# Patient Record
Sex: FEMALE | Race: WHITE | Employment: OTHER | URBAN - METROPOLITAN AREA
[De-identification: names, ages, dates, MRNs, and addresses within clinical notes are randomized per-mention and may not be internally consistent; named-entity substitution may affect disease eponyms.]

---

## 2018-06-20 ENCOUNTER — HOSPITAL ENCOUNTER (EMERGENCY)
Age: 48
Discharge: HOME | End: 2018-06-20
Attending: EMERGENCY MEDICINE
Payer: COMMERCIAL

## 2018-06-20 ENCOUNTER — APPOINTMENT (OUTPATIENT)
Dept: CT IMAGING | Age: 48
End: 2018-06-20
Payer: COMMERCIAL

## 2018-06-20 ENCOUNTER — APPOINTMENT (OUTPATIENT)
Dept: GENERAL RADIOLOGY | Age: 48
End: 2018-06-20
Payer: COMMERCIAL

## 2018-06-20 VITALS
OXYGEN SATURATION: 98 % | HEART RATE: 93 BPM | WEIGHT: 148 LBS | TEMPERATURE: 98.8 F | RESPIRATION RATE: 18 BRPM | HEIGHT: 59 IN | BODY MASS INDEX: 29.84 KG/M2 | SYSTOLIC BLOOD PRESSURE: 158 MMHG | DIASTOLIC BLOOD PRESSURE: 86 MMHG

## 2018-06-20 DIAGNOSIS — R07.89 ATYPICAL CHEST PAIN: Primary | ICD-10-CM

## 2018-06-20 DIAGNOSIS — R10.9 ABDOMINAL PAIN, UNSPECIFIED ABDOMINAL LOCATION: ICD-10-CM

## 2018-06-20 DIAGNOSIS — R60.0 LOWER EXTREMITY EDEMA: ICD-10-CM

## 2018-06-20 LAB
ALBUMIN SERPL-MCNC: 3.8 G/DL (ref 3.5–5.2)
ALP BLD-CCNC: 133 U/L (ref 35–104)
ALT SERPL-CCNC: 26 U/L (ref 0–32)
ANION GAP SERPL CALCULATED.3IONS-SCNC: 10 MMOL/L (ref 7–16)
AST SERPL-CCNC: 20 U/L (ref 0–31)
BACTERIA: ABNORMAL /HPF
BASOPHILS ABSOLUTE: 0.01 E9/L (ref 0–0.2)
BASOPHILS RELATIVE PERCENT: 0.1 % (ref 0–2)
BILIRUB SERPL-MCNC: 0.3 MG/DL (ref 0–1.2)
BILIRUBIN URINE: ABNORMAL
BLOOD, URINE: NEGATIVE
BUN BLDV-MCNC: 22 MG/DL (ref 6–20)
CALCIUM SERPL-MCNC: 9.9 MG/DL (ref 8.6–10.2)
CHLORIDE BLD-SCNC: 97 MMOL/L (ref 98–107)
CLARITY: CLEAR
CO2: 28 MMOL/L (ref 22–29)
COLOR: YELLOW
CREAT SERPL-MCNC: 0.6 MG/DL (ref 0.5–1)
EKG ATRIAL RATE: 91 BPM
EKG P AXIS: 47 DEGREES
EKG P-R INTERVAL: 124 MS
EKG Q-T INTERVAL: 362 MS
EKG QRS DURATION: 88 MS
EKG QTC CALCULATION (BAZETT): 445 MS
EKG R AXIS: 31 DEGREES
EKG T AXIS: 49 DEGREES
EKG VENTRICULAR RATE: 91 BPM
EOSINOPHILS ABSOLUTE: 0.09 E9/L (ref 0.05–0.5)
EOSINOPHILS RELATIVE PERCENT: 1 % (ref 0–6)
EPITHELIAL CELLS, UA: ABNORMAL /HPF
GFR AFRICAN AMERICAN: >60
GFR NON-AFRICAN AMERICAN: >60 ML/MIN/1.73
GLUCOSE BLD-MCNC: 446 MG/DL (ref 74–109)
GLUCOSE URINE: >=1000 MG/DL
HCT VFR BLD CALC: 39 % (ref 34–48)
HEMOGLOBIN: 12.9 G/DL (ref 11.5–15.5)
IMMATURE GRANULOCYTES #: 0.03 E9/L
IMMATURE GRANULOCYTES %: 0.3 % (ref 0–5)
KETONES, URINE: NEGATIVE MG/DL
LACTIC ACID: 1.9 MMOL/L (ref 0.5–2.2)
LEUKOCYTE ESTERASE, URINE: ABNORMAL
LIPASE: 27 U/L (ref 13–60)
LYMPHOCYTES ABSOLUTE: 2.19 E9/L (ref 1.5–4)
LYMPHOCYTES RELATIVE PERCENT: 25.4 % (ref 20–42)
MCH RBC QN AUTO: 31 PG (ref 26–35)
MCHC RBC AUTO-ENTMCNC: 33.1 % (ref 32–34.5)
MCV RBC AUTO: 93.8 FL (ref 80–99.9)
MONOCYTES ABSOLUTE: 0.51 E9/L (ref 0.1–0.95)
MONOCYTES RELATIVE PERCENT: 5.9 % (ref 2–12)
NEUTROPHILS ABSOLUTE: 5.79 E9/L (ref 1.8–7.3)
NEUTROPHILS RELATIVE PERCENT: 67.3 % (ref 43–80)
NITRITE, URINE: NEGATIVE
PDW BLD-RTO: 12.2 FL (ref 11.5–15)
PH UA: 6 (ref 5–9)
PLATELET # BLD: 229 E9/L (ref 130–450)
PMV BLD AUTO: 11.5 FL (ref 7–12)
POTASSIUM SERPL-SCNC: 5.6 MMOL/L (ref 3.5–5)
PROTEIN UA: NEGATIVE MG/DL
RBC # BLD: 4.16 E12/L (ref 3.5–5.5)
RBC UA: ABNORMAL /HPF (ref 0–2)
SODIUM BLD-SCNC: 135 MMOL/L (ref 132–146)
SPECIFIC GRAVITY UA: <=1.005 (ref 1–1.03)
TOTAL PROTEIN: 7.6 G/DL (ref 6.4–8.3)
TROPONIN: <0.01 NG/ML (ref 0–0.03)
UROBILINOGEN, URINE: 0.2 E.U./DL
WBC # BLD: 8.6 E9/L (ref 4.5–11.5)
WBC UA: ABNORMAL /HPF (ref 0–5)

## 2018-06-20 PROCEDURE — 36415 COLL VENOUS BLD VENIPUNCTURE: CPT

## 2018-06-20 PROCEDURE — 74177 CT ABD & PELVIS W/CONTRAST: CPT

## 2018-06-20 PROCEDURE — 83605 ASSAY OF LACTIC ACID: CPT

## 2018-06-20 PROCEDURE — 2580000003 HC RX 258: Performed by: EMERGENCY MEDICINE

## 2018-06-20 PROCEDURE — 99285 EMERGENCY DEPT VISIT HI MDM: CPT

## 2018-06-20 PROCEDURE — 96374 THER/PROPH/DIAG INJ IV PUSH: CPT

## 2018-06-20 PROCEDURE — 84484 ASSAY OF TROPONIN QUANT: CPT

## 2018-06-20 PROCEDURE — 81001 URINALYSIS AUTO W/SCOPE: CPT

## 2018-06-20 PROCEDURE — 93005 ELECTROCARDIOGRAM TRACING: CPT | Performed by: EMERGENCY MEDICINE

## 2018-06-20 PROCEDURE — 6360000002 HC RX W HCPCS: Performed by: EMERGENCY MEDICINE

## 2018-06-20 PROCEDURE — 85025 COMPLETE CBC W/AUTO DIFF WBC: CPT

## 2018-06-20 PROCEDURE — 83690 ASSAY OF LIPASE: CPT

## 2018-06-20 PROCEDURE — 80053 COMPREHEN METABOLIC PANEL: CPT

## 2018-06-20 PROCEDURE — 6360000004 HC RX CONTRAST MEDICATION: Performed by: RADIOLOGY

## 2018-06-20 PROCEDURE — 96375 TX/PRO/DX INJ NEW DRUG ADDON: CPT

## 2018-06-20 PROCEDURE — 71045 X-RAY EXAM CHEST 1 VIEW: CPT

## 2018-06-20 RX ORDER — 0.9 % SODIUM CHLORIDE 0.9 %
1000 INTRAVENOUS SOLUTION INTRAVENOUS ONCE
Status: COMPLETED | OUTPATIENT
Start: 2018-06-20 | End: 2018-06-20

## 2018-06-20 RX ORDER — ONDANSETRON 2 MG/ML
8 INJECTION INTRAMUSCULAR; INTRAVENOUS ONCE
Status: COMPLETED | OUTPATIENT
Start: 2018-06-20 | End: 2018-06-20

## 2018-06-20 RX ORDER — KETOROLAC TROMETHAMINE 30 MG/ML
30 INJECTION, SOLUTION INTRAMUSCULAR; INTRAVENOUS ONCE
Status: COMPLETED | OUTPATIENT
Start: 2018-06-20 | End: 2018-06-20

## 2018-06-20 RX ADMIN — KETOROLAC TROMETHAMINE 30 MG: 30 INJECTION, SOLUTION INTRAMUSCULAR at 10:27

## 2018-06-20 RX ADMIN — IOHEXOL 50 ML: 240 INJECTION, SOLUTION INTRATHECAL; INTRAVASCULAR; INTRAVENOUS; ORAL at 12:00

## 2018-06-20 RX ADMIN — ONDANSETRON 8 MG: 2 INJECTION INTRAMUSCULAR; INTRAVENOUS at 10:25

## 2018-06-20 RX ADMIN — IOPAMIDOL 80 ML: 755 INJECTION, SOLUTION INTRAVENOUS at 12:00

## 2018-06-20 RX ADMIN — SODIUM CHLORIDE 1000 ML: 900 INJECTION, SOLUTION INTRAVENOUS at 12:15

## 2018-06-20 ASSESSMENT — ENCOUNTER SYMPTOMS
BLOOD IN STOOL: 0
WHEEZING: 0
CHEST TIGHTNESS: 0
RHINORRHEA: 0
TROUBLE SWALLOWING: 0
CONSTIPATION: 0
COUGH: 0
VOMITING: 0
SHORTNESS OF BREATH: 0
ABDOMINAL PAIN: 1
SORE THROAT: 0
NAUSEA: 0
DIARRHEA: 1

## 2018-06-20 ASSESSMENT — PAIN SCALES - GENERAL
PAINLEVEL_OUTOF10: 6
PAINLEVEL_OUTOF10: 6

## 2018-06-20 ASSESSMENT — PAIN DESCRIPTION - DESCRIPTORS: DESCRIPTORS: ACHING

## 2018-06-20 ASSESSMENT — PAIN DESCRIPTION - LOCATION: LOCATION: BREAST

## 2018-06-20 ASSESSMENT — PAIN DESCRIPTION - PAIN TYPE: TYPE: ACUTE PAIN

## 2018-06-20 ASSESSMENT — PAIN DESCRIPTION - ORIENTATION: ORIENTATION: LEFT

## 2018-06-20 ASSESSMENT — PAIN DESCRIPTION - FREQUENCY: FREQUENCY: CONTINUOUS

## 2018-07-26 ENCOUNTER — HOSPITAL ENCOUNTER (OUTPATIENT)
Dept: INTERVENTIONAL RADIOLOGY/VASCULAR | Age: 48
End: 2018-07-26
Payer: COMMERCIAL

## 2018-07-26 ENCOUNTER — HOSPITAL ENCOUNTER (OUTPATIENT)
Dept: INTERVENTIONAL RADIOLOGY/VASCULAR | Age: 48
Discharge: HOME | End: 2018-07-28
Payer: COMMERCIAL

## 2018-07-26 DIAGNOSIS — M79.606 PAIN OF LOWER EXTREMITY, UNSPECIFIED LATERALITY: ICD-10-CM

## 2018-07-26 PROCEDURE — 93970 EXTREMITY STUDY: CPT

## 2018-07-27 ENCOUNTER — HOSPITAL ENCOUNTER (OUTPATIENT)
Dept: INTERVENTIONAL RADIOLOGY/VASCULAR | Age: 48
Discharge: HOME | End: 2018-07-29
Payer: COMMERCIAL

## 2018-07-27 DIAGNOSIS — I70.90 ARTERIOSCLEROSIS: ICD-10-CM

## 2018-07-27 PROCEDURE — 93923 UPR/LXTR ART STDY 3+ LVLS: CPT

## 2018-09-10 ENCOUNTER — HOSPITAL ENCOUNTER (OUTPATIENT)
Age: 48
Discharge: HOME | End: 2018-09-12
Payer: COMMERCIAL

## 2018-09-10 ENCOUNTER — OFFICE VISIT (OUTPATIENT)
Dept: FAMILY MEDICINE CLINIC | Age: 48
End: 2018-09-10
Payer: COMMERCIAL

## 2018-09-10 VITALS
TEMPERATURE: 98.5 F | RESPIRATION RATE: 18 BRPM | WEIGHT: 153 LBS | HEART RATE: 76 BPM | DIASTOLIC BLOOD PRESSURE: 84 MMHG | HEIGHT: 59 IN | SYSTOLIC BLOOD PRESSURE: 126 MMHG | OXYGEN SATURATION: 99 % | BODY MASS INDEX: 30.84 KG/M2

## 2018-09-10 DIAGNOSIS — E78.2 MIXED HYPERLIPIDEMIA: ICD-10-CM

## 2018-09-10 DIAGNOSIS — H35.00 RETINOPATHY: ICD-10-CM

## 2018-09-10 DIAGNOSIS — M54.50 LUMBAR PAIN: ICD-10-CM

## 2018-09-10 DIAGNOSIS — F41.9 ANXIETY: ICD-10-CM

## 2018-09-10 DIAGNOSIS — K58.9 IRRITABLE BOWEL SYNDROME, UNSPECIFIED TYPE: ICD-10-CM

## 2018-09-10 DIAGNOSIS — R07.89 ATYPICAL CHEST PAIN: ICD-10-CM

## 2018-09-10 DIAGNOSIS — G62.9 NEUROPATHY: ICD-10-CM

## 2018-09-10 DIAGNOSIS — R53.83 FATIGUE, UNSPECIFIED TYPE: ICD-10-CM

## 2018-09-10 DIAGNOSIS — Z00.00 PHYSICAL EXAM: ICD-10-CM

## 2018-09-10 DIAGNOSIS — K21.9 GASTROESOPHAGEAL REFLUX DISEASE WITHOUT ESOPHAGITIS: ICD-10-CM

## 2018-09-10 DIAGNOSIS — R73.01 IFG (IMPAIRED FASTING GLUCOSE): ICD-10-CM

## 2018-09-10 LAB
ALBUMIN SERPL-MCNC: 3.9 G/DL (ref 3.5–5.2)
ALP BLD-CCNC: 128 U/L (ref 35–104)
ALT SERPL-CCNC: 21 U/L (ref 0–32)
AMPHETAMINE SCREEN, URINE: NOT DETECTED
ANION GAP SERPL CALCULATED.3IONS-SCNC: 16 MMOL/L (ref 7–16)
AST SERPL-CCNC: 20 U/L (ref 0–31)
BARBITURATE SCREEN URINE: NOT DETECTED
BASOPHILS ABSOLUTE: 0.01 E9/L (ref 0–0.2)
BASOPHILS RELATIVE PERCENT: 0.1 % (ref 0–2)
BENZODIAZEPINE SCREEN, URINE: NOT DETECTED
BILIRUB SERPL-MCNC: 0.4 MG/DL (ref 0–1.2)
BUN BLDV-MCNC: 18 MG/DL (ref 6–20)
CALCIUM SERPL-MCNC: 9.4 MG/DL (ref 8.6–10.2)
CANNABINOID SCREEN URINE: NOT DETECTED
CHLORIDE BLD-SCNC: 98 MMOL/L (ref 98–107)
CHOLESTEROL, TOTAL: 233 MG/DL (ref 0–199)
CO2: 26 MMOL/L (ref 22–29)
COCAINE METABOLITE SCREEN URINE: NOT DETECTED
CREAT SERPL-MCNC: 0.8 MG/DL (ref 0.5–1)
EOSINOPHILS ABSOLUTE: 0.12 E9/L (ref 0.05–0.5)
EOSINOPHILS RELATIVE PERCENT: 1.3 % (ref 0–6)
GFR AFRICAN AMERICAN: >60
GFR NON-AFRICAN AMERICAN: >60 ML/MIN/1.73
GLUCOSE BLD-MCNC: 206 MG/DL
GLUCOSE BLD-MCNC: 234 MG/DL (ref 74–109)
HBA1C MFR BLD: 10.6 %
HCT VFR BLD CALC: 43.7 % (ref 34–48)
HDLC SERPL-MCNC: 49 MG/DL
HEMOGLOBIN: 13.7 G/DL (ref 11.5–15.5)
IMMATURE GRANULOCYTES #: 0.03 E9/L
IMMATURE GRANULOCYTES %: 0.3 % (ref 0–5)
LDL CHOLESTEROL CALCULATED: 164 MG/DL (ref 0–99)
LYMPHOCYTES ABSOLUTE: 2.11 E9/L (ref 1.5–4)
LYMPHOCYTES RELATIVE PERCENT: 22.1 % (ref 20–42)
MCH RBC QN AUTO: 30.4 PG (ref 26–35)
MCHC RBC AUTO-ENTMCNC: 31.4 % (ref 32–34.5)
MCV RBC AUTO: 97.1 FL (ref 80–99.9)
METHADONE SCREEN, URINE: NOT DETECTED
MONOCYTES ABSOLUTE: 0.54 E9/L (ref 0.1–0.95)
MONOCYTES RELATIVE PERCENT: 5.7 % (ref 2–12)
NEUTROPHILS ABSOLUTE: 6.72 E9/L (ref 1.8–7.3)
NEUTROPHILS RELATIVE PERCENT: 70.5 % (ref 43–80)
OPIATE SCREEN URINE: NOT DETECTED
PDW BLD-RTO: 12.6 FL (ref 11.5–15)
PHENCYCLIDINE SCREEN URINE: NOT DETECTED
PLATELET # BLD: 282 E9/L (ref 130–450)
PMV BLD AUTO: 11.7 FL (ref 7–12)
POTASSIUM SERPL-SCNC: 5.1 MMOL/L (ref 3.5–5)
PROPOXYPHENE SCREEN: NOT DETECTED
RBC # BLD: 4.5 E12/L (ref 3.5–5.5)
SODIUM BLD-SCNC: 140 MMOL/L (ref 132–146)
TOTAL PROTEIN: 7.6 G/DL (ref 6.4–8.3)
TRIGL SERPL-MCNC: 102 MG/DL (ref 0–149)
TSH SERPL DL<=0.05 MIU/L-ACNC: 3.08 UIU/ML (ref 0.27–4.2)
VLDLC SERPL CALC-MCNC: 20 MG/DL
WBC # BLD: 9.5 E9/L (ref 4.5–11.5)

## 2018-09-10 PROCEDURE — 80307 DRUG TEST PRSMV CHEM ANLYZR: CPT

## 2018-09-10 PROCEDURE — 2022F DILAT RTA XM EVC RTNOPTHY: CPT | Performed by: FAMILY MEDICINE

## 2018-09-10 PROCEDURE — G8427 DOCREV CUR MEDS BY ELIG CLIN: HCPCS | Performed by: FAMILY MEDICINE

## 2018-09-10 PROCEDURE — 85025 COMPLETE CBC W/AUTO DIFF WBC: CPT

## 2018-09-10 PROCEDURE — 82962 GLUCOSE BLOOD TEST: CPT | Performed by: FAMILY MEDICINE

## 2018-09-10 PROCEDURE — G8599 NO ASA/ANTIPLAT THER USE RNG: HCPCS | Performed by: FAMILY MEDICINE

## 2018-09-10 PROCEDURE — 3046F HEMOGLOBIN A1C LEVEL >9.0%: CPT | Performed by: FAMILY MEDICINE

## 2018-09-10 PROCEDURE — 3017F COLORECTAL CA SCREEN DOC REV: CPT | Performed by: FAMILY MEDICINE

## 2018-09-10 PROCEDURE — 1036F TOBACCO NON-USER: CPT | Performed by: FAMILY MEDICINE

## 2018-09-10 PROCEDURE — 99204 OFFICE O/P NEW MOD 45 MIN: CPT | Performed by: FAMILY MEDICINE

## 2018-09-10 PROCEDURE — 80061 LIPID PANEL: CPT

## 2018-09-10 PROCEDURE — 84443 ASSAY THYROID STIM HORMONE: CPT

## 2018-09-10 PROCEDURE — G0444 DEPRESSION SCREEN ANNUAL: HCPCS | Performed by: FAMILY MEDICINE

## 2018-09-10 PROCEDURE — 80053 COMPREHEN METABOLIC PANEL: CPT

## 2018-09-10 PROCEDURE — G8417 CALC BMI ABV UP PARAM F/U: HCPCS | Performed by: FAMILY MEDICINE

## 2018-09-10 PROCEDURE — 83036 HEMOGLOBIN GLYCOSYLATED A1C: CPT | Performed by: FAMILY MEDICINE

## 2018-09-10 RX ORDER — GLYBURIDE 5 MG/1
10 TABLET ORAL 2 TIMES DAILY WITH MEALS
Qty: 360 TABLET | Refills: 1 | Status: SHIPPED | OUTPATIENT
Start: 2018-09-10 | End: 2019-04-20

## 2018-09-10 RX ORDER — OMEPRAZOLE 40 MG/1
40 CAPSULE, DELAYED RELEASE ORAL DAILY
Qty: 90 CAPSULE | Refills: 1 | Status: SHIPPED | OUTPATIENT
Start: 2018-09-10 | End: 2018-11-26 | Stop reason: SDUPTHER

## 2018-09-10 RX ORDER — RAMIPRIL 1.25 MG/1
1.25 CAPSULE ORAL DAILY
Qty: 30 CAPSULE | Refills: 3 | Status: SHIPPED | OUTPATIENT
Start: 2018-09-10 | End: 2019-02-11

## 2018-09-10 RX ORDER — NITROGLYCERIN 0.4 MG/1
0.4 TABLET SUBLINGUAL EVERY 5 MIN PRN
Qty: 25 TABLET | Refills: 1 | Status: SHIPPED | OUTPATIENT
Start: 2018-09-10 | End: 2019-04-15 | Stop reason: SDUPTHER

## 2018-09-10 ASSESSMENT — PATIENT HEALTH QUESTIONNAIRE - PHQ9
1. LITTLE INTEREST OR PLEASURE IN DOING THINGS: 1
SUM OF ALL RESPONSES TO PHQ QUESTIONS 1-9: 8
7. TROUBLE CONCENTRATING ON THINGS, SUCH AS READING THE NEWSPAPER OR WATCHING TELEVISION: 0
6. FEELING BAD ABOUT YOURSELF - OR THAT YOU ARE A FAILURE OR HAVE LET YOURSELF OR YOUR FAMILY DOWN: 0
3. TROUBLE FALLING OR STAYING ASLEEP: 2
2. FEELING DOWN, DEPRESSED OR HOPELESS: 2
10. IF YOU CHECKED OFF ANY PROBLEMS, HOW DIFFICULT HAVE THESE PROBLEMS MADE IT FOR YOU TO DO YOUR WORK, TAKE CARE OF THINGS AT HOME, OR GET ALONG WITH OTHER PEOPLE: 1
9. THOUGHTS THAT YOU WOULD BE BETTER OFF DEAD, OR OF HURTING YOURSELF: 0
SUM OF ALL RESPONSES TO PHQ9 QUESTIONS 1 & 2: 3
4. FEELING TIRED OR HAVING LITTLE ENERGY: 2
8. MOVING OR SPEAKING SO SLOWLY THAT OTHER PEOPLE COULD HAVE NOTICED. OR THE OPPOSITE, BEING SO FIGETY OR RESTLESS THAT YOU HAVE BEEN MOVING AROUND A LOT MORE THAN USUAL: 0
SUM OF ALL RESPONSES TO PHQ QUESTIONS 1-9: 8
5. POOR APPETITE OR OVEREATING: 1

## 2018-09-14 PROBLEM — H35.00 RETINOPATHY: Status: ACTIVE | Noted: 2018-09-14

## 2018-09-14 PROBLEM — F41.9 ANXIETY: Status: ACTIVE | Noted: 2018-09-14

## 2018-09-14 PROBLEM — G62.9 NEUROPATHY: Status: ACTIVE | Noted: 2018-09-14

## 2018-09-14 PROBLEM — E78.2 MIXED HYPERLIPIDEMIA: Status: ACTIVE | Noted: 2018-09-14

## 2018-09-14 PROBLEM — R53.83 FATIGUE: Status: ACTIVE | Noted: 2018-09-14

## 2018-09-14 PROBLEM — M54.50 LUMBAR PAIN: Status: ACTIVE | Noted: 2018-09-14

## 2018-09-14 PROBLEM — K58.9 IRRITABLE BOWEL SYNDROME: Status: ACTIVE | Noted: 2018-09-14

## 2018-09-14 PROBLEM — K21.9 GASTROESOPHAGEAL REFLUX DISEASE WITHOUT ESOPHAGITIS: Status: ACTIVE | Noted: 2018-09-14

## 2018-09-14 ASSESSMENT — ENCOUNTER SYMPTOMS
SORE THROAT: 0
DIARRHEA: 0
HEMOPTYSIS: 0
BACK PAIN: 1
DOUBLE VISION: 0
EYE PAIN: 0
ORTHOPNEA: 0
SPUTUM PRODUCTION: 0
PHOTOPHOBIA: 0
SHORTNESS OF BREATH: 0
CONSTIPATION: 0
EYES NEGATIVE: 1
HEARTBURN: 1
ABDOMINAL PAIN: 0
BLURRED VISION: 0
COUGH: 0
NAUSEA: 0
BLOOD IN STOOL: 0
RESPIRATORY NEGATIVE: 1
EYE DISCHARGE: 0
VOMITING: 0
EYE REDNESS: 0
WHEEZING: 0
STRIDOR: 0
SINUS PAIN: 0

## 2018-09-14 NOTE — PROGRESS NOTES
EOM are normal. Right eye exhibits no discharge. Left eye exhibits no discharge. No scleral icterus. Pt states she has no vision right eye. Pt follows with retin center. Neck: Normal range of motion. Neck supple. No JVD present. No tracheal deviation present. No thyromegaly present. Cardiovascular: Normal rate, regular rhythm, normal heart sounds and intact distal pulses. Exam reveals no gallop and no friction rub. No murmur heard. Pulmonary/Chest: Effort normal and breath sounds normal. No stridor. No respiratory distress. She has no wheezes. She has no rales. She exhibits no tenderness. Abdominal: Soft. Bowel sounds are normal. She exhibits no distension and no mass. There is no tenderness. There is no rebound and no guarding. No hernia. Musculoskeletal: She exhibits tenderness. She exhibits no edema or deformity. Pain and decreased ROM lumbar. Lymphadenopathy:     She has no cervical adenopathy. Neurological: She is alert and oriented to person, place, and time. She has normal reflexes. She displays normal reflexes. No cranial nerve deficit or sensory deficit. She exhibits normal muscle tone. Coordination normal.   Skin: Skin is warm. No rash noted. She is not diaphoretic. No erythema. No pallor. Psychiatric: She has a normal mood and affect. Her behavior is normal. Judgment and thought content normal.   Nursing note and vitals reviewed. ASSESSMENT/PLAN  Jenny was seen today for diabetes, depression, discuss medications and medication refill. Diagnoses and all orders for this visit:    [facility] mellitus[facility] ([facility]  -     insulin glargine (BASAGLAR KWIKPEN) 100 UNIT/ML injection pen; Inject 32 Units into the skin nightly  -     LYRICA 50 MG capsule; Take 1 capsule by mouth daily. .  -     glyBURIDE (DIABETA) 5 MG tablet; Take 2 tablets by mouth 2 times daily (with meals)  -     ramipril (ALTACE) 1.25 MG capsule;  Take 1 capsule by mouth daily  -     CBC Auto controlled. Low chol. Diet. IFG (impaired fasting glucose)  -     CBC Auto Differential; Future  -     Comprehensive Metabolic Panel; Future  -     Lipid Panel; Future  -     TSH without Reflex; Future  -     URINE DRUG SCREEN; Future  Instructed on lab. Fatigue, unspecified type  -     CBC Auto Differential; Future  -     Comprehensive Metabolic Panel; Future  -     Lipid Panel; Future  -     TSH without Reflex; Future  -     URINE DRUG SCREEN; Future  Not controlled. Lab. Atypical chest pain  -     Metoprolol Succinate 25 MG CS24; Take 0.5 tablets by mouth daily  -     nitroGLYCERIN (NITROSTAT) 0.4 MG SL tablet; Place 1 tablet under the tongue every 5 minutes as needed for Chest pain  -     CBC Auto Differential; Future  -     Comprehensive Metabolic Panel; Future  -     Lipid Panel; Future  -     TSH without Reflex; Future  -     URINE DRUG SCREEN; Future  Stable. Lab, BB, nitrostat, cardiology. Lumbar pain  -     etodolac (LODINE) 300 MG capsule; Take 1 capsule by mouth daily  -     CBC Auto Differential; Future  -     Comprehensive Metabolic Panel; Future  -     Lipid Panel; Future  -     TSH without Reflex; Future  -     URINE DRUG SCREEN; Future  Stable. Lab, lodine. Other orders  -     POCT Glucose  -     POCT glycosylated hemoglobin (Hb A1C)      Pt instructed if any worse go ED ASAP. Outpatient Encounter Prescriptions as of 9/10/2018   Medication Sig Dispense Refill    insulin glargine (BASAGLAR KWIKPEN) 100 UNIT/ML injection pen Inject 32 Units into the skin nightly 5 pen 1    LYRICA 50 MG capsule Take 1 capsule by mouth daily. . 30 capsule 0    Metoprolol Succinate 25 MG CS24 Take 0.5 tablets by mouth daily 45 capsule 1    omeprazole (PRILOSEC) 40 MG delayed release capsule Take 1 capsule by mouth daily 90 capsule 1    nitroGLYCERIN (NITROSTAT) 0.4 MG SL tablet Place 1 tablet under the tongue every 5 minutes as needed for Chest pain 25 tablet 1    glyBURIDE (DIABETA) 5 MG tablet Take 2 Shingles Vaccine (1 of 2 - 2 Dose Series)    Colon cancer screen colonoscopy     Flu vaccine (1)    A1C test (Diabetic or Prediabetic)     Lipid screen     Potassium monitoring     Creatinine monitoring

## 2018-09-17 ENCOUNTER — HOSPITAL ENCOUNTER (EMERGENCY)
Age: 48
Discharge: HOME | End: 2018-09-17
Payer: COMMERCIAL

## 2018-09-17 ENCOUNTER — APPOINTMENT (OUTPATIENT)
Dept: CT IMAGING | Age: 48
End: 2018-09-17
Payer: COMMERCIAL

## 2018-09-17 VITALS
BODY MASS INDEX: 30.44 KG/M2 | TEMPERATURE: 98.6 F | HEART RATE: 77 BPM | OXYGEN SATURATION: 100 % | RESPIRATION RATE: 18 BRPM | SYSTOLIC BLOOD PRESSURE: 131 MMHG | DIASTOLIC BLOOD PRESSURE: 71 MMHG | WEIGHT: 151 LBS | HEIGHT: 59 IN

## 2018-09-17 DIAGNOSIS — R10.31 ABDOMINAL PAIN, RIGHT LOWER QUADRANT: ICD-10-CM

## 2018-09-17 DIAGNOSIS — R10.13 EPIGASTRIC PAIN: Primary | ICD-10-CM

## 2018-09-17 LAB
ALBUMIN SERPL-MCNC: 3.7 G/DL (ref 3.5–5.2)
ALP BLD-CCNC: 128 U/L (ref 35–104)
ALT SERPL-CCNC: 17 U/L (ref 0–32)
ANION GAP SERPL CALCULATED.3IONS-SCNC: 11 MMOL/L (ref 7–16)
AST SERPL-CCNC: 17 U/L (ref 0–31)
BASOPHILS ABSOLUTE: 0.01 E9/L (ref 0–0.2)
BASOPHILS RELATIVE PERCENT: 0.1 % (ref 0–2)
BILIRUB SERPL-MCNC: 0.4 MG/DL (ref 0–1.2)
BILIRUBIN URINE: ABNORMAL
BLOOD, URINE: NEGATIVE
BUN BLDV-MCNC: 18 MG/DL (ref 6–20)
CALCIUM SERPL-MCNC: 9.2 MG/DL (ref 8.6–10.2)
CHLORIDE BLD-SCNC: 102 MMOL/L (ref 98–107)
CLARITY: CLEAR
CO2: 26 MMOL/L (ref 22–29)
COLOR: YELLOW
CREAT SERPL-MCNC: 0.8 MG/DL (ref 0.5–1)
EOSINOPHILS ABSOLUTE: 0.06 E9/L (ref 0.05–0.5)
EOSINOPHILS RELATIVE PERCENT: 0.7 % (ref 0–6)
GFR AFRICAN AMERICAN: >60
GFR NON-AFRICAN AMERICAN: >60 ML/MIN/1.73
GLUCOSE BLD-MCNC: 348 MG/DL (ref 74–109)
GLUCOSE URINE: >=1000 MG/DL
HCT VFR BLD CALC: 39.2 % (ref 34–48)
HEMOGLOBIN: 12.9 G/DL (ref 11.5–15.5)
IMMATURE GRANULOCYTES #: 0.04 E9/L
IMMATURE GRANULOCYTES %: 0.5 % (ref 0–5)
KETONES, URINE: NEGATIVE MG/DL
LACTIC ACID: 1.3 MMOL/L (ref 0.5–2.2)
LEUKOCYTE ESTERASE, URINE: NEGATIVE
LIPASE: 26 U/L (ref 13–60)
LYMPHOCYTES ABSOLUTE: 2.29 E9/L (ref 1.5–4)
LYMPHOCYTES RELATIVE PERCENT: 26 % (ref 20–42)
MCH RBC QN AUTO: 30.6 PG (ref 26–35)
MCHC RBC AUTO-ENTMCNC: 32.9 % (ref 32–34.5)
MCV RBC AUTO: 93.1 FL (ref 80–99.9)
MONOCYTES ABSOLUTE: 0.47 E9/L (ref 0.1–0.95)
MONOCYTES RELATIVE PERCENT: 5.3 % (ref 2–12)
NEUTROPHILS ABSOLUTE: 5.94 E9/L (ref 1.8–7.3)
NEUTROPHILS RELATIVE PERCENT: 67.4 % (ref 43–80)
NITRITE, URINE: NEGATIVE
PDW BLD-RTO: 11.9 FL (ref 11.5–15)
PH UA: 6 (ref 5–9)
PLATELET # BLD: 254 E9/L (ref 130–450)
PMV BLD AUTO: 11.1 FL (ref 7–12)
POTASSIUM SERPL-SCNC: 4.7 MMOL/L (ref 3.5–5)
PROTEIN UA: NEGATIVE MG/DL
RBC # BLD: 4.21 E12/L (ref 3.5–5.5)
SODIUM BLD-SCNC: 139 MMOL/L (ref 132–146)
SPECIFIC GRAVITY UA: <=1.005 (ref 1–1.03)
TOTAL PROTEIN: 7.3 G/DL (ref 6.4–8.3)
UROBILINOGEN, URINE: 0.2 E.U./DL
WBC # BLD: 8.8 E9/L (ref 4.5–11.5)

## 2018-09-17 PROCEDURE — 36415 COLL VENOUS BLD VENIPUNCTURE: CPT

## 2018-09-17 PROCEDURE — 99284 EMERGENCY DEPT VISIT MOD MDM: CPT

## 2018-09-17 PROCEDURE — 83690 ASSAY OF LIPASE: CPT

## 2018-09-17 PROCEDURE — 6360000002 HC RX W HCPCS: Performed by: NURSE PRACTITIONER

## 2018-09-17 PROCEDURE — 6360000004 HC RX CONTRAST MEDICATION: Performed by: RADIOLOGY

## 2018-09-17 PROCEDURE — 80053 COMPREHEN METABOLIC PANEL: CPT

## 2018-09-17 PROCEDURE — 96375 TX/PRO/DX INJ NEW DRUG ADDON: CPT

## 2018-09-17 PROCEDURE — 83605 ASSAY OF LACTIC ACID: CPT

## 2018-09-17 PROCEDURE — 74177 CT ABD & PELVIS W/CONTRAST: CPT

## 2018-09-17 PROCEDURE — 2580000003 HC RX 258: Performed by: NURSE PRACTITIONER

## 2018-09-17 PROCEDURE — 81003 URINALYSIS AUTO W/O SCOPE: CPT

## 2018-09-17 PROCEDURE — 96374 THER/PROPH/DIAG INJ IV PUSH: CPT

## 2018-09-17 PROCEDURE — 85025 COMPLETE CBC W/AUTO DIFF WBC: CPT

## 2018-09-17 RX ORDER — 0.9 % SODIUM CHLORIDE 0.9 %
1000 INTRAVENOUS SOLUTION INTRAVENOUS ONCE
Status: COMPLETED | OUTPATIENT
Start: 2018-09-17 | End: 2018-09-17

## 2018-09-17 RX ORDER — SUCRALFATE 1 G/1
1 TABLET ORAL 4 TIMES DAILY
Qty: 40 TABLET | Refills: 0 | Status: SHIPPED | OUTPATIENT
Start: 2018-09-17 | End: 2018-10-10 | Stop reason: CLARIF

## 2018-09-17 RX ORDER — KETOROLAC TROMETHAMINE 30 MG/ML
30 INJECTION, SOLUTION INTRAMUSCULAR; INTRAVENOUS ONCE
Status: COMPLETED | OUTPATIENT
Start: 2018-09-17 | End: 2018-09-17

## 2018-09-17 RX ORDER — PANTOPRAZOLE SODIUM 40 MG/1
40 TABLET, DELAYED RELEASE ORAL DAILY
Qty: 10 TABLET | Refills: 0 | Status: SHIPPED | OUTPATIENT
Start: 2018-09-17 | End: 2018-10-10

## 2018-09-17 RX ORDER — DICYCLOMINE HYDROCHLORIDE 10 MG/1
20 CAPSULE ORAL
Qty: 40 CAPSULE | Refills: 0 | Status: SHIPPED | OUTPATIENT
Start: 2018-09-17 | End: 2018-10-10 | Stop reason: CLARIF

## 2018-09-17 RX ORDER — ROSUVASTATIN CALCIUM 10 MG/1
5 TABLET, COATED ORAL DAILY
COMMUNITY
End: 2019-04-15 | Stop reason: SDUPTHER

## 2018-09-17 RX ORDER — METOCLOPRAMIDE 10 MG/1
10 TABLET ORAL 4 TIMES DAILY
Qty: 120 TABLET | Refills: 0 | Status: SHIPPED | OUTPATIENT
Start: 2018-09-17 | End: 2019-02-11 | Stop reason: CLARIF

## 2018-09-17 RX ORDER — MORPHINE SULFATE 10 MG/ML
6 INJECTION, SOLUTION INTRAMUSCULAR; INTRAVENOUS ONCE
Status: COMPLETED | OUTPATIENT
Start: 2018-09-17 | End: 2018-09-17

## 2018-09-17 RX ORDER — ONDANSETRON 2 MG/ML
4 INJECTION INTRAMUSCULAR; INTRAVENOUS ONCE
Status: COMPLETED | OUTPATIENT
Start: 2018-09-17 | End: 2018-09-17

## 2018-09-17 RX ADMIN — KETOROLAC TROMETHAMINE 30 MG: 30 INJECTION, SOLUTION INTRAMUSCULAR; INTRAVENOUS at 12:12

## 2018-09-17 RX ADMIN — IOPAMIDOL 80 ML: 755 INJECTION, SOLUTION INTRAVENOUS at 13:02

## 2018-09-17 RX ADMIN — ONDANSETRON 4 MG: 2 INJECTION, SOLUTION INTRAMUSCULAR; INTRAVENOUS at 12:11

## 2018-09-17 RX ADMIN — SODIUM CHLORIDE 1000 ML: 9 INJECTION, SOLUTION INTRAVENOUS at 12:11

## 2018-09-17 RX ADMIN — MORPHINE SULFATE 6 MG: 10 INJECTION INTRAVENOUS at 12:12

## 2018-09-17 ASSESSMENT — PAIN SCALES - GENERAL
PAINLEVEL_OUTOF10: 8
PAINLEVEL_OUTOF10: 10

## 2018-09-17 NOTE — ED PROVIDER NOTES
that she has never smoked. She has never used smokeless tobacco. She reports that she drinks alcohol. She reports that she does not use drugs. Family History: family history includes Diabetes in her father and mother; Heart Disease in her father and mother. Allergies: Patient has no known allergies. Physical Exam           ED Triage Vitals [09/17/18 1124]   BP Temp Temp Source Pulse Resp SpO2 Height Weight   132/66 98.6 °F (37 °C) Oral 78 20 100 % 4' 11\" (1.499 m) 151 lb (68.5 kg)      Oxygen Saturation Interpretation: Normal.    · General Appearance/Constitutional:  Alert, development consistent with age. · HEENT:  NC/NT. PERRLA. Airway patent. · Neck:  Supple. No lymphadenopathy. · Respiratory:  No retractions. Lungs Clear to auscultation and breath sounds equal.  · CV:  Regular rate and rhythm. · GI:  General Appearance: normal.         Bowel sounds: normal bowel sounds. Distension:  None. Tenderness: No abdominal tenderness. Liver: non-palpable and non-tender. Spleen:  non-palpable and non-tender. Pulsatile Mass: absent. Hernia:  no inguinal or femoral hernias noted. · Back: CVA Tenderness: No.  · : (chaperone present during examination). deferred. · Integument:  Normal turgor. Warm, dry, without visible rash, unless noted elsewhere. · Lymphatics: No edema, cap.refill <3sec. · Neurological:  Orientation age-appropriate. Motor functions intact.     Lab / Imaging Results   (All laboratory and radiology results have been personally reviewed by myself)  Labs:  Results for orders placed or performed during the hospital encounter of 09/17/18   CBC auto differential   Result Value Ref Range    WBC 8.8 4.5 - 11.5 E9/L    RBC 4.21 3.50 - 5.50 E12/L    Hemoglobin 12.9 11.5 - 15.5 g/dL    Hematocrit 39.2 34.0 - 48.0 %    MCV 93.1 80.0 - 99.9 fL    MCH 30.6 26.0 - 35.0 pg    MCHC 32.9 32.0 - 34.5 %    RDW 11.9 11.5 - 15.0 fL signed by LAKESHA Amos CNP   DD: 9/17/18  **This report was transcribed using voice recognition software. Every effort was made to ensure accuracy; however, inadvertent computerized transcription errors may be present.   END OF ED PROVIDER NOTE     LAKESHA Amos CNP  09/17/18 2293

## 2018-09-17 NOTE — ED NOTES
Radiology Procedure Waiver   Name: Gabby Griggs  : 1970  MRN: [id number]    Date:  18    Time: 11:39 AM    Benefits of immediately proceeding with Radiology exam(s) without pre-testing outweigh the risks or are not indicated as specified below and therefore the following is/are being waived:    [x] Pregnancy test   [] Patients LMP on-time and regular.   [] Patient had Tubal Ligation or has other Contraception Device. [] Patient  is Menopausal or Premenarcheal.    [x] Patient had Full or Partial Hysterectomy. [] Protocol for Iodine allergy    [] MRI Questionnaire     [] BUN/Creatinine   [] Patient age w/no hx of renal dysfunction. [] Patient on Dialysis. [] Recent Normal Labs.   Electronically signed by LAKESHA Mosquera CNP on 18 at 11:39 AM             LAKESHA Mosquera CNP  [phone]

## 2018-10-09 ENCOUNTER — HOSPITAL ENCOUNTER (OUTPATIENT)
Age: 48
Discharge: HOME | End: 2018-10-09
Payer: COMMERCIAL

## 2018-10-09 PROCEDURE — 87045 FECES CULTURE AEROBIC BACT: CPT

## 2018-10-09 PROCEDURE — 89055 LEUKOCYTE ASSESSMENT FECAL: CPT

## 2018-10-09 PROCEDURE — G0328 FECAL BLOOD SCRN IMMUNOASSAY: HCPCS

## 2018-10-09 PROCEDURE — 87329 GIARDIA AG IA: CPT

## 2018-10-10 ENCOUNTER — OFFICE VISIT (OUTPATIENT)
Dept: FAMILY MEDICINE CLINIC | Age: 48
End: 2018-10-10
Payer: COMMERCIAL

## 2018-10-10 ENCOUNTER — TELEPHONE (OUTPATIENT)
Dept: FAMILY MEDICINE CLINIC | Age: 48
End: 2018-10-10

## 2018-10-10 VITALS
WEIGHT: 151 LBS | RESPIRATION RATE: 18 BRPM | HEART RATE: 81 BPM | SYSTOLIC BLOOD PRESSURE: 122 MMHG | OXYGEN SATURATION: 98 % | DIASTOLIC BLOOD PRESSURE: 84 MMHG | BODY MASS INDEX: 30.44 KG/M2 | HEIGHT: 59 IN

## 2018-10-10 DIAGNOSIS — H35.00 RETINOPATHY: ICD-10-CM

## 2018-10-10 DIAGNOSIS — K21.9 GASTROESOPHAGEAL REFLUX DISEASE WITHOUT ESOPHAGITIS: ICD-10-CM

## 2018-10-10 DIAGNOSIS — R42 VERTIGO: ICD-10-CM

## 2018-10-10 DIAGNOSIS — E78.2 MIXED HYPERLIPIDEMIA: ICD-10-CM

## 2018-10-10 DIAGNOSIS — Z12.39 SCREENING FOR BREAST CANCER: ICD-10-CM

## 2018-10-10 DIAGNOSIS — G62.9 NEUROPATHY: ICD-10-CM

## 2018-10-10 DIAGNOSIS — R53.1 WEAKNESS: ICD-10-CM

## 2018-10-10 LAB
GIARDIA ANTIGEN STOOL: NORMAL
WHITE BLOOD CELLS (WBC), STOOL: NORMAL

## 2018-10-10 PROCEDURE — G8417 CALC BMI ABV UP PARAM F/U: HCPCS | Performed by: FAMILY MEDICINE

## 2018-10-10 PROCEDURE — G8599 NO ASA/ANTIPLAT THER USE RNG: HCPCS | Performed by: FAMILY MEDICINE

## 2018-10-10 PROCEDURE — 99214 OFFICE O/P EST MOD 30 MIN: CPT | Performed by: FAMILY MEDICINE

## 2018-10-10 PROCEDURE — 1036F TOBACCO NON-USER: CPT | Performed by: FAMILY MEDICINE

## 2018-10-10 PROCEDURE — 2022F DILAT RTA XM EVC RTNOPTHY: CPT | Performed by: FAMILY MEDICINE

## 2018-10-10 PROCEDURE — 3046F HEMOGLOBIN A1C LEVEL >9.0%: CPT | Performed by: FAMILY MEDICINE

## 2018-10-10 PROCEDURE — 3017F COLORECTAL CA SCREEN DOC REV: CPT | Performed by: FAMILY MEDICINE

## 2018-10-10 PROCEDURE — G8484 FLU IMMUNIZE NO ADMIN: HCPCS | Performed by: FAMILY MEDICINE

## 2018-10-10 PROCEDURE — G8427 DOCREV CUR MEDS BY ELIG CLIN: HCPCS | Performed by: FAMILY MEDICINE

## 2018-10-10 RX ORDER — BUMETANIDE 1 MG/1
1 TABLET ORAL EVERY OTHER DAY
COMMUNITY
End: 2019-04-15 | Stop reason: SDUPTHER

## 2018-10-10 RX ORDER — BLOOD PRESSURE TEST KIT
1 KIT MISCELLANEOUS DAILY
Qty: 1 KIT | Refills: 0 | Status: SHIPPED | OUTPATIENT
Start: 2018-10-10

## 2018-10-11 LAB — CULTURE, STOOL: NORMAL

## 2018-10-12 LAB — OCCULT BLOOD SCREENING: NORMAL

## 2018-10-15 ASSESSMENT — ENCOUNTER SYMPTOMS
BLOOD IN STOOL: 0
SORE THROAT: 0
ORTHOPNEA: 0
WHEEZING: 0
PHOTOPHOBIA: 0
COUGH: 0
DIARRHEA: 0
BLURRED VISION: 1
DOUBLE VISION: 0
EYE DISCHARGE: 0
SPUTUM PRODUCTION: 0
HEMOPTYSIS: 0
SHORTNESS OF BREATH: 0
NAUSEA: 0
VOMITING: 0
ABDOMINAL PAIN: 0
BACK PAIN: 0
SINUS PAIN: 0
HEARTBURN: 1
EYE REDNESS: 0
STRIDOR: 0
EYE PAIN: 0
RESPIRATORY NEGATIVE: 1
CONSTIPATION: 0

## 2018-10-15 NOTE — PROGRESS NOTES
Positive for tingling and focal weakness. Negative for dizziness, tremors, sensory change, speech change, seizures, loss of consciousness, weakness and headaches. Endo/Heme/Allergies: Negative. Negative for environmental allergies and polydipsia. Does not bruise/bleed easily. Psychiatric/Behavioral: Negative. Negative for depression, hallucinations, memory loss, substance abuse and suicidal ideas. The patient is not nervous/anxious and does not have insomnia. Past Medical/Surgical Hx;  Reviewed with patient      Diagnosis Date    Arthritis     lower back    Back pain     Diabetes mellitus ([address]     H/O exercise stress test 10/2015  ? Dr Ronaldo Hobson  neg    Hyperlipidemia     Hypertension     Neuropathy due to secondary diabetes ([address]     in marcial feet     Past Surgical History:   Procedure Laterality Date    ANKLE SURGERY      bilateral tarsal tunnels    CARPAL TUNNEL RELEASE      left    CARPAL TUNNEL RELEASE  12    right     SECTION      HYSTERECTOMY      SHOULDER ARTHROSCOPY Left 2016    subacromin decompression and debridement       Past Family Hx:  Reviewed with patient  Family History   Problem Relation Age of Onset    Heart Disease Mother     Diabetes Mother     Heart Disease Father     Diabetes Father        Social Hx:  Reviewed with patient  Social History   Substance Use Topics    Smoking status: Never Smoker    Smokeless tobacco: Never Used    Alcohol use Yes      Comment: social       OBJECTIVE  /84   Pulse 81   Resp 18   Ht 4' 11\" (1.499 m)   Wt 151 lb (68.5 kg)   LMP  (LMP Unknown)   SpO2 98%   Breastfeeding? No   BMI 30.50 kg/m²     Problem List:  Praveen Dsouza  does not have any pertinent problems on file. PHYS EX:  Physical Exam   Constitutional: She is oriented to person, place, and time. She appears well-developed and well-nourished. No distress. HENT:   Head: Normocephalic and atraumatic.    Right Ear: External ear normal.   Left Ear: (VICTOZA) 18 MG/3ML SOPN SC injection; Inject 0.6 mg into the skin daily  Not controlled. Micro, diabeta, victoza, basaglar, ace, statin, aspirin, ADA diet. Neuropathy  -     Misc. Devices (QUAD CANE) MISC; 1 Quad cane  -     [facility] [address]  Not controlled. Lodine,  Lyrica, quad cane. Gastroesophageal reflux disease without esophagitis  Stable. Reglan, prilosex. Retinopathy  Stable. Specialist.  Mixed hyperlipidemia  Not controlled. Low chol. Diet, statin. Screening for breast cancer  -     [facility] DIGITAL SCREEN W OR WO CAD BILATERAL; Future  Pt instructed on marcial. Curry.  Vertigo  -     Blood Pressure KIT; 1 kit by Does not apply route daily  -     [facility] [address]  Stable. BP kit, PT. Weakness  -     Blood Pressure KIT; 1 kit by Does not apply route daily  -     [facility] [address]  Stable. BP kit, PT. Pt instructed if any worse go ED ASAP. Outpatient Encounter Prescriptions as of 10/10/2018   Medication Sig Dispense Refill    bumetanide (BUMEX) 1 MG tablet Take 1 mg by mouth every other day      Blood Pressure KIT 1 kit by Does not apply route daily 1 kit 0    Misc. Devices (QUAD CANE) MISC 1 Quad cane 1 each 0    Liraglutide (VICTOZA) 18 MG/3ML SOPN SC injection Inject 0.6 mg into the skin daily 1 pen 3    rosuvastatin (CRESTOR) 10 MG tablet Take 10 mg by mouth daily      metoclopramide (REGLAN) 10 MG tablet Take 1 tablet by mouth 4 times daily 120 tablet 0    insulin glargine (BASAGLAR KWIKPEN) 100 UNIT/ML injection pen Inject 32 Units into the skin nightly 5 pen 1    LYRICA 50 MG capsule Take 1 capsule by mouth daily. . 30 capsule 0    Metoprolol Succinate 25 MG CS24 Take 0.5 tablets by mouth daily 45 capsule 1    omeprazole (PRILOSEC) 40 MG delayed release capsule Take 1 capsule by mouth daily 90 capsule 1    nitroGLYCERIN (NITROSTAT) 0.4 MG SL tablet Place 1 tablet under the tongue every 5 minutes as needed for Chest

## 2018-10-22 DIAGNOSIS — G62.9 NEUROPATHY: ICD-10-CM

## 2018-10-29 ENCOUNTER — HOSPITAL ENCOUNTER (OUTPATIENT)
Age: 48
Discharge: HOME | End: 2018-10-31
Payer: COMMERCIAL

## 2018-10-29 ENCOUNTER — OFFICE VISIT (OUTPATIENT)
Dept: FAMILY MEDICINE CLINIC | Age: 48
End: 2018-10-29
Payer: COMMERCIAL

## 2018-10-29 VITALS
DIASTOLIC BLOOD PRESSURE: 66 MMHG | WEIGHT: 152 LBS | SYSTOLIC BLOOD PRESSURE: 124 MMHG | HEIGHT: 59 IN | OXYGEN SATURATION: 99 % | HEART RATE: 80 BPM | RESPIRATION RATE: 18 BRPM | BODY MASS INDEX: 30.64 KG/M2

## 2018-10-29 DIAGNOSIS — G62.9 NEUROPATHY: ICD-10-CM

## 2018-10-29 DIAGNOSIS — R53.83 FATIGUE, UNSPECIFIED TYPE: ICD-10-CM

## 2018-10-29 DIAGNOSIS — Z23 IMMUNIZATION DUE: ICD-10-CM

## 2018-10-29 DIAGNOSIS — E78.2 MIXED HYPERLIPIDEMIA: ICD-10-CM

## 2018-10-29 DIAGNOSIS — L30.9 DERMATITIS: ICD-10-CM

## 2018-10-29 LAB
AMPHETAMINE SCREEN, URINE: NOT DETECTED
BARBITURATE SCREEN URINE: NOT DETECTED
BENZODIAZEPINE SCREEN, URINE: NOT DETECTED
CANNABINOID SCREEN URINE: NOT DETECTED
COCAINE METABOLITE SCREEN URINE: NOT DETECTED
GLUCOSE BLD-MCNC: 265 MG/DL
METHADONE SCREEN, URINE: NOT DETECTED
MICROALBUMIN UR-MCNC: <12 MG/L
OPIATE SCREEN URINE: NOT DETECTED
PHENCYCLIDINE SCREEN URINE: NOT DETECTED
PROPOXYPHENE SCREEN: NOT DETECTED

## 2018-10-29 PROCEDURE — 1036F TOBACCO NON-USER: CPT | Performed by: FAMILY MEDICINE

## 2018-10-29 PROCEDURE — 2022F DILAT RTA XM EVC RTNOPTHY: CPT | Performed by: FAMILY MEDICINE

## 2018-10-29 PROCEDURE — G8417 CALC BMI ABV UP PARAM F/U: HCPCS | Performed by: FAMILY MEDICINE

## 2018-10-29 PROCEDURE — 82044 UR ALBUMIN SEMIQUANTITATIVE: CPT

## 2018-10-29 PROCEDURE — 3017F COLORECTAL CA SCREEN DOC REV: CPT | Performed by: FAMILY MEDICINE

## 2018-10-29 PROCEDURE — 99214 OFFICE O/P EST MOD 30 MIN: CPT | Performed by: FAMILY MEDICINE

## 2018-10-29 PROCEDURE — G8427 DOCREV CUR MEDS BY ELIG CLIN: HCPCS | Performed by: FAMILY MEDICINE

## 2018-10-29 PROCEDURE — 80307 DRUG TEST PRSMV CHEM ANLYZR: CPT

## 2018-10-29 PROCEDURE — 90732 PPSV23 VACC 2 YRS+ SUBQ/IM: CPT | Performed by: FAMILY MEDICINE

## 2018-10-29 PROCEDURE — 90471 IMMUNIZATION ADMIN: CPT | Performed by: FAMILY MEDICINE

## 2018-10-29 PROCEDURE — 90472 IMMUNIZATION ADMIN EACH ADD: CPT | Performed by: FAMILY MEDICINE

## 2018-10-29 PROCEDURE — 82962 GLUCOSE BLOOD TEST: CPT | Performed by: FAMILY MEDICINE

## 2018-10-29 PROCEDURE — G8599 NO ASA/ANTIPLAT THER USE RNG: HCPCS | Performed by: FAMILY MEDICINE

## 2018-10-29 PROCEDURE — 90686 IIV4 VACC NO PRSV 0.5 ML IM: CPT | Performed by: FAMILY MEDICINE

## 2018-10-29 PROCEDURE — 3046F HEMOGLOBIN A1C LEVEL >9.0%: CPT | Performed by: FAMILY MEDICINE

## 2018-10-29 PROCEDURE — G8482 FLU IMMUNIZE ORDER/ADMIN: HCPCS | Performed by: FAMILY MEDICINE

## 2018-10-29 RX ORDER — BLOOD-GLUCOSE METER
1 KIT MISCELLANEOUS DAILY
Qty: 1 KIT | Refills: 0 | Status: SHIPPED | OUTPATIENT
Start: 2018-10-29 | End: 2021-08-04

## 2018-10-29 RX ORDER — PREGABALIN 50 MG/1
50 CAPSULE ORAL DAILY
Qty: 30 CAPSULE | Refills: 0 | Status: SHIPPED | OUTPATIENT
Start: 2018-10-29 | End: 2018-11-09

## 2018-10-29 RX ORDER — MUPIROCIN CALCIUM 20 MG/G
CREAM TOPICAL
Qty: 1 TUBE | Refills: 0 | Status: SHIPPED | OUTPATIENT
Start: 2018-10-29 | End: 2018-11-28

## 2018-10-30 ENCOUNTER — TELEPHONE (OUTPATIENT)
Dept: FAMILY MEDICINE CLINIC | Age: 48
End: 2018-10-30

## 2018-11-04 PROBLEM — L30.9 DERMATITIS: Status: ACTIVE | Noted: 2018-11-04

## 2018-11-04 ASSESSMENT — ENCOUNTER SYMPTOMS
VOMITING: 0
RESPIRATORY NEGATIVE: 1
EYE REDNESS: 0
EYE PAIN: 0
BLOOD IN STOOL: 0
ORTHOPNEA: 0
DIARRHEA: 0
SINUS PAIN: 0
EYES NEGATIVE: 1
ABDOMINAL PAIN: 0
HEARTBURN: 0
STRIDOR: 0
SORE THROAT: 0
NAUSEA: 0
BLURRED VISION: 0
BACK PAIN: 0
DOUBLE VISION: 0
PHOTOPHOBIA: 0
CONSTIPATION: 0
SPUTUM PRODUCTION: 0
EYE DISCHARGE: 0
HEMOPTYSIS: 0
COUGH: 0
WHEEZING: 0
GASTROINTESTINAL NEGATIVE: 1
SHORTNESS OF BREATH: 0

## 2018-11-05 ENCOUNTER — TELEPHONE (OUTPATIENT)
Dept: FAMILY MEDICINE CLINIC | Age: 48
End: 2018-11-05

## 2018-11-05 RX ORDER — GLUCOSAMINE HCL/CHONDROITIN SU 500-400 MG
CAPSULE ORAL
Qty: 100 STRIP | Refills: 1 | Status: SHIPPED | OUTPATIENT
Start: 2018-11-05 | End: 2018-11-19 | Stop reason: SDUPTHER

## 2018-11-05 RX ORDER — LANCETS 30 GAUGE
1 EACH MISCELLANEOUS DAILY
Qty: 100 EACH | Refills: 1 | Status: SHIPPED | OUTPATIENT
Start: 2018-11-05 | End: 2019-06-10 | Stop reason: SDUPTHER

## 2018-11-07 ENCOUNTER — TELEPHONE (OUTPATIENT)
Dept: FAMILY MEDICINE CLINIC | Age: 48
End: 2018-11-07

## 2018-11-07 DIAGNOSIS — G62.9 NEUROPATHY: Primary | ICD-10-CM

## 2018-11-08 ENCOUNTER — TELEPHONE (OUTPATIENT)
Dept: FAMILY MEDICINE CLINIC | Age: 48
End: 2018-11-08

## 2018-11-08 NOTE — TELEPHONE ENCOUNTER
Tim Juarez has reviewed the request for [facility] submitted by Josep Wood on behalf of Charlene Ayala on 11/07/2018. After review, the request for service is:  Denied: It did not meet the established medical necessity criteria or guidelines at this time. The request does not meet Plan criteria for an exception to the preferred drug list (formulary). Other medications are available on the preferred drug list.  [place] is given to patients who have tried or are unable to try certain medicines at the maximum tolerated dose for 6 to 8 weeks: tricyclic anti-depressants-(IE: amitriptyline, nortriptyline, desipramine, imipramine, doexpin), OR SNRI anti-depressants (IE: duloxetine, venlafaxine). The facts reviewed in your pharmacy claims history does not show you have tried one of these anti-depressant medications for the length required. Please speak with your doctor about your choices. This decision was made per the [facility] (pregabalin) Guideline.     Electronically signed by Kim Escobar MA on 11/8/18 at 9:45 AM

## 2018-11-09 RX ORDER — DULOXETIN HYDROCHLORIDE 30 MG/1
30 CAPSULE, DELAYED RELEASE ORAL EVERY MORNING
Qty: 90 CAPSULE | Refills: 1 | Status: SHIPPED | OUTPATIENT
Start: 2018-11-09 | End: 2019-04-15

## 2018-11-17 ENCOUNTER — APPOINTMENT (OUTPATIENT)
Dept: GENERAL RADIOLOGY | Age: 48
End: 2018-11-17
Payer: COMMERCIAL

## 2018-11-17 ENCOUNTER — HOSPITAL ENCOUNTER (EMERGENCY)
Age: 48
Discharge: HOME | End: 2018-11-17
Payer: COMMERCIAL

## 2018-11-17 ENCOUNTER — APPOINTMENT (OUTPATIENT)
Dept: CT IMAGING | Age: 48
End: 2018-11-17
Payer: COMMERCIAL

## 2018-11-17 VITALS
DIASTOLIC BLOOD PRESSURE: 79 MMHG | SYSTOLIC BLOOD PRESSURE: 128 MMHG | HEART RATE: 84 BPM | HEIGHT: 59 IN | RESPIRATION RATE: 18 BRPM | BODY MASS INDEX: 30.64 KG/M2 | WEIGHT: 152 LBS | TEMPERATURE: 97.9 F | OXYGEN SATURATION: 99 %

## 2018-11-17 DIAGNOSIS — S70.02XA CONTUSION OF LEFT HIP, INITIAL ENCOUNTER: ICD-10-CM

## 2018-11-17 DIAGNOSIS — S09.90XA INJURY OF HEAD, INITIAL ENCOUNTER: Primary | ICD-10-CM

## 2018-11-17 PROCEDURE — 96374 THER/PROPH/DIAG INJ IV PUSH: CPT

## 2018-11-17 PROCEDURE — 99284 EMERGENCY DEPT VISIT MOD MDM: CPT

## 2018-11-17 PROCEDURE — 73700 CT LOWER EXTREMITY W/O DYE: CPT

## 2018-11-17 PROCEDURE — 6360000002 HC RX W HCPCS: Performed by: NURSE PRACTITIONER

## 2018-11-17 PROCEDURE — 6360000002 HC RX W HCPCS: Performed by: PHYSICIAN ASSISTANT

## 2018-11-17 PROCEDURE — 96375 TX/PRO/DX INJ NEW DRUG ADDON: CPT

## 2018-11-17 PROCEDURE — 72100 X-RAY EXAM L-S SPINE 2/3 VWS: CPT

## 2018-11-17 PROCEDURE — 96376 TX/PRO/DX INJ SAME DRUG ADON: CPT

## 2018-11-17 PROCEDURE — 73502 X-RAY EXAM HIP UNI 2-3 VIEWS: CPT

## 2018-11-17 RX ORDER — METHYLPREDNISOLONE 4 MG/1
TABLET ORAL
Qty: 21 TABLET | Status: SHIPPED | OUTPATIENT
Start: 2018-11-17 | End: 2018-11-23

## 2018-11-17 RX ORDER — IBUPROFEN 800 MG/1
800 TABLET ORAL EVERY 6 HOURS PRN
Qty: 20 TABLET | Refills: 3 | Status: SHIPPED | OUTPATIENT
Start: 2018-11-17 | End: 2018-11-26

## 2018-11-17 RX ORDER — ONDANSETRON 2 MG/ML
4 INJECTION INTRAMUSCULAR; INTRAVENOUS ONCE
Status: COMPLETED | OUTPATIENT
Start: 2018-11-17 | End: 2018-11-17

## 2018-11-17 RX ORDER — MORPHINE SULFATE 10 MG/ML
6 INJECTION, SOLUTION INTRAMUSCULAR; INTRAVENOUS ONCE
Status: COMPLETED | OUTPATIENT
Start: 2018-11-17 | End: 2018-11-17

## 2018-11-17 RX ORDER — LIDOCAINE AND PRILOCAINE 25; 25 MG/G; MG/G
CREAM TOPICAL
Qty: 30 G | Refills: 0 | Status: SHIPPED | OUTPATIENT
Start: 2018-11-17 | End: 2021-08-04 | Stop reason: CLARIF

## 2018-11-17 RX ORDER — MORPHINE SULFATE 4 MG/ML
4 INJECTION, SOLUTION INTRAMUSCULAR; INTRAVENOUS ONCE
Status: COMPLETED | OUTPATIENT
Start: 2018-11-17 | End: 2018-11-17

## 2018-11-17 RX ADMIN — MORPHINE SULFATE 4 MG: 4 INJECTION INTRAVENOUS at 16:49

## 2018-11-17 RX ADMIN — ONDANSETRON 4 MG: 2 INJECTION INTRAMUSCULAR; INTRAVENOUS at 14:13

## 2018-11-17 RX ADMIN — MORPHINE SULFATE 6 MG: 10 INJECTION INTRAVENOUS at 14:13

## 2018-11-17 ASSESSMENT — PAIN SCALES - GENERAL
PAINLEVEL_OUTOF10: 9

## 2018-11-17 ASSESSMENT — PAIN DESCRIPTION - LOCATION: LOCATION: HIP

## 2018-11-17 ASSESSMENT — PAIN DESCRIPTION - ORIENTATION: ORIENTATION: LEFT

## 2018-11-17 ASSESSMENT — PAIN DESCRIPTION - PAIN TYPE: TYPE: ACUTE PAIN

## 2018-11-17 NOTE — ED NOTES
Bed: 23  Expected date:   Expected time:   Means of arrival:   Comments:  213 Ellie Angeles RN  11/17/18 3778

## 2018-11-17 NOTE — ED PROVIDER NOTES
DISPOSITION  Disposition: Discharge to home  Patient condition is stable                   LAKESHA Lopez CNP  11/17/18 2057

## 2018-11-19 RX ORDER — GLUCOSAMINE HCL/CHONDROITIN SU 500-400 MG
CAPSULE ORAL
Qty: 100 STRIP | Refills: 3 | Status: SHIPPED | OUTPATIENT
Start: 2018-11-19 | End: 2019-06-10 | Stop reason: SDUPTHER

## 2018-11-26 ENCOUNTER — OFFICE VISIT (OUTPATIENT)
Dept: FAMILY MEDICINE CLINIC | Age: 48
End: 2018-11-26
Payer: COMMERCIAL

## 2018-11-26 VITALS
DIASTOLIC BLOOD PRESSURE: 78 MMHG | WEIGHT: 153 LBS | BODY MASS INDEX: 30.84 KG/M2 | HEIGHT: 59 IN | OXYGEN SATURATION: 97 % | RESPIRATION RATE: 18 BRPM | HEART RATE: 79 BPM | SYSTOLIC BLOOD PRESSURE: 120 MMHG | TEMPERATURE: 98.2 F

## 2018-11-26 DIAGNOSIS — F41.9 ANXIETY: ICD-10-CM

## 2018-11-26 DIAGNOSIS — K21.9 GASTROESOPHAGEAL REFLUX DISEASE WITHOUT ESOPHAGITIS: ICD-10-CM

## 2018-11-26 DIAGNOSIS — E78.2 MIXED HYPERLIPIDEMIA: ICD-10-CM

## 2018-11-26 DIAGNOSIS — S70.02XD CONTUSION OF LEFT HIP, SUBSEQUENT ENCOUNTER: ICD-10-CM

## 2018-11-26 LAB
GLUCOSE BLD-MCNC: 403 MG/DL
HBA1C MFR BLD: 10 %

## 2018-11-26 PROCEDURE — 2022F DILAT RTA XM EVC RTNOPTHY: CPT | Performed by: FAMILY MEDICINE

## 2018-11-26 PROCEDURE — 99214 OFFICE O/P EST MOD 30 MIN: CPT | Performed by: FAMILY MEDICINE

## 2018-11-26 PROCEDURE — G8599 NO ASA/ANTIPLAT THER USE RNG: HCPCS | Performed by: FAMILY MEDICINE

## 2018-11-26 PROCEDURE — G8417 CALC BMI ABV UP PARAM F/U: HCPCS | Performed by: FAMILY MEDICINE

## 2018-11-26 PROCEDURE — 83036 HEMOGLOBIN GLYCOSYLATED A1C: CPT | Performed by: FAMILY MEDICINE

## 2018-11-26 PROCEDURE — G8482 FLU IMMUNIZE ORDER/ADMIN: HCPCS | Performed by: FAMILY MEDICINE

## 2018-11-26 PROCEDURE — 3017F COLORECTAL CA SCREEN DOC REV: CPT | Performed by: FAMILY MEDICINE

## 2018-11-26 PROCEDURE — 3046F HEMOGLOBIN A1C LEVEL >9.0%: CPT | Performed by: FAMILY MEDICINE

## 2018-11-26 PROCEDURE — 82962 GLUCOSE BLOOD TEST: CPT | Performed by: FAMILY MEDICINE

## 2018-11-26 PROCEDURE — 1036F TOBACCO NON-USER: CPT | Performed by: FAMILY MEDICINE

## 2018-11-26 PROCEDURE — G8427 DOCREV CUR MEDS BY ELIG CLIN: HCPCS | Performed by: FAMILY MEDICINE

## 2018-11-26 RX ORDER — OMEPRAZOLE 20 MG/1
20 CAPSULE, DELAYED RELEASE ORAL DAILY
Qty: 30 CAPSULE | Refills: 2 | Status: SHIPPED | OUTPATIENT
Start: 2018-11-26 | End: 2019-03-11 | Stop reason: SDUPTHER

## 2018-11-27 ASSESSMENT — ENCOUNTER SYMPTOMS
CHOKING: 0
NAUSEA: 0
SHORTNESS OF BREATH: 0
EYE ITCHING: 0
EYE DISCHARGE: 0
FACIAL SWELLING: 0
VOMITING: 0
SORE THROAT: 0
STRIDOR: 0
TROUBLE SWALLOWING: 0
PHOTOPHOBIA: 0
RESPIRATORY NEGATIVE: 1
SINUS PAIN: 0
CONSTIPATION: 0
RECTAL PAIN: 0
WHEEZING: 0
BLOOD IN STOOL: 0
VOICE CHANGE: 0
RHINORRHEA: 0
ANAL BLEEDING: 0
EYE REDNESS: 0
ALLERGIC/IMMUNOLOGIC NEGATIVE: 1
ABDOMINAL PAIN: 0
COUGH: 0
DIARRHEA: 0
EYE PAIN: 0
ABDOMINAL DISTENTION: 0
BACK PAIN: 0
SINUS PRESSURE: 0
COLOR CHANGE: 0
CHEST TIGHTNESS: 0

## 2018-11-28 NOTE — PROGRESS NOTES
orders for this visit:    [facility] mellitus[facility]  -     Dulaglutide (TRULICITY) 1.5 SO/3.4BL SOPN; Inject 1.5 mLs into the skin once a week  -     insulin glargine (BASAGLAR KWIKPEN) 100 UNIT/ML injection pen; Inject 36 Units into the skin nightly  -     Insulin Pen Needle 32G X 4 MM MISC; 1 each by Does not apply route daily  -     POCT Glucose  -     POCT glycosylated hemoglobin (Hb A1C)  Not controlled. Lab, diabeta, trulicity, basaglar, ace, statin, aspirin, ADA diet. Gastroesophageal reflux disease without esophagitis  -     omeprazole (PRILOSEC) 20 MG delayed release capsule; Take 1 capsule by mouth daily  Stable. Lab, prilosec. Anxiety  Stable. Cymbalta. Mixed hyperlipidemia  Not controlled. Low chol. Diet, statin. Contusion of left hip, subsequent encounter  Stable. Moist heat. Pt instructed if any worse go ED ASAP. Outpatient Encounter Prescriptions as of 11/26/2018   Medication Sig Dispense Refill    Dulaglutide (TRULICITY) 1.5 DQ/7.9DN SOPN Inject 1.5 mLs into the skin once a week 4 pen 3    insulin glargine (BASAGLAR KWIKPEN) 100 UNIT/ML injection pen Inject 36 Units into the skin nightly 5 pen 3    Insulin Pen Needle 32G X 4 MM MISC 1 each by Does not apply route daily 100 each 3    omeprazole (PRILOSEC) 20 MG delayed release capsule Take 1 capsule by mouth daily 30 capsule 2    blood glucose monitor strips Test once daily & as needed for symptoms of irregular blood glucose. 100 strip 3    lidocaine-prilocaine (EMLA) 2.5-2.5 % cream Apply topically as needed. 30 g 0    DULoxetine (CYMBALTA) 30 MG extended release capsule Take 1 capsule by mouth every morning 90 capsule 1    Lancets MISC 1 each by Does not apply route daily 100 each 1    glucose monitoring kit (FREESTYLE) monitoring kit 1 kit by Does not apply route daily 1 kit 0    mupirocin (BACTROBAN) 2 % cream Apply 3 times daily.  1 Tube 0    bumetanide (BUMEX) 1 MG tablet Take 1 mg by mouth every

## 2018-12-03 ENCOUNTER — HOSPITAL ENCOUNTER (EMERGENCY)
Age: 48
Discharge: HOME | End: 2018-12-03
Attending: EMERGENCY MEDICINE
Payer: COMMERCIAL

## 2018-12-03 ENCOUNTER — APPOINTMENT (OUTPATIENT)
Dept: ULTRASOUND IMAGING | Age: 48
End: 2018-12-03
Payer: COMMERCIAL

## 2018-12-03 ENCOUNTER — APPOINTMENT (OUTPATIENT)
Dept: GENERAL RADIOLOGY | Age: 48
End: 2018-12-03
Payer: COMMERCIAL

## 2018-12-03 VITALS
WEIGHT: 146.19 LBS | RESPIRATION RATE: 15 BRPM | DIASTOLIC BLOOD PRESSURE: 88 MMHG | OXYGEN SATURATION: 99 % | SYSTOLIC BLOOD PRESSURE: 135 MMHG | HEART RATE: 102 BPM | HEIGHT: 59 IN | BODY MASS INDEX: 29.47 KG/M2 | TEMPERATURE: 97.9 F

## 2018-12-03 DIAGNOSIS — K29.00 ACUTE GASTRITIS WITHOUT HEMORRHAGE, UNSPECIFIED GASTRITIS TYPE: Primary | ICD-10-CM

## 2018-12-03 LAB
ALBUMIN SERPL-MCNC: 4.3 G/DL (ref 3.5–5.2)
ALP BLD-CCNC: 93 U/L (ref 35–104)
ALT SERPL-CCNC: 27 U/L (ref 0–32)
ANION GAP SERPL CALCULATED.3IONS-SCNC: 13 MMOL/L (ref 7–16)
AST SERPL-CCNC: 21 U/L (ref 0–31)
BASOPHILS ABSOLUTE: 0.01 E9/L (ref 0–0.2)
BASOPHILS RELATIVE PERCENT: 0.1 % (ref 0–2)
BILIRUB SERPL-MCNC: 0.4 MG/DL (ref 0–1.2)
BILIRUBIN URINE: ABNORMAL
BLOOD, URINE: NEGATIVE
BUN BLDV-MCNC: 14 MG/DL (ref 6–20)
CALCIUM SERPL-MCNC: 9.8 MG/DL (ref 8.6–10.2)
CHLORIDE BLD-SCNC: 100 MMOL/L (ref 98–107)
CLARITY: CLEAR
CO2: 28 MMOL/L (ref 22–29)
COLOR: YELLOW
CREAT SERPL-MCNC: 0.7 MG/DL (ref 0.5–1)
EKG ATRIAL RATE: 106 BPM
EKG P AXIS: 57 DEGREES
EKG P-R INTERVAL: 126 MS
EKG Q-T INTERVAL: 342 MS
EKG QRS DURATION: 82 MS
EKG QTC CALCULATION (BAZETT): 454 MS
EKG R AXIS: 39 DEGREES
EKG T AXIS: 43 DEGREES
EKG VENTRICULAR RATE: 106 BPM
EOSINOPHILS ABSOLUTE: 0.1 E9/L (ref 0.05–0.5)
EOSINOPHILS RELATIVE PERCENT: 1.3 % (ref 0–6)
GFR AFRICAN AMERICAN: >60
GFR NON-AFRICAN AMERICAN: >60 ML/MIN/1.73
GLUCOSE BLD-MCNC: 342 MG/DL (ref 74–99)
GLUCOSE URINE: >=1000 MG/DL
HCT VFR BLD CALC: 40.6 % (ref 34–48)
HEMOGLOBIN: 13.3 G/DL (ref 11.5–15.5)
IMMATURE GRANULOCYTES #: 0.02 E9/L
IMMATURE GRANULOCYTES %: 0.3 % (ref 0–5)
KETONES, URINE: ABNORMAL MG/DL
LACTIC ACID: 1.2 MMOL/L (ref 0.5–2.2)
LEUKOCYTE ESTERASE, URINE: NEGATIVE
LIPASE: 20 U/L (ref 13–60)
LYMPHOCYTES ABSOLUTE: 2.79 E9/L (ref 1.5–4)
LYMPHOCYTES RELATIVE PERCENT: 37.6 % (ref 20–42)
MCH RBC QN AUTO: 30.9 PG (ref 26–35)
MCHC RBC AUTO-ENTMCNC: 32.8 % (ref 32–34.5)
MCV RBC AUTO: 94.4 FL (ref 80–99.9)
MONOCYTES ABSOLUTE: 0.5 E9/L (ref 0.1–0.95)
MONOCYTES RELATIVE PERCENT: 6.7 % (ref 2–12)
NEUTROPHILS ABSOLUTE: 4 E9/L (ref 1.8–7.3)
NEUTROPHILS RELATIVE PERCENT: 54 % (ref 43–80)
NITRITE, URINE: NEGATIVE
PDW BLD-RTO: 12.4 FL (ref 11.5–15)
PH UA: 7 (ref 5–9)
PLATELET # BLD: 246 E9/L (ref 130–450)
PMV BLD AUTO: 10.8 FL (ref 7–12)
POTASSIUM SERPL-SCNC: 3.9 MMOL/L (ref 3.5–5)
PROTEIN UA: NEGATIVE MG/DL
RBC # BLD: 4.3 E12/L (ref 3.5–5.5)
SODIUM BLD-SCNC: 141 MMOL/L (ref 132–146)
SPECIFIC GRAVITY UA: 1.01 (ref 1–1.03)
TOTAL PROTEIN: 7.6 G/DL (ref 6.4–8.3)
TROPONIN: <0.01 NG/ML (ref 0–0.03)
UROBILINOGEN, URINE: 0.2 E.U./DL
WBC # BLD: 7.4 E9/L (ref 4.5–11.5)

## 2018-12-03 PROCEDURE — 80053 COMPREHEN METABOLIC PANEL: CPT

## 2018-12-03 PROCEDURE — 83690 ASSAY OF LIPASE: CPT

## 2018-12-03 PROCEDURE — 85025 COMPLETE CBC W/AUTO DIFF WBC: CPT

## 2018-12-03 PROCEDURE — 84484 ASSAY OF TROPONIN QUANT: CPT

## 2018-12-03 PROCEDURE — 6370000000 HC RX 637 (ALT 250 FOR IP): Performed by: PHYSICIAN ASSISTANT

## 2018-12-03 PROCEDURE — 99284 EMERGENCY DEPT VISIT MOD MDM: CPT

## 2018-12-03 PROCEDURE — 74022 RADEX COMPL AQT ABD SERIES: CPT

## 2018-12-03 PROCEDURE — C9113 INJ PANTOPRAZOLE SODIUM, VIA: HCPCS | Performed by: PHYSICIAN ASSISTANT

## 2018-12-03 PROCEDURE — 81003 URINALYSIS AUTO W/O SCOPE: CPT

## 2018-12-03 PROCEDURE — 76705 ECHO EXAM OF ABDOMEN: CPT

## 2018-12-03 PROCEDURE — 36415 COLL VENOUS BLD VENIPUNCTURE: CPT

## 2018-12-03 PROCEDURE — 6360000002 HC RX W HCPCS: Performed by: PHYSICIAN ASSISTANT

## 2018-12-03 PROCEDURE — 96374 THER/PROPH/DIAG INJ IV PUSH: CPT

## 2018-12-03 PROCEDURE — 83605 ASSAY OF LACTIC ACID: CPT

## 2018-12-03 RX ORDER — PANTOPRAZOLE SODIUM 40 MG/10ML
40 INJECTION, POWDER, LYOPHILIZED, FOR SOLUTION INTRAVENOUS ONCE
Status: COMPLETED | OUTPATIENT
Start: 2018-12-03 | End: 2018-12-03

## 2018-12-03 RX ORDER — SUCRALFATE ORAL 1 G/10ML
1 SUSPENSION ORAL 4 TIMES DAILY
Qty: 600 ML | Refills: 0 | Status: SHIPPED | OUTPATIENT
Start: 2018-12-03 | End: 2019-04-15 | Stop reason: SDUPTHER

## 2018-12-03 RX ORDER — HYDROCODONE BITARTRATE AND ACETAMINOPHEN 5; 325 MG/1; MG/1
1 TABLET ORAL ONCE
Status: DISCONTINUED | OUTPATIENT
Start: 2018-12-03 | End: 2018-12-03 | Stop reason: HOSPADM

## 2018-12-03 RX ADMIN — PANTOPRAZOLE SODIUM 40 MG: 40 INJECTION, POWDER, FOR SOLUTION INTRAVENOUS at 14:54

## 2018-12-03 RX ADMIN — LIDOCAINE HYDROCHLORIDE: 20 SOLUTION ORAL; TOPICAL at 14:51

## 2018-12-03 ASSESSMENT — PAIN DESCRIPTION - LOCATION: LOCATION: ABDOMEN;CHEST

## 2018-12-03 ASSESSMENT — PAIN DESCRIPTION - PAIN TYPE: TYPE: ACUTE PAIN

## 2018-12-03 ASSESSMENT — PAIN DESCRIPTION - DESCRIPTORS: DESCRIPTORS: DISCOMFORT

## 2018-12-03 NOTE — ED PROVIDER NOTES
ED Attending  CC: No        HPI:  12/3/18,   Time: 3:30 PM         Jenny Bishop is a 46 y.o. female presenting to the ED for chest and upper abdominal pain, beginning months ago. The complaint has been intermittent, moderate in severity, and worsened by nothing. The patient presents today with several complaints. First of all she's been having intermittent upper abdominal and right upper quadrant pain for several months. She states that it's worse with eating. She reports some nausea but no vomiting. The patient has been on medication for acid reflux but states that is not helping. She states that the pain is severe at times. She is having some increased belching and bloating as well. The patient became more concerned today when she started having pain in her lower sternal region. She states there is a strong family history of cardiac disease. She has been seen by a cardiologist and had an normal stress test about 6 months ago. The patient denies any cough or congestion. She has not been ill. States that she is not a smoker. ROS:     Constitutional: Negative for fever and chills  HENT: Negative for ear pain, sore throat and sinus pressure  Eyes: Negative for pain, discharge and redness  Respiratory: See HPI  Cardiovascular: See HPI  Gastrointestinal: See HPI  Genitourinary: Negative for dysuria and frequency  Musculoskeletal: Negative for back pain and arthralgia  Skin: Negative for rash and wound  Neurological: Negative for weakness and headaches  Hematological: Negative for adenopathy    All other systems reviewed and are negative      -------------------------------- PAST HISTORY ----------------------------------  Past Medical History:  has a past medical history of Arthritis; Back pain; Diabetes mellitus ([facility] H/O exercise stress test; Hyperlipidemia; Hypertension; and Neuropathy due to secondary diabetes ([facility]     Past Surgical History:  has a past surgical history that includes Carpal tunnel

## 2018-12-20 ENCOUNTER — HOSPITAL ENCOUNTER (EMERGENCY)
Age: 48
Discharge: HOME | End: 2018-12-20
Payer: COMMERCIAL

## 2018-12-20 VITALS
HEIGHT: 59 IN | SYSTOLIC BLOOD PRESSURE: 143 MMHG | OXYGEN SATURATION: 98 % | WEIGHT: 148 LBS | HEART RATE: 94 BPM | DIASTOLIC BLOOD PRESSURE: 72 MMHG | TEMPERATURE: 98.1 F | RESPIRATION RATE: 20 BRPM | BODY MASS INDEX: 29.84 KG/M2

## 2018-12-20 DIAGNOSIS — J20.9 ACUTE BRONCHITIS, UNSPECIFIED ORGANISM: Primary | ICD-10-CM

## 2018-12-20 PROCEDURE — 99283 EMERGENCY DEPT VISIT LOW MDM: CPT

## 2018-12-20 RX ORDER — GUAIFENESIN AND DEXTROMETHORPHAN HYDROBROMIDE 1200; 60 MG/1; MG/1
1 TABLET, EXTENDED RELEASE ORAL EVERY 12 HOURS PRN
Qty: 28 TABLET | Refills: 0 | Status: SHIPPED | OUTPATIENT
Start: 2018-12-20 | End: 2019-01-10 | Stop reason: CLARIF

## 2018-12-20 RX ORDER — BENZONATATE 100 MG/1
100 CAPSULE ORAL 3 TIMES DAILY PRN
Qty: 21 CAPSULE | Refills: 0 | Status: SHIPPED | OUTPATIENT
Start: 2018-12-20 | End: 2018-12-27

## 2018-12-20 RX ORDER — ONDANSETRON 4 MG/1
4 TABLET, ORALLY DISINTEGRATING ORAL EVERY 8 HOURS PRN
Qty: 24 TABLET | Refills: 0 | Status: SHIPPED | OUTPATIENT
Start: 2018-12-20 | End: 2019-04-15 | Stop reason: SDUPTHER

## 2018-12-20 RX ORDER — PREDNISONE 10 MG/1
40 TABLET ORAL DAILY
Qty: 20 TABLET | Refills: 0 | Status: SHIPPED | OUTPATIENT
Start: 2018-12-20 | End: 2018-12-25

## 2018-12-20 RX ORDER — NAPROXEN 500 MG/1
500 TABLET ORAL 2 TIMES DAILY PRN
Qty: 14 TABLET | Refills: 0 | Status: SHIPPED | OUTPATIENT
Start: 2018-12-20 | End: 2018-12-27

## 2018-12-21 NOTE — ED PROVIDER NOTES
[facility]     Department of Emergency Medicine   ED  Provider Note  Admit Date/RoomTime: 2018  7:41 PM  ED Room:    Chief Complaint   Cough (dry cough x 5 days, congestion, nausea without emesis, fatigue, chills)    History of Present Illness   Source of history provided by:  patient. History/Exam Limitations: none. Haley Kyle is a 46 y.o. old female who has a past medical history of:   Past Medical History:   Diagnosis Date    Arthritis     lower back    Back pain     Diabetes mellitus ([address]     H/O exercise stress test 10/2015  ? Dr Chip De Anda  neg    Hyperlipidemia     Hypertension     Neuropathy due to secondary diabetes ([address]     in marcial feet    presents to the emergency department by private vehicle, with complaints of gradual onset, still present, constant non-productive cough which began 3-4 day(s) prior to arrival.  The symptoms are associated with chills, night sweats, nasal congestion and nausea and denies abdominal pain, calf pain, chest pain, leg swelling, palpitations, shortness of breath or syncope. She has prior history of no history of pneumonia or bronchitis in the past.  Since onset the symptoms have been persistent and stable and mild-moderate in severity. The symptoms are aggravated by nothing and relieved by nothing. ROS   Pertinent positives and negatives are stated within HPI, all other systems reviewed and are negative. Past Surgical History:   Procedure Laterality Date    ANKLE SURGERY      bilateral tarsal tunnels    CARPAL TUNNEL RELEASE      left    CARPAL TUNNEL RELEASE  12    right     SECTION      HYSTERECTOMY      SHOULDER ARTHROSCOPY Left 2016    subacromin decompression and debridement     Social History:  reports that she has never smoked. She has never used smokeless tobacco. She reports that she drinks alcohol. She reports that she does not use drugs.     Family History: family history includes Diabetes in her

## 2019-01-02 ENCOUNTER — TELEPHONE (OUTPATIENT)
Dept: FAMILY MEDICINE CLINIC | Age: 49
End: 2019-01-02

## 2019-01-10 ENCOUNTER — TELEPHONE (OUTPATIENT)
Dept: FAMILY MEDICINE CLINIC | Age: 49
End: 2019-01-10

## 2019-01-10 ENCOUNTER — OFFICE VISIT (OUTPATIENT)
Dept: FAMILY MEDICINE CLINIC | Age: 49
End: 2019-01-10
Payer: COMMERCIAL

## 2019-01-10 VITALS
RESPIRATION RATE: 18 BRPM | SYSTOLIC BLOOD PRESSURE: 138 MMHG | OXYGEN SATURATION: 97 % | BODY MASS INDEX: 30.1 KG/M2 | WEIGHT: 149.3 LBS | HEIGHT: 59 IN | HEART RATE: 83 BPM | TEMPERATURE: 97.5 F | DIASTOLIC BLOOD PRESSURE: 76 MMHG

## 2019-01-10 DIAGNOSIS — K21.9 GASTROESOPHAGEAL REFLUX DISEASE WITHOUT ESOPHAGITIS: ICD-10-CM

## 2019-01-10 DIAGNOSIS — J06.9 BACTERIAL URI: ICD-10-CM

## 2019-01-10 DIAGNOSIS — Z79.4 TYPE 2 DIABETES MELLITUS WITH HYPERGLYCEMIA, WITH LONG-TERM CURRENT USE OF INSULIN (HCC): Primary | ICD-10-CM

## 2019-01-10 DIAGNOSIS — E11.65 TYPE 2 DIABETES MELLITUS WITH HYPERGLYCEMIA, WITH LONG-TERM CURRENT USE OF INSULIN (HCC): Primary | ICD-10-CM

## 2019-01-10 DIAGNOSIS — B96.89 BACTERIAL URI: ICD-10-CM

## 2019-01-10 DIAGNOSIS — R10.11 RIGHT UPPER QUADRANT PAIN: ICD-10-CM

## 2019-01-10 DIAGNOSIS — G62.9 NEUROPATHY: ICD-10-CM

## 2019-01-10 LAB — GLUCOSE BLD-MCNC: 297 MG/DL

## 2019-01-10 PROCEDURE — G8417 CALC BMI ABV UP PARAM F/U: HCPCS | Performed by: FAMILY MEDICINE

## 2019-01-10 PROCEDURE — G8427 DOCREV CUR MEDS BY ELIG CLIN: HCPCS | Performed by: FAMILY MEDICINE

## 2019-01-10 PROCEDURE — 3046F HEMOGLOBIN A1C LEVEL >9.0%: CPT | Performed by: FAMILY MEDICINE

## 2019-01-10 PROCEDURE — 82962 GLUCOSE BLOOD TEST: CPT | Performed by: FAMILY MEDICINE

## 2019-01-10 PROCEDURE — 2022F DILAT RTA XM EVC RTNOPTHY: CPT | Performed by: FAMILY MEDICINE

## 2019-01-10 PROCEDURE — 3017F COLORECTAL CA SCREEN DOC REV: CPT | Performed by: FAMILY MEDICINE

## 2019-01-10 PROCEDURE — 1036F TOBACCO NON-USER: CPT | Performed by: FAMILY MEDICINE

## 2019-01-10 PROCEDURE — 99214 OFFICE O/P EST MOD 30 MIN: CPT | Performed by: FAMILY MEDICINE

## 2019-01-10 PROCEDURE — G8482 FLU IMMUNIZE ORDER/ADMIN: HCPCS | Performed by: FAMILY MEDICINE

## 2019-01-10 RX ORDER — SERTRALINE HYDROCHLORIDE 100 MG/1
100 TABLET, FILM COATED ORAL DAILY
COMMUNITY
End: 2019-02-26 | Stop reason: CLARIF

## 2019-01-10 RX ORDER — AMOXICILLIN AND CLAVULANATE POTASSIUM 875; 125 MG/1; MG/1
1 TABLET, FILM COATED ORAL 2 TIMES DAILY
Qty: 14 TABLET | Refills: 0 | Status: SHIPPED | OUTPATIENT
Start: 2019-01-10 | End: 2019-01-17

## 2019-01-10 RX ORDER — NAPROXEN 500 MG/1
500 TABLET ORAL 2 TIMES DAILY WITH MEALS
COMMUNITY
End: 2019-02-11 | Stop reason: CLARIF

## 2019-01-10 RX ORDER — FAMOTIDINE 20 MG/1
20 TABLET, FILM COATED ORAL 2 TIMES DAILY
Qty: 60 TABLET | Refills: 0 | Status: SHIPPED | OUTPATIENT
Start: 2019-01-10 | End: 2019-04-15 | Stop reason: SDUPTHER

## 2019-01-10 RX ORDER — PREDNISONE 10 MG/1
10 TABLET ORAL DAILY
COMMUNITY
End: 2019-02-11 | Stop reason: CLARIF

## 2019-01-10 ASSESSMENT — ENCOUNTER SYMPTOMS
CONSTIPATION: 0
WHEEZING: 0
DIARRHEA: 0
SHORTNESS OF BREATH: 0
VOMITING: 0
NAUSEA: 0

## 2019-01-12 ASSESSMENT — ENCOUNTER SYMPTOMS
COUGH: 1
SINUS PRESSURE: 1
ABDOMINAL PAIN: 1

## 2019-01-15 DIAGNOSIS — Z79.4 TYPE 2 DIABETES MELLITUS WITH HYPERGLYCEMIA, WITH LONG-TERM CURRENT USE OF INSULIN (HCC): ICD-10-CM

## 2019-01-15 DIAGNOSIS — E11.65 TYPE 2 DIABETES MELLITUS WITH HYPERGLYCEMIA, WITH LONG-TERM CURRENT USE OF INSULIN (HCC): ICD-10-CM

## 2019-01-24 ENCOUNTER — TELEPHONE (OUTPATIENT)
Dept: FAMILY MEDICINE CLINIC | Age: 49
End: 2019-01-24

## 2019-01-28 ENCOUNTER — TELEPHONE (OUTPATIENT)
Dept: FAMILY MEDICINE CLINIC | Age: 49
End: 2019-01-28

## 2019-02-11 ENCOUNTER — OFFICE VISIT (OUTPATIENT)
Dept: FAMILY MEDICINE CLINIC | Age: 49
End: 2019-02-11
Payer: COMMERCIAL

## 2019-02-11 VITALS
RESPIRATION RATE: 18 BRPM | TEMPERATURE: 98.5 F | HEART RATE: 91 BPM | WEIGHT: 156 LBS | OXYGEN SATURATION: 97 % | BODY MASS INDEX: 31.45 KG/M2 | HEIGHT: 59 IN | DIASTOLIC BLOOD PRESSURE: 82 MMHG | SYSTOLIC BLOOD PRESSURE: 124 MMHG

## 2019-02-11 DIAGNOSIS — E78.2 MIXED HYPERLIPIDEMIA: ICD-10-CM

## 2019-02-11 DIAGNOSIS — K21.9 GASTROESOPHAGEAL REFLUX DISEASE WITHOUT ESOPHAGITIS: ICD-10-CM

## 2019-02-11 DIAGNOSIS — Z12.39 SCREENING FOR BREAST CANCER: ICD-10-CM

## 2019-02-11 DIAGNOSIS — R10.84 GENERALIZED ABDOMINAL PAIN: ICD-10-CM

## 2019-02-11 PROCEDURE — 3046F HEMOGLOBIN A1C LEVEL >9.0%: CPT | Performed by: FAMILY MEDICINE

## 2019-02-11 PROCEDURE — 99214 OFFICE O/P EST MOD 30 MIN: CPT | Performed by: FAMILY MEDICINE

## 2019-02-11 PROCEDURE — G8427 DOCREV CUR MEDS BY ELIG CLIN: HCPCS | Performed by: FAMILY MEDICINE

## 2019-02-11 PROCEDURE — G8417 CALC BMI ABV UP PARAM F/U: HCPCS | Performed by: FAMILY MEDICINE

## 2019-02-11 PROCEDURE — 2022F DILAT RTA XM EVC RTNOPTHY: CPT | Performed by: FAMILY MEDICINE

## 2019-02-11 PROCEDURE — 3017F COLORECTAL CA SCREEN DOC REV: CPT | Performed by: FAMILY MEDICINE

## 2019-02-11 PROCEDURE — 1036F TOBACCO NON-USER: CPT | Performed by: FAMILY MEDICINE

## 2019-02-11 PROCEDURE — G8482 FLU IMMUNIZE ORDER/ADMIN: HCPCS | Performed by: FAMILY MEDICINE

## 2019-02-11 ASSESSMENT — PATIENT HEALTH QUESTIONNAIRE - PHQ9
SUM OF ALL RESPONSES TO PHQ9 QUESTIONS 1 & 2: 0
SUM OF ALL RESPONSES TO PHQ QUESTIONS 1-9: 0
2. FEELING DOWN, DEPRESSED OR HOPELESS: 0
1. LITTLE INTEREST OR PLEASURE IN DOING THINGS: 0
SUM OF ALL RESPONSES TO PHQ QUESTIONS 1-9: 0

## 2019-02-13 ENCOUNTER — INITIAL CONSULT (OUTPATIENT)
Dept: SURGERY | Age: 49
End: 2019-02-13
Payer: COMMERCIAL

## 2019-02-13 VITALS
SYSTOLIC BLOOD PRESSURE: 134 MMHG | HEIGHT: 59 IN | DIASTOLIC BLOOD PRESSURE: 88 MMHG | WEIGHT: 150 LBS | TEMPERATURE: 98.7 F | HEART RATE: 87 BPM | BODY MASS INDEX: 30.24 KG/M2

## 2019-02-13 DIAGNOSIS — G89.29 CHRONIC RLQ PAIN: Primary | ICD-10-CM

## 2019-02-13 DIAGNOSIS — K44.9 HIATAL HERNIA: ICD-10-CM

## 2019-02-13 DIAGNOSIS — K21.00 GASTROESOPHAGEAL REFLUX DISEASE WITH ESOPHAGITIS: ICD-10-CM

## 2019-02-13 DIAGNOSIS — R10.31 CHRONIC RLQ PAIN: Primary | ICD-10-CM

## 2019-02-13 PROBLEM — R10.84 GENERALIZED ABDOMINAL PAIN: Status: ACTIVE | Noted: 2019-02-13

## 2019-02-13 PROCEDURE — G8417 CALC BMI ABV UP PARAM F/U: HCPCS | Performed by: SURGERY

## 2019-02-13 PROCEDURE — G8482 FLU IMMUNIZE ORDER/ADMIN: HCPCS | Performed by: SURGERY

## 2019-02-13 PROCEDURE — 99204 OFFICE O/P NEW MOD 45 MIN: CPT | Performed by: SURGERY

## 2019-02-13 PROCEDURE — 3017F COLORECTAL CA SCREEN DOC REV: CPT | Performed by: SURGERY

## 2019-02-13 PROCEDURE — G8427 DOCREV CUR MEDS BY ELIG CLIN: HCPCS | Performed by: SURGERY

## 2019-02-13 PROCEDURE — 1036F TOBACCO NON-USER: CPT | Performed by: SURGERY

## 2019-02-13 RX ORDER — WHEAT DEXTRIN 3 G/3.8 G
4 POWDER (GRAM) ORAL
Qty: 475 G | Refills: 2 | Status: SHIPPED
Start: 2019-02-13 | End: 2021-02-03

## 2019-02-13 RX ORDER — POLYETHYLENE GLYCOL 3350 17 G/17G
17 POWDER, FOR SOLUTION ORAL DAILY
Qty: 1530 G | Refills: 1 | Status: SHIPPED | OUTPATIENT
Start: 2019-02-13 | End: 2019-03-15

## 2019-02-13 ASSESSMENT — ENCOUNTER SYMPTOMS
COUGH: 0
NAUSEA: 1
EYE REDNESS: 0
BLOOD IN STOOL: 0
COLOR CHANGE: 0
SHORTNESS OF BREATH: 0
ANAL BLEEDING: 0
VOMITING: 0
SINUS PAIN: 0
RESPIRATORY NEGATIVE: 1
TROUBLE SWALLOWING: 0
VOICE CHANGE: 0
SORE THROAT: 0
CHOKING: 0
CONSTIPATION: 0
DIARRHEA: 0
ALLERGIC/IMMUNOLOGIC NEGATIVE: 1
SINUS PRESSURE: 0
EYE DISCHARGE: 0
BACK PAIN: 0
FACIAL SWELLING: 0
STRIDOR: 0
RHINORRHEA: 0
ABDOMINAL PAIN: 1
WHEEZING: 0
CHEST TIGHTNESS: 0
ABDOMINAL DISTENTION: 0
PHOTOPHOBIA: 0
RECTAL PAIN: 0
EYES NEGATIVE: 1
EYE PAIN: 0
EYE ITCHING: 0

## 2019-02-18 ENCOUNTER — HOSPITAL ENCOUNTER (OUTPATIENT)
Dept: NUCLEAR MEDICINE | Age: 49
Discharge: HOME | End: 2019-02-18
Payer: COMMERCIAL

## 2019-02-18 DIAGNOSIS — R10.31 CHRONIC RLQ PAIN: ICD-10-CM

## 2019-02-18 DIAGNOSIS — G89.29 CHRONIC RLQ PAIN: ICD-10-CM

## 2019-02-18 PROCEDURE — 78227 HEPATOBIL SYST IMAGE W/DRUG: CPT

## 2019-02-18 PROCEDURE — 3430000000 HC RX DIAGNOSTIC RADIOPHARMACEUTICAL: Performed by: SURGERY

## 2019-02-18 PROCEDURE — A9537 TC99M MEBROFENIN: HCPCS | Performed by: SURGERY

## 2019-02-18 RX ADMIN — MEBROFENIN 6 MILLICURIE: 45 INJECTION, POWDER, LYOPHILIZED, FOR SOLUTION INTRAVENOUS at 09:37

## 2019-02-19 ENCOUNTER — TELEPHONE (OUTPATIENT)
Dept: SURGERY | Age: 49
End: 2019-02-19

## 2019-02-20 ENCOUNTER — TELEPHONE (OUTPATIENT)
Dept: SURGERY | Age: 49
End: 2019-02-20

## 2019-02-20 ENCOUNTER — OFFICE VISIT (OUTPATIENT)
Dept: SURGERY | Age: 49
End: 2019-02-20
Payer: COMMERCIAL

## 2019-02-20 VITALS
SYSTOLIC BLOOD PRESSURE: 156 MMHG | WEIGHT: 150 LBS | HEIGHT: 59 IN | HEART RATE: 96 BPM | DIASTOLIC BLOOD PRESSURE: 95 MMHG | BODY MASS INDEX: 30.24 KG/M2

## 2019-02-20 DIAGNOSIS — K21.00 GASTROESOPHAGEAL REFLUX DISEASE WITH ESOPHAGITIS: ICD-10-CM

## 2019-02-20 DIAGNOSIS — K82.8 BILIARY DYSKINESIA: Primary | ICD-10-CM

## 2019-02-20 DIAGNOSIS — K44.9 HIATAL HERNIA: ICD-10-CM

## 2019-02-20 PROCEDURE — 1036F TOBACCO NON-USER: CPT | Performed by: SURGERY

## 2019-02-20 PROCEDURE — 3017F COLORECTAL CA SCREEN DOC REV: CPT | Performed by: SURGERY

## 2019-02-20 PROCEDURE — 99214 OFFICE O/P EST MOD 30 MIN: CPT | Performed by: SURGERY

## 2019-02-20 PROCEDURE — G8417 CALC BMI ABV UP PARAM F/U: HCPCS | Performed by: SURGERY

## 2019-02-20 PROCEDURE — G8482 FLU IMMUNIZE ORDER/ADMIN: HCPCS | Performed by: SURGERY

## 2019-02-20 PROCEDURE — G8427 DOCREV CUR MEDS BY ELIG CLIN: HCPCS | Performed by: SURGERY

## 2019-02-26 RX ORDER — SERTRALINE HYDROCHLORIDE 100 MG/1
100 TABLET, FILM COATED ORAL DAILY PRN
COMMUNITY
End: 2019-04-15 | Stop reason: SDUPTHER

## 2019-02-27 ENCOUNTER — HOSPITAL ENCOUNTER (OUTPATIENT)
Dept: PREADMISSION TESTING | Age: 49
Discharge: HOME | End: 2019-02-27
Payer: COMMERCIAL

## 2019-02-27 VITALS
RESPIRATION RATE: 16 BRPM | WEIGHT: 146.9 LBS | DIASTOLIC BLOOD PRESSURE: 80 MMHG | BODY MASS INDEX: 29.61 KG/M2 | HEART RATE: 104 BPM | OXYGEN SATURATION: 98 % | TEMPERATURE: 98.1 F | HEIGHT: 59 IN | SYSTOLIC BLOOD PRESSURE: 144 MMHG

## 2019-02-27 DIAGNOSIS — K80.20 SYMPTOMATIC CHOLELITHIASIS: Primary | ICD-10-CM

## 2019-02-27 LAB
ANION GAP SERPL CALCULATED.3IONS-SCNC: 10 MMOL/L (ref 7–16)
BUN BLDV-MCNC: 13 MG/DL (ref 6–20)
CALCIUM SERPL-MCNC: 9.6 MG/DL (ref 8.6–10.2)
CHLORIDE BLD-SCNC: 98 MMOL/L (ref 98–107)
CO2: 27 MMOL/L (ref 22–29)
CREAT SERPL-MCNC: 0.7 MG/DL (ref 0.5–1)
GFR AFRICAN AMERICAN: >60
GFR NON-AFRICAN AMERICAN: >60 ML/MIN/1.73
GLUCOSE BLD-MCNC: 449 MG/DL (ref 74–99)
HCT VFR BLD CALC: 39.4 % (ref 34–48)
HEMOGLOBIN: 13 G/DL (ref 11.5–15.5)
MCH RBC QN AUTO: 31.8 PG (ref 26–35)
MCHC RBC AUTO-ENTMCNC: 33 % (ref 32–34.5)
MCV RBC AUTO: 96.3 FL (ref 80–99.9)
PDW BLD-RTO: 11.9 FL (ref 11.5–15)
PLATELET # BLD: 246 E9/L (ref 130–450)
PMV BLD AUTO: 10.5 FL (ref 7–12)
POTASSIUM REFLEX MAGNESIUM: 4.8 MMOL/L (ref 3.5–5)
RBC # BLD: 4.09 E12/L (ref 3.5–5.5)
SODIUM BLD-SCNC: 135 MMOL/L (ref 132–146)
WBC # BLD: 5.9 E9/L (ref 4.5–11.5)

## 2019-02-27 PROCEDURE — 80048 BASIC METABOLIC PNL TOTAL CA: CPT

## 2019-02-27 PROCEDURE — 85027 COMPLETE CBC AUTOMATED: CPT

## 2019-02-27 PROCEDURE — 36415 COLL VENOUS BLD VENIPUNCTURE: CPT

## 2019-02-27 ASSESSMENT — PAIN DESCRIPTION - LOCATION: LOCATION: ABDOMEN

## 2019-02-27 ASSESSMENT — PAIN DESCRIPTION - PAIN TYPE: TYPE: CHRONIC PAIN

## 2019-02-27 ASSESSMENT — PAIN DESCRIPTION - DESCRIPTORS: DESCRIPTORS: SORE

## 2019-02-27 ASSESSMENT — PAIN DESCRIPTION - FREQUENCY: FREQUENCY: CONTINUOUS

## 2019-02-27 ASSESSMENT — PAIN DESCRIPTION - ORIENTATION: ORIENTATION: RIGHT

## 2019-02-27 ASSESSMENT — PAIN SCALES - GENERAL: PAINLEVEL_OUTOF10: 5

## 2019-02-28 ENCOUNTER — HOSPITAL ENCOUNTER (OUTPATIENT)
Age: 49
Setting detail: OUTPATIENT SURGERY
Discharge: HOME | End: 2019-02-28
Attending: SURGERY | Admitting: SURGERY
Payer: COMMERCIAL

## 2019-02-28 ENCOUNTER — ANESTHESIA (OUTPATIENT)
Dept: OPERATING ROOM | Age: 49
End: 2019-02-28
Payer: COMMERCIAL

## 2019-02-28 ENCOUNTER — APPOINTMENT (OUTPATIENT)
Dept: GENERAL RADIOLOGY | Age: 49
End: 2019-02-28
Attending: SURGERY
Payer: COMMERCIAL

## 2019-02-28 ENCOUNTER — ANESTHESIA EVENT (OUTPATIENT)
Dept: OPERATING ROOM | Age: 49
End: 2019-02-28
Payer: COMMERCIAL

## 2019-02-28 VITALS
SYSTOLIC BLOOD PRESSURE: 102 MMHG | WEIGHT: 145 LBS | OXYGEN SATURATION: 97 % | BODY MASS INDEX: 29.29 KG/M2 | HEART RATE: 91 BPM | TEMPERATURE: 96.3 F | RESPIRATION RATE: 16 BRPM | DIASTOLIC BLOOD PRESSURE: 50 MMHG

## 2019-02-28 VITALS
DIASTOLIC BLOOD PRESSURE: 116 MMHG | SYSTOLIC BLOOD PRESSURE: 147 MMHG | RESPIRATION RATE: 5 BRPM | OXYGEN SATURATION: 100 %

## 2019-02-28 DIAGNOSIS — K80.80 BILIARY CALCULUS OF OTHER SITE WITHOUT OBSTRUCTION: ICD-10-CM

## 2019-02-28 DIAGNOSIS — G89.18 POSTOPERATIVE PAIN: Primary | ICD-10-CM

## 2019-02-28 LAB — METER GLUCOSE: 134 MG/DL (ref 74–99)

## 2019-02-28 PROCEDURE — 82962 GLUCOSE BLOOD TEST: CPT

## 2019-02-28 PROCEDURE — 3600000014 HC SURGERY LEVEL 4 ADDTL 15MIN: Performed by: SURGERY

## 2019-02-28 PROCEDURE — 6360000004 HC RX CONTRAST MEDICATION: Performed by: SURGERY

## 2019-02-28 PROCEDURE — 7100000001 HC PACU RECOVERY - ADDTL 15 MIN: Performed by: SURGERY

## 2019-02-28 PROCEDURE — 7100000000 HC PACU RECOVERY - FIRST 15 MIN: Performed by: SURGERY

## 2019-02-28 PROCEDURE — 2500000003 HC RX 250 WO HCPCS: Performed by: ANESTHESIOLOGY

## 2019-02-28 PROCEDURE — 3700000001 HC ADD 15 MINUTES (ANESTHESIA): Performed by: SURGERY

## 2019-02-28 PROCEDURE — 2709999900 HC NON-CHARGEABLE SUPPLY: Performed by: SURGERY

## 2019-02-28 PROCEDURE — 3600000004 HC SURGERY LEVEL 4 BASE: Performed by: SURGERY

## 2019-02-28 PROCEDURE — 2500000003 HC RX 250 WO HCPCS: Performed by: NURSE ANESTHETIST, CERTIFIED REGISTERED

## 2019-02-28 PROCEDURE — 6360000002 HC RX W HCPCS

## 2019-02-28 PROCEDURE — 6360000002 HC RX W HCPCS: Performed by: SURGERY

## 2019-02-28 PROCEDURE — 6360000002 HC RX W HCPCS: Performed by: NURSE ANESTHETIST, CERTIFIED REGISTERED

## 2019-02-28 PROCEDURE — 47563 LAPARO CHOLECYSTECTOMY/GRAPH: CPT | Performed by: SURGERY

## 2019-02-28 PROCEDURE — 3700000000 HC ANESTHESIA ATTENDED CARE: Performed by: SURGERY

## 2019-02-28 PROCEDURE — 2500000003 HC RX 250 WO HCPCS: Performed by: SURGERY

## 2019-02-28 PROCEDURE — 6360000002 HC RX W HCPCS: Performed by: ANESTHESIOLOGY

## 2019-02-28 PROCEDURE — 74300 X-RAY BILE DUCTS/PANCREAS: CPT

## 2019-02-28 PROCEDURE — C1894 INTRO/SHEATH, NON-LASER: HCPCS | Performed by: SURGERY

## 2019-02-28 PROCEDURE — 2580000003 HC RX 258: Performed by: SURGERY

## 2019-02-28 PROCEDURE — 88304 TISSUE EXAM BY PATHOLOGIST: CPT

## 2019-02-28 PROCEDURE — 7100000010 HC PHASE II RECOVERY - FIRST 15 MIN: Performed by: SURGERY

## 2019-02-28 PROCEDURE — 7100000011 HC PHASE II RECOVERY - ADDTL 15 MIN: Performed by: SURGERY

## 2019-02-28 RX ORDER — HYDROMORPHONE HYDROCHLORIDE 1 MG/ML
0.5 INJECTION, SOLUTION INTRAMUSCULAR; INTRAVENOUS; SUBCUTANEOUS EVERY 5 MIN PRN
Status: DISCONTINUED | OUTPATIENT
Start: 2019-02-28 | End: 2019-02-28 | Stop reason: HOSPADM

## 2019-02-28 RX ORDER — HYDROMORPHONE HYDROCHLORIDE 1 MG/ML
0.25 INJECTION, SOLUTION INTRAMUSCULAR; INTRAVENOUS; SUBCUTANEOUS EVERY 5 MIN PRN
Status: DISCONTINUED | OUTPATIENT
Start: 2019-02-28 | End: 2019-02-28 | Stop reason: HOSPADM

## 2019-02-28 RX ORDER — CEFAZOLIN SODIUM 2 G/50ML
2 SOLUTION INTRAVENOUS
Status: COMPLETED | OUTPATIENT
Start: 2019-02-28 | End: 2019-02-28

## 2019-02-28 RX ORDER — MEPERIDINE HYDROCHLORIDE 25 MG/ML
12.5 INJECTION INTRAMUSCULAR; INTRAVENOUS; SUBCUTANEOUS EVERY 5 MIN PRN
Status: DISCONTINUED | OUTPATIENT
Start: 2019-02-28 | End: 2019-02-28 | Stop reason: HOSPADM

## 2019-02-28 RX ORDER — FENTANYL CITRATE 50 UG/ML
INJECTION, SOLUTION INTRAMUSCULAR; INTRAVENOUS PRN
Status: DISCONTINUED | OUTPATIENT
Start: 2019-02-28 | End: 2019-02-28 | Stop reason: SDUPTHER

## 2019-02-28 RX ORDER — KETOROLAC TROMETHAMINE 30 MG/ML
INJECTION, SOLUTION INTRAMUSCULAR; INTRAVENOUS PRN
Status: DISCONTINUED | OUTPATIENT
Start: 2019-02-28 | End: 2019-02-28 | Stop reason: SDUPTHER

## 2019-02-28 RX ORDER — SODIUM CHLORIDE 0.9 % (FLUSH) 0.9 %
10 SYRINGE (ML) INJECTION EVERY 12 HOURS SCHEDULED
Status: DISCONTINUED | OUTPATIENT
Start: 2019-02-28 | End: 2019-02-28 | Stop reason: HOSPADM

## 2019-02-28 RX ORDER — MEPERIDINE HYDROCHLORIDE 25 MG/ML
INJECTION INTRAMUSCULAR; INTRAVENOUS; SUBCUTANEOUS
Status: COMPLETED
Start: 2019-02-28 | End: 2019-02-28

## 2019-02-28 RX ORDER — ONDANSETRON 2 MG/ML
4 INJECTION INTRAMUSCULAR; INTRAVENOUS
Status: DISCONTINUED | OUTPATIENT
Start: 2019-02-28 | End: 2019-02-28 | Stop reason: HOSPADM

## 2019-02-28 RX ORDER — SODIUM CHLORIDE 0.9 % (FLUSH) 0.9 %
10 SYRINGE (ML) INJECTION PRN
Status: DISCONTINUED | OUTPATIENT
Start: 2019-02-28 | End: 2019-02-28 | Stop reason: HOSPADM

## 2019-02-28 RX ORDER — IBUPROFEN 200 MG
800 TABLET ORAL EVERY 6 HOURS PRN
Qty: 90 TABLET | Refills: 0 | Status: SHIPPED | OUTPATIENT
Start: 2019-02-28 | End: 2019-04-15

## 2019-02-28 RX ORDER — DOCUSATE SODIUM 100 MG/1
100 CAPSULE, LIQUID FILLED ORAL 2 TIMES DAILY
Qty: 30 CAPSULE | Refills: 0 | Status: SHIPPED | OUTPATIENT
Start: 2019-02-28 | End: 2019-03-15

## 2019-02-28 RX ORDER — LABETALOL HYDROCHLORIDE 5 MG/ML
5 INJECTION, SOLUTION INTRAVENOUS EVERY 10 MIN PRN
Status: DISCONTINUED | OUTPATIENT
Start: 2019-02-28 | End: 2019-02-28 | Stop reason: HOSPADM

## 2019-02-28 RX ORDER — NEOSTIGMINE METHYLSULFATE 1 MG/ML
INJECTION, SOLUTION INTRAVENOUS PRN
Status: DISCONTINUED | OUTPATIENT
Start: 2019-02-28 | End: 2019-02-28 | Stop reason: SDUPTHER

## 2019-02-28 RX ORDER — SODIUM CHLORIDE 9 MG/ML
INJECTION, SOLUTION INTRAVENOUS CONTINUOUS
Status: DISCONTINUED | OUTPATIENT
Start: 2019-02-28 | End: 2019-02-28 | Stop reason: HOSPADM

## 2019-02-28 RX ORDER — ROCURONIUM BROMIDE 10 MG/ML
INJECTION, SOLUTION INTRAVENOUS PRN
Status: DISCONTINUED | OUTPATIENT
Start: 2019-02-28 | End: 2019-02-28 | Stop reason: SDUPTHER

## 2019-02-28 RX ORDER — BUPIVACAINE HYDROCHLORIDE AND EPINEPHRINE 2.5; 5 MG/ML; UG/ML
INJECTION, SOLUTION EPIDURAL; INFILTRATION; INTRACAUDAL; PERINEURAL PRN
Status: DISCONTINUED | OUTPATIENT
Start: 2019-02-28 | End: 2019-02-28 | Stop reason: ALTCHOICE

## 2019-02-28 RX ORDER — ONDANSETRON 2 MG/ML
INJECTION INTRAMUSCULAR; INTRAVENOUS PRN
Status: DISCONTINUED | OUTPATIENT
Start: 2019-02-28 | End: 2019-02-28 | Stop reason: SDUPTHER

## 2019-02-28 RX ORDER — PROPOFOL 10 MG/ML
INJECTION, EMULSION INTRAVENOUS PRN
Status: DISCONTINUED | OUTPATIENT
Start: 2019-02-28 | End: 2019-02-28 | Stop reason: SDUPTHER

## 2019-02-28 RX ORDER — OXYCODONE HYDROCHLORIDE AND ACETAMINOPHEN 5; 325 MG/1; MG/1
1 TABLET ORAL EVERY 6 HOURS PRN
Qty: 20 TABLET | Refills: 0 | Status: SHIPPED | OUTPATIENT
Start: 2019-02-28 | End: 2019-03-05

## 2019-02-28 RX ORDER — GLYCOPYRROLATE 0.2 MG/ML
INJECTION INTRAMUSCULAR; INTRAVENOUS PRN
Status: DISCONTINUED | OUTPATIENT
Start: 2019-02-28 | End: 2019-02-28 | Stop reason: SDUPTHER

## 2019-02-28 RX ADMIN — FENTANYL CITRATE 100 MCG: 50 INJECTION, SOLUTION INTRAMUSCULAR; INTRAVENOUS at 08:05

## 2019-02-28 RX ADMIN — FENTANYL CITRATE 50 MCG: 50 INJECTION, SOLUTION INTRAMUSCULAR; INTRAVENOUS at 07:50

## 2019-02-28 RX ADMIN — GLYCOPYRROLATE 0.6 MG: 0.2 INJECTION, SOLUTION INTRAMUSCULAR; INTRAVENOUS at 08:05

## 2019-02-28 RX ADMIN — FENTANYL CITRATE 50 MCG: 50 INJECTION, SOLUTION INTRAMUSCULAR; INTRAVENOUS at 07:40

## 2019-02-28 RX ADMIN — Medication 35 MG: at 07:38

## 2019-02-28 RX ADMIN — CEFAZOLIN SODIUM 2 G: 2 SOLUTION INTRAVENOUS at 07:35

## 2019-02-28 RX ADMIN — MEPERIDINE HYDROCHLORIDE 12.5 MG: 25 INJECTION INTRAMUSCULAR; INTRAVENOUS; SUBCUTANEOUS at 08:51

## 2019-02-28 RX ADMIN — PROPOFOL 150 MG: 10 INJECTION, EMULSION INTRAVENOUS at 07:38

## 2019-02-28 RX ADMIN — MEPERIDINE HYDROCHLORIDE 12.5 MG: 25 INJECTION INTRAMUSCULAR; INTRAVENOUS; SUBCUTANEOUS at 09:17

## 2019-02-28 RX ADMIN — SODIUM CHLORIDE: 9 INJECTION, SOLUTION INTRAVENOUS at 07:02

## 2019-02-28 RX ADMIN — FENTANYL CITRATE 50 MCG: 50 INJECTION, SOLUTION INTRAMUSCULAR; INTRAVENOUS at 07:35

## 2019-02-28 RX ADMIN — Medication 3 MG: at 08:05

## 2019-02-28 RX ADMIN — FENTANYL CITRATE 50 MCG: 50 INJECTION, SOLUTION INTRAMUSCULAR; INTRAVENOUS at 07:55

## 2019-02-28 RX ADMIN — LABETALOL 20 MG/4 ML (5 MG/ML) INTRAVENOUS SYRINGE 5 MG: at 08:34

## 2019-02-28 RX ADMIN — FENTANYL CITRATE 50 MCG: 50 INJECTION, SOLUTION INTRAMUSCULAR; INTRAVENOUS at 07:45

## 2019-02-28 RX ADMIN — ONDANSETRON HYDROCHLORIDE 4 MG: 2 INJECTION, SOLUTION INTRAMUSCULAR; INTRAVENOUS at 07:51

## 2019-02-28 RX ADMIN — KETOROLAC TROMETHAMINE 30 MG: 30 INJECTION, SOLUTION INTRAMUSCULAR; INTRAVENOUS at 08:06

## 2019-02-28 ASSESSMENT — PAIN SCALES - GENERAL
PAINLEVEL_OUTOF10: 0
PAINLEVEL_OUTOF10: 5
PAINLEVEL_OUTOF10: 6
PAINLEVEL_OUTOF10: 0
PAINLEVEL_OUTOF10: 8

## 2019-02-28 ASSESSMENT — PULMONARY FUNCTION TESTS
PIF_VALUE: 21
PIF_VALUE: 30
PIF_VALUE: 22
PIF_VALUE: 20
PIF_VALUE: 21
PIF_VALUE: 22
PIF_VALUE: 2
PIF_VALUE: 1
PIF_VALUE: 2
PIF_VALUE: 17
PIF_VALUE: 20
PIF_VALUE: 1
PIF_VALUE: 22
PIF_VALUE: 2
PIF_VALUE: 22
PIF_VALUE: 12
PIF_VALUE: 17
PIF_VALUE: 17
PIF_VALUE: 21
PIF_VALUE: 28
PIF_VALUE: 12
PIF_VALUE: 19
PIF_VALUE: 3
PIF_VALUE: 2
PIF_VALUE: 20
PIF_VALUE: 16
PIF_VALUE: 24
PIF_VALUE: 19
PIF_VALUE: 16
PIF_VALUE: 19
PIF_VALUE: 2
PIF_VALUE: 23
PIF_VALUE: 22
PIF_VALUE: 22
PIF_VALUE: 21
PIF_VALUE: 14
PIF_VALUE: 21
PIF_VALUE: 16
PIF_VALUE: 22
PIF_VALUE: 18
PIF_VALUE: 21

## 2019-02-28 ASSESSMENT — PAIN DESCRIPTION - DESCRIPTORS
DESCRIPTORS: DISCOMFORT;ACHING
DESCRIPTORS: SORE;ACHING
DESCRIPTORS: CRAMPING;ACHING;DISCOMFORT

## 2019-02-28 ASSESSMENT — PAIN DESCRIPTION - LOCATION
LOCATION: ABDOMEN

## 2019-02-28 ASSESSMENT — PAIN - FUNCTIONAL ASSESSMENT: PAIN_FUNCTIONAL_ASSESSMENT: 0-10

## 2019-02-28 ASSESSMENT — PAIN DESCRIPTION - PAIN TYPE
TYPE: SURGICAL PAIN

## 2019-02-28 ASSESSMENT — PAIN DESCRIPTION - PROGRESSION: CLINICAL_PROGRESSION: GRADUALLY IMPROVING

## 2019-02-28 ASSESSMENT — PAIN DESCRIPTION - ORIENTATION: ORIENTATION: RIGHT

## 2019-02-28 ASSESSMENT — PAIN DESCRIPTION - ONSET
ONSET: AWAKENED FROM SLEEP
ONSET: AWAKENED FROM SLEEP

## 2019-03-01 ENCOUNTER — TELEPHONE (OUTPATIENT)
Dept: FAMILY MEDICINE CLINIC | Age: 49
End: 2019-03-01

## 2019-03-11 ENCOUNTER — OFFICE VISIT (OUTPATIENT)
Dept: SURGERY | Age: 49
End: 2019-03-11

## 2019-03-11 VITALS
SYSTOLIC BLOOD PRESSURE: 144 MMHG | RESPIRATION RATE: 16 BRPM | HEART RATE: 92 BPM | OXYGEN SATURATION: 98 % | TEMPERATURE: 98.2 F | WEIGHT: 148 LBS | DIASTOLIC BLOOD PRESSURE: 90 MMHG | BODY MASS INDEX: 29.84 KG/M2 | HEIGHT: 59 IN

## 2019-03-11 DIAGNOSIS — K81.1 CHRONIC CHOLECYSTITIS: Primary | ICD-10-CM

## 2019-03-11 PROCEDURE — 99024 POSTOP FOLLOW-UP VISIT: CPT | Performed by: SURGERY

## 2019-03-11 RX ORDER — OMEPRAZOLE 40 MG/1
CAPSULE, DELAYED RELEASE ORAL
Refills: 1 | COMMUNITY
Start: 2019-02-08 | End: 2019-04-15 | Stop reason: SDUPTHER

## 2019-03-12 ENCOUNTER — NURSE ONLY (OUTPATIENT)
Dept: FAMILY MEDICINE CLINIC | Age: 49
End: 2019-03-12
Payer: COMMERCIAL

## 2019-03-12 ENCOUNTER — HOSPITAL ENCOUNTER (OUTPATIENT)
Age: 49
Discharge: HOME | End: 2019-03-14
Payer: COMMERCIAL

## 2019-03-12 DIAGNOSIS — K58.0 IRRITABLE BOWEL SYNDROME WITH DIARRHEA: ICD-10-CM

## 2019-03-12 DIAGNOSIS — E78.2 MIXED HYPERLIPIDEMIA: ICD-10-CM

## 2019-03-12 DIAGNOSIS — K21.9 GASTROESOPHAGEAL REFLUX DISEASE WITHOUT ESOPHAGITIS: ICD-10-CM

## 2019-03-12 DIAGNOSIS — R53.82 CHRONIC FATIGUE: ICD-10-CM

## 2019-03-12 DIAGNOSIS — R10.13 EPIGASTRIC PAIN: ICD-10-CM

## 2019-03-12 DIAGNOSIS — K58.9 IRRITABLE BOWEL SYNDROME, UNSPECIFIED TYPE: ICD-10-CM

## 2019-03-12 DIAGNOSIS — R10.84 GENERALIZED ABDOMINAL PAIN: ICD-10-CM

## 2019-03-12 LAB
ALBUMIN SERPL-MCNC: 3.7 G/DL (ref 3.5–5.2)
ALP BLD-CCNC: 88 U/L (ref 35–104)
ALT SERPL-CCNC: 24 U/L (ref 0–32)
ANION GAP SERPL CALCULATED.3IONS-SCNC: 12 MMOL/L (ref 7–16)
AST SERPL-CCNC: 25 U/L (ref 0–31)
BASOPHILS ABSOLUTE: 0.02 E9/L (ref 0–0.2)
BASOPHILS RELATIVE PERCENT: 0.2 % (ref 0–2)
BILIRUB SERPL-MCNC: 0.3 MG/DL (ref 0–1.2)
BUN BLDV-MCNC: 20 MG/DL (ref 6–20)
CALCIUM SERPL-MCNC: 9.1 MG/DL (ref 8.6–10.2)
CHLORIDE BLD-SCNC: 100 MMOL/L (ref 98–107)
CHOLESTEROL, TOTAL: 137 MG/DL (ref 0–199)
CO2: 27 MMOL/L (ref 22–29)
CREAT SERPL-MCNC: 0.9 MG/DL (ref 0.5–1)
EOSINOPHILS ABSOLUTE: 0.26 E9/L (ref 0.05–0.5)
EOSINOPHILS RELATIVE PERCENT: 3 % (ref 0–6)
GFR AFRICAN AMERICAN: >60
GFR NON-AFRICAN AMERICAN: >60 ML/MIN/1.73
GLUCOSE BLD-MCNC: 253 MG/DL (ref 74–99)
HBA1C MFR BLD: 10.1 % (ref 4–5.6)
HCT VFR BLD CALC: 36.7 % (ref 34–48)
HDLC SERPL-MCNC: 51 MG/DL
HEMOGLOBIN: 11.5 G/DL (ref 11.5–15.5)
IMMATURE GRANULOCYTES #: 0.03 E9/L
IMMATURE GRANULOCYTES %: 0.3 % (ref 0–5)
LDL CHOLESTEROL CALCULATED: 70 MG/DL (ref 0–99)
LYMPHOCYTES ABSOLUTE: 2.49 E9/L (ref 1.5–4)
LYMPHOCYTES RELATIVE PERCENT: 28.7 % (ref 20–42)
MCH RBC QN AUTO: 31 PG (ref 26–35)
MCHC RBC AUTO-ENTMCNC: 31.3 % (ref 32–34.5)
MCV RBC AUTO: 98.9 FL (ref 80–99.9)
MONOCYTES ABSOLUTE: 0.8 E9/L (ref 0.1–0.95)
MONOCYTES RELATIVE PERCENT: 9.2 % (ref 2–12)
NEUTROPHILS ABSOLUTE: 5.09 E9/L (ref 1.8–7.3)
NEUTROPHILS RELATIVE PERCENT: 58.6 % (ref 43–80)
PDW BLD-RTO: 12 FL (ref 11.5–15)
PLATELET # BLD: 299 E9/L (ref 130–450)
PMV BLD AUTO: 11 FL (ref 7–12)
POTASSIUM SERPL-SCNC: 5.3 MMOL/L (ref 3.5–5)
RBC # BLD: 3.71 E12/L (ref 3.5–5.5)
SODIUM BLD-SCNC: 139 MMOL/L (ref 132–146)
TOTAL PROTEIN: 6.7 G/DL (ref 6.4–8.3)
TRIGL SERPL-MCNC: 80 MG/DL (ref 0–149)
TSH SERPL DL<=0.05 MIU/L-ACNC: 2.32 UIU/ML (ref 0.27–4.2)
VLDLC SERPL CALC-MCNC: 16 MG/DL
WBC # BLD: 8.7 E9/L (ref 4.5–11.5)

## 2019-03-12 PROCEDURE — 85025 COMPLETE CBC W/AUTO DIFF WBC: CPT

## 2019-03-12 PROCEDURE — 36415 COLL VENOUS BLD VENIPUNCTURE: CPT | Performed by: FAMILY MEDICINE

## 2019-03-12 PROCEDURE — 84443 ASSAY THYROID STIM HORMONE: CPT

## 2019-03-12 PROCEDURE — 83036 HEMOGLOBIN GLYCOSYLATED A1C: CPT

## 2019-03-12 PROCEDURE — 80061 LIPID PANEL: CPT

## 2019-03-12 PROCEDURE — 80053 COMPREHEN METABOLIC PANEL: CPT

## 2019-03-13 ENCOUNTER — PATIENT MESSAGE (OUTPATIENT)
Dept: FAMILY MEDICINE CLINIC | Age: 49
End: 2019-03-13

## 2019-04-01 DIAGNOSIS — Z79.4 TYPE 2 DIABETES MELLITUS WITH HYPERGLYCEMIA, WITH LONG-TERM CURRENT USE OF INSULIN (HCC): ICD-10-CM

## 2019-04-01 DIAGNOSIS — E11.65 TYPE 2 DIABETES MELLITUS WITH HYPERGLYCEMIA, WITH LONG-TERM CURRENT USE OF INSULIN (HCC): ICD-10-CM

## 2019-04-01 NOTE — TELEPHONE ENCOUNTER
Attempted to contact pt regarding unread MyChart message. Pt did not answer - no voicemail set up.     Electronically signed by Berny Rubio MA on 4/1/19 at 1:42 PM

## 2019-04-01 NOTE — TELEPHONE ENCOUNTER
Patient called the clinical care line requesting medication refill. Chart reviewed and medication sent to the pharmacy.     Electronically signed by Veronica Enriquez on 4/1/19 at 3:40 PM

## 2019-04-15 ENCOUNTER — OFFICE VISIT (OUTPATIENT)
Dept: FAMILY MEDICINE CLINIC | Age: 49
End: 2019-04-15
Payer: COMMERCIAL

## 2019-04-15 VITALS
WEIGHT: 144.8 LBS | RESPIRATION RATE: 18 BRPM | TEMPERATURE: 97.4 F | OXYGEN SATURATION: 98 % | BODY MASS INDEX: 29.19 KG/M2 | HEART RATE: 80 BPM | SYSTOLIC BLOOD PRESSURE: 120 MMHG | DIASTOLIC BLOOD PRESSURE: 70 MMHG | HEIGHT: 59 IN

## 2019-04-15 DIAGNOSIS — R07.89 ATYPICAL CHEST PAIN: ICD-10-CM

## 2019-04-15 DIAGNOSIS — F41.9 ANXIETY: ICD-10-CM

## 2019-04-15 DIAGNOSIS — M54.50 LUMBAR PAIN: ICD-10-CM

## 2019-04-15 DIAGNOSIS — R10.11 RIGHT UPPER QUADRANT PAIN: ICD-10-CM

## 2019-04-15 DIAGNOSIS — E78.2 MIXED HYPERLIPIDEMIA: ICD-10-CM

## 2019-04-15 DIAGNOSIS — Z12.39 SCREENING FOR BREAST CANCER: ICD-10-CM

## 2019-04-15 DIAGNOSIS — G62.9 NEUROPATHY: ICD-10-CM

## 2019-04-15 DIAGNOSIS — K21.9 GASTROESOPHAGEAL REFLUX DISEASE WITHOUT ESOPHAGITIS: ICD-10-CM

## 2019-04-15 LAB
BILIRUBIN, POC: NEGATIVE
BLOOD URINE, POC: NEGATIVE
CLARITY, POC: CLEAR
COLOR, POC: YELLOW
GLUCOSE URINE, POC: NEGATIVE
KETONES, POC: NEGATIVE
LEUKOCYTE EST, POC: NORMAL
NITRITE, POC: NEGATIVE
PH, POC: 7
PROTEIN, POC: NEGATIVE
SPECIFIC GRAVITY, POC: 1.01
UROBILINOGEN, POC: 0.2

## 2019-04-15 PROCEDURE — 2022F DILAT RTA XM EVC RTNOPTHY: CPT | Performed by: FAMILY MEDICINE

## 2019-04-15 PROCEDURE — 3017F COLORECTAL CA SCREEN DOC REV: CPT | Performed by: FAMILY MEDICINE

## 2019-04-15 PROCEDURE — 99214 OFFICE O/P EST MOD 30 MIN: CPT | Performed by: FAMILY MEDICINE

## 2019-04-15 PROCEDURE — G8427 DOCREV CUR MEDS BY ELIG CLIN: HCPCS | Performed by: FAMILY MEDICINE

## 2019-04-15 PROCEDURE — 3046F HEMOGLOBIN A1C LEVEL >9.0%: CPT | Performed by: FAMILY MEDICINE

## 2019-04-15 PROCEDURE — 1036F TOBACCO NON-USER: CPT | Performed by: FAMILY MEDICINE

## 2019-04-15 PROCEDURE — G8417 CALC BMI ABV UP PARAM F/U: HCPCS | Performed by: FAMILY MEDICINE

## 2019-04-15 PROCEDURE — 81003 URINALYSIS AUTO W/O SCOPE: CPT | Performed by: FAMILY MEDICINE

## 2019-04-15 RX ORDER — ONDANSETRON 4 MG/1
4 TABLET, ORALLY DISINTEGRATING ORAL EVERY 8 HOURS PRN
Qty: 24 TABLET | Refills: 1 | Status: SHIPPED | OUTPATIENT
Start: 2019-04-15 | End: 2019-08-23

## 2019-04-15 RX ORDER — SERTRALINE HYDROCHLORIDE 100 MG/1
100 TABLET, FILM COATED ORAL DAILY PRN
Qty: 90 TABLET | Refills: 1 | Status: SHIPPED | OUTPATIENT
Start: 2019-04-15 | End: 2020-01-06 | Stop reason: SDUPTHER

## 2019-04-15 RX ORDER — SUCRALFATE ORAL 1 G/10ML
1 SUSPENSION ORAL 4 TIMES DAILY
Qty: 600 ML | Refills: 5 | Status: SHIPPED | OUTPATIENT
Start: 2019-04-15 | End: 2019-10-09

## 2019-04-15 RX ORDER — OMEPRAZOLE 40 MG/1
CAPSULE, DELAYED RELEASE ORAL
Qty: 90 CAPSULE | Refills: 1 | Status: SHIPPED | OUTPATIENT
Start: 2019-04-15 | End: 2019-08-23 | Stop reason: SDUPTHER

## 2019-04-15 RX ORDER — FAMOTIDINE 20 MG/1
20 TABLET, FILM COATED ORAL 2 TIMES DAILY
Qty: 180 TABLET | Refills: 1 | Status: SHIPPED | OUTPATIENT
Start: 2019-04-15 | End: 2019-08-23 | Stop reason: SDUPTHER

## 2019-04-15 RX ORDER — NITROGLYCERIN 0.4 MG/1
0.4 TABLET SUBLINGUAL EVERY 5 MIN PRN
Qty: 25 TABLET | Refills: 1 | Status: SHIPPED
Start: 2019-04-15 | End: 2020-09-25 | Stop reason: SDUPTHER

## 2019-04-15 RX ORDER — ROSUVASTATIN CALCIUM 10 MG/1
5 TABLET, COATED ORAL DAILY
Qty: 45 TABLET | Refills: 1 | Status: SHIPPED | OUTPATIENT
Start: 2019-04-15 | End: 2019-08-23 | Stop reason: SDUPTHER

## 2019-04-15 RX ORDER — BUMETANIDE 1 MG/1
1 TABLET ORAL EVERY OTHER DAY
Qty: 90 TABLET | Refills: 1 | Status: SHIPPED | OUTPATIENT
Start: 2019-04-15 | End: 2019-08-23 | Stop reason: SDUPTHER

## 2019-04-15 RX ORDER — DULOXETIN HYDROCHLORIDE 30 MG/1
30 CAPSULE, DELAYED RELEASE ORAL EVERY MORNING
Qty: 90 CAPSULE | Refills: 1 | Status: CANCELLED | OUTPATIENT
Start: 2019-04-15

## 2019-04-20 ASSESSMENT — ENCOUNTER SYMPTOMS
DIARRHEA: 1
BLOOD IN STOOL: 0
COUGH: 0
CHEST TIGHTNESS: 0
EYE REDNESS: 0
SHORTNESS OF BREATH: 0
WHEEZING: 0
NAUSEA: 0
FACIAL SWELLING: 0
VOICE CHANGE: 0
ANAL BLEEDING: 0
STRIDOR: 0
BACK PAIN: 0
ABDOMINAL DISTENTION: 0
VOMITING: 0
CONSTIPATION: 0
EYES NEGATIVE: 1
SINUS PRESSURE: 0
PHOTOPHOBIA: 0
TROUBLE SWALLOWING: 0
EYE PAIN: 0
RESPIRATORY NEGATIVE: 1
SINUS PAIN: 0
EYE ITCHING: 0
EYE DISCHARGE: 0
ABDOMINAL PAIN: 1
RECTAL PAIN: 0
SORE THROAT: 0
COLOR CHANGE: 0
CHOKING: 0
ALLERGIC/IMMUNOLOGIC NEGATIVE: 1
RHINORRHEA: 0

## 2019-04-20 NOTE — PROGRESS NOTES
SUBJECTIVE  Vickie A Stanton Kocher is a 44 y.o. female. HPI/Chief C/O:  Chief Complaint   Patient presents with    Medication Refill     Pt here today for medication refills- meds pended    Abdominal Pain     Pt c/o R sided abdominal pain x1 week     No Known Allergies  This 44year old female presents for medication refills. Pt has IDDM, neuropathy, GERD, hyperlipidemia, anxiety, and fatigue. Pt follows with [facility] center, podiatry, and nephrology. Pt had GB surgery on 2/28/2019. Pt c/o RUQ pain for 1 week, and diarrhea 1 days. Pt instructed to call general surgeon. Pt denies SI and HI.   ROS:  Review of Systems   Constitutional: Positive for fatigue. Negative for activity change, appetite change, chills, diaphoresis, fever and unexpected weight change. HENT: Negative. Negative for congestion, dental problem, drooling, ear discharge, ear pain, facial swelling, hearing loss, mouth sores, nosebleeds, postnasal drip, rhinorrhea, sinus pressure, sinus pain, sneezing, sore throat, tinnitus, trouble swallowing and voice change. Eyes: Negative. Negative for photophobia, pain, discharge, redness, itching and visual disturbance. Respiratory: Negative. Negative for cough, choking, chest tightness, shortness of breath, wheezing and stridor. Cardiovascular: Negative. Negative for chest pain, palpitations and leg swelling. Gastrointestinal: Positive for abdominal pain and diarrhea. Negative for abdominal distention, anal bleeding, blood in stool, constipation, nausea, rectal pain and vomiting. Endocrine: Negative. Negative for cold intolerance, heat intolerance, polydipsia, polyphagia and polyuria. Genitourinary: Positive for frequency and urgency. Negative for decreased urine volume, difficulty urinating, dysuria, flank pain, genital sores, hematuria, menstrual problem and pelvic pain. Musculoskeletal: Negative.   Negative for arthralgias, back pain, gait problem, joint swelling, myalgias, neck pain and neck stiffness. Skin: Negative. Negative for color change, pallor, rash and wound. Allergic/Immunologic: Negative. Neurological: Positive for numbness. Negative for dizziness, tremors, seizures, syncope, facial asymmetry, speech difficulty, weakness, light-headedness and headaches. Hematological: Negative. Negative for adenopathy. Does not bruise/bleed easily. Psychiatric/Behavioral: Negative for agitation, behavioral problems, confusion, decreased concentration, dysphoric mood, hallucinations, self-injury, sleep disturbance and suicidal ideas. The patient is nervous/anxious. The patient is not hyperactive. Past Medical/Surgical Hx;  Reviewed with patient      Diagnosis Date    Arthritis     lower back    Back pain     Cardiac valve prolapse     micro    Diabetes mellitus ([facility]     H/O exercise stress test 10/2015  ?     Dr Lyla Libman  neg    Hyperlipidemia     Hypertension     Neuropathy due to secondary diabetes ([facility]     in marcial feet    Psoriasis      Past Surgical History:   Procedure Laterality Date    ANKLE SURGERY      bilateral tarsal tunnels    CARDIAC SURGERY      cardiac catheterization    CARPAL TUNNEL RELEASE      left    CARPAL TUNNEL RELEASE  12    right     SECTION      CHOLECYSTECTOMY, LAPAROSCOPIC N/A 2019    LAPAROSCOPIC CHOLECYSTECTOMY WITH IOC performed by Robinson Donahue MD at [address] [place] [address] COLONOSCOPY      ENDOSCOPY, COLON, DIAGNOSTIC      HYSTERECTOMY      SHOULDER ARTHROSCOPY Left 2016    subacromin decompression and debridement       Past Family Hx:  Reviewed with patient      Problem Relation Age of Onset    Heart Disease Mother     Diabetes Mother     Heart Disease Father     Diabetes Father        Social Hx:  Reviewed with patient  Social History     Tobacco Use    Smoking status: Never Smoker    Smokeless tobacco: Never Used   Substance Use Topics    Alcohol use: Yes     Comment: social       OBJECTIVE  /70   Pulse 80 Temp 97.4 °F (36.3 °C)   Resp 18   Ht 4' 11.02\" (1.499 m)   Wt 144 lb 12.8 oz (65.7 kg)   LMP  (LMP Unknown)   SpO2 98%   Breastfeeding? No   BMI 29.23 kg/m²     Problem List:  Philip Smith does not have any pertinent problems on file. PHYS EX:  Physical Exam   Constitutional: She is oriented to person, place, and time. She appears well-developed and well-nourished. No distress. HENT:   Head: Normocephalic and atraumatic. Right Ear: External ear normal.   Left Ear: External ear normal.   Nose: Nose normal.   Mouth/Throat: Oropharynx is clear and moist. No oropharyngeal exudate. Eyes: Pupils are equal, round, and reactive to light. Conjunctivae and EOM are normal. Right eye exhibits no discharge. Left eye exhibits no discharge. No scleral icterus. Neck: Normal range of motion. Neck supple. No JVD present. No tracheal deviation present. No thyromegaly present. Cardiovascular: Normal rate, regular rhythm, normal heart sounds and intact distal pulses. Exam reveals no gallop and no friction rub. No murmur heard. Pulmonary/Chest: Effort normal and breath sounds normal. No stridor. No respiratory distress. She has no wheezes. She has no rales. She exhibits no tenderness. Abdominal: Soft. Bowel sounds are normal. She exhibits no distension and no mass. There is tenderness. There is no rebound and no guarding. No hernia. Pt has slight RUQ pain. Musculoskeletal: She exhibits tenderness. She exhibits no edema or deformity. Pain and decreased ROM multiple joints. Lymphadenopathy:     She has no cervical adenopathy. Neurological: She is alert and oriented to person, place, and time. She has normal reflexes. She displays normal reflexes. No cranial nerve deficit or sensory deficit. She exhibits normal muscle tone. Coordination normal.   Skin: Skin is warm. No rash noted. She is not diaphoretic. No erythema. No pallor. Psychiatric: She has a normal mood and affect.  Her behavior is normal. Judgment and thought content normal.   Nursing note and vitals reviewed. ASSESSMENT/PLAN  Jenny was seen today for medication refill and abdominal pain. Diagnoses and all orders for this visit:    IDDM (insulin dependent diabetes mellitus) ([facility]  -     bumetanide (BUMEX) 1 MG tablet; Take 1 tablet by mouth every other day  -     Dulaglutide (TRULICITY) 1.5 WB/6.4ZY SOPN; Inject 1.5 mLs into the skin once a week  -     insulin glargine (BASAGLAR KWIKPEN) 100 UNIT/ML injection pen; Inject 44 Units into the skin nightly  -     insulin lispro (ADMELOG SOLOSTAR) 100 UNIT/ML pen; As directed per sliding scale up to 13 units 4 times per day. -     POCT Urinalysis No Micro (Auto)  Not controlled. Metformin, trulicity, sliding scale, basaglar, statin, ADA diet. Neuropathy  -     etodolac (LODINE) 300 MG capsule; TK 1 C PO D  Not controlled. Lodine. Gastroesophageal reflux disease without esophagitis  -     famotidine (PEPCID) 20 MG tablet; Take 1 tablet by mouth 2 times daily  -     omeprazole (PRILOSEC) 40 MG delayed release capsule; TK 1 C PO D  -     ondansetron (ZOFRAN ODT) 4 MG disintegrating tablet; Take 1 tablet by mouth every 8 hours as needed for Nausea or Vomiting  -     sucralfate (CARAFATE) 1 GM/10ML suspension; Take 10 mLs by mouth 4 times daily  Stable. Carafate, pepcid, prilosec, zofran. Atypical chest pain  -     Metoprolol Succinate 25 MG CS24; Take 0.5 tablets by mouth daily  -     nitroGLYCERIN (NITROSTAT) 0.4 MG SL tablet; Place 1 tablet under the tongue every 5 minutes as needed for Chest pain  Controlled. BB. Right upper quadrant pain  Stable. Pt instructed to call Dr. Johann Schwartz office. Screening for breast cancer  -     CURRY DIGITAL SCREEN W OR WO CAD BILATERAL; Future  -     POCT Urinalysis No Micro (Auto)  Pt instructed on marcial. Curry.   Mixed hyperlipidemia  -     rosuvastatin (CRESTOR) 10 MG tablet; Take 0.5 tablets by mouth daily  Not controlled. Low chol. Diet, statin. Lumbar pain  Stable. Moist heat. Anxiety  -     sertraline (ZOLOFT) 100 MG tablet; Take 1 tablet by mouth daily as needed (depression)  Controlled. Zoloft. Pt instructed if any worse go ED ASAP. Outpatient Encounter Medications as of 4/15/2019   Medication Sig Dispense Refill    bumetanide (BUMEX) 1 MG tablet Take 1 tablet by mouth every other day 90 tablet 1    Dulaglutide (TRULICITY) 1.5 KH/9.9HN SOPN Inject 1.5 mLs into the skin once a week 4 pen 3    etodolac (LODINE) 300 MG capsule TK 1 C PO D 90 capsule 1    famotidine (PEPCID) 20 MG tablet Take 1 tablet by mouth 2 times daily 180 tablet 1    insulin glargine (BASAGLAR KWIKPEN) 100 UNIT/ML injection pen Inject 44 Units into the skin nightly 5 pen 3    insulin lispro (ADMELOG SOLOSTAR) 100 UNIT/ML pen As directed per sliding scale up to 13 units 4 times per day.  5 pen 3    Metoprolol Succinate 25 MG CS24 Take 0.5 tablets by mouth daily 45 capsule 1    omeprazole (PRILOSEC) 40 MG delayed release capsule TK 1 C PO D 90 capsule 1    ondansetron (ZOFRAN ODT) 4 MG disintegrating tablet Take 1 tablet by mouth every 8 hours as needed for Nausea or Vomiting 24 tablet 1    nitroGLYCERIN (NITROSTAT) 0.4 MG SL tablet Place 1 tablet under the tongue every 5 minutes as needed for Chest pain 25 tablet 1    rosuvastatin (CRESTOR) 10 MG tablet Take 0.5 tablets by mouth daily 45 tablet 1    sertraline (ZOLOFT) 100 MG tablet Take 1 tablet by mouth daily as needed (depression) 90 tablet 1    sucralfate (CARAFATE) 1 GM/10ML suspension Take 10 mLs by mouth 4 times daily 600 mL 5    metFORMIN (GLUCOPHAGE) 500 MG tablet Take 2 tablets by mouth 2 times daily (with meals) 360 tablet 1    Wheat Dextrin (BENEFIBER) POWD Take 4 g by mouth 3 times daily (with meals) 475 g 2    Insulin Syringe-Needle U-100 ([employer] INSULIN SYR 1CC/30G) 30G X 5/16\" 1 ML MISC 1 each by Does not apply route 4 times daily 120 each 5    Insulin Pen Needle 32G X 4 MM MISC 1 each by Does not apply route daily 100 each 3    blood glucose monitor strips Test once daily & as needed for symptoms of irregular blood glucose. 100 strip 3    lidocaine-prilocaine (EMLA) 2.5-2.5 % cream Apply topically as needed. 30 g 0    Lancets MISC 1 each by Does not apply route daily 100 each 1    glucose monitoring kit (FREESTYLE) monitoring kit 1 kit by Does not apply route daily 1 kit 0    Blood Pressure KIT 1 kit by Does not apply route daily 1 kit 0    Misc. Devices (QUAD CANE) MISC 1 Quad cane 1 each 0    Multiple Vitamins-Minerals (THERAPEUTIC MULTIVITAMIN-MINERALS) tablet Take 1 tablet by mouth daily      B Complex Vitamins (B COMPLEX 1 PO) Take 1 tablet by mouth daily.  [DISCONTINUED] etodolac (LODINE) 300 MG capsule TK 1 C PO D 90 capsule 1    [DISCONTINUED] insulin glargine (BASAGLAR KWIKPEN) 100 UNIT/ML injection pen Inject 44 Units into the skin nightly 5 pen 3    [DISCONTINUED] omeprazole (PRILOSEC) 40 MG delayed release capsule TK 1 C PO D  1    [DISCONTINUED] ibuprofen (ADVIL) 200 MG tablet Take 4 tablets by mouth every 6 hours as needed for Pain 90 tablet 0    [DISCONTINUED] sertraline (ZOLOFT) 100 MG tablet Take 100 mg by mouth daily as needed      [DISCONTINUED] insulin lispro (ADMELOG SOLOSTAR) 100 UNIT/ML pen As directed per sliding scale up to 13 units 4 times per day.  5 pen 3    [DISCONTINUED] famotidine (PEPCID) 20 MG tablet Take 1 tablet by mouth 2 times daily 60 tablet 0    [DISCONTINUED] ondansetron (ZOFRAN ODT) 4 MG disintegrating tablet Take 1 tablet by mouth every 8 hours as needed for Nausea or Vomiting 24 tablet 0    [DISCONTINUED] sucralfate (CARAFATE) 1 GM/10ML suspension Take 10 mLs by mouth 4 times daily 600 mL 0    [DISCONTINUED] Dulaglutide (TRULICITY) 1.5 NF/1.3DP SOPN Inject 1.5 mLs into the skin once a week 4 pen 3    [DISCONTINUED] DULoxetine (CYMBALTA) 30 MG extended release capsule Take 1 capsule by mouth every morning 90 capsule 1    [DISCONTINUED] bumetanide (BUMEX)

## 2019-04-30 ENCOUNTER — TELEPHONE (OUTPATIENT)
Dept: FAMILY MEDICINE CLINIC | Age: 49
End: 2019-04-30

## 2019-04-30 NOTE — TELEPHONE ENCOUNTER
Patient notified the cream has been sent to the pharmacy. Also notified to discontinue the Lodine to due stomach problems. Patient states she hardly ever takes it and will set it off to the side and discontinue.   Electronically signed by Justin Smith on 4/30/2019 at 1:39 PM

## 2019-04-30 NOTE — TELEPHONE ENCOUNTER
Patient contacted office staitng she had bed bugs and now has a rash on her arms and breast. Patient wants to know if there is an otc ointment she can use to help with the itching or is there an ointment that can be sent to pharmacy

## 2019-04-30 NOTE — TELEPHONE ENCOUNTER
Attempted to reach patient regarding medication order. Message picks up and says patient has a voicemail box that is not set up yet and disconnects. Will send letter.   Electronically signed by Tucker Ashby on 4/30/2019 at 10:51 AM

## 2019-05-10 ENCOUNTER — TELEPHONE (OUTPATIENT)
Dept: FAMILY MEDICINE CLINIC | Age: 49
End: 2019-05-10

## 2019-05-10 DIAGNOSIS — M25.511 RIGHT SHOULDER PAIN, UNSPECIFIED CHRONICITY: Primary | ICD-10-CM

## 2019-05-16 DIAGNOSIS — M25.511 RIGHT SHOULDER PAIN, UNSPECIFIED CHRONICITY: Primary | ICD-10-CM

## 2019-05-17 ENCOUNTER — OFFICE VISIT (OUTPATIENT)
Dept: ORTHOPEDIC SURGERY | Age: 49
End: 2019-05-17
Payer: COMMERCIAL

## 2019-05-17 VITALS — BODY MASS INDEX: 29.84 KG/M2 | TEMPERATURE: 98.6 F | WEIGHT: 148 LBS | HEIGHT: 59 IN

## 2019-05-17 DIAGNOSIS — M75.101 TEAR OF RIGHT ROTATOR CUFF, UNSPECIFIED TEAR EXTENT: Primary | ICD-10-CM

## 2019-05-17 PROCEDURE — 99203 OFFICE O/P NEW LOW 30 MIN: CPT | Performed by: ORTHOPAEDIC SURGERY

## 2019-05-17 PROCEDURE — G8417 CALC BMI ABV UP PARAM F/U: HCPCS | Performed by: ORTHOPAEDIC SURGERY

## 2019-05-17 PROCEDURE — 1036F TOBACCO NON-USER: CPT | Performed by: ORTHOPAEDIC SURGERY

## 2019-05-17 PROCEDURE — G8427 DOCREV CUR MEDS BY ELIG CLIN: HCPCS | Performed by: ORTHOPAEDIC SURGERY

## 2019-05-17 PROCEDURE — 3017F COLORECTAL CA SCREEN DOC REV: CPT | Performed by: ORTHOPAEDIC SURGERY

## 2019-05-17 NOTE — PROGRESS NOTES
Chief Complaint   Patient presents with    Shoulder Pain     Right shoulder pain. Pt has neuropathy and has poor balance. Jodie Carreno at her bothers house. Pain with internal rotation. Pain with external rotation. Pt notes she thinks it's rotator cuff related. She had a similar injury on the left and feels similar. HPI:    Patient is 44 y.o. female complaining Right shoulder pain. Pt has neuropathy and has poor balance. Jodie Carreno at her bothers house. Pain with internal rotation. Pain with external rotation. Pt notes she thinks it's rotator cuff related. She had a similar injury on the left and feels similar. ROS:    Skin: (-) rash,(-) psoriasis,(-) eczema, (-)skin cancer. Neurologic: (-)numbness, (-)tingling, (-)headaches, (-) LOC. Cardiovascular: (-) Chest pain, (-) swelling in legs/feet, (-) SOB, (-) cramping in legs/feet with walking. All other review of systems negative except stated above or in HPI      Past Medical History:   Diagnosis Date    Arthritis     lower back    Back pain     Cardiac valve prolapse     micro    Diabetes mellitus ([address]     H/O exercise stress test 10/2015  ? Dr Lito Victoria  neg    Hyperlipidemia     Hypertension     Neuropathy due to secondary diabetes ([address]     in marcial feet    Psoriasis      Past Surgical History:   Procedure Laterality Date    ANKLE SURGERY      bilateral tarsal tunnels    CARDIAC SURGERY      cardiac catheterization    CARPAL TUNNEL RELEASE      left    CARPAL TUNNEL RELEASE  12    right     SECTION      CHOLECYSTECTOMY, LAPAROSCOPIC N/A 2019    LAPAROSCOPIC CHOLECYSTECTOMY WITH IOC performed by Bandar Fregoso MD at [address] [place] [address] COLONOSCOPY      ENDOSCOPY, COLON, DIAGNOSTIC      HYSTERECTOMY      SHOULDER ARTHROSCOPY Left 2016    subacromin decompression and debridement       Current Outpatient Medications:     hydrocortisone 2.5 % cream, Apply topically 2 times daily. , Disp: 28 g, Rfl: 3    bumetanide (BUMEX) 1 MG tablet, Take 1 tablet by mouth every other day, Disp: 90 tablet, Rfl: 1    Dulaglutide (TRULICITY) 1.5 PE/9.7AN SOPN, Inject 1.5 mLs into the skin once a week, Disp: 4 pen, Rfl: 3    etodolac (LODINE) 300 MG capsule, TK 1 C PO D, Disp: 90 capsule, Rfl: 1    famotidine (PEPCID) 20 MG tablet, Take 1 tablet by mouth 2 times daily, Disp: 180 tablet, Rfl: 1    insulin glargine (BASAGLAR KWIKPEN) 100 UNIT/ML injection pen, Inject 44 Units into the skin nightly, Disp: 5 pen, Rfl: 3    insulin lispro (ADMELOG SOLOSTAR) 100 UNIT/ML pen, As directed per sliding scale up to 13 units 4 times per day., Disp: 5 pen, Rfl: 3    Metoprolol Succinate 25 MG CS24, Take 0.5 tablets by mouth daily, Disp: 45 capsule, Rfl: 1    omeprazole (PRILOSEC) 40 MG delayed release capsule, TK 1 C PO D, Disp: 90 capsule, Rfl: 1    ondansetron (ZOFRAN ODT) 4 MG disintegrating tablet, Take 1 tablet by mouth every 8 hours as needed for Nausea or Vomiting, Disp: 24 tablet, Rfl: 1    nitroGLYCERIN (NITROSTAT) 0.4 MG SL tablet, Place 1 tablet under the tongue every 5 minutes as needed for Chest pain, Disp: 25 tablet, Rfl: 1    rosuvastatin (CRESTOR) 10 MG tablet, Take 0.5 tablets by mouth daily, Disp: 45 tablet, Rfl: 1    sertraline (ZOLOFT) 100 MG tablet, Take 1 tablet by mouth daily as needed (depression), Disp: 90 tablet, Rfl: 1    sucralfate (CARAFATE) 1 GM/10ML suspension, Take 10 mLs by mouth 4 times daily, Disp: 600 mL, Rfl: 5    metFORMIN (GLUCOPHAGE) 500 MG tablet, Take 2 tablets by mouth 2 times daily (with meals), Disp: 360 tablet, Rfl: 1    Wheat Dextrin (BENEFIBER) POWD, Take 4 g by mouth 3 times daily (with meals), Disp: 475 g, Rfl: 2    Insulin Syringe-Needle U-100 ([employer] INSULIN SYR 1CC/30G) 30G X 5/16\" 1 ML MISC, 1 each by Does not apply route 4 times daily, Disp: 120 each, Rfl: 5    Insulin Pen Needle 32G X 4 MM MISC, 1 each by Does not apply route daily, Disp: 100 each, Rfl: 3    blood glucose monitor strips, Test once daily & as needed for symptoms of irregular blood glucose., Disp: 100 strip, Rfl: 3    lidocaine-prilocaine (EMLA) 2.5-2.5 % cream, Apply topically as needed. , Disp: 30 g, Rfl: 0    Lancets MISC, 1 each by Does not apply route daily, Disp: 100 each, Rfl: 1    glucose monitoring kit (FREESTYLE) monitoring kit, 1 kit by Does not apply route daily, Disp: 1 kit, Rfl: 0    Blood Pressure KIT, 1 kit by Does not apply route daily, Disp: 1 kit, Rfl: 0    Misc. Devices (QUAD CANE) MISC, 1 Quad cane, Disp: 1 each, Rfl: 0    Multiple Vitamins-Minerals (THERAPEUTIC MULTIVITAMIN-MINERALS) tablet, Take 1 tablet by mouth daily, Disp: , Rfl:     B Complex Vitamins (B COMPLEX 1 PO), Take 1 tablet by mouth daily.   , Disp: , Rfl:   No Known Allergies  Social History     Socioeconomic History    Marital status: Legally      Spouse name: Not on file    Number of children: Not on file    Years of education: Not on file    Highest education level: Not on file   Occupational History    Not on file   Social Needs    Financial resource strain: Not on file    Food insecurity:     Worry: Not on file     Inability: Not on file    Transportation needs:     Medical: Not on file     Non-medical: Not on file   Tobacco Use    Smoking status: Never Smoker    Smokeless tobacco: Never Used   Substance and Sexual Activity    Alcohol use: Yes     Comment: social    Drug use: No    Sexual activity: Not on file   Lifestyle    Physical activity:     Days per week: Not on file     Minutes per session: Not on file    Stress: Not on file   Relationships    Social connections:     Talks on phone: Not on file     Gets together: Not on file     Attends Yarsanism service: Not on file     Active member of club or organization: Not on file     Attends meetings of clubs or organizations: Not on file     Relationship status: Not on file    Intimate partner violence:     Fear of current or ex partner: Not on file Shoulder Right (min 2 Views)    Result Date: 5/17/2019  Location: 200 Indication: Right shoulder pain Comparison: Right shoulder radiograph from 6/5/2015 Technique: 4 views of the right shoulder were obtained. Findings: There is no evidence of acute fracture or dislocation. The glenohumeral joint is maintained. The acromioclavicular joint is maintained. Visualized portion of the right lung is grossly clear. Unremarkable right shoulder radiographs. Philip Smith was seen today for shoulder pain. Diagnoses and all orders for this visit:    Tear of right rotator cuff, unspecified tear extent  -     MRI SHOULDER RIGHT WO CONTRAST; Future        Patient seen and examined. X-rays reviewed. Patient sustained an injury to her right shoulder over several months ago with continued pain and weakness. Patient states it feels much like her left shoulder which had a rotator cuff tear. Concern is for rotator cuff pathology of the right shoulder. MRI recommended for evaluation and management. Follow-up after MRI.           Francoise Shook DO  5/17/19

## 2019-05-17 NOTE — PROGRESS NOTES
Chief Complaint   Patient presents with    Shoulder Pain     Right shoulder pain. Pt has neuropathy and has poor balance. Roya Herron at her bothers house. Pain with internal rotation. Pain with external rotation. Pt notes she thinks it's rotator cuff related. She had a similar injury on the left and feels similar. HPI:    Patient is 44 y.o. female complaining ***. She  admits to ***. She denies ***. Previous treatments include ***.      ROS:    Skin: (-) rash,(-) psoriasis,(-) eczema, (-)skin cancer. Neurologic: (-)numbness, (-)tingling, (-)headaches, (-) LOC. Cardiovascular: (-) Chest pain, (-) swelling in legs/feet, (-) SOB, (-) cramping in legs/feet with walking. All other review of systems negative except stated above or in HPI      Past Medical History:   Diagnosis Date    Arthritis     lower back    Back pain     Cardiac valve prolapse     micro    Diabetes mellitus ([address]     H/O exercise stress test 10/2015  ? Dr Sherrill Shankar  neg    Hyperlipidemia     Hypertension     Neuropathy due to secondary diabetes ([address]     in marcial feet    Psoriasis      Past Surgical History:   Procedure Laterality Date    ANKLE SURGERY      bilateral tarsal tunnels    CARDIAC SURGERY      cardiac catheterization    CARPAL TUNNEL RELEASE      left    CARPAL TUNNEL RELEASE  12    right     SECTION      CHOLECYSTECTOMY, LAPAROSCOPIC N/A 2019    LAPAROSCOPIC CHOLECYSTECTOMY WITH IOC performed by Klever Cha MD at [address] [place] [address],[facility] COLONOSCOPY      ENDOSCOPY, COLON, DIAGNOSTIC      HYSTERECTOMY      SHOULDER ARTHROSCOPY Left 2016    subacromin decompression and debridement       Current Outpatient Medications:     hydrocortisone 2.5 % cream, Apply topically 2 times daily. , Disp: 28 g, Rfl: 3    bumetanide (BUMEX) 1 MG tablet, Take 1 tablet by mouth every other day, Disp: 90 tablet, Rfl: 1    Dulaglutide (TRULICITY) 1.5 SP/5.2XD SOPN, Inject 1.5 mLs into the skin once a week, Disp: 4 g, Rfl: 0    Lancets MISC, 1 each by Does not apply route daily, Disp: 100 each, Rfl: 1    glucose monitoring kit (FREESTYLE) monitoring kit, 1 kit by Does not apply route daily, Disp: 1 kit, Rfl: 0    Blood Pressure KIT, 1 kit by Does not apply route daily, Disp: 1 kit, Rfl: 0    Misc. Devices (QUAD CANE) MISC, 1 Quad cane, Disp: 1 each, Rfl: 0    Multiple Vitamins-Minerals (THERAPEUTIC MULTIVITAMIN-MINERALS) tablet, Take 1 tablet by mouth daily, Disp: , Rfl:     B Complex Vitamins (B COMPLEX 1 PO), Take 1 tablet by mouth daily.   , Disp: , Rfl:   No Known Allergies  Social History     Socioeconomic History    Marital status: Legally      Spouse name: Not on file    Number of children: Not on file    Years of education: Not on file    Highest education level: Not on file   Occupational History    Not on file   Social Needs    Financial resource strain: Not on file    Food insecurity:     Worry: Not on file     Inability: Not on file    Transportation needs:     Medical: Not on file     Non-medical: Not on file   Tobacco Use    Smoking status: Never Smoker    Smokeless tobacco: Never Used   Substance and Sexual Activity    Alcohol use: Yes     Comment: social    Drug use: No    Sexual activity: Not on file   Lifestyle    Physical activity:     Days per week: Not on file     Minutes per session: Not on file    Stress: Not on file   Relationships    Social connections:     Talks on phone: Not on file     Gets together: Not on file     Attends Druze service: Not on file     Active member of club or organization: Not on file     Attends meetings of clubs or organizations: Not on file     Relationship status: Not on file    Intimate partner violence:     Fear of current or ex partner: Not on file     Emotionally abused: Not on file     Physically abused: Not on file     Forced sexual activity: Not on file   Other Topics Concern    Not on file   Social History Narrative    Not on file Family History   Problem Relation Age of Onset    Heart Disease Mother     Diabetes Mother     Heart Disease Father     Diabetes Father            Physical Exam:    Temp 98.6 °F (37 °C)   Ht 4' 11\" (1.499 m)   Wt 148 lb (67.1 kg)   LMP  (LMP Unknown)   BMI 29.89 kg/m²     GENERAL: alert, appears stated age, cooperative, no acute distress    HEENT: Head is normocephalic, atraumatic. PERRLA. SKIN: Clean, dry, intact. There {IS/IS NOT:9024::\"is not\"} any cellulitis or cutaneous lesions noted in the lower extremities    PULMONARY: breathing is regular and unlabored, no acute distress    CV: The bilateral upper and lower extremities are warm and well-perfused with brisk capillary refill. 2+ pulses UE and LE bilateral.     PSYCHIATRY: Pleasant mood, appropriate behavior, follows commands    NEURO: Sensation is intact distally with light touch with no alteration. Motor exam of the lower extremities show quadriceps, hamstrings, foot dorsiflexion and plantarflexion grossly intact 5/5. LYMPH: No lymphedema present distally in upper or lower extremity. MUSCULOSKELETAL:  Shoulder Exam:  Examination of the {left/right:044845} shoulder shows: There {IS/IS GLJ:46631} a deformity. There {IS/IS NOT:19932} erythema. There {IS/IS NOT:19932} soft tissue swelling. Deltoid region {IS/IS NOT:19932} tender to palpation. AC Joint {IS/IS F6254323 tender to palpation. Clavicle {IS/IS NOT:19932} tender to palpation. Bicipital Groove {IS/IS [id number] tender to palpation. Pectoralis  {IS/IS NOT:19932} tender to palpation. Scapula/ trapezius {IS/IS NOT:19932} tender to palpation. There {IS/IS NOT:19932} weakness with supraspinatus testing. There {IS/IS NOT:19932} pain with supraspinatus testing. Yergason Test {Pos/neg/not tested:28519}. Drop Arm Test {Pos/neg/not tested:41411}. Apprehension Test {Pos/neg/not tested:39330}. Cross Arm Test {Pos/neg/not tested:13955}.   Supraspinatus Test {Pos/neg/not

## 2019-06-10 ENCOUNTER — TELEPHONE (OUTPATIENT)
Dept: SURGERY | Age: 49
End: 2019-06-10

## 2019-06-10 ENCOUNTER — OFFICE VISIT (OUTPATIENT)
Dept: SURGERY | Age: 49
End: 2019-06-10
Payer: COMMERCIAL

## 2019-06-10 VITALS
HEIGHT: 59 IN | BODY MASS INDEX: 27.82 KG/M2 | TEMPERATURE: 98.3 F | HEART RATE: 84 BPM | DIASTOLIC BLOOD PRESSURE: 95 MMHG | SYSTOLIC BLOOD PRESSURE: 144 MMHG | WEIGHT: 138 LBS

## 2019-06-10 DIAGNOSIS — K92.1 MELENA: ICD-10-CM

## 2019-06-10 DIAGNOSIS — K21.9 GASTROESOPHAGEAL REFLUX DISEASE WITHOUT ESOPHAGITIS: Primary | ICD-10-CM

## 2019-06-10 PROCEDURE — 1036F TOBACCO NON-USER: CPT | Performed by: SURGERY

## 2019-06-10 PROCEDURE — 99214 OFFICE O/P EST MOD 30 MIN: CPT | Performed by: SURGERY

## 2019-06-10 PROCEDURE — 3017F COLORECTAL CA SCREEN DOC REV: CPT | Performed by: SURGERY

## 2019-06-10 PROCEDURE — G8427 DOCREV CUR MEDS BY ELIG CLIN: HCPCS | Performed by: SURGERY

## 2019-06-10 PROCEDURE — G8417 CALC BMI ABV UP PARAM F/U: HCPCS | Performed by: SURGERY

## 2019-06-10 RX ORDER — GLUCOSAMINE HCL/CHONDROITIN SU 500-400 MG
CAPSULE ORAL
Qty: 100 STRIP | Refills: 3 | Status: SHIPPED | OUTPATIENT
Start: 2019-06-10 | End: 2019-11-22 | Stop reason: SDUPTHER

## 2019-06-10 RX ORDER — LANCETS 30 GAUGE
1 EACH MISCELLANEOUS DAILY
Qty: 100 EACH | Refills: 1 | Status: SHIPPED | OUTPATIENT
Start: 2019-06-10 | End: 2019-11-22 | Stop reason: SDUPTHER

## 2019-06-10 NOTE — TELEPHONE ENCOUNTER
Patient called the clinical care line requesting medication refill. Chart reviewed and medication sent to the pharmacy.

## 2019-06-10 NOTE — TELEPHONE ENCOUNTER
Per the order of Dr. April Kramer, patient has been scheduled for EGD and Screening Colonoscopy on 6.20.19. Patient provided with testing instructions during office visit and scheduled for results appointment on 7.3. 19. Patient instructed to please contact our office with any questions. Patient given Golytely bowel prep instructions for screening colonoscopy. Surgery scheduling form faxed to [facility] surgery scheduling and fax confirmation received. Dr. April Kramer to enter orders. Chart forwarded to RN to check if pre cert is required .

## 2019-06-11 NOTE — TELEPHONE ENCOUNTER
PEr Zion Wiggins no Sandra Crowell is required.    Ref# [id number]  Electronically signed by Lennox Adan MA on 6/11/19 at 9:15 AM

## 2019-06-19 NOTE — PROGRESS NOTES
polish on your fingers or toes. 11. DO NOT wear any jewelry or piercings on day of surgery. All body piercing jewelry must be removed. 12. Shower the night before surgery with _x__Antibacterial soap /ALBINO WIPES________    13. TOTAL JOINT REPLACEMENT/HYSTERECTOMY PATIENTS ONLY---Remember to bring Blood Bank bracelet to the hospital on the day of surgery. 14. If you have a Living Will and Durable Power of  for Healthcare, please bring in a copy. 15. If appropriate bring crutches, inspirex, WALKER, CANE etc... 12. Notify your Surgeon if you develop any illness between now and surgery time, cough, cold, fever, sore throat, nausea, vomiting, etc.  Please notify your surgeon if you experience dizziness, shortness of breath or blurred vision between now & the time of your surgery. 17. If you have ___dentures, they will be removed before going to the OR; we will provide you a container. If you wear ___contact lenses or ___glasses, they will be removed; please bring a case for them. 18. To provide excellent care visitors will be limited to 2 in the room at any given time. 19. Please bring picture ID and insurance card. 20. Sleep apnea patients need to bring CPAP AND SETTINGS to hospital on day of surgery. 21. During flu season no children under the age of 15 are permitted in the hospital for the safety of all patients. 22. Other                  Please call AMBULATORY CARE if you have any further questions.    [facility]     [address]

## 2019-06-20 ENCOUNTER — ANESTHESIA (OUTPATIENT)
Dept: ENDOSCOPY | Age: 49
End: 2019-06-20
Payer: COMMERCIAL

## 2019-06-20 ENCOUNTER — ANESTHESIA EVENT (OUTPATIENT)
Dept: ENDOSCOPY | Age: 49
End: 2019-06-20
Payer: COMMERCIAL

## 2019-06-20 ENCOUNTER — HOSPITAL ENCOUNTER (OUTPATIENT)
Age: 49
Setting detail: OUTPATIENT SURGERY
Discharge: HOME | End: 2019-06-20
Attending: SURGERY | Admitting: SURGERY
Payer: COMMERCIAL

## 2019-06-20 VITALS — SYSTOLIC BLOOD PRESSURE: 81 MMHG | OXYGEN SATURATION: 100 % | DIASTOLIC BLOOD PRESSURE: 51 MMHG

## 2019-06-20 VITALS
SYSTOLIC BLOOD PRESSURE: 108 MMHG | OXYGEN SATURATION: 100 % | TEMPERATURE: 97.9 F | BODY MASS INDEX: 28.44 KG/M2 | HEIGHT: 59 IN | HEART RATE: 80 BPM | WEIGHT: 141.06 LBS | RESPIRATION RATE: 16 BRPM | DIASTOLIC BLOOD PRESSURE: 60 MMHG

## 2019-06-20 LAB — METER GLUCOSE: 110 MG/DL (ref 74–99)

## 2019-06-20 PROCEDURE — 2709999900 HC NON-CHARGEABLE SUPPLY: Performed by: SURGERY

## 2019-06-20 PROCEDURE — 88305 TISSUE EXAM BY PATHOLOGIST: CPT

## 2019-06-20 PROCEDURE — 7100000010 HC PHASE II RECOVERY - FIRST 15 MIN: Performed by: SURGERY

## 2019-06-20 PROCEDURE — 82962 GLUCOSE BLOOD TEST: CPT

## 2019-06-20 PROCEDURE — 6360000002 HC RX W HCPCS: Performed by: NURSE ANESTHETIST, CERTIFIED REGISTERED

## 2019-06-20 PROCEDURE — 43239 EGD BIOPSY SINGLE/MULTIPLE: CPT | Performed by: SURGERY

## 2019-06-20 PROCEDURE — 7100000011 HC PHASE II RECOVERY - ADDTL 15 MIN: Performed by: SURGERY

## 2019-06-20 PROCEDURE — 3700000001 HC ADD 15 MINUTES (ANESTHESIA): Performed by: SURGERY

## 2019-06-20 PROCEDURE — 3609012400 HC EGD TRANSORAL BIOPSY SINGLE/MULTIPLE: Performed by: SURGERY

## 2019-06-20 PROCEDURE — 45378 DIAGNOSTIC COLONOSCOPY: CPT | Performed by: SURGERY

## 2019-06-20 PROCEDURE — 3609027000 HC COLONOSCOPY: Performed by: SURGERY

## 2019-06-20 PROCEDURE — 88342 IMHCHEM/IMCYTCHM 1ST ANTB: CPT

## 2019-06-20 PROCEDURE — 2580000003 HC RX 258: Performed by: SURGERY

## 2019-06-20 PROCEDURE — 3700000000 HC ANESTHESIA ATTENDED CARE: Performed by: SURGERY

## 2019-06-20 RX ORDER — SODIUM CHLORIDE 0.9 % (FLUSH) 0.9 %
10 SYRINGE (ML) INJECTION PRN
Status: DISCONTINUED | OUTPATIENT
Start: 2019-06-20 | End: 2019-06-20 | Stop reason: HOSPADM

## 2019-06-20 RX ORDER — DULOXETIN HYDROCHLORIDE 30 MG/1
CAPSULE, DELAYED RELEASE ORAL
Refills: 0 | COMMUNITY
Start: 2019-06-10 | End: 2019-08-12 | Stop reason: SDUPTHER

## 2019-06-20 RX ORDER — SODIUM CHLORIDE 9 MG/ML
INJECTION, SOLUTION INTRAVENOUS CONTINUOUS
Status: DISCONTINUED | OUTPATIENT
Start: 2019-06-20 | End: 2019-06-20 | Stop reason: HOSPADM

## 2019-06-20 RX ORDER — SODIUM CHLORIDE 0.9 % (FLUSH) 0.9 %
10 SYRINGE (ML) INJECTION EVERY 12 HOURS SCHEDULED
Status: DISCONTINUED | OUTPATIENT
Start: 2019-06-20 | End: 2019-06-20 | Stop reason: HOSPADM

## 2019-06-20 RX ORDER — PROPOFOL 10 MG/ML
INJECTION, EMULSION INTRAVENOUS PRN
Status: DISCONTINUED | OUTPATIENT
Start: 2019-06-20 | End: 2019-06-20 | Stop reason: SDUPTHER

## 2019-06-20 RX ADMIN — PROPOFOL 30 MG: 10 INJECTION, EMULSION INTRAVENOUS at 13:32

## 2019-06-20 RX ADMIN — PROPOFOL 30 MG: 10 INJECTION, EMULSION INTRAVENOUS at 13:29

## 2019-06-20 RX ADMIN — PROPOFOL 100 MG: 10 INJECTION, EMULSION INTRAVENOUS at 13:27

## 2019-06-20 RX ADMIN — PROPOFOL 50 MG: 10 INJECTION, EMULSION INTRAVENOUS at 13:40

## 2019-06-20 RX ADMIN — SODIUM CHLORIDE: 9 INJECTION, SOLUTION INTRAVENOUS at 13:24

## 2019-06-20 RX ADMIN — PROPOFOL 30 MG: 10 INJECTION, EMULSION INTRAVENOUS at 13:34

## 2019-06-20 ASSESSMENT — PAIN - FUNCTIONAL ASSESSMENT: PAIN_FUNCTIONAL_ASSESSMENT: 0-10

## 2019-06-20 NOTE — OP NOTE
[address] [place] [address] [facility]           Operative Report    DATE OF PROCEDURE: 6/20/2019    Efren Acevedo    SURGEON: Marylene Harrison, MD.     ASSISTANT: None    PREOPERATIVE DIAGNOSES:  GERD    POSTOPERATIVE DIAGNOSES:   (1) No Hiatal Hernia  (2) No Esophagitis  (3) Alkaline Gastritis    OPERATION: [id number] with antral biopsies    ANESTHESIA: LMAC    COMPLICATIONS: None. History and consent: This is a 44y.o. year old female who is having GERD. I have discussed with the patient the indication, alternatives, and the possible risks and/or complications of upper endoscopy and the conscious sedation anesthesia. The patient and/or family understands and agrees to proceed. Monitoring and Safety:    The patient was placed on a cardiac monitor and vital signs, pulse oximetry and level of consciousness were continuously evaluated throughout the procedure. The patient was closely monitored until recovery from the medications was complete and the patient had returned to baseline status. Anesthesia was present at all times during the procedure. OPERATIONS: The patient was placed on the table and sedated while blood pressure, pulse and pulse oximetry were continuously monitored by the anesthesia team. A bite block was placed prior to sedation and the patient was placed in the left lateral decubitus position. A lubricated scope was easily passed into the upper esophagus which looked normal. The distal esophagus looked normal. The scope was passed into the stomach and retroflexed. The gastroesophageal junction was at 39cm. There was no hiatal hernia. The scope was passed down toward the pylorus. The antral mucosa looked abnormal: mild biliary reflux with some gastritis. Biopsy was taken to check for H. pylori.  The scope was then passed through the pylorus into the duodenal bulb which looked normal, then around to the distal duodenum which looked normal, and the scope was then withdrawn. The patient tolerated the procedure well.        Diet: GERD    PLAN:  (1) Await biopsies    Further Procedures:  Proceed with colonoscopy    Physician Signature: Electronically signed by Dr. Johnny Jacinto

## 2019-06-20 NOTE — H&P
ondansetron (ZOFRAN ODT) 4 MG disintegrating tablet Take 1 tablet by mouth every 8 hours as needed for Nausea or Vomiting 4/15/19  Yes Charlene P Catterlin, DO   rosuvastatin (CRESTOR) 10 MG tablet Take 0.5 tablets by mouth daily 4/15/19  Yes Charlene P Catterlin, DO   sertraline (ZOLOFT) 100 MG tablet Take 1 tablet by mouth daily as needed (depression) 4/15/19  Yes Charlene P Catterlin, DO   sucralfate (CARAFATE) 1 GM/10ML suspension Take 10 mLs by mouth 4 times daily 4/15/19  Yes Charlene P Catterlin, DO   metFORMIN (GLUCOPHAGE) 500 MG tablet Take 2 tablets by mouth 2 times daily (with meals) 4/1/19  Yes Charlene P Catterlin, DO   Wheat Dextrin (BENEFIBER) POWD Take 4 g by mouth 3 times daily (with meals) 2/13/19  Yes Klever Cha MD   lidocaine-prilocaine (EMLA) 2.5-2.5 % cream Apply topically as needed. 11/17/18  Yes LAKESHA Pardo - CNP   Misc. Devices (QUAD CANE) MISC 1 Quad cane 10/10/18  Yes Charlene P Catterlin, DO   Multiple Vitamins-Minerals (THERAPEUTIC MULTIVITAMIN-MINERALS) tablet Take 1 tablet by mouth daily   Yes Historical Provider, MD   B Complex Vitamins (B COMPLEX 1 PO) Take 1 tablet by mouth daily. Yes Historical Provider, MD   Lancets MISC 1 each by Does not apply route daily 6/10/19   Eddie Smaller Catterlin, DO   blood glucose monitor strips Test once daily & as needed for symptoms of irregular blood glucose.  6/10/19   Charlene P Catterlin, DO   nitroGLYCERIN (NITROSTAT) 0.4 MG SL tablet Place 1 tablet under the tongue every 5 minutes as needed for Chest pain 4/15/19   Charlene P Catterlin, DO   Insulin Syringe-Needle U-100 ([employer] INSULIN SYR 1CC/30G) 30G X 5/16\" 1 ML MISC 1 each by Does not apply route 4 times daily 1/15/19   Atul Neville Catterlin, DO   Insulin Pen Needle 32G X 4 MM MISC 1 each by Does not apply route daily 11/26/18   Eddie Velez,    glucose monitoring kit (FREESTYLE) monitoring kit 1 kit by Does not apply route daily 10/29/18   Bro Velez,    Blood Pressure KIT 1 kit by Does not apply route daily 10/10/18   Jesus Rivera,        Past Medical History:   Diagnosis Date    Arthritis     lower back    Back pain     Cardiac valve prolapse     micro    Diabetes [facility]     H/O exercise stress test 10/2015  ? Dr [place]  neg    Hyperlipidemia     Hypertension     Neuropathy due to secondary diabetes ([facility]     in marcial feet    Prolonged emergence from general anesthesia     Psoriasis        Past Surgical History:   Procedure Laterality Date    ANKLE SURGERY      bilateral tarsal tunnels    CARDIAC SURGERY      cardiac catheterization    CARPAL TUNNEL RELEASE      left    CARPAL TUNNEL RELEASE  12    right     SECTION      CHOLECYSTECTOMY, LAPAROSCOPIC N/A 2019    LAPAROSCOPIC CHOLECYSTECTOMY WITH IOC performed by Denny Skiff, MD at [address]  had extremely high blood pressure and hard to wake up    COLONOSCOPY      ENDOSCOPY, COLON, DIAGNOSTIC      HYSTERECTOMY      SHOULDER ARTHROSCOPY Left 2016    subacromin decompression and debridement       Social History     Socioeconomic History    Marital status: Legally      Spouse name: Not on file    Number of children: Not on file    Years of education: Not on file    Highest education level: Not on file   Occupational History    Not on file   Social Needs    Financial resource strain: Not on file    Food insecurity:     Worry: Not on file     Inability: Not on file    Transportation needs:     Medical: Not on file     Non-medical: Not on file   Tobacco Use    Smoking status: Never Smoker    Smokeless tobacco: Never Used   Substance and Sexual Activity    Alcohol use: Yes     Comment: occas.     Drug use: No    Sexual activity: Not on file   Lifestyle    Physical activity:     Days per week: Not on file     Minutes per session: Not on file    Stress: Not on file   Relationships    Social connections:     Talks on phone: Not on file     Gets together: red conjunctiva  Lungs: Clear bilateral  Chest wall: atraumatic, no tenderness, no echymosis or abrasions  Heart: reg rate, no murmur  Abdomen: soft, nondistended, well healed incisions  Extremities: full ROM all 4 ext  Pulses: 2+ distal  Skin: warm and dry  Neurologic: spontanous eye opening, purposeful, follows complex commands        Assessment:  44 y.o. female presents for melena and GERD    Patient Active Problem List   Diagnosis    IDDM (insulin dependent diabetes mellitus) (HCC)    Neuropathy    Gastroesophageal reflux disease without esophagitis    Retinopathy    Anxiety    Irritable bowel syndrome    Mixed hyperlipidemia    Fatigue    Lumbar pain    Epigastric pain    Abdominal pain, right lower quadrant    Dermatitis    Head injury    Contusion of left hip    Generalized abdominal pain         Plan:  EGD and colonoscopy  I recommended diagnostic colonoscopy with possible biopsy or polypectomy and explained the risk, benefits, expected outcome, and alternatives to the procedure. Risks included but are not limited to bleeding, infection, respiratory distress, hypotension, and perforation of the colon. The patient understands and is in agreement.          Physician Signature: Electronically signed by Dr. Hieu Erazo

## 2019-06-20 NOTE — ANESTHESIA PRE PROCEDURE
Department of Anesthesiology  Preprocedure Note       Name:  Efren Acevedo   Age:  44 y.o.  :  1970                                          MRN:  [id number]         Date:  2019      Surgeon: Chiqui Willson):  Marylene Harrison, MD    Procedure: EGD & COLONOSCOPY (N/A )  COLORECTAL CANCER SCREENING, NOT HIGH RISK (N/A )    Medications prior to admission:   Prior to Admission medications    Medication Sig Start Date End Date Taking? Authorizing Provider   DULoxetine (CYMBALTA) 30 MG extended release capsule TK 1 C PO QAM 6/10/19   Historical Provider, MD   Lancets MISC 1 each by Does not apply route daily 6/10/19   Charlene P Catterlin, DO   blood glucose monitor strips Test once daily & as needed for symptoms of irregular blood glucose. 6/10/19   Charlene P Catterlin, DO   hydrocortisone 2.5 % cream Apply topically 2 times daily. 19   Charlene P Catterlin, DO   bumetanide (BUMEX) 1 MG tablet Take 1 tablet by mouth every other day 4/15/19   Christa Head Catterlin, DO   Dulaglutide (TRULICITY) 1.5 LJ/4.4UK SOPN Inject 1.5 mLs into the skin once a week 4/15/19   Jackdarrell Head Catterlin, DO   etodolac (LODINE) 300 MG capsule TK 1 C PO D 4/15/19   Charlene P Catterlin, DO   famotidine (PEPCID) 20 MG tablet Take 1 tablet by mouth 2 times daily 4/15/19   Lilli Albino P Catterlin, DO   insulin glargine (BASAGLAR KWIKPEN) 100 UNIT/ML injection pen Inject 44 Units into the skin nightly 4/15/19   Charlene P Catterlin, DO   insulin lispro (ADMELOG SOLOSTAR) 100 UNIT/ML pen As directed per sliding scale up to 13 units 4 times per day.  4/15/19   Charlene P Catterlin, DO   Metoprolol Succinate 25 MG CS24 Take 0.5 tablets by mouth daily 4/15/19   Kaseynn Head Catterlin, DO   omeprazole (PRILOSEC) 40 MG delayed release capsule TK 1 C PO D 4/15/19   Charlene P Catterlin, DO   ondansetron (ZOFRAN ODT) 4 MG disintegrating tablet Take 1 tablet by mouth every 8 hours as needed for Nausea or Vomiting 4/15/19   Charlene Velez, DO   nitroGLYCERIN (NITROSTAT) Comment: occas. Counseling given: Not Answered      Vital Signs (Current): There were no vitals filed for this visit. BP Readings from Last 3 Encounters:   06/20/19 126/81   06/10/19 (!) 144/95   04/15/19 120/70       NPO Status:                                                                                 BMI:   Wt Readings from Last 3 Encounters:   06/20/19 141 lb 1 oz (64 kg)   06/10/19 138 lb (62.6 kg)   05/17/19 148 lb (67.1 kg)     There is no height or weight on file to calculate BMI.    CBC:   Lab Results   Component Value Date    WBC 8.7 03/12/2019    RBC 3.71 03/12/2019    HGB 11.5 03/12/2019    HCT 36.7 03/12/2019    MCV 98.9 03/12/2019    RDW 12.0 03/12/2019     03/12/2019       CMP:   Lab Results   Component Value Date     03/12/2019    K 5.3 03/12/2019    K 4.8 02/27/2019     03/12/2019    CO2 27 03/12/2019    BUN 20 03/12/2019    CREATININE 0.9 03/12/2019    GFRAA >60 03/12/2019    LABGLOM >60 03/12/2019    GLUCOSE 253 03/12/2019    GLUCOSE 194 03/30/2012    PROT 6.7 03/12/2019    CALCIUM 9.1 03/12/2019    BILITOT 0.3 03/12/2019    ALKPHOS 88 03/12/2019    AST 25 03/12/2019    ALT 24 03/12/2019       POC Tests: No results for input(s): POCGLU, POCNA, POCK, POCCL, POCBUN, POCHEMO, POCHCT in the last 72 hours.     Coags:   Lab Results   Component Value Date    PROTIME 10.7 03/03/2017    INR 0.9 03/03/2017    APTT 20.0 07/29/2014       HCG (If Applicable): No results found for: PREGTESTUR, PREGSERUM, HCG, HCGQUANT     ABGs: No results found for: PHART, PO2ART, KHN7YSQ, LVB2PUS, BEART, V2CCOQJI     Type & Screen (If Applicable):  No results found for: LABABO, [address]    Anesthesia Evaluation  Patient summary reviewed no history of anesthetic complications:   Airway: Mallampati: II  TM distance: >3 FB   Neck ROM: full  Mouth opening: > = 3 FB Dental: normal exam     Comment: Grossly intact    Pulmonary:Negative Pulmonary ROS breath sounds clear to auscultation                             Cardiovascular:  Exercise tolerance: good (>4 METS),   (+) hypertension: moderate, hyperlipidemia      ECG reviewed  Rhythm: regular  Rate: normal                    Neuro/Psych:   (+) neuromuscular disease ( Neuropathy):,             GI/Hepatic/Renal:   (+) GERD: well controlled, bowel prep,      (-) no renal disease and no morbid obesity       Endo/Other:    (+) DiabetesType II DM, poorly controlled, using insulin, . Abdominal:         (-) obese     Vascular: negative vascular ROS. Anesthesia Plan      MAC     ASA 3       Induction: intravenous. Anesthetic plan and risks discussed with patient. Plan discussed with CRNA. DOS STAFF ADDENDUM:    Pt seen and examined, chart reviewed (including anesthesia, drug and allergy history). Anesthetic plan, risks, benefits, alternatives, and personnel involved discussed with patient. Patient verbalized an understanding and agrees to proceed. Plan discussed with care team members and agreed upon.     Freda Hines MD  Staff Anesthesiologist  12:32 PM        Freda Hines MD   6/20/2019

## 2019-07-03 ENCOUNTER — OFFICE VISIT (OUTPATIENT)
Dept: SURGERY | Age: 49
End: 2019-07-03
Payer: COMMERCIAL

## 2019-07-03 VITALS
HEART RATE: 85 BPM | WEIGHT: 138 LBS | HEIGHT: 59 IN | DIASTOLIC BLOOD PRESSURE: 99 MMHG | SYSTOLIC BLOOD PRESSURE: 151 MMHG | BODY MASS INDEX: 27.82 KG/M2

## 2019-07-03 DIAGNOSIS — K21.9 GASTROESOPHAGEAL REFLUX DISEASE WITHOUT ESOPHAGITIS: Primary | ICD-10-CM

## 2019-07-03 PROCEDURE — 99212 OFFICE O/P EST SF 10 MIN: CPT | Performed by: SURGERY

## 2019-07-03 NOTE — PROGRESS NOTES
General Surgery Office Note  Federico Klein MD, MS    Patient's Name/Date of Birth: Elsa David / 1970    Date: July 3, 2019     Surgeon: Tristan Ortiz MD    Chief Complaint: s/p colonoscopy    Patient Active Problem List   Diagnosis    IDDM (insulin dependent diabetes mellitus) ([facility] [address]    Neuropathy    Gastroesophageal reflux disease without esophagitis    Retinopathy    Anxiety    Irritable bowel syndrome    Mixed hyperlipidemia    Fatigue    Lumbar pain    Epigastric pain    Abdominal pain, right lower quadrant    Dermatitis    Head injury    Contusion of left hip    Generalized abdominal pain    Melena       Subjective:  Doing well. Did well after colonoscopy. No bleeding. No pain, still some diarrhea    Objective:  BP (!) 151/99 (Site: Left Upper Arm, Position: Sitting, Cuff Size: Medium Adult)   Pulse 85   Ht 4' 11\" (1.499 m)   Wt 138 lb (62.6 kg)   LMP  (LMP Unknown)   BMI 27.87 kg/m²   Labs:  No results for input(s): WBC, HGB, HCT in the last 72 hours. Invalid input(s): PLR  Lab Results   Component Value Date    CREATININE 0.9 03/12/2019    BUN 20 03/12/2019     03/12/2019    K 5.3 (H) 03/12/2019     03/12/2019    CO2 27 03/12/2019     No results for input(s): LIPASE, AMYLASE in the last 72 hours.       General appearance: AA, NAD  HEENT: NCAT, PERRLA, EOMI  Lungs: Clear, equal rise bilateral  Heart: Reg  Abdomen: soft, nondistended, nontender  Skin: No lesions  Psych: No distress, conversive, no hallucinations  : No ulcers or lesions  Rectal: No bleeding    Review of Systems -  General ROS: negative for - chills, fatigue or malaise  ENT ROS: negative for - hearing change, nasal congestion or nasal discharge  Allergy and Immunology ROS: negative for - hives, itchy/watery eyes or nasal congestion  Hematological and Lymphatic ROS: negative for - blood clots, blood transfusions, bruising or fatigue  Endocrine ROS: negative for - malaise/lethargy, mood swings, palpitations or polydipsia/polyuria  Respiratory ROS: negative for - sputum changes, stridor, tachypnea or wheezing  Cardiovascular ROS: negative for - irregular heartbeat, loss of consciousness, murmur or orthopnea  Gastrointestinal ROS: negative for - constipation, diarrhea, gas/bloating, heartburn or hematemesis  Genito-Urinary ROS: negative for -  genital discharge, genital ulcers or hematuria  Musculoskeletal ROS: negative for - gait disturbance, muscle pain or muscular weakness    Time spent reviewing past medical, surgical, social and family history, vitals, nursing assessment and images. Colonoscopy: Normal Colon      POSTOPERATIVE DIAGNOSES:   (1) No Hiatal Hernia  (2) No Esophagitis  (3) Alkaline Gastritis      Pathology: Diagnosis:  Gastric antrum, biopsy: Mild chronic gastritis with reactive change. Negative for intestinal metaplasia and dysplasia. Immunostain negative for Helicobacter pylori organisms.     Assessment/Plan:  Mirtha Horton is a 44 y.o. female s/p colonoscopy with findings of normal colon and EGD with alkaline gastritis    Resume normal colon screening per NCCN guidelines  Next colonoscopy 10 yrs  High fiber diet- 40g daily  Advise 60oz water intake daily  Follow up as needed  If high fiber trial not improving, can add cholestryramine    Physician Signature: Electronically signed by Dr. Ramez Nelson  [phone] (p)  7/3/2019  9:42 AM

## 2019-08-12 RX ORDER — DULOXETIN HYDROCHLORIDE 30 MG/1
CAPSULE, DELAYED RELEASE ORAL
Qty: 30 CAPSULE | Refills: 3 | Status: SHIPPED | OUTPATIENT
Start: 2019-08-12 | End: 2019-12-03 | Stop reason: SDUPTHER

## 2019-08-23 ENCOUNTER — HOSPITAL ENCOUNTER (OUTPATIENT)
Age: 49
Discharge: HOME | End: 2019-08-25
Payer: COMMERCIAL

## 2019-08-23 ENCOUNTER — OFFICE VISIT (OUTPATIENT)
Dept: FAMILY MEDICINE CLINIC | Age: 49
End: 2019-08-23
Payer: COMMERCIAL

## 2019-08-23 VITALS
HEIGHT: 59 IN | DIASTOLIC BLOOD PRESSURE: 66 MMHG | TEMPERATURE: 98.4 F | SYSTOLIC BLOOD PRESSURE: 118 MMHG | BODY MASS INDEX: 28.47 KG/M2 | OXYGEN SATURATION: 99 % | HEART RATE: 76 BPM | WEIGHT: 141.2 LBS

## 2019-08-23 DIAGNOSIS — M54.6 DISCOGENIC THORACIC PAIN: ICD-10-CM

## 2019-08-23 DIAGNOSIS — K58.9 IRRITABLE BOWEL SYNDROME, UNSPECIFIED TYPE: ICD-10-CM

## 2019-08-23 DIAGNOSIS — M54.50 LUMBAR PAIN: ICD-10-CM

## 2019-08-23 DIAGNOSIS — G25.81 RLS (RESTLESS LEGS SYNDROME): ICD-10-CM

## 2019-08-23 DIAGNOSIS — R07.89 ATYPICAL CHEST PAIN: ICD-10-CM

## 2019-08-23 DIAGNOSIS — L30.9 DERMATITIS: ICD-10-CM

## 2019-08-23 DIAGNOSIS — E78.2 MIXED HYPERLIPIDEMIA: ICD-10-CM

## 2019-08-23 DIAGNOSIS — H66.002 ACUTE SUPPURATIVE OTITIS MEDIA OF LEFT EAR WITHOUT SPONTANEOUS RUPTURE OF TYMPANIC MEMBRANE, RECURRENCE NOT SPECIFIED: ICD-10-CM

## 2019-08-23 DIAGNOSIS — Z12.39 SCREENING FOR BREAST CANCER: ICD-10-CM

## 2019-08-23 DIAGNOSIS — G62.9 NEUROPATHY: ICD-10-CM

## 2019-08-23 DIAGNOSIS — K21.9 GASTROESOPHAGEAL REFLUX DISEASE WITHOUT ESOPHAGITIS: ICD-10-CM

## 2019-08-23 LAB
ALBUMIN SERPL-MCNC: 4.1 G/DL (ref 3.5–5.2)
ALP BLD-CCNC: 123 U/L (ref 35–104)
ALT SERPL-CCNC: 33 U/L (ref 0–32)
ANION GAP SERPL CALCULATED.3IONS-SCNC: 15 MMOL/L (ref 7–16)
AST SERPL-CCNC: 38 U/L (ref 0–31)
BASOPHILS ABSOLUTE: 0.01 E9/L (ref 0–0.2)
BASOPHILS RELATIVE PERCENT: 0.1 % (ref 0–2)
BILIRUB SERPL-MCNC: 0.3 MG/DL (ref 0–1.2)
BUN BLDV-MCNC: 13 MG/DL (ref 6–20)
CALCIUM SERPL-MCNC: 9.8 MG/DL (ref 8.6–10.2)
CHLORIDE BLD-SCNC: 99 MMOL/L (ref 98–107)
CHOLESTEROL, TOTAL: 146 MG/DL (ref 0–199)
CHP ED QC CHECK: NORMAL
CO2: 27 MMOL/L (ref 22–29)
CREAT SERPL-MCNC: 0.8 MG/DL (ref 0.5–1)
EOSINOPHILS ABSOLUTE: 0.09 E9/L (ref 0.05–0.5)
EOSINOPHILS RELATIVE PERCENT: 1 % (ref 0–6)
GFR AFRICAN AMERICAN: >60
GFR NON-AFRICAN AMERICAN: >60 ML/MIN/1.73
GLUCOSE BLD-MCNC: 177 MG/DL (ref 74–99)
GLUCOSE BLD-MCNC: 180 MG/DL
HBA1C MFR BLD: 9.5 %
HCT VFR BLD CALC: 40.9 % (ref 34–48)
HDLC SERPL-MCNC: 49 MG/DL
HEMOGLOBIN: 13 G/DL (ref 11.5–15.5)
IMMATURE GRANULOCYTES #: 0.03 E9/L
IMMATURE GRANULOCYTES %: 0.3 % (ref 0–5)
LDL CHOLESTEROL CALCULATED: 82 MG/DL (ref 0–99)
LYMPHOCYTES ABSOLUTE: 2.74 E9/L (ref 1.5–4)
LYMPHOCYTES RELATIVE PERCENT: 31.7 % (ref 20–42)
MCH RBC QN AUTO: 31.1 PG (ref 26–35)
MCHC RBC AUTO-ENTMCNC: 31.8 % (ref 32–34.5)
MCV RBC AUTO: 97.8 FL (ref 80–99.9)
MONOCYTES ABSOLUTE: 0.71 E9/L (ref 0.1–0.95)
MONOCYTES RELATIVE PERCENT: 8.2 % (ref 2–12)
NEUTROPHILS ABSOLUTE: 5.05 E9/L (ref 1.8–7.3)
NEUTROPHILS RELATIVE PERCENT: 58.7 % (ref 43–80)
PDW BLD-RTO: 12.2 FL (ref 11.5–15)
PLATELET # BLD: 254 E9/L (ref 130–450)
PMV BLD AUTO: 11.7 FL (ref 7–12)
POTASSIUM SERPL-SCNC: 5.5 MMOL/L (ref 3.5–5)
RBC # BLD: 4.18 E12/L (ref 3.5–5.5)
SODIUM BLD-SCNC: 141 MMOL/L (ref 132–146)
TOTAL PROTEIN: 7.5 G/DL (ref 6.4–8.3)
TRIGL SERPL-MCNC: 74 MG/DL (ref 0–149)
TSH SERPL DL<=0.05 MIU/L-ACNC: 1.92 UIU/ML (ref 0.27–4.2)
VLDLC SERPL CALC-MCNC: 15 MG/DL
WBC # BLD: 8.6 E9/L (ref 4.5–11.5)

## 2019-08-23 PROCEDURE — G8417 CALC BMI ABV UP PARAM F/U: HCPCS | Performed by: FAMILY MEDICINE

## 2019-08-23 PROCEDURE — 99214 OFFICE O/P EST MOD 30 MIN: CPT | Performed by: FAMILY MEDICINE

## 2019-08-23 PROCEDURE — 80061 LIPID PANEL: CPT

## 2019-08-23 PROCEDURE — 83036 HEMOGLOBIN GLYCOSYLATED A1C: CPT | Performed by: FAMILY MEDICINE

## 2019-08-23 PROCEDURE — 80053 COMPREHEN METABOLIC PANEL: CPT

## 2019-08-23 PROCEDURE — 1036F TOBACCO NON-USER: CPT | Performed by: FAMILY MEDICINE

## 2019-08-23 PROCEDURE — 82962 GLUCOSE BLOOD TEST: CPT | Performed by: FAMILY MEDICINE

## 2019-08-23 PROCEDURE — 85025 COMPLETE CBC W/AUTO DIFF WBC: CPT

## 2019-08-23 PROCEDURE — 84443 ASSAY THYROID STIM HORMONE: CPT

## 2019-08-23 PROCEDURE — 3046F HEMOGLOBIN A1C LEVEL >9.0%: CPT | Performed by: FAMILY MEDICINE

## 2019-08-23 PROCEDURE — 2022F DILAT RTA XM EVC RTNOPTHY: CPT | Performed by: FAMILY MEDICINE

## 2019-08-23 PROCEDURE — 3017F COLORECTAL CA SCREEN DOC REV: CPT | Performed by: FAMILY MEDICINE

## 2019-08-23 PROCEDURE — G8427 DOCREV CUR MEDS BY ELIG CLIN: HCPCS | Performed by: FAMILY MEDICINE

## 2019-08-23 RX ORDER — BUMETANIDE 1 MG/1
1 TABLET ORAL EVERY OTHER DAY
Qty: 90 TABLET | Refills: 1 | Status: SHIPPED | OUTPATIENT
Start: 2019-08-23 | End: 2020-01-06 | Stop reason: SDUPTHER

## 2019-08-23 RX ORDER — ROSUVASTATIN CALCIUM 10 MG/1
5 TABLET, COATED ORAL DAILY
Qty: 45 TABLET | Refills: 1 | Status: SHIPPED | OUTPATIENT
Start: 2019-08-23 | End: 2019-10-21 | Stop reason: CLARIF

## 2019-08-23 RX ORDER — OMEPRAZOLE 40 MG/1
CAPSULE, DELAYED RELEASE ORAL
Qty: 90 CAPSULE | Refills: 1 | Status: SHIPPED | OUTPATIENT
Start: 2019-08-23 | End: 2020-01-06 | Stop reason: SDUPTHER

## 2019-08-23 RX ORDER — FAMOTIDINE 20 MG/1
20 TABLET, FILM COATED ORAL 2 TIMES DAILY
Qty: 180 TABLET | Refills: 1 | Status: SHIPPED | OUTPATIENT
Start: 2019-08-23 | End: 2020-01-06 | Stop reason: SDUPTHER

## 2019-08-23 RX ORDER — DOXYCYCLINE HYCLATE 100 MG
100 TABLET ORAL 2 TIMES DAILY
Qty: 14 TABLET | Refills: 0 | Status: SHIPPED | OUTPATIENT
Start: 2019-08-23 | End: 2019-08-30

## 2019-08-23 RX ORDER — PRAMIPEXOLE DIHYDROCHLORIDE 0.12 MG/1
0.12 TABLET ORAL NIGHTLY
Qty: 90 TABLET | Refills: 3 | Status: SHIPPED | OUTPATIENT
Start: 2019-08-23 | End: 2020-01-06 | Stop reason: SDUPTHER

## 2019-08-26 ASSESSMENT — ENCOUNTER SYMPTOMS
TROUBLE SWALLOWING: 0
VOICE CHANGE: 0
ABDOMINAL PAIN: 1
PHOTOPHOBIA: 0
SORE THROAT: 0
COLOR CHANGE: 0
NAUSEA: 0
RHINORRHEA: 0
DIARRHEA: 1
COUGH: 0
EYE ITCHING: 0
BACK PAIN: 1
EYE PAIN: 0
RECTAL PAIN: 0
SHORTNESS OF BREATH: 0
ABDOMINAL DISTENTION: 0
RESPIRATORY NEGATIVE: 1
ALLERGIC/IMMUNOLOGIC NEGATIVE: 1
CHOKING: 0
CONSTIPATION: 0
SINUS PAIN: 0
BLOOD IN STOOL: 0
STRIDOR: 0
VOMITING: 0
ANAL BLEEDING: 0
SINUS PRESSURE: 0
CHEST TIGHTNESS: 0
EYE REDNESS: 0
WHEEZING: 0
FACIAL SWELLING: 0
EYE DISCHARGE: 0

## 2019-08-26 NOTE — PROGRESS NOTES
and oriented to person, place, and time. She has normal reflexes. She displays normal reflexes. No cranial nerve deficit or sensory deficit. She exhibits normal muscle tone. Coordination normal.   Skin: Skin is warm. Rash noted. She is not diaphoretic. There is erythema. No pallor. Bug bites with infection. Psychiatric: She has a normal mood and affect. Her behavior is normal. Judgment and thought content normal.   Nursing note and vitals reviewed. ASSESSMENT/PLAN  Jenny was seen today for medication refill and abdominal pain. Diagnoses and all orders for this visit:    IDDM (insulin dependent diabetes mellitus) ([facility]  -     bumetanide (BUMEX) 1 MG tablet; Take 1 tablet by mouth every other day  -     Dulaglutide (TRULICITY) 1.5 OX/8.4RB SOPN; Inject 1.5 mLs into the skin once a week  -     insulin glargine (BASAGLAR KWIKPEN) 100 UNIT/ML injection pen; Inject 44 Units into the skin nightly  -     insulin lispro (ADMELOG SOLOSTAR) 100 UNIT/ML pen; As directed per sliding scale up to 13 units 4 times per day. -     POCT Urinalysis No Micro (Auto)  Not controlled. Metformin, trulicity, sliding scale, basaglar, statin, ADA diet. Neuropathy  -     etodolac (LODINE) 300 MG capsule; TK 1 C PO D  Not controlled. Lodine. Gastroesophageal reflux disease without esophagitis  -     famotidine (PEPCID) 20 MG tablet; Take 1 tablet by mouth 2 times daily  -     omeprazole (PRILOSEC) 40 MG delayed release capsule; TK 1 C PO D  -     ondansetron (ZOFRAN ODT) 4 MG disintegrating tablet; Take 1 tablet by mouth every 8 hours as needed for Nausea or Vomiting  -     sucralfate (CARAFATE) 1 GM/10ML suspension; Take 10 mLs by mouth 4 times daily  Stable. Carafate, pepcid, prilosec, zofran. Atypical chest pain  -     Metoprolol Succinate 25 MG CS24; Take 0.5 tablets by mouth daily  -     nitroGLYCERIN (NITROSTAT) 0.4 MG SL tablet; Place 1 tablet under the tongue every 5 minutes as needed for Chest pain  Controlled. BB.  Screening for breast cancer  -     [facility] DIGITAL SCREEN W OR WO CAD BILATERAL; Future  -     POCT Urinalysis No Micro (Auto)  Pt instructed on marcial. Curry.   Mixed hyperlipidemia  -     rosuvastatin (CRESTOR) 10 MG tablet; Take 0.5 tablets by mouth daily  Not controlled. Low chol. Diet, statin. Lumbar pain  Not controlled. Moist heat, spine center. Anxiety  -     sertraline (ZOLOFT) 100 MG tablet; Take 1 tablet by mouth daily as needed (depression)  Controlled. Zoloft. Thoracic pain  Not controlled. Moist heat, spine center. Dermatitis  Not controlled. Vibra-tabs, hydrocortisone cream.       Pt instructed if any worse go ED ASAP. Outpatient Encounter Medications as of 8/23/2019   Medication Sig Dispense Refill    metFORMIN (GLUCOPHAGE) 500 MG tablet Take 2 tablets by mouth 2 times daily (with meals) 360 tablet 1    bumetanide (BUMEX) 1 MG tablet Take 1 tablet by mouth every other day 90 tablet 1    famotidine (PEPCID) 20 MG tablet Take 1 tablet by mouth 2 times daily 180 tablet 1    omeprazole (PRILOSEC) 40 MG delayed release capsule TK 1 C PO D 90 capsule 1    rosuvastatin (CRESTOR) 10 MG tablet Take 0.5 tablets by mouth daily 45 tablet 1    Metoprolol Succinate 25 MG CS24 Take 0.5 tablets by mouth daily 45 capsule 1    pramipexole (MIRAPEX) 0.125 MG tablet Take 1 tablet by mouth nightly 90 tablet 3    doxycycline hyclate (VIBRA-TABS) 100 MG tablet Take 1 tablet by mouth 2 times daily for 7 days 14 tablet 0    DULoxetine (CYMBALTA) 30 MG extended release capsule TK 1 C PO QAM 30 capsule 3    Lancets MISC 1 each by Does not apply route daily 100 each 1    blood glucose monitor strips Test once daily & as needed for symptoms of irregular blood glucose. 100 strip 3    hydrocortisone 2.5 % cream Apply topically 2 times daily.  28 g 3    Dulaglutide (TRULICITY) 1.5 QN/1.5VA SOPN Inject 1.5 mLs into the skin once a week 4 pen 3    etodolac (LODINE) 300 MG capsule TK 1 C PO D 90 capsule 1  insulin glargine (BASAGLAR KWIKPEN) 100 UNIT/ML injection pen Inject 44 Units into the skin nightly 5 pen 3    insulin lispro (ADMELOG SOLOSTAR) 100 UNIT/ML pen As directed per sliding scale up to 13 units 4 times per day. 5 pen 3    nitroGLYCERIN (NITROSTAT) 0.4 MG SL tablet Place 1 tablet under the tongue every 5 minutes as needed for Chest pain 25 tablet 1    sertraline (ZOLOFT) 100 MG tablet Take 1 tablet by mouth daily as needed (depression) 90 tablet 1    Wheat Dextrin (BENEFIBER) POWD Take 4 g by mouth 3 times daily (with meals) 475 g 2    Insulin Syringe-Needle U-100 ([employer] INSULIN SYR 1CC/30G) 30G X 5/16\" 1 ML MISC 1 each by Does not apply route 4 times daily 120 each 5    Insulin Pen Needle 32G X 4 MM MISC 1 each by Does not apply route daily 100 each 3    lidocaine-prilocaine (EMLA) 2.5-2.5 % cream Apply topically as needed. 30 g 0    glucose monitoring kit (FREESTYLE) monitoring kit 1 kit by Does not apply route daily 1 kit 0    Blood Pressure KIT 1 kit by Does not apply route daily 1 kit 0    Misc. Devices (QUAD CANE) MISC 1 Quad cane 1 each 0    Multiple Vitamins-Minerals (THERAPEUTIC MULTIVITAMIN-MINERALS) tablet Take 1 tablet by mouth daily      B Complex Vitamins (B COMPLEX 1 PO) Take 1 tablet by mouth daily.         [DISCONTINUED] metFORMIN (GLUCOPHAGE) 500 MG tablet Take 2 tablets by mouth 2 times daily (with meals) 360 tablet 0    sucralfate (CARAFATE) 1 GM/10ML suspension Take 10 mLs by mouth 4 times daily (Patient not taking: Reported on 8/23/2019) 600 mL 5    [DISCONTINUED] bumetanide (BUMEX) 1 MG tablet Take 1 tablet by mouth every other day 90 tablet 1    [DISCONTINUED] famotidine (PEPCID) 20 MG tablet Take 1 tablet by mouth 2 times daily 180 tablet 1    [DISCONTINUED] Metoprolol Succinate 25 MG CS24 Take 0.5 tablets by mouth daily 45 capsule 1    [DISCONTINUED] omeprazole (PRILOSEC) 40 MG delayed release capsule TK 1 C PO D 90 capsule 1    [DISCONTINUED] ondansetron

## 2019-09-23 ENCOUNTER — TELEPHONE (OUTPATIENT)
Dept: FAMILY MEDICINE CLINIC | Age: 49
End: 2019-09-23

## 2019-10-09 ENCOUNTER — OFFICE VISIT (OUTPATIENT)
Dept: FAMILY MEDICINE CLINIC | Age: 49
End: 2019-10-09
Payer: COMMERCIAL

## 2019-10-09 VITALS
RESPIRATION RATE: 18 BRPM | TEMPERATURE: 97.4 F | OXYGEN SATURATION: 98 % | WEIGHT: 143.2 LBS | DIASTOLIC BLOOD PRESSURE: 70 MMHG | SYSTOLIC BLOOD PRESSURE: 122 MMHG | HEIGHT: 59 IN | HEART RATE: 104 BPM | BODY MASS INDEX: 28.87 KG/M2

## 2019-10-09 DIAGNOSIS — Z23 IMMUNIZATION DUE: ICD-10-CM

## 2019-10-09 DIAGNOSIS — R10.31 ABDOMINAL PAIN, RIGHT LOWER QUADRANT: Primary | ICD-10-CM

## 2019-10-09 DIAGNOSIS — K21.9 GASTROESOPHAGEAL REFLUX DISEASE WITHOUT ESOPHAGITIS: ICD-10-CM

## 2019-10-09 DIAGNOSIS — L30.9 DERMATITIS: ICD-10-CM

## 2019-10-09 PROCEDURE — 3046F HEMOGLOBIN A1C LEVEL >9.0%: CPT | Performed by: FAMILY MEDICINE

## 2019-10-09 PROCEDURE — G8482 FLU IMMUNIZE ORDER/ADMIN: HCPCS | Performed by: FAMILY MEDICINE

## 2019-10-09 PROCEDURE — 2022F DILAT RTA XM EVC RTNOPTHY: CPT | Performed by: FAMILY MEDICINE

## 2019-10-09 PROCEDURE — 3017F COLORECTAL CA SCREEN DOC REV: CPT | Performed by: FAMILY MEDICINE

## 2019-10-09 PROCEDURE — G8417 CALC BMI ABV UP PARAM F/U: HCPCS | Performed by: FAMILY MEDICINE

## 2019-10-09 PROCEDURE — G8427 DOCREV CUR MEDS BY ELIG CLIN: HCPCS | Performed by: FAMILY MEDICINE

## 2019-10-09 PROCEDURE — 1036F TOBACCO NON-USER: CPT | Performed by: FAMILY MEDICINE

## 2019-10-09 PROCEDURE — 90686 IIV4 VACC NO PRSV 0.5 ML IM: CPT | Performed by: FAMILY MEDICINE

## 2019-10-09 PROCEDURE — 99214 OFFICE O/P EST MOD 30 MIN: CPT | Performed by: FAMILY MEDICINE

## 2019-10-09 PROCEDURE — 90471 IMMUNIZATION ADMIN: CPT | Performed by: FAMILY MEDICINE

## 2019-10-13 ASSESSMENT — ENCOUNTER SYMPTOMS
COUGH: 0
CONSTIPATION: 0
ABDOMINAL DISTENTION: 0
PHOTOPHOBIA: 0
CHEST TIGHTNESS: 0
EYE DISCHARGE: 0
EYES NEGATIVE: 1
BLOOD IN STOOL: 0
ABDOMINAL PAIN: 1
CHOKING: 0
EYE REDNESS: 0
WHEEZING: 0
ANAL BLEEDING: 0
STRIDOR: 0
SHORTNESS OF BREATH: 0
VOMITING: 0
EYE PAIN: 0
EYE ITCHING: 0
RECTAL PAIN: 0
RHINORRHEA: 0
SINUS PRESSURE: 0
FACIAL SWELLING: 0
BACK PAIN: 0
NAUSEA: 1
ALLERGIC/IMMUNOLOGIC NEGATIVE: 1
RESPIRATORY NEGATIVE: 1
TROUBLE SWALLOWING: 0
SINUS PAIN: 0
COLOR CHANGE: 0
VOICE CHANGE: 0
DIARRHEA: 0
SORE THROAT: 0

## 2019-10-17 ENCOUNTER — HOSPITAL ENCOUNTER (OUTPATIENT)
Dept: CT IMAGING | Age: 49
Discharge: HOME | End: 2019-10-17
Payer: COMMERCIAL

## 2019-10-17 DIAGNOSIS — R10.31 ABDOMINAL PAIN, RIGHT LOWER QUADRANT: ICD-10-CM

## 2019-10-17 PROCEDURE — 74178 CT ABD&PLV WO CNTR FLWD CNTR: CPT

## 2019-10-17 PROCEDURE — 6360000004 HC RX CONTRAST MEDICATION: Performed by: RADIOLOGY

## 2019-10-17 RX ADMIN — IOPAMIDOL 80 ML: 755 INJECTION, SOLUTION INTRAVENOUS at 09:21

## 2019-10-21 ENCOUNTER — OFFICE VISIT (OUTPATIENT)
Dept: FAMILY MEDICINE CLINIC | Age: 49
End: 2019-10-21
Payer: COMMERCIAL

## 2019-10-21 VITALS
WEIGHT: 146 LBS | RESPIRATION RATE: 18 BRPM | HEART RATE: 89 BPM | DIASTOLIC BLOOD PRESSURE: 78 MMHG | TEMPERATURE: 98.4 F | OXYGEN SATURATION: 98 % | SYSTOLIC BLOOD PRESSURE: 122 MMHG | HEIGHT: 59 IN | BODY MASS INDEX: 29.43 KG/M2

## 2019-10-21 DIAGNOSIS — N39.0 URINARY TRACT INFECTION WITHOUT HEMATURIA, SITE UNSPECIFIED: ICD-10-CM

## 2019-10-21 DIAGNOSIS — K31.89 GASTRIC DISTENTION: ICD-10-CM

## 2019-10-21 DIAGNOSIS — R10.84 GENERALIZED ABDOMINAL PAIN: ICD-10-CM

## 2019-10-21 DIAGNOSIS — K63.9 MURAL THICKENING OF SMALL INTESTINE: Primary | ICD-10-CM

## 2019-10-21 LAB
BILIRUBIN, POC: NORMAL
BLOOD URINE, POC: NEGATIVE
CLARITY, POC: CLEAR
COLOR, POC: YELLOW
GLUCOSE URINE, POC: 250
KETONES, POC: NEGATIVE
LEUKOCYTE EST, POC: NORMAL
NITRITE, POC: POSITIVE
PH, POC: 6
PROTEIN, POC: NORMAL
SPECIFIC GRAVITY, POC: 1.01
UROBILINOGEN, POC: 0.2

## 2019-10-21 PROCEDURE — 2022F DILAT RTA XM EVC RTNOPTHY: CPT | Performed by: FAMILY MEDICINE

## 2019-10-21 PROCEDURE — 1036F TOBACCO NON-USER: CPT | Performed by: FAMILY MEDICINE

## 2019-10-21 PROCEDURE — 3046F HEMOGLOBIN A1C LEVEL >9.0%: CPT | Performed by: FAMILY MEDICINE

## 2019-10-21 PROCEDURE — G8417 CALC BMI ABV UP PARAM F/U: HCPCS | Performed by: FAMILY MEDICINE

## 2019-10-21 PROCEDURE — 3017F COLORECTAL CA SCREEN DOC REV: CPT | Performed by: FAMILY MEDICINE

## 2019-10-21 PROCEDURE — 99214 OFFICE O/P EST MOD 30 MIN: CPT | Performed by: FAMILY MEDICINE

## 2019-10-21 PROCEDURE — G8427 DOCREV CUR MEDS BY ELIG CLIN: HCPCS | Performed by: FAMILY MEDICINE

## 2019-10-21 PROCEDURE — 81003 URINALYSIS AUTO W/O SCOPE: CPT | Performed by: FAMILY MEDICINE

## 2019-10-21 PROCEDURE — G8482 FLU IMMUNIZE ORDER/ADMIN: HCPCS | Performed by: FAMILY MEDICINE

## 2019-10-21 RX ORDER — CEFDINIR 300 MG/1
300 CAPSULE ORAL 2 TIMES DAILY
Qty: 14 CAPSULE | Refills: 0 | Status: SHIPPED | OUTPATIENT
Start: 2019-10-21 | End: 2019-10-28

## 2019-10-25 ASSESSMENT — ENCOUNTER SYMPTOMS
EYES NEGATIVE: 1
WHEEZING: 0
RHINORRHEA: 0
ABDOMINAL PAIN: 1
TROUBLE SWALLOWING: 0
CONSTIPATION: 0
RECTAL PAIN: 0
COLOR CHANGE: 0
PHOTOPHOBIA: 0
SINUS PAIN: 0
DIARRHEA: 0
COUGH: 0
EYE REDNESS: 0
ALLERGIC/IMMUNOLOGIC NEGATIVE: 1
EYE PAIN: 0
BLOOD IN STOOL: 0
CHOKING: 0
FACIAL SWELLING: 0
ABDOMINAL DISTENTION: 0
SHORTNESS OF BREATH: 0
EYE DISCHARGE: 0
SINUS PRESSURE: 0
NAUSEA: 1
SORE THROAT: 0
EYE ITCHING: 0
STRIDOR: 0
VOMITING: 0
RESPIRATORY NEGATIVE: 1
ANAL BLEEDING: 0
CHEST TIGHTNESS: 0
BACK PAIN: 0
VOICE CHANGE: 0

## 2019-11-11 ENCOUNTER — INITIAL CONSULT (OUTPATIENT)
Dept: SURGERY | Age: 49
End: 2019-11-11
Payer: MEDICAID

## 2019-11-11 VITALS
OXYGEN SATURATION: 93 % | SYSTOLIC BLOOD PRESSURE: 154 MMHG | BODY MASS INDEX: 28.63 KG/M2 | DIASTOLIC BLOOD PRESSURE: 101 MMHG | HEART RATE: 95 BPM | WEIGHT: 142 LBS | TEMPERATURE: 97.4 F | HEIGHT: 59 IN

## 2019-11-11 DIAGNOSIS — R10.31 RLQ ABDOMINAL PAIN: Primary | ICD-10-CM

## 2019-11-11 PROCEDURE — 99214 OFFICE O/P EST MOD 30 MIN: CPT | Performed by: SURGERY

## 2019-11-11 PROCEDURE — G8417 CALC BMI ABV UP PARAM F/U: HCPCS | Performed by: SURGERY

## 2019-11-11 PROCEDURE — G8427 DOCREV CUR MEDS BY ELIG CLIN: HCPCS | Performed by: SURGERY

## 2019-11-11 PROCEDURE — 1036F TOBACCO NON-USER: CPT | Performed by: SURGERY

## 2019-11-11 PROCEDURE — G8482 FLU IMMUNIZE ORDER/ADMIN: HCPCS | Performed by: SURGERY

## 2019-11-11 PROCEDURE — 3017F COLORECTAL CA SCREEN DOC REV: CPT | Performed by: SURGERY

## 2019-11-25 LAB — DIABETIC RETINOPATHY: POSITIVE

## 2019-12-03 RX ORDER — DULOXETIN HYDROCHLORIDE 30 MG/1
CAPSULE, DELAYED RELEASE ORAL
Qty: 90 CAPSULE | Refills: 1 | Status: SHIPPED
Start: 2019-12-03 | End: 2020-03-13 | Stop reason: SDUPTHER

## 2019-12-16 NOTE — ED PROVIDER NOTES
-------------------------------------------------  Labs:  Results for orders placed or performed during the hospital encounter of 12/03/18   CBC Auto Differential   Result Value Ref Range    WBC 7.4 4.5 - 11.5 E9/L    RBC 4.30 3.50 - 5.50 E12/L    Hemoglobin 13.3 11.5 - 15.5 g/dL    Hematocrit 40.6 34.0 - 48.0 %    MCV 94.4 80.0 - 99.9 fL    MCH 30.9 26.0 - 35.0 pg    MCHC 32.8 32.0 - 34.5 %    RDW 12.4 11.5 - 15.0 fL    Platelets 234 184 - 512 E9/L    MPV 10.8 7.0 - 12.0 fL    Neutrophils % 54.0 43.0 - 80.0 %    Immature Granulocytes % 0.3 0.0 - 5.0 %    Lymphocytes % 37.6 20.0 - 42.0 %    Monocytes % 6.7 2.0 - 12.0 %    Eosinophils % 1.3 0.0 - 6.0 %    Basophils % 0.1 0.0 - 2.0 %    Neutrophils # 4.00 1.80 - 7.30 E9/L    Immature Granulocytes # 0.02 E9/L    Lymphocytes # 2.79 1.50 - 4.00 E9/L    Monocytes # 0.50 0.10 - 0.95 E9/L    Eosinophils # 0.10 0.05 - 0.50 E9/L    Basophils # 0.01 0.00 - 0.20 E9/L   Comprehensive Metabolic Panel   Result Value Ref Range    Sodium 141 132 - 146 mmol/L    Potassium 3.9 3.5 - 5.0 mmol/L    Chloride 100 98 - 107 mmol/L    CO2 28 22 - 29 mmol/L    Anion Gap 13 7 - 16 mmol/L    Glucose 342 (H) 74 - 99 mg/dL    BUN 14 6 - 20 mg/dL    CREATININE 0.7 0.5 - 1.0 mg/dL    GFR Non-African American >60 >=60 mL/min/1.73    GFR African American >60     Calcium 9.8 8.6 - 10.2 mg/dL    Total Protein 7.6 6.4 - 8.3 g/dL    Alb 4.3 3.5 - 5.2 g/dL    Total Bilirubin 0.4 0.0 - 1.2 mg/dL    Alkaline Phosphatase 93 35 - 104 U/L    ALT 27 0 - 32 U/L    AST 21 0 - 31 U/L   Troponin   Result Value Ref Range    Troponin <0.01 0.00 - 0.03 ng/mL   Lipase   Result Value Ref Range    Lipase 20 13 - 60 U/L   Urinalysis   Result Value Ref Range    Color, UA Yellow Straw/Yellow    Clarity, UA Clear Clear    Glucose, Ur >=1000 (A) Negative mg/dL    Bilirubin Urine MODERATE (A) Negative    Ketones, Urine TRACE (A) Negative mg/dL    Specific Gravity, UA 1.010 1.005 - 1.030    Blood, Urine Negative Negative    pH, UA 7.0 5.0 - 9.0    Protein, UA Negative Negative mg/dL    Urobilinogen, Urine 0.2 <2.0 E.U./dL    Nitrite, Urine Negative Negative    Leukocyte Esterase, Urine Negative Negative   Lactic Acid, Plasma   Result Value Ref Range    Lactic Acid 1.2 0.5 - 2.2 mmol/L   EKG 12 Lead   Result Value Ref Range    Ventricular Rate 106 BPM    Atrial Rate 106 BPM    P-R Interval 126 ms    QRS Duration 82 ms    Q-T Interval 342 ms    QTc Calculation (Bazett) 454 ms    P Axis 57 degrees    R Axis 39 degrees    T Axis 43 degrees       Radiology:  US GALLBLADDER RUQ   Final Result   Normal gallbladder sonogram.            XR Acute Abd Series Chest 1 VW   Final Result   1. Nonobstructive bowel gas pattern. 2. No acute cardiopulmonary disease.          ------------------------- NURSING NOTES AND VITALS REVIEWED ---------------------------  Date / Time Roomed:  12/3/2018  2:08 PM  ED Bed Assignment:  23/23    The nursing notes within the ED encounter and vital signs as below have been reviewed. BP (!) 186/84   Pulse 102   Temp 98.1 °F (36.7 °C) (Oral)   Resp 16   Ht 4' 11\" (1.499 m)   Wt 146 lb 3 oz (66.3 kg)   LMP  (LMP Unknown)   SpO2 98%   BMI 29.53 kg/m²   Oxygen Saturation Interpretation: Normal      ------------------------------------------ PROGRESS NOTES ------------------------------------------  I have spoken with the patient and discussed todays results, in addition to providing specific details for the plan of care and counseling regarding the diagnosis and prognosis. Their questions are answered at this time and they are agreeable with the plan. I discussed at length with them reasons for immediate return here for re evaluation. They will followup with primary care by calling their office tomorrow.       --------------------------------- ADDITIONAL PROVIDER NOTES ---------------------------------  At this time the patient is without objective evidence of an acute process requiring hospitalization or inpatient 5

## 2020-01-06 ENCOUNTER — HOSPITAL ENCOUNTER (OUTPATIENT)
Age: 50
Discharge: HOME | End: 2020-01-08
Payer: MEDICAID

## 2020-01-06 ENCOUNTER — OFFICE VISIT (OUTPATIENT)
Dept: FAMILY MEDICINE CLINIC | Age: 50
End: 2020-01-06
Payer: COMMERCIAL

## 2020-01-06 VITALS
SYSTOLIC BLOOD PRESSURE: 122 MMHG | WEIGHT: 153 LBS | HEIGHT: 59 IN | TEMPERATURE: 98.2 F | OXYGEN SATURATION: 98 % | BODY MASS INDEX: 30.84 KG/M2 | HEART RATE: 76 BPM | DIASTOLIC BLOOD PRESSURE: 64 MMHG

## 2020-01-06 PROBLEM — G25.81 RLS (RESTLESS LEGS SYNDROME): Status: ACTIVE | Noted: 2020-01-06

## 2020-01-06 LAB — HBA1C MFR BLD: 8.5 %

## 2020-01-06 PROCEDURE — G8417 CALC BMI ABV UP PARAM F/U: HCPCS | Performed by: FAMILY MEDICINE

## 2020-01-06 PROCEDURE — 99214 OFFICE O/P EST MOD 30 MIN: CPT | Performed by: FAMILY MEDICINE

## 2020-01-06 PROCEDURE — G8427 DOCREV CUR MEDS BY ELIG CLIN: HCPCS | Performed by: FAMILY MEDICINE

## 2020-01-06 PROCEDURE — G8482 FLU IMMUNIZE ORDER/ADMIN: HCPCS | Performed by: FAMILY MEDICINE

## 2020-01-06 PROCEDURE — 3017F COLORECTAL CA SCREEN DOC REV: CPT | Performed by: FAMILY MEDICINE

## 2020-01-06 PROCEDURE — 1036F TOBACCO NON-USER: CPT | Performed by: FAMILY MEDICINE

## 2020-01-06 PROCEDURE — 83036 HEMOGLOBIN GLYCOSYLATED A1C: CPT | Performed by: FAMILY MEDICINE

## 2020-01-06 PROCEDURE — 2022F DILAT RTA XM EVC RTNOPTHY: CPT | Performed by: FAMILY MEDICINE

## 2020-01-06 RX ORDER — PRAMIPEXOLE DIHYDROCHLORIDE 0.12 MG/1
0.12 TABLET ORAL NIGHTLY
Qty: 90 TABLET | Refills: 3 | Status: SHIPPED
Start: 2020-01-06 | End: 2020-03-13 | Stop reason: SDUPTHER

## 2020-01-06 RX ORDER — FAMOTIDINE 20 MG/1
20 TABLET, FILM COATED ORAL 2 TIMES DAILY
Qty: 180 TABLET | Refills: 1 | Status: SHIPPED
Start: 2020-01-06 | End: 2020-08-19 | Stop reason: SDUPTHER

## 2020-01-06 RX ORDER — BUMETANIDE 1 MG/1
1 TABLET ORAL EVERY OTHER DAY
Qty: 90 TABLET | Refills: 1 | Status: SHIPPED
Start: 2020-01-06 | End: 2020-04-20

## 2020-01-06 RX ORDER — SERTRALINE HYDROCHLORIDE 100 MG/1
100 TABLET, FILM COATED ORAL DAILY PRN
Qty: 90 TABLET | Refills: 1 | Status: SHIPPED
Start: 2020-01-06 | End: 2020-04-24 | Stop reason: SDUPTHER

## 2020-01-06 RX ORDER — OMEPRAZOLE 40 MG/1
CAPSULE, DELAYED RELEASE ORAL
Qty: 90 CAPSULE | Refills: 1 | Status: SHIPPED
Start: 2020-01-06 | End: 2020-04-24 | Stop reason: SDUPTHER

## 2020-01-06 ASSESSMENT — ENCOUNTER SYMPTOMS
BLOOD IN STOOL: 0
ABDOMINAL DISTENTION: 0
TROUBLE SWALLOWING: 0
SHORTNESS OF BREATH: 0
COLOR CHANGE: 0
RESPIRATORY NEGATIVE: 1
EYE PAIN: 0
EYE DISCHARGE: 0
EYE REDNESS: 0
SORE THROAT: 0
BACK PAIN: 0
RECTAL PAIN: 0
COUGH: 0
ALLERGIC/IMMUNOLOGIC NEGATIVE: 1
VOMITING: 0
NAUSEA: 0
VOICE CHANGE: 0
CONSTIPATION: 0
FACIAL SWELLING: 0
ABDOMINAL PAIN: 0
EYES NEGATIVE: 1
PHOTOPHOBIA: 0
CHEST TIGHTNESS: 0
STRIDOR: 0
EYE ITCHING: 0
RHINORRHEA: 0
SINUS PRESSURE: 0
CHOKING: 0
WHEEZING: 0
SINUS PAIN: 0
DIARRHEA: 0
ANAL BLEEDING: 0

## 2020-01-06 ASSESSMENT — PATIENT HEALTH QUESTIONNAIRE - PHQ9
2. FEELING DOWN, DEPRESSED OR HOPELESS: 0
SUM OF ALL RESPONSES TO PHQ QUESTIONS 1-9: 0
SUM OF ALL RESPONSES TO PHQ9 QUESTIONS 1 & 2: 0
SUM OF ALL RESPONSES TO PHQ QUESTIONS 1-9: 0
1. LITTLE INTEREST OR PLEASURE IN DOING THINGS: 0

## 2020-01-06 NOTE — PROGRESS NOTES
SUBJECTIVE  Jenny Raza is a 44 y.o. female. HPI/Chief C/O:  Chief Complaint   Patient presents with    Diabetes     patient here for 3 month checkup; and medicine refills    Health Maintenance     due for A1C; urine microalbumin and mammogram (patient still has the order for mammogram)     No Known Allergies  This 44year old female presents with IDDM A1C decreased from 10 in 3/2019 to 8.5. Pt has anxiety, RLS, lumbar pain, neuropathy, and fatigue. Pt denies SI and HI. Pt follows with GI specialist.   ROS:  Review of Systems   Constitutional: Positive for fatigue. Negative for activity change, appetite change, chills, diaphoresis, fever and unexpected weight change. HENT: Negative. Negative for congestion, dental problem, drooling, ear discharge, ear pain, facial swelling, hearing loss, mouth sores, nosebleeds, postnasal drip, rhinorrhea, sinus pressure, sinus pain, sneezing, sore throat, tinnitus, trouble swallowing and voice change. Eyes: Negative. Negative for photophobia, pain, discharge, redness, itching and visual disturbance. Respiratory: Negative. Negative for cough, choking, chest tightness, shortness of breath, wheezing and stridor. Cardiovascular: Negative. Negative for chest pain, palpitations and leg swelling. Gastrointestinal: Negative for abdominal distention, abdominal pain, anal bleeding, blood in stool, constipation, diarrhea, nausea, rectal pain and vomiting. Endocrine: Negative. Negative for cold intolerance, heat intolerance, polydipsia, polyphagia and polyuria. Genitourinary: Positive for frequency and urgency. Negative for decreased urine volume, difficulty urinating, dysuria, flank pain, genital sores, hematuria, menstrual problem and pelvic pain. Musculoskeletal: Negative. Negative for arthralgias, back pain, gait problem, joint swelling, myalgias, neck pain and neck stiffness. Skin: Negative for color change, pallor, rash and wound. Allergic/Immunologic: Negative. Neurological: Positive for dizziness and numbness. Negative for tremors, seizures, syncope, facial asymmetry, speech difficulty, weakness, light-headedness and headaches. Hematological: Negative. Negative for adenopathy. Does not bruise/bleed easily. Psychiatric/Behavioral: Positive for sleep disturbance. Negative for agitation, behavioral problems, confusion, decreased concentration, dysphoric mood, hallucinations, self-injury and suicidal ideas. The patient is nervous/anxious. The patient is not hyperactive. Past Medical/Surgical Hx;  Reviewed with patient      Diagnosis Date    Arthritis     lower back    Back pain     Cardiac valve prolapse     micro    Diabetes mellitus ([address]     H/O exercise stress test 10/2015  ?     Dr Miller December  neg    Hyperlipidemia     Hypertension     Neuropathy due to secondary diabetes ([address]     in marcial feet    Prolonged emergence from general anesthesia     Psoriasis      Past Surgical History:   Procedure Laterality Date    ANKLE SURGERY      bilateral tarsal tunnels    CARDIAC SURGERY      cardiac catheterization    CARPAL TUNNEL RELEASE      left    CARPAL TUNNEL RELEASE  12    right     SECTION      CHOLECYSTECTOMY, LAPAROSCOPIC N/A 2019    LAPAROSCOPIC CHOLECYSTECTOMY WITH IOC performed by Miguel Angel Hewitt MD at [address]  had extremely high blood pressure and hard to wake up    COLONOSCOPY      COLONOSCOPY N/A 2019    COLORECTAL CANCER SCREENING, NOT HIGH RISK performed by Miguel Angel Hewitt MD at [address] [facility], COLON, DIAGNOSTIC      HYSTERECTOMY      SHOULDER ARTHROSCOPY Left 2016    subacromin decompression and debridement    UPPER GASTROINTESTINAL ENDOSCOPY N/A 2019    EGD BIOPSY performed by Miguel Angel Hewitt MD at [place] [address]       Past Family Hx:  Reviewed with patient      Problem Relation Age of Onset    Heart Disease Mother     Diabetes Mother    Alonaon August Heart Disease Father     Diabetes Father        Social Hx:  Reviewed with patient  Social History     Tobacco Use    Smoking status: Never Smoker    Smokeless tobacco: Never Used   Substance Use Topics    Alcohol use: Yes     Comment: occas. OBJECTIVE  /64   Pulse 76   Temp 98.2 °F (36.8 °C)   Ht 4' 11\" (1.499 m)   Wt 153 lb (69.4 kg)   LMP  (LMP Unknown)   SpO2 98%   Breastfeeding? No   BMI 30.90 kg/m²     Problem List:  aGrth Ribeiro does not have any pertinent problems on file. PHYS EX:  Physical Exam  Vitals signs and nursing note reviewed. Constitutional:       General: She is not in acute distress. Appearance: Normal appearance. She is well-developed. She is not ill-appearing, toxic-appearing or diaphoretic. HENT:      Head: Normocephalic and atraumatic. Right Ear: Tympanic membrane, ear canal and external ear normal.      Left Ear: Tympanic membrane, ear canal and external ear normal.      Nose: Nose normal. No congestion or rhinorrhea. Mouth/Throat:      Mouth: Mucous membranes are moist.      Pharynx: Oropharynx is clear. No oropharyngeal exudate or posterior oropharyngeal erythema. Eyes:      General: No scleral icterus. Right eye: No discharge. Left eye: No discharge. Conjunctiva/sclera: Conjunctivae normal.      Pupils: Pupils are equal, round, and reactive to light. Neck:      Musculoskeletal: Normal range of motion and neck supple. No neck rigidity or muscular tenderness. Thyroid: No thyromegaly. Vascular: No carotid bruit or JVD. Trachea: No tracheal deviation. Cardiovascular:      Rate and Rhythm: Normal rate and regular rhythm. Pulses: Normal pulses. Heart sounds: Normal heart sounds. No murmur. No friction rub. No gallop. Pulmonary:      Effort: Pulmonary effort is normal. No respiratory distress. Breath sounds: Normal breath sounds. No stridor. No wheezing, rhonchi or rales.    Chest:      Chest wall: No tenderness. Abdominal:      General: Bowel sounds are normal. There is no distension. Palpations: Abdomen is soft. There is no mass. Tenderness: There is no tenderness. There is no guarding or rebound. Hernia: No hernia is present. Musculoskeletal:         General: Tenderness present. No swelling, deformity or signs of injury. Comments: Pain and decreased ROM lumbar with neuropathy and weakness marcial. Legs. Lymphadenopathy:      Cervical: No cervical adenopathy. Skin:     General: Skin is warm. Coloration: Skin is not jaundiced or pale. Findings: No bruising, erythema, lesion or rash. Neurological:      General: No focal deficit present. Mental Status: She is alert and oriented to person, place, and time. Cranial Nerves: No cranial nerve deficit. Sensory: No sensory deficit. Motor: No weakness or abnormal muscle tone. Coordination: Coordination normal.      Gait: Gait normal.      Deep Tendon Reflexes: Reflexes are normal and symmetric. Reflexes normal.   Psychiatric:         Mood and Affect: Mood normal.         Behavior: Behavior normal.         Thought Content: Thought content normal.         Judgment: Judgment normal.         ASSESSMENT/PLAN  Jenny was seen today for gastroesophageal reflux and medication refill. Diagnoses and all orders for this visit  Gastroesophageal reflux disease without esophagitis  Not controlled. Lab, pepcid, prilosec. IDDM (insulin dependent diabetes mellitus) ([facility] [address]  Not controlled. Lab, metformin, trulicity, admelog, basaglar, statin, ADA diet. Anxiety  Stable. Marcela Linda. RLS  Stable. Lab, mirapex. Neuropathy  Stable. Lab, cymbalta. Lumbar pain  Stable. Lab, cymbalta. Pt instructed if any worse go ED ASAP.         Outpatient Encounter Medications as of 1/6/2020   Medication Sig Dispense Refill    sertraline (ZOLOFT) 100 MG tablet Take 1 tablet by mouth daily as needed (depression) Misc. Devices (QUAD CANE) MISC 1 Quad cane 1 each 0    Multiple Vitamins-Minerals (THERAPEUTIC MULTIVITAMIN-MINERALS) tablet Take 1 tablet by mouth daily      B Complex Vitamins (B COMPLEX 1 PO) Take 1 tablet by mouth daily.  [DISCONTINUED] insulin glargine (BASAGLAR KWIKPEN) 100 UNIT/ML injection pen Inject 50 Units into the skin nightly 5 pen 3    [DISCONTINUED] metFORMIN (GLUCOPHAGE) 500 MG tablet Take 2 tablets by mouth 2 times daily (with meals) 360 tablet 1    [DISCONTINUED] bumetanide (BUMEX) 1 MG tablet Take 1 tablet by mouth every other day 90 tablet 1    [DISCONTINUED] famotidine (PEPCID) 20 MG tablet Take 1 tablet by mouth 2 times daily 180 tablet 1    [DISCONTINUED] omeprazole (PRILOSEC) 40 MG delayed release capsule TK 1 C PO D 90 capsule 1    [DISCONTINUED] Metoprolol Succinate 25 MG CS24 Take 0.5 tablets by mouth daily 45 capsule 1    [DISCONTINUED] pramipexole (MIRAPEX) 0.125 MG tablet Take 1 tablet by mouth nightly 90 tablet 3    [DISCONTINUED] Dulaglutide (TRULICITY) 1.5 SA/2.0BV SOPN Inject 1.5 mLs into the skin once a week 4 pen 3    [DISCONTINUED] sertraline (ZOLOFT) 100 MG tablet Take 1 tablet by mouth daily as needed (depression) 90 tablet 1     No facility-administered encounter medications on file as of 1/6/2020. Return in about 3 months (around 4/6/2020).         Reviewed recent labs related to Jenny's current problems      Discussed importance of regular Health Maintenance follow up  Health Maintenance   Topic    Diabetic foot exam     DTaP/Tdap/Td vaccine (1 - Tdap)    HIV screen     Hepatitis B vaccine (1 of 3 - Risk 3-dose series)    Cervical cancer screen     Breast cancer screen     Shingles Vaccine (1 of 2)    Diabetic microalbuminuria test     A1C test (Diabetic or Prediabetic)     Lipid screen     Potassium monitoring     Creatinine monitoring     Diabetic retinal exam     Colon cancer screen colonoscopy     Flu vaccine     Pneumococcal 0-64 years Vaccine

## 2020-01-07 LAB
ALBUMIN SERPL-MCNC: 3.8 G/DL (ref 3.5–5.2)
ALP BLD-CCNC: 120 U/L (ref 35–104)
ALT SERPL-CCNC: 44 U/L (ref 0–32)
ANION GAP SERPL CALCULATED.3IONS-SCNC: 18 MMOL/L (ref 7–16)
AST SERPL-CCNC: 47 U/L (ref 0–31)
BASOPHILS ABSOLUTE: 0.02 E9/L (ref 0–0.2)
BASOPHILS RELATIVE PERCENT: 0.3 % (ref 0–2)
BILIRUB SERPL-MCNC: 0.2 MG/DL (ref 0–1.2)
BUN BLDV-MCNC: 13 MG/DL (ref 6–20)
CALCIUM SERPL-MCNC: 9.1 MG/DL (ref 8.6–10.2)
CHLORIDE BLD-SCNC: 104 MMOL/L (ref 98–107)
CO2: 22 MMOL/L (ref 22–29)
CREAT SERPL-MCNC: 0.8 MG/DL (ref 0.5–1)
EOSINOPHILS ABSOLUTE: 0.25 E9/L (ref 0.05–0.5)
EOSINOPHILS RELATIVE PERCENT: 3.1 % (ref 0–6)
GFR AFRICAN AMERICAN: >60
GFR NON-AFRICAN AMERICAN: >60 ML/MIN/1.73
GLUCOSE BLD-MCNC: 183 MG/DL (ref 74–99)
HCT VFR BLD CALC: 38.5 % (ref 34–48)
HEMOGLOBIN: 11.5 G/DL (ref 11.5–15.5)
IMMATURE GRANULOCYTES #: 0.02 E9/L
IMMATURE GRANULOCYTES %: 0.3 % (ref 0–5)
LYMPHOCYTES ABSOLUTE: 2.52 E9/L (ref 1.5–4)
LYMPHOCYTES RELATIVE PERCENT: 31.5 % (ref 20–42)
MCH RBC QN AUTO: 30.3 PG (ref 26–35)
MCHC RBC AUTO-ENTMCNC: 29.9 % (ref 32–34.5)
MCV RBC AUTO: 101.6 FL (ref 80–99.9)
MONOCYTES ABSOLUTE: 0.57 E9/L (ref 0.1–0.95)
MONOCYTES RELATIVE PERCENT: 7.1 % (ref 2–12)
NEUTROPHILS ABSOLUTE: 4.61 E9/L (ref 1.8–7.3)
NEUTROPHILS RELATIVE PERCENT: 57.7 % (ref 43–80)
PDW BLD-RTO: 13 FL (ref 11.5–15)
PLATELET # BLD: 250 E9/L (ref 130–450)
PMV BLD AUTO: 11.9 FL (ref 7–12)
POTASSIUM SERPL-SCNC: 5.1 MMOL/L (ref 3.5–5)
RBC # BLD: 3.79 E12/L (ref 3.5–5.5)
SODIUM BLD-SCNC: 144 MMOL/L (ref 132–146)
TOTAL PROTEIN: 6.9 G/DL (ref 6.4–8.3)
WBC # BLD: 8 E9/L (ref 4.5–11.5)

## 2020-01-07 PROCEDURE — 80053 COMPREHEN METABOLIC PANEL: CPT

## 2020-01-07 PROCEDURE — 85025 COMPLETE CBC W/AUTO DIFF WBC: CPT

## 2020-01-17 ENCOUNTER — HOSPITAL ENCOUNTER (OUTPATIENT)
Dept: ULTRASOUND IMAGING | Age: 50
Discharge: HOME | End: 2020-01-17
Payer: MEDICAID

## 2020-01-17 ENCOUNTER — HOSPITAL ENCOUNTER (OUTPATIENT)
Age: 50
Discharge: HOME | End: 2020-01-17
Payer: MEDICAID

## 2020-01-17 LAB
ALBUMIN SERPL-MCNC: 4 G/DL (ref 3.5–5.2)
ALP BLD-CCNC: 152 U/L (ref 35–104)
ALT SERPL-CCNC: 48 U/L (ref 0–32)
ANION GAP SERPL CALCULATED.3IONS-SCNC: 10 MMOL/L (ref 7–16)
AST SERPL-CCNC: 39 U/L (ref 0–31)
BASOPHILS ABSOLUTE: 0.01 E9/L (ref 0–0.2)
BASOPHILS RELATIVE PERCENT: 0.1 % (ref 0–2)
BILIRUB SERPL-MCNC: 0.2 MG/DL (ref 0–1.2)
BUN BLDV-MCNC: 10 MG/DL (ref 6–20)
CALCIUM SERPL-MCNC: 9.9 MG/DL (ref 8.6–10.2)
CHLORIDE BLD-SCNC: 99 MMOL/L (ref 98–107)
CO2: 29 MMOL/L (ref 22–29)
CREAT SERPL-MCNC: 0.6 MG/DL (ref 0.5–1)
EOSINOPHILS ABSOLUTE: 0.06 E9/L (ref 0.05–0.5)
EOSINOPHILS RELATIVE PERCENT: 0.7 % (ref 0–6)
GFR AFRICAN AMERICAN: >60
GFR NON-AFRICAN AMERICAN: >60 ML/MIN/1.73
GLUCOSE BLD-MCNC: 325 MG/DL (ref 74–99)
HCT VFR BLD CALC: 38.7 % (ref 34–48)
HEMOGLOBIN: 12.1 G/DL (ref 11.5–15.5)
IMMATURE GRANULOCYTES #: 0.03 E9/L
IMMATURE GRANULOCYTES %: 0.4 % (ref 0–5)
LYMPHOCYTES ABSOLUTE: 2 E9/L (ref 1.5–4)
LYMPHOCYTES RELATIVE PERCENT: 23.3 % (ref 20–42)
MCH RBC QN AUTO: 30.9 PG (ref 26–35)
MCHC RBC AUTO-ENTMCNC: 31.3 % (ref 32–34.5)
MCV RBC AUTO: 98.7 FL (ref 80–99.9)
MICROALBUMIN UR-MCNC: 32.1 MG/L
MONOCYTES ABSOLUTE: 0.54 E9/L (ref 0.1–0.95)
MONOCYTES RELATIVE PERCENT: 6.3 % (ref 2–12)
NEUTROPHILS ABSOLUTE: 5.93 E9/L (ref 1.8–7.3)
NEUTROPHILS RELATIVE PERCENT: 69.2 % (ref 43–80)
PDW BLD-RTO: 12.8 FL (ref 11.5–15)
PLATELET # BLD: 256 E9/L (ref 130–450)
PMV BLD AUTO: 10.8 FL (ref 7–12)
POTASSIUM SERPL-SCNC: 5.6 MMOL/L (ref 3.5–5)
RBC # BLD: 3.92 E12/L (ref 3.5–5.5)
SODIUM BLD-SCNC: 138 MMOL/L (ref 132–146)
TOTAL PROTEIN: 7.5 G/DL (ref 6.4–8.3)
WBC # BLD: 8.6 E9/L (ref 4.5–11.5)

## 2020-01-17 PROCEDURE — 85025 COMPLETE CBC W/AUTO DIFF WBC: CPT

## 2020-01-17 PROCEDURE — 80053 COMPREHEN METABOLIC PANEL: CPT

## 2020-01-17 PROCEDURE — 76705 ECHO EXAM OF ABDOMEN: CPT

## 2020-01-17 PROCEDURE — 36415 COLL VENOUS BLD VENIPUNCTURE: CPT

## 2020-01-17 PROCEDURE — 82044 UR ALBUMIN SEMIQUANTITATIVE: CPT

## 2020-01-21 NOTE — TELEPHONE ENCOUNTER
Patient called the clinical care line requesting medication refill. Chart reviewed and medication sent to the pharmacy.   Electronically signed by Syd Garland on 1/21/2020 at 3:10 PM

## 2020-03-02 ENCOUNTER — TELEPHONE (OUTPATIENT)
Dept: FAMILY MEDICINE CLINIC | Age: 50
End: 2020-03-02

## 2020-03-02 NOTE — TELEPHONE ENCOUNTER
Patient called asking if a humidifier order can be sent to [facility].  Patient states she thinks her insurance will cover

## 2020-03-09 ENCOUNTER — HOSPITAL ENCOUNTER (OUTPATIENT)
Age: 50
Discharge: HOME | End: 2020-03-09
Payer: COMMERCIAL

## 2020-03-09 LAB
BUN BLDV-MCNC: 17 MG/DL (ref 6–20)
CREAT SERPL-MCNC: 0.7 MG/DL (ref 0.5–1)
GFR AFRICAN AMERICAN: >60
GFR NON-AFRICAN AMERICAN: >60 ML/MIN/1.73

## 2020-03-09 PROCEDURE — 84520 ASSAY OF UREA NITROGEN: CPT

## 2020-03-09 PROCEDURE — 36415 COLL VENOUS BLD VENIPUNCTURE: CPT

## 2020-03-09 PROCEDURE — 82565 ASSAY OF CREATININE: CPT

## 2020-03-13 ENCOUNTER — TELEPHONE (OUTPATIENT)
Dept: FAMILY MEDICINE CLINIC | Age: 50
End: 2020-03-13

## 2020-03-13 RX ORDER — PRAMIPEXOLE DIHYDROCHLORIDE 0.12 MG/1
0.12 TABLET ORAL NIGHTLY
Qty: 90 TABLET | Refills: 0 | Status: SHIPPED
Start: 2020-03-13 | End: 2020-06-15

## 2020-03-13 RX ORDER — GLUCOSAM/CHON-MSM1/C/MANG/BOSW 500-416.6
1 TABLET ORAL 4 TIMES DAILY
Qty: 400 EACH | Refills: 0 | Status: SHIPPED
Start: 2020-03-13 | End: 2020-07-21

## 2020-03-13 RX ORDER — DULOXETIN HYDROCHLORIDE 30 MG/1
CAPSULE, DELAYED RELEASE ORAL
Qty: 90 CAPSULE | Refills: 0 | Status: SHIPPED
Start: 2020-03-13 | End: 2020-04-24 | Stop reason: SDUPTHER

## 2020-03-13 NOTE — TELEPHONE ENCOUNTER
Simeon Osgood called to schedule her check up now, but per protocol, I try to discourage scheduling at this time, but did so in May. Simeon Osgood said that she is concerned about her med refills and she wants to make sure that she has all of her diabetic supplies available. She also said that she had a paper for social security disability that needs to be filled out - I told her that she could fax (from [facility] also) or drop in the mail - I think that she intends to fax it over. Please return her call regarding the med refills - [phone].

## 2020-03-31 ENCOUNTER — HOSPITAL ENCOUNTER (OUTPATIENT)
Dept: CT IMAGING | Age: 50
Discharge: HOME | End: 2020-03-31
Payer: COMMERCIAL

## 2020-03-31 VITALS — SYSTOLIC BLOOD PRESSURE: 151 MMHG | DIASTOLIC BLOOD PRESSURE: 87 MMHG | HEART RATE: 84 BPM

## 2020-03-31 LAB — METER GLUCOSE: 102 MG/DL (ref 74–99)

## 2020-03-31 PROCEDURE — 2500000003 HC RX 250 WO HCPCS: Performed by: INTERNAL MEDICINE

## 2020-03-31 PROCEDURE — 6370000000 HC RX 637 (ALT 250 FOR IP): Performed by: INTERNAL MEDICINE

## 2020-03-31 PROCEDURE — 2500000003 HC RX 250 WO HCPCS: Performed by: RADIOLOGY

## 2020-03-31 PROCEDURE — 82962 GLUCOSE BLOOD TEST: CPT

## 2020-03-31 PROCEDURE — 6370000000 HC RX 637 (ALT 250 FOR IP): Performed by: RADIOLOGY

## 2020-03-31 RX ORDER — METOPROLOL TARTRATE 5 MG/5ML
5 INJECTION INTRAVENOUS PRN
Status: COMPLETED | OUTPATIENT
Start: 2020-03-31 | End: 2020-03-31

## 2020-03-31 RX ORDER — ATENOLOL 25 MG/1
25 TABLET ORAL ONCE
Status: COMPLETED | OUTPATIENT
Start: 2020-03-31 | End: 2020-03-31

## 2020-03-31 RX ADMIN — ATENOLOL 25 MG: 25 TABLET ORAL at 11:04

## 2020-03-31 RX ADMIN — METOPROLOL TARTRATE 5 MG: 5 INJECTION INTRAVENOUS at 10:06

## 2020-03-31 RX ADMIN — ATENOLOL 25 MG: 25 TABLET ORAL at 08:42

## 2020-03-31 RX ADMIN — METOPROLOL TARTRATE 5 MG: 5 INJECTION INTRAVENOUS at 10:21

## 2020-03-31 RX ADMIN — METOPROLOL TARTRATE 5 MG: 5 INJECTION INTRAVENOUS at 11:37

## 2020-03-31 RX ADMIN — METOPROLOL TARTRATE 5 MG: 5 INJECTION INTRAVENOUS at 11:45

## 2020-04-20 RX ORDER — BUMETANIDE 1 MG/1
1 TABLET ORAL EVERY OTHER DAY
Qty: 90 TABLET | Refills: 1 | Status: SHIPPED
Start: 2020-04-20 | End: 2020-08-19 | Stop reason: SDUPTHER

## 2020-04-24 RX ORDER — DULOXETIN HYDROCHLORIDE 30 MG/1
CAPSULE, DELAYED RELEASE ORAL
Qty: 90 CAPSULE | Refills: 0 | Status: SHIPPED
Start: 2020-04-24 | End: 2020-06-18

## 2020-04-24 RX ORDER — SERTRALINE HYDROCHLORIDE 100 MG/1
100 TABLET, FILM COATED ORAL DAILY PRN
Qty: 90 TABLET | Refills: 1 | Status: SHIPPED
Start: 2020-04-24 | End: 2020-08-19

## 2020-04-24 RX ORDER — OMEPRAZOLE 40 MG/1
CAPSULE, DELAYED RELEASE ORAL
Qty: 90 CAPSULE | Refills: 1 | Status: SHIPPED
Start: 2020-04-24 | End: 2020-08-19 | Stop reason: SDUPTHER

## 2020-05-21 RX ORDER — LACTOBACILLUS RHAMNOSUS GG 10B CELL
1 CAPSULE ORAL DAILY
COMMUNITY
Start: 2020-03-23 | End: 2020-05-21 | Stop reason: SDUPTHER

## 2020-05-21 RX ORDER — LACTOBACILLUS RHAMNOSUS GG 10B CELL
1 CAPSULE ORAL DAILY
Qty: 90 CAPSULE | Refills: 1 | Status: SHIPPED
Start: 2020-05-21 | End: 2020-08-19 | Stop reason: SDUPTHER

## 2020-05-22 RX ORDER — PEN NEEDLE, DIABETIC 31 GX5/16"
NEEDLE, DISPOSABLE MISCELLANEOUS
Qty: 100 EACH | Refills: 3 | Status: SHIPPED
Start: 2020-05-22 | End: 2020-08-19 | Stop reason: SDUPTHER

## 2020-05-26 ENCOUNTER — OFFICE VISIT (OUTPATIENT)
Dept: ORTHOPEDIC SURGERY | Age: 50
End: 2020-05-26
Payer: COMMERCIAL

## 2020-05-26 VITALS — BODY MASS INDEX: 30.84 KG/M2 | WEIGHT: 153 LBS | HEIGHT: 59 IN

## 2020-05-26 PROCEDURE — 3017F COLORECTAL CA SCREEN DOC REV: CPT | Performed by: ORTHOPAEDIC SURGERY

## 2020-05-26 PROCEDURE — G8417 CALC BMI ABV UP PARAM F/U: HCPCS | Performed by: ORTHOPAEDIC SURGERY

## 2020-05-26 PROCEDURE — G8427 DOCREV CUR MEDS BY ELIG CLIN: HCPCS | Performed by: ORTHOPAEDIC SURGERY

## 2020-05-26 PROCEDURE — 99213 OFFICE O/P EST LOW 20 MIN: CPT | Performed by: ORTHOPAEDIC SURGERY

## 2020-05-26 PROCEDURE — 1036F TOBACCO NON-USER: CPT | Performed by: ORTHOPAEDIC SURGERY

## 2020-05-26 NOTE — PROGRESS NOTES
Chief Complaint   Patient presents with    Shoulder Pain     Right shoulder follow up. Patient was not able to get MRI about this time last year. HPI:    Patient is 44 y.o. female complaining Right shoulder pain. Pt has neuropathy and has poor balance. Armin Conley at her bothers house. Pain with internal rotation. Pain with external rotation. Pt notes she thinks it's rotator cuff related. She had a similar injury on the left and feels similar. ROS:    Skin: (-) rash,(-) psoriasis,(-) eczema, (-)skin cancer. Neurologic: (-)numbness, (-)tingling, (-)headaches, (-) LOC. Cardiovascular: (-) Chest pain, (-) swelling in legs/feet, (-) SOB, (-) cramping in legs/feet with walking. All other review of systems negative except stated above or in HPI      Past Medical History:   Diagnosis Date    Arthritis     lower back    Back pain     Cardiac valve prolapse     micro    Diabetes mellitus ([address]     H/O exercise stress test 10/2015  ?     Dr Rodolfo Obrien  neg    Hyperlipidemia     Hypertension     Neuropathy due to secondary diabetes ([address]     in marcial feet    Prolonged emergence from general anesthesia     Psoriasis      Past Surgical History:   Procedure Laterality Date    ANKLE SURGERY      bilateral tarsal tunnels    CARDIAC SURGERY      cardiac catheterization    CARPAL TUNNEL RELEASE      left    CARPAL TUNNEL RELEASE  12    right     SECTION      CHOLECYSTECTOMY, LAPAROSCOPIC N/A 2019    LAPAROSCOPIC CHOLECYSTECTOMY WITH IOC performed by Bernardo Benavides MD at [address]  had extremely high blood pressure and hard to wake up    COLONOSCOPY      COLONOSCOPY N/A 2019    COLORECTAL CANCER SCREENING, NOT HIGH RISK performed by Bernardo Benavides MD at [address] [facility], [place], DIAGNOSTIC      HYSTERECTOMY      SHOULDER ARTHROSCOPY Left 2016    subacromin decompression and debridement    UPPER GASTROINTESTINAL ENDOSCOPY N/A 2019    EGD BIOPSY performed by Talks on phone: Not on file     Gets together: Not on file     Attends Congregational service: Not on file     Active member of club or organization: Not on file     Attends meetings of clubs or organizations: Not on file     Relationship status: Not on file    Intimate partner violence     Fear of current or ex partner: Not on file     Emotionally abused: Not on file     Physically abused: Not on file     Forced sexual activity: Not on file   Other Topics Concern    Not on file   Social History Narrative    Not on file     Family History   Problem Relation Age of Onset    Heart Disease Mother     Diabetes Mother     Heart Disease Father     Diabetes Father            Physical Exam:    Ht 4' 11\" (1.499 m)   Wt 153 lb (69.4 kg)   LMP  (LMP Unknown)   BMI 30.90 kg/m²     GENERAL: alert, appears stated age, cooperative, no acute distress    HEENT: Head is normocephalic, atraumatic. PERRLA. SKIN: Clean, dry, intact. There is not any cellulitis or cutaneous lesions noted in the upper extremities    PULMONARY: breathing is regular and unlabored, no acute distress    CV: The bilateral upper and lower extremities are warm and well-perfused with brisk capillary refill. 2+ pulses UE and LE bilateral.     PSYCHIATRY: Pleasant mood, appropriate behavior, follows commands    NEURO: Sensation is intact distally with light touch with no alteration. Motor exam of the upper extremities show elbow flexion and extension, wrist flexion and extension, and finger abduction grossly intact 5/5. Upper extremity reflexes are bilaterally symmetrical and within normal limits. LYMPH: No lymphedema present distally in upper or lower extremity. MUSCULOSKELETAL:  Shoulder Exam:  Examination of the left shoulder shows: There is  a deformity. There is not erythema. There is  soft tissue swelling. Deltoid region is  tender to palpation. AC Joint is  tender to palpation. Clavicle is not tender to palpation.   Bicipital Groove is not tender to palpation. Pectoralis  is not tender to palpation. Scapula/ trapezius is  tender to palpation. Right:  ROM Full, Strength: Supraspinatus 5/5, Infraspinatus 5/5, Subscapularis 5/5  Left:  /160/60, Strength: Supraspinatus 3/5, Infraspinatus 3/5, Subscapularis 3/5      Imaging:  Xr Shoulder Right (min 2 Views)    Result Date: 5/17/2019  Location: 200 Indication: Right shoulder pain Comparison: Right shoulder radiograph from 6/5/2015 Technique: 4 views of the right shoulder were obtained. Findings: There is no evidence of acute fracture or dislocation. The glenohumeral joint is maintained. The acromioclavicular joint is maintained. Visualized portion of the right lung is grossly clear. Unremarkable right shoulder radiographs. Norma Faustin was seen today for shoulder pain. Diagnoses and all orders for this visit:    Nontraumatic tear of right rotator cuff, unspecified tear extent  -     MRI SHOULDER RIGHT WO CONTRAST; Future        Patient seen and examined. X-rays reviewed. Patient sustained an injury to her right shoulder over several months ago with continued pain and weakness. Patient states it feels much like her left shoulder which had a rotator cuff tear. Concern is for rotator cuff pathology of the right shoulder. MRI recommended for evaluation and management. Follow-up after MRI.           Tess Mcwilliams DO

## 2020-06-04 ENCOUNTER — HOSPITAL ENCOUNTER (OUTPATIENT)
Dept: MRI IMAGING | Age: 50
Discharge: HOME | End: 2020-06-06
Payer: MEDICAID

## 2020-06-04 PROCEDURE — 73221 MRI JOINT UPR EXTREM W/O DYE: CPT

## 2020-06-10 ENCOUNTER — OFFICE VISIT (OUTPATIENT)
Dept: ORTHOPEDIC SURGERY | Age: 50
End: 2020-06-10
Payer: MEDICAID

## 2020-06-10 VITALS — BODY MASS INDEX: 30.84 KG/M2 | HEIGHT: 59 IN | WEIGHT: 153 LBS

## 2020-06-10 PROCEDURE — G8427 DOCREV CUR MEDS BY ELIG CLIN: HCPCS | Performed by: ORTHOPAEDIC SURGERY

## 2020-06-10 PROCEDURE — 20610 DRAIN/INJ JOINT/BURSA W/O US: CPT | Performed by: ORTHOPAEDIC SURGERY

## 2020-06-10 PROCEDURE — G8417 CALC BMI ABV UP PARAM F/U: HCPCS | Performed by: ORTHOPAEDIC SURGERY

## 2020-06-10 PROCEDURE — 99214 OFFICE O/P EST MOD 30 MIN: CPT | Performed by: ORTHOPAEDIC SURGERY

## 2020-06-10 PROCEDURE — 1036F TOBACCO NON-USER: CPT | Performed by: ORTHOPAEDIC SURGERY

## 2020-06-10 PROCEDURE — 3017F COLORECTAL CA SCREEN DOC REV: CPT | Performed by: ORTHOPAEDIC SURGERY

## 2020-06-10 RX ORDER — TRIAMCINOLONE ACETONIDE 40 MG/ML
40 INJECTION, SUSPENSION INTRA-ARTICULAR; INTRAMUSCULAR ONCE
Status: COMPLETED | OUTPATIENT
Start: 2020-06-10 | End: 2020-06-12

## 2020-06-10 NOTE — PROGRESS NOTES
Chief Complaint   Patient presents with    Shoulder Pain     Rilght shoulder MRI follow up. HPI:    Patient is 44 y.o. female complaining Right shoulder pain. Pt has neuropathy and has poor balance. Mary Grissom at her bothers house. Pain with internal rotation. Pain with external rotation. Pt notes she thinks it's rotator cuff related. She had a similar injury on the left and feels similar. Follows up after MRI. ROS:    Skin: (-) rash,(-) psoriasis,(-) eczema, (-)skin cancer. Neurologic: (-)numbness, (-)tingling, (-)headaches, (-) LOC. Cardiovascular: (-) Chest pain, (-) swelling in legs/feet, (-) SOB, (-) cramping in legs/feet with walking. All other review of systems negative except stated above or in HPI      Past Medical History:   Diagnosis Date    Arthritis     lower back    Back pain     Cardiac valve prolapse     micro    Diabetes mellitus ([address]     H/O exercise stress test 10/2015  ?     Dr Leonard Roche  neg    Hyperlipidemia     Hypertension     Neuropathy due to secondary diabetes ([address]     in marcial feet    Prolonged emergence from general anesthesia     Psoriasis      Past Surgical History:   Procedure Laterality Date    ANKLE SURGERY      bilateral tarsal tunnels    CARDIAC SURGERY      cardiac catheterization    CARPAL TUNNEL RELEASE      left    CARPAL TUNNEL RELEASE  12    right     SECTION      CHOLECYSTECTOMY, LAPAROSCOPIC N/A 2019    LAPAROSCOPIC CHOLECYSTECTOMY WITH IOC performed by Clay Morton MD at [address]  had extremely high blood pressure and hard to wake up    COLONOSCOPY      COLONOSCOPY N/A 2019    COLORECTAL CANCER SCREENING, NOT HIGH RISK performed by Clay Morton MD at [facility], DIAGNOSTIC      HYSTERECTOMY      SHOULDER ARTHROSCOPY Left 2016    subacromin decompression and debridement    UPPER GASTROINTESTINAL ENDOSCOPY N/A 2019    EGD BIOPSY performed by Clay Morton MD at [facility] ENDOSCOPY       Current Outpatient Medications:     Insulin Pen Needle (B-D ULTRAFINE III SHORT PEN) 31G X 8 MM MISC, USE WITH INSULIN PENS AS DIRECTED, Disp: 100 each, Rfl: 3    lactobacillus (CULTURELLE) CAPS capsule, Take 1 capsule by mouth daily, Disp: 90 capsule, Rfl: 1    hydrocortisone 2.5 % cream, APPLY TO AFFECTED AREA TOPICALLY TWICE DAILY, Disp: 84 g, Rfl: 10    Metoprolol Succinate 25 MG CS24, Take 0.5 tablets by mouth daily, Disp: 45 capsule, Rfl: 1    Insulin Syringe-Needle U-100 ([employer] INSULIN SYR 1CC/30G) 30G X 5/16\" 1 ML MISC, 1 each by Does not apply route 4 times daily, Disp: 120 each, Rfl: 5    DULoxetine (CYMBALTA) 30 MG extended release capsule, TAKE 1 CAPSULE BY MOUTH EVERY MORNING, Disp: 90 capsule, Rfl: 0    etodolac (LODINE) 300 MG capsule, TAKE 1 CAPSULE BY MOUTH EVERY DAY, Disp: 90 capsule, Rfl: 1    metFORMIN (GLUCOPHAGE) 500 MG tablet, Take 2 tablets by mouth 2 times daily (with meals), Disp: 360 tablet, Rfl: 1    omeprazole (PRILOSEC) 40 MG delayed release capsule, TK 1 C PO D, Disp: 90 capsule, Rfl: 1    sertraline (ZOLOFT) 100 MG tablet, Take 1 tablet by mouth daily as needed (depression), Disp: 90 tablet, Rfl: 1    bumetanide (BUMEX) 1 MG tablet, TAKE 1 TABLET BY MOUTH EVERY OTHER DAY, Disp: 90 tablet, Rfl: 1    blood glucose test strips (ONE TOUCH ULTRA TEST) strip, TEST 4 TIMES PER DAY AND AS NEEDED FOR SYMPTOMS OF IRREGULAR BLOOD GLUCOSE, Disp: 400 strip, Rfl: 0    TRUEplus Lancets 30G MISC, Inject 1 each into the skin 4 times daily for 90 doses, Disp: 400 each, Rfl: 0    pramipexole (MIRAPEX) 0.125 MG tablet, Take 1 tablet by mouth nightly, Disp: 90 tablet, Rfl: 0    Humidifier MISC, 1 Device by Does not apply route daily, Disp: 1 each, Rfl: 0    Dulaglutide (TRULICITY) 1.5 LH/7.6GI SOPN, Inject 1.5 mLs into the skin once a week, Disp: 4 pen, Rfl: 3    famotidine (PEPCID) 20 MG tablet, Take 1 tablet by mouth 2 times daily, Disp: 180 tablet, Rfl: 1    insulin Gets together: Not on file     Attends Buddhist service: Not on file     Active member of club or organization: Not on file     Attends meetings of clubs or organizations: Not on file     Relationship status: Not on file    Intimate partner violence     Fear of current or ex partner: Not on file     Emotionally abused: Not on file     Physically abused: Not on file     Forced sexual activity: Not on file   Other Topics Concern    Not on file   Social History Narrative    Not on file     Family History   Problem Relation Age of Onset    Heart Disease Mother     Diabetes Mother     Heart Disease Father     Diabetes Father            Physical Exam:    Ht 4' 11\" (1.499 m)   Wt 153 lb (69.4 kg)   LMP  (LMP Unknown)   BMI 30.90 kg/m²     GENERAL: alert, appears stated age, cooperative, no acute distress    HEENT: Head is normocephalic, atraumatic. PERRLA. SKIN: Clean, dry, intact. There is not any cellulitis or cutaneous lesions noted in the upper extremities    PULMONARY: breathing is regular and unlabored, no acute distress    CV: The bilateral upper and lower extremities are warm and well-perfused with brisk capillary refill. 2+ pulses UE and LE bilateral.     PSYCHIATRY: Pleasant mood, appropriate behavior, follows commands    NEURO: Sensation is intact distally with light touch with no alteration. Motor exam of the upper extremities show elbow flexion and extension, wrist flexion and extension, and finger abduction grossly intact 5/5. Upper extremity reflexes are bilaterally symmetrical and within normal limits. LYMPH: No lymphedema present distally in upper or lower extremity. MUSCULOSKELETAL:  Shoulder Exam:  Examination of the left shoulder shows: There is  a deformity. There is not erythema. There is  soft tissue swelling. Deltoid region is  tender to palpation. AC Joint is  tender to palpation. Clavicle is not tender to palpation. Bicipital Groove is not tender to palpation. Pectoralis  is not tender to palpation. Scapula/ trapezius is  tender to palpation. Right:  ROM Full, Strength: Supraspinatus 5/5, Infraspinatus 5/5, Subscapularis 5/5  Left:  /160/60, Strength: Supraspinatus 3/5, Infraspinatus 3/5, Subscapularis 3/5      Imaging:  Xr Shoulder Right (min 2 Views)    Result Date: 5/17/2019  Location: 200 Indication: Right shoulder pain Comparison: Right shoulder radiograph from 6/5/2015 Technique: 4 views of the right shoulder were obtained. Findings: There is no evidence of acute fracture or dislocation. The glenohumeral joint is maintained. The acromioclavicular joint is maintained. Visualized portion of the right lung is grossly clear. Unremarkable right shoulder radiographs. Mri Shoulder Right Wo Contrast    Result Date: 6/4/2020  EXAMINATION: MRI OF THE RIGHT SHOULDER WITHOUT CONTRAST   6/4/2020 8:57 am TECHNIQUE: Multiplanar multisequence MRI of the right shoulder was performed without the administration of intravenous contrast. COMPARISON: Radiographs May 17, 2019 HISTORY: ORDERING SYSTEM PROVIDED HISTORY: Nontraumatic tear of right rotator cuff, unspecified tear extent FINDINGS: Evaluation is limited by motion. ROTATOR CUFF: Mild partial-thickness articular surface tearing of the distal supraspinatus tendon is suspected. Mild increased signal and thickening within the supraspinatus tendon are consistent with tendinosis. Infraspinatus tendon is intact. Teres minor is intact. Subscapularis tendon is intact. BICEPS TENDON: Intact LABRUM: Questionable SLAP type labral tear. GLENOHUMERAL JOINT: No joint effusion. Mild increased signal surrounds the glenohumeral joint with mild increased signal within the rotator interval. AC JOINT AND ACROMIOCLAVICULAR ARCH: Mild acromioclavicular degenerative changes. BONE MARROW: No acute fracture. No suspicious bone marrow replacing lesion. OUTLET SPACES: No mass or lesion.      Mild supraspinatus tendinosis with mild

## 2020-06-12 RX ADMIN — TRIAMCINOLONE ACETONIDE 40 MG: 40 INJECTION, SUSPENSION INTRA-ARTICULAR; INTRAMUSCULAR at 10:49

## 2020-06-15 RX ORDER — PRAMIPEXOLE DIHYDROCHLORIDE 0.12 MG/1
TABLET ORAL
Qty: 90 TABLET | Refills: 0 | Status: SHIPPED
Start: 2020-06-15 | End: 2020-06-23

## 2020-06-15 RX ORDER — INSULIN GLARGINE 100 [IU]/ML
INJECTION, SOLUTION SUBCUTANEOUS
Qty: 15 ML | Refills: 0 | Status: SHIPPED
Start: 2020-06-15 | End: 2020-07-21

## 2020-06-15 RX ORDER — DULAGLUTIDE 1.5 MG/.5ML
INJECTION, SOLUTION SUBCUTANEOUS
Qty: 12 PEN | Refills: 0 | Status: SHIPPED
Start: 2020-06-15 | End: 2020-08-19 | Stop reason: SDUPTHER

## 2020-06-18 RX ORDER — DULOXETIN HYDROCHLORIDE 30 MG/1
CAPSULE, DELAYED RELEASE ORAL
Qty: 90 CAPSULE | Refills: 0 | Status: SHIPPED
Start: 2020-06-18 | End: 2020-08-19 | Stop reason: SDUPTHER

## 2020-06-23 ENCOUNTER — VIRTUAL VISIT (OUTPATIENT)
Dept: FAMILY MEDICINE CLINIC | Age: 50
End: 2020-06-23
Payer: MEDICAID

## 2020-06-23 PROCEDURE — 99214 OFFICE O/P EST MOD 30 MIN: CPT | Performed by: FAMILY MEDICINE

## 2020-06-23 RX ORDER — PRAMIPEXOLE DIHYDROCHLORIDE 0.12 MG/1
TABLET ORAL
Qty: 90 TABLET | Refills: 3
Start: 2020-06-23 | End: 2020-08-19 | Stop reason: SDUPTHER

## 2020-06-23 NOTE — PROGRESS NOTES
TELEPHONE VISIT    Consent:  He and/or health care decision maker is aware that that he may receive a bill for this telephone service, depending on his insurance coverage, and has provided verbal consent to proceed: Yes      Documentation:  I communicated with the patient and/or health care decision maker about C/O cramping in her legs at rest.   Details of this discussion including any medical advice provided: I have increased the dose of her mirapex . 125 to one in AM amd TWO in PM    ---VASCULAR PANEL  A) asa, plavix, aggrenox  B) coumadin, pletal, tzd, statin  C) ace, BUMEX, folic, ccb  D) cannikinumab, fish oils     ---CARDIAC---asa, ace, beta, statin, BUMEX, ( ccb )      I affirm this is a Patient Initiated Episode with a Patient who has not had a related appointment within my department in the past 7 days or scheduled within the next 24 hours.         Patient's location: {[email or web address]:[id number]::\"home address in [place]\",\"other address in [address] [place] [address]    Physician  location home address in [facility]   Other people involved in call, are none          Total Time: minutes: 21-30 minutes

## 2020-06-24 ENCOUNTER — OFFICE VISIT (OUTPATIENT)
Dept: ORTHOPEDIC SURGERY | Age: 50
End: 2020-06-24
Payer: MEDICAID

## 2020-06-24 ENCOUNTER — HOSPITAL ENCOUNTER (OUTPATIENT)
Age: 50
Discharge: HOME | End: 2020-06-26
Payer: MEDICAID

## 2020-06-24 VITALS — BODY MASS INDEX: 30.84 KG/M2 | WEIGHT: 153 LBS | HEIGHT: 59 IN

## 2020-06-24 LAB
ANION GAP SERPL CALCULATED.3IONS-SCNC: 16 MMOL/L (ref 7–16)
BASOPHILS ABSOLUTE: 0.02 E9/L (ref 0–0.2)
BASOPHILS RELATIVE PERCENT: 0.2 % (ref 0–2)
BUN BLDV-MCNC: 13 MG/DL (ref 6–20)
CALCIUM SERPL-MCNC: 9.5 MG/DL (ref 8.6–10.2)
CHLORIDE BLD-SCNC: 101 MMOL/L (ref 98–107)
CHOLESTEROL, TOTAL: 145 MG/DL (ref 0–199)
CO2: 23 MMOL/L (ref 22–29)
CREAT SERPL-MCNC: 0.8 MG/DL (ref 0.5–1)
EOSINOPHILS ABSOLUTE: 0.12 E9/L (ref 0.05–0.5)
EOSINOPHILS RELATIVE PERCENT: 1.3 % (ref 0–6)
GFR AFRICAN AMERICAN: >60
GFR NON-AFRICAN AMERICAN: >60 ML/MIN/1.73
GLUCOSE BLD-MCNC: 257 MG/DL (ref 74–99)
HBA1C MFR BLD: 9.4 % (ref 4–5.6)
HCT VFR BLD CALC: 40.9 % (ref 34–48)
HDLC SERPL-MCNC: 57 MG/DL
HEMOGLOBIN: 12.8 G/DL (ref 11.5–15.5)
IMMATURE GRANULOCYTES #: 0.02 E9/L
IMMATURE GRANULOCYTES %: 0.2 % (ref 0–5)
LDL CHOLESTEROL CALCULATED: 70 MG/DL (ref 0–99)
LYMPHOCYTES ABSOLUTE: 3.07 E9/L (ref 1.5–4)
LYMPHOCYTES RELATIVE PERCENT: 33.6 % (ref 20–42)
MCH RBC QN AUTO: 30.7 PG (ref 26–35)
MCHC RBC AUTO-ENTMCNC: 31.3 % (ref 32–34.5)
MCV RBC AUTO: 98.1 FL (ref 80–99.9)
MICROALBUMIN UR-MCNC: 515 MG/L
MONOCYTES ABSOLUTE: 0.62 E9/L (ref 0.1–0.95)
MONOCYTES RELATIVE PERCENT: 6.8 % (ref 2–12)
NEUTROPHILS ABSOLUTE: 5.28 E9/L (ref 1.8–7.3)
NEUTROPHILS RELATIVE PERCENT: 57.9 % (ref 43–80)
PDW BLD-RTO: 12.7 FL (ref 11.5–15)
PLATELET # BLD: 248 E9/L (ref 130–450)
PMV BLD AUTO: 11.2 FL (ref 7–12)
POTASSIUM SERPL-SCNC: 5.1 MMOL/L (ref 3.5–5)
RBC # BLD: 4.17 E12/L (ref 3.5–5.5)
SODIUM BLD-SCNC: 140 MMOL/L (ref 132–146)
TRIGL SERPL-MCNC: 92 MG/DL (ref 0–149)
TSH SERPL DL<=0.05 MIU/L-ACNC: 2.21 UIU/ML (ref 0.27–4.2)
VLDLC SERPL CALC-MCNC: 18 MG/DL
WBC # BLD: 9.1 E9/L (ref 4.5–11.5)

## 2020-06-24 PROCEDURE — 20610 DRAIN/INJ JOINT/BURSA W/O US: CPT | Performed by: ORTHOPAEDIC SURGERY

## 2020-06-24 PROCEDURE — 82044 UR ALBUMIN SEMIQUANTITATIVE: CPT

## 2020-06-24 PROCEDURE — 1036F TOBACCO NON-USER: CPT | Performed by: ORTHOPAEDIC SURGERY

## 2020-06-24 PROCEDURE — 99214 OFFICE O/P EST MOD 30 MIN: CPT | Performed by: ORTHOPAEDIC SURGERY

## 2020-06-24 PROCEDURE — 3017F COLORECTAL CA SCREEN DOC REV: CPT | Performed by: ORTHOPAEDIC SURGERY

## 2020-06-24 PROCEDURE — 83036 HEMOGLOBIN GLYCOSYLATED A1C: CPT

## 2020-06-24 PROCEDURE — 85025 COMPLETE CBC W/AUTO DIFF WBC: CPT

## 2020-06-24 PROCEDURE — 80048 BASIC METABOLIC PNL TOTAL CA: CPT

## 2020-06-24 PROCEDURE — 36415 COLL VENOUS BLD VENIPUNCTURE: CPT

## 2020-06-24 PROCEDURE — G8417 CALC BMI ABV UP PARAM F/U: HCPCS | Performed by: ORTHOPAEDIC SURGERY

## 2020-06-24 PROCEDURE — G8427 DOCREV CUR MEDS BY ELIG CLIN: HCPCS | Performed by: ORTHOPAEDIC SURGERY

## 2020-06-24 PROCEDURE — 80074 ACUTE HEPATITIS PANEL: CPT

## 2020-06-24 PROCEDURE — 80061 LIPID PANEL: CPT

## 2020-06-24 PROCEDURE — 84443 ASSAY THYROID STIM HORMONE: CPT

## 2020-06-24 RX ORDER — TRIAMCINOLONE ACETONIDE 40 MG/ML
40 INJECTION, SUSPENSION INTRA-ARTICULAR; INTRAMUSCULAR ONCE
Status: COMPLETED | OUTPATIENT
Start: 2020-06-24 | End: 2020-06-24

## 2020-06-24 RX ADMIN — TRIAMCINOLONE ACETONIDE 40 MG: 40 INJECTION, SUSPENSION INTRA-ARTICULAR; INTRAMUSCULAR at 09:34

## 2020-06-24 NOTE — PROGRESS NOTES
DO Eufemia Olson   Caller: Unspecified (Yesterday,  3:28 PM)             Mirtha Wyman   --set up sono for DVT both lower legs

## 2020-06-24 NOTE — PROGRESS NOTES
strip, Rfl: 0    TRUEplus Lancets 30G MISC, Inject 1 each into the skin 4 times daily for 90 doses, Disp: 400 each, Rfl: 0    Humidifier MISC, 1 Device by Does not apply route daily, Disp: 1 each, Rfl: 0    famotidine (PEPCID) 20 MG tablet, Take 1 tablet by mouth 2 times daily, Disp: 180 tablet, Rfl: 1    insulin lispro (ADMELOG SOLOSTAR) 100 UNIT/ML pen, As directed per sliding scale up to 13 units 4 times per day., Disp: 5 pen, Rfl: 3    nitroGLYCERIN (NITROSTAT) 0.4 MG SL tablet, Place 1 tablet under the tongue every 5 minutes as needed for Chest pain, Disp: 25 tablet, Rfl: 1    Wheat Dextrin (BENEFIBER) POWD, Take 4 g by mouth 3 times daily (with meals), Disp: 475 g, Rfl: 2    lidocaine-prilocaine (EMLA) 2.5-2.5 % cream, Apply topically as needed. , Disp: 30 g, Rfl: 0    glucose monitoring kit (FREESTYLE) monitoring kit, 1 kit by Does not apply route daily, Disp: 1 kit, Rfl: 0    Blood Pressure KIT, 1 kit by Does not apply route daily, Disp: 1 kit, Rfl: 0    Misc. Devices (QUAD CANE) MISC, 1 Quad cane, Disp: 1 each, Rfl: 0    Multiple Vitamins-Minerals (THERAPEUTIC MULTIVITAMIN-MINERALS) tablet, Take 1 tablet by mouth daily, Disp: , Rfl:     B Complex Vitamins (B COMPLEX 1 PO), Take 1 tablet by mouth daily. , Disp: , Rfl:   No Known Allergies  Social History     Socioeconomic History    Marital status:      Spouse name: Not on file    Number of children: Not on file    Years of education: Not on file    Highest education level: Not on file   Occupational History    Not on file   Social Needs    Financial resource strain: Not on file    Food insecurity     Worry: Not on file     Inability: Not on file    Transportation needs     Medical: Not on file     Non-medical: Not on file   Tobacco Use    Smoking status: Never Smoker    Smokeless tobacco: Never Used   Substance and Sexual Activity    Alcohol use: Yes     Comment: occas.     Drug use: No    Sexual activity: Not on file Lifestyle    Physical activity     Days per week: Not on file     Minutes per session: Not on file    Stress: Not on file   Relationships    Social connections     Talks on phone: Not on file     Gets together: Not on file     Attends Pentecostalism service: Not on file     Active member of club or organization: Not on file     Attends meetings of clubs or organizations: Not on file     Relationship status: Not on file    Intimate partner violence     Fear of current or ex partner: Not on file     Emotionally abused: Not on file     Physically abused: Not on file     Forced sexual activity: Not on file   Other Topics Concern    Not on file   Social History Narrative    Not on file     Family History   Problem Relation Age of Onset    Heart Disease Mother     Diabetes Mother     Heart Disease Father     Diabetes Father            Physical Exam:    Ht 4' 11\" (1.499 m)   Wt 153 lb (69.4 kg)   LMP  (LMP Unknown)   BMI 30.90 kg/m²     GENERAL: alert, appears stated age, cooperative, no acute distress    HEENT: Head is normocephalic, atraumatic. PERRLA. SKIN: Clean, dry, intact. There is not any cellulitis or cutaneous lesions noted in the upper extremities    PULMONARY: breathing is regular and unlabored, no acute distress    CV: The bilateral upper and lower extremities are warm and well-perfused with brisk capillary refill. 2+ pulses UE and LE bilateral.     PSYCHIATRY: Pleasant mood, appropriate behavior, follows commands    NEURO: Sensation is intact distally with light touch with no alteration. Motor exam of the upper extremities show elbow flexion and extension, wrist flexion and extension, and finger abduction grossly intact 5/5. Upper extremity reflexes are bilaterally symmetrical and within normal limits. LYMPH: No lymphedema present distally in upper or lower extremity. MUSCULOSKELETAL:  Shoulder Exam:  Examination of the left shoulder shows: There is  a deformity. There is not erythema.

## 2020-06-25 ENCOUNTER — HOSPITAL ENCOUNTER (EMERGENCY)
Age: 50
Discharge: HOME | End: 2020-06-26
Attending: EMERGENCY MEDICINE
Payer: MEDICAID

## 2020-06-25 VITALS
HEIGHT: 59 IN | TEMPERATURE: 98.7 F | DIASTOLIC BLOOD PRESSURE: 94 MMHG | BODY MASS INDEX: 31.45 KG/M2 | HEART RATE: 98 BPM | RESPIRATION RATE: 18 BRPM | OXYGEN SATURATION: 99 % | WEIGHT: 156 LBS | SYSTOLIC BLOOD PRESSURE: 173 MMHG

## 2020-06-25 LAB
ANION GAP SERPL CALCULATED.3IONS-SCNC: 12 MMOL/L (ref 7–16)
BASOPHILS ABSOLUTE: 0.01 E9/L (ref 0–0.2)
BASOPHILS RELATIVE PERCENT: 0.1 % (ref 0–2)
BETA-HYDROXYBUTYRATE: 0.57 MMOL/L (ref 0.02–0.27)
BUN BLDV-MCNC: 16 MG/DL (ref 6–20)
CALCIUM SERPL-MCNC: 10.1 MG/DL (ref 8.6–10.2)
CHLORIDE BLD-SCNC: 98 MMOL/L (ref 98–107)
CO2: 25 MMOL/L (ref 22–29)
CREAT SERPL-MCNC: 0.7 MG/DL (ref 0.5–1)
EOSINOPHILS ABSOLUTE: 0.02 E9/L (ref 0.05–0.5)
EOSINOPHILS RELATIVE PERCENT: 0.2 % (ref 0–6)
GFR AFRICAN AMERICAN: >60
GFR NON-AFRICAN AMERICAN: >60 ML/MIN/1.73
GLUCOSE BLD-MCNC: 355 MG/DL
GLUCOSE BLD-MCNC: 385 MG/DL (ref 74–99)
HAV IGM SER IA-ACNC: NORMAL
HCT VFR BLD CALC: 42.6 % (ref 34–48)
HEMOGLOBIN: 13.6 G/DL (ref 11.5–15.5)
HEPATITIS B CORE IGM ANTIBODY: NORMAL
HEPATITIS B SURFACE ANTIGEN INTERPRETATION: NORMAL
HEPATITIS C ANTIBODY INTERPRETATION: NORMAL
IMMATURE GRANULOCYTES #: 0.04 E9/L
IMMATURE GRANULOCYTES %: 0.4 % (ref 0–5)
LYMPHOCYTES ABSOLUTE: 2.01 E9/L (ref 1.5–4)
LYMPHOCYTES RELATIVE PERCENT: 19.1 % (ref 20–42)
MCH RBC QN AUTO: 30.5 PG (ref 26–35)
MCHC RBC AUTO-ENTMCNC: 31.9 % (ref 32–34.5)
MCV RBC AUTO: 95.5 FL (ref 80–99.9)
METER GLUCOSE: 355 MG/DL (ref 74–99)
MONOCYTES ABSOLUTE: 0.59 E9/L (ref 0.1–0.95)
MONOCYTES RELATIVE PERCENT: 5.6 % (ref 2–12)
NEUTROPHILS ABSOLUTE: 7.87 E9/L (ref 1.8–7.3)
NEUTROPHILS RELATIVE PERCENT: 74.6 % (ref 43–80)
PDW BLD-RTO: 12.2 FL (ref 11.5–15)
PH VENOUS: 7.37 (ref 7.35–7.45)
PLATELET # BLD: 256 E9/L (ref 130–450)
PMV BLD AUTO: 10.9 FL (ref 7–12)
POTASSIUM SERPL-SCNC: 5.4 MMOL/L (ref 3.5–5)
RBC # BLD: 4.46 E12/L (ref 3.5–5.5)
SODIUM BLD-SCNC: 135 MMOL/L (ref 132–146)
WBC # BLD: 10.5 E9/L (ref 4.5–11.5)

## 2020-06-25 PROCEDURE — 80048 BASIC METABOLIC PNL TOTAL CA: CPT

## 2020-06-25 PROCEDURE — 82962 GLUCOSE BLOOD TEST: CPT

## 2020-06-25 PROCEDURE — 96374 THER/PROPH/DIAG INJ IV PUSH: CPT

## 2020-06-25 PROCEDURE — 6370000000 HC RX 637 (ALT 250 FOR IP): Performed by: EMERGENCY MEDICINE

## 2020-06-25 PROCEDURE — 99285 EMERGENCY DEPT VISIT HI MDM: CPT

## 2020-06-25 PROCEDURE — 82800 BLOOD PH: CPT

## 2020-06-25 PROCEDURE — 85025 COMPLETE CBC W/AUTO DIFF WBC: CPT

## 2020-06-25 PROCEDURE — 82010 KETONE BODYS QUAN: CPT

## 2020-06-25 PROCEDURE — 2580000003 HC RX 258: Performed by: EMERGENCY MEDICINE

## 2020-06-25 RX ORDER — 0.9 % SODIUM CHLORIDE 0.9 %
1000 INTRAVENOUS SOLUTION INTRAVENOUS ONCE
Status: COMPLETED | OUTPATIENT
Start: 2020-06-25 | End: 2020-06-25

## 2020-06-25 RX ADMIN — INSULIN HUMAN 10 UNITS: 100 INJECTION, SOLUTION PARENTERAL at 22:30

## 2020-06-25 RX ADMIN — SODIUM CHLORIDE 1000 ML: 9 INJECTION, SOLUTION INTRAVENOUS at 20:33

## 2020-06-25 ASSESSMENT — ENCOUNTER SYMPTOMS
ABDOMINAL PAIN: 0
SHORTNESS OF BREATH: 0
CHEST TIGHTNESS: 0

## 2020-06-25 NOTE — ED PROVIDER NOTES
78-year-old female presenting with her hyperglycemia. She is diabetic but does not have a history of DKA. Says she is taking her insulin and not eating food that she should not be eating. Limited nausea but no vomiting, no other concerns at this time. She checked her sugar at home it was high and she wanted to be evaluated. Awake, alert, oriented x4. No abdominal pain, no lightheadedness, dizziness, chest pain, no fevers, no chills, no other illnesses. Timing is unknown, quality is high sugars, severity is moderate, duration is at least 1 day, little nausea for associated symptom. Family History   Problem Relation Age of Onset    Heart Disease Mother     Diabetes Mother     Heart Disease Father     Diabetes Father      Past Surgical History:   Procedure Laterality Date    ANKLE SURGERY      bilateral tarsal tunnels    CARDIAC SURGERY      cardiac catheterization    CARPAL TUNNEL RELEASE      left    CARPAL TUNNEL RELEASE  12    right     SECTION      CHOLECYSTECTOMY, LAPAROSCOPIC N/A 2019    LAPAROSCOPIC CHOLECYSTECTOMY WITH IOC performed by Nimo Alvarez MD at [address] [facility] [address]  had extremely high blood pressure and hard to wake up    COLONOSCOPY      COLONOSCOPY N/A 2019    COLORECTAL CANCER SCREENING, NOT HIGH RISK performed by Nimo Alvarez MD at [address] [facility], COLON, DIAGNOSTIC      HYSTERECTOMY      SHOULDER ARTHROSCOPY Left 2016    subacromin decompression and debridement    UPPER GASTROINTESTINAL ENDOSCOPY N/A 2019    EGD BIOPSY performed by Nimo Alvarez MD at [address] [facility]   Respiratory: Negative for chest tightness and shortness of breath. Cardiovascular: Negative for chest pain. Gastrointestinal: Negative for abdominal pain. Endocrine:        Hyperglycemia   All other systems reviewed and are negative. Physical Exam  Constitutional:       General: She is not in acute distress. Appearance: She is well-developed. HENT:      Head: Normocephalic and atraumatic. Eyes:      Conjunctiva/sclera: Conjunctivae normal.      Pupils: Pupils are equal, round, and reactive to light. Neck:      Musculoskeletal: Normal range of motion. Thyroid: No thyromegaly. Cardiovascular:      Rate and Rhythm: Normal rate and regular rhythm. Pulmonary:      Effort: Pulmonary effort is normal. No respiratory distress. Breath sounds: Normal breath sounds. Abdominal:      General: There is no distension. Palpations: Abdomen is soft. Tenderness: There is no abdominal tenderness. There is no guarding or rebound. Musculoskeletal: Normal range of motion. General: No tenderness. Skin:     General: Skin is warm and dry. Findings: No erythema. Neurological:      Mental Status: She is alert and oriented to person, place, and time. Cranial Nerves: No cranial nerve deficit. Coordination: Coordination normal.          Procedures     MDM              --------------------------------------------- PAST HISTORY ---------------------------------------------  Past Medical History:  has a past medical history of Arthritis, Back pain, Cardiac valve prolapse, Diabetes mellitus ([address] H/O exercise stress test, Hyperlipidemia, Hypertension, Neuropathy due to secondary diabetes ([address] Prolonged emergence from general anesthesia, and Psoriasis. Past Surgical History:  has a past surgical history that includes Carpal tunnel release; Hysterectomy;  section; Ankle surgery; Carpal tunnel release (12); Shoulder arthroscopy (Left, 2016); Cardiac surgery; Colonoscopy; Endoscopy, colon, diagnostic; Cholecystectomy, laparoscopic (N/A, 2019); Upper gastrointestinal endoscopy (N/A, 2019); and Colonoscopy (N/A, 2019). Social History:  reports that she has never smoked. She has never used smokeless tobacco. She reports current alcohol use.  She reports that Meter Glucose 355 (H) 74 - 99 mg/dL       Radiology:  No orders to display       ------------------------- NURSING NOTES AND VITALS REVIEWED ---------------------------  Date / Time Roomed:  6/25/2020  7:31 PM  ED Bed Assignment:  [id number]    The nursing notes within the ED encounter and vital signs as below have been reviewed. BP (!) 173/94   Pulse 98   Temp 98.7 °F (37.1 °C) (Oral)   Resp 18   Ht 4' 11\" (1.499 m)   Wt 156 lb (70.8 kg)   LMP  (LMP Unknown)   SpO2 99%   BMI 31.51 kg/m²   Oxygen Saturation Interpretation: Normal      ------------------------------------------ PROGRESS NOTES ------------------------------------------  I have spoken with the patient and discussed todays results, in addition to providing specific details for the plan of care and counseling regarding the diagnosis and prognosis. Their questions are answered at this time and they are agreeable with the plan. I discussed at length with them reasons for immediate return here for re evaluation. They will followup with primary care by calling their office tomorrow. --------------------------------- ADDITIONAL PROVIDER NOTES ---------------------------------  At this time the patient is without objective evidence of an acute process requiring hospitalization or inpatient management. They have remained hemodynamically stable throughout their entire ED visit and are stable for discharge with outpatient follow-up. The plan has been discussed in detail and they are aware of the specific conditions for emergent return, as well as the importance of follow-up. Discharge Medication List as of 6/26/2020 12:53 AM          Diagnosis:  1. Hyperglycemia        Disposition:  Patient's disposition: Discharge to home  Patient's condition is stable.              [facility], DO  06/27/20 [id number]

## 2020-06-26 ENCOUNTER — HOSPITAL ENCOUNTER (OUTPATIENT)
Dept: ULTRASOUND IMAGING | Age: 50
Discharge: HOME | End: 2020-06-26
Payer: MEDICAID

## 2020-06-26 PROCEDURE — 93970 EXTREMITY STUDY: CPT

## 2020-07-20 RX ORDER — METOPROLOL SUCCINATE 25 MG/1
TABLET, EXTENDED RELEASE ORAL
Qty: 45 TABLET | Refills: 0 | Status: SHIPPED
Start: 2020-07-20 | End: 2020-08-19 | Stop reason: SDUPTHER

## 2020-07-21 RX ORDER — INSULIN GLARGINE 100 [IU]/ML
INJECTION, SOLUTION SUBCUTANEOUS
Qty: 15 ML | Refills: 3 | Status: SHIPPED
Start: 2020-07-21 | End: 2020-08-19 | Stop reason: SDUPTHER

## 2020-07-21 RX ORDER — LANCETS 30 GAUGE
EACH MISCELLANEOUS
Qty: 300 EACH | Refills: 2 | Status: SHIPPED
Start: 2020-07-21 | End: 2020-09-25 | Stop reason: CLARIF

## 2020-08-19 ENCOUNTER — VIRTUAL VISIT (OUTPATIENT)
Dept: FAMILY MEDICINE CLINIC | Age: 50
End: 2020-08-19
Payer: MEDICAID

## 2020-08-19 PROCEDURE — 99214 OFFICE O/P EST MOD 30 MIN: CPT | Performed by: FAMILY MEDICINE

## 2020-08-19 RX ORDER — SERTRALINE HYDROCHLORIDE 100 MG/1
100 TABLET, FILM COATED ORAL DAILY PRN
Qty: 90 TABLET | Refills: 1 | Status: CANCELLED | OUTPATIENT
Start: 2020-08-19

## 2020-08-19 RX ORDER — FAMOTIDINE 20 MG/1
20 TABLET, FILM COATED ORAL 2 TIMES DAILY
Qty: 180 TABLET | Refills: 1 | Status: SHIPPED
Start: 2020-08-19 | End: 2022-06-09

## 2020-08-19 RX ORDER — DULAGLUTIDE 1.5 MG/.5ML
INJECTION, SOLUTION SUBCUTANEOUS
Qty: 12 PEN | Refills: 1 | Status: SHIPPED
Start: 2020-08-19 | End: 2020-09-16

## 2020-08-19 RX ORDER — PEN NEEDLE, DIABETIC 31 GX5/16"
NEEDLE, DISPOSABLE MISCELLANEOUS
Qty: 100 EACH | Refills: 3 | Status: SHIPPED
Start: 2020-08-19 | End: 2020-11-17

## 2020-08-19 RX ORDER — DULOXETIN HYDROCHLORIDE 30 MG/1
CAPSULE, DELAYED RELEASE ORAL
Qty: 90 CAPSULE | Refills: 1 | Status: SHIPPED
Start: 2020-08-19 | End: 2020-09-16

## 2020-08-19 RX ORDER — LACTOBACILLUS RHAMNOSUS GG 10B CELL
1 CAPSULE ORAL DAILY
Qty: 90 CAPSULE | Refills: 1 | Status: SHIPPED
Start: 2020-08-19 | End: 2020-10-21

## 2020-08-19 RX ORDER — BUMETANIDE 1 MG/1
1 TABLET ORAL EVERY OTHER DAY
Qty: 90 TABLET | Refills: 1 | Status: SHIPPED | OUTPATIENT
Start: 2020-08-19

## 2020-08-19 RX ORDER — PRAMIPEXOLE DIHYDROCHLORIDE 0.12 MG/1
TABLET ORAL
Qty: 270 TABLET | Refills: 1 | Status: SHIPPED
Start: 2020-08-19 | End: 2020-09-16

## 2020-08-19 RX ORDER — INSULIN GLARGINE 100 [IU]/ML
INJECTION, SOLUTION SUBCUTANEOUS
Qty: 15 ML | Refills: 3 | Status: SHIPPED
Start: 2020-08-19 | End: 2021-01-15

## 2020-08-19 RX ORDER — OMEPRAZOLE 40 MG/1
CAPSULE, DELAYED RELEASE ORAL
Qty: 90 CAPSULE | Refills: 1 | Status: SHIPPED
Start: 2020-08-19 | End: 2020-11-17

## 2020-08-19 RX ORDER — AMOXICILLIN 250 MG/1
250 CAPSULE ORAL 3 TIMES DAILY
Qty: 30 CAPSULE | Refills: 0 | Status: SHIPPED | OUTPATIENT
Start: 2020-08-19 | End: 2020-08-29

## 2020-08-19 RX ORDER — METOPROLOL SUCCINATE 25 MG/1
TABLET, EXTENDED RELEASE ORAL
Qty: 45 TABLET | Refills: 1 | Status: SHIPPED
Start: 2020-08-19 | End: 2020-09-16

## 2020-08-21 ENCOUNTER — TELEPHONE (OUTPATIENT)
Dept: FAMILY MEDICINE CLINIC | Age: 50
End: 2020-08-21

## 2020-08-21 PROBLEM — J01.90 ACUTE BACTERIAL SINUSITIS: Status: ACTIVE | Noted: 2020-08-21

## 2020-08-21 PROBLEM — I10 ESSENTIAL HYPERTENSION: Status: ACTIVE | Noted: 2020-08-21

## 2020-08-21 PROBLEM — B96.89 ACUTE BACTERIAL SINUSITIS: Status: ACTIVE | Noted: 2020-08-21

## 2020-08-21 NOTE — PROGRESS NOTES
TELEPHONE VISIT    Consent:  He and/or health care decision maker is aware that that he may receive a bill for this telephone service, depending on his insurance coverage, and has provided verbal consent to proceed: Yes    Pt dies not have access to video computer    Documentation:  I communicated with the patient and/or health care decision maker about this 44year old female presents with nasal congestion, cough, and sinus pain. Pt denies fever, denies chest pain, and denies shortness of breath. Pt has IDDM, neuropathy, GERD, anxiety, anxiety, RLS, hypertension, IBS, and sinusitis. Details of this discussion including any medical advice provided: Please take tylenol every 6 hours as needed for temperature or aches   Hydrate with 40 to 50 oz fluids   I have sent in medication for you  Please stay in touch and let me know how you are doing  If you get worse, please call asap  You should self isolate for 14 days   Pt instructed to get covid test.     Pt instructed if any worse go ED ASAP. I affirm this is a Patient Initiated Episode with a Patient who has not had a related appointment within my department in the past 7 days or scheduled within the next 24 hours. [facility]\"home address in [place]\",\"other address in [facility]   Physician  location other address in [facility]   Other people involved in call Dr. Andres Brownlee          Total Time: minutes: 21-30 minutes                        ASSESSMENT/PLAN  Jenny was seen today for pharyngitis, cough and sinus problem. Diagnoses and all orders for this visit:    Acute bacterial sinusitis  -     amoxicillin (AMOXIL) 250 MG capsule; Take 1 capsule by mouth 3 times daily for 10 days  Not controlled.    IDDM (insulin dependent diabetes mellitus) ([facility])  -     insulin glargine (BASAGLAR KWIKPEN) 100 UNIT/ML injection pen; INJECT 50 UNITS SUBCUTANEOUSLY NIGHTLY  -     Dulaglutide (TRULICITY) 1.5 ZU/4.7RW SOPN; INJECT CONTENTS OF 1 SYRINGE (1.5MG) EVERY WEEK  -     Insulin Syringe-Needle U-100 ([employer] INSULIN SYR 1CC/30G) 30G X 5/16\" 1 ML MISC; 1 each by Does not apply route 4 times daily  -     metFORMIN (GLUCOPHAGE) 500 MG tablet; Take 2 tablets by mouth 2 times daily (with meals)  -     Insulin Pen Needle (B-D ULTRAFINE III SHORT PEN) 31G X 8 MM MISC; USE WITH INSULIN PENS AS DIRECTED  Stable. Neuropathy  -     DULoxetine (CYMBALTA) 30 MG extended release capsule; TAKE 1 CAPSULE BY MOUTH EVERY MORNING  Stable. Gastroesophageal reflux disease without esophagitis  -     omeprazole (PRILOSEC) 40 MG delayed release capsule; TK 1 C PO D  -     famotidine (PEPCID) 20 MG tablet; Take 1 tablet by mouth 2 times daily  -     lactobacillus (CULTURELLE) CAPS capsule; Take 1 capsule by mouth daily  Stable. Anxiety  Stable. Pt denies SI and HI.   RLS (restless legs syndrome)  -     pramipexole (MIRAPEX) 0.125 MG tablet; One in AM and take TWO at night  Stable. Essential hypertension  -     metoprolol succinate (TOPROL XL) 25 MG extended release tablet; TAKE 1/2 TABLET BY MOUTH EVERY DAY  -     bumetanide (BUMEX) 1 MG tablet; Take 1 tablet by mouth every other day  Stable. Irritable bowel syndrome, unspecified type  -     lactobacillus (CULTURELLE) CAPS capsule; Take 1 capsule by mouth daily  Stable. Pt instructed if any worse go ED ASAP.

## 2020-08-27 NOTE — TELEPHONE ENCOUNTER
Marti Wharton     Key: [id number]    PA Case ID: [id number]    Rx #: [id number]    Status  Sent to Plan - today    Drug  Basaglar KwikPen 100UNIT/ML pen-injectors    Form  [employer] Electronic Prior [address] [place] [address] Prior Authorization Required    Electronically signed by Roberto Carlos Martinez MA on 8/27/20 at 10:41 AM EDT

## 2020-08-27 NOTE — TELEPHONE ENCOUNTER
[employer] has reviewed the request for M-KOPA Inj 100unit submitted  by Andres Brownlee on behalf of Jessy Reyes on 08/27/2020. After review, the request for  service is: Approved through 08/27/2021.     Electronically signed by Gisel Jules MA on 8/27/20 at 3:11 PM EDT

## 2020-09-09 ENCOUNTER — TELEPHONE (OUTPATIENT)
Dept: ADMINISTRATIVE | Age: 50
End: 2020-09-09

## 2020-09-09 ENCOUNTER — TELEPHONE (OUTPATIENT)
Dept: FAMILY MEDICINE CLINIC | Age: 50
End: 2020-09-09

## 2020-09-09 NOTE — TELEPHONE ENCOUNTER
Patient would like to schedule her A1-C for September 17th at or about 8:30 -- so her brother who has a lab at that time can bring her. Per my protocol I am sending a phone encounter over to the office to schedule. Patient would like a call back. Thank you!

## 2020-09-09 NOTE — TELEPHONE ENCOUNTER
Patient called the clinical care line stating she had some questions about her prescriptions and \"something is messed up\". This MA attempted to reach patient regarding her questions, voicemail message left to return call to the office.   Electronically signed by Arcenio Huffman on 9/9/2020 at 3:08 PM

## 2020-09-11 ENCOUNTER — TELEPHONE (OUTPATIENT)
Dept: FAMILY MEDICINE CLINIC | Age: 50
End: 2020-09-11

## 2020-09-11 NOTE — TELEPHONE ENCOUNTER
Pt left message for [address] [place] [address] requesting return call regarding an issue with her pharmacy. This MA attempted to return call to pt. Pt's phone rang to voicemail. This MA left message for pt requesting return call to office.     Electronically signed by Shala Saul MA on 9/11/20 at 1:35 PM EDT

## 2020-09-16 RX ORDER — PRAMIPEXOLE DIHYDROCHLORIDE 0.12 MG/1
TABLET ORAL
Qty: 90 TABLET | Refills: 10 | Status: SHIPPED
Start: 2020-09-16 | End: 2020-09-16

## 2020-09-16 RX ORDER — DULOXETIN HYDROCHLORIDE 30 MG/1
CAPSULE, DELAYED RELEASE ORAL
Qty: 90 CAPSULE | Refills: 1 | Status: SHIPPED
Start: 2020-09-16 | End: 2020-09-16

## 2020-09-16 RX ORDER — DULOXETIN HYDROCHLORIDE 30 MG/1
CAPSULE, DELAYED RELEASE ORAL
Qty: 90 CAPSULE | Refills: 10 | Status: SHIPPED
Start: 2020-09-16 | End: 2021-02-03

## 2020-09-16 RX ORDER — DULAGLUTIDE 1.5 MG/.5ML
INJECTION, SOLUTION SUBCUTANEOUS
Qty: 6 ML | Refills: 10 | Status: SHIPPED
Start: 2020-09-16 | End: 2022-04-22 | Stop reason: SDUPTHER

## 2020-09-16 RX ORDER — PRAMIPEXOLE DIHYDROCHLORIDE 0.12 MG/1
TABLET ORAL
Qty: 90 TABLET | Refills: 1 | Status: SHIPPED
Start: 2020-09-16 | End: 2021-09-16

## 2020-09-16 RX ORDER — METOPROLOL SUCCINATE 25 MG/1
TABLET, EXTENDED RELEASE ORAL
Qty: 45 TABLET | Refills: 10 | Status: SHIPPED
Start: 2020-09-16 | End: 2021-09-16

## 2020-09-25 ENCOUNTER — OFFICE VISIT (OUTPATIENT)
Dept: FAMILY MEDICINE CLINIC | Age: 50
End: 2020-09-25
Payer: MEDICAID

## 2020-09-25 ENCOUNTER — HOSPITAL ENCOUNTER (OUTPATIENT)
Age: 50
Discharge: HOME | End: 2020-09-27
Payer: MEDICAID

## 2020-09-25 VITALS
HEIGHT: 59 IN | RESPIRATION RATE: 18 BRPM | OXYGEN SATURATION: 98 % | TEMPERATURE: 98 F | HEART RATE: 76 BPM | SYSTOLIC BLOOD PRESSURE: 122 MMHG | BODY MASS INDEX: 33.06 KG/M2 | DIASTOLIC BLOOD PRESSURE: 78 MMHG | WEIGHT: 164 LBS

## 2020-09-25 LAB
ALBUMIN SERPL-MCNC: 4 G/DL (ref 3.5–5.2)
ALP BLD-CCNC: 146 U/L (ref 35–104)
ALT SERPL-CCNC: 33 U/L (ref 0–32)
ANION GAP SERPL CALCULATED.3IONS-SCNC: 15 MMOL/L (ref 7–16)
AST SERPL-CCNC: 31 U/L (ref 0–31)
BASOPHILS ABSOLUTE: 0.02 E9/L (ref 0–0.2)
BASOPHILS RELATIVE PERCENT: 0.2 % (ref 0–2)
BILIRUB SERPL-MCNC: 0.2 MG/DL (ref 0–1.2)
BUN BLDV-MCNC: 11 MG/DL (ref 6–20)
CALCIUM SERPL-MCNC: 10.4 MG/DL (ref 8.6–10.2)
CHLORIDE BLD-SCNC: 102 MMOL/L (ref 98–107)
CHOLESTEROL, TOTAL: 171 MG/DL (ref 0–199)
CO2: 28 MMOL/L (ref 22–29)
CREAT SERPL-MCNC: 0.7 MG/DL (ref 0.5–1)
EOSINOPHILS ABSOLUTE: 0.14 E9/L (ref 0.05–0.5)
EOSINOPHILS RELATIVE PERCENT: 1.7 % (ref 0–6)
GFR AFRICAN AMERICAN: >60
GFR NON-AFRICAN AMERICAN: >60 ML/MIN/1.73
GLUCOSE BLD-MCNC: 149 MG/DL (ref 74–99)
HBA1C MFR BLD: 9.1 %
HCT VFR BLD CALC: 43.6 % (ref 34–48)
HDLC SERPL-MCNC: 71 MG/DL
HEMOGLOBIN: 13.1 G/DL (ref 11.5–15.5)
IMMATURE GRANULOCYTES #: 0.02 E9/L
IMMATURE GRANULOCYTES %: 0.2 % (ref 0–5)
LDL CHOLESTEROL CALCULATED: 88 MG/DL (ref 0–99)
LYMPHOCYTES ABSOLUTE: 2.34 E9/L (ref 1.5–4)
LYMPHOCYTES RELATIVE PERCENT: 28.7 % (ref 20–42)
MCH RBC QN AUTO: 30.1 PG (ref 26–35)
MCHC RBC AUTO-ENTMCNC: 30 % (ref 32–34.5)
MCV RBC AUTO: 100.2 FL (ref 80–99.9)
MONOCYTES ABSOLUTE: 0.66 E9/L (ref 0.1–0.95)
MONOCYTES RELATIVE PERCENT: 8.1 % (ref 2–12)
NEUTROPHILS ABSOLUTE: 4.96 E9/L (ref 1.8–7.3)
NEUTROPHILS RELATIVE PERCENT: 61.1 % (ref 43–80)
PDW BLD-RTO: 12.5 FL (ref 11.5–15)
PLATELET # BLD: 295 E9/L (ref 130–450)
PMV BLD AUTO: 11.2 FL (ref 7–12)
POTASSIUM SERPL-SCNC: 6.4 MMOL/L (ref 3.5–5)
RBC # BLD: 4.35 E12/L (ref 3.5–5.5)
SODIUM BLD-SCNC: 145 MMOL/L (ref 132–146)
TOTAL PROTEIN: 7.7 G/DL (ref 6.4–8.3)
TRIGL SERPL-MCNC: 61 MG/DL (ref 0–149)
TSH SERPL DL<=0.05 MIU/L-ACNC: 1.83 UIU/ML (ref 0.27–4.2)
VLDLC SERPL CALC-MCNC: 12 MG/DL
WBC # BLD: 8.1 E9/L (ref 4.5–11.5)

## 2020-09-25 PROCEDURE — 3046F HEMOGLOBIN A1C LEVEL >9.0%: CPT | Performed by: FAMILY MEDICINE

## 2020-09-25 PROCEDURE — 86703 HIV-1/HIV-2 1 RESULT ANTBDY: CPT

## 2020-09-25 PROCEDURE — 84443 ASSAY THYROID STIM HORMONE: CPT

## 2020-09-25 PROCEDURE — 2022F DILAT RTA XM EVC RTNOPTHY: CPT | Performed by: FAMILY MEDICINE

## 2020-09-25 PROCEDURE — 1036F TOBACCO NON-USER: CPT | Performed by: FAMILY MEDICINE

## 2020-09-25 PROCEDURE — G8427 DOCREV CUR MEDS BY ELIG CLIN: HCPCS | Performed by: FAMILY MEDICINE

## 2020-09-25 PROCEDURE — 87491 CHLMYD TRACH DNA AMP PROBE: CPT

## 2020-09-25 PROCEDURE — 3017F COLORECTAL CA SCREEN DOC REV: CPT | Performed by: FAMILY MEDICINE

## 2020-09-25 PROCEDURE — 86592 SYPHILIS TEST NON-TREP QUAL: CPT

## 2020-09-25 PROCEDURE — 87591 N.GONORRHOEAE DNA AMP PROB: CPT

## 2020-09-25 PROCEDURE — 80053 COMPREHEN METABOLIC PANEL: CPT

## 2020-09-25 PROCEDURE — 99214 OFFICE O/P EST MOD 30 MIN: CPT | Performed by: FAMILY MEDICINE

## 2020-09-25 PROCEDURE — 85025 COMPLETE CBC W/AUTO DIFF WBC: CPT

## 2020-09-25 PROCEDURE — G8417 CALC BMI ABV UP PARAM F/U: HCPCS | Performed by: FAMILY MEDICINE

## 2020-09-25 PROCEDURE — 80061 LIPID PANEL: CPT

## 2020-09-25 PROCEDURE — 80074 ACUTE HEPATITIS PANEL: CPT

## 2020-09-25 PROCEDURE — 83036 HEMOGLOBIN GLYCOSYLATED A1C: CPT | Performed by: FAMILY MEDICINE

## 2020-09-25 RX ORDER — NITROGLYCERIN 0.4 MG/1
0.4 TABLET SUBLINGUAL EVERY 5 MIN PRN
Qty: 25 TABLET | Refills: 1 | Status: SHIPPED | OUTPATIENT
Start: 2020-09-25

## 2020-09-28 LAB
HAV IGM SER IA-ACNC: NORMAL
HEPATITIS B CORE IGM ANTIBODY: NORMAL
HEPATITIS B SURFACE ANTIGEN INTERPRETATION: NORMAL
HEPATITIS C ANTIBODY INTERPRETATION: NORMAL
HIV-1 AND HIV-2 ANTIBODIES: NORMAL
RPR: NORMAL

## 2020-09-30 PROBLEM — Z76.89 ENCOUNTER FOR ASSESSMENT OF STD EXPOSURE: Status: ACTIVE | Noted: 2020-09-30

## 2020-09-30 PROBLEM — R07.89 ATYPICAL CHEST PAIN: Status: ACTIVE | Noted: 2020-09-30

## 2020-09-30 PROBLEM — Z20.2 ENCOUNTER FOR ASSESSMENT OF STD EXPOSURE: Status: ACTIVE | Noted: 2020-09-30

## 2020-09-30 PROBLEM — Z12.31 ENCOUNTER FOR SCREENING MAMMOGRAM FOR BREAST CANCER: Status: ACTIVE | Noted: 2020-09-30

## 2020-09-30 LAB
C. TRACHOMATIS DNA ,URINE: NEGATIVE
N. GONORRHOEAE DNA, URINE: NEGATIVE
SOURCE: NORMAL

## 2020-09-30 RX ORDER — EMPAGLIFLOZIN 10 MG/1
10 TABLET, FILM COATED ORAL DAILY
Qty: 30 TABLET | Refills: 3 | Status: SHIPPED
Start: 2020-09-30 | End: 2021-02-03 | Stop reason: SDUPTHER

## 2020-09-30 ASSESSMENT — ENCOUNTER SYMPTOMS
CHEST TIGHTNESS: 0
EYE DISCHARGE: 0
SHORTNESS OF BREATH: 0
NAUSEA: 0
CHOKING: 0
VOMITING: 0
RHINORRHEA: 0
EYE PAIN: 0
COLOR CHANGE: 0
BACK PAIN: 0
DIARRHEA: 0
PHOTOPHOBIA: 0
ALLERGIC/IMMUNOLOGIC NEGATIVE: 1
STRIDOR: 0
BLOOD IN STOOL: 0
SINUS PRESSURE: 0
VOICE CHANGE: 0
COUGH: 0
RESPIRATORY NEGATIVE: 1
ANAL BLEEDING: 0
WHEEZING: 0
ABDOMINAL PAIN: 0
SINUS PAIN: 0
SORE THROAT: 0
EYE REDNESS: 0
CONSTIPATION: 0
TROUBLE SWALLOWING: 0
EYE ITCHING: 0
RECTAL PAIN: 0
ABDOMINAL DISTENTION: 0
FACIAL SWELLING: 0
EYES NEGATIVE: 1

## 2020-09-30 NOTE — PROGRESS NOTES
SUBJECTIVE  Jenny Barksdale is a 44 y.o. female. HPI/Chief C/O:  Chief Complaint   Patient presents with    Diabetes     Pt here for check up for her refills, refills previously sent in, only needs refill of Nitro today    Health Maintenance     Mammogram pended     No Known Allergies  This 44year old female presents with IDDM A1C is 9.1. Pt has anxiety, RLS, lumbar pain, neuropathy, and fatigue. Pt denies SI and HI. Pt follows with GI specialist. Pt denies SI and HI. Pt denies bladder and bowel incontinence, and denies symptoms of cauda equina. Pt follows with cardiology. ROS:  Review of Systems   Constitutional: Positive for fatigue. Negative for activity change, appetite change, chills, diaphoresis, fever and unexpected weight change. HENT: Negative. Negative for congestion, dental problem, drooling, ear discharge, ear pain, facial swelling, hearing loss, mouth sores, nosebleeds, postnasal drip, rhinorrhea, sinus pressure, sinus pain, sneezing, sore throat, tinnitus, trouble swallowing and voice change. Eyes: Negative. Negative for photophobia, pain, discharge, redness, itching and visual disturbance. Respiratory: Negative. Negative for cough, choking, chest tightness, shortness of breath, wheezing and stridor. Cardiovascular: Negative. Negative for chest pain, palpitations and leg swelling. Gastrointestinal: Negative for abdominal distention, abdominal pain, anal bleeding, blood in stool, constipation, diarrhea, nausea, rectal pain and vomiting. Endocrine: Negative. Negative for cold intolerance, heat intolerance, polydipsia, polyphagia and polyuria. Genitourinary: Positive for frequency and urgency. Negative for decreased urine volume, difficulty urinating, dysuria, enuresis, flank pain, genital sores, hematuria, menstrual problem and pelvic pain. Musculoskeletal: Positive for arthralgias and myalgias.  Negative for back pain, gait problem, joint swelling, neck pain and neck stiffness. Skin: Negative for color change, pallor, rash and wound. Allergic/Immunologic: Negative. Neurological: Positive for weakness and numbness. Negative for dizziness, tremors, seizures, syncope, facial asymmetry, speech difficulty, light-headedness and headaches. Hematological: Negative. Negative for adenopathy. Does not bruise/bleed easily. Psychiatric/Behavioral: Positive for sleep disturbance. Negative for agitation, behavioral problems, confusion, decreased concentration, dysphoric mood, hallucinations, self-injury and suicidal ideas. The patient is nervous/anxious. The patient is not hyperactive. Past Medical/Surgical Hx;  Reviewed with patient      Diagnosis Date    Arthritis     lower back    Back pain     Cardiac valve prolapse     micro    Diabetes mellitus ([address]     H/O exercise stress test 10/2015  ?     Dr Eddy Ask  neg    Hyperlipidemia     Hypertension     Neuropathy due to secondary diabetes [facility]     in marcial feet    Prolonged emergence from general anesthesia     Psoriasis      Past Surgical History:   Procedure Laterality Date    ANKLE SURGERY      bilateral tarsal tunnels    CARDIAC SURGERY      cardiac catheterization    CARPAL TUNNEL RELEASE      left    CARPAL TUNNEL RELEASE  12    right     SECTION      CHOLECYSTECTOMY, LAPAROSCOPIC N/A 2019    LAPAROSCOPIC CHOLECYSTECTOMY WITH IOC performed by John Paul Alvarez MD at [address]  had extremely high blood pressure and hard to wake up    COLONOSCOPY      COLONOSCOPY N/A 2019    COLORECTAL CANCER SCREENING, NOT HIGH RISK performed by John Paul Alvarez MD at [address] [place] [address], [place], DIAGNOSTIC      HYSTERECTOMY      HYSTERECTOMY, TOTAL ABDOMINAL      SHOULDER ARTHROSCOPY Left 2016    subacromin decompression and debridement    UPPER GASTROINTESTINAL ENDOSCOPY N/A 2019    EGD BIOPSY performed by John Paul Alvarez MD at [place] [address]       Past Family respiratory distress. Breath sounds: Normal breath sounds. No stridor. No wheezing, rhonchi or rales. Chest:      Chest wall: No tenderness. Abdominal:      General: Bowel sounds are normal. There is no distension. Palpations: Abdomen is soft. There is no mass. Tenderness: There is no abdominal tenderness. There is no guarding or rebound. Hernia: No hernia is present. Musculoskeletal: Normal range of motion. General: Tenderness present. No swelling, deformity or signs of injury. Right lower leg: No edema. Left lower leg: No edema. Comments: Pain and decreased ROM lumbar with neuropathy and weakness marcial. Legs. Lymphadenopathy:      Cervical: No cervical adenopathy. Skin:     General: Skin is warm. Coloration: Skin is not jaundiced or pale. Findings: No bruising, erythema, lesion or rash. Neurological:      General: No focal deficit present. Mental Status: She is alert and oriented to person, place, and time. Cranial Nerves: No cranial nerve deficit. Sensory: No sensory deficit. Motor: No weakness or abnormal muscle tone. Coordination: Coordination normal.      Gait: Gait normal.      Deep Tendon Reflexes: Reflexes are normal and symmetric. Reflexes normal.   Psychiatric:         Mood and Affect: Mood normal.         Behavior: Behavior normal.         Thought Content: Thought content normal.         Judgment: Judgment normal.         ASSESSMENT/PLAN  Jenny was seen today for gastroesophageal reflux and medication refill. Diagnoses and all orders for this visit  Gastroesophageal reflux disease without esophagitis  Not controlled. Lab, pepcid, prilosec. IDDM (insulin dependent diabetes mellitus) ([facility]  Not controlled. Lab, metformin, trulicity, admelog, basaglar, statin, ADA diet. Anxiety  Stable. Geanie Bouillon. RLS  Stable. Lab, mirapex. Neuropathy  Stable. Lab, cymbalta. Lumbar pain  Stable.  Lab, cymbalta. Hypertension  Controlled. Lab, BB low salt diet. Hyperlipidemia  Not controlled. Lab, low chol. Diet, statin. IFG  Patient instructed on labs. Fatigue  Not controlled. Lab. Possible exposure to STD  Not controlled. Lab, UA, urine. Atypical chest pain  Stable. Lab, cardiology. Pt instructed if any worse go ED ASAP.         Outpatient Encounter Medications as of 9/25/2020   Medication Sig Dispense Refill    nitroGLYCERIN (NITROSTAT) 0.4 MG SL tablet Place 1 tablet under the tongue every 5 minutes as needed for Chest pain 25 tablet 1    Dulaglutide (TRULICITY) 1.5 BD/7.2ZP SOPN INJECT THE CONTENTS OF 1 SYRINGE SUBCUTANEOUSLY EVERY WEEK *PATIENT NEEDS APPOINTMENT* 6 mL 10    metoprolol succinate (TOPROL XL) 25 MG extended release tablet TAKE 1/2 TABLET BY MOUTH DAILY 45 tablet 10    DULoxetine (CYMBALTA) 30 MG extended release capsule TAKE 1 CAPSULE BY MOUTH EVERY MORNING *PATIENT NEEDS APPOINTMENT* 90 capsule 10    pramipexole (MIRAPEX) 0.125 MG tablet TAKE 1 TABLET BY MOUTH EVERY NIGHT 90 tablet 1    insulin glargine (BASAGLAR KWIKPEN) 100 UNIT/ML injection pen INJECT 50 UNITS SUBCUTANEOUSLY NIGHTLY 15 mL 3    Insulin Syringe-Needle U-100 ([employer] INSULIN SYR 1CC/30G) 30G X 5/16\" 1 ML MISC 1 each by Does not apply route 4 times daily 120 each 5    metFORMIN (GLUCOPHAGE) 500 MG tablet Take 2 tablets by mouth 2 times daily (with meals) 360 tablet 1    omeprazole (PRILOSEC) 40 MG delayed release capsule TK 1 C PO D 90 capsule 1    bumetanide (BUMEX) 1 MG tablet Take 1 tablet by mouth every other day 90 tablet 1    famotidine (PEPCID) 20 MG tablet Take 1 tablet by mouth 2 times daily 180 tablet 1    lactobacillus (CULTURELLE) CAPS capsule Take 1 capsule by mouth daily 90 capsule 1    Insulin Pen Needle (B-D ULTRAFINE III SHORT PEN) 31G X 8 MM MISC USE WITH INSULIN PENS AS DIRECTED 100 each 3    hydrocortisone 2.5 % cream APPLY TO AFFECTED AREA TOPICALLY TWICE DAILY 84 g 10  etodolac (LODINE) 300 MG capsule TAKE 1 CAPSULE BY MOUTH EVERY DAY 90 capsule 1    blood glucose test strips (ONE TOUCH ULTRA TEST) strip TEST 4 TIMES PER DAY AND AS NEEDED FOR SYMPTOMS OF IRREGULAR BLOOD GLUCOSE 400 strip 0    Humidifier MISC 1 Device by Does not apply route daily 1 each 0    insulin lispro (ADMELOG SOLOSTAR) 100 UNIT/ML pen As directed per sliding scale up to 13 units 4 times per day. 5 pen 3    Wheat Dextrin (BENEFIBER) POWD Take 4 g by mouth 3 times daily (with meals) 475 g 2    lidocaine-prilocaine (EMLA) 2.5-2.5 % cream Apply topically as needed. 30 g 0    glucose monitoring kit (FREESTYLE) monitoring kit 1 kit by Does not apply route daily 1 kit 0    Blood Pressure KIT 1 kit by Does not apply route daily 1 kit 0    Misc. Devices (QUAD CANE) MISC 1 Quad cane 1 each 0    Multiple Vitamins-Minerals (THERAPEUTIC MULTIVITAMIN-MINERALS) tablet Take 1 tablet by mouth daily      B Complex Vitamins (B COMPLEX 1 PO) Take 1 tablet by mouth daily.  [DISCONTINUED] Lancets (ONETOUCH DELICA PLUS [id number]) MISC TEST FOUR TIMES DAILY 300 each 2    [DISCONTINUED] nitroGLYCERIN (NITROSTAT) 0.4 MG SL tablet Place 1 tablet under the tongue every 5 minutes as needed for Chest pain 25 tablet 1     No facility-administered encounter medications on file as of 9/25/2020. Return in about 3 months (around 12/25/2020).         Reviewed recent labs related to Jenny's current problems      Discussed importance of regular Health Maintenance follow up  Health Maintenance   Topic    Statin Therapy     Diabetic foot exam     Hepatitis B vaccine (1 of 3 - Risk 3-dose series)    DTaP/Tdap/Td vaccine (1 - Tdap)    Breast cancer screen     Shingles Vaccine (1 of 2)    Flu vaccine (1)    Diabetic retinal exam     A1C test (Diabetic or Prediabetic)     Diabetic microalbuminuria test     Lipid screen     Potassium monitoring     Creatinine monitoring     Colon cancer screen colonoscopy    

## 2020-10-05 LAB — DIABETIC RETINOPATHY: POSITIVE

## 2020-10-13 NOTE — PROGRESS NOTES
Overdue results letter mailed to patient regarding mammogram order  Electronically signed by Sherly Chacko on 10/13/2020 at 10:40 AM

## 2020-10-19 ENCOUNTER — VIRTUAL VISIT (OUTPATIENT)
Dept: FAMILY MEDICINE CLINIC | Age: 50
End: 2020-10-19
Payer: MEDICAID

## 2020-10-19 PROCEDURE — G8427 DOCREV CUR MEDS BY ELIG CLIN: HCPCS | Performed by: FAMILY MEDICINE

## 2020-10-19 PROCEDURE — 1036F TOBACCO NON-USER: CPT | Performed by: FAMILY MEDICINE

## 2020-10-19 PROCEDURE — G8484 FLU IMMUNIZE NO ADMIN: HCPCS | Performed by: FAMILY MEDICINE

## 2020-10-19 PROCEDURE — 99213 OFFICE O/P EST LOW 20 MIN: CPT | Performed by: FAMILY MEDICINE

## 2020-10-19 PROCEDURE — G8417 CALC BMI ABV UP PARAM F/U: HCPCS | Performed by: FAMILY MEDICINE

## 2020-10-19 PROCEDURE — 3017F COLORECTAL CA SCREEN DOC REV: CPT | Performed by: FAMILY MEDICINE

## 2020-10-19 RX ORDER — CEPHALEXIN 500 MG/1
500 CAPSULE ORAL 2 TIMES DAILY
Qty: 14 CAPSULE | Refills: 0 | Status: SHIPPED | OUTPATIENT
Start: 2020-10-19 | End: 2020-10-26

## 2020-10-19 RX ORDER — GUAIFENESIN 600 MG/1
600 TABLET, EXTENDED RELEASE ORAL 2 TIMES DAILY
Qty: 30 TABLET | Refills: 0 | Status: SHIPPED | OUTPATIENT
Start: 2020-10-19 | End: 2020-11-03

## 2020-10-19 RX ORDER — FLUTICASONE PROPIONATE 50 MCG
1 SPRAY, SUSPENSION (ML) NASAL DAILY
Qty: 1 BOTTLE | Refills: 3 | Status: SHIPPED | OUTPATIENT
Start: 2020-10-19

## 2020-10-19 ASSESSMENT — ENCOUNTER SYMPTOMS
DIARRHEA: 0
EYE ITCHING: 0
HOARSE VOICE: 0
PHOTOPHOBIA: 0
RECTAL PAIN: 0
EYE REDNESS: 0
ABDOMINAL DISTENTION: 0
CHEST TIGHTNESS: 0
EYE PAIN: 0
SORE THROAT: 1
BLURRED VISION: 0
EYES NEGATIVE: 1
COLOR CHANGE: 0
STRIDOR: 0
BACK PAIN: 0
BLOOD IN STOOL: 0
WHEEZING: 0
CONSTIPATION: 0
VOICE CHANGE: 0
ANAL BLEEDING: 0
EYE DISCHARGE: 0
APNEA: 0
SINUS PAIN: 0
SINUS PRESSURE: 1
TROUBLE SWALLOWING: 0
ALLERGIC/IMMUNOLOGIC NEGATIVE: 1
VISUAL CHANGE: 0
SHORTNESS OF BREATH: 0
FACIAL SWELLING: 0
RHINORRHEA: 0
COUGH: 1
SWOLLEN GLANDS: 0
CHOKING: 0

## 2020-10-19 NOTE — PROGRESS NOTES
SUBJECTIVE  Jenny Ravi is a 44 y.o. female. HPI/Chief C/O:  Chief Complaint   Patient presents with    Dysuria     Muscle spasms, patient states she feels the need to go, even after going.  Pharyngitis     Sore throat x 2-3 days     No Known Allergies  TeleMedicine Video Visit    This visit was performed as a virtual video visit using a synchronous, two-way, audio-video telehealth technology platform. Patient identification was verified at the start of the visit, including the patient's telephone number and physical location. I discussed with the patient the nature of our telehealth visits, that:     Due to the nature of an audio- video modality, the only components of a physical exam that could be done are the elements supported by direct observation. I would evaluate the patient and recommend diagnostics and treatments based on my assessment. If it was felt that the patient should be evaluated in clinic or an emergency room setting, then they would be directed there. Our sessions are not being recorded and that personal health information is protected. Our team would provide follow up care in person if/when the patient needs it. Patient does agree to proceed with telemedicine consultation. Patient's location: home address in [facility]  Physician  location home address in [facility] other people involved in call, are none      Time spent: Greater than 30    This visit was completed virtually using Doxy. me  C/O UTI  The patient is here for a medication list and treatment planning review  We will go over our care planning goals as well as take care of all refills  We will set up labs as well             Sinusitis   This is a new problem. The current episode started in the past 7 days. The problem has been gradually worsening since onset. Associated symptoms include congestion, coughing, sinus pressure and a sore throat.  Pertinent negatives include no chills, diaphoresis, ear pain, headaches, hoarse leg swelling. Gastrointestinal: Negative for abdominal distention, anal bleeding, blood in stool, constipation, diarrhea and rectal pain. Endocrine: Negative. Negative for cold intolerance, heat intolerance, polydipsia, polyphagia and polyuria. Genitourinary: Negative for decreased urine volume, difficulty urinating, enuresis, genital sores, menstrual problem and pelvic pain. Musculoskeletal: Negative for back pain, gait problem, neck pain and neck stiffness. Skin: Negative for color change, pallor and wound. Allergic/Immunologic: Negative. Neurological: Positive for weakness. Negative for dizziness, tremors, seizures, syncope, facial asymmetry, speech difficulty, light-headedness and headaches. Hematological: Negative. Negative for adenopathy. Does not bruise/bleed easily. Psychiatric/Behavioral: Positive for sleep disturbance. Negative for agitation, behavioral problems, confusion, decreased concentration, dysphoric mood, hallucinations, self-injury and suicidal ideas. The patient is nervous/anxious. The patient is not hyperactive. Past Medical/Surgical Hx;  Reviewed with patient      Diagnosis Date    Arthritis     lower back    Back pain     Cardiac valve prolapse     micro    Diabetes mellitus ([address]     H/O exercise stress test 10/2015  ?     Dr Remy Shelton  neg    Hyperlipidemia     Hypertension     Neuropathy due to secondary diabetes ([address]     in marcial feet    Prolonged emergence from general anesthesia     Psoriasis      Past Surgical History:   Procedure Laterality Date    ANKLE SURGERY      bilateral tarsal tunnels    CARDIAC SURGERY      cardiac catheterization    CARPAL TUNNEL RELEASE      left    CARPAL TUNNEL RELEASE  12    right     SECTION      CHOLECYSTECTOMY, LAPAROSCOPIC N/A 2019    LAPAROSCOPIC CHOLECYSTECTOMY WITH IOC performed by Rufina Adrian MD at [address]  had extremely high blood pressure and hard to wake up    COLONOSCOPY      COLONOSCOPY N/A 6/20/2019    COLORECTAL CANCER SCREENING, NOT HIGH RISK performed by Paulina Primrose, MD at [address], DIAGNOSTIC      HYSTERECTOMY      HYSTERECTOMY, TOTAL ABDOMINAL      SHOULDER ARTHROSCOPY Left 1/21/2016    subacromin decompression and debridement    UPPER GASTROINTESTINAL ENDOSCOPY N/A 6/20/2019    EGD BIOPSY performed by Paulina Primrose, MD at [facility]       Past Family Hx:  Reviewed with patient      Problem Relation Age of Onset    Heart Disease Mother     Diabetes Mother     Heart Disease Father     Diabetes Father        Social Hx:  Reviewed with patient  Social History     Tobacco Use    Smoking status: Never Smoker    Smokeless tobacco: Never Used   Substance Use Topics    Alcohol use: Yes     Comment: occas. OBJECTIVE  LMP  (LMP Unknown)     Problem List:  Dennis Michel does not have any pertinent problems on file. PHYS EX:  General: Awake/Alert/Oriented/No Acute Distress      ASSESSMENT/PLAN  Jenny was seen today for dysuria and pharyngitis. Diagnoses and all orders for this visit:    Acute bacterial sinusitis  -     cephALEXin (KEFLEX) 500 MG capsule; Take 1 capsule by mouth 2 times daily for 7 days  -     fluticasone (FLONASE) 50 MCG/ACT nasal spray; 1 spray by Nasal route daily  -     guaiFENesin (MUCINEX) 600 MG extended release tablet; Take 1 tablet by mouth 2 times daily for 15 days    Acute bronchiolitis due to unspecified organism  -     cephALEXin (KEFLEX) 500 MG capsule; Take 1 capsule by mouth 2 times daily for 7 days  -     guaiFENesin (MUCINEX) 600 MG extended release tablet;  Take 1 tablet by mouth 2 times daily for 15 days    Essential hypertension    ---VASCULAR PANEL  A) asa, plavix, aggrenox  B) coumadin, pletal, tzd, statin  C) ace, BUMEX, folic, ccb  D) cannikinumab, fish oils     ---CARDIAC---asa, ace, BETA, statin, BUMEX, ( ccb )        Outpatient Encounter Medications as of 10/19/2020   Medication Sig Dispense CAPSULE BY MOUTH EVERY DAY 90 capsule 1    blood glucose test strips (ONE TOUCH ULTRA TEST) strip TEST 4 TIMES PER DAY AND AS NEEDED FOR SYMPTOMS OF IRREGULAR BLOOD GLUCOSE 400 strip 0    Humidifier MISC 1 Device by Does not apply route daily 1 each 0    insulin lispro (ADMELOG SOLOSTAR) 100 UNIT/ML pen As directed per sliding scale up to 13 units 4 times per day. 5 pen 3    Wheat Dextrin (BENEFIBER) POWD Take 4 g by mouth 3 times daily (with meals) 475 g 2    lidocaine-prilocaine (EMLA) 2.5-2.5 % cream Apply topically as needed. 30 g 0    glucose monitoring kit (FREESTYLE) monitoring kit 1 kit by Does not apply route daily 1 kit 0    Blood Pressure KIT 1 kit by Does not apply route daily 1 kit 0    Misc. Devices (QUAD CANE) MISC 1 Quad cane 1 each 0    Multiple Vitamins-Minerals (THERAPEUTIC MULTIVITAMIN-MINERALS) tablet Take 1 tablet by mouth daily      B Complex Vitamins (B COMPLEX 1 PO) Take 1 tablet by mouth daily. No facility-administered encounter medications on file as of 10/19/2020. Return in about 2 weeks (around 11/2/2020).         Reviewed recent labs related to Jenny's current problems      Discussed importance of regular Health Maintenance follow up  Health Maintenance   Topic    Diabetic foot exam     Hepatitis B vaccine (1 of 3 - Risk 3-dose series)    DTaP/Tdap/Td vaccine (1 - Tdap)    Breast cancer screen     Shingles Vaccine (1 of 2)    Flu vaccine (1)    Diabetic retinal exam     A1C test (Diabetic or Prediabetic)     Diabetic microalbuminuria test     Lipid screen     Potassium monitoring     Creatinine monitoring     Colon cancer screen colonoscopy     Pneumococcal 0-64 years Vaccine     HIV screen     Hepatitis A vaccine     Hib vaccine     Meningococcal (ACWY) vaccine

## 2020-10-29 ENCOUNTER — OFFICE VISIT (OUTPATIENT)
Dept: ORTHOPEDIC SURGERY | Age: 50
End: 2020-10-29
Payer: MEDICAID

## 2020-10-29 VITALS — HEIGHT: 59 IN | WEIGHT: 158 LBS | TEMPERATURE: 98 F | BODY MASS INDEX: 31.85 KG/M2

## 2020-10-29 PROCEDURE — G8484 FLU IMMUNIZE NO ADMIN: HCPCS | Performed by: ORTHOPAEDIC SURGERY

## 2020-10-29 PROCEDURE — G8417 CALC BMI ABV UP PARAM F/U: HCPCS | Performed by: ORTHOPAEDIC SURGERY

## 2020-10-29 PROCEDURE — G8427 DOCREV CUR MEDS BY ELIG CLIN: HCPCS | Performed by: ORTHOPAEDIC SURGERY

## 2020-10-29 PROCEDURE — 3017F COLORECTAL CA SCREEN DOC REV: CPT | Performed by: ORTHOPAEDIC SURGERY

## 2020-10-29 PROCEDURE — 1036F TOBACCO NON-USER: CPT | Performed by: ORTHOPAEDIC SURGERY

## 2020-10-29 PROCEDURE — 99214 OFFICE O/P EST MOD 30 MIN: CPT | Performed by: ORTHOPAEDIC SURGERY

## 2020-10-29 NOTE — PATIENT INSTRUCTIONS
Patient Education        Rotator Cuff Problems: Care Instructions  Your Care Instructions     The rotator cuff is a group of tendons and muscles around the shoulder that keeps the shoulder joint stable and allows you to raise and rotate your arm. Over time, daily wear and exercise can cause the tendons to rub on the bones of your shoulder. This is called impingement. This condition may cause the tendons to bruise, degenerate, or tear. In many people, these problems do not cause pain. When they do cause pain, you can use rest, physical therapy, ice and heat, and anti-inflammatory medicine to reduce pain and swelling. If you still have pain after trying these treatments, you and your doctor can discuss having a steroid injection or surgery. Follow-up care is a key part of your treatment and safety. Be sure to make and go to all appointments, and call your doctor if you are having problems. It's also a good idea to know your test results and keep a list of the medicines you take. How can you care for yourself at home? · Be safe with medicines. Read and follow all instructions on the label. ? If the doctor gave you a prescription medicine for pain, take it as prescribed. ? If you are not taking a prescription pain medicine, ask your doctor if you can take an over-the-counter medicine. · Put ice or a cold pack on your shoulder for 10 to 20 minutes at a time. Try to do this every 1 to 2 hours for the next 3 days (when you are awake). Put a thin cloth between the ice pack and your skin. · After 3 days, put a warm, wet towel on your shoulder. This is to relax the muscles and increase blood flow. While holding the towel on your shoulder, lean forward so your arm hangs freely, and gently swing your arm back and forth like a pendulum. You also can do this standing under a warm shower. · Follow your doctor's advice for physical therapy. When your doctor says it is okay, try these stretching exercises.  Do them slowly to avoid injury. Put a warm, wet towel on your shoulder before exercising. Stop any exercise that increases pain. ? Range-of-motion exercises. If it is not too painful, stretch your arm in four directions: across the body, up the back, to the side, and overhead. ? Pendulum exercise. Lean forward and hold onto a table or the back of a chair with your good arm. Bend at the waist, letting the arm with the sore shoulder hang straight down. Swing your arm back and forth like a pendulum, then in circles that start small and slowly grow larger. This exercise does not use the arm muscles. Instead, use your legs and your hips to create movement that makes your arm swing freely. Try this for about 5 minutes, several times a day. ? Wall climbing (to the side). Stand with your side to a wall so that your fingers can just touch it. Then turn so your body is turned slightly toward the wall. Walk the fingers of your injured arm up the wall as high as pain permits. Try not to shrug your shoulder up toward your ear as you move your arm up. Hold that position for a count of 15 to 30 seconds. Walk your fingers down to the starting position. Repeat 2 to 4 times, trying to reach higher each time. ? Wall climbing (to the front). Face a wall, standing so your fingers can just touch it. Walk the fingers of your affected arm up the wall as high as pain permits. Try not to shrug your shoulder up toward your ear as you move your arm up. Hold that position for a count of 15 to 30 seconds. Slowly walk your fingers to the starting position. Repeat 2 to 4 times, trying to reach higher each time. · Rest your shoulder when you are not doing stretches and other exercises. Your doctor may tell you to wait for the pain to go away before doing exercises. Do not lift heavy bags of groceries, play sports, or do anything else that makes you twist or stress your shoulder. Avoid activities where you move your affected arm above your head.   When should you call for help? Call your doctor now or seek immediate medical care if:    · You have severe pain.     · You cannot move your shoulder or arm.     · You have tingling or numbness in your arm or hand.     · Your arm or hand is cool or pale. Watch closely for changes in your health, and be sure to contact your doctor if:    · Your pain gets worse.     · You have new or worse swelling in your arm or hand.     · You do not get better as expected. Where can you learn more? Go to https://Nexus BiosystemspeINTERACTION MEDIA GROUP.Novogy. org and sign in to your Shodogg account. Enter E207 in the Scloby box to learn more about \"Rotator Cuff Problems: Care Instructions. \"     If you do not have an account, please click on the \"Sign Up Now\" link. Current as of: March 2, 2020               Content Version: 12.6  © 6692-3743 [employer]. Care instructions adapted under license by [facility] ([facility]). If you have questions about a medical condition or this instruction, always ask your healthcare professional. [address] any warranty or liability for your use of this information. Patient Education        Shoulder Arthroscopy: Before Your Surgery  What is shoulder arthroscopy? Shoulder arthroscopy is a type of surgery. It lets a doctor repair shoulder problems without making a large cut (incision). To do this surgery, the doctor puts a lighted tube through small incisions in your shoulder. The tube is called an arthroscope or scope. Next, the doctor puts some surgical tools in the scope to help make any repairs. The incisions will leave scars that usually fade with time. This type of surgery is used to treat many shoulder problems. Osteoarthritis. This happens when your cartilage breaks down. Cartilage is the hard, thick tissue that cushions the joints. For this problem, the doctor shaves and smooths rough surfaces on the shoulder joint. Loose body.    This is a loose piece of bone or cartilage. It's often caused by an injury. The doctor may put the loose piece back in place. Sometimes the piece is removed. Impingement syndrome. This happens when shoulder tissue starts to swell and rub against a bone. This can occur in the tendons of the rotator cuff. Or it may happen in the tendons that connect the bicep to the shoulder. It can also occur in the bursa, the sac between the rotator cuff and the top of the shoulder blade. To fix this problem, your doctor removes the bursa and part of the bone from the point of your shoulder. This increases the space in the shoulder area. In a few weeks, the bursa re-forms. Shoulder arthroscopy is also used for other problems. These include rotator cuff problems, bicep tendon tears, and shoulder instability. This information does not cover these surgeries. Most people go home on the day of the surgery. When you can go back to work or your usual activities depends on your shoulder problem. You will probably need about 6 weeks or longer to recover. Follow-up care is a key part of your treatment and safety. Be sure to make and go to all appointments, and call your doctor if you are having problems. It's also a good idea to know your test results and keep a list of the medicines you take. How do you prepare for surgery? Surgery can be stressful. This information will help you understand what you can expect. And it will help you safely prepare for surgery. Preparing for surgery    · Be sure you have someone to take you home. Anesthesia and pain medicine will make it unsafe for you to drive or get home on your own.     · Understand exactly what surgery is planned, along with the risks, benefits, and other options.     · If you take aspirin or some other blood thinner, ask your doctor if you should stop taking it before your surgery. Make sure that you understand exactly what your doctor wants you to do. These medicines increase the risk of bleeding.   · Tell your doctor ALL the medicines, vitamins, supplements, and herbal remedies you take. Some may increase the risk of problems during your surgery. Your doctor will tell you if you should stop taking any of them before the surgery and how soon to do it.     · Make sure your doctor and the hospital have a copy of your advance directive. If you don't have one, you may want to prepare one. It lets others know your health care wishes. It's a good thing to have before any type of surgery or procedure. What happens on the day of surgery? · Follow the instructions exactly about when to stop eating and drinking. If you don't, your surgery may be canceled. If your doctor told you to take your medicines on the day of surgery, take them with only a sip of water.     · Take a bath or shower before you come in for your surgery. Do not apply lotions, perfumes, deodorants, or nail polish.     · Do not shave the surgical site yourself.     · Take off all jewelry and piercings. And take out contact lenses, if you wear them. At the hospital or surgery center   · Bring a picture ID.     · The area for surgery is often marked to make sure there are no errors.     · You will be kept comfortable and safe by your anesthesia provider. The anesthesia may make you sleep. Or it may just numb the area being worked on.     · The surgery will take about 1 to 2 hours. It depends on what type of shoulder problem you have. When should you call your doctor? · You have questions or concerns.     · You don't understand how to prepare for your surgery.     · You become ill before the surgery (such as fever, flu, or a cold).     · You need to reschedule or have changed your mind about having the surgery. Where can you learn more? Go to https://chsheryl.Axigen Messaging. org and sign in to your CodeStreet account. Enter W180 in the Stima Systems box to learn more about \"Shoulder Arthroscopy: Before Your Surgery. \"     If you do not have an account, please click on the \"Sign Up Now\" link. Current as of: March 2, 2020               Content Version: 12.6  © 2006-2020 [employer]. Care instructions adapted under license by [facility] ([facility]). If you have questions about a medical condition or this instruction, always ask your healthcare professional. [address] any warranty or liability for your use of this information. Patient Education        Shoulder Arthroscopy: What to Expect at Home  Your Recovery     Arthroscopy is a way to find problems and do surgery inside a joint without making a large cut (incision). Your doctor put a lighted tube with a tiny camera--called an arthroscope, or scope--and surgical tools through small incisions in your shoulder. Your arm may be in a sling. You will feel tired for several days. Your shoulder will be swollen. And you may notice that your skin is a different color near the cuts the doctor made (incisions). Your hand and arm may also be swollen. This is normal and will go away in a few days. Depending on the medicine you had during the surgery, your entire arm may feel numb or like you can't move it. This goes away in 12 to 24 hours. How soon you can go back to work or your usual routine will depend on your shoulder problem. Most people need 6 weeks or longer to recover. How much time you need depends on the surgery that was done. You may have to limit your activity until your shoulder strength and range of motion are back to normal. You may also be in a rehabilitation program (rehab). If you have a desk job, you may be able to go back to work a few days after the surgery. If you lift things at work, it may take months before you can go back. This care sheet gives you a general idea about how long it will take for you to recover. But each person recovers at a different pace. Follow the steps below to get better as quickly as possible.   How can you care for yourself at home? Activity    · Rest when you feel tired. Getting enough sleep will help you recover. You may be more comfortable if you sleep in a reclining chair. To make your arm and shoulder feel better, keep a thin pillow under the back of your arm while you are lying down.     · Try to walk each day. Start by walking a little more than you did the day before. Bit by bit, increase the amount you walk. Walking boosts blood flow and helps prevent pneumonia and constipation.     · For 2 to 3 weeks, avoid lifting anything heavier than a plate or a glass. You need to give your shoulder time to heal.     · Your arm may be in a sling. You may need to use the sling for a few days to a few weeks. Your doctor will advise you on how long to wear the sling.     · You may take the sling off when you dress or wash.     · Do not use your arm for repeated movements. These include painting, vacuuming, or using a computer. Diet    · You can eat your normal diet. If your stomach is upset, try bland, low-fat foods like plain rice, broiled chicken, toast, and yogurt.     · Drink plenty of fluids, unless your doctor tells you not to.     · You may notice that your bowel movements are not regular right after your surgery. This is common. Try to avoid constipation and straining with bowel movements. You may want to take a fiber supplement every day. If you have not had a bowel movement after a couple of days, ask your doctor about taking a mild laxative. Medicines    · Your doctor will tell you if and when you can restart your medicines. He or she will also give you instructions about taking any new medicines.     · If you take aspirin or some other blood thinner, ask your doctor if and when to start taking it again. Make sure that you understand exactly what your doctor wants you to do.     · Take pain medicines exactly as directed. ? If the doctor gave you a prescription medicine for pain, take it as prescribed.   ? If you are pale, or numb, or it changes color.     · You are unable to move your fingers, wrist, or elbow.     · You are sick to your stomach or cannot keep fluids down.     · You have pain that does not get better after you take pain medicine.     · You have signs of infection, such as:  ? Increased pain, swelling, warmth, or redness. ? Red streaks leading from the incision. ? Pus draining from the incision. ? A fever.     · You have loose stitches, or your incision comes open.     · Your incision bleeds through your first dressing or is still bleeding 3 days after your surgery. Watch closely for changes in your health, and be sure to contact your doctor if:    · Your sling feels too tight, and you cannot loosen it.     · You have new or increased swelling in your arm.     · You have new pain that develops in another area of the affected limb. For example, you have pain in your hand or elbow.     · You do not have a bowel movement after taking a laxative.     · You do not get better as expected. Where can you learn more? Go to https://Change.orgpeStyleTread.HealthHiway. org and sign in to your DySISmedical account. Enter M813 in the Savvy Cellar Wines box to learn more about \"Shoulder Arthroscopy: What to Expect at Home. \"     If you do not have an account, please click on the \"Sign Up Now\" link. Current as of: March 2, 2020               Content Version: 12.6  © 0985-6058 [employer]. Care instructions adapted under license by [facility] ([facility]). If you have questions about a medical condition or this instruction, always ask your healthcare professional. [address] any warranty or liability for your use of this information.

## 2020-10-29 NOTE — PROGRESS NOTES
ARTHROSCOPY Left 1/21/2016    subacromin decompression and debridement    UPPER GASTROINTESTINAL ENDOSCOPY N/A 6/20/2019    EGD BIOPSY performed by Stefania Carcamo MD at [place] [address]       Current Outpatient Medications:     etodolac (LODINE) 300 MG capsule, TAKE 1 CAPSULE BY MOUTH EVERY DAY *EMERGENCY REFILL*, Disp: 30 capsule, Rfl: 0    sertraline (ZOLOFT) 100 MG tablet, TAKE 1 TABLET BY MOUTH DAILY AS NEEDED FOR DEPRESSION *EMERGENCY REFILL*, Disp: 30 tablet, Rfl: 3    lactobacillus (CULTURELLE) CAPS capsule, TAKE (1) CAPSULE BY MOUTH DAILY, Disp: 30 capsule, Rfl: 3    blood glucose test strips (ONETOUCH ULTRA) strip, TEST FOUR TIMES DAILY AS NEEDED FOR SYMTOMS OF IRREGULAR BLOOD GLUCOSE, Disp: 100 each, Rfl: 3    empagliflozin (JARDIANCE) 10 MG tablet, Take 1 tablet by mouth daily, Disp: 30 tablet, Rfl: 3    nitroGLYCERIN (NITROSTAT) 0.4 MG SL tablet, Place 1 tablet under the tongue every 5 minutes as needed for Chest pain, Disp: 25 tablet, Rfl: 1    Dulaglutide (TRULICITY) 1.5 CM/6.8GG SOPN, INJECT THE CONTENTS OF 1 SYRINGE SUBCUTANEOUSLY EVERY WEEK *PATIENT NEEDS APPOINTMENT*, Disp: 6 mL, Rfl: 10    metoprolol succinate (TOPROL XL) 25 MG extended release tablet, TAKE 1/2 TABLET BY MOUTH DAILY, Disp: 45 tablet, Rfl: 10    DULoxetine (CYMBALTA) 30 MG extended release capsule, TAKE 1 CAPSULE BY MOUTH EVERY MORNING *PATIENT NEEDS APPOINTMENT*, Disp: 90 capsule, Rfl: 10    pramipexole (MIRAPEX) 0.125 MG tablet, TAKE 1 TABLET BY MOUTH EVERY NIGHT, Disp: 90 tablet, Rfl: 1    insulin glargine (BASAGLAR KWIKPEN) 100 UNIT/ML injection pen, INJECT 50 UNITS SUBCUTANEOUSLY NIGHTLY, Disp: 15 mL, Rfl: 3    Insulin Syringe-Needle U-100 ([employer] INSULIN SYR 1CC/30G) 30G X 5/16\" 1 ML MISC, 1 each by Does not apply route 4 times daily, Disp: 120 each, Rfl: 5    metFORMIN (GLUCOPHAGE) 500 MG tablet, Take 2 tablets by mouth 2 times daily (with meals), Disp: 360 tablet, Rfl: 1    omeprazole (PRILOSEC) 40 MG delayed release capsule, TK 1 C PO D, Disp: 90 capsule, Rfl: 1    bumetanide (BUMEX) 1 MG tablet, Take 1 tablet by mouth every other day, Disp: 90 tablet, Rfl: 1    famotidine (PEPCID) 20 MG tablet, Take 1 tablet by mouth 2 times daily, Disp: 180 tablet, Rfl: 1    Insulin Pen Needle (B-D ULTRAFINE III SHORT PEN) 31G X 8 MM MISC, USE WITH INSULIN PENS AS DIRECTED, Disp: 100 each, Rfl: 3    hydrocortisone 2.5 % cream, APPLY TO AFFECTED AREA TOPICALLY TWICE DAILY, Disp: 84 g, Rfl: 10    Humidifier MISC, 1 Device by Does not apply route daily, Disp: 1 each, Rfl: 0    insulin lispro (ADMELOG SOLOSTAR) 100 UNIT/ML pen, As directed per sliding scale up to 13 units 4 times per day., Disp: 5 pen, Rfl: 3    Wheat Dextrin (BENEFIBER) POWD, Take 4 g by mouth 3 times daily (with meals), Disp: 475 g, Rfl: 2    lidocaine-prilocaine (EMLA) 2.5-2.5 % cream, Apply topically as needed. , Disp: 30 g, Rfl: 0    glucose monitoring kit (FREESTYLE) monitoring kit, 1 kit by Does not apply route daily, Disp: 1 kit, Rfl: 0    Blood Pressure KIT, 1 kit by Does not apply route daily, Disp: 1 kit, Rfl: 0    Misc. Devices (QUAD CANE) MISC, 1 Quad cane, Disp: 1 each, Rfl: 0    Multiple Vitamins-Minerals (THERAPEUTIC MULTIVITAMIN-MINERALS) tablet, Take 1 tablet by mouth daily, Disp: , Rfl:     B Complex Vitamins (B COMPLEX 1 PO), Take 1 tablet by mouth daily.   , Disp: , Rfl:     fluticasone (FLONASE) 50 MCG/ACT nasal spray, 1 spray by Nasal route daily (Patient not taking: Reported on 10/29/2020), Disp: 1 Bottle, Rfl: 3    guaiFENesin (MUCINEX) 600 MG extended release tablet, Take 1 tablet by mouth 2 times daily for 15 days (Patient not taking: Reported on 10/29/2020), Disp: 30 tablet, Rfl: 0  No Known Allergies  Social History     Socioeconomic History    Marital status:      Spouse name: Not on file    Number of children: Not on file    Years of education: Not on file    Highest education level: Not on file Occupational History    Not on file   Social Needs    Financial resource strain: Not on file    Food insecurity     Worry: Not on file     Inability: Not on file    Transportation needs     Medical: Not on file     Non-medical: Not on file   Tobacco Use    Smoking status: Never Smoker    Smokeless tobacco: Never Used   Substance and Sexual Activity    Alcohol use: Yes     Comment: occas.  Drug use: No    Sexual activity: Not on file   Lifestyle    Physical activity     Days per week: Not on file     Minutes per session: Not on file    Stress: Not on file   Relationships    Social connections     Talks on phone: Not on file     Gets together: Not on file     Attends Islam service: Not on file     Active member of club or organization: Not on file     Attends meetings of clubs or organizations: Not on file     Relationship status: Not on file    Intimate partner violence     Fear of current or ex partner: Not on file     Emotionally abused: Not on file     Physically abused: Not on file     Forced sexual activity: Not on file   Other Topics Concern    Not on file   Social History Narrative    Not on file     Family History   Problem Relation Age of Onset    Heart Disease Mother     Diabetes Mother     Heart Disease Father     Diabetes Father            Physical Exam:    Temp 98 °F (36.7 °C)   Ht 4' 11\" (1.499 m)   Wt 158 lb (71.7 kg)   LMP  (LMP Unknown)   BMI 31.91 kg/m²     GENERAL: alert, appears stated age, cooperative, no acute distress    HEENT: Head is normocephalic, atraumatic. PERRLA. SKIN: Clean, dry, intact. There is not any cellulitis or cutaneous lesions noted in the upper extremities    PULMONARY: breathing is regular and unlabored, no acute distress    CV: The bilateral upper and lower extremities are warm and well-perfused with brisk capillary refill.  2+ pulses UE and LE bilateral.     PSYCHIATRY: Pleasant mood, appropriate behavior, follows commands    NEURO: supraspinatus tendon is suspected. Mild increased signal and thickening within the supraspinatus tendon are consistent with tendinosis. Infraspinatus tendon is intact. Teres minor is intact. Subscapularis tendon is intact. BICEPS TENDON: Intact LABRUM: Questionable SLAP type labral tear. GLENOHUMERAL JOINT: No joint effusion. Mild increased signal surrounds the glenohumeral joint with mild increased signal within the rotator interval. AC JOINT AND ACROMIOCLAVICULAR ARCH: Mild acromioclavicular degenerative changes. BONE MARROW: No acute fracture. No suspicious bone marrow replacing lesion. OUTLET SPACES: No mass or lesion. Mild supraspinatus tendinosis with mild partial-thickness articular surface tearing. Questionable SLAP type labral tear. Mild increased signal surrounding the glenohumeral joint is nonspecific but can be seen with adhesive capsulitis. Jayy Chirinos was seen today for shoulder pain. Diagnoses and all orders for this visit:    Nontraumatic tear of right rotator cuff, unspecified tear extent      Patient seen and examined. X-rays reviewed. Patient sustained an injury to her right shoulder over several months ago with continued pain and weakness. Patient states it feels much like her left shoulder which had a rotator cuff tear. Concern is for rotator cuff pathology of the right shoulder. MRI recommended for evaluation and management. Patient seen and examined. MRI reviewed with patient in detail. Natural history and course discussed with patient in long discussion  Treatment options discussed with patient in detail including risks and benefits. I discussed the risks and benefits of the shoulder arthroscopy with the patient. The risks include but are not limited to: infection, injuries to blood vessels and nerves, non relief of symptoms, intraoperative fracture, need for further operative intervention, blood loss, arthrofibrosis of shoulder, DVT/PE, MI and death.   The patient understands these risks and wishes to proceed with surgery. The patient was counseled at length about the risks of jason Covid-19 during their perioperative period and any recovery window from their procedure. The patient was made aware that jason Covid-19  may worsen their prognosis for recovering from their procedure  and lend to a higher morbidity and/or mortality risk. All material risks, benefits, and reasonable alternatives including postponing the procedure were discussed. The patient does wish to proceed with the procedure at this time. Lito Burrows DO          25 minutes was spent with patient. 50% or greater was spent counseling the patient.

## 2020-10-30 PROBLEM — Z12.31 ENCOUNTER FOR SCREENING MAMMOGRAM FOR BREAST CANCER: Status: RESOLVED | Noted: 2020-09-30 | Resolved: 2020-10-30

## 2020-11-10 ENCOUNTER — HOSPITAL ENCOUNTER (OUTPATIENT)
Age: 50
Discharge: HOME | End: 2020-11-12
Payer: MEDICAID

## 2020-11-10 PROCEDURE — U0003 INFECTIOUS AGENT DETECTION BY NUCLEIC ACID (DNA OR RNA); SEVERE ACUTE RESPIRATORY SYNDROME CORONAVIRUS 2 (SARS-COV-2) (CORONAVIRUS DISEASE [COVID-19]), AMPLIFIED PROBE TECHNIQUE, MAKING USE OF HIGH THROUGHPUT TECHNOLOGIES AS DESCRIBED BY CMS-2020-01-R: HCPCS

## 2020-11-11 RX ORDER — SODIUM CHLORIDE 0.9 % (FLUSH) 0.9 %
10 SYRINGE (ML) INJECTION PRN
Status: CANCELLED | OUTPATIENT
Start: 2020-11-11

## 2020-11-11 RX ORDER — SODIUM CHLORIDE 9 MG/ML
INJECTION, SOLUTION INTRAVENOUS CONTINUOUS
Status: CANCELLED | OUTPATIENT
Start: 2020-11-11

## 2020-11-11 RX ORDER — SODIUM CHLORIDE 0.9 % (FLUSH) 0.9 %
10 SYRINGE (ML) INJECTION EVERY 12 HOURS SCHEDULED
Status: CANCELLED | OUTPATIENT
Start: 2020-11-11

## 2020-11-12 ENCOUNTER — HOSPITAL ENCOUNTER (OUTPATIENT)
Dept: MAMMOGRAPHY | Age: 50
Discharge: HOME | End: 2020-11-14
Payer: MEDICAID

## 2020-11-12 ENCOUNTER — HOSPITAL ENCOUNTER (OUTPATIENT)
Dept: PREADMISSION TESTING | Age: 50
Discharge: HOME | End: 2020-11-12
Payer: MEDICAID

## 2020-11-12 VITALS
SYSTOLIC BLOOD PRESSURE: 122 MMHG | OXYGEN SATURATION: 96 % | DIASTOLIC BLOOD PRESSURE: 88 MMHG | TEMPERATURE: 97.3 F | WEIGHT: 157 LBS | HEIGHT: 59 IN | RESPIRATION RATE: 16 BRPM | HEART RATE: 90 BPM | BODY MASS INDEX: 31.65 KG/M2

## 2020-11-12 LAB
ANION GAP SERPL CALCULATED.3IONS-SCNC: 10 MMOL/L (ref 7–16)
BUN BLDV-MCNC: 11 MG/DL (ref 6–20)
CALCIUM SERPL-MCNC: 9.2 MG/DL (ref 8.6–10.2)
CHLORIDE BLD-SCNC: 102 MMOL/L (ref 98–107)
CO2: 26 MMOL/L (ref 22–29)
CREAT SERPL-MCNC: 0.7 MG/DL (ref 0.5–1)
GFR AFRICAN AMERICAN: >60
GFR NON-AFRICAN AMERICAN: >60 ML/MIN/1.73
GLUCOSE BLD-MCNC: 114 MG/DL (ref 74–99)
HCT VFR BLD CALC: 35.7 % (ref 34–48)
HEMOGLOBIN: 11.5 G/DL (ref 11.5–15.5)
MCH RBC QN AUTO: 29.9 PG (ref 26–35)
MCHC RBC AUTO-ENTMCNC: 32.2 % (ref 32–34.5)
MCV RBC AUTO: 93 FL (ref 80–99.9)
PDW BLD-RTO: 12.7 FL (ref 11.5–15)
PLATELET # BLD: 356 E9/L (ref 130–450)
PMV BLD AUTO: 10.5 FL (ref 7–12)
POTASSIUM REFLEX MAGNESIUM: 4.3 MMOL/L (ref 3.5–5)
RBC # BLD: 3.84 E12/L (ref 3.5–5.5)
SODIUM BLD-SCNC: 138 MMOL/L (ref 132–146)
WBC # BLD: 10.7 E9/L (ref 4.5–11.5)

## 2020-11-12 PROCEDURE — 77067 SCR MAMMO BI INCL CAD: CPT

## 2020-11-12 PROCEDURE — 85027 COMPLETE CBC AUTOMATED: CPT

## 2020-11-12 PROCEDURE — 36415 COLL VENOUS BLD VENIPUNCTURE: CPT

## 2020-11-12 PROCEDURE — 80048 BASIC METABOLIC PNL TOTAL CA: CPT

## 2020-11-12 NOTE — PROGRESS NOTES
[facility]                                                                                                                    PRE OP INSTRUCTIONS FOR  Jameel Contreras        Date: 11/12/2020    Date of surgery: 11/16/20   Arrival Time: Hospital will call you this friday between 5pm and 7pm with your final arrival time for surgery    1. Do not eat or drink anything after midnight prior to surgery. This includes no water, chewing gum, mints or ice chips. 2. Take the following medications with a small sip of water on the morning of Surgery: metoprolol, omeprazole,  Nerve pills if needed. Tylenol if needed for pain. 3. Diabetics may take half of evening dose of insulin but none after midnight. If you feel symptomatic or low blood sugar morning of surgery drink 1-2 ounces of apple juice only. 4. Aspirin, Ibuprofen, Advil, Naproxen, Vitamin E and other Anti-inflammatory products should be stopped  before surgery  as directed by your physician. Take Tylenol only unless instructed otherwise by your surgeon. 5. Check with your Doctor regarding stopping Plavix, Coumadin, Lovenox, Eliquis, Effient, or other blood thinners. 6. Do not smoke,use illicit drugs and do not drink any alcoholic beverages 24 hours prior to surgery. 7. You may brush your teeth the morning of surgery. DO NOT SWALLOW WATER    8. You MUST make arrangements for a responsible adult to take you home after your surgery. You will not be allowed to leave alone or drive yourself home. It is strongly suggested someone stay with you the first 24 hrs. Your surgery will be cancelled if you do not have a ride home. 9. PEDIATRIC PATIENTS ONLY:  A parent/legal guardian must accompany a child scheduled for surgery and plan to stay at the hospital until the child is discharged. Please do not bring other children with you.     10. Please wear simple, loose fitting clothing to the hospital.  Do not bring valuables (money, credit cards, checkbooks, etc.) Do not wear any makeup (including no eye makeup) or nail polish on your fingers or toes. 11. DO NOT wear any jewelry or piercings on day of surgery. All body piercing jewelry must be removed. 12. Shower the night before surgery with _ x__Antibacterial soap /ALBINO WIPES________    13. TOTAL JOINT REPLACEMENT/HYSTERECTOMY PATIENTS ONLY---Remember to bring Blood Bank bracelet to the hospital on the day of surgery. 14. If you have a Living Will and Durable Power of  for Healthcare, please bring in a copy. 15. If appropriate bring crutches, inspirex, WALKER, CANE etc... 12. Notify your Surgeon if you develop any illness between now and surgery time, cough, cold, fever, sore throat, nausea, vomiting, etc.  Please notify your surgeon if you experience dizziness, shortness of breath or blurred vision between now & the time of your surgery. 17. If you have ___dentures, they will be removed before going to the OR; we will provide you a container. If you wear ___contact lenses or ___glasses, they will be removed; please bring a case for them. 18. To provide excellent care visitors will be limited to 1 in the room at any given time. 19. Please bring picture ID and insurance card. 20. Sleep apnea patients need to bring CPAP AND SETTINGS to hospital on day of surgery. 21. During flu season no children under the age of 15 are permitted in the hospital for the safety of all patients. 22. Other please check in at the information desk/main lobby. Wear a mask. Please call AMBULATORY CARE if you have any further questions.    [facility]     [address]

## 2020-11-13 ENCOUNTER — TELEPHONE (OUTPATIENT)
Dept: ORTHOPEDIC SURGERY | Age: 50
End: 2020-11-13

## 2020-11-13 LAB
SARS-COV-2: NOT DETECTED
SOURCE: NORMAL

## 2020-11-13 NOTE — TELEPHONE ENCOUNTER
I spoke with Dr Maria E Jonas office yesterday (11/12/2020) asking for them to fax over EKG and cardiac clearance. Office staff said she was faxing it over. I never received the fax. Called again this morning 11/13/2020, had to leave message asking for this info to be faxed. I will call again this afternoon if not received.

## 2020-11-16 ENCOUNTER — ANESTHESIA EVENT (OUTPATIENT)
Dept: OPERATING ROOM | Age: 50
End: 2020-11-16
Payer: MEDICAID

## 2020-11-16 ENCOUNTER — ANESTHESIA (OUTPATIENT)
Dept: OPERATING ROOM | Age: 50
End: 2020-11-16
Payer: MEDICAID

## 2020-11-16 ENCOUNTER — HOSPITAL ENCOUNTER (OUTPATIENT)
Age: 50
Setting detail: OUTPATIENT SURGERY
Discharge: HOME | End: 2020-11-16
Attending: ORTHOPAEDIC SURGERY | Admitting: ORTHOPAEDIC SURGERY
Payer: MEDICAID

## 2020-11-16 VITALS
DIASTOLIC BLOOD PRESSURE: 70 MMHG | RESPIRATION RATE: 20 BRPM | SYSTOLIC BLOOD PRESSURE: 122 MMHG | OXYGEN SATURATION: 97 % | TEMPERATURE: 97.7 F | HEART RATE: 97 BPM

## 2020-11-16 VITALS
SYSTOLIC BLOOD PRESSURE: 105 MMHG | DIASTOLIC BLOOD PRESSURE: 68 MMHG | OXYGEN SATURATION: 94 % | RESPIRATION RATE: 5 BRPM

## 2020-11-16 LAB
METER GLUCOSE: 107 MG/DL (ref 74–99)
METER GLUCOSE: 93 MG/DL (ref 74–99)

## 2020-11-16 PROCEDURE — 29823 SHO ARTHRS SRG XTNSV DBRDMT: CPT | Performed by: ORTHOPAEDIC SURGERY

## 2020-11-16 PROCEDURE — 29824 SHO ARTHRS SRG DSTL CLAVICLC: CPT | Performed by: ORTHOPAEDIC SURGERY

## 2020-11-16 PROCEDURE — 7100000010 HC PHASE II RECOVERY - FIRST 15 MIN: Performed by: ORTHOPAEDIC SURGERY

## 2020-11-16 PROCEDURE — 2500000003 HC RX 250 WO HCPCS: Performed by: ANESTHESIOLOGIST ASSISTANT

## 2020-11-16 PROCEDURE — 3700000000 HC ANESTHESIA ATTENDED CARE: Performed by: ORTHOPAEDIC SURGERY

## 2020-11-16 PROCEDURE — 6360000002 HC RX W HCPCS: Performed by: ORTHOPAEDIC SURGERY

## 2020-11-16 PROCEDURE — 7100000000 HC PACU RECOVERY - FIRST 15 MIN: Performed by: ORTHOPAEDIC SURGERY

## 2020-11-16 PROCEDURE — 2500000003 HC RX 250 WO HCPCS: Performed by: ORTHOPAEDIC SURGERY

## 2020-11-16 PROCEDURE — 2720000010 HC SURG SUPPLY STERILE: Performed by: ORTHOPAEDIC SURGERY

## 2020-11-16 PROCEDURE — 7100000001 HC PACU RECOVERY - ADDTL 15 MIN: Performed by: ORTHOPAEDIC SURGERY

## 2020-11-16 PROCEDURE — 3600000013 HC SURGERY LEVEL 3 ADDTL 15MIN: Performed by: ORTHOPAEDIC SURGERY

## 2020-11-16 PROCEDURE — 7100000011 HC PHASE II RECOVERY - ADDTL 15 MIN: Performed by: ORTHOPAEDIC SURGERY

## 2020-11-16 PROCEDURE — 2580000003 HC RX 258: Performed by: ORTHOPAEDIC SURGERY

## 2020-11-16 PROCEDURE — 3700000001 HC ADD 15 MINUTES (ANESTHESIA): Performed by: ORTHOPAEDIC SURGERY

## 2020-11-16 PROCEDURE — 3600000003 HC SURGERY LEVEL 3 BASE: Performed by: ORTHOPAEDIC SURGERY

## 2020-11-16 PROCEDURE — 6360000002 HC RX W HCPCS

## 2020-11-16 PROCEDURE — 6360000002 HC RX W HCPCS: Performed by: ANESTHESIOLOGY

## 2020-11-16 PROCEDURE — 2580000003 HC RX 258: Performed by: ANESTHESIOLOGY

## 2020-11-16 PROCEDURE — 82962 GLUCOSE BLOOD TEST: CPT

## 2020-11-16 PROCEDURE — 6360000002 HC RX W HCPCS: Performed by: ANESTHESIOLOGIST ASSISTANT

## 2020-11-16 PROCEDURE — 2709999900 HC NON-CHARGEABLE SUPPLY: Performed by: ORTHOPAEDIC SURGERY

## 2020-11-16 PROCEDURE — 29826 SHO ARTHRS SRG DECOMPRESSION: CPT | Performed by: ORTHOPAEDIC SURGERY

## 2020-11-16 PROCEDURE — 76942 ECHO GUIDE FOR BIOPSY: CPT | Performed by: ANESTHESIOLOGY

## 2020-11-16 RX ORDER — GLYCOPYRROLATE 1 MG/5 ML
SYRINGE (ML) INTRAVENOUS PRN
Status: DISCONTINUED | OUTPATIENT
Start: 2020-11-16 | End: 2020-11-16 | Stop reason: SDUPTHER

## 2020-11-16 RX ORDER — OXYCODONE HYDROCHLORIDE AND ACETAMINOPHEN 5; 325 MG/1; MG/1
1 TABLET ORAL EVERY 6 HOURS PRN
Qty: 28 TABLET | Refills: 0 | Status: SHIPPED | OUTPATIENT
Start: 2020-11-16 | End: 2020-11-23

## 2020-11-16 RX ORDER — FENTANYL CITRATE 50 UG/ML
INJECTION, SOLUTION INTRAMUSCULAR; INTRAVENOUS PRN
Status: DISCONTINUED | OUTPATIENT
Start: 2020-11-16 | End: 2020-11-16 | Stop reason: SDUPTHER

## 2020-11-16 RX ORDER — ROPIVACAINE HYDROCHLORIDE 5 MG/ML
30 INJECTION, SOLUTION EPIDURAL; INFILTRATION; PERINEURAL
Status: COMPLETED | OUTPATIENT
Start: 2020-11-16 | End: 2020-11-16

## 2020-11-16 RX ORDER — PROMETHAZINE HYDROCHLORIDE 25 MG/ML
6.25 INJECTION, SOLUTION INTRAMUSCULAR; INTRAVENOUS
Status: DISCONTINUED | OUTPATIENT
Start: 2020-11-16 | End: 2020-11-16 | Stop reason: HOSPADM

## 2020-11-16 RX ORDER — LABETALOL HYDROCHLORIDE 5 MG/ML
5 INJECTION, SOLUTION INTRAVENOUS EVERY 10 MIN PRN
Status: DISCONTINUED | OUTPATIENT
Start: 2020-11-16 | End: 2020-11-16 | Stop reason: HOSPADM

## 2020-11-16 RX ORDER — PHENYLEPHRINE HYDROCHLORIDE 10 MG/ML
INJECTION INTRAVENOUS PRN
Status: DISCONTINUED | OUTPATIENT
Start: 2020-11-16 | End: 2020-11-16 | Stop reason: SDUPTHER

## 2020-11-16 RX ORDER — LABETALOL HYDROCHLORIDE 5 MG/ML
INJECTION, SOLUTION INTRAVENOUS PRN
Status: DISCONTINUED | OUTPATIENT
Start: 2020-11-16 | End: 2020-11-16 | Stop reason: SDUPTHER

## 2020-11-16 RX ORDER — KETOROLAC TROMETHAMINE 10 MG/1
10 TABLET, FILM COATED ORAL EVERY 6 HOURS PRN
Qty: 20 TABLET | Refills: 0 | Status: SHIPPED
Start: 2020-11-16 | End: 2021-02-03

## 2020-11-16 RX ORDER — ROPIVACAINE HYDROCHLORIDE 5 MG/ML
INJECTION, SOLUTION EPIDURAL; INFILTRATION; PERINEURAL
Status: COMPLETED
Start: 2020-11-16 | End: 2020-11-16

## 2020-11-16 RX ORDER — ONDANSETRON 4 MG/1
4 TABLET, FILM COATED ORAL EVERY 8 HOURS PRN
Qty: 20 TABLET | Refills: 0 | Status: SHIPPED | OUTPATIENT
Start: 2020-11-16 | End: 2022-06-09

## 2020-11-16 RX ORDER — PROPOFOL 10 MG/ML
INJECTION, EMULSION INTRAVENOUS PRN
Status: DISCONTINUED | OUTPATIENT
Start: 2020-11-16 | End: 2020-11-16 | Stop reason: SDUPTHER

## 2020-11-16 RX ORDER — FENTANYL CITRATE 50 UG/ML
100 INJECTION, SOLUTION INTRAMUSCULAR; INTRAVENOUS ONCE
Status: DISCONTINUED | OUTPATIENT
Start: 2020-11-16 | End: 2020-11-16

## 2020-11-16 RX ORDER — MIDAZOLAM HYDROCHLORIDE 1 MG/ML
INJECTION INTRAMUSCULAR; INTRAVENOUS PRN
Status: DISCONTINUED | OUTPATIENT
Start: 2020-11-16 | End: 2020-11-16 | Stop reason: SDUPTHER

## 2020-11-16 RX ORDER — MIDAZOLAM HYDROCHLORIDE 1 MG/ML
INJECTION INTRAMUSCULAR; INTRAVENOUS
Status: COMPLETED
Start: 2020-11-16 | End: 2020-11-16

## 2020-11-16 RX ORDER — ONDANSETRON 2 MG/ML
4 INJECTION INTRAMUSCULAR; INTRAVENOUS
Status: DISCONTINUED | OUTPATIENT
Start: 2020-11-16 | End: 2020-11-16 | Stop reason: HOSPADM

## 2020-11-16 RX ORDER — DEXAMETHASONE SODIUM PHOSPHATE 10 MG/ML
INJECTION, SOLUTION INTRAMUSCULAR; INTRAVENOUS PRN
Status: DISCONTINUED | OUTPATIENT
Start: 2020-11-16 | End: 2020-11-16 | Stop reason: SDUPTHER

## 2020-11-16 RX ORDER — HYDRALAZINE HYDROCHLORIDE 20 MG/ML
5 INJECTION INTRAMUSCULAR; INTRAVENOUS EVERY 10 MIN PRN
Status: DISCONTINUED | OUTPATIENT
Start: 2020-11-16 | End: 2020-11-16 | Stop reason: HOSPADM

## 2020-11-16 RX ORDER — SODIUM CHLORIDE 0.9 % (FLUSH) 0.9 %
10 SYRINGE (ML) INJECTION PRN
Status: DISCONTINUED | OUTPATIENT
Start: 2020-11-16 | End: 2020-11-16 | Stop reason: HOSPADM

## 2020-11-16 RX ORDER — LIDOCAINE HYDROCHLORIDE 20 MG/ML
INJECTION, SOLUTION INTRAVENOUS PRN
Status: DISCONTINUED | OUTPATIENT
Start: 2020-11-16 | End: 2020-11-16 | Stop reason: SDUPTHER

## 2020-11-16 RX ORDER — BUPIVACAINE HYDROCHLORIDE AND EPINEPHRINE 2.5; 5 MG/ML; UG/ML
INJECTION, SOLUTION EPIDURAL; INFILTRATION; INTRACAUDAL; PERINEURAL PRN
Status: DISCONTINUED | OUTPATIENT
Start: 2020-11-16 | End: 2020-11-16 | Stop reason: ALTCHOICE

## 2020-11-16 RX ORDER — NEOSTIGMINE METHYLSULFATE 1 MG/ML
INJECTION, SOLUTION INTRAVENOUS PRN
Status: DISCONTINUED | OUTPATIENT
Start: 2020-11-16 | End: 2020-11-16 | Stop reason: SDUPTHER

## 2020-11-16 RX ORDER — FENTANYL CITRATE 50 UG/ML
INJECTION, SOLUTION INTRAMUSCULAR; INTRAVENOUS
Status: COMPLETED
Start: 2020-11-16 | End: 2020-11-16

## 2020-11-16 RX ORDER — ROCURONIUM BROMIDE 10 MG/ML
INJECTION, SOLUTION INTRAVENOUS PRN
Status: DISCONTINUED | OUTPATIENT
Start: 2020-11-16 | End: 2020-11-16 | Stop reason: SDUPTHER

## 2020-11-16 RX ORDER — MIDAZOLAM HYDROCHLORIDE 1 MG/ML
1 INJECTION INTRAMUSCULAR; INTRAVENOUS EVERY 5 MIN PRN
Status: COMPLETED | OUTPATIENT
Start: 2020-11-16 | End: 2020-11-16

## 2020-11-16 RX ORDER — FENTANYL CITRATE 50 UG/ML
50 INJECTION, SOLUTION INTRAMUSCULAR; INTRAVENOUS ONCE
Status: COMPLETED | OUTPATIENT
Start: 2020-11-16 | End: 2020-11-16

## 2020-11-16 RX ORDER — SODIUM CHLORIDE 9 MG/ML
INJECTION, SOLUTION INTRAVENOUS CONTINUOUS
Status: DISCONTINUED | OUTPATIENT
Start: 2020-11-16 | End: 2020-11-16 | Stop reason: HOSPADM

## 2020-11-16 RX ORDER — MEPERIDINE HYDROCHLORIDE 25 MG/ML
12.5 INJECTION INTRAMUSCULAR; INTRAVENOUS; SUBCUTANEOUS EVERY 5 MIN PRN
Status: DISCONTINUED | OUTPATIENT
Start: 2020-11-16 | End: 2020-11-16 | Stop reason: HOSPADM

## 2020-11-16 RX ORDER — ESMOLOL HYDROCHLORIDE 10 MG/ML
INJECTION INTRAVENOUS PRN
Status: DISCONTINUED | OUTPATIENT
Start: 2020-11-16 | End: 2020-11-16 | Stop reason: SDUPTHER

## 2020-11-16 RX ORDER — ONDANSETRON 2 MG/ML
INJECTION INTRAMUSCULAR; INTRAVENOUS PRN
Status: DISCONTINUED | OUTPATIENT
Start: 2020-11-16 | End: 2020-11-16 | Stop reason: SDUPTHER

## 2020-11-16 RX ORDER — DOCUSATE SODIUM 100 MG/1
100 CAPSULE, LIQUID FILLED ORAL 2 TIMES DAILY PRN
Qty: 20 CAPSULE | Refills: 1 | Status: SHIPPED | OUTPATIENT
Start: 2020-11-16 | End: 2021-02-09

## 2020-11-16 RX ORDER — SODIUM CHLORIDE 0.9 % (FLUSH) 0.9 %
10 SYRINGE (ML) INJECTION EVERY 12 HOURS SCHEDULED
Status: DISCONTINUED | OUTPATIENT
Start: 2020-11-16 | End: 2020-11-16 | Stop reason: HOSPADM

## 2020-11-16 RX ORDER — ROPIVACAINE HYDROCHLORIDE 5 MG/ML
INJECTION, SOLUTION EPIDURAL; INFILTRATION; PERINEURAL
Status: COMPLETED | OUTPATIENT
Start: 2020-11-16 | End: 2020-11-16

## 2020-11-16 RX ADMIN — LABETALOL HYDROCHLORIDE 5 MG: 5 INJECTION INTRAVENOUS at 15:03

## 2020-11-16 RX ADMIN — ROPIVACAINE HYDROCHLORIDE 30 ML: 5 INJECTION, SOLUTION EPIDURAL; INFILTRATION; PERINEURAL at 13:53

## 2020-11-16 RX ADMIN — ROCURONIUM BROMIDE 25 MG: 10 SOLUTION INTRAVENOUS at 14:47

## 2020-11-16 RX ADMIN — SODIUM CHLORIDE: 9 INJECTION, SOLUTION INTRAVENOUS at 12:28

## 2020-11-16 RX ADMIN — FENTANYL CITRATE 50 MCG: 50 INJECTION, SOLUTION INTRAMUSCULAR; INTRAVENOUS at 14:58

## 2020-11-16 RX ADMIN — FENTANYL CITRATE 50 MCG: 50 INJECTION, SOLUTION INTRAMUSCULAR; INTRAVENOUS at 13:44

## 2020-11-16 RX ADMIN — PROPOFOL 20 MG: 10 INJECTION, EMULSION INTRAVENOUS at 14:58

## 2020-11-16 RX ADMIN — FENTANYL CITRATE 50 MCG: 50 INJECTION, SOLUTION INTRAMUSCULAR; INTRAVENOUS at 15:03

## 2020-11-16 RX ADMIN — Medication 0.6 MG: at 15:34

## 2020-11-16 RX ADMIN — MIDAZOLAM 1 MG: 1 INJECTION INTRAMUSCULAR; INTRAVENOUS at 13:44

## 2020-11-16 RX ADMIN — LIDOCAINE HYDROCHLORIDE 80 MG: 20 INJECTION, SOLUTION INTRAVENOUS at 14:23

## 2020-11-16 RX ADMIN — MIDAZOLAM 2 MG: 1 INJECTION INTRAMUSCULAR; INTRAVENOUS at 14:15

## 2020-11-16 RX ADMIN — PROPOFOL 30 MG: 10 INJECTION, EMULSION INTRAVENOUS at 14:53

## 2020-11-16 RX ADMIN — ROPIVACAINE HYDROCHLORIDE 30 ML: 5 INJECTION, SOLUTION EPIDURAL; INFILTRATION; PERINEURAL at 14:00

## 2020-11-16 RX ADMIN — ESMOLOL HYDROCHLORIDE 30 MG: 10 INJECTION, SOLUTION INTRAVENOUS at 14:54

## 2020-11-16 RX ADMIN — ONDANSETRON 4 MG: 2 INJECTION INTRAMUSCULAR; INTRAVENOUS at 15:34

## 2020-11-16 RX ADMIN — FENTANYL CITRATE 100 MCG: 50 INJECTION, SOLUTION INTRAMUSCULAR; INTRAVENOUS at 14:53

## 2020-11-16 RX ADMIN — PROPOFOL 150 MG: 10 INJECTION, EMULSION INTRAVENOUS at 14:23

## 2020-11-16 RX ADMIN — PHENYLEPHRINE HYDROCHLORIDE 100 MCG: 10 INJECTION INTRAVENOUS at 14:34

## 2020-11-16 RX ADMIN — Medication 3 MG: at 15:34

## 2020-11-16 RX ADMIN — FENTANYL CITRATE 50 MCG: 50 INJECTION, SOLUTION INTRAMUSCULAR; INTRAVENOUS at 14:39

## 2020-11-16 RX ADMIN — Medication 2 G: at 14:41

## 2020-11-16 RX ADMIN — SODIUM CHLORIDE: 9 INJECTION, SOLUTION INTRAVENOUS at 15:39

## 2020-11-16 RX ADMIN — FENTANYL CITRATE 100 MCG: 50 INJECTION, SOLUTION INTRAMUSCULAR; INTRAVENOUS at 14:23

## 2020-11-16 RX ADMIN — MIDAZOLAM 1 MG: 1 INJECTION INTRAMUSCULAR; INTRAVENOUS at 13:46

## 2020-11-16 RX ADMIN — ROCURONIUM BROMIDE 25 MG: 10 SOLUTION INTRAVENOUS at 14:24

## 2020-11-16 RX ADMIN — DEXAMETHASONE SODIUM PHOSPHATE 10 MG: 10 INJECTION, SOLUTION INTRAMUSCULAR; INTRAVENOUS at 14:24

## 2020-11-16 ASSESSMENT — PULMONARY FUNCTION TESTS
PIF_VALUE: 23
PIF_VALUE: 36
PIF_VALUE: 22
PIF_VALUE: 23
PIF_VALUE: 24
PIF_VALUE: 25
PIF_VALUE: 4
PIF_VALUE: 24
PIF_VALUE: 24
PIF_VALUE: 2
PIF_VALUE: 24
PIF_VALUE: 5
PIF_VALUE: 3
PIF_VALUE: 26
PIF_VALUE: 24
PIF_VALUE: 23
PIF_VALUE: 23
PIF_VALUE: 24
PIF_VALUE: 5
PIF_VALUE: 1
PIF_VALUE: 23
PIF_VALUE: 23
PIF_VALUE: 25
PIF_VALUE: 26
PIF_VALUE: 23
PIF_VALUE: 25
PIF_VALUE: 23
PIF_VALUE: 22
PIF_VALUE: 23
PIF_VALUE: 2
PIF_VALUE: 24
PIF_VALUE: 22
PIF_VALUE: 22
PIF_VALUE: 25
PIF_VALUE: 23
PIF_VALUE: 23
PIF_VALUE: 2
PIF_VALUE: 24
PIF_VALUE: 24
PIF_VALUE: 25
PIF_VALUE: 19
PIF_VALUE: 5
PIF_VALUE: 25
PIF_VALUE: 37
PIF_VALUE: 23
PIF_VALUE: 22
PIF_VALUE: 25
PIF_VALUE: 24
PIF_VALUE: 24
PIF_VALUE: 23
PIF_VALUE: 24
PIF_VALUE: 0
PIF_VALUE: 24
PIF_VALUE: 25
PIF_VALUE: 24
PIF_VALUE: 25
PIF_VALUE: 0
PIF_VALUE: 1
PIF_VALUE: 1
PIF_VALUE: 21
PIF_VALUE: 24
PIF_VALUE: 23
PIF_VALUE: 21
PIF_VALUE: 25
PIF_VALUE: 25
PIF_VALUE: 0
PIF_VALUE: 23
PIF_VALUE: 0
PIF_VALUE: 24
PIF_VALUE: 22
PIF_VALUE: 24
PIF_VALUE: 2
PIF_VALUE: 25
PIF_VALUE: 25
PIF_VALUE: 22
PIF_VALUE: 24
PIF_VALUE: 0
PIF_VALUE: 26
PIF_VALUE: 25
PIF_VALUE: 25
PIF_VALUE: 26
PIF_VALUE: 4

## 2020-11-16 ASSESSMENT — PAIN DESCRIPTION - DESCRIPTORS
DESCRIPTORS: ACHING
DESCRIPTORS: DISCOMFORT
DESCRIPTORS: ACHING

## 2020-11-16 ASSESSMENT — PAIN SCALES - GENERAL
PAINLEVEL_OUTOF10: 0
PAINLEVEL_OUTOF10: 6
PAINLEVEL_OUTOF10: 6
PAINLEVEL_OUTOF10: 0

## 2020-11-16 ASSESSMENT — PAIN - FUNCTIONAL ASSESSMENT: PAIN_FUNCTIONAL_ASSESSMENT: 0-10

## 2020-11-16 ASSESSMENT — PAIN DESCRIPTION - PAIN TYPE
TYPE: SURGICAL PAIN
TYPE: SURGICAL PAIN

## 2020-11-16 ASSESSMENT — PAIN DESCRIPTION - ORIENTATION
ORIENTATION: RIGHT
ORIENTATION: RIGHT

## 2020-11-16 ASSESSMENT — PAIN DESCRIPTION - LOCATION
LOCATION: SHOULDER
LOCATION: SHOULDER

## 2020-11-16 NOTE — BRIEF OP NOTE
Brief Postoperative Note      Patient: Tod Rinaldi  YOB: 1970  MRN: [id number]    Date of Procedure: 11/16/2020    Pre-Op Diagnosis: NONTRAUMATIC TEAR OF RIGHT ROTATOR CUFF    Post-Op Diagnosis: Same       Procedure(s):  RIGHT SHOULDER ARTHROSCOPY, SUBACROMIAL DECOMPRESSION, LABRIAL DEBRIDEMENT, CHONDROPLASTY, RAÚL    Surgeon(s):   Nicolas Mackenzie DO    Assistant:  Resident: Jeronimo Shook DO; Robert Mark DO; Antonette Russell DO    Anesthesia: General    Estimated Blood Loss (mL): Minimal    Complications: None    Specimens:   * No specimens in log *    Implants:  * No implants in log *      Drains: * No LDAs found *    Findings: see report      Electronically signed by Nicolas Mackenzie DO on 11/16/2020 at 3:52 PM

## 2020-11-16 NOTE — ANESTHESIA PRE PROCEDURE
Department of Anesthesiology  Preprocedure Note       Name:  Natasha Cagle   Age:  44 y.o.  :  1970                                          MRN:  [id number]         Date:  2020      Surgeon: Selena Hampton): Hanna Hughes DO    Procedure: RIGHT SHOULDER ARTHROSCOPY, SUBACROMIAL DECOMPRESSION, DEBRIDEMENT (Right )    Medications prior to admission:   Prior to Admission medications    Medication Sig Start Date End Date Taking?  Authorizing Provider   etodolac (LODINE) 300 MG capsule TAKE 1 CAPSULE BY MOUTH EVERY DAY *EMERGENCY REFILL* 10/21/20   Charlene P Catterlin, DO   sertraline (ZOLOFT) 100 MG tablet TAKE 1 TABLET BY MOUTH DAILY AS NEEDED FOR DEPRESSION *EMERGENCY REFILL* 10/21/20   Charlene P Catterlin, DO   lactobacillus (CULTURELLE) CAPS capsule TAKE (1) CAPSULE BY MOUTH DAILY 10/21/20   Charlene P Catterlin, DO   blood glucose test strips (ONETOUCH ULTRA) strip TEST FOUR TIMES DAILY AS NEEDED FOR SYMTOMS OF IRREGULAR BLOOD GLUCOSE 10/21/20   [employer] P Catterlin, DO   fluticasone (FLONASE) 50 MCG/ACT nasal spray 1 spray by Nasal route daily  Patient not taking: Reported on 10/29/2020 10/19/20   Inocencio Johnson Catterlin, DO   empagliflozin (JARDIANCE) 10 MG tablet Take 1 tablet by mouth daily 20   [employer] P Catterlin, DO   nitroGLYCERIN (NITROSTAT) 0.4 MG SL tablet Place 1 tablet under the tongue every 5 minutes as needed for Chest pain 20   Charlene P Catterlin, DO   Dulaglutide (TRULICITY) 1.5 TC/8.7UM SOPN INJECT THE CONTENTS OF 1 SYRINGE SUBCUTANEOUSLY EVERY WEEK *PATIENT NEEDS APPOINTMENT*  Patient taking differently: INJECT THE CONTENTS OF 1 SYRINGE SUBCUTANEOUSLY EVERY WEEK *PATIENT NEEDS APPOINTMENT*    Takes on    [employer] P Catterlin, DO   metoprolol succinate (TOPROL XL) 25 MG extended release tablet TAKE 1/2 TABLET BY MOUTH DAILY 20   Charlene P Catterlin, DO   DULoxetine (CYMBALTA) 30 MG extended release capsule TAKE 1 CAPSULE BY MOUTH EVERY MORNING *PATIENT NEEDS tablet by mouth daily    Historical Provider, MD   B Complex Vitamins (B COMPLEX 1 PO) Take 1 tablet by mouth daily. Historical Provider, MD       Current medications:    No current facility-administered medications for this visit. No current outpatient medications on file. Facility-Administered Medications Ordered in Other Visits   Medication Dose Route Frequency Provider Last Rate Last Dose    ceFAZolin (ANCEF) 2 g in sterile water 20 mL IV syringe  2 g Intravenous Once Anand Hardneion, DO        0.9 % sodium chloride infusion   Intravenous Continuous Hines Osler, MD 50 mL/hr at 11/16/20 1228      sodium chloride flush 0.9 % injection 10 mL  10 mL Intravenous 2 times per day Hines Osler, MD        sodium chloride flush 0.9 % injection 10 mL  10 mL Intravenous PRN Hines Osler, MD           Allergies:  No Known Allergies    Problem List:    Patient Active Problem List   Diagnosis Code    IDDM (insulin dependent diabetes mellitus) [id number]    Neuropathy G62.9    Gastroesophageal reflux disease without esophagitis K21.9    Retinopathy H35.00    Anxiety F41.9    Irritable bowel syndrome K58.9    Mixed hyperlipidemia E78.2    Fatigue R53.83    Lumbar pain M54.5    Epigastric pain R10.13    Abdominal pain, right lower quadrant R10.31    Dermatitis L30.9    Head injury S09.90XA    Contusion of left hip S70. 02XA    Generalized abdominal pain R10.84    Melena K92.1    RLS (restless legs syndrome) G25.81    Essential hypertension I10    Acute bacterial sinusitis J01.90, B96.89    Atypical chest pain R07.89    Encounter for assessment of STD exposure Z76.89       Past Medical History:        Diagnosis Date    Arthritis     lower back    Back pain     Cardiac valve prolapse     micro    Diabetes mellitus ([address]     H/O exercise stress test 10/2015  ?     Dr Harley Gamez  neg    Hyperlipidemia     Hypertension     Neuropathy due to secondary diabetes ([address]     in marcial feet    Prolonged emergence from general anesthesia     Psoriasis        Past Surgical History:        Procedure Laterality Date    ANKLE SURGERY      bilateral tarsal tunnels    CARDIAC SURGERY      cardiac catheterization    CARPAL TUNNEL RELEASE      left    CARPAL TUNNEL RELEASE  12    right     SECTION      CHOLECYSTECTOMY, LAPAROSCOPIC N/A 2019    LAPAROSCOPIC CHOLECYSTECTOMY WITH IOC performed by Pedro Snow MD at [address]  had extremely high blood pressure and hard to wake up    COLONOSCOPY      COLONOSCOPY N/A 2019    COLORECTAL CANCER SCREENING, NOT HIGH RISK performed by Pedro Snow MD at [facility], [place], DIAGNOSTIC      HYSTERECTOMY      HYSTERECTOMY, TOTAL ABDOMINAL      SHOULDER ARTHROSCOPY Left 2016    subacromin decompression and debridement    UPPER GASTROINTESTINAL ENDOSCOPY N/A 2019    EGD BIOPSY [facility] by [facility] at [facility] History:    Social History     Tobacco Use    Smoking status: Never Smoker    Smokeless tobacco: Never Used   Substance Use Topics    Alcohol use: Yes     Alcohol/week: 1.0 standard drinks     Types: 1 Glasses of wine per week     Comment: weekly                                Counseling given: Not Answered      Vital Signs (Current): There were no vitals filed for this visit.                                            BP Readings from Last 3 Encounters:   20 130/83   20 122/88   20 122/78       NPO Status:                                                                                 BMI:   Wt Readings from Last 3 Encounters:   20 157 lb (71.2 kg)   10/29/20 158 lb (71.7 kg)   20 164 lb (74.4 kg)     There is no height or weight on file to calculate BMI.    CBC:   Lab Results   Component Value Date    WBC 10.7 2020    RBC 3.84 2020    HGB 11.5 2020    HCT 35.7 2020    MCV 93.0 2020    RDW 12.7 2020     2020 Angel Luis Dominguez MD   11/16/2020 [place] Counseling: I discussed with the patient the risks of tildrakizumab including but not limited to immunosuppression, malignancy, posterior leukoencephalopathy syndrome, and serious infections.  The patient understands that monitoring is required including a PPD at baseline and must alert us or the primary physician if symptoms of infection or other concerning signs are noted.

## 2020-11-16 NOTE — PROGRESS NOTES
1330 to pacu for right interscalene block. Connected to all monitors and O2 applied at 3 liters nasal canula.   1343 time out performed and premedicated per orders  1347 block started utilizing ultrasound and nerve stimulator  1353 30 cc 0.5% ropivacaine injected to right interscalene by dr Delia vasquez

## 2020-11-16 NOTE — OP NOTE
debrided with chondroplasty. The procedure continued with lysis of adhesions both anterior capsule and posterior capsule due to her adhesive capsulitis. There was synovitis noted throughout the glenohumeral joint treated with arthroscopic extensive debridement anterior to posterior. Underside of the rotator cuff was thoroughly debrided along with extensive debridement of the labrum as well. There was no full-thickness tear of supraspinatus tendon. Interstitial tearing without full thickness defect was noted at the superior border of the subscapularis as well and debrided. The arthroscope was removed from the glenohumeral joint, redirected in subacromial space. There was bursitis noted in the subacromial space and extensive debridement of the subacromial subdeltoid bursa was carried out. Arthroscopic acromioplasty, was performed with a bur which was utilized to remove 1.5 cm of the anterior acromial overhang which was taken back 2 cm smooth and flattened. Subacromial space was thoroughly irrigated and suctioned. Acromioplasty was finished utilizing a cutting block technique. At this point, the acromioclavicular joint was inspected to be arthritic and therefore an arthroscopic distal clavicle excision with a bur was carried out by removing 1 cm of bone. After this was completed, the joint was thoroughly irrigated and suctioned. Instrumentation removed. Portal sites were closed with interrupted 3-0 nylon followed by injection of 0.25% Marcaine with epinephrine. Dry sterile dressings were applied. Xeroform, 4 x 4 gauze, ABD, foam tape, ice bag, and sling. DISPOSITION: The patient was awoken from anesthesia in the operating room having tolerated the procedure well and was transported to the recovery room in stable condition.

## 2020-11-16 NOTE — PROGRESS NOTES
11/16/20 5535 reviewed discharge instructions with pt and her mom adolfo.  Both verbalized understanding, signed in agreement and given copy. reza barber

## 2020-11-16 NOTE — ANESTHESIA POSTPROCEDURE EVALUATION
Department of Anesthesiology  Postprocedure Note    Patient: Terrence Baum  MRN: [id number]  YOB: 1970  Date of evaluation: 11/16/2020  Time:  4:55 PM     Procedure Summary     Date:  11/16/20 Room / Location:  [address] [place] [address] / [facility]    Anesthesia Start:  2098 Anesthesia Stop:  2140    Procedure:  RIGHT SHOULDER ARTHROSCOPY, SUBACROMIAL DECOMPRESSION, LABRIAL DEBRIDEMENT, CHONDROPLASTY, RAÚL (Right Shoulder) Diagnosis:  (NONTRAUMATIC TEAR OF RIGHT ROTATOR CUFF)    Surgeon:  Mekhi Coley DO Responsible Provider:  Marcus Dumont MD    Anesthesia Type:  general, regional ASA Status:  3          Anesthesia Type: general, regional    Radha Phase I: Radha Score: 9    Radha Phase II:      Last vitals: Reviewed and per EMR flowsheets.        Anesthesia Post Evaluation    Patient location during evaluation: PACU  Patient participation: complete - patient participated  Level of consciousness: awake and alert  Pain score: 0  Airway patency: patent  Nausea & Vomiting: no vomiting and no nausea  Complications: no  Cardiovascular status: hemodynamically stable  Respiratory status: acceptable  Hydration status: stable

## 2020-11-16 NOTE — H&P
CC: Right Shoulder Pain     HPI:    Patient is 44 y.o. female complaining Right shoulder pain. Pt has neuropathy and has poor balance. Regina Tan at her bothers house. Pain with internal rotation. Pain with external rotation. Pt notes she thinks it's rotator cuff related. She had a similar injury on the left and feels similar. Follows up after cortisone injection a couple weeks ago. She does note about 20% improvement. ROS:    Skin: (-) rash,(-) psoriasis,(-) eczema, (-)skin cancer. Neurologic: (-)numbness, (-)tingling, (-)headaches, (-) LOC. Cardiovascular: (-) Chest pain, (-) swelling in legs/feet, (-) SOB, (-) cramping in legs/feet with walking. All other review of systems negative except stated above or in HPI      Past Medical History:   Diagnosis Date    Arthritis     lower back    Back pain     Cardiac valve prolapse     micro    Diabetes mellitus ([address]     H/O exercise stress test 10/2015  ?     Dr Edouard Shah  neg    Hyperlipidemia     Hypertension     Neuropathy due to secondary diabetes ([address]     in marcial feet    Prolonged emergence from general anesthesia     Psoriasis      Past Surgical History:   Procedure Laterality Date    ANKLE SURGERY      bilateral tarsal tunnels    CARDIAC SURGERY      cardiac catheterization    CARPAL TUNNEL RELEASE      left    CARPAL TUNNEL RELEASE  12    right     SECTION      CHOLECYSTECTOMY, LAPAROSCOPIC N/A 2019    LAPAROSCOPIC CHOLECYSTECTOMY WITH IOC performed by Miranda Bhatti MD at [address]  had extremely high blood pressure and hard to wake up    COLONOSCOPY      COLONOSCOPY N/A 2019    COLORECTAL CANCER SCREENING, NOT HIGH RISK performed by Miranda Bhatti MD at [address] [facility], COLON, DIAGNOSTIC      HYSTERECTOMY      HYSTERECTOMY, TOTAL ABDOMINAL      SHOULDER ARTHROSCOPY Left 2016    subacromin decompression and debridement    UPPER GASTROINTESTINAL ENDOSCOPY N/A 2019    EGD BIOPSY performed SKIN: Clean, dry, intact. There is not any cellulitis or cutaneous lesions noted in the upper extremities    PULMONARY: breathing is regular and unlabored, no acute distress    CV: The bilateral upper and lower extremities are warm and well-perfused with brisk capillary refill. 2+ pulses UE and LE bilateral.     PSYCHIATRY: Pleasant mood, appropriate behavior, follows commands    NEURO: Sensation is intact distally with light touch with no alteration. Motor exam of the upper extremities show elbow flexion and extension, wrist flexion and extension, and finger abduction grossly intact 5/5. Upper extremity reflexes are bilaterally symmetrical and within normal limits. LYMPH: No lymphedema present distally in upper or lower extremity. MUSCULOSKELETAL:  Shoulder Exam:  Examination of the left shoulder shows: There is  a deformity. There is not erythema. There is  soft tissue swelling. Deltoid region is  tender to palpation. AC Joint is  tender to palpation. Clavicle is not tender to palpation. Bicipital Groove is not tender to palpation. Pectoralis  is not tender to palpation. Scapula/ trapezius is  tender to palpation. Right:  ROM Full, Strength: Supraspinatus 5/5, Infraspinatus 5/5, Subscapularis 5/5  Left:  /160/60, Strength: Supraspinatus 3/5, Infraspinatus 3/5, Subscapularis 3/5      Imaging:  Xr Shoulder Right (min 2 Views)    Result Date: 5/17/2019  Location: 200 Indication: Right shoulder pain Comparison: Right shoulder radiograph from 6/5/2015 Technique: 4 views of the right shoulder were obtained. Findings: There is no evidence of acute fracture or dislocation. The glenohumeral joint is maintained. The acromioclavicular joint is maintained. Visualized portion of the right lung is grossly clear. Unremarkable right shoulder radiographs.       Mri Shoulder Right Wo Contrast    Result Date: 6/4/2020  EXAMINATION: MRI OF THE RIGHT SHOULDER WITHOUT CONTRAST   6/4/2020 8:57 am TECHNIQUE: Multiplanar multisequence MRI of the right shoulder was performed without the administration of intravenous contrast. COMPARISON: Radiographs May 17, 2019 HISTORY: ORDERING SYSTEM PROVIDED HISTORY: Nontraumatic tear of right rotator cuff, unspecified tear extent FINDINGS: Evaluation is limited by motion. ROTATOR CUFF: Mild partial-thickness articular surface tearing of the distal supraspinatus tendon is suspected. Mild increased signal and thickening within the supraspinatus tendon are consistent with tendinosis. Infraspinatus tendon is intact. Teres minor is intact. Subscapularis tendon is intact. BICEPS TENDON: Intact LABRUM: Questionable SLAP type labral tear. GLENOHUMERAL JOINT: No joint effusion. Mild increased signal surrounds the glenohumeral joint with mild increased signal within the rotator interval. AC JOINT AND ACROMIOCLAVICULAR ARCH: Mild acromioclavicular degenerative changes. BONE MARROW: No acute fracture. No suspicious bone marrow replacing lesion. OUTLET SPACES: No mass or lesion. Mild supraspinatus tendinosis with mild partial-thickness articular surface tearing. Questionable SLAP type labral tear. Mild increased signal surrounding the glenohumeral joint is nonspecific but can be seen with adhesive capsulitis. Martinez Pierre was seen today for shoulder pain. Diagnoses and all orders for this visit:    Nontraumatic tear of right rotator cuff, unspecified tear extent      Patient seen and examined. X-rays reviewed. Patient sustained an injury to her right shoulder over several months ago with continued pain and weakness. Patient states it feels much like her left shoulder which had a rotator cuff tear. Concern is for rotator cuff pathology of the right shoulder. MRI recommended for evaluation and management. Patient seen and examined. MRI reviewed with patient in detail.   Natural history and course discussed with patient in long discussion  Treatment options discussed with patient in detail including risks and benefits. I discussed the risks and benefits of the shoulder arthroscopy with the patient. The risks include but are not limited to: infection, injuries to blood vessels and nerves, non relief of symptoms, intraoperative fracture, need for further operative intervention, blood loss, arthrofibrosis of shoulder, DVT/PE, MI and death. The patient understands these risks and wishes to proceed with surgery. The patient was counseled at length about the risks of jason Covid-19 during their perioperative period and any recovery window from their procedure. The patient was made aware that jason Covid-19  may worsen their prognosis for recovering from their procedure  and lend to a higher morbidity and/or mortality risk. All material risks, benefits, and reasonable alternatives including postponing the procedure were discussed. The patient does wish to proceed with the procedure at this time.           Karli John,

## 2020-11-17 RX ORDER — OMEPRAZOLE 40 MG/1
CAPSULE, DELAYED RELEASE ORAL
Qty: 90 CAPSULE | Refills: 1 | Status: SHIPPED
Start: 2020-11-17 | End: 2021-05-12

## 2020-11-17 RX ORDER — PEN NEEDLE, DIABETIC 31 GX5/16"
NEEDLE, DISPOSABLE MISCELLANEOUS
Qty: 100 EACH | Refills: 10 | Status: SHIPPED | OUTPATIENT
Start: 2020-11-17

## 2020-11-30 ENCOUNTER — EVALUATION (OUTPATIENT)
Dept: PHYSICAL THERAPY | Age: 50
End: 2020-11-30
Payer: MEDICAID

## 2020-11-30 PROBLEM — M75.101 NONTRAUMATIC TEAR OF RIGHT ROTATOR CUFF: Status: ACTIVE | Noted: 2020-11-30

## 2020-11-30 PROCEDURE — 97110 THERAPEUTIC EXERCISES: CPT | Performed by: PHYSICAL THERAPIST

## 2020-11-30 PROCEDURE — 97140 MANUAL THERAPY 1/> REGIONS: CPT | Performed by: PHYSICAL THERAPIST

## 2020-11-30 PROCEDURE — 97162 PT EVAL MOD COMPLEX 30 MIN: CPT | Performed by: PHYSICAL THERAPIST

## 2020-11-30 NOTE — PROGRESS NOTES
Physical Therapy Daily Treatment Note    Date: 2020  Patient Name: Yesenia Reddy  : 1970   MRN: [id number]  DOInjury: 1 year  DOSx: 2020   Referring Provider: Mally Funk DO   Scott Pereira [address] [place] [address]     Medical Diagnosis:      Diagnosis Orders   1. Nontraumatic tear of right rotator cuff, unspecified tear extent         Outcome Measure: Quick Dash: 41% Impairment    S: See eval  O: Pt given written HEP  Time 3553-9204     Visit 1 Repeat outcome measure at mid point and end. Pain 6/10     ROM See eval     Modalities            Manual 10 mins flex, abd, ER, IR                 Stretch      Table slides 5 reps x 1 set with 10s hold for flex, scap, abd, ER      Wall Walk Flex      Wall Walk ABD      Wall ER Stretch      Towel IR Stretch      Supine Stretch with Arms Behind Head            Exercise      UBE          Pulleys - Flex      Pulleys - IR      Supine Wand Flex      Supine Wand Bench Press      Supine Wand ER/IR      Standing Wand IR      Standing Wand Scaption      Standing Wand Flex      Standing Wand ER/IR      Shrugs AROM       Pendulum Ex 1 min each fwd/bwd, side to side, cwise/ccwise           Supine Flex      S-lying ABD      S-lying ER            Prone Flexion      Prone Ext      Prone Row with ER      Prone Horizontal ABD            Standing Flex      Standing Scaption       Standing ABD      Standing Shoulder Press       Functional activities To aid in ROM and strength needed for reaching , lifting ,pushing and pulling at home/work    ROWS: H       ROWS: M  \"    ROWS: L  \"    ER  \"    IR  \"    Shoulder Flex      Shoulder ABD      A:  Tolerated well. Above added to written HEP.   P: Continue with rehab plan  Hallie Childs, PT    Treatment Charges: Mins Units   Initial Evaluation 30 1   Re-Evaluation     Ther Exercise         TE 20 1   Manual Therapy     MT 10 1   Ther Activities        TA     Gait Training          GT     Neuro Re-education NR     Modalities Non-Billable Service Time     Other     Total Time/Units 60 3

## 2020-11-30 NOTE — PROGRESS NOTES
[facility] OUTPATIENT REHABILITATION  PHYSICAL THERAPY INITIAL EVALUATION         Date:  2020   Patient: Francie Garcia  : 1970  MRN: [id number]  Referring Provider: Jennifer Mello DO  1934 [address], [facility]     Medical Diagnosis:      Diagnosis Orders   1. Nontraumatic tear of right rotator cuff, unspecified tear extent          SUBJECTIVE:     Onset date: 1 year    Sx date: 2020    Sx:      1. Right shoulder arthroscopic lysis and resection of adhesions   2. right shoulder arthroscopic distal clavicle resection   3. right shoulder arthroscopic acromioplasty   4. right shoulder arthroscopic extensive debridement     Onset[de-identified] Sudden onset    Mechanism of Injury / History: Pt fell over outside when walking the dog and landed on her R shoulder and it progressively got worse until she required a R shoulder scope. Patient is right handed. Previous PT: none    Medical Management for Current Problem: injections, cortisone injections, OTC meds     Chief complaint: pain, decreased motion and weakness    Behavior: condition is getting better    Pain: intermittent  Current: 0/10     Best: 0/10     Worst:6/10    Symptom Type/Quality: aching  Location[de-identified] noted above anterior, A-C joint, mid-deltoid region      Aggravated by: reaching overhead, reaching out, reaching behind back, lifting/carrying/material handling    Relieved by: rest, ice, medication      Imaging results: Curry Digital Screen W Or Wo Cad Bilateral    Result Date: 2020  EXAMINATION: SCREENING DIGITAL BILATERAL  MAMMOGRAM, 2020 TECHNIQUE: Screening mammography of the bilateral breasts was performed  in the MLO and CC projection. Computer aided detection was utilized in the interpretation of this exam. COMPARISON: 2012, and 2011 HISTORY: Screening. FINDINGS: The breast parenchyma is heterogeneously dense.  Right breast: There is no suspicious mass, cluster of microcalcifications or architectural distortion. Left breast: Within the medial aspect of the left breast best appreciated on the CC view, there is a 1.5 cm irregular focal asymmetry. This is not well seen on the MLO view but is likely within the inferior aspect of the left breast.    1. 1.5 cm irregular focal asymmetry seen within the medial aspect of the left breast best appreciated on the CC view. Dedicated diagnostic mammogram of the left breast is recommended. 2. Stable mammogram of the right breast.  There is no mammographic evidence of malignancy. Annual screening mammography is recommended. BIRADS: BIRADS - CATEGORY 0 Incomplete: Needs Additional Imaging Evaluation OVERALL ASSESSMENT - INCOMPLETE:NEED ADDITIONAL IMAGING EVALUATION. Past Medical History:  Past Medical History:   Diagnosis Date    Arthritis     lower back    Back pain     Cardiac valve prolapse     micro    Diabetes mellitus ([address]     H/O exercise stress test 10/2015  ?     Dr Haim Fletcher  neg    Hyperlipidemia     Hypertension     Neuropathy due to secondary diabetes ([address]     in marcial feet    Prolonged emergence from general anesthesia     Psoriasis      Past Surgical History:   Procedure Laterality Date    ANKLE SURGERY      bilateral tarsal tunnels    CARDIAC SURGERY      cardiac catheterization    CARPAL TUNNEL RELEASE      left    CARPAL TUNNEL RELEASE  12    right     SECTION      CHOLECYSTECTOMY, LAPAROSCOPIC N/A 2019    LAPAROSCOPIC CHOLECYSTECTOMY WITH IOC performed by Phill Page MD at [address]  had extremely high blood pressure and hard to wake up    COLONOSCOPY      COLONOSCOPY N/A 2019    COLORECTAL CANCER SCREENING, NOT HIGH RISK performed by Phill Page MD at [address] [facility], COLON, DIAGNOSTIC      HYSTERECTOMY      HYSTERECTOMY, TOTAL ABDOMINAL      SHOULDER ARTHROSCOPY Left 2016    subacromin decompression and debridement    SHOULDER ARTHROSCOPY Right 2020 RIGHT SHOULDER ARTHROSCOPY, SUBACROMIAL DECOMPRESSION, LABRIAL DEBRIDEMENT, CHONDROPLASTY, RAÚL performed by Pauline Saldaña DO at [address] N/A 6/20/2019    EGD BIOPSY performed by Bina Benavides MD at [facility] ENDOSCOPY       Medications:   Current Outpatient Medications   Medication Sig Dispense Refill    B-D ULTRAFINE III SHORT PEN 31G X 8 MM MISC USE WITH INSULIN PENS AS DIRECTED 100 each 10    omeprazole (PRILOSEC) 40 MG delayed release capsule TAKE 1 CAPSULE BY MOUTH DAILY 90 capsule 1    docusate sodium (COLACE) 100 MG capsule Take 1 capsule by mouth 2 times daily as needed for Constipation 20 capsule 1    ketorolac (TORADOL) 10 MG tablet Take 1 tablet by mouth every 6 hours as needed for Pain Patient received prior injection 20 tablet 0    ondansetron (ZOFRAN) 4 MG tablet Take 1 tablet by mouth every 8 hours as needed for Nausea or Vomiting 20 tablet 0    sertraline (ZOLOFT) 100 MG tablet TAKE 1 TABLET BY MOUTH DAILY AS NEEDED FOR DEPRESSION *EMERGENCY REFILL* 30 tablet 3    lactobacillus (CULTURELLE) CAPS capsule TAKE (1) CAPSULE BY MOUTH DAILY 30 capsule 3    blood glucose test strips (ONETOUCH ULTRA) strip TEST FOUR TIMES DAILY AS NEEDED FOR SYMTOMS OF IRREGULAR BLOOD GLUCOSE 100 each 3    fluticasone (FLONASE) 50 MCG/ACT nasal spray 1 spray by Nasal route daily (Patient not taking: Reported on 10/29/2020) 1 Bottle 3    empagliflozin (JARDIANCE) 10 MG tablet Take 1 tablet by mouth daily 30 tablet 3    nitroGLYCERIN (NITROSTAT) 0.4 MG SL tablet Place 1 tablet under the tongue every 5 minutes as needed for Chest pain 25 tablet 1    Dulaglutide (TRULICITY) 1.5 QX/6.5XG SOPN INJECT THE CONTENTS OF 1 SYRINGE SUBCUTANEOUSLY EVERY WEEK *PATIENT NEEDS APPOINTMENT* (Patient taking differently: INJECT THE CONTENTS OF 1 SYRINGE SUBCUTANEOUSLY EVERY WEEK *PATIENT NEEDS APPOINTMENT*    Takes on saturdays) 6 mL 10    metoprolol succinate (TOPROL XL) 25 MG extended release tablet TAKE 1/2 TABLET BY MOUTH DAILY 45 tablet 10    DULoxetine (CYMBALTA) 30 MG extended release capsule TAKE 1 CAPSULE BY MOUTH EVERY MORNING *PATIENT NEEDS APPOINTMENT* 90 capsule 10    pramipexole (MIRAPEX) 0.125 MG tablet TAKE 1 TABLET BY MOUTH EVERY NIGHT 90 tablet 1    insulin glargine (BASAGLAR KWIKPEN) 100 UNIT/ML injection pen INJECT 50 UNITS SUBCUTANEOUSLY NIGHTLY 15 mL 3    Insulin Syringe-Needle U-100 ([employer] INSULIN SYR 1CC/30G) 30G X 5/16\" 1 ML MISC 1 each by Does not apply route 4 times daily 120 each 5    metFORMIN (GLUCOPHAGE) 500 MG tablet Take 2 tablets by mouth 2 times daily (with meals) 360 tablet 1    bumetanide (BUMEX) 1 MG tablet Take 1 tablet by mouth every other day 90 tablet 1    famotidine (PEPCID) 20 MG tablet Take 1 tablet by mouth 2 times daily 180 tablet 1    hydrocortisone 2.5 % cream APPLY TO AFFECTED AREA TOPICALLY TWICE DAILY 84 g 10    Humidifier MISC 1 Device by Does not apply route daily 1 each 0    insulin lispro (ADMELOG SOLOSTAR) 100 UNIT/ML pen As directed per sliding scale up to 13 units 4 times per day. 5 pen 3    Wheat Dextrin (BENEFIBER) POWD Take 4 g by mouth 3 times daily (with meals) 475 g 2    lidocaine-prilocaine (EMLA) 2.5-2.5 % cream Apply topically as needed. 30 g 0    glucose monitoring kit (FREESTYLE) monitoring kit 1 kit by Does not apply route daily 1 kit 0    Blood Pressure KIT 1 kit by Does not apply route daily 1 kit 0    Misc. Devices (QUAD CANE) MISC 1 Quad cane 1 each 0    Multiple Vitamins-Minerals (THERAPEUTIC MULTIVITAMIN-MINERALS) tablet Take 1 tablet by mouth daily      B Complex Vitamins (B COMPLEX 1 PO) Take 1 tablet by mouth daily. No current facility-administered medications for this visit. Occupation: does not work. Physical demands include: n/a. Status: n/a.     Exercise regimen: none    Hobbies: yard work, working around the house    Patient Goals: pain relief, return to prior activity, get back to normal    Precautions/Contraindications: none    OBJECTIVE:     Observations: well nourished female    Inspection: irregular scapulohumeral rhythm right shoulder. Incision: edges approximated, no purulent drainage, no redness, no swelling, no tenderness and not hot to touch. Palpation: Tender to palpation ac joint, mid deltoid     Joint/Motion:  Right Shoulder:  AROM: 115° Forward elevation,  35° ER,  IR to 35  PROM: 135° Forward elevation,  40° ER,  40° IR    Left Shoulder:  AROM: WFL° Forward elevation,  WFL° ER,  IR to [facility]  PROM: WFL° Forward elevation,  WFL° ER , WFL° IR    Strength:  Right Shoulder: Flexion 3-/5,  Abduction 3-/5, ER 3-/5, IR 3-/5      Left Shoulder: Flexion 4/5,  Abduction 4/5, ER 4/5, IR 4/5       Special Tests/Functional Screens:    [] Deandre []+ / [] -  [] Dorchester's []+ / [] -   []  Claire's []+ / [] -    []GH drawer []+ / [] -    [] Bicep Load []+ / [] -   [] Crank []+ / [] -  [] Harpal Ferrari []+ / [] -   [] Elbow Varus []+ / [] -  [] Neer's []+ / [] -      [] Speed's []+ / [] -   []Sulcus Sign []+ / [] -   [] Apprehension []+ / [] -   [] Bicep Load II []+ / [] -   [] Desiree Hodgson []+ / [] -   [] Elbow Valgus []+ / [] -     [] Other: []+ / [] -         ASSESSMENT     Outcome Measure:   QuickDASH (Disorders of the Arm, Shoulder, and Hand) 41% disability    Problems:    Pain reported 6/10   ROM decreased   Strength decreased 3-/5 R shoulder   Decreased functional ability with standing, work, ADLs, use of right upper extremity, reaching, lifting, carrying    [x] There are no barriers affecting plan of care or recovery    [] Barriers to this patient's plan of care or recovery include. Domestic Concerns:  [x] No  [] Yes:    Short Term goals (3-4 weeks)   Decrease reported pain to 4/10   Increase ROM to [facility]   Increase Strength to 4-/5 R shoulder within available range.      Able to perform/complete the following functions/tasks: Perform ADLs, hobbies, housework with less pain.    QuickDASH (Disorders of the Arm, Shoulder, and Hand) 25% disability    Long Term goals (6-8 weeks)   Decrease reported pain to 0-3/10   Increase ROM to WNL   Increase Strength to 5/5 R shoulder within available range.  Able to perform/complete the following functions/tasks: Perform ADLs, hobbies, housework with no/minimal pain.  QuickDASH 0-20% disability   Independent with Home Exercise Programs    Rehab Potential: [x] Good  [] Fair  [] Poor    PLAN       Treatment Plan:   [x] Therapeutic Exercise  [x] Therapeutic Activity  [x] Neuromuscular Re-education   [] Gait Training  [] Balance Training  [] Aerobic conditioning  [x] Manual Therapy  [x] Massage/Fascial release   [] Work/Sport specific activities    [] Pain Neuroscience [x] Cold/hotpack  [] Vasocompression  [x] Electrical Stimulation  [] Lumbar/Cervical Traction  [x] Ultrasound   [] Iontophoresis: 4 mg/mL Dexamethasone Sodium Phosphate 40-80 mAmin        [x] Instruction in HEP      []  Medication allergies reviewed for use of Dexamethasone Sodium Phosphate 4mg/ml  with iontophoresis treatments. Patient is not allergic. The following CPT codes are likely to be used in the care of this patient: 37637 PT Evaluation: Moderate Complexity , 82066 PT Re-Evaluation , 05281 Therapeutic Exercise , 15251 Neuromuscular Re-Education , 79142 Therapeutic Activities , 61461 Manual Therapy , 02474 Vasopneumatic Device ,  Electrical Stimulation      Suggested Professional Referral: [x] No  [] Yes:     Patient Education:  [x] Plans/Goals, Risks/Benefits discussed  [x] Home exercise program  Method of Education: [x] Verbal  [x] Demo  [x] Written  Comprehension of Education:  [x] Verbalizes understanding. [x] Demonstrates understanding. [] Needs Review. [] Demonstrates/verbalizes understanding of HEP/Ed previously given.     Frequency:  1-2 days per week for 6-8 weeks    Patient understands diagnosis/prognosis and consents to treatment, plan and goals: [x] Yes    [] No     Thank you for the opportunity to work with your patient. If you have questions or comments, please contact me at [phone]; fax: [phone]. Electronically signed by: Lex Johnson PT    Medicare Patients Only     Please sign Physician's Certification and return to: [address] [place] [id number]  Dept: [phone]  Dept Fax: [phone] Certification / Comments     Frequency/Duration 1-2 days per week for 6-8 weeks. Certification period from 11/30/2020  to 02/28/2021. I have reviewed the Plan of Care established for skilled therapy services and certify that the services are required and that they will be provided while the patient is under my care.     Physician's Comments/Revisions:               Physician's Printed Name:                                           [de-identified] Signature:                                                               Date:

## 2020-12-08 ENCOUNTER — TREATMENT (OUTPATIENT)
Dept: PHYSICAL THERAPY | Age: 50
End: 2020-12-08
Payer: MEDICAID

## 2020-12-08 PROCEDURE — 97110 THERAPEUTIC EXERCISES: CPT | Performed by: PHYSICAL THERAPIST

## 2020-12-08 NOTE — PROGRESS NOTES
Physical Therapy Daily Treatment Note    Date: 2020  Patient Name: Pritesh Garnica  : 1970   MRN: [id number]  DOInjury: 1 year  DOSx: 2020       1. Right shoulder arthroscopic lysis and resection of adhesions                 2. right shoulder arthroscopic distal clavicle resection                 3. right shoulder arthroscopic acromioplasty                 4. right shoulder arthroscopic extensive debridement   Referring Provider: No referring provider defined for this encounter. Medical Diagnosis:      Diagnosis Orders   1. Nontraumatic tear of right rotator cuff, unspecified tear extent         Outcome Measure: Quick Dash: 41% Impairment    S: Pt stated that she is still stiff and painful and has been performing her HEP 2x per day as instructed. O: Pt given written HEP  Time 8988-3472     Visit 2 Repeat outcome measure at mid point and end.     Pain 7/10     ROM See eval     Modalities            Manual 15 mins flex, abd, ER, IR                 Stretch      Table slides 10 reps x 1 set with 10s hold for flex, scap, abd, ER      Wall Walk Flex      Wall Walk ABD      Wall ER Stretch      Towel IR Stretch      Supine Stretch with Arms Behind Head            Exercise      UBE          Pulleys - Flex 3 mins     Pulleys - IR 3 mins     Supine Wand Flex      Supine Wand Bench Press      Supine Wand ER/IR      Standing Wand IR      Standing Wand Scaption      Standing Wand Flex      Standing Wand ER/IR      Shrugs AROM       Pendulum Ex  HEP          Supine Flex      S-lying ABD      S-lying ER            Prone Flexion      Prone Ext      Prone Row with ER      Prone Horizontal ABD            Standing Flex      Standing Scaption       Standing ABD      Standing Shoulder Press       Functional activities To aid in ROM and strength needed for reaching , lifting ,pushing and pulling at home/work    ROWS: H       ROWS: M  \"    ROWS: L  \"    ER  \"    IR  \"    Shoulder Flex      Shoulder ABD      A: Tolerated well. Pt stiff very stiff in all motions and was encouraged to stretch into end range but stop before she gets high level pain.    P: Continue with rehab plan  Mil Clancy PT    Treatment Charges: Mins Units   Initial Evaluation     Re-Evaluation     Ther Exercise         TE 45 3   Manual Therapy     MT     Ther Activities        TA     Gait Training          GT     Neuro Re-education NR     Modalities     Non-Billable Service Time     Other     Total Time/Units 45 3

## 2020-12-09 ENCOUNTER — OFFICE VISIT (OUTPATIENT)
Dept: ORTHOPEDIC SURGERY | Age: 50
End: 2020-12-09

## 2020-12-09 VITALS — WEIGHT: 157 LBS | BODY MASS INDEX: 31.65 KG/M2 | HEIGHT: 59 IN | TEMPERATURE: 98 F

## 2020-12-09 PROCEDURE — 99024 POSTOP FOLLOW-UP VISIT: CPT | Performed by: ORTHOPAEDIC SURGERY

## 2020-12-09 RX ORDER — LANCETS 30 GAUGE
EACH MISCELLANEOUS
COMMUNITY
Start: 2020-12-01 | End: 2021-04-12

## 2020-12-09 NOTE — PROGRESS NOTES
Chief Complaint   Patient presents with    Shoulder Pain     RIght shoulder arthroscopy on 11/16/2020. Feels good. Sore from Pt yesterday. Jamiemagda Baum is here for follow-up after right shoulder arthroscopy. Findings at surgery:  Impingement, AC arthritis, adhesive capsulitis, glenohumeral osteoarthritis. Pain is controlled without any medications. The patient denies fever, wound drainage, increasing redness, pus, increasing pain, increasing swelling. Post op problems reported: none. Shoulder exam - The incisions are clean, dry and intact. right normal; 110/110/50 range of motion, no pain on motion, no tenderness or deformity noted. Motor and sensory exam is grossly intact in B/L upper extremities. Special test results are as follow:  Impingement negative, Zhou negative, Speeds negative, Apprehension negative, Scruggs negative, Load Shiftnegative, Latrice manuver negative, Cross arm test negative. Encounter Diagnoses   Name Primary?  Adhesive capsulitis of right shoulder Yes    Nontraumatic tear of right rotator cuff, unspecified tear extent        Plan:    The patient will continue with gentle ROM exercises and being activities as tolerated. The patient was educated about natural history and postoperative course and to continue physical therapy. Sling will be used for comfort ONLY otherwise discontinue. Patient is to continue analgesics and needed and use ice for pain. We will see the pain back in 4 weeks time for repeat evaluation.

## 2020-12-15 ENCOUNTER — HOSPITAL ENCOUNTER (OUTPATIENT)
Dept: MAMMOGRAPHY | Age: 50
Discharge: HOME | End: 2020-12-17
Payer: MEDICAID

## 2020-12-15 ENCOUNTER — HOSPITAL ENCOUNTER (OUTPATIENT)
Dept: ULTRASOUND IMAGING | Age: 50
Discharge: HOME | End: 2020-12-15
Payer: MEDICAID

## 2020-12-15 PROCEDURE — G0279 TOMOSYNTHESIS, MAMMO: HCPCS

## 2020-12-15 PROCEDURE — 76642 ULTRASOUND BREAST LIMITED: CPT

## 2020-12-18 ENCOUNTER — TELEPHONE (OUTPATIENT)
Dept: GENERAL RADIOLOGY | Age: 50
End: 2020-12-18

## 2020-12-18 NOTE — TELEPHONE ENCOUNTER
Call to [facility] after receiving referral for left breast biopsy at [facility]. Instructed on preparation for biopsy. She is agreeable to proceed. Scheduled for 12/23/20. Dr. Roberto Panda to review imaging prior to biopsy.  Electronically signed by Sriram Velasquez RN, BSN on 12/18/2020 at 9:08 AM

## 2020-12-23 ENCOUNTER — HOSPITAL ENCOUNTER (OUTPATIENT)
Dept: GENERAL RADIOLOGY | Age: 50
Discharge: HOME | End: 2020-12-25
Payer: MEDICAID

## 2020-12-23 PROCEDURE — A4648 IMPLANTABLE TISSUE MARKER: HCPCS

## 2020-12-23 PROCEDURE — 77065 DX MAMMO INCL CAD UNI: CPT

## 2020-12-23 PROCEDURE — 2500000003 HC RX 250 WO HCPCS

## 2020-12-23 PROCEDURE — 88305 TISSUE EXAM BY PATHOLOGIST: CPT

## 2020-12-23 PROCEDURE — 88360 TUMOR IMMUNOHISTOCHEM/MANUAL: CPT

## 2020-12-23 NOTE — PROGRESS NOTES
Met with patient prior to her breast biopsy. Instructed on  breast biopsy procedure. Denies use of blood thinners or aspirin products within the past 5 days. I remained with her during the biopsy to provide instruction and emotional support. She tolerated breast biopsy well. Upon questioning regarding results notification, patient indicates that she would like to receive breast biopsy results by phone via the breast navigator. Instructed that results will be available in approximately 3-5 business days. Instructed that her physician will also be notified of results. Provided with folder containing my contact information, monthly breast self exam card, and post biopsy discharge instructions. Instructed to call me if she has any questions or concerns about her biopsy. Verbalizes understanding.  BLAIRE Castillo, OCN, CN-BN

## 2020-12-30 ENCOUNTER — TELEPHONE (OUTPATIENT)
Dept: GENERAL RADIOLOGY | Age: 50
End: 2020-12-30

## 2020-12-30 NOTE — TELEPHONE ENCOUNTER
Called patient regarding her recent breast biopsy results. Instructed in detail on her breast biopsy pathology findings including cancer type and hormone receptor status. Instructed on next steps including breast surgery consultation. Provided with my contact information and instructed patient to call me with questions or concerns. Verbalizes understanding.

## 2021-01-04 ENCOUNTER — TELEPHONE (OUTPATIENT)
Dept: FAMILY MEDICINE CLINIC | Age: 51
End: 2021-01-04

## 2021-01-04 DIAGNOSIS — Z17.0 MALIGNANT NEOPLASM OF LOWER-INNER QUADRANT OF LEFT BREAST IN FEMALE, ESTROGEN RECEPTOR POSITIVE (HCC): Primary | ICD-10-CM

## 2021-01-04 DIAGNOSIS — C50.312 MALIGNANT NEOPLASM OF LOWER-INNER QUADRANT OF LEFT BREAST IN FEMALE, ESTROGEN RECEPTOR POSITIVE (HCC): Primary | ICD-10-CM

## 2021-01-04 NOTE — TELEPHONE ENCOUNTER
Pt called in she had a mammogram done on 12/23 and she has invasive ductal carcinoma in the left breast. Please advise

## 2021-01-05 ENCOUNTER — TREATMENT (OUTPATIENT)
Dept: PHYSICAL THERAPY | Age: 51
End: 2021-01-05
Payer: MEDICAID

## 2021-01-05 DIAGNOSIS — M75.101 NONTRAUMATIC TEAR OF RIGHT ROTATOR CUFF, UNSPECIFIED TEAR EXTENT: Primary | ICD-10-CM

## 2021-01-05 PROCEDURE — 97140 MANUAL THERAPY 1/> REGIONS: CPT

## 2021-01-05 PROCEDURE — 97110 THERAPEUTIC EXERCISES: CPT

## 2021-01-05 NOTE — PROGRESS NOTES
Physical Therapy Daily Treatment Note    Date: 2021  Patient Name: Cheri Damon  : 1970   MRN: [id number]  DOInjury: 1 year  DOSx: 2020       1. Right shoulder arthroscopic lysis and resection of adhesions                 2. right shoulder arthroscopic distal clavicle resection                 3. right shoulder arthroscopic acromioplasty                 4. right shoulder arthroscopic extensive debridement   Referring Provider:  Cecilia Ren, [place] [address], [facility] Diagnosis:      Diagnosis Orders   1. Nontraumatic tear of right rotator cuff, unspecified tear extent         Outcome Measure: Quick Dash: 41% Impairment    S: Pt reports 3/10 right shoulder pain and tightness  O: Pt given written HEP  Time 6807-3568     Visit 3 Repeat outcome measure at mid point and end.     Pain 3/10     ROM PROM Flex 130*, ER 35*, IR 40* 21    Modalities            Manual 15 mins flex, abd, ER, IR                 Stretch      Table slides 10 reps x 1 set with 10s hold for flex, scap, abd, ER      Wall Walk Flex      Wall Walk ABD      Wall ER Stretch      Towel IR Stretch      Supine Stretch with Arms Behind Head            Exercise      UBE          Pulleys - Flex 3 mins     Pulleys - IR  Unable painful, limited mobility    Supine Wand Flex 2 x 10     Supine Wand Bench Press 2 x 10     Supine Wand ER/IR 2 x 10 (0*)     Standing Wand IR      Standing Wand Scaption      Standing Wand Flex      Standing Wand ER/IR      Shrugs AROM       Pendulum Ex  HEP          Supine Flex      S-lying ABD      S-lying ER            Prone Flexion      Prone Ext      Prone Row with ER      Prone Horizontal ABD            Standing Flex      Standing Scaption       Standing ABD      Standing Shoulder Press       Functional activities To aid in ROM and strength needed for reaching , lifting ,pushing and pulling at home/work    ROWS: H       ROWS: M  \"    ROWS: L  \"    ER  \"    IR  \"    Shoulder Flex

## 2021-01-07 ENCOUNTER — TELEPHONE (OUTPATIENT)
Dept: BREAST CENTER | Age: 51
End: 2021-01-07

## 2021-01-07 NOTE — PROGRESS NOTES
Subjective:      Patient ID: Devin Mancia is a 46 y.o. female. HPI  History and Physical    Patient's Name/Date of Birth: Devin Mancia / 1970    Date: 2021      Myton Nearing A Les Schirmer presents for evaluation of newly diagnosed left invasive ductal breast cancer (ER+/NE+/ HER2 EQUIVACOL). FISH NEGATIVE. Accompanied today by her sister. PCP: Nora Varela DO, Gynecologist: Dr. Dominga Mercado. The lesion is located in the 7 o'clock position of the left breast. The lesion was discovered by mammogram. The patient has not noted a change in BSE since presentation. Patient denies nipple discharge. Patient denies a personal history of breast cancer. Breast cancer risk factors include age, gender, family history of cancer,  Ashkenazi Orthodox Ancestry: No.    OBSTETRIC RELATED HISTORY:  Age of menarche was 4. Partial hysterectomy 10 years ago. No cycles. Patient admits to hormonal therapy (OTC estrogen) Patient has stopped the medication. Patient is . Age of first live birth was 27  Patient did not breast feed. Is patient interested in fertility information about fertility preservation? n/a    CANCER SURVEILLANCE HISTORY:  Mammograms: Yes  Breast MRI's: No   Breast Biopsies: Yes   Colonoscopy: Yes   GI Polyps: No   EGD: Yes (acid reflux)  Pelvic Exam: Yes   Pap Smear: Yes   Dermatology: No   Lung screening: no      Have you received your flu vaccination this year? Yes  Have you received your Pneumococcal vaccination? No      Estimated body mass index is 31.71 kg/m² as calculated from the following:    Height as of 20: 4' 11\" (1.499 m). Weight as of 20: 157 lb (71.2 kg). Bra Size: 36 C    Because violence is so common, we ask all our patients: are you in a relationship or do you live with a person who threatens, hurts, or controls you:  Safe in her apartment. Occasionally falls due to DM-induced balance issues. Patient drinks moderate caffeinated beverages. Patient does not smoke cigarettes. Patient does not use recreational drugs. Past Medical History:   Diagnosis Date    Arthritis     lower back    Back pain     Cardiac valve prolapse     micro    Diabetes mellitus ([address]     H/O exercise stress test 10/2015  ?     Dr Zully Jim  neg    Hyperlipidemia     Hypertension     Neuropathy due to secondary diabetes ([address]     in marcial feet    Prolonged emergence from general anesthesia     Psoriasis        Past Surgical History:   Procedure Laterality Date    ANKLE SURGERY      bilateral tarsal tunnels    CARDIAC SURGERY      cardiac catheterization    CARPAL TUNNEL RELEASE      left    CARPAL TUNNEL RELEASE  12    right     SECTION      CHOLECYSTECTOMY, LAPAROSCOPIC N/A 2019    LAPAROSCOPIC CHOLECYSTECTOMY WITH IOC performed by Diane Oakes MD at [address]  had extremely high blood pressure and hard to wake up    COLONOSCOPY      COLONOSCOPY N/A 2019    COLORECTAL CANCER SCREENING, NOT HIGH RISK performed by Diane Oakes MD at [facility], COLON, DIAGNOSTIC     [address], TOTAL ABDOMINAL      SHOULDER ARTHROSCOPY Left 2016    subacromin decompression and debridement    SHOULDER ARTHROSCOPY Right 2020    RIGHT SHOULDER ARTHROSCOPY, SUBACROMIAL DECOMPRESSION, LABRIAL DEBRIDEMENT, CHONDROPLASTY, [place] performed by Salena Jenkins DO at [facility] 2019    EGD BIOPSY performed by Diane Oakes MD at [address] [facility]  2020     BREAST NEEDLE BIOPSY LEFT 2020 [facility]       Current Outpatient Medications   Medication Sig Dispense Refill    Lancets (ONETOUCH DELICA PLUS [id number]) MISC       B-D ULTRAFINE III SHORT PEN 31G X 8 MM MISC USE WITH INSULIN PENS AS DIRECTED 100 each 10  omeprazole (PRILOSEC) 40 MG delayed release capsule TAKE 1 CAPSULE BY MOUTH DAILY 90 capsule 1    docusate sodium (COLACE) 100 MG capsule Take 1 capsule by mouth 2 times daily as needed for Constipation 20 capsule 1    ketorolac (TORADOL) 10 MG tablet Take 1 tablet by mouth every 6 hours as needed for Pain Patient received prior injection 20 tablet 0    ondansetron (ZOFRAN) 4 MG tablet Take 1 tablet by mouth every 8 hours as needed for Nausea or Vomiting 20 tablet 0    sertraline (ZOLOFT) 100 MG tablet TAKE 1 TABLET BY MOUTH DAILY AS NEEDED FOR DEPRESSION *EMERGENCY REFILL* 30 tablet 3    lactobacillus (CULTURELLE) CAPS capsule TAKE (1) CAPSULE BY MOUTH DAILY 30 capsule 3    blood glucose test strips (ONETOUCH ULTRA) strip TEST FOUR TIMES DAILY AS NEEDED FOR SYMTOMS OF IRREGULAR BLOOD GLUCOSE 100 each 3    fluticasone (FLONASE) 50 MCG/ACT nasal spray 1 spray by Nasal route daily (Patient not taking: Reported on 10/29/2020) 1 Bottle 3    empagliflozin (JARDIANCE) 10 MG tablet Take 1 tablet by mouth daily 30 tablet 3    nitroGLYCERIN (NITROSTAT) 0.4 MG SL tablet Place 1 tablet under the tongue every 5 minutes as needed for Chest pain 25 tablet 1    Dulaglutide (TRULICITY) 1.5 IM/5.7TN SOPN INJECT THE CONTENTS OF 1 SYRINGE SUBCUTANEOUSLY EVERY WEEK *PATIENT NEEDS APPOINTMENT* (Patient taking differently: INJECT THE CONTENTS OF 1 SYRINGE SUBCUTANEOUSLY EVERY WEEK *PATIENT NEEDS APPOINTMENT*    Takes on saturdays) 6 mL 10    metoprolol succinate (TOPROL XL) 25 MG extended release tablet TAKE 1/2 TABLET BY MOUTH DAILY 45 tablet 10    DULoxetine (CYMBALTA) 30 MG extended release capsule TAKE 1 CAPSULE BY MOUTH EVERY MORNING *PATIENT NEEDS APPOINTMENT* 90 capsule 10    pramipexole (MIRAPEX) 0.125 MG tablet TAKE 1 TABLET BY MOUTH EVERY NIGHT 90 tablet 1    insulin glargine (BASAGLAR KWIKPEN) 100 UNIT/ML injection pen INJECT 50 UNITS SUBCUTANEOUSLY NIGHTLY 15 mL 3  Insulin Syringe-Needle U-100 ([employer] INSULIN SYR 1CC/30G) 30G X 5/16\" 1 ML MISC 1 each by Does not apply route 4 times daily 120 each 5    metFORMIN (GLUCOPHAGE) 500 MG tablet Take 2 tablets by mouth 2 times daily (with meals) 360 tablet 1    bumetanide (BUMEX) 1 MG tablet Take 1 tablet by mouth every other day 90 tablet 1    famotidine (PEPCID) 20 MG tablet Take 1 tablet by mouth 2 times daily 180 tablet 1    hydrocortisone 2.5 % cream APPLY TO AFFECTED AREA TOPICALLY TWICE DAILY 84 g 10    Humidifier MISC 1 Device by Does not apply route daily 1 each 0    insulin lispro (ADMELOG SOLOSTAR) 100 UNIT/ML pen As directed per sliding scale up to 13 units 4 times per day. 5 pen 3    Wheat Dextrin (BENEFIBER) POWD Take 4 g by mouth 3 times daily (with meals) 475 g 2    lidocaine-prilocaine (EMLA) 2.5-2.5 % cream Apply topically as needed. 30 g 0    glucose monitoring kit (FREESTYLE) monitoring kit 1 kit by Does not apply route daily 1 kit 0    Blood Pressure KIT 1 kit by Does not apply route daily 1 kit 0    Misc. Devices (QUAD CANE) MISC 1 Quad cane 1 each 0    Multiple Vitamins-Minerals (THERAPEUTIC MULTIVITAMIN-MINERALS) tablet Take 1 tablet by mouth daily      B Complex Vitamins (B COMPLEX 1 PO) Take 1 tablet by mouth daily. No current facility-administered medications for this visit.         No Known Allergies    Family History   Problem Relation Age of Onset    Heart Disease Mother     Diabetes Mother     Heart Disease Father     Diabetes Father        Social History     Socioeconomic History    Marital status:      Spouse name: Not on file    Number of children: Not on file    Years of education: Not on file    Highest education level: Not on file   Occupational History    Not on file   Social Needs    Financial resource strain: Not on file    Food insecurity     Worry: Not on file     Inability: Not on file    Transportation needs     Medical: Not on file Non-medical: Not on file   Tobacco Use    Smoking status: Never Smoker    Smokeless tobacco: Never Used   Substance and Sexual Activity    Alcohol use: Yes     Alcohol/week: 1.0 standard drinks     Types: 1 Glasses of wine per week     Comment: weekly    Drug use: No    Sexual activity: Not on file   Lifestyle    Physical activity     Days per week: Not on file     Minutes per session: Not on file    Stress: Not on file   Relationships    Social connections     Talks on phone: Not on file     Gets together: Not on file     Attends [employer] service: Not on file     Active member of club or organization: Not on file     Attends meetings of clubs or organizations: Not on file     Relationship status: Not on file    Intimate partner violence     Fear of current or ex partner: Not on file     Emotionally abused: Not on file     Physically abused: Not on file     Forced sexual activity: Not on file   Other Topics Concern    Not on file   Social History Narrative    Not on file       Occupation: Applying for disability. No heavy lifting        Review of Systems  CONSTITUTIONAL: No fever, chills. Good appetite and energy level. ENMT: Eyes: No diplopia; Nose: No epistaxis. Mouth: No sore throat. RESPIRATORY: No hemoptysis, shortness of breath, cough. CARDIOVASCULAR: No chest pain, pressure, or palpitations. GASTROINTESTINAL: No nausea/vomiting, abdominal pain, diarrhea/constipation. No blood in the stools. GENITOURINARY: No dysuria, urinary frequency, hematuria. NEURO: No syncope, presyncope, headache. Remainder:  ROS NEGATIVE    Patient denies previous history of DVT/PE. Had recent shoulder surgery, due to adhesions, and had blood pressure issues perioperatively. Objective:   Physical Exam  Vitals signs and nursing note reviewed. Constitutional:       General: She is not in acute distress. Appearance: She is well-developed. She is not diaphoretic.    HENT: Head: Normocephalic and atraumatic. Eyes:      Conjunctiva/sclera: Conjunctivae normal.      Pupils: Pupils are equal, round, and reactive to light. Neck:      Musculoskeletal: Normal range of motion and neck supple. Thyroid: No thyromegaly. Trachea: No tracheal deviation. Cardiovascular:      Rate and Rhythm: Normal rate and regular rhythm. Heart sounds: Normal heart sounds. Pulmonary:      Effort: Pulmonary effort is normal. No respiratory distress. Breath sounds: Normal breath sounds. Chest:      Breasts: Breasts are symmetrical.         Right: No inverted nipple, mass, nipple discharge, skin change or tenderness. Left: No inverted nipple, mass, nipple discharge, skin change or tenderness. Abdominal:      General: There is no distension. Palpations: Abdomen is soft. Musculoskeletal:      Right shoulder: She exhibits decreased range of motion. Left shoulder: She exhibits decreased range of motion. Comments: Bilateral limited range of motion, shoulders. Maximum adduction 90 degrees. Lymphadenopathy:      Cervical: No cervical adenopathy. Upper Body:      Right upper body: No supraclavicular adenopathy. Left upper body: No supraclavicular adenopathy. Skin:     General: Skin is warm and dry. Coloration: Skin is not pale. Findings: No erythema. Neurological:      Mental Status: She is alert and oriented to person, place, and time. Psychiatric:         Behavior: Behavior normal.         Thought Content: Thought content normal.         Judgment: Judgment normal.         Assessment:      46 y.o. woman who underwent  an US guided left breast core biopsy at 7 o'clock position on December 23 , 2020. Pathological evaluation completed at [facility].       11/12/2020: BILATERAL SCREENING MAMMOGRAM: [facility]    FINDINGS:   The breast parenchyma is heterogeneously dense.    Right breast: There is no suspicious mass, cluster of microcalcifications or architectural   distortion. Left breast:   Within the medial aspect of the left breast best appreciated on the CC view,   there is a 1.5 cm irregular focal asymmetry.  This is not well seen on the   MLO view but is likely within the inferior aspect of the left breast.       Impression   1. 1.5 cm irregular focal asymmetry seen within the medial aspect of the left   breast best appreciated on the CC view.  Dedicated diagnostic mammogram of   the left breast is recommended. 2. Stable mammogram of the right breast.  There is no mammographic evidence   of malignancy.  Annual screening mammography is recommended. BIRADS:   BIRADS - CATEGORY 0   Incomplete: Needs Additional Imaging Evaluation   OVERALL ASSESSMENT - INCOMPLETE:NEED ADDITIONAL IMAGING EVALUATION.           12/15/2020: LEFT DIAGNOSTIC MAMMOGRAM;[facility]      FINDINGS:   Mammogram:   The breast parenchyma is heterogeneously dense. Within the lower inner quadrant of the left breast there is a spiculated mass   measuring approximately 1.5 cm.  This mass does not compress on the spot   compression images. On the dedicated targeted ultrasound examination of the left breast there is   a solid lesion with posterior shadowing at the 7 o'clock position   corresponding to the mammographic abnormality. Ultrasound-guided core biopsy is recommended.       Impression   1. 1.5 cm spiculated lesion seen within the left breast at the 7 o'clock   position highly suspicious for malignancy.  Dedicated ultrasound-guided core   biopsy is recommended. BIRADS:   BIRADS - CATEGORY 5   Highly Suggestive for Malignancy.  Biopsy should be considered at this time. OVERALL ASSESSMENT - HIGHLY SUGGESTIVE OF MALIGNANCY. A letter of notification will be sent to the patient regarding the results. My findings and recommendations were discussed with the patient at the time   of service. A representative from the radiology department will be contacting your office   and assisting the patient in getting appropriate follow-up         12/15/2020: LEFT BREAST ULTRASOUND; [facility]    FINDINGS:   Mammogram:   The breast parenchyma is heterogeneously dense. Within the lower inner quadrant of the left breast there is a spiculated mass   measuring approximately 1.5 cm.  This mass does not compress on the spot   compression images. On the dedicated targeted ultrasound examination of the left breast there is   a solid lesion with posterior shadowing at the 7 o'clock position   corresponding to the mammographic abnormality. Ultrasound-guided core biopsy is recommended.       Impression   1. 1.5 cm spiculated lesion seen within the left breast at the 7 o'clock   position highly suspicious for malignancy.  Dedicated ultrasound-guided core   biopsy is recommended. BIRADS:   BIRADS - CATEGORY 5   Highly Suggestive for Malignancy.  Biopsy should be considered at this time. OVERALL ASSESSMENT - HIGHLY SUGGESTIVE OF MALIGNANCY. A letter of notification will be sent to the patient regarding the results. My findings and recommendations were discussed with the patient at the time   of service.    A representative from the radiology department will be contacting your office   and assisting the patient in getting appropriate follow-up.       12/23/2020; LEFT BREAST US BIOPSY; [facility]      Addendum    Signed by Pola Barros DO on 12/30/20 1022    ----------------------------- ADDENDED REPORT -----------------------------     12/30/2020 Addendum:     BIOPSY SITE  ------------------------------  Procedure in the left breast     PATHOLOGY  ------------------------------  REPORTED AS:  Invasive ductal carcinoma in the left breast  ADDITIONAL COMMENTS:  Accession Number:  [id number]  Diagnosis:  Left breast, core needle biopsy: Invasive, moderately differentiated  ductal carcinoma (grade 2) Comment:    Intradepartmental consultation is obtained. Breast Cancer Marker Studies:  Estrogen Receptors (ER):  -Positive (10%of cells demonstrate nuclear positivity):  Percentage of cells positive: 90%  Intensity: Strong  Progesterone Receptors (PA):  -Positive:  Percentage of cells positive: 90%  Intensity: Strong  Hormone receptor studies are performed by immunohistochemistry on  formalin-fixed, paraffin-embedded tissue (Roche Benchmark Immunostainer,  [place] anti-ER clone [id number], anti-PA clone [id number], polymer-based detection  chemistry). ER and PA are evaluated based on the percentage of cells  showing nuclear staining with 1% considered positive for each. Her-2/salvador (c-erb B-2) protein expression: Indeterminate/2+       FISH TESTING has been ordered and will be reported separately  Her-2 studies are performed by immunohistochemistry on formalin-fixed,  paraffin-embedded tissue (Roche Benchmark Immunostainer, [place] Pathway  anti-Her-2 clone [id number], polymer-based detection chemistry). This assay is  FDA approved. Her-2 is evaluated based on the pattern of membrane staining as well as  the percentage of cancer cells showing membrane staining, using the  latest ASCO/CAP scoring criteria. Testing is performed on block A1. All controls (high protein expression,  low protein expression, negative protein expression, and internal) are  acceptable. Percentage of tumor cells exhibiting uniform intense complete membrane  stainin%  Percentage of tumor cells exhibiting weak to moderate complete membrane  stainin%  Cold Ischemia and Fixation Times:  Meets requirements specified in latest version of the ASCO/CAP  guidelines: Yes                                       LITO Card Elvis                                   (Electronic Signature)           CONCORDANCE  ------------------------------  This is concordant with the imaging findings.      RECOMMENDATION  ------------------------------ Review by the [address] [place] [address] Dr Braden interdisciplinary team with generation of multidisciplinary recommendations     FISH: NEGATIVE    CPS stage IA lower inner quadrant left breast cancer. Multiple comorbidities including blood pressure issues perioperatively and insulin-dependent diabetes. Long discussion about process going forward. Patient is an excellent candidate for conservative therapy. Discussed in detail. Questions answered to patient satisfaction.                                              Plan:      1. Metastatic workup to include CXR, CBC, CMP, completed with this visit. 2. Genetic testing discussed. Patient defers at this time. 3. Patient has met with our Breast Navigator for information and to receive literature. 4. She would likely be a candidate for conservative therapy with anesthesia modifications. 5. We had a discussion of the treatment options for breast cancer including risks, benefits, and recurrence rates for breast conservation therapy versus mastectomy. We discussed the indications and risks of sentinel lymph node biopsy and possibility of axillary lymph node dissection. We also discussed the possible role of systemic and radiation therapy. NCCN guidelines were utilized in our discussion. The patient was given the appropriate contact numbers and will call with any questions or concerns. Face-to-face time 45 minutes, greater than 50% in counseling, education, and coordination of care. My final recommendation will be communicated back to the referring physician by way of shared medical record and/or written letter via [facility] [facility] mail. I personally and independently saw and examined patient and reviewed all pertinent laboratory data and imaging studies. I have reviewed and agree with the CNP history and review of systems as documented in the above note. This document is generated, in part, by voice recognition software and thus syntax and grammatical errors are possible. Tana Reid MD [facility]  January 12, 2021

## 2021-01-08 ENCOUNTER — TELEPHONE (OUTPATIENT)
Dept: CASE MANAGEMENT | Age: 51
End: 2021-01-08

## 2021-01-08 ENCOUNTER — TELEPHONE (OUTPATIENT)
Dept: BREAST CENTER | Age: 51
End: 2021-01-08

## 2021-01-08 DIAGNOSIS — C50.912 INVASIVE DUCTAL CARCINOMA OF LEFT BREAST (HCC): Primary | ICD-10-CM

## 2021-01-08 NOTE — TELEPHONE ENCOUNTER
Patient breast surgery education packet assembled. Nurse Navigator is scheduled to met with patient at new patient appointment on 1/12/21 with Dr. Neva Garcia.

## 2021-01-08 NOTE — TELEPHONE ENCOUNTER
Spoke with patient and updated obstetric related and cancer surveillance history. This information will be used for medical decision making and planning for the upcoming surgical consultation on January 12, 2020 with Dr. Gifty Brown.

## 2021-01-12 ENCOUNTER — HOSPITAL ENCOUNTER (OUTPATIENT)
Dept: GENERAL RADIOLOGY | Age: 51
Discharge: HOME | End: 2021-01-14
Payer: MEDICAID

## 2021-01-12 ENCOUNTER — OFFICE VISIT (OUTPATIENT)
Dept: BREAST CENTER | Age: 51
End: 2021-01-12
Payer: MEDICAID

## 2021-01-12 ENCOUNTER — TELEPHONE (OUTPATIENT)
Dept: CASE MANAGEMENT | Age: 51
End: 2021-01-12

## 2021-01-12 VITALS
SYSTOLIC BLOOD PRESSURE: 102 MMHG | RESPIRATION RATE: 16 BRPM | WEIGHT: 154.5 LBS | HEIGHT: 59 IN | TEMPERATURE: 97.7 F | OXYGEN SATURATION: 98 % | HEART RATE: 85 BPM | DIASTOLIC BLOOD PRESSURE: 60 MMHG | BODY MASS INDEX: 31.15 KG/M2

## 2021-01-12 DIAGNOSIS — C50.912 INVASIVE DUCTAL CARCINOMA OF LEFT BREAST (HCC): ICD-10-CM

## 2021-01-12 DIAGNOSIS — C50.912 MALIGNANT NEOPLASM OF LEFT BREAST IN FEMALE, ESTROGEN RECEPTOR POSITIVE, UNSPECIFIED SITE OF BREAST (HCC): ICD-10-CM

## 2021-01-12 DIAGNOSIS — Z17.0 MALIGNANT NEOPLASM OF LEFT BREAST IN FEMALE, ESTROGEN RECEPTOR POSITIVE, UNSPECIFIED SITE OF BREAST (HCC): ICD-10-CM

## 2021-01-12 LAB
ALBUMIN SERPL-MCNC: 3.8 G/DL (ref 3.5–5.2)
ALP BLD-CCNC: 144 U/L (ref 35–104)
ALT SERPL-CCNC: 43 U/L (ref 0–32)
ANION GAP SERPL CALCULATED.3IONS-SCNC: 10 MMOL/L (ref 7–16)
AST SERPL-CCNC: 61 U/L (ref 0–31)
BASOPHILS ABSOLUTE: 0.02 E9/L (ref 0–0.2)
BASOPHILS RELATIVE PERCENT: 0.3 % (ref 0–2)
BILIRUB SERPL-MCNC: <0.2 MG/DL (ref 0–1.2)
BUN BLDV-MCNC: 11 MG/DL (ref 6–20)
CALCIUM SERPL-MCNC: 9.3 MG/DL (ref 8.6–10.2)
CHLORIDE BLD-SCNC: 99 MMOL/L (ref 98–107)
CO2: 27 MMOL/L (ref 22–29)
CREAT SERPL-MCNC: 0.8 MG/DL (ref 0.5–1)
EOSINOPHILS ABSOLUTE: 0.11 E9/L (ref 0.05–0.5)
EOSINOPHILS RELATIVE PERCENT: 1.4 % (ref 0–6)
GFR AFRICAN AMERICAN: >60
GFR NON-AFRICAN AMERICAN: >60 ML/MIN/1.73
GLUCOSE BLD-MCNC: 114 MG/DL (ref 74–99)
HCT VFR BLD CALC: 39.2 % (ref 34–48)
HEMOGLOBIN: 12 G/DL (ref 11.5–15.5)
IMMATURE GRANULOCYTES #: 0.01 E9/L
IMMATURE GRANULOCYTES %: 0.1 % (ref 0–5)
LYMPHOCYTES ABSOLUTE: 2.29 E9/L (ref 1.5–4)
LYMPHOCYTES RELATIVE PERCENT: 29.7 % (ref 20–42)
MCH RBC QN AUTO: 28.7 PG (ref 26–35)
MCHC RBC AUTO-ENTMCNC: 30.6 % (ref 32–34.5)
MCV RBC AUTO: 93.8 FL (ref 80–99.9)
MONOCYTES ABSOLUTE: 1.15 E9/L (ref 0.1–0.95)
MONOCYTES RELATIVE PERCENT: 14.9 % (ref 2–12)
NEUTROPHILS ABSOLUTE: 4.14 E9/L (ref 1.8–7.3)
NEUTROPHILS RELATIVE PERCENT: 53.6 % (ref 43–80)
PDW BLD-RTO: 14.9 FL (ref 11.5–15)
PLATELET # BLD: 279 E9/L (ref 130–450)
PMV BLD AUTO: 11 FL (ref 7–12)
POTASSIUM SERPL-SCNC: 5.2 MMOL/L (ref 3.5–5)
RBC # BLD: 4.18 E12/L (ref 3.5–5.5)
SODIUM BLD-SCNC: 136 MMOL/L (ref 132–146)
TOTAL PROTEIN: 7.4 G/DL (ref 6.4–8.3)
WBC # BLD: 7.7 E9/L (ref 4.5–11.5)

## 2021-01-12 PROCEDURE — 36415 COLL VENOUS BLD VENIPUNCTURE: CPT | Performed by: SURGERY

## 2021-01-12 PROCEDURE — 1036F TOBACCO NON-USER: CPT | Performed by: SURGERY

## 2021-01-12 PROCEDURE — 99203 OFFICE O/P NEW LOW 30 MIN: CPT | Performed by: SURGERY

## 2021-01-12 PROCEDURE — G8417 CALC BMI ABV UP PARAM F/U: HCPCS | Performed by: SURGERY

## 2021-01-12 PROCEDURE — 3017F COLORECTAL CA SCREEN DOC REV: CPT | Performed by: SURGERY

## 2021-01-12 PROCEDURE — G8427 DOCREV CUR MEDS BY ELIG CLIN: HCPCS | Performed by: SURGERY

## 2021-01-12 PROCEDURE — 99214 OFFICE O/P EST MOD 30 MIN: CPT | Performed by: SURGERY

## 2021-01-12 PROCEDURE — G8484 FLU IMMUNIZE NO ADMIN: HCPCS | Performed by: SURGERY

## 2021-01-12 PROCEDURE — 71046 X-RAY EXAM CHEST 2 VIEWS: CPT

## 2021-01-12 NOTE — Clinical Note
Left needle localized lumpectomy, blue dye injection, left axillary sentinel lymph node excision, possible left axillary dissection.

## 2021-01-12 NOTE — TELEPHONE ENCOUNTER
Met with patient regarding her recent breast cancer diagnosis. Instructed in detail on her breast biopsy pathology findings including cancer type Left (IDC) and hormone receptor status (ER+, CO+, Her2-). Instructed on next steps including breast surgery options, lymph node biopsy procedures and additional imaging that may be required. Informed patient that this is the beginning of their breast cancer journey and when treatment has been completed she will receive a [address] detailing the events of her specific treatment plan. Provided with extensive literature including \"Be A Survivor: Your guide to breast cancer treatment\", Your Guide to Your Breast Cancer Pathology Report, American Cancer society Exercises after breast surgery and Lymphedema Early Signs and Symptoms. Written materials on local and national support and informational groups. Today patient received copies of their pathology and imaging reports (if available) as well as a list of local medical oncology providers. Provided patient with my contact information, office hours, and encouragement to call me with questions or concerns. Patient verbalizes understanding and appreciative of nurse navigator visit. Emotional support provided and greater than 30 minutes spent with patient. Nurse navigator will continue to follow.

## 2021-01-12 NOTE — PATIENT INSTRUCTIONS
RELEASE OF RESULTS AND RECORDS  As of October 1, 2020, all results (x-ray reports, labwork, pathology reports) will be released through [employer] in real time per federal law. Once your test results are final, they will be automatically released to your electronic medical record [employer] where you will be able to see those results and any clinical notes associated with that result. In some cases, you may see those results and notes before your provider or the staff have had a chance to review. We will make every effort to contact you, especially about any abnormal or confusing results. If you view a result before we have contacted you, please wait up to one business day for us to reach you before calling with questions. If anything in your notes seems inaccurate, please message us through [employer] with potential corrections. If you see a term or language in your clinical note that doesn't make sense, please use the online health library reference tool in your MyChart (under the Health tab)  to help you interpret the note.

## 2021-01-12 NOTE — LETTER
[address] [address]  Phone: [phone]  Fax: [phone]    Jerry Domingo MD        January 12, 2021     Deborah Crawley DO  [address] [facility]  [place] [address] [address]    Patient: Cherie Espinoza  MR Number: <[id number]>  YOB: 1970  Date of Visit: 1/12/2021    Dear Dr. Miguel Angel [facility] [place]:    Thank you for the request for consultation for Lorri Moss to me for the evaluation of her left breast cancer. Below are the relevant portions of my assessment and plan of care. 46 y.o. woman who underwent an US guided left breast core biopsy at 7 o'clock position on December 23 , 2020. Pathological evaluation completed at [facility].   11/12/2020: BILATERAL SCREENING MAMMOGRAM: [facility]   FINDINGS:   The breast parenchyma is heterogeneously dense. Right breast:   There is no suspicious mass, cluster of microcalcifications or architectural   distortion. Left breast:   Within the medial aspect of the left breast best appreciated on the CC view,   there is a 1.5 cm irregular focal asymmetry. This is not well seen on the   MLO view but is likely within the inferior aspect of the left breast.      Impression   1. 1.5 cm irregular focal asymmetry seen within the medial aspect of the left   breast best appreciated on the CC view. Dedicated diagnostic mammogram of   the left breast is recommended. 2. Stable mammogram of the right breast. There is no mammographic evidence   of malignancy. Annual screening mammography is recommended. BIRADS:   BIRADS - CATEGORY 0   Incomplete: Needs Additional Imaging Evaluation   OVERALL ASSESSMENT - INCOMPLETE:NEED ADDITIONAL IMAGING EVALUATION. 12/15/2020: LEFT DIAGNOSTIC MAMMOGRAM;JACBC   FINDINGS:   Mammogram:   The breast parenchyma is heterogeneously dense.    Within the lower inner quadrant of the left breast there is a spiculated mass measuring approximately 1.5 cm. This mass does not compress on the spot   compression images. On the dedicated targeted ultrasound examination of the left breast there is   a solid lesion with posterior shadowing at the 7 o'clock position   corresponding to the mammographic abnormality. Ultrasound-guided core biopsy is recommended. Impression   1. 1.5 cm spiculated lesion seen within the left breast at the 7 o'clock   position highly suspicious for malignancy. Dedicated ultrasound-guided core   biopsy is recommended. BIRADS:   BIRADS - CATEGORY 5   Highly Suggestive for Malignancy. Biopsy should be considered at this time. OVERALL ASSESSMENT - HIGHLY SUGGESTIVE OF MALIGNANCY. A letter of notification will be sent to the patient regarding the results. My findings and recommendations were discussed with the patient at the time   of service. A representative from the radiology department will be contacting your office   and assisting the patient in getting appropriate follow-up   12/15/2020: LEFT BREAST ULTRASOUND; [facility]   FINDINGS:   Mammogram:   The breast parenchyma is heterogeneously dense. Within the lower inner quadrant of the left breast there is a spiculated mass   measuring approximately 1.5 cm. This mass does not compress on the spot   compression images. On the dedicated targeted ultrasound examination of the left breast there is   a solid lesion with posterior shadowing at the 7 o'clock position   corresponding to the mammographic abnormality. Ultrasound-guided core biopsy is recommended. Impression   1. 1.5 cm spiculated lesion seen within the left breast at the 7 o'clock   position highly suspicious for malignancy. Dedicated ultrasound-guided core   biopsy is recommended. BIRADS:   BIRADS - CATEGORY 5   Highly Suggestive for Malignancy. Biopsy should be considered at this time. OVERALL ASSESSMENT - HIGHLY SUGGESTIVE OF MALIGNANCY. A letter of notification will be sent to the patient regarding the results. My findings and recommendations were discussed with the patient at the time   of service. A representative from the radiology department will be contacting your office   and assisting the patient in getting appropriate follow-up.   12/23/2020; LEFT BREAST US BIOPSY; [address] [place] [address] Dr Braden   Addendum   Signed by Karen Love DO on 12/30/20 1022     ----------------------------- ADDENDED REPORT -----------------------------   12/30/2020 Addendum:   BIOPSY SITE   ------------------------------   Procedure in the left breast   PATHOLOGY   ------------------------------   REPORTED AS:   Invasive ductal carcinoma in the left breast   ADDITIONAL COMMENTS:   Accession Number: [id number]   Diagnosis:   Left breast, core needle biopsy: Invasive, moderately differentiated   ductal carcinoma (grade 2)   Comment: Intradepartmental consultation is obtained. Breast Cancer Marker Studies:   Estrogen Receptors (ER):   -Positive (10%of cells demonstrate nuclear positivity):   Percentage of cells positive: 90%   Intensity: Strong   Progesterone Receptors (SD):   -Positive:   Percentage of cells positive: 90%   Intensity: Strong   Hormone receptor studies are performed by immunohistochemistry on   formalin-fixed, paraffin-embedded tissue (Roche Benchmark Immunostainer,   [place] anti-ER clone [id number], anti-SD clone [id number], polymer-based detection   chemistry). ER and SD are evaluated based on the percentage of cells   showing nuclear staining with 1% considered positive for each. Her-2/salvador (c-erb B-2) protein expression: Indeterminate/2+   FISH TESTING has been ordered and will be reported separately   Her-2 studies are performed by immunohistochemistry on formalin-fixed,   paraffin-embedded tissue (Ofidium Immunostainer, [place] Pathway   anti-Her-2 clone [id number], polymer-based detection chemistry). This assay is   FDA approved. Her-2 is evaluated based on the pattern of membrane staining as well as   the percentage of cancer cells showing membrane staining, using the   latest ASCO/CAP scoring criteria. Testing is performed on block A1. All controls (high protein expression,   low protein expression, negative protein expression, and internal) are   acceptable. Percentage of tumor cells exhibiting uniform intense complete membrane   stainin%   Percentage of tumor cells exhibiting weak to moderate complete membrane   stainin%   Cold Ischemia and Fixation Times:   Meets requirements specified in latest version of the ASCO/CAP   guidelines: Yes   Aliyah Pham M.D.   (Electronic Signature)   Lindsay Wiggins   ------------------------------   This is concordant with the imaging findings. RECOMMENDATION   ------------------------------   Review by the [facility] interdisciplinary team with generation of multidisciplinary recommendations   FISH: NEGATIVE    CPS stage IA lower inner quadrant left breast cancer. Multiple comorbidities including blood pressure issues perioperatively and insulin-dependent diabetes. Long discussion about process going forward. Patient is an excellent candidate for conservative therapy. Discussed in detail. Questions answered to patient satisfaction. If you have questions, please do not hesitate to call me. I look forward to following Jenny along with you.     Sincerely,  Mónica Dimas MD

## 2021-01-13 DIAGNOSIS — C50.912 INVASIVE DUCTAL CARCINOMA OF LEFT BREAST (HCC): Primary | ICD-10-CM

## 2021-01-15 DIAGNOSIS — C50.912 INVASIVE DUCTAL CARCINOMA OF LEFT BREAST (HCC): Primary | ICD-10-CM

## 2021-01-15 RX ORDER — INSULIN GLARGINE 100 [IU]/ML
INJECTION, SOLUTION SUBCUTANEOUS
Qty: 15 ML | Refills: 3 | Status: SHIPPED
Start: 2021-01-15 | End: 2021-02-03 | Stop reason: SDUPTHER

## 2021-01-15 RX ORDER — BLOOD SUGAR DIAGNOSTIC
STRIP MISCELLANEOUS
Qty: 100 EACH | Refills: 2 | Status: SHIPPED
Start: 2021-01-15 | End: 2021-04-13

## 2021-01-18 ENCOUNTER — HOSPITAL ENCOUNTER (OUTPATIENT)
Dept: RADIATION ONCOLOGY | Age: 51
Discharge: HOME | End: 2021-01-18
Payer: MEDICAID

## 2021-01-18 VITALS
RESPIRATION RATE: 18 BRPM | OXYGEN SATURATION: 96 % | TEMPERATURE: 96.1 F | DIASTOLIC BLOOD PRESSURE: 60 MMHG | HEART RATE: 97 BPM | SYSTOLIC BLOOD PRESSURE: 102 MMHG | BODY MASS INDEX: 30.92 KG/M2 | WEIGHT: 153.1 LBS

## 2021-01-18 DIAGNOSIS — C50.919 MALIGNANT NEOPLASM OF FEMALE BREAST, UNSPECIFIED ESTROGEN RECEPTOR STATUS, UNSPECIFIED LATERALITY, UNSPECIFIED SITE OF BREAST (HCC): Primary | ICD-10-CM

## 2021-01-18 PROCEDURE — 99205 OFFICE O/P NEW HI 60 MIN: CPT

## 2021-01-18 PROCEDURE — 99219 PR INITIAL OBSERVATION CARE/DAY 50 MINUTES: CPT | Performed by: RADIOLOGY

## 2021-01-18 NOTE — PROGRESS NOTES
Radiation Oncology      Eugenio Plaza. Sabra Royal MD [facility]      Referring Physician: Dr. Laya Valente      Primary Care Physician:Charlene Velez DO   Primary Oncologist: pending      Diagnosis: cT1c cNo left breast cancer   -ER+   -NE+   -Her2-      Service:  Radiation Oncology consultation performed on 1/18/20        HPI:        The lesion is located in the 7 o'clock position of the left breast. The lesion was discovered by mammogram. The patient has not noted a change in BSE since presentation. Patient denies nipple discharge. Patient denies a personal history of breast cancer. Breast cancer risk factors include age, gender, family history of cancer,  Ashkenazi Congregational Ancestry: No.  The patient presents today to discuss fractionated external beam radiation therapy as a component of multidisciplinary, adjuvant management. We reviewed the available medical records including the complete medical history of this pt today prior to consultation. Epic -CE and available scanned documents per the Epic Media tab were reviewed PRN. A complete ROS was also performed today and is noted below. During consultation today I personally discussed the pts workup to date; including but not limited to applicable imaging studies, Pathology reports, and interventions. The NCCN guidelines, as pertaining to the above diagnosis were also recapped for the pt today in brief. Today, Jenny Lilly  notes Sx that include fatigue, anxiety. KPS 70.        -----  SARS-CoV-2:    -pt asymptomatic and afebrile      [facility] NOV 2020: [email or web address].su.[email or web address] html:    The likelihood of recovering replication-competent virus also declines after onset of symptoms.  For patients with mild to moderate WNQVE-04, replication-competent virus has not been recovered after 10 days following symptom onset (CDC, unpublished data, 2020; Wölfjennifer et al., 1677; Tate et al., 2020; Alejandro et al., 2020; Venus et al., 0354; personal communication with Umu Kenney, 2020; Jose Angel Fall, 2020).       -----  Per [address]:    -pending    -----  Pathology reviewed:    -surgery pending    -----      Past Medical History:   Diagnosis Date    Arthritis     lower back    Back pain     Cardiac valve prolapse     micro    Diabetes mellitus ([address]     H/O exercise stress test 10/2015  ?     Dr Johnny May  neg    Hyperlipidemia     Hypertension     Neuropathy due to secondary diabetes ([address]     in marcial feet    Prolonged emergence from general anesthesia     Psoriasis        Past Surgical History:   Procedure Laterality Date    ANKLE SURGERY      bilateral tarsal tunnels    CARDIAC SURGERY      cardiac catheterization    CARPAL TUNNEL RELEASE      left    CARPAL TUNNEL RELEASE  12    right     SECTION      CHOLECYSTECTOMY, LAPAROSCOPIC N/A 2019    LAPAROSCOPIC CHOLECYSTECTOMY WITH IOC performed by Johnny Hooker MD at [address]  had extremely high blood pressure and hard to wake up    COLONOSCOPY      COLONOSCOPY N/A 2019    COLORECTAL CANCER SCREENING, NOT HIGH RISK performed by Johnny Hooker MD at [facility], COLON, DIAGNOSTIC      HYSTERECTOMY      HYSTERECTOMY, TOTAL ABDOMINAL      SHOULDER ARTHROSCOPY Left 2016    subacromin decompression and debridement    SHOULDER ARTHROSCOPY Right 2020    RIGHT SHOULDER ARTHROSCOPY, SUBACROMIAL DECOMPRESSION, LABRIAL DEBRIDEMENT, CHONDROPLASTY, RAÚL performed by Bety Hood DO at [facility] 2019    EGD BIOPSY performed by Johnny Hooker MD at [facility] LEFT  2020     BREAST NEEDLE BIOPSY LEFT 2020 [facility]       Family History   Problem Relation Age of Onset    Heart Disease Mother     Diabetes Mother     Heart Disease Father     Diabetes Father     Cancer CONTENTS OF 1 SYRINGE SUBCUTANEOUSLY EVERY WEEK *PATIENT NEEDS APPOINTMENT*    Takes on saturdays) 6 mL 10    metoprolol succinate (TOPROL XL) 25 MG extended release tablet TAKE 1/2 TABLET BY MOUTH DAILY 45 tablet 10    DULoxetine (CYMBALTA) 30 MG extended release capsule TAKE 1 CAPSULE BY MOUTH EVERY MORNING *PATIENT NEEDS APPOINTMENT* 90 capsule 10    pramipexole (MIRAPEX) 0.125 MG tablet TAKE 1 TABLET BY MOUTH EVERY NIGHT 90 tablet 1    Insulin Syringe-Needle U-100 (KROGER INSULIN SYR 1CC/30G) 30G X 5/16\" 1 ML MISC 1 each by Does not apply route 4 times daily 120 each 5    metFORMIN (GLUCOPHAGE) 500 MG tablet Take 2 tablets by mouth 2 times daily (with meals) 360 tablet 1    bumetanide (BUMEX) 1 MG tablet Take 1 tablet by mouth every other day 90 tablet 1    famotidine (PEPCID) 20 MG tablet Take 1 tablet by mouth 2 times daily 180 tablet 1    hydrocortisone 2.5 % cream APPLY TO AFFECTED AREA TOPICALLY TWICE DAILY 84 g 10    Humidifier MISC 1 Device by Does not apply route daily 1 each 0    insulin lispro (ADMELOG SOLOSTAR) 100 UNIT/ML pen As directed per sliding scale up to 13 units 4 times per day. 5 pen 3    Wheat Dextrin (BENEFIBER) POWD Take 4 g by mouth 3 times daily (with meals) (Patient not taking: Reported on 1/12/2021) 475 g 2    lidocaine-prilocaine (EMLA) 2.5-2.5 % cream Apply topically as needed. 30 g 0    glucose monitoring kit (FREESTYLE) monitoring kit 1 kit by Does not apply route daily 1 kit 0    Blood Pressure KIT 1 kit by Does not apply route daily 1 kit 0    Misc. Devices (QUAD CANE) MISC 1 Quad cane 1 each 0    Multiple Vitamins-Minerals (THERAPEUTIC MULTIVITAMIN-MINERALS) tablet Take 1 tablet by mouth daily      B Complex Vitamins (B COMPLEX 1 PO) Take 1 tablet by mouth daily. No current facility-administered medications for this encounter.         No Known Allergies      Social History     Socioeconomic History    Marital status:      Spouse name: None    Number of children: 2    Years of education: None    Highest education level: None   Occupational History    None   Social Needs    Financial resource strain: None    Food insecurity     Worry: None     Inability: None    Transportation needs     Medical: None     Non-medical: None   Tobacco Use    Smoking status: Never Smoker    Smokeless tobacco: Never Used   Substance and Sexual Activity    Alcohol use: Not Currently     Alcohol/week: 1.0 standard drinks     Types: 1 Glasses of wine per week     Comment: weekly    Drug use: No    Sexual activity: None   Lifestyle    Physical activity     Days per week: None     Minutes per session: None    Stress: None   Relationships    Social connections     Talks on phone: None     Gets together: None     Attends Voodoo service: None     Active member of club or organization: None     Attends meetings of clubs or organizations: None     Relationship status: None    Intimate partner violence     Fear of current or ex partner: None     Emotionally abused: None     Physically abused: None     Forced sexual activity: None   Other Topics Concern    None   Social History Narrative    None         Review of Systems - History obtained from chart review and the patient  General ROS: positive for  - fatigue and mild anxiety surrounding the diagnosis  Psychological ROS: negative  Ophthalmic ROS: negative  ENT ROS: negative  Allergy and Immunology ROS: negative  Hematological and Lymphatic ROS: negative  Endocrine ROS: negative  Breast ROS: negative for new breast lumps  Respiratory ROS: no cough, shortness of breath, or wheezing  Cardiovascular ROS: no chest pain or dyspnea on exertion  Gastrointestinal ROS: no abdominal pain, change in bowel habits, or black or bloody stools  Genito-Urinary ROS: no dysuria, trouble voiding, or hematuria  Musculoskeletal ROS: negative  Neurological ROS: no TIA or stroke symptoms  Dermatological ROS: negative        Physical Exam  HENT:      Head: Normocephalic and atraumatic. Right Ear: External ear normal.      Left Ear: External ear normal.      Nose: Nose normal.      Mouth/Throat:      Mouth: Mucous membranes are moist.   Eyes:      Extraocular Movements: Extraocular movements intact. Pupils: Pupils are equal, round, and reactive to light. Neck:      Musculoskeletal: Normal range of motion. Cardiovascular:      Rate and Rhythm: Normal rate. Pulses: Normal pulses. Heart sounds: Normal heart sounds. Pulmonary:      Effort: Pulmonary effort is normal.      Breath sounds: Normal breath sounds. Abdominal:      General: Abdomen is flat. Palpations: Abdomen is soft. Musculoskeletal: Normal range of motion. Skin:     General: Skin is warm and dry. Neurological:      General: No focal deficit present. Mental Status: She is alert and oriented to person, place, and time. Psychiatric:         Mood and Affect: Mood normal.         Behavior: Behavior normal.         Thought Content: Thought content normal.         Judgment: Judgment normal.             Imaging reviewed:      [facility] 12/15/20:  Impression   1. 1.5 cm spiculated lesion seen within the left breast at the 7 o'clock   position highly suspicious for malignancy.  Dedicated ultrasound-guided core   biopsy is recommended. BIRADS:   BIRADS - CATEGORY 5   Highly Suggestive for Malignancy.  Biopsy should be considered at this time. OVERALL ASSESSMENT - HIGHLY SUGGESTIVE OF MALIGNANCY. A letter of notification will be sent to the patient regarding the results. My findings and recommendations were discussed with the patient at the time   of service. A representative from the radiology department will be contacting your office   and assisting the patient in getting appropriate follow-up.          Radiation Safety and Treatment Support:  -previous Radiation history: No  -history of connective tissue disease: No  -history of autoimmune disease: No  -pregnant: no  -fertility conservation and /or contraception discussed: no  -nutrition consult prior to [address]: Yes  -PEG: No  -Dental evaluation prior to treatment:No  -Social Work requested: No  -Oncology Nurse Navigator requested: Yes  -pre + post treatment PT / Rehab / PM+R evaluation considered: Yes  -ICD: No   -ICD brand: -  -[facility] patient navigator: Micah Haigler  -Nurse Practitioners for Radiation Oncology:    ---Mya Roman, MSN, RN, FNP-C   ---Inderjit Torrez, MSN, RN, FNP-BC        Assessment and Plan: Roger Hopkins is a pleasant and cooperative 46year old with a recent diagnosis of AJCC stage group I left breast cancer. We recommend adjuvant external beam radiation therapy. With a breast conserving surgery, combined with fractionated external beam radiation therapy, there is no difference in overall survival compared to mastectomy: ipsilateral breast tumor recurrence rates drop from over 30 % (35 % in the 12 year analysis of NSABP B-06) to 10% [20 year IBRT ; 39% lumpectomy alone vs 14% lumpectomy + RT /// Shirlene Hunt al. Juni Adams J Med. 2002 Oct 17;347(16):1233-41]. There is no excess risk of contralateral breast cancer recurrence per [facility] data from a similar era. Multiple other trials have demonstrated a reduction in risk of ipsilateral breast tumor recurrence by 2/3 (whole breast radiation), with further possible benefit of Tamoxifen / Aromasin therapy (per Medical Oncology). The recent EBCTCG meta analysis shows an overall local recurrence rate of less the 10 % for node negative patients. Whole breast radiation to 50 Gy in standard fractions may be combined with a boost based on specific disease and patient characteristics to further improve local control [Frieda H, J Clin Oncol.  2007 Aug 1;25(22):8374-26] and was discussed in detail with the patient as applicable [place] / hypofractionation will also be considered based on the ACR / JASON guidelines) as well as regional LN irradiation based on clinical and pathology characteristic (a thorough discussion of the potential benefits and risks on this specific aspect of treatment was performed PRN). The risks (detailed discussion), benefits, alternatives, process and logistics of external beam radiation were reviewed. Specifically, we discussed the possible chronic radiation toxicity which may include but is not limited to lymphedema, pneumonitis, skin/cartilage necrosis (rare), rib fracture (rare) , joint pain, brachioplexopathy and cosmetic changes. We answered all of the patient's questions to the best of our ability who verbalized understanding and seemed satisfied. Radiation planning will commence within 7 days from SURG; the next step in management being the simulation scan, with external beam radiation to commence in a timely fashion thereafter. For left sided breast cancer and select right sided cases, utilization of the Active Breathing Coordinator and / or surface guided fractionated external beam radiation therapy (with Vision-RT / 4D) will be considered to reduce radiation dose to the heart (and lung in certain situations) [Brooklyn et al. PRO. 2015 /// Sanford et al. Earnestine Tello. 2011 /// Yocasta et al. Charmaine Noyola. 2012  /// Godfrey Avitia. 2017]. It was a pleasure meeting Antonio See today and we appreciate the referral and opportunity to be involved in her care. We had an extensive discussion today regarding the course to date (including a focused review of theapplicable radiographic and laboratory information), multidisciplinary approach to cancer care, and indications for external beam radiation therapy as a component therein. A literature review and multidisciplinary discussion was performed after seeing this patient due to the complexity of the medical decision making in this case. I personally spent greater than 85 minutes on this case and with this patient.  I performed the complete history and physical as above at today's visit, at least 45 minutes was in direct discussion and  regarding disease management.          -surgery pending  -sim 2-3 weeks post BCS, review PATH  -cardiac sparing DIB indicated        Jose Luis Terry MD [place] [address] Oncology  Cell: [phone]    [facility]:  [facility] [address]: [phone]  [facility]:  [phone]   FAX:    [phone]  [address] [place] [address] Road:  [phone]   FAX:  [phone]        NOTE: This report was transcribed using voice recognition software. Every effort was made to ensure accuracy; however, inadvertent computerized transcription errors may be present.

## 2021-01-18 NOTE — PROGRESS NOTES
Mike Hess  1970 46 y.o. Referring Physician: Dr. Ayaan Wilson    PCP: Gracy Velez, DO     Vitals:    21 1129   BP: 102/60   Pulse: 97   Resp: 18   Temp: 96.1 °F (35.6 °C)   SpO2: 96%        Wt Readings from Last 3 Encounters:   21 153 lb 1.6 oz (69.4 kg)   21 154 lb 8 oz (70.1 kg)   20 157 lb (71.2 kg)        Body mass index is 30.92 kg/m². Chief Complaint: No chief complaint on file. Cancer Staging  No matching staging information was found for the patient. Prior Radiation Therapy? NO    Concurrent Chemo/radiation? NO    Prior Chemotherapy? NO    Prior Hormonal Therapy? YES: Site Treated: birth contol          Facility: home          Date: 1yr as teenager    Head and Neck Cancer? No, patient does NOT have HN cancer.       LMP: 34yrs    Age at first Menses: 4    : 3    Para: 2        Current Outpatient Medications   Medication Sig Dispense Refill    blood glucose test strips (ONETOUCH ULTRA) strip USE AS DIRECTED FOUR TIMES DAILY 100 each 2    etodolac (LODINE) 300 MG capsule TAKE ONE (1) CAPSULE BY MOUTH ONCE DAILY 30 capsule 2    BASAGLAR KWIKPEN 100 UNIT/ML injection pen INJECT 50 UNITS SUBCUTANEOUSLY NIGHTLY 15 mL 3    GABAPENTIN PO Take by mouth Nightly      Lancets (ONETOUCH DELICA PLUS [id number]) MISC       B-D ULTRAFINE III SHORT PEN 31G X 8 MM MISC USE WITH INSULIN PENS AS DIRECTED 100 each 10    omeprazole (PRILOSEC) 40 MG delayed release capsule TAKE 1 CAPSULE BY MOUTH DAILY 90 capsule 1    docusate sodium (COLACE) 100 MG capsule Take 1 capsule by mouth 2 times daily as needed for Constipation 20 capsule 1    ketorolac (TORADOL) 10 MG tablet Take 1 tablet by mouth every 6 hours as needed for Pain Patient received prior injection 20 tablet 0    ondansetron (ZOFRAN) 4 MG tablet Take 1 tablet by mouth every 8 hours as needed for Nausea or Vomiting 20 tablet 0    sertraline (ZOLOFT) 100 MG tablet TAKE 1 TABLET BY MOUTH DAILY AS NEEDED FOR DEPRESSION *EMERGENCY REFILL* 30 tablet 3    lactobacillus (CULTURELLE) CAPS capsule TAKE (1) CAPSULE BY MOUTH DAILY 30 capsule 3    fluticasone (FLONASE) 50 MCG/ACT nasal spray 1 spray by Nasal route daily 1 Bottle 3    empagliflozin (JARDIANCE) 10 MG tablet Take 1 tablet by mouth daily 30 tablet 3    nitroGLYCERIN (NITROSTAT) 0.4 MG SL tablet Place 1 tablet under the tongue every 5 minutes as needed for Chest pain 25 tablet 1    Dulaglutide (TRULICITY) 1.5 CI/0.4WV SOPN INJECT THE CONTENTS OF 1 SYRINGE SUBCUTANEOUSLY EVERY WEEK *PATIENT NEEDS APPOINTMENT* (Patient taking differently: INJECT THE CONTENTS OF 1 SYRINGE SUBCUTANEOUSLY EVERY WEEK *PATIENT NEEDS APPOINTMENT*    Takes on saturdays) 6 mL 10    metoprolol succinate (TOPROL XL) 25 MG extended release tablet TAKE 1/2 TABLET BY MOUTH DAILY 45 tablet 10    DULoxetine (CYMBALTA) 30 MG extended release capsule TAKE 1 CAPSULE BY MOUTH EVERY MORNING *PATIENT NEEDS APPOINTMENT* 90 capsule 10    pramipexole (MIRAPEX) 0.125 MG tablet TAKE 1 TABLET BY MOUTH EVERY NIGHT 90 tablet 1    Insulin Syringe-Needle U-100 ([employer] INSULIN SYR 1CC/30G) 30G X 5/16\" 1 ML MISC 1 each by Does not apply route 4 times daily 120 each 5    metFORMIN (GLUCOPHAGE) 500 MG tablet Take 2 tablets by mouth 2 times daily (with meals) 360 tablet 1    bumetanide (BUMEX) 1 MG tablet Take 1 tablet by mouth every other day 90 tablet 1    famotidine (PEPCID) 20 MG tablet Take 1 tablet by mouth 2 times daily 180 tablet 1    hydrocortisone 2.5 % cream APPLY TO AFFECTED AREA TOPICALLY TWICE DAILY 84 g 10    Humidifier MISC 1 Device by Does not apply route daily 1 each 0    insulin lispro (ADMELOG SOLOSTAR) 100 UNIT/ML pen As directed per sliding scale up to 13 units 4 times per day.  5 pen 3    Wheat Dextrin (BENEFIBER) POWD Take 4 g by mouth 3 times daily (with meals) (Patient not taking: Reported on 1/12/2021) 475 g 2    lidocaine-prilocaine (EMLA) 2.5-2.5 % cream Apply topically as needed. 30 g 0    glucose monitoring kit (FREESTYLE) monitoring kit 1 kit by Does not apply route daily 1 kit 0    Blood Pressure KIT 1 kit by Does not apply route daily 1 kit 0    Misc. Devices (QUAD CANE) MISC 1 Quad cane 1 each 0    Multiple Vitamins-Minerals (THERAPEUTIC MULTIVITAMIN-MINERALS) tablet Take 1 tablet by mouth daily      B Complex Vitamins (B COMPLEX 1 PO) Take 1 tablet by mouth daily. No current facility-administered medications for this encounter. Past Medical History:   Diagnosis Date    Arthritis     lower back    Back pain     Cardiac valve prolapse     micro    Diabetes mellitus ([address]     H/O exercise stress test 10/2015  ?     Dr Daxa Delacruz  neg    Hyperlipidemia     Hypertension     Neuropathy due to secondary diabetes ([address]     in marcial feet    Prolonged emergence from general anesthesia     Psoriasis        Past Surgical History:   Procedure Laterality Date    ANKLE SURGERY      bilateral tarsal tunnels    CARDIAC SURGERY      cardiac catheterization    CARPAL TUNNEL RELEASE      left    CARPAL TUNNEL RELEASE  12    right     SECTION      CHOLECYSTECTOMY, LAPAROSCOPIC N/A 2019    LAPAROSCOPIC CHOLECYSTECTOMY WITH IOC performed by Selene Su MD at [address] [place], [place] [address]e W.  had extremely high blood pressure and hard to wake up    COLONOSCOPY      COLONOSCOPY N/A 2019    COLORECTAL CANCER SCREENING, NOT HIGH RISK performed by Selene Su MD at [facility] [place], DIAGNOSTIC     [facility], TOTAL ABDOMINAL      SHOULDER ARTHROSCOPY Left 2016    subacromin decompression and debridement    SHOULDER ARTHROSCOPY Right 2020    RIGHT SHOULDER ARTHROSCOPY, SUBACROMIAL DECOMPRESSION, LABRIAL DEBRIDEMENT, CHONDROPLASTY, [place] performed by Shanice Singh DO at [facility] 2019    EGD BIOPSY performed by Selene Su MD at [place] [address]  US BREAST NEEDLE BIOPSY LEFT  12/23/2020     BREAST NEEDLE BIOPSY LEFT 12/23/2020 [facility]       Family History   Problem Relation Age of Onset    Heart Disease Mother     Diabetes Mother     Heart Disease Father     Diabetes Father     Cancer Father         prostate, colon, skin cance behind ear    Cancer Maternal Aunt 54        breast    Cancer Maternal Uncle 62        colon    Cancer Paternal Aunt 58        breast    Cancer Other 62        maternal great aunt - breast       Social History     Socioeconomic History    Marital status:      Spouse name: Not on file    Number of children: 2    Years of education: Not on file    Highest education level: Not on file   Occupational History    Not on file   Social Needs    Financial resource strain: Not on file    Food insecurity     Worry: Not on file     Inability: Not on file   [employer] needs     Medical: Not on file     Non-medical: Not on file   Tobacco Use    Smoking status: Never Smoker    Smokeless tobacco: Never Used   Substance and Sexual Activity    Alcohol use: Not Currently     Alcohol/week: 1.0 standard drinks     Types: 1 Glasses of wine per week     Comment: weekly    Drug use: No    Sexual activity: Not on file   Lifestyle    Physical activity     Days per week: Not on file     Minutes per session: Not on file    Stress: Not on file   Relationships    Social connections     Talks on phone: Not on file     Gets together: Not on file     Attends Holiness service: Not on file     Active member of club or organization: Not on file     Attends meetings of clubs or organizations: Not on file     Relationship status: Not on file    Intimate partner violence     Fear of current or ex partner: Not on file     Emotionally abused: Not on file     Physically abused: Not on file     Forced sexual activity: Not on file   Other Topics Concern    Not on file   Social History Narrative    Not on file Occupation: not employed  Retired:  NO        REVIEW OF SYSTEMS: <<For Level 5, 10 or more systems>> Approximately >20minutes spent with patient about radiation therapy  to her left breast utilizing handouts and slides, with discussion about treatment and side effects. Her surgery is planned for  2/17/2021 for a lumpectomy by Dr. Heriberto Winchester. A lump was found on a routine visit to her PCP, and then had a mammogram done on 11/12/20220 with a ultrasound 12/15/2020. Dr. 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 Patient hasn't seen medical oncology at this time, scheduling an appointment. All questions were answered from a nursing perspective and she expressed understanding. Pacemaker/Defibulator/ICD:  No    Mediport: No        FALLS RISK SCREENING ASSESSMENT    Instructions:  Assess the patient and enter the appropriate indicators that are present for fall risk identification. Total the numbers entered and assign a fall risk score from Table 2.  Reassess patient at a minimum every 12 weeks or with status change. Assessment   Date  1/18/2021     1. Mental Ability: confusion/cognitively impaired No - 0       2. Elimination Issues: incontinence, frequency No - 0       3. Ambulatory: use of assistive devices (walker, cane, off-loading devices), attached to equipment (IV pole, oxygen) Yes-1     4. Sensory Limitations: dizziness, vertigo, impaired vision No - 0       5. Age Less than 65 years - 0       6. Medication: diuretics, strong analgesics, hypnotics, sedatives, antihypertensive agents   Yes - 3   7. Falls:  recent history of falls within the last 3 months (not to include slipping or tripping)   No - 0   TOTAL 4    If score of 4 or greater was education given? Yes       TABLE 2   Risk Score Risk Level Plan of Care   0-3 Little or  No Risk 1.   Provide assistance as indicated for ambulation activities  2. Reorient confused/cognitively impaired patient  3. Call-light/bell within patient's reach  4. Chair/bed in low position, stretcher/bed with siderails up except when performing patient care activities  5. Educate patient/family/caregiver on falls prevention  6.  Reassess in 12 weeks or with any noted change in patient condition which places them at a risk for a fall   4-6 Moderate Risk 1. Provide assistance as indicated for ambulation activities  2. Reorient confused/cognitively impaired patient  3. Call-light/bell within patient's reach  4. Chair/bed in low position, stretcher/bed with siderails up except when performing patient care activities  5. Educate patient/family/caregiver on falls prevention  6. Falls risk precaution (Yellow sticker Level II) placed on patient chart   7 or   Higher High Risk 1. Place patient in easily observable treatment room  2. Patient attended at all times by family member or staff  3. Provide assistance as indicated for ambulation activities  4. Reorient confused/cognitively impaired patient  5. Call-light/bell within patient's reach  6. Chair/bed in low position, stretcher/bed with siderails up except when performing patient care activities  7. Educate patient/family/caregiver on falls prevention  8. Falls risk precaution (Yellow sticker Level III) placed on patient chart           MALNUTRITION RISK SCREENING ASSESSMENT    Instructions:  Assess the patient and enter the appropriate indicators that are present for nutrition risk identification. Total the numbers entered and assign a risk score. Follow the appropriate action for total score listed below. Assessment   Date  1/18/2021     1. Have you lost weight without trying? 0- No     2. Have you been eating poorly because of a decreased appetite? 0- No   3. Do you have a diagnosis of head and neck cancer?       0- No TOTAL 0          Score of 0-1: No action  Score 2 or greater:  · For Non-Diabetic Patient: Recommend adding Ensure Complete 2 x daily and provide patient with Ensure wellness bag with coupons  · For Diabetic Patient: Recommend adding Glucerna Shake 2 x daily and provide patient with Glucerna Wellness bag with coupons  · Route to the dietitian via [address]    · Are you having  difficulty performing daily routine tasks  due to fatigue or weakness (ie: bathing/showering, dressing, housework, meal prep, work, child Mel Pierce): No     · Do you have any arm flexibility/ROM restrictions, swelling or pain that limit activity: No     · Any changes in memory, attention/focus that impact daily activities: No     · Do you avoid participation in leisure/social activity due weakness, fatigue or pain: No     ARE ANY OF THE ABOVE ARE ANSWERED YES: No          PT ASSESSMENT FOR REFERRAL    · Have you had any recent falls in past 2 months: No     · Do you have difficulty  going up/down stairs: No     · Are you having difficulty walking: No     · Do you often hold onto furniture/environmental supports or feel off balance when you are walking: No     · Do you need to take rest breaks when you are walking: No     · Any pain on scale of 1-10 that limits your mobility: No 0/10    ARE ANY OF THE ABOVE ARE ANSWERED YES: No           LYMPHEDEMA SCREENING ASSESSMENT FOR PATIENTS WITH BREAST CANCER    The patient reports the following signs/symptoms of lymphedema: None    Please ask the provider to assess patient for lymphedema for any reported signs or symptoms so a referral to Lymphedema Therapy can be considered. PREHAB AUDIOLOGY REFERRAL    - Is patient planned to receive Cisplatin? No. This patient is not planned to start Cisplatin. - Is patient planned to receive radiation therapy that may be directed toward auditory canals or nerves?  No. Patient is not planned to start radiation therapy to auditory canals or nerves. - Is patient complaining of new onset hearing loss? No. Patient is not complaining of new onset hearing loss. Patient education given on radiation therapy to her left breast post surgery. . The patient expresses understanding and acceptance of instructions.  Andriy Herron 1/18/2021 11:34 AM           Andriy Herron

## 2021-01-19 ENCOUNTER — TELEPHONE (OUTPATIENT)
Dept: BREAST CENTER | Age: 51
End: 2021-01-19

## 2021-01-19 NOTE — TELEPHONE ENCOUNTER
Patient surgery has been scheduled with surgery scheduling 2/17/21 @ 10:00am / NL & Nuclear 7:30am / [place] Drain 6:00am - Left breast NL lumpectomy - blue dye inj - left axillary SLN excision - possible left axillary dissection - General - Trident/Neoprobe. Patient notified of date and time of surgery. Patient was instructed to enter the [facility] entrance of the hospital. Patient was instructed NPO after midnight the night prior to surgery. Patient was instructed NO ASA products or blood thinners 5-7 days prior to surgery. Patient instruction and post op instructions sheet mailed to patient. Patient was instructed to bring a sports bra with her day of surgery. Patient will be instructed by PAT about Covid 19 test.  Patient will be tested 72-96 hours prior to surgery. Post op visit 3/2/21 @ 9:30am [facility]. No prior authorization required.

## 2021-01-26 ENCOUNTER — PREP FOR PROCEDURE (OUTPATIENT)
Dept: BREAST CENTER | Age: 51
End: 2021-01-26

## 2021-01-26 RX ORDER — SODIUM CHLORIDE, SODIUM LACTATE, POTASSIUM CHLORIDE, CALCIUM CHLORIDE 600; 310; 30; 20 MG/100ML; MG/100ML; MG/100ML; MG/100ML
INJECTION, SOLUTION INTRAVENOUS CONTINUOUS
Status: CANCELLED | OUTPATIENT
Start: 2021-01-26

## 2021-01-26 RX ORDER — SODIUM CHLORIDE 0.9 % (FLUSH) 0.9 %
10 SYRINGE (ML) INJECTION PRN
Status: CANCELLED | OUTPATIENT
Start: 2021-01-26

## 2021-01-26 RX ORDER — SODIUM CHLORIDE 0.9 % (FLUSH) 0.9 %
10 SYRINGE (ML) INJECTION EVERY 12 HOURS SCHEDULED
Status: CANCELLED | OUTPATIENT
Start: 2021-01-26

## 2021-02-03 ENCOUNTER — OFFICE VISIT (OUTPATIENT)
Dept: FAMILY MEDICINE CLINIC | Age: 51
End: 2021-02-03
Payer: MEDICAID

## 2021-02-03 VITALS
RESPIRATION RATE: 16 BRPM | HEIGHT: 59 IN | TEMPERATURE: 98 F | BODY MASS INDEX: 30.84 KG/M2 | WEIGHT: 153 LBS | OXYGEN SATURATION: 97 % | DIASTOLIC BLOOD PRESSURE: 62 MMHG | HEART RATE: 90 BPM | SYSTOLIC BLOOD PRESSURE: 104 MMHG

## 2021-02-03 DIAGNOSIS — F41.9 ANXIETY: ICD-10-CM

## 2021-02-03 DIAGNOSIS — K21.9 GASTROESOPHAGEAL REFLUX DISEASE WITHOUT ESOPHAGITIS: ICD-10-CM

## 2021-02-03 DIAGNOSIS — E11.65 UNCONTROLLED TYPE 2 DIABETES MELLITUS WITH HYPERGLYCEMIA (HCC): ICD-10-CM

## 2021-02-03 DIAGNOSIS — R73.01 IFG (IMPAIRED FASTING GLUCOSE): ICD-10-CM

## 2021-02-03 DIAGNOSIS — R30.0 DYSURIA: ICD-10-CM

## 2021-02-03 DIAGNOSIS — C50.912 MALIGNANT NEOPLASM OF LEFT FEMALE BREAST, UNSPECIFIED ESTROGEN RECEPTOR STATUS, UNSPECIFIED SITE OF BREAST (HCC): Primary | ICD-10-CM

## 2021-02-03 DIAGNOSIS — N39.0 URINARY TRACT INFECTION WITHOUT HEMATURIA, SITE UNSPECIFIED: ICD-10-CM

## 2021-02-03 DIAGNOSIS — K58.9 IRRITABLE BOWEL SYNDROME, UNSPECIFIED TYPE: ICD-10-CM

## 2021-02-03 LAB
BILIRUBIN, POC: NORMAL
BLOOD URINE, POC: NORMAL
CLARITY, POC: NORMAL
COLOR, POC: YELLOW
GLUCOSE URINE, POC: 100
HBA1C MFR BLD: 7.8 %
KETONES, POC: NEGATIVE
LEUKOCYTE EST, POC: NORMAL
NITRITE, POC: NEGATIVE
PH, POC: 6.5
PROTEIN, POC: 100
SPECIFIC GRAVITY, POC: 1.01
UROBILINOGEN, POC: 0.2

## 2021-02-03 PROCEDURE — 1036F TOBACCO NON-USER: CPT | Performed by: FAMILY MEDICINE

## 2021-02-03 PROCEDURE — 3051F HG A1C>EQUAL 7.0%<8.0%: CPT | Performed by: FAMILY MEDICINE

## 2021-02-03 PROCEDURE — 81002 URINALYSIS NONAUTO W/O SCOPE: CPT | Performed by: FAMILY MEDICINE

## 2021-02-03 PROCEDURE — G8484 FLU IMMUNIZE NO ADMIN: HCPCS | Performed by: FAMILY MEDICINE

## 2021-02-03 PROCEDURE — 99214 OFFICE O/P EST MOD 30 MIN: CPT | Performed by: FAMILY MEDICINE

## 2021-02-03 PROCEDURE — 2022F DILAT RTA XM EVC RTNOPTHY: CPT | Performed by: FAMILY MEDICINE

## 2021-02-03 PROCEDURE — 3017F COLORECTAL CA SCREEN DOC REV: CPT | Performed by: FAMILY MEDICINE

## 2021-02-03 PROCEDURE — 83036 HEMOGLOBIN GLYCOSYLATED A1C: CPT | Performed by: FAMILY MEDICINE

## 2021-02-03 PROCEDURE — G8427 DOCREV CUR MEDS BY ELIG CLIN: HCPCS | Performed by: FAMILY MEDICINE

## 2021-02-03 PROCEDURE — G8417 CALC BMI ABV UP PARAM F/U: HCPCS | Performed by: FAMILY MEDICINE

## 2021-02-03 RX ORDER — EMPAGLIFLOZIN 10 MG/1
10 TABLET, FILM COATED ORAL DAILY
Qty: 90 TABLET | Refills: 1 | Status: SHIPPED
Start: 2021-02-03 | End: 2021-07-12

## 2021-02-03 RX ORDER — CEFDINIR 300 MG/1
300 CAPSULE ORAL 2 TIMES DAILY
Qty: 14 CAPSULE | Refills: 0 | Status: SHIPPED
Start: 2021-02-03 | End: 2021-02-09 | Stop reason: ALTCHOICE

## 2021-02-03 RX ORDER — LACTOBACILLUS RHAMNOSUS GG 10B CELL
CAPSULE ORAL
Qty: 90 CAPSULE | Refills: 1 | Status: SHIPPED
Start: 2021-02-03 | End: 2021-08-16

## 2021-02-03 RX ORDER — CEFDINIR 300 MG/1
300 CAPSULE ORAL 2 TIMES DAILY
Qty: 14 CAPSULE | Refills: 0 | Status: SHIPPED
Start: 2021-02-03 | End: 2021-02-03 | Stop reason: SDUPTHER

## 2021-02-03 RX ORDER — INSULIN GLARGINE 100 [IU]/ML
INJECTION, SOLUTION SUBCUTANEOUS
Qty: 15 ML | Refills: 3 | Status: SHIPPED
Start: 2021-02-03 | End: 2021-05-12

## 2021-02-03 RX ORDER — SERTRALINE HYDROCHLORIDE 100 MG/1
TABLET, FILM COATED ORAL
Qty: 90 TABLET | Refills: 1 | Status: SHIPPED
Start: 2021-02-03 | End: 2021-02-16

## 2021-02-04 ENCOUNTER — TELEPHONE (OUTPATIENT)
Dept: FAMILY MEDICINE CLINIC | Age: 51
End: 2021-02-04

## 2021-02-04 NOTE — LETTER
[facility]  [address] [facility] [address]  Phone: [phone]  Fax: Laya Bates,         February 4, 2021    [place] [address]      Dear Omar Perez:    Upon receipt of this notice, please call the office to schedule a follow up from last visit. If you have any questions or concerns, please don't hesitate to call.     Sincerely,        Channing Parham, DO

## 2021-02-04 NOTE — TELEPHONE ENCOUNTER
----- Message from Nora Varela DO sent at 2/1/2021  9:42 AM EST -----  Call pt and set up recheck appointment    document

## 2021-02-09 PROBLEM — C50.912 MALIGNANT NEOPLASM OF LEFT FEMALE BREAST (HCC): Status: ACTIVE | Noted: 2021-02-09

## 2021-02-09 PROBLEM — R30.0 DYSURIA: Status: ACTIVE | Noted: 2021-02-09

## 2021-02-09 PROBLEM — E11.65 UNCONTROLLED TYPE 2 DIABETES MELLITUS WITH HYPERGLYCEMIA (HCC): Status: ACTIVE | Noted: 2021-02-09

## 2021-02-09 PROBLEM — N39.0 URINARY TRACT INFECTION WITHOUT HEMATURIA: Status: ACTIVE | Noted: 2021-02-09

## 2021-02-09 RX ORDER — ROSUVASTATIN CALCIUM 5 MG/1
TABLET, COATED ORAL
COMMUNITY
Start: 2021-02-01 | End: 2022-01-10 | Stop reason: SDUPTHER

## 2021-02-09 ASSESSMENT — ENCOUNTER SYMPTOMS
RHINORRHEA: 0
SINUS PAIN: 0
ABDOMINAL DISTENTION: 0
EYE ITCHING: 0
NAUSEA: 0
COLOR CHANGE: 0
EYES NEGATIVE: 1
RECTAL PAIN: 0
VOICE CHANGE: 0
EYE DISCHARGE: 0
WHEEZING: 0
CONSTIPATION: 0
RESPIRATORY NEGATIVE: 1
ABDOMINAL PAIN: 0
TROUBLE SWALLOWING: 0
CHOKING: 0
VOMITING: 0
BACK PAIN: 0
FACIAL SWELLING: 0
SHORTNESS OF BREATH: 0
STRIDOR: 0
SINUS PRESSURE: 0
ANAL BLEEDING: 0
CHEST TIGHTNESS: 0
COUGH: 0
PHOTOPHOBIA: 0
DIARRHEA: 0
BLOOD IN STOOL: 0
SORE THROAT: 0
EYE PAIN: 0
EYE REDNESS: 0
ALLERGIC/IMMUNOLOGIC NEGATIVE: 1

## 2021-02-09 NOTE — PROGRESS NOTES
[address] PRE-ADMISSION TESTING GENERAL INSTRUCTIONS- [facility]-phone number:[phone]    GENERAL INSTRUCTIONS  [x] Antibacterial Soap shower Night before and/or AM of Surgery  [] Eugene wipe instruction sheet and wipes given. [x] Nothing by mouth after midnight, including gum, candy, mints, or water. [x] You may brush your teeth, gargle, but do NOT swallow water. []Hibiclens shower  the night before and the morning of surgery. Do not use             Hibiclens on your face or head. [x]No smoking, chewing tobacco, illegal drugs, or alcohol within 24 hours of your surgery. [x] Jewelry, valuables or body piercing's should not be brought to the hospital. All body and/or tongue piercing's must be removed prior to arriving to hospital.  ALL hair pins must be removed. [x] Do not wear makeup, lotions, powders, deodorant. Nail polish as directed by the nurse. [x] Arrange transportation with a responsible adult  to and from the hospital. If you do not have a responsible adult  to transport you, you will need to make arrangements with a medical transportation company (i.e. Note. A Uber/taxi/bus is not appropriate unless you are accompanied by a responsible adult ). Arrange for someone to be with you for the remainder of the day and for 24 hours after your procedure due to having had anesthesia. Who will be your  for transportation?_________friend_________   Who will be staying with you for 24 hrs after your procedure?_____________friend_____  [] Bring insurance card and photo ID.  [] Transfusion Bracelet: Please bring with you to hospital, day of surgery  [] Bring urine specimen day of surgery. Any small container is acceptable. [] Use inhalers the morning of surgery and bring with you to hospital.  [] Bring copy of living will or healthcare power of  papers to be placed in your electronic record.   [] CPAP/BI-PAP: Please bring your machine if you are to spend the night in the hospital.     PARKING INSTRUCTIONS:   [x] Arrival Time:_0600 via [address] door____________  · [] Parking lot '\"I\"  is located on [facility] (the corner of [facility] and [facility]). To enter, press the button and the gate will lift. A free token will be provided to exit the lot. One car per patient is allowed to park in this lot. All other cars are to park on [address] [place] [address] Street either in the parking garage or the handicap lot. [] To reach the [facility] lobby from [address] [place] [address], upon entering the hospital, take elevator B to the 3rd floor. EDUCATION INSTRUCTIONS:      [] Knee or hip replacement booklet & exercise pamphlets given. [] [address] placed in chart. [] Pre-admission Testing educational folder given  [] Incentive Spirometry,coughing & deep breathing exercises reviewed. []Medication information sheet(s)   []Fluoroscopy-Xray used in surgery reviewed with patient. Educational pamphlet placed in chart. []Pain: Post-op pain is normal and to be expected. You will be asked to rate your pain from 0-10(a zero is not acceptable-education is needed). Your post-op pain goal is:  [] Ask your nurse for your pain medication. [] Joint camp offered. [] Joint replacement booklets given. [] Other:___________________________    MEDICATION INSTRUCTIONS:   [x]Bring a complete list of your medications, please write the last time you took the medicine, give this list to the nurse. [x] Take the following medications the morning of surgery with 1-2 ounces of water: zoloft toprol  [x] Stop herbal supplements and vitamins 5 days before your surgery. [x] DO NOT take any diabetic medicine the morning of surgery. Follow instructions for insulin the day before surgery. 1/2 dose night before[x] If you are diabetic and your blood sugar is low or you feel symptomatic, you may drink 1-2 ounces of apple juice or take a glucose tablet.   The morning of your procedure, you may call the pre-op area if you have concerns about your blood sugar [phone]. [] Use your inhalers the morning of surgery. Bring your emergency inhaler with you day of surgery. [] Follow physician instructions regarding any blood thinners you may be taking. WHAT TO EXPECT:  [x] The day of surgery you will be greeted and checked in by the [employer].  In addition, you will be registered in the [place] by a Patient Access Representative. Please bring your photo ID and insurance card. A nurse will greet you in accordance to the time you are needed in the pre-op area to prepare you for surgery. Please do not be discouraged if you are not greeted in the order you arrive as there are many variables that are involved in patient preparation. Your patience is greatly appreciated as you wait for your nurse. Please bring in items such as: books, magazines, newspapers, electronics, or any other items  to occupy your time in the waiting area. []  Delays may occur with surgery and staff will make a sincere effort to keep you informed of delays. If any delays occur with your procedure, we apologize ahead of time for your inconvenience as we recognize the value of your time.

## 2021-02-10 NOTE — PROGRESS NOTES
SUBJECTIVE  Jenny Meza is a 46 y.o. female. HPI/Chief C/O:  Chief Complaint   Patient presents with    Hypertension    Diabetes     Regular check up    Dysuria     Urinary pressure     No Known Allergies  This 46year old female presents for left breast cancer, invasive, differentiated ductal carcinoma, follows with specialist. Pt to have lumpectomy on 2/17/2021. Pt has IDDM, GERD, IBS, anxiety, and fatigue. Pt c/o dysuria, urinary frequency and urgency. ROS:  Review of Systems   Constitutional: Positive for fatigue. Negative for activity change, appetite change, chills, diaphoresis, fever and unexpected weight change. HENT: Negative. Negative for congestion, dental problem, drooling, ear discharge, ear pain, facial swelling, hearing loss, mouth sores, nosebleeds, postnasal drip, rhinorrhea, sinus pressure, sinus pain, sneezing, sore throat, tinnitus, trouble swallowing and voice change. Eyes: Negative. Negative for photophobia, pain, discharge, redness, itching and visual disturbance. Respiratory: Negative. Negative for cough, choking, chest tightness, shortness of breath, wheezing and stridor. Cardiovascular: Negative. Negative for chest pain, palpitations and leg swelling. Gastrointestinal: Negative for abdominal distention, abdominal pain, anal bleeding, blood in stool, constipation, diarrhea, nausea, rectal pain and vomiting. Endocrine: Negative. Negative for cold intolerance, heat intolerance, polydipsia, polyphagia and polyuria. Genitourinary: Positive for dysuria, frequency and urgency. Negative for decreased urine volume, difficulty urinating, flank pain, genital sores, hematuria, menstrual problem and pelvic pain. Musculoskeletal: Negative. Negative for arthralgias, back pain, gait problem, joint swelling, myalgias, neck pain and neck stiffness. Skin: Negative. Negative for color change, pallor, rash and wound. Allergic/Immunologic: Negative.     Neurological: Negative. Negative for dizziness, tremors, seizures, syncope, facial asymmetry, speech difficulty, weakness, light-headedness, numbness and headaches. Hematological: Negative. Negative for adenopathy. Does not bruise/bleed easily. Psychiatric/Behavioral: Negative for agitation, behavioral problems, confusion, decreased concentration, dysphoric mood, hallucinations, self-injury, sleep disturbance and suicidal ideas. The patient is nervous/anxious. The patient is not hyperactive.          Past Medical/Surgical Hx;  Reviewed with patient      Diagnosis Date    Anesthesia complication     problem with blood pressure up & down    Arthritis     lower back    Back pain     Blind     right eye    Cancer ([address]     breast    Cardiac valve prolapse     micro    Diabetes mellitus ([address]     Hyperlipidemia     Hypertension     Neuropathy due to secondary diabetes ([address]     in marcial feet    Prolonged emergence from general anesthesia     Psoriasis      Past Surgical History:   Procedure Laterality Date    ANKLE SURGERY      bilateral tarsal tunnels    CARDIAC SURGERY      cardiac catheterization    CARPAL TUNNEL RELEASE      left    CARPAL TUNNEL RELEASE  2012    right     SECTION      CHOLECYSTECTOMY, LAPAROSCOPIC N/A 2019    LAPAROSCOPIC CHOLECYSTECTOMY WITH IOC performed by Diane Oakes MD at [address]  had extremely high blood pressure and hard to wake up    COLONOSCOPY N/A 2019    COLORECTAL CANCER SCREENING, NOT HIGH RISK performed by Diane Oakes MD at [address] [facility], [address] [facility] ARTHROSCOPY Left 2016    subacromin decompression and debridement    SHOULDER ARTHROSCOPY Right 2020    RIGHT SHOULDER ARTHROSCOPY, SUBACROMIAL DECOMPRESSION, LABRIAL DEBRIDEMENT, CHONDROPLASTY, RAÚL performed by Salena Jenkins DO at [address] 2019    EGD BIOPSY performed by Diane Oakes, capsule 0    blood glucose test strips (ONETOUCH ULTRA) strip USE AS DIRECTED FOUR TIMES DAILY 100 each 2    GABAPENTIN PO Take 300 mg by mouth Nightly       Lancets (ONETOUCH DELICA PLUS [id number]) MISC       B-D ULTRAFINE III SHORT PEN 31G X 8 MM MISC USE WITH INSULIN PENS AS DIRECTED 100 each 10    omeprazole (PRILOSEC) 40 MG delayed release capsule TAKE 1 CAPSULE BY MOUTH DAILY 90 capsule 1    ondansetron (ZOFRAN) 4 MG tablet Take 1 tablet by mouth every 8 hours as needed for Nausea or Vomiting 20 tablet 0    [DISCONTINUED] docusate sodium (COLACE) 100 MG capsule Take 1 capsule by mouth 2 times daily as needed for Constipation 20 capsule 1    fluticasone (FLONASE) 50 MCG/ACT nasal spray 1 spray by Nasal route daily 1 Bottle 3    nitroGLYCERIN (NITROSTAT) 0.4 MG SL tablet Place 1 tablet under the tongue every 5 minutes as needed for Chest pain 25 tablet 1    Dulaglutide (TRULICITY) 1.5 CK/5.2AO SOPN INJECT THE CONTENTS OF 1 SYRINGE SUBCUTANEOUSLY EVERY WEEK *PATIENT NEEDS APPOINTMENT* (Patient taking differently: INJECT THE CONTENTS OF 1 SYRINGE SUBCUTANEOUSLY EVERY WEEK *PATIENT NEEDS APPOINTMENT*    Takes on saturdays) 6 mL 10    metoprolol succinate (TOPROL XL) 25 MG extended release tablet TAKE 1/2 TABLET BY MOUTH DAILY 45 tablet 10    pramipexole (MIRAPEX) 0.125 MG tablet TAKE 1 TABLET BY MOUTH EVERY NIGHT 90 tablet 1    Insulin Syringe-Needle U-100 ([employer] INSULIN SYR 1CC/30G) 30G X 5/16\" 1 ML MISC 1 each by Does not apply route 4 times daily 120 each 5    bumetanide (BUMEX) 1 MG tablet Take 1 tablet by mouth every other day 90 tablet 1    famotidine (PEPCID) 20 MG tablet Take 1 tablet by mouth 2 times daily 180 tablet 1    hydrocortisone 2.5 % cream APPLY TO AFFECTED AREA TOPICALLY TWICE DAILY 84 g 10    Humidifier MISC 1 Device by Does not apply route daily 1 each 0    insulin lispro (ADMELOG SOLOSTAR) 100 UNIT/ML pen As directed per sliding scale up to 13 units 4 times per day.  5 pen 3 Maintenance follow up  Health Maintenance   Topic    Diabetic foot exam     Hepatitis B vaccine (1 of 3 - Risk 3-dose series)    DTaP/Tdap/Td vaccine (1 - Tdap)    Shingles Vaccine (1 of 2)    Flu vaccine (1)    Diabetic microalbuminuria test     Lipid screen     Diabetic retinal exam     Breast cancer screen     Potassium monitoring     Creatinine monitoring     A1C test (Diabetic or Prediabetic)     Colon cancer screen colonoscopy     Pneumococcal 0-64 years Vaccine     Hepatitis C screen     HIV screen     Hepatitis A vaccine     Hib vaccine     Meningococcal (ACWY) vaccine

## 2021-02-12 ENCOUNTER — HOSPITAL ENCOUNTER (OUTPATIENT)
Age: 51
Discharge: HOME | End: 2021-02-14
Payer: MEDICAID

## 2021-02-12 DIAGNOSIS — U07.1 COVID-19: ICD-10-CM

## 2021-02-12 PROCEDURE — U0003 INFECTIOUS AGENT DETECTION BY NUCLEIC ACID (DNA OR RNA); SEVERE ACUTE RESPIRATORY SYNDROME CORONAVIRUS 2 (SARS-COV-2) (CORONAVIRUS DISEASE [COVID-19]), AMPLIFIED PROBE TECHNIQUE, MAKING USE OF HIGH THROUGHPUT TECHNOLOGIES AS DESCRIBED BY CMS-2020-01-R: HCPCS

## 2021-02-14 LAB
SARS-COV-2: NOT DETECTED
SOURCE: NORMAL

## 2021-02-16 ENCOUNTER — ANESTHESIA EVENT (OUTPATIENT)
Dept: OPERATING ROOM | Age: 51
End: 2021-02-16
Payer: MEDICAID

## 2021-02-16 NOTE — H&P
Patient ID: Kavon Garcia is a 46 y.o. female.     HPI  History and Physical     Patient's Name/Date of Birth: Kavon Garcia / 1970     Kavon Garcia presents for conservative therapy for left invasive ductal breast cancer (ER+/NH+/ HER2 EQUIVACOL). FISH NEGATIVE.       PCP: Eli Morales DO, Gynecologist: Dr. Trista Payton.     The lesion is located in the 7 o'clock position of the left breast. The lesion was discovered by mammogram. The patient has not noted a change in BSE since presentation. Patient denies nipple discharge. Patient denies a personal history of breast cancer. Breast cancer risk factors include age, gender, family history of cancer,  Ashkenazi Yazdanism Ancestry: No.     OBSTETRIC RELATED HISTORY:  Age of menarche was 4. Partial hysterectomy 10 years ago. No cycles. Patient admits to hormonal therapy (OTC estrogen) Patient has stopped the medication. Patient is . Age of first live birth was 27  Patient did not breast feed. Is patient interested in fertility information about fertility preservation? n/a     CANCER SURVEILLANCE HISTORY:  Mammograms: Yes  Breast MRI's: No   Breast Biopsies: Yes   Colonoscopy: Yes   GI Polyps: No   EGD: Yes (acid reflux)  Pelvic Exam: Yes   Pap Smear: Yes   Dermatology: No   Lung screening: no        Have you received your flu vaccination this year? Yes  Have you received your Pneumococcal vaccination? No        Estimated body mass index is 31.71 kg/m² as calculated from the following:    Height as of 20: 4' 11\" (1.499 m). Weight as of 20: 157 lb (71.2 kg). Bra Size: 36 C     Because violence is so common, we ask all our patients: are you in a relationship or do you live with a person who threatens, hurts, or controls you:  Safe in her apartment. Occasionally falls due to DM-induced balance issues.     Patient drinks moderate caffeinated beverages. Patient does not smoke cigarettes.  Patient does not use recreational omeprazole (PRILOSEC) 40 MG delayed release capsule TAKE 1 CAPSULE BY MOUTH DAILY 90 capsule 1    docusate sodium (COLACE) 100 MG capsule Take 1 capsule by mouth 2 times daily as needed for Constipation 20 capsule 1    ketorolac (TORADOL) 10 MG tablet Take 1 tablet by mouth every 6 hours as needed for Pain Patient received prior injection 20 tablet 0    ondansetron (ZOFRAN) 4 MG tablet Take 1 tablet by mouth every 8 hours as needed for Nausea or Vomiting 20 tablet 0    sertraline (ZOLOFT) 100 MG tablet TAKE 1 TABLET BY MOUTH DAILY AS NEEDED FOR DEPRESSION *EMERGENCY REFILL* 30 tablet 3    lactobacillus (CULTURELLE) CAPS capsule TAKE (1) CAPSULE BY MOUTH DAILY 30 capsule 3    blood glucose test strips (ONETOUCH ULTRA) strip TEST FOUR TIMES DAILY AS NEEDED FOR SYMTOMS OF IRREGULAR BLOOD GLUCOSE 100 each 3    fluticasone (FLONASE) 50 MCG/ACT nasal spray 1 spray by Nasal route daily (Patient not taking: Reported on 10/29/2020) 1 Bottle 3    empagliflozin (JARDIANCE) 10 MG tablet Take 1 tablet by mouth daily 30 tablet 3    nitroGLYCERIN (NITROSTAT) 0.4 MG SL tablet Place 1 tablet under the tongue every 5 minutes as needed for Chest pain 25 tablet 1    Dulaglutide (TRULICITY) 1.5 EQ/6.3EO SOPN INJECT THE CONTENTS OF 1 SYRINGE SUBCUTANEOUSLY EVERY WEEK *PATIENT NEEDS APPOINTMENT* (Patient taking differently: INJECT THE CONTENTS OF 1 SYRINGE SUBCUTANEOUSLY EVERY WEEK *PATIENT NEEDS APPOINTMENT*     Takes on saturdays) 6 mL 10    metoprolol succinate (TOPROL XL) 25 MG extended release tablet TAKE 1/2 TABLET BY MOUTH DAILY 45 tablet 10    DULoxetine (CYMBALTA) 30 MG extended release capsule TAKE 1 CAPSULE BY MOUTH EVERY MORNING *PATIENT NEEDS APPOINTMENT* 90 capsule 10    pramipexole (MIRAPEX) 0.125 MG tablet TAKE 1 TABLET BY MOUTH EVERY NIGHT 90 tablet 1    insulin glargine (BASAGLAR KWIKPEN) 100 UNIT/ML injection pen INJECT 50 UNITS SUBCUTANEOUSLY NIGHTLY 15 mL 3    Insulin Syringe-Needle U-100 ([employer] [facility] INSULIN SYR 1CC/30G) 30G X 5/16\" 1 ML MISC 1 each by Does not apply route 4 times daily 120 each 5    metFORMIN (GLUCOPHAGE) 500 MG tablet Take 2 tablets by mouth 2 times daily (with meals) 360 tablet 1    bumetanide (BUMEX) 1 MG tablet Take 1 tablet by mouth every other day 90 tablet 1    famotidine (PEPCID) 20 MG tablet Take 1 tablet by mouth 2 times daily 180 tablet 1    hydrocortisone 2.5 % cream APPLY TO AFFECTED AREA TOPICALLY TWICE DAILY 84 g 10    Humidifier MISC 1 Device by Does not apply route daily 1 each 0    insulin lispro (ADMELOG SOLOSTAR) 100 UNIT/ML pen As directed per sliding scale up to 13 units 4 times per day. 5 pen 3    Wheat Dextrin (BENEFIBER) POWD Take 4 g by mouth 3 times daily (with meals) 475 g 2    lidocaine-prilocaine (EMLA) 2.5-2.5 % cream Apply topically as needed. 30 g 0    glucose monitoring kit (FREESTYLE) monitoring kit 1 kit by Does not apply route daily 1 kit 0    Blood Pressure KIT 1 kit by Does not apply route daily 1 kit 0    Misc.  Devices (QUAD CANE) MISC 1 Quad cane 1 each 0    Multiple Vitamins-Minerals (THERAPEUTIC MULTIVITAMIN-MINERALS) tablet Take 1 tablet by mouth daily        B Complex Vitamins (B COMPLEX 1 PO) Take 1 tablet by mouth daily.            No current facility-administered medications for this visit.             No Known Allergies     Family History         Family History   Problem Relation Age of Onset    Heart Disease Mother      Diabetes Mother      Heart Disease Father      Diabetes Father              Social History               Socioeconomic History    Marital status:        Spouse name: Not on file    Number of children: Not on file    Years of education: Not on file    Highest education level: Not on file   Occupational History    Not on file   Social Needs    Financial resource strain: Not on file    Food insecurity       Worry: Not on file       Inability: Not on file    Transportation needs       Medical: Not on file       Non-medical: Not on file   Tobacco Use    Smoking status: Never Smoker    Smokeless tobacco: Never Used   Substance and Sexual Activity    Alcohol use: Yes       Alcohol/week: 1.0 standard drinks       Types: 1 Glasses of wine per week       Comment: weekly    Drug use: No    Sexual activity: Not on file   Lifestyle    Physical activity       Days per week: Not on file       Minutes per session: Not on file    Stress: Not on file   Relationships    Social connections       Talks on phone: Not on file       Gets together: Not on file       Attends Mandaeism service: Not on file       Active member of club or organization: Not on file       Attends meetings of clubs or organizations: Not on file       Relationship status: Not on file    Intimate partner violence       Fear of current or ex partner: Not on file       Emotionally abused: Not on file       Physically abused: Not on file       Forced sexual activity: Not on file   Other Topics Concern    Not on file   Social History Narrative    Not on file            Occupation: Applying for disability. No heavy lifting           Review of Systems  CONSTITUTIONAL: No fever, chills. Good appetite and energy level. ENMT: Eyes: No diplopia; Nose: No epistaxis. Mouth: No sore throat. RESPIRATORY: No hemoptysis, shortness of breath, cough. CARDIOVASCULAR: No chest pain, pressure, or palpitations. GASTROINTESTINAL: No nausea/vomiting, abdominal pain, diarrhea/constipation. No blood in the stools. GENITOURINARY: No dysuria, urinary frequency, hematuria. NEURO: No syncope, presyncope, headache. Remainder:  ROS NEGATIVE     Patient denies previous history of DVT/PE. Had recent shoulder surgery, due to adhesions, and had blood pressure issues perioperatively.     Objective:   Physical Exam  Vitals signs and nursing note reviewed. Constitutional:       General: She is not in acute distress. Appearance: She is well-developed.  She is not diaphoretic. HENT:      Head: Normocephalic and atraumatic. Eyes:      Conjunctiva/sclera: Conjunctivae normal.      Pupils: Pupils are equal, round, and reactive to light. Neck:      Musculoskeletal: Normal range of motion and neck supple. Thyroid: No thyromegaly. Trachea: No tracheal deviation. Cardiovascular:      Rate and Rhythm: Normal rate and regular rhythm. Heart sounds: Normal heart sounds. Pulmonary:      Effort: Pulmonary effort is normal. No respiratory distress. Breath sounds: Normal breath sounds. Chest:      Breasts: Breasts are symmetrical.         Right: No inverted nipple, mass, nipple discharge, skin change or tenderness. Left: No inverted nipple, mass, nipple discharge, skin change or tenderness. Abdominal:      General: There is no distension. Palpations: Abdomen is soft. Musculoskeletal:      Right shoulder: She exhibits decreased range of motion. Left shoulder: She exhibits decreased range of motion. Comments: Bilateral limited range of motion, shoulders. Maximum adduction 90 degrees. Lymphadenopathy:      Cervical: No cervical adenopathy. Upper Body:      Right upper body: No supraclavicular adenopathy. Left upper body: No supraclavicular adenopathy. Skin:     General: Skin is warm and dry. Coloration: Skin is not pale. Findings: No erythema. Neurological:      Mental Status: She is alert and oriented to person, place, and time. Psychiatric:         Behavior: Behavior normal.         Thought Content: Thought content normal.         Judgment: Judgment normal.            Assessment:   46 y.o. woman who underwent  an US guided left breast core biopsy at 7 o'clock position on December 23 , 2020. Pathological evaluation completed at [facility].         11/12/2020: BILATERAL SCREENING MAMMOGRAM: [facility]     FINDINGS:   The breast parenchyma is heterogeneously dense.    Right breast:   There is no suspicious mass, cluster of microcalcifications or architectural   distortion. Left breast:   Within the medial aspect of the left breast best appreciated on the CC view,   there is a 1.5 cm irregular focal asymmetry.  This is not well seen on the   MLO view but is likely within the inferior aspect of the left breast.       Impression   1. 1.5 cm irregular focal asymmetry seen within the medial aspect of the left   breast best appreciated on the CC view.  Dedicated diagnostic mammogram of   the left breast is recommended. 2. Stable mammogram of the right breast.  There is no mammographic evidence   of malignancy.  Annual screening mammography is recommended. BIRADS:   BIRADS - CATEGORY 0   Incomplete: Needs Additional Imaging Evaluation   OVERALL ASSESSMENT - INCOMPLETE:NEED ADDITIONAL IMAGING EVALUATION.           12/15/2020: LEFT DIAGNOSTIC MAMMOGRAM;[facility]        FINDINGS:   Mammogram:   The breast parenchyma is heterogeneously dense. Within the lower inner quadrant of the left breast there is a spiculated mass   measuring approximately 1.5 cm.  This mass does not compress on the spot   compression images. On the dedicated targeted ultrasound examination of the left breast there is   a solid lesion with posterior shadowing at the 7 o'clock position   corresponding to the mammographic abnormality. Ultrasound-guided core biopsy is recommended.       Impression   1. 1.5 cm spiculated lesion seen within the left breast at the 7 o'clock   position highly suspicious for malignancy.  Dedicated ultrasound-guided core   biopsy is recommended. BIRADS:   BIRADS - CATEGORY 5   Highly Suggestive for Malignancy.  Biopsy should be considered at this time. OVERALL ASSESSMENT - HIGHLY SUGGESTIVE OF MALIGNANCY. A letter of notification will be sent to the patient regarding the results. My findings and recommendations were discussed with the patient at the time   of service.    A representative from the radiology department will be contacting your office   and assisting the patient in getting appropriate follow-up            12/15/2020: LEFT BREAST ULTRASOUND; [facility]     FINDINGS:   Mammogram:   The breast parenchyma is heterogeneously dense. Within the lower inner quadrant of the left breast there is a spiculated mass   measuring approximately 1.5 cm.  This mass does not compress on the spot   compression images. On the dedicated targeted ultrasound examination of the left breast there is   a solid lesion with posterior shadowing at the 7 o'clock position   corresponding to the mammographic abnormality. Ultrasound-guided core biopsy is recommended.       Impression   1. 1.5 cm spiculated lesion seen within the left breast at the 7 o'clock   position highly suspicious for malignancy.  Dedicated ultrasound-guided core   biopsy is recommended. BIRADS:   BIRADS - CATEGORY 5   Highly Suggestive for Malignancy.  Biopsy should be considered at this time. OVERALL ASSESSMENT - HIGHLY SUGGESTIVE OF MALIGNANCY. A letter of notification will be sent to the patient regarding the results. My findings and recommendations were discussed with the patient at the time   of service.    A representative from the radiology department will be contacting your office   and assisting the patient in getting appropriate follow-up.         12/23/2020; LEFT BREAST US BIOPSY; [facility]        Addendum     Signed by Bill Pierre DO on 12/30/20 1022    ----------------------------- ADDENDED REPORT -----------------------------     12/30/2020 Addendum:     BIOPSY SITE  ------------------------------  Procedure in the left breast     PATHOLOGY  ------------------------------  REPORTED AS:  Invasive ductal carcinoma in the left breast  ADDITIONAL COMMENTS:  Accession Number:  HES-  Diagnosis:  Left breast, core needle biopsy: Invasive, moderately differentiated  ductal carcinoma (grade 2)  Comment:    Intradepartmental consultation is obtained. Breast Cancer Marker Studies:  Estrogen Receptors (ER):  -Positive (10%of cells demonstrate nuclear positivity):  Percentage of cells positive: 90%  Intensity: Strong  Progesterone Receptors (AR):  -Positive:  Percentage of cells positive: 90%  Intensity: Strong  Hormone receptor studies are performed by immunohistochemistry on  formalin-fixed, paraffin-embedded tissue (Roche Benchmark Immunostainer,  Reservoir anti-ER clone [id number], anti-AR clone [id number], polymer-based detection  chemistry). ER and AR are evaluated based on the percentage of cells  showing nuclear staining with 1% considered positive for each. Her-2/salvador (c-erb B-2) protein expression: Indeterminate/2+       FISH TESTING has been ordered and will be reported separately  Her-2 studies are performed by immunohistochemistry on formalin-fixed,  paraffin-embedded tissue (Roche Benchmark Immunostainer, Reservoir Pathway  anti-Her-2 clone [id number], polymer-based detection chemistry). This assay is  FDA approved. Her-2 is evaluated based on the pattern of membrane staining as well as  the percentage of cancer cells showing membrane staining, using the  latest ASCO/CAP scoring criteria. Testing is performed on block A1. All controls (high protein expression,  low protein expression, negative protein expression, and internal) are  acceptable. Percentage of tumor cells exhibiting uniform intense complete membrane  stainin%  Percentage of tumor cells exhibiting weak to moderate complete membrane  stainin%  Cold Ischemia and Fixation Times:  Meets requirements specified in latest version of the ASCO/CAP  guidelines:  Yes                                       LITO Cloud Guest                                   (Electronic Signature)           CONCORDANCE  ------------------------------  This is concordant with the imaging findings.     RECOMMENDATION  ------------------------------  Review by the [facility] interdisciplinary team with generation of multidisciplinary recommendations      FISH: NEGATIVE    CPS stage IA lower inner quadrant left breast cancer. Multiple comorbidities including blood pressure issues perioperatively and insulin-dependent diabetes. Long discussion about process going forward. Patient is an excellent candidate for conservative therapy. Discussed in detail. Questions answered to patient satisfaction. Plan: Left needle localized lumpectomy, blue dye injection, left axillary sentinel lymph node excision, possible left axillary dissection. The risks, benefits, expected outcomes, and alternatives to this procedure have been discussed with the patient, which include but are not limited to bleeding, infection, flap necrosis and medical complications to anesthesia or surgery such as pneumonia, heart problems, blood clots, etc. Patient also was told that with minimally invasive procedures adequate margins are not always obtained with the first operation, and she has a >20% chance of requiring a second procedure to obtain clear margins around her tumor. Patient understood and desires to undergo the procedure.

## 2021-02-17 ENCOUNTER — APPOINTMENT (OUTPATIENT)
Dept: GENERAL RADIOLOGY | Age: 51
End: 2021-02-17
Attending: SURGERY
Payer: MEDICAID

## 2021-02-17 ENCOUNTER — HOSPITAL ENCOUNTER (OUTPATIENT)
Dept: NUCLEAR MEDICINE | Age: 51
Discharge: HOME | End: 2021-02-19
Payer: MEDICAID

## 2021-02-17 ENCOUNTER — HOSPITAL ENCOUNTER (OUTPATIENT)
Age: 51
Setting detail: OUTPATIENT SURGERY
Discharge: HOME | End: 2021-02-17
Attending: SURGERY | Admitting: SURGERY
Payer: MEDICAID

## 2021-02-17 ENCOUNTER — HOSPITAL ENCOUNTER (OUTPATIENT)
Dept: GENERAL RADIOLOGY | Age: 51
Setting detail: OUTPATIENT SURGERY
End: 2021-02-17
Attending: SURGERY
Payer: MEDICAID

## 2021-02-17 ENCOUNTER — ANESTHESIA (OUTPATIENT)
Dept: OPERATING ROOM | Age: 51
End: 2021-02-17
Payer: MEDICAID

## 2021-02-17 VITALS
TEMPERATURE: 97 F | SYSTOLIC BLOOD PRESSURE: 160 MMHG | RESPIRATION RATE: 16 BRPM | DIASTOLIC BLOOD PRESSURE: 112 MMHG | OXYGEN SATURATION: 97 %

## 2021-02-17 VITALS
BODY MASS INDEX: 30.84 KG/M2 | RESPIRATION RATE: 16 BRPM | WEIGHT: 153 LBS | OXYGEN SATURATION: 92 % | SYSTOLIC BLOOD PRESSURE: 124 MMHG | HEART RATE: 93 BPM | TEMPERATURE: 98 F | DIASTOLIC BLOOD PRESSURE: 65 MMHG | HEIGHT: 59 IN

## 2021-02-17 DIAGNOSIS — C50.912 INVASIVE DUCTAL CARCINOMA OF LEFT BREAST (HCC): ICD-10-CM

## 2021-02-17 DIAGNOSIS — U07.1 COVID-19: Primary | ICD-10-CM

## 2021-02-17 DIAGNOSIS — Z01.818 PREOP TESTING: ICD-10-CM

## 2021-02-17 LAB
ANION GAP SERPL CALCULATED.3IONS-SCNC: 10 MMOL/L (ref 7–16)
BUN BLDV-MCNC: 8 MG/DL (ref 6–20)
CALCIUM SERPL-MCNC: 9.9 MG/DL (ref 8.6–10.2)
CHLORIDE BLD-SCNC: 105 MMOL/L (ref 98–107)
CO2: 30 MMOL/L (ref 22–29)
CREAT SERPL-MCNC: 0.6 MG/DL (ref 0.5–1)
GFR AFRICAN AMERICAN: >60
GFR NON-AFRICAN AMERICAN: >60 ML/MIN/1.73
GLUCOSE BLD-MCNC: 172 MG/DL (ref 74–99)
HCT VFR BLD CALC: 36.5 % (ref 34–48)
HEMOGLOBIN: 11.7 G/DL (ref 11.5–15.5)
MCH RBC QN AUTO: 29 PG (ref 26–35)
MCHC RBC AUTO-ENTMCNC: 32.1 % (ref 32–34.5)
MCV RBC AUTO: 90.6 FL (ref 80–99.9)
METER GLUCOSE: 156 MG/DL (ref 74–99)
METER GLUCOSE: 255 MG/DL (ref 74–99)
PDW BLD-RTO: 16.6 FL (ref 11.5–15)
PLATELET # BLD: 270 E9/L (ref 130–450)
PMV BLD AUTO: 10.9 FL (ref 7–12)
POTASSIUM SERPL-SCNC: 4.7 MMOL/L (ref 3.5–5)
RBC # BLD: 4.03 E12/L (ref 3.5–5.5)
SODIUM BLD-SCNC: 145 MMOL/L (ref 132–146)
WBC # BLD: 8.4 E9/L (ref 4.5–11.5)

## 2021-02-17 PROCEDURE — 76098 X-RAY EXAM SURGICAL SPECIMEN: CPT

## 2021-02-17 PROCEDURE — A9520 TC99 TILMANOCEPT DIAG 0.5MCI: HCPCS | Performed by: RADIOLOGY

## 2021-02-17 PROCEDURE — 38525 BIOPSY/REMOVAL LYMPH NODES: CPT | Performed by: SURGERY

## 2021-02-17 PROCEDURE — 80048 BASIC METABOLIC PNL TOTAL CA: CPT

## 2021-02-17 PROCEDURE — 2580000003 HC RX 258: Performed by: SURGERY

## 2021-02-17 PROCEDURE — 19301 PARTIAL MASTECTOMY: CPT | Performed by: SURGERY

## 2021-02-17 PROCEDURE — 38792 RA TRACER ID OF SENTINL NODE: CPT | Performed by: RADIOLOGY

## 2021-02-17 PROCEDURE — 3600000013 HC SURGERY LEVEL 3 ADDTL 15MIN: Performed by: SURGERY

## 2021-02-17 PROCEDURE — 88307 TISSUE EXAM BY PATHOLOGIST: CPT

## 2021-02-17 PROCEDURE — 2500000003 HC RX 250 WO HCPCS

## 2021-02-17 PROCEDURE — 82962 GLUCOSE BLOOD TEST: CPT

## 2021-02-17 PROCEDURE — 3600000003 HC SURGERY LEVEL 3 BASE: Performed by: SURGERY

## 2021-02-17 PROCEDURE — 7100000011 HC PHASE II RECOVERY - ADDTL 15 MIN: Performed by: SURGERY

## 2021-02-17 PROCEDURE — 88305 TISSUE EXAM BY PATHOLOGIST: CPT

## 2021-02-17 PROCEDURE — 2500000003 HC RX 250 WO HCPCS: Performed by: SURGERY

## 2021-02-17 PROCEDURE — 7100000000 HC PACU RECOVERY - FIRST 15 MIN: Performed by: SURGERY

## 2021-02-17 PROCEDURE — 6360000002 HC RX W HCPCS: Performed by: SURGERY

## 2021-02-17 PROCEDURE — 2709999900 HC NON-CHARGEABLE SUPPLY: Performed by: SURGERY

## 2021-02-17 PROCEDURE — 3700000001 HC ADD 15 MINUTES (ANESTHESIA): Performed by: SURGERY

## 2021-02-17 PROCEDURE — 7100000010 HC PHASE II RECOVERY - FIRST 15 MIN: Performed by: SURGERY

## 2021-02-17 PROCEDURE — 3430000000 HC RX DIAGNOSTIC RADIOPHARMACEUTICAL: Performed by: RADIOLOGY

## 2021-02-17 PROCEDURE — 7100000001 HC PACU RECOVERY - ADDTL 15 MIN: Performed by: SURGERY

## 2021-02-17 PROCEDURE — 6370000000 HC RX 637 (ALT 250 FOR IP)

## 2021-02-17 PROCEDURE — 6360000002 HC RX W HCPCS

## 2021-02-17 PROCEDURE — 36415 COLL VENOUS BLD VENIPUNCTURE: CPT

## 2021-02-17 PROCEDURE — 2720000010 HC SURG SUPPLY STERILE: Performed by: SURGERY

## 2021-02-17 PROCEDURE — 38792 RA TRACER ID OF SENTINL NODE: CPT

## 2021-02-17 PROCEDURE — 38900 IO MAP OF SENT LYMPH NODE: CPT | Performed by: SURGERY

## 2021-02-17 PROCEDURE — 85027 COMPLETE CBC AUTOMATED: CPT

## 2021-02-17 PROCEDURE — 19281 PERQ DEVICE BREAST 1ST IMAG: CPT

## 2021-02-17 PROCEDURE — 3700000000 HC ANESTHESIA ATTENDED CARE: Performed by: SURGERY

## 2021-02-17 RX ORDER — ESMOLOL HYDROCHLORIDE 10 MG/ML
INJECTION INTRAVENOUS PRN
Status: DISCONTINUED | OUTPATIENT
Start: 2021-02-17 | End: 2021-02-17 | Stop reason: SDUPTHER

## 2021-02-17 RX ORDER — PROMETHAZINE HYDROCHLORIDE 25 MG/ML
6.25 INJECTION, SOLUTION INTRAMUSCULAR; INTRAVENOUS
Status: DISCONTINUED | OUTPATIENT
Start: 2021-02-17 | End: 2021-02-17 | Stop reason: HOSPADM

## 2021-02-17 RX ORDER — METHYLENE BLUE 10 MG/ML
INJECTION INTRAVENOUS PRN
Status: DISCONTINUED | OUTPATIENT
Start: 2021-02-17 | End: 2021-02-17 | Stop reason: ALTCHOICE

## 2021-02-17 RX ORDER — SODIUM CHLORIDE 0.9 % (FLUSH) 0.9 %
10 SYRINGE (ML) INJECTION PRN
Status: DISCONTINUED | OUTPATIENT
Start: 2021-02-17 | End: 2021-02-17 | Stop reason: HOSPADM

## 2021-02-17 RX ORDER — SODIUM CHLORIDE, SODIUM LACTATE, POTASSIUM CHLORIDE, CALCIUM CHLORIDE 600; 310; 30; 20 MG/100ML; MG/100ML; MG/100ML; MG/100ML
INJECTION, SOLUTION INTRAVENOUS CONTINUOUS
Status: DISCONTINUED | OUTPATIENT
Start: 2021-02-17 | End: 2021-02-17 | Stop reason: HOSPADM

## 2021-02-17 RX ORDER — MIDAZOLAM HYDROCHLORIDE 1 MG/ML
INJECTION INTRAMUSCULAR; INTRAVENOUS PRN
Status: DISCONTINUED | OUTPATIENT
Start: 2021-02-17 | End: 2021-02-17 | Stop reason: SDUPTHER

## 2021-02-17 RX ORDER — FENTANYL CITRATE 50 UG/ML
INJECTION, SOLUTION INTRAMUSCULAR; INTRAVENOUS PRN
Status: DISCONTINUED | OUTPATIENT
Start: 2021-02-17 | End: 2021-02-17 | Stop reason: SDUPTHER

## 2021-02-17 RX ORDER — ONDANSETRON 2 MG/ML
INJECTION INTRAMUSCULAR; INTRAVENOUS PRN
Status: DISCONTINUED | OUTPATIENT
Start: 2021-02-17 | End: 2021-02-17 | Stop reason: SDUPTHER

## 2021-02-17 RX ORDER — SODIUM CHLORIDE 0.9 % (FLUSH) 0.9 %
SYRINGE (ML) INJECTION PRN
Status: DISCONTINUED | OUTPATIENT
Start: 2021-02-17 | End: 2021-02-17 | Stop reason: ALTCHOICE

## 2021-02-17 RX ORDER — ROCURONIUM BROMIDE 10 MG/ML
INJECTION, SOLUTION INTRAVENOUS PRN
Status: DISCONTINUED | OUTPATIENT
Start: 2021-02-17 | End: 2021-02-17 | Stop reason: SDUPTHER

## 2021-02-17 RX ORDER — PROPOFOL 10 MG/ML
INJECTION, EMULSION INTRAVENOUS PRN
Status: DISCONTINUED | OUTPATIENT
Start: 2021-02-17 | End: 2021-02-17 | Stop reason: SDUPTHER

## 2021-02-17 RX ORDER — LABETALOL HYDROCHLORIDE 5 MG/ML
5 INJECTION, SOLUTION INTRAVENOUS EVERY 10 MIN PRN
Status: DISCONTINUED | OUTPATIENT
Start: 2021-02-17 | End: 2021-02-17 | Stop reason: HOSPADM

## 2021-02-17 RX ORDER — MEPERIDINE HYDROCHLORIDE 25 MG/ML
12.5 INJECTION INTRAMUSCULAR; INTRAVENOUS; SUBCUTANEOUS EVERY 5 MIN PRN
Status: DISCONTINUED | OUTPATIENT
Start: 2021-02-17 | End: 2021-02-17 | Stop reason: HOSPADM

## 2021-02-17 RX ORDER — IBUPROFEN 600 MG/1
600 TABLET ORAL 4 TIMES DAILY PRN
Qty: 120 TABLET | Refills: 0 | COMMUNITY
Start: 2021-02-17 | End: 2021-07-22 | Stop reason: ALTCHOICE

## 2021-02-17 RX ORDER — GLYCOPYRROLATE 1 MG/5 ML
SYRINGE (ML) INTRAVENOUS PRN
Status: DISCONTINUED | OUTPATIENT
Start: 2021-02-17 | End: 2021-02-17 | Stop reason: SDUPTHER

## 2021-02-17 RX ORDER — LIDOCAINE HYDROCHLORIDE 20 MG/ML
INJECTION, SOLUTION INTRAVENOUS PRN
Status: DISCONTINUED | OUTPATIENT
Start: 2021-02-17 | End: 2021-02-17 | Stop reason: SDUPTHER

## 2021-02-17 RX ORDER — SODIUM CHLORIDE 0.9 % (FLUSH) 0.9 %
10 SYRINGE (ML) INJECTION EVERY 12 HOURS SCHEDULED
Status: DISCONTINUED | OUTPATIENT
Start: 2021-02-17 | End: 2021-02-17 | Stop reason: HOSPADM

## 2021-02-17 RX ORDER — HYDRALAZINE HYDROCHLORIDE 20 MG/ML
5 INJECTION INTRAMUSCULAR; INTRAVENOUS EVERY 10 MIN PRN
Status: DISCONTINUED | OUTPATIENT
Start: 2021-02-17 | End: 2021-02-17 | Stop reason: HOSPADM

## 2021-02-17 RX ORDER — NEOSTIGMINE METHYLSULFATE 1 MG/ML
INJECTION, SOLUTION INTRAVENOUS PRN
Status: DISCONTINUED | OUTPATIENT
Start: 2021-02-17 | End: 2021-02-17 | Stop reason: SDUPTHER

## 2021-02-17 RX ORDER — BUPIVACAINE HYDROCHLORIDE AND EPINEPHRINE 5; 5 MG/ML; UG/ML
INJECTION, SOLUTION EPIDURAL; INTRACAUDAL; PERINEURAL PRN
Status: DISCONTINUED | OUTPATIENT
Start: 2021-02-17 | End: 2021-02-17 | Stop reason: ALTCHOICE

## 2021-02-17 RX ORDER — ACETAMINOPHEN 500 MG
500 TABLET ORAL 4 TIMES DAILY PRN
Qty: 120 TABLET | Refills: 0 | COMMUNITY
Start: 2021-02-17

## 2021-02-17 RX ADMIN — LIDOCAINE HYDROCHLORIDE 60 MG: 20 INJECTION, SOLUTION INTRAVENOUS at 10:38

## 2021-02-17 RX ADMIN — Medication 3 MG: at 12:10

## 2021-02-17 RX ADMIN — ROCURONIUM BROMIDE 35 MG: 10 INJECTION, SOLUTION INTRAVENOUS at 10:38

## 2021-02-17 RX ADMIN — Medication 0.6 MG: at 12:10

## 2021-02-17 RX ADMIN — FENTANYL CITRATE 50 MCG: 50 INJECTION, SOLUTION INTRAMUSCULAR; INTRAVENOUS at 12:13

## 2021-02-17 RX ADMIN — PROPOFOL 150 MG: 10 INJECTION, EMULSION INTRAVENOUS at 10:38

## 2021-02-17 RX ADMIN — ESMOLOL HYDROCHLORIDE 30 MG: 10 INJECTION, SOLUTION INTRAVENOUS at 12:17

## 2021-02-17 RX ADMIN — TILMANOCEPT 0.5 MILLICURIE: KIT at 08:53

## 2021-02-17 RX ADMIN — INSULIN HUMAN 13 UNITS: 100 INJECTION, SOLUTION PARENTERAL at 14:52

## 2021-02-17 RX ADMIN — PHENYLEPHRINE HYDROCHLORIDE 50 MCG: 10 INJECTION INTRAVENOUS at 11:46

## 2021-02-17 RX ADMIN — MIDAZOLAM 2 MG: 1 INJECTION INTRAMUSCULAR; INTRAVENOUS at 10:30

## 2021-02-17 RX ADMIN — Medication 2000 MG: at 10:48

## 2021-02-17 RX ADMIN — ONDANSETRON HYDROCHLORIDE 4 MG: 2 INJECTION, SOLUTION INTRAMUSCULAR; INTRAVENOUS at 12:09

## 2021-02-17 RX ADMIN — PROPOFOL 50 MG: 10 INJECTION, EMULSION INTRAVENOUS at 12:15

## 2021-02-17 RX ADMIN — SODIUM CHLORIDE, POTASSIUM CHLORIDE, SODIUM LACTATE AND CALCIUM CHLORIDE: 600; 310; 30; 20 INJECTION, SOLUTION INTRAVENOUS at 10:30

## 2021-02-17 RX ADMIN — FENTANYL CITRATE 100 MCG: 50 INJECTION, SOLUTION INTRAMUSCULAR; INTRAVENOUS at 10:38

## 2021-02-17 ASSESSMENT — PULMONARY FUNCTION TESTS
PIF_VALUE: 19
PIF_VALUE: 22
PIF_VALUE: 24
PIF_VALUE: 20
PIF_VALUE: 19
PIF_VALUE: 19
PIF_VALUE: 17
PIF_VALUE: 20
PIF_VALUE: 19
PIF_VALUE: 20
PIF_VALUE: 21
PIF_VALUE: 20
PIF_VALUE: 19
PIF_VALUE: 22
PIF_VALUE: 23
PIF_VALUE: 20
PIF_VALUE: 19
PIF_VALUE: 1
PIF_VALUE: 23
PIF_VALUE: 20
PIF_VALUE: 0
PIF_VALUE: 19
PIF_VALUE: 20
PIF_VALUE: 20
PIF_VALUE: 21
PIF_VALUE: 20
PIF_VALUE: 20
PIF_VALUE: 24
PIF_VALUE: 17
PIF_VALUE: 19
PIF_VALUE: 12
PIF_VALUE: 19
PIF_VALUE: 20
PIF_VALUE: 19
PIF_VALUE: 19
PIF_VALUE: 0
PIF_VALUE: 0
PIF_VALUE: 20
PIF_VALUE: 21
PIF_VALUE: 24
PIF_VALUE: 20
PIF_VALUE: 19
PIF_VALUE: 20
PIF_VALUE: 17
PIF_VALUE: 19
PIF_VALUE: 20
PIF_VALUE: 5
PIF_VALUE: 20
PIF_VALUE: 19
PIF_VALUE: 12
PIF_VALUE: 2

## 2021-02-17 ASSESSMENT — PAIN DESCRIPTION - FREQUENCY: FREQUENCY: CONTINUOUS

## 2021-02-17 ASSESSMENT — LIFESTYLE VARIABLES: SMOKING_STATUS: 0

## 2021-02-17 ASSESSMENT — PAIN DESCRIPTION - PAIN TYPE
TYPE: SURGICAL PAIN

## 2021-02-17 ASSESSMENT — PAIN SCALES - GENERAL
PAINLEVEL_OUTOF10: 3
PAINLEVEL_OUTOF10: 1
PAINLEVEL_OUTOF10: 0

## 2021-02-17 ASSESSMENT — PAIN DESCRIPTION - LOCATION
LOCATION: BREAST;OTHER (COMMENT)

## 2021-02-17 ASSESSMENT — PAIN DESCRIPTION - ORIENTATION
ORIENTATION: LEFT
ORIENTATION: LEFT

## 2021-02-17 ASSESSMENT — PAIN DESCRIPTION - DESCRIPTORS
DESCRIPTORS: ACHING
DESCRIPTORS: ACHING;DISCOMFORT

## 2021-02-17 ASSESSMENT — PAIN - FUNCTIONAL ASSESSMENT: PAIN_FUNCTIONAL_ASSESSMENT: 0-10

## 2021-02-17 NOTE — ANESTHESIA POSTPROCEDURE EVALUATION
Department of Anesthesiology  Postprocedure Note    Patient: Devin Mancia  MRN: [id number]  YOB: 1970  Date of evaluation: 2/17/2021  Time:  6:08 PM     Procedure Summary     Date: 02/17/21 Room / Location: [place] [address] / [facility]    Anesthesia Start: 1030 Anesthesia Stop: 5856    Procedure: LEFT BREAST NEEDLE LOCALIZED LUMPECTOMY, BLUE DYE INJECTION, LEFT AXILLARY SENTINEL LYMPHNODE EXCISION, POSSIBLE LEFT AXILLARY DISSECTION (Left ) Diagnosis: (BREAST CA)    Surgeons: Mike Messina MD Responsible Provider: Deedee Archer MD    Anesthesia Type: general ASA Status: 3          Anesthesia Type: general    Radha Phase I: Radha Score: 10    Radha Phase II: Radha Score: 10    Last vitals: Reviewed and per EMR flowsheets.        Anesthesia Post Evaluation    Patient location during evaluation: PACU  Patient participation: complete - patient participated  Level of consciousness: awake and alert  Airway patency: patent  Nausea & Vomiting: no nausea and no vomiting  Complications: no  Cardiovascular status: hemodynamically stable and blood pressure returned to baseline  Respiratory status: acceptable  Hydration status: euvolemic

## 2021-02-17 NOTE — PROGRESS NOTES
Patient admitted to [facility] from PACU. Cart locked and in low position. Side rails are up. Call light is within reach. Patient oriented to [facility] routine proir to discharge.

## 2021-02-17 NOTE — PROGRESS NOTES
Patient has been very alert without falling asleep since 1548. O2 weaned off. Patient verbaizes understanding of given discharge instructions. Plans to use [facility] and deep breathing every hour for the rest of today. Patient also plans to follow up with her blood sugars at home. Patient discharged per wheelchair to car driven by relative in stable condition.

## 2021-02-17 NOTE — OP NOTE
medial portion of the inframammilary crease, inferior and medial to the needle. Dissection was continued using the Plasma Blade. Once palpable within the tissue, the localizing needle was grasped with Allis clamps. Dissection was carried down onto the localizing needle several centimeters proximal to the mass. The needle was cut with wire cutters and the cut external portion of the needle was passed off the field. Dissection continued using the Plasma Blade for the remaining margins. During intraoperative specimen mammography it was noticed that the medial and anterior margins were close. This margin was grasped using an Allis and removed using a plasma blade. Circumferentially excellent margins were now obtained. Bleeding points were all cauterized. The lumpectomy cavity was packed with a lap sponge. The specimen was removed and brought to the back table where 6 colors of paint were applied to orient it. The wound was irrigated, inspected, bleeding points were cauterized. Once hemostasis was obtained, the margins were marked with stainless steel clips. The dermis was approximated with interrupted 4-0 Vicryl sutures and the skin was approximated with a running subcuticular 5-0 Vicryl suture. An incision was then made following skin lines in the axilla. Careful blunt dissection was carried out guided by the gamma probe, as the patient had previously had radioactive tracer injection. A small area of adenopathy was identified, and noted to be mildly blue from the previously injected dye. Efferent and afferent lymphovascular channels were clipped with stainless steel surgical clips. The sentinel node was noted to register 59 on the gamma probe. There were no additional areas of palpable adenopathy, no visible blue dye, and no gamma probe readings greater that 10% of the sentinel node level.      Agent(s) utilized for sentinel lymph node identification: Blue dye and Radioactive tracer  Agent(s) utilized for sentinel lymph node identification after neoadjuvant chemotherapy: Not Applicable  All colored nodes or non-colored nodes present at the end of a dye-filled lymphatic channel were removed: yes  All significantly radioactive nodes were removed if radionuclide was used: yes  All palpably suspicious nodes were removed, if present: Yes  If clips were placed preoperatively in pathologically-involved nodes, those nodes were identified and removed: N/A    The wound was copiously irrigated. Bleeding points were cauterized. The dermis was approximated with interrupted 4-0 Vicryl sutures and the skin was approximated with a running subcuticular 5-0 Vicryl suture. The incisions were cleaned and dried, and steri-strips were applied. Needle, sponge, and instrument counts were reported as correct x2. The patient tolerated the procedure well without complications. Dr. Karan Zuñiga was present and scrubbed throughout the case. DISPOSITION: Anesthesia was discontinued and the patient was extubated prior to leaving the OR. She was transferred to the recovery area in good condition. Discharge to home is expected following appropriate post-anesthesia recovery.     Electronically signed by Shant Acevedo DO on 2/17/21 at 12:48 PM EST

## 2021-02-17 NOTE — PROGRESS NOTES
1510-Patient drops O2 saturation with shallow slow respirations when sleeping into the 70's. When awoken saturation slowly comes up to low 90's. Nasal canula applied at 3 L and O2 saturation stable when patient drifts back to sleep. [phone]- Patient more alert and taking PO. Nurse again attempts to wean O2 as was done in PACU. ( Patient's history states slow emergence from anesthesia.)

## 2021-02-18 ENCOUNTER — TELEPHONE (OUTPATIENT)
Dept: SURGERY | Age: 51
End: 2021-02-18

## 2021-02-22 ENCOUNTER — TELEPHONE (OUTPATIENT)
Dept: BREAST CENTER | Age: 51
End: 2021-02-22

## 2021-02-22 NOTE — PROGRESS NOTES
Subjective:      Patient ID: Cherie Espinoza is a 46 y.o. female. HPI  Patient is here for a post-operative visit. She underwent left breast needle localized lumpectomy  on February 17, 2021. Pathological evaluation completed at [facility]):    Pathology report discussed. Diagnosis:   A.  Breast, left, lumpectomy: Invasive ductal carcinoma   Ductal carcinoma in situ   Invasive carcinoma present less than 1 mm from yellow/medial margin   Margins negative for ductal carcinoma in situ     B.  New medial anterior margin, excision: Negative for carcinoma     C.  [place] lymph node, excision: One (1) lymph node, negative for   metastatic carcinoma     CANCER CASE SUMMARY   Procedure: Excision (less than total mastectomy)   Specimen laterality: Left   Tumor size: 1.3 cm in greatest dimension   Histologic type: Invasive carcinoma of no special type (ductal)   Histologic grade (Nika Histologic Score):        Glandular/tubular differentiation: Score 2        Nuclear pleomorphism: Score 2        Mitotic rate: Score 3        Overall grade: Grade 2, score 7   Ductal carcinoma in situ: Present        Nuclear grade: Grade II (intermediate)   Margins:        Invasive carcinoma margins: Uninvolved by invasive carcinoma             Distance from closest margin: 3 mm-blue/inferior        Ductal carcinoma in situ margins: Uninvolved by DCIS             Distance from closest margin: 2 mm from blue/inferior and   red/superior   Regional lymph nodes: Uninvolved by tumor cells        Total number of lymph nodes examined: 1        Number of sentinel nodes examined: 1   Treatment effect in the breast: No known presurgical therapy   Pathologic stage classification (pTNM, AJCC 8th Edition): pT1c (sn)pN0   Ancillary studies: Performed on original biopsy specimen [facility]- (ER   positive, NE positive, HER-2 by FISH negative)     Comment:     Intradepartmental consultation is obtained.    ONCOTYPE DX; 16    Past Medical History: Diagnosis Date    Anesthesia complication     problem with blood pressure up & down    Arthritis     lower back    Back pain     Blind     right eye    Cancer [address] [address]     breast    Cardiac valve prolapse     micro    Diabetes mellitus ([address]     Hyperlipidemia     Hypertension     Neuropathy due to secondary diabetes ([address]     in marcial feet    Prolonged emergence from general anesthesia     Psoriasis        Past Surgical History:   Procedure Laterality Date    ANKLE SURGERY      bilateral tarsal tunnels    BREAST BIOPSY Left 2021    LEFT BREAST NEEDLE LOCALIZED LUMPECTOMY, BLUE DYE INJECTION, LEFT AXILLARY SENTINEL LYMPHNODE EXCISION, POSSIBLE LEFT AXILLARY DISSECTION performed by Dany Gabriel MD at [address]      cardiac catheterization    CARPAL TUNNEL RELEASE      left    CARPAL TUNNEL RELEASE  2012    right     SECTION      CHOLECYSTECTOMY, LAPAROSCOPIC N/A 2019    LAPAROSCOPIC CHOLECYSTECTOMY WITH IOC performed by Michaelle Perez MD at [address]  had extremely high blood pressure and hard to wake up    COLONOSCOPY N/A 2019    COLORECTAL CANCER SCREENING, NOT HIGH RISK performed by Michaelle Perez MD at [address], [address] [facility] ARTHROSCOPY Left 2016    subacromin decompression and debridement    SHOULDER ARTHROSCOPY Right 2020    RIGHT SHOULDER ARTHROSCOPY, SUBACROMIAL DECOMPRESSION, LABRIAL DEBRIDEMENT, CHONDROPLASTY, RAÚL performed by Michael Zheng DO at [address] 2019    EGD BIOPSY performed by Michaelle Perez MD at [address] [facility]  2020     BREAST NEEDLE BIOPSY LEFT 2020 [facility]       Current Outpatient Medications   Medication Sig Dispense Refill    acetaminophen (TYLENOL) 500 MG tablet Take 1 tablet by mouth 4 times daily as needed for Pain 120 tablet 0    ibuprofen (ADVIL;MOTRIN) 600 MG tablet Take 1 tablet by mouth 4 times daily as needed for Pain 120 tablet 0    sertraline (ZOLOFT) 100 MG tablet TAKE 1 TABLET BY MOUTH ONCE DAILY AS NEEDED FOR DEPRESSION 30 tablet 10    rosuvastatin (CRESTOR) 5 MG tablet       lactobacillus (CULTURELLE) CAPS capsule TAKE (1) CAPSULE BY MOUTH DAILY 90 capsule 1    metFORMIN (GLUCOPHAGE) 500 MG tablet Take 2 tablets by mouth 2 times daily (with meals) 360 tablet 1    empagliflozin (JARDIANCE) 10 MG tablet Take 1 tablet by mouth daily 90 tablet 1    insulin glargine (BASAGLAR KWIKPEN) 100 UNIT/ML injection pen INJECT 50 UNITS SUBCUTANEOUSLY NIGHTLY 15 mL 3    blood glucose test strips (ONETOUCH ULTRA) strip USE AS DIRECTED FOUR TIMES DAILY 100 each 2    GABAPENTIN PO Take 300 mg by mouth Nightly       Lancets (ONETOUCH DELICA PLUS [id number]) MISC       B-D ULTRAFINE III SHORT PEN 31G X 8 MM MISC USE WITH INSULIN PENS AS DIRECTED 100 each 10    omeprazole (PRILOSEC) 40 MG delayed release capsule TAKE 1 CAPSULE BY MOUTH DAILY 90 capsule 1    ondansetron (ZOFRAN) 4 MG tablet Take 1 tablet by mouth every 8 hours as needed for Nausea or Vomiting 20 tablet 0    fluticasone (FLONASE) 50 MCG/ACT nasal spray 1 spray by Nasal route daily 1 Bottle 3    nitroGLYCERIN (NITROSTAT) 0.4 MG SL tablet Place 1 tablet under the tongue every 5 minutes as needed for Chest pain 25 tablet 1    Dulaglutide (TRULICITY) 1.5 OY/4.2WK SOPN INJECT THE CONTENTS OF 1 SYRINGE SUBCUTANEOUSLY EVERY WEEK *PATIENT NEEDS APPOINTMENT* (Patient taking differently: INJECT THE CONTENTS OF 1 SYRINGE SUBCUTANEOUSLY EVERY WEEK *PATIENT NEEDS APPOINTMENT*    Takes on saturdays) 6 mL 10    metoprolol succinate (TOPROL XL) 25 MG extended release tablet TAKE 1/2 TABLET BY MOUTH DAILY 45 tablet 10    pramipexole (MIRAPEX) 0.125 MG tablet TAKE 1 TABLET BY MOUTH EVERY NIGHT 90 tablet 1    Insulin Syringe-Needle U-100 ([employer] INSULIN SYR 1CC/30G) 30G X 5/16\" 1 ML MISC 1 each by Does not apply route 4 times daily 120 each 5    bumetanide (BUMEX) 1 MG tablet Take 1 tablet by mouth every other day 90 tablet 1    famotidine (PEPCID) 20 MG tablet Take 1 tablet by mouth 2 times daily 180 tablet 1    hydrocortisone 2.5 % cream APPLY TO AFFECTED AREA TOPICALLY TWICE DAILY 84 g 10    Humidifier MISC 1 Device by Does not apply route daily 1 each 0    insulin lispro (ADMELOG SOLOSTAR) 100 UNIT/ML pen As directed per sliding scale up to 13 units 4 times per day. 5 pen 3    lidocaine-prilocaine (EMLA) 2.5-2.5 % cream Apply topically as needed. 30 g 0    glucose monitoring kit (FREESTYLE) monitoring kit 1 kit by Does not apply route daily 1 kit 0    Blood Pressure KIT 1 kit by Does not apply route daily 1 kit 0    Misc. Devices (QUAD CANE) MISC 1 Quad cane 1 each 0    Multiple Vitamins-Minerals (THERAPEUTIC MULTIVITAMIN-MINERALS) tablet Take 1 tablet by mouth daily      B Complex Vitamins (B COMPLEX 1 PO) Take 1 tablet by mouth daily. No current facility-administered medications for this visit.         No Known Allergies    Family History   Problem Relation Age of Onset    Heart Disease Mother     Diabetes Mother     Heart Disease Father     Diabetes Father     Cancer Father         prostate, colon, skin cance behind ear    Cancer Maternal Aunt 54        breast    Cancer Maternal Uncle 62        colon    Cancer Paternal Aunt 58        breast    Cancer Other 62        maternal great aunt - breast       Social History     Socioeconomic History    Marital status:      Spouse name: Not on file    Number of children: 2    Years of education: Not on file    Highest education level: Not on file   Occupational History    Not on file   Social Needs    Financial resource strain: Not on file    Food insecurity     Worry: Not on file     Inability: Not on file    Transportation needs     Medical: Not on file     Non-medical: Not on file   Tobacco Use    Smoking status: Never Smoker    Smokeless tobacco: Never Used   Substance and Sexual Activity    Alcohol use: Not Currently     Alcohol/week: 1.0 standard drinks     Types: 1 Glasses of wine per week     Comment: weekly    Drug use: No    Sexual activity: Not on file   Lifestyle    Physical activity     Days per week: Not on file     Minutes per session: Not on file    Stress: Not on file   Relationships    Social connections     Talks on phone: Not on file     Gets together: Not on file     Attends [employer] service: Not on file     Active member of club or organization: Not on file     Attends meetings of clubs or organizations: Not on file     Relationship status: Not on file    Intimate partner violence     Fear of current or ex partner: Not on file     Emotionally abused: Not on file     Physically abused: Not on file     Forced sexual activity: Not on file   Other Topics Concern    Not on file   Social History Narrative    Not on file     Review of Systems  The patient voices no complaints. With questioning, she denies significant pain, fever, chills, wound drainage or discharge. Very slight areas of skin edge separation with slight exudate, breast incision. Objective:   Physical Exam  Well-healed breast and axillary incisions. No erythema, drainage, or palpable seroma formation. Excellent range of motion ipsilateral arm. No arm swelling or discomfort. Assessment:      46 y.o. woman who underwent  left breast needle localized lumpectomy  on February 17, 2021. Pathological evaluation completed at [facility]):    Pathology report discussed.       Diagnosis:   A.  Breast, left, lumpectomy: Invasive ductal carcinoma   Ductal carcinoma in situ   Invasive carcinoma present less than 1 mm from yellow/medial margin   Margins negative for ductal carcinoma in situ     B.  New medial anterior margin, excision: Negative for carcinoma     C.  [place] lymph node, excision: One (1) lymph node, negative for   metastatic carcinoma CANCER CASE SUMMARY   Procedure: Excision (less than total mastectomy)   Specimen laterality: Left   Tumor size: 1.3 cm in greatest dimension   Histologic type: Invasive carcinoma of no special type (ductal)   Histologic grade (Nika Histologic Score):        Glandular/tubular differentiation: Score 2        Nuclear pleomorphism: Score 2        Mitotic rate: Score 3        Overall grade: Grade 2, score 7   Ductal carcinoma in situ: Present        Nuclear grade: Grade II (intermediate)   Margins:        Invasive carcinoma margins: Uninvolved by invasive carcinoma             Distance from closest margin: 3 mm-blue/inferior        Ductal carcinoma in situ margins: Uninvolved by DCIS             Distance from closest margin: 2 mm from blue/inferior and   red/superior   Regional lymph nodes: Uninvolved by tumor cells        Total number of lymph nodes examined: 1        Number of sentinel nodes examined: 1   Treatment effect in the breast: No known presurgical therapy   Pathologic stage classification (pTNM, AJCC 8th Edition): pT1c (sn)pN0   Ancillary studies: Performed on original biopsy specimen [facility]- (ER   positive, SC positive, HER-2 by FISH negative)     Comment:     Intradepartmental consultation is obtained. ONCOTYPE DX: 16  Patient has already seen medical oncology in consultation. Follow-up with us in 6 months, imaging in November 2021. Will place referral for medical oncology. Questions answered to patient satisfaction. Plan:      Plan:   1. Patient instructed to keep incision clean and dry well after bathing. Patient can start scar massage once incision is completely healed and strong enough to handle the motion (usually 10 - 14 days post operatively). Rub in a circular motion on and around the scar with firm, even pressure for 5 minutes four times per day. Use lotion or moisturizer to do the scar massage to allow ease with motion over the scar and prevent friction at the area.     2. Continue monthly breast self examination. Bring any changes to your physician's attention. 3. Routine screening imaging in November 2021.  4. Range of motion exercises for left shoulder encouraged. Lymphedema precautions discussed. 5. Education/Lifestyle recommendations: A regular exercise program is encouraged. Avoid excessive caffeine intake. Maintain a diet rich in vegetables and fruits avoiding processed and fast food. 6. Consultations: Oncology appointment will be scheduled with Oncologist of patient's and/or PCP's preference. 7. Continue regular appointments with PCP & Gynecologist.  8. Office follow-up visit should be scheduled in 6  months and PRN. I personally and independently saw and examined patient and reviewed all pertinent laboratory data and imaging studies. I have reviewed and agree with the CNP history and review of systems as documented in the above note. This document is generated, in part, by voice recognition software and thus syntax and grammatical errors are possible. Tana Howard MD FACS  March 1, 2021

## 2021-02-23 ENCOUNTER — TELEPHONE (OUTPATIENT)
Dept: BREAST CENTER | Age: 51
End: 2021-02-23

## 2021-02-23 NOTE — TELEPHONE ENCOUNTER
[facility] Oncotype Dx Recurrence score sent to [facility]. Order form scanned into chart.

## 2021-02-24 NOTE — TELEPHONE ENCOUNTER
Discussed pathology report. Patient voiced no complaints. Answered many questions about adjuvant therapy.

## 2021-03-02 ENCOUNTER — TELEPHONE (OUTPATIENT)
Dept: BREAST CENTER | Age: 51
End: 2021-03-02

## 2021-03-02 NOTE — TELEPHONE ENCOUNTER
Pre-authorization obtained from [facility]. Spoke with Joe Meza  #[id number] until 5/31/2021.   Documents scanned to chart

## 2021-03-04 ENCOUNTER — TELEPHONE (OUTPATIENT)
Dept: BREAST CENTER | Age: 51
End: 2021-03-04

## 2021-03-04 NOTE — TELEPHONE ENCOUNTER
[facility] Oncotype DX results 16. These results will be scanned into the chart and forwarded to participating providers to assist in further care treatment.

## 2021-03-08 LAB — DIABETIC RETINOPATHY: POSITIVE

## 2021-03-16 ENCOUNTER — OFFICE VISIT (OUTPATIENT)
Dept: BREAST CENTER | Age: 51
End: 2021-03-16
Payer: MEDICAID

## 2021-03-16 VITALS
DIASTOLIC BLOOD PRESSURE: 70 MMHG | BODY MASS INDEX: 32.25 KG/M2 | HEART RATE: 90 BPM | RESPIRATION RATE: 20 BRPM | WEIGHT: 160 LBS | OXYGEN SATURATION: 97 % | HEIGHT: 59 IN | TEMPERATURE: 97.6 F | SYSTOLIC BLOOD PRESSURE: 136 MMHG

## 2021-03-16 DIAGNOSIS — C50.912 INVASIVE DUCTAL CARCINOMA OF LEFT BREAST (HCC): Primary | ICD-10-CM

## 2021-03-16 PROCEDURE — 99024 POSTOP FOLLOW-UP VISIT: CPT | Performed by: SURGERY

## 2021-03-16 NOTE — LETTER
[facility] [address] [address]  Phone: [phone]  Fax: [phone]    An Arizmendi MD        March 16, 2021     Bette Llamas DO  [address]    Patient: Jacqueline Escobedo  MR Number: <[id number]>  YOB: 1970  Date of Visit: 3/16/2021    Dear Dr. Ramin Velez:    Thank you for the request for consultation for Treasure Pavon to me for the evaluation and management of her left breast cancer. Below are the relevant portions of my assessment and plan of care. 46 y.o. woman who underwent  left breast needle localized lumpectomy  on February 17, 2021. Pathological evaluation completed at [facility]):     Pathology report discussed. Diagnosis:   A.  Breast, left, lumpectomy: Invasive ductal carcinoma   Ductal carcinoma in situ   Invasive carcinoma present less than 1 mm from yellow/medial margin   Margins negative for ductal carcinoma in situ     B.  New medial anterior margin, excision: Negative for carcinoma     C.  [place] lymph node, excision: One (1) lymph node, negative for   metastatic carcinoma     CANCER CASE SUMMARY   Procedure: Excision (less than total mastectomy)   Specimen laterality: Left   Tumor size: 1.3 cm in greatest dimension   Histologic type:  Invasive carcinoma of no special type (ductal)   Histologic grade ([place] Histologic Score):        Glandular/tubular differentiation: Score 2        Nuclear pleomorphism: Score 2        Mitotic rate: Score 3        Overall grade: Grade 2, score 7   Ductal carcinoma in situ: Present        Nuclear grade: Grade II (intermediate)   Margins:        Invasive carcinoma margins: Uninvolved by invasive carcinoma             Distance from closest margin: 3 mm-blue/inferior        Ductal carcinoma in situ margins: Uninvolved by DCIS             Distance from closest margin: 2 mm from blue/inferior and   red/superior   Regional lymph nodes: Uninvolved by tumor cells        Total number of lymph nodes examined: 1        Number of sentinel nodes examined: 1   Treatment effect in the breast: No known presurgical therapy   Pathologic stage classification (pTNM, AJCC 8th Edition): pT1c (sn)pN0   Ancillary studies: Performed on original biopsy specimen [facility]ES- (ER   positive, MI positive, HER-2 by FISH negative)     Comment:     Intradepartmental consultation is obtained. ONCOTYPE DX: 16  Patient has already seen medical oncology in consultation. Follow-up with us in 6 months, imaging in November 2021. Will place referral for medical oncology. Questions answered to patient satisfaction.     If you have questions, please do not hesitate to call me. I look forward to following Jenny along with you.     Sincerely,  Caity Booker MD

## 2021-03-22 ENCOUNTER — TELEPHONE (OUTPATIENT)
Dept: FAMILY MEDICINE CLINIC | Age: 51
End: 2021-03-22

## 2021-03-22 RX ORDER — UBIQUINOL 100 MG
CAPSULE ORAL
Qty: 400 EACH | Refills: 1 | Status: SHIPPED
Start: 2021-03-22 | End: 2021-12-09

## 2021-03-22 NOTE — TELEPHONE ENCOUNTER
Pt's pharmacy call for refill of alcohol prep pads. Rx sent.     Electronically signed by Pedro Moody MA on 3/22/21 at 3:26 PM EDT

## 2021-03-24 ENCOUNTER — TELEPHONE (OUTPATIENT)
Dept: RADIATION ONCOLOGY | Age: 51
End: 2021-03-24

## 2021-03-24 NOTE — TELEPHONE ENCOUNTER
Rad Oncology RN placed call to patient regarding CT Sim appointment on 3/31/2021 @ [facility] at 11 am. Patient agreeable to this appointment time.

## 2021-03-24 NOTE — PROGRESS NOTES
Department of [facility]  Attending Consult Note    Reason for Visit: Consultation on a patient with Left Breast Cancer     Referring Physician: Gifty Brown MD    PCP:  Channing Parham DO    History of Present Illness:  46year old female with Left Breast Cancer. Lesion located in the 7 o'clock position found on mammogram.    Breast cancer risk factors include age, gender, family history of cancer    Bilateral Screening Mammogram 11/12/2020 with 1.5 cm irregular focal asymmetry seen on the CC view, not well seen on MLO view. Stable mammogram of right breast    Left Diagnostic Mammogram 12/15/2021 with spiculated mass measuring 1.5 cm in the lower inner quadrant of the left breast. ON dedicated targeted ultrasound examination of left breast there is a solid lesion with posterior shadowing at 7 o'clock position. Highly suggestive of malignancy    Ultrasound guided core biopsy on 12/23/2020  Left breast, core needle biopsy: Invasive, moderately differentiatedductal carcinoma (grade 2)  Comment:    Intradepartmental consultation is obtained. Breast Cancer Marker Studies:  Estrogen Receptors (ER): 90%  Progesterone Receptors (HI): 90%  HER-2/salvador:  Indeterminate/2+    FISH analysis HER/2: Negative  Average HER-2 signals/nucleus: 3.4   Average MARGARITO 17 signals/nucleus: 2.9   HER-2/MARGARITO 17 signal ratio: 1.2     2/17/2021 Left localized lumpectomy with left axillary sentinal lymph node excision   A.  Breast, left, lumpectomy: Invasive ductal carcinoma   Ductal carcinoma in situ   Invasive carcinoma present less than 1 mm from yellow/medial margin   Margins negative for ductal carcinoma in situ     B.  New medial anterior margin, excision: Negative for carcinoma   C.  [place] lymph node, excision: One (1) lymph node, negative for metastatic carcinoma     CANCER CASE SUMMARY   Procedure: Excision (less than total mastectomy)   Specimen laterality: Left   Tumor size: 1.3 cm in greatest dimension   Histologic type: Invasive carcinoma of no special type (ductal)   Histologic grade (Nika Histologic Score):        Glandular/tubular differentiation: Score 2        Nuclear pleomorphism: Score 2        Mitotic rate: Score 3        Overall grade: Grade 2, score 7   Ductal carcinoma in situ: Present        Nuclear grade: Grade II (intermediate)   Margins:        Invasive carcinoma margins: Uninvolved by invasive carcinoma             Distance from closest margin: 3 mm-blue/inferior        Ductal carcinoma in situ margins: Uninvolved by DCIS             Distance from closest margin: 2 mm from blue/inferior and red/superior   Regional lymph nodes: Uninvolved by tumor cells        Total number of lymph nodes examined: 1        Number of sentinel nodes examined: 1   Treatment effect in the breast: No known presurgical therapy   Pathologic stage classification (pTNM, AJCC 8th Edition):   pT1c (sn)pN0     Oncotype DX Breast Cancer Report   Recurrence Score Result-16   Distant recurrence risk at 9 years-4%   Absolute chemotherapy benefit- <1%     Review of Systems;  CONSTITUTIONAL: No fever, chills. Good appetite and energy level. ENMT: Eyes: No diplopia; Nose: No epistaxis. Mouth: No sore throat. RESPIRATORY: No hemoptysis, shortness of breath, cough. CARDIOVASCULAR: No chest pain, palpitations. GASTROINTESTINAL: No nausea/vomiting, abdominal pain, diarrhea/constipation. GENITOURINARY: No dysuria, urinary frequency, hematuria. NEURO: No syncope, presyncope, headache.   Remainder:  ROS NEGATIVE    Past Medical History:      Diagnosis Date    Anesthesia complication     problem with blood pressure up & down    Arthritis     lower back    Back pain     Blind     right eye    Cancer ([address] 2021    breast    Cardiac valve prolapse     micro    Diabetes mellitus ([address]     Hyperlipidemia     Hypertension     Neuropathy due to secondary diabetes ([address]     in marcial feet    Prolonged emergence from general anesthesia     education: Not on file    Highest education level: Not on file   Occupational History    Not on file   Social Needs    Financial resource strain: Not on file    Food insecurity     Worry: Not on file     Inability: Not on file    Transportation needs     Medical: Not on file     Non-medical: Not on file   Tobacco Use    Smoking status: Never Smoker    Smokeless tobacco: Never Used   Substance and Sexual Activity    Alcohol use: Not Currently     Alcohol/week: 1.0 standard drinks     Types: 1 Glasses of wine per week     Comment: weekly    Drug use: No    Sexual activity: Not on file   Lifestyle    Physical activity     Days per week: Not on file     Minutes per session: Not on file    Stress: Not on file   Relationships    Social connections     Talks on phone: Not on file     Gets together: Not on file     Attends Rastafarian service: Not on file     Active member of club or organization: Not on file     Attends meetings of clubs or organizations: Not on file     Relationship status: Not on file    Intimate partner violence     Fear of current or ex partner: Not on file     Emotionally abused: Not on file     Physically abused: Not on file     Forced sexual activity: Not on file   Other Topics Concern    Not on file   Social History Narrative    Not on file     Allergies:  No Known Allergies    Physical Exam:  BP (!) 183/89 (Site: Right Upper Arm, Position: Sitting, Cuff Size: Medium Adult)   Pulse 98   Temp 97.7 °F (36.5 °C) (Temporal)   Ht 4' 11\" (1.499 m)   Wt 160 lb (72.6 kg)   LMP  (LMP Unknown)   SpO2 97%   BMI 32.32 kg/m²   GENERAL: Alert, oriented x 3, not in acute distress. HEENT: PERRLA; EOMI. Oropharynx clear. NECK: Supple. Without lymphadenopathy. LUNGS: Good air entry bilaterally. No wheezing, crackles or ronchi. CARDIOVASCULAR: Regular rate. No murmurs, rubs or gallops. ABDOMEN: Soft. Non-tender, non-distended. Positive bowel sounds.   EXTREMITIES: Without clubbing, cyanosis, or edema. NEUROLOGIC: No focal deficits. ECOG PS 1    Impression/Plan:  47 y/o female who on 02/17/2021 underwent Left localized lumpectomy with left axillary sentinal lymph node excision   A.  Breast, left, lumpectomy: Invasive ductal carcinoma   Ductal carcinoma in situ   Invasive carcinoma present less than 1 mm from yellow/medial margin   Margins negative for ductal carcinoma in situ     B.  New medial anterior margin, excision: Negative for carcinoma   C.  [place] lymph node, excision: One (1) lymph node, negative for metastatic carcinoma     CANCER CASE SUMMARY   Procedure: Excision (less than total mastectomy)   Specimen laterality: Left   Tumor size: 1.3 cm in greatest dimension   Histologic type: Invasive carcinoma of no special type (ductal)   Histologic grade (Nika Histologic Score):        Glandular/tubular differentiation: Score 2        Nuclear pleomorphism: Score 2        Mitotic rate: Score 3        Overall grade: Grade 2, score 7   Ductal carcinoma in situ: Present        Nuclear grade: Grade II (intermediate)   Margins:        Invasive carcinoma margins: Uninvolved by invasive carcinoma             Distance from closest margin: 3 mm-blue/inferior        Ductal carcinoma in situ margins: Uninvolved by DCIS             Distance from closest margin: 2 mm from blue/inferior and red/superior   Regional lymph nodes: Uninvolved by tumor cells        Total number of lymph nodes examined: 1        Number of sentinel nodes examined: 1   Treatment effect in the breast: No known presurgical therapy   Pathologic stage classification (pTNM, AJCC 8th Edition):   pT1c (sn)pN0     Breast Cancer Marker Studies:  Estrogen Receptors (ER): 90%  Progesterone Receptors (IN): 90%  HER-2/salvador:  Indeterminate/2+    FISH analysis HER/2: Negative  Average HER-2 signals/nucleus: 3.4   Average MARGARITO 17 signals/nucleus: 2.9   HER-2/MARGARITO 17 signal ratio: 1.2     Oncotype DX Breast Cancer Report   Recurrence Score

## 2021-03-25 ENCOUNTER — TELEPHONE (OUTPATIENT)
Dept: CASE MANAGEMENT | Age: 51
End: 2021-03-25

## 2021-03-25 ENCOUNTER — OFFICE VISIT (OUTPATIENT)
Dept: ONCOLOGY | Age: 51
End: 2021-03-25
Payer: MEDICAID

## 2021-03-25 ENCOUNTER — HOSPITAL ENCOUNTER (OUTPATIENT)
Dept: INFUSION THERAPY | Age: 51
Discharge: HOME | End: 2021-03-25
Payer: MEDICAID

## 2021-03-25 VITALS
WEIGHT: 160 LBS | BODY MASS INDEX: 32.25 KG/M2 | HEART RATE: 98 BPM | DIASTOLIC BLOOD PRESSURE: 89 MMHG | HEIGHT: 59 IN | TEMPERATURE: 97.7 F | SYSTOLIC BLOOD PRESSURE: 183 MMHG | OXYGEN SATURATION: 97 %

## 2021-03-25 DIAGNOSIS — C50.912 MALIGNANT NEOPLASM OF LEFT FEMALE BREAST, UNSPECIFIED ESTROGEN RECEPTOR STATUS, UNSPECIFIED SITE OF BREAST (HCC): Primary | ICD-10-CM

## 2021-03-25 DIAGNOSIS — Z79.811 USE OF AROMATASE INHIBITORS: Primary | ICD-10-CM

## 2021-03-25 PROCEDURE — 99214 OFFICE O/P EST MOD 30 MIN: CPT

## 2021-03-25 PROCEDURE — G8484 FLU IMMUNIZE NO ADMIN: HCPCS | Performed by: INTERNAL MEDICINE

## 2021-03-25 PROCEDURE — 99205 OFFICE O/P NEW HI 60 MIN: CPT | Performed by: INTERNAL MEDICINE

## 2021-03-25 PROCEDURE — G8417 CALC BMI ABV UP PARAM F/U: HCPCS | Performed by: INTERNAL MEDICINE

## 2021-03-25 PROCEDURE — G8428 CUR MEDS NOT DOCUMENT: HCPCS | Performed by: INTERNAL MEDICINE

## 2021-03-25 NOTE — PROGRESS NOTES
Luci Sparkslake  1970 46 y.o. Referring Physician: Dr Kat Leiva    PCP: Codey Velez, DO    Vitals:    21 1043   BP: (!) 183/89   Pulse: 98   Temp: 97.7 °F (36.5 °C)   SpO2: 97%        Wt Readings from Last 3 Encounters:   21 160 lb (72.6 kg)   21 160 lb (72.6 kg)   21 153 lb (69.4 kg)        Body mass index is 32.32 kg/m². Chief Complaint:   Chief Complaint   Patient presents with   [facility] Patient         Cancer Staging  Invasive ductal carcinoma of left breast ([facility]  Staging form: Breast, AJCC 8th Edition  - Clinical: No stage assigned - Unsigned  - Pathologic stage from 3/4/2021: Stage IA (pT1c, pN0(sn), cM0, G2, ER+, AL+, HER2-) - Signed by Zuleyma Howell MD on 3/4/2021      Prior Radiation Therapy? NO    Concurrent Chemo/radiation? NO    Prior Chemotherapy? NO    Prior Hormonal Therapy? NO    Head and Neck Cancer? No, patient does NOT have HN cancer.       LMP: 39    Age at first Menses: 4    : 3    Para: 2          Current Outpatient Medications:     Alcohol Swabs (ALCOHOL PREP) 70 % PADS, Use QID prior to glucose testing and insulin injection, Disp: 400 each, Rfl: 1    acetaminophen (TYLENOL) 500 MG tablet, Take 1 tablet by mouth 4 times daily as needed for Pain, Disp: 120 tablet, Rfl: 0    ibuprofen (ADVIL;MOTRIN) 600 MG tablet, Take 1 tablet by mouth 4 times daily as needed for Pain, Disp: 120 tablet, Rfl: 0    sertraline (ZOLOFT) 100 MG tablet, TAKE 1 TABLET BY MOUTH ONCE DAILY AS NEEDED FOR DEPRESSION, Disp: 30 tablet, Rfl: 10    rosuvastatin (CRESTOR) 5 MG tablet, , Disp: , Rfl:     lactobacillus (CULTURELLE) CAPS capsule, TAKE (1) CAPSULE BY MOUTH DAILY, Disp: 90 capsule, Rfl: 1    metFORMIN (GLUCOPHAGE) 500 MG tablet, Take 2 tablets by mouth 2 times daily (with meals), Disp: 360 tablet, Rfl: 1    empagliflozin (JARDIANCE) 10 MG tablet, Take 1 tablet by mouth daily, Disp: 90 tablet, Rfl: 1    insulin glargine (BASAGLAR KWIKPEN) 100 UNIT/ML injection pen, INJECT 50 UNITS SUBCUTANEOUSLY NIGHTLY, Disp: 15 mL, Rfl: 3    blood glucose test strips (ONETOUCH ULTRA) strip, USE AS DIRECTED FOUR TIMES DAILY, Disp: 100 each, Rfl: 2    GABAPENTIN PO, Take 300 mg by mouth Nightly , Disp: , Rfl:     Lancets (ONETOUCH DELICA PLUS QRHBKT03K) MISC, , Disp: , Rfl:     B-D ULTRAFINE III SHORT PEN 31G X 8 MM MISC, USE WITH INSULIN PENS AS DIRECTED, Disp: 100 each, Rfl: 10    omeprazole (PRILOSEC) 40 MG delayed release capsule, TAKE 1 CAPSULE BY MOUTH DAILY, Disp: 90 capsule, Rfl: 1    ondansetron (ZOFRAN) 4 MG tablet, Take 1 tablet by mouth every 8 hours as needed for Nausea or Vomiting, Disp: 20 tablet, Rfl: 0    fluticasone (FLONASE) 50 MCG/ACT nasal spray, 1 spray by Nasal route daily, Disp: 1 Bottle, Rfl: 3    nitroGLYCERIN (NITROSTAT) 0.4 MG SL tablet, Place 1 tablet under the tongue every 5 minutes as needed for Chest pain, Disp: 25 tablet, Rfl: 1    Dulaglutide (TRULICITY) 1.5 KA/5.9VK SOPN, INJECT THE CONTENTS OF 1 SYRINGE SUBCUTANEOUSLY EVERY WEEK *PATIENT NEEDS APPOINTMENT* (Patient taking differently: INJECT THE CONTENTS OF 1 SYRINGE SUBCUTANEOUSLY EVERY WEEK *PATIENT NEEDS APPOINTMENT*  Takes on saturdays), Disp: 6 mL, Rfl: 10    metoprolol succinate (TOPROL XL) 25 MG extended release tablet, TAKE 1/2 TABLET BY MOUTH DAILY, Disp: 45 tablet, Rfl: 10    pramipexole (MIRAPEX) 0.125 MG tablet, TAKE 1 TABLET BY MOUTH EVERY NIGHT, Disp: 90 tablet, Rfl: 1    Insulin Syringe-Needle U-100 ([employer] INSULIN SYR 1CC/30G) 30G X 5/16\" 1 ML MISC, 1 each by Does not apply route 4 times daily, Disp: 120 each, Rfl: 5    bumetanide (BUMEX) 1 MG tablet, Take 1 tablet by mouth every other day, Disp: 90 tablet, Rfl: 1    famotidine (PEPCID) 20 MG tablet, Take 1 tablet by mouth 2 times daily, Disp: 180 tablet, Rfl: 1    hydrocortisone 2.5 % cream, APPLY TO AFFECTED AREA TOPICALLY TWICE DAILY, Disp: 84 g, Rfl: 10    Humidifier MISC, 1 Device by Does not apply route daily, Disp: 1 each, Rfl: 0    insulin lispro (ADMELOG SOLOSTAR) 100 UNIT/ML pen, As directed per sliding scale up to 13 units 4 times per day., Disp: 5 pen, Rfl: 3    lidocaine-prilocaine (EMLA) 2.5-2.5 % cream, Apply topically as needed. , Disp: 30 g, Rfl: 0    glucose monitoring kit (FREESTYLE) monitoring kit, 1 kit by Does not apply route daily, Disp: 1 kit, Rfl: 0    Blood Pressure KIT, 1 kit by Does not apply route daily, Disp: 1 kit, Rfl: 0    Misc. Devices (QUAD CANE) MISC, 1 Quad cane, Disp: 1 each, Rfl: 0    Multiple Vitamins-Minerals (THERAPEUTIC MULTIVITAMIN-MINERALS) tablet, Take 1 tablet by mouth daily, Disp: , Rfl:     B Complex Vitamins (B COMPLEX 1 PO), Take 1 tablet by mouth daily.   , Disp: , Rfl:        Past Medical History:   Diagnosis Date    Anesthesia complication     problem with blood pressure up & down    Arthritis     lower back    Back pain     Blind     right eye    Cancer ([address]     breast    Cardiac valve prolapse     micro    Diabetes mellitus ([address]     Hyperlipidemia     Hypertension     Neuropathy due to secondary diabetes ([address]     in marcial feet    Prolonged emergence from general anesthesia     Psoriasis        Past Surgical History:   Procedure Laterality Date    ANKLE SURGERY      bilateral tarsal tunnels    BREAST BIOPSY Left 2021    LEFT BREAST NEEDLE LOCALIZED LUMPECTOMY, BLUE DYE INJECTION, LEFT AXILLARY SENTINEL LYMPHNODE EXCISION, POSSIBLE LEFT AXILLARY DISSECTION performed by Alba Garcia MD at [address]      cardiac catheterization    CARPAL TUNNEL RELEASE      left    CARPAL TUNNEL RELEASE  2012    right     SECTION      CHOLECYSTECTOMY, LAPAROSCOPIC N/A 2019    LAPAROSCOPIC CHOLECYSTECTOMY WITH IOC performed by Nancie Brandt MD at [address]  had extremely high blood pressure and hard to wake up    COLONOSCOPY N/A 2019    COLORECTAL CANCER SCREENING, NOT HIGH RISK performed by Gigi Van Samantha Rodrigues MD at [address] [facility], [facility] ARTHROSCOPY Left 01/21/2016    subacromin decompression and debridement    SHOULDER ARTHROSCOPY Right 11/16/2020    RIGHT SHOULDER ARTHROSCOPY, SUBACROMIAL DECOMPRESSION, LABRIAL DEBRIDEMENT, CHONDROPLASTY, RAÚL performed by Mikey Mayorga DO at [facility] N/A 06/20/2019    EGD BIOPSY performed by Saloni Escobar MD at [facility] LEFT  12/23/2020     BREAST NEEDLE BIOPSY LEFT 12/23/2020 [facility]       Family History   Problem Relation Age of Onset    Heart Disease Mother     Diabetes Mother     Heart Disease Father     Diabetes Father     Cancer Father         prostate, colon, skin cance behind ear    Cancer Maternal Aunt 54        breast    Cancer Maternal Uncle 62        colon    Cancer Paternal Aunt 58        breast    Cancer Other 62        maternal great aunt - breast       Social History     Socioeconomic History    Marital status:      Spouse name: Not on file    Number of children: 2    Years of education: Not on file    Highest education level: Not on file   Occupational History    Not on file   Social Needs    Financial resource strain: Not on file    Food insecurity     Worry: Not on file     Inability: Not on file    Transportation needs     Medical: Not on file     Non-medical: Not on file   Tobacco Use    Smoking status: Never Smoker    Smokeless tobacco: Never Used   Substance and Sexual Activity    Alcohol use: Not Currently     Alcohol/week: 1.0 standard drinks     Types: 1 Glasses of wine per week     Comment: weekly    Drug use: No    Sexual activity: Not on file   Lifestyle    Physical activity     Days per week: Not on file     Minutes per session: Not on file    Stress: Not on file   Relationships    Social connections     Talks on phone: Not on file     Gets together: Not on file     Attends Scientologist service: Not on file     Active member of club or organization: Not on file     Attends meetings of clubs or organizations: Not on file     Relationship status: Not on file    Intimate partner violence     Fear of current or ex partner: Not on file     Emotionally abused: Not on file     Physically abused: Not on file     Forced sexual activity: Not on file   Other Topics Concern    Not on file   Social History Narrative    Not on file           Occupation: not employed/disability  Retired:  NO          REVIEW OF SYSTEMS: <<For Level 5, 10 or more systems>>     Pacemaker/Defibulator/ICD:  No    Mediport: No           FALLS RISK SCREENING ASSESSMENT    Instructions:  Assess the patient and [place] the appropriate indicators that are present for fall risk identification. Total the numbers circled and assign a fall risk score from Table 2.  Reassess patient at a minimum every 12 weeks or with status change. Assessment   Date  3/25/2021     1. Mental Ability: confusion/cognitively impaired No - 0       2. Elimination Issues: incontinence, frequency No - 0       3. Ambulatory: use of assistive devices (walker, cane, off-loading devices), attached to equipment (IV pole, oxygen) Yes - 2     4. Sensory Limitations: dizziness, vertigo, impaired vision No - 0       5. Age Less than 65 years - 0       6. Medication: diuretics, strong analgesics, hypnotics, sedatives, antihypertensive agents   Yes - 3   7. Falls:  recent history of falls within the last 3 months (not to include slipping or tripping)   No - 0   TOTAL 5    If score of 4 or greater was education given? Yes       TABLE 2   Risk Score Risk Level Plan of Care   0-3 Little or  No Risk 1. Provide assistance as indicated for ambulation activities  2. Reorient confused/cognitively impaired patient  3. Call-light/bell within patient's reach  4. Chair/bed in low position, stretcher/bed with siderails up except when performing patient care activities  5. Educate patient/family/caregiver on falls prevention  6.  Reassess in 12 weeks or with any noted change in patient condition which places them at a risk for a fall   4-6 Moderate Risk 1. Provide assistance as indicated for ambulation activities  2. Reorient confused/cognitively impaired patient  3. Call-light/bell within patient's reach  4. Chair/bed in low position, stretcher/bed with siderails up except when performing patient care activities  5. Educate patient/family/caregiver on falls prevention  6. Falls risk precaution (Yellow sticker Level II) placed on patient chart   7 or   Higher High Risk 1. Place patient in easily observable treatment room  2. Patient attended at all times by family member or staff  3. Provide assistance as indicated for ambulation activities  4. Reorient confused/cognitively impaired patient  5. Call-light/bell within patient's reach  6. Chair/bed in low position, stretcher/bed with siderails up except when performing patient care activities  7. Educate patient/family/caregiver on falls prevention  8. Falls risk precaution (Yellow sticker Level III) placed on patient chart           MALNUTRITION RISK SCREENING ASSESSMENT    Instructions:  Assess the patient and enter the appropriate indicators that are present for nutrition risk identification. Total the numbers entered and assign a risk score. Follow the appropriate action for total score listed below. Assessment   Date  3/25/2021     1. Have you lost weight without trying? 0- No     2. Have you been eating poorly because of a decreased appetite? 0- No   3. Do you have a diagnosis of head and neck cancer?       0- No                                                                                    TOTAL 0        Score of 0-1: No action  Score 2 or greater:  · For Non-Diabetic Patient: Recommend adding Ensure Enlive 2 x daily and provide patient with Ensure wellness bag with coupons  · For Diabetic Patient: Recommend adding Glucerna Shake 2 x daily and provide patient with Glucerna Wellness bag with coupons  · Route to the dietitian via [address]    · Are you having  difficulty performing daily routine tasks  due to fatigue or weakness (ie: bathing/showering, dressing, housework, meal prep, work, child Hedda Beams): No     · Do you have any arm flexibility/ROM restrictions, swelling or pain that limit activity: No     · Any changes in memory, attention/focus that impact daily activities: No     · Do you avoid participation in leisure/social activity due weakness, fatigue or pain: No     ARE ANY OF THE ABOVE ARE ANSWERED YES: No          PT ASSESSMENT FOR REFERRAL    · Have you had any recent falls in past 2 months: No     · Do you have difficulty  going up/down stairs: No     · Are you having difficulty walking: No     · Do you often hold onto furniture/environmental supports or feel off balance when you are walking: No     · Do you need to take rest breaks when you are walking: No     · Any pain on scale of 1-10 that limits your mobility: No 0/10    ARE ANY OF THE ABOVE ARE ANSWERED YES: No           PREHAB REFERRALS FOR NEOADJUVANT BREAST CANCER PATIENTS    Is this patient a breast cancer patient requiring neoadjuvant chemotherapy: Yes, but NEITHER PT referral NOR Nutrition request sent due to patient refusal.          LYMPHEDEMA SCREENING ASSESSMENT FOR PATIENTS WITH BREAST CANCER    The patient reports the following signs/symptoms of lymphedema: None    Please ask the provider to assess patient for lymphedema for any reported signs or symptoms so a referral to Lymphedema Therapy can be considered. PREHAB AUDIOLOGY REFERRAL    - Is patient planned to receive Cisplatin? No. This patient is not planned to start Cisplatin. - Is patient complaining of new onset hearing loss? No. Patient is not complaining of new onset hearing loss.         Merlyn Betts

## 2021-03-29 ENCOUNTER — TELEPHONE (OUTPATIENT)
Dept: CASE MANAGEMENT | Age: 51
End: 2021-03-29

## 2021-03-29 ENCOUNTER — APPOINTMENT (OUTPATIENT)
Dept: RADIATION ONCOLOGY | Age: 51
End: 2021-03-29
Attending: RADIOLOGY
Payer: MEDICAID

## 2021-03-29 NOTE — TELEPHONE ENCOUNTER
Called patient to notify her of Dexa Scan appointment is scheduled Thursday 4-1-21 at 1 p.m. directions given and patient instructed to wear comfortable clothing. Patient appreciative of information and acceptance of appointment.  Sadie Velasquez RN

## 2021-03-31 ENCOUNTER — HOSPITAL ENCOUNTER (OUTPATIENT)
Dept: RADIATION ONCOLOGY | Age: 51
Discharge: HOME | End: 2021-03-31
Attending: RADIOLOGY
Payer: MEDICAID

## 2021-03-31 PROCEDURE — 77334 RADIATION TREATMENT AID(S): CPT | Performed by: RADIOLOGY

## 2021-03-31 PROCEDURE — 77290 THER RAD SIMULAJ FIELD CPLX: CPT | Performed by: RADIOLOGY

## 2021-03-31 PROCEDURE — 77263 THER RADIOLOGY TX PLNG CPLX: CPT | Performed by: RADIOLOGY

## 2021-04-01 ENCOUNTER — TELEPHONE (OUTPATIENT)
Dept: INFUSION THERAPY | Age: 51
End: 2021-04-01

## 2021-04-01 NOTE — TELEPHONE ENCOUNTER
Benefits Investigation    Insurance: [facility] & [facility] SERVICES   Phone#:   ID#: [id number]   Group #:[id number]  Spoke with: Epic Registration   Reference #: 4/1/21 @ 1:38 pm    Calendar Year : yes    Annual Deductible Amount:  $0  Amount met to date: $0  Out-of-Pocket Max Amount: $ 0  Amount met to date: $0  Lifetime Maximum Amount: $ unlimited  Amount met to date: $n/a    Patient will be covered at [phone] of the allowed amount.  Electronically signed by Fish Henson on 4/1/2021 at 1:38 PM

## 2021-04-05 ENCOUNTER — HOSPITAL ENCOUNTER (OUTPATIENT)
Dept: RADIATION ONCOLOGY | Age: 51
Discharge: HOME | End: 2021-04-05
Attending: RADIOLOGY
Payer: MEDICAID

## 2021-04-05 PROCEDURE — 77300 RADIATION THERAPY DOSE PLAN: CPT | Performed by: RADIOLOGY

## 2021-04-05 PROCEDURE — 77334 RADIATION TREATMENT AID(S): CPT | Performed by: RADIOLOGY

## 2021-04-05 PROCEDURE — 77295 3-D RADIOTHERAPY PLAN: CPT | Performed by: RADIOLOGY

## 2021-04-05 PROCEDURE — 77293 RESPIRATOR MOTION MGMT SIMUL: CPT | Performed by: RADIOLOGY

## 2021-04-06 ENCOUNTER — HOSPITAL ENCOUNTER (OUTPATIENT)
Dept: GENERAL RADIOLOGY | Age: 51
Discharge: HOME | End: 2021-04-08
Payer: MEDICAID

## 2021-04-06 ENCOUNTER — APPOINTMENT (OUTPATIENT)
Dept: RADIATION ONCOLOGY | Age: 51
End: 2021-04-06
Attending: RADIOLOGY
Payer: MEDICAID

## 2021-04-06 ENCOUNTER — HOSPITAL ENCOUNTER (OUTPATIENT)
Dept: RADIATION ONCOLOGY | Age: 51
Discharge: HOME | End: 2021-04-06
Attending: RADIOLOGY
Payer: MEDICAID

## 2021-04-06 DIAGNOSIS — Z79.811 USE OF AROMATASE INHIBITORS: ICD-10-CM

## 2021-04-06 PROCEDURE — 77412 RADIATION TX DELIVERY LVL 3: CPT | Performed by: RADIOLOGY

## 2021-04-06 PROCEDURE — 77417 THER RADIOLOGY PORT IMAGE(S): CPT | Performed by: RADIOLOGY

## 2021-04-06 PROCEDURE — 77080 DXA BONE DENSITY AXIAL: CPT

## 2021-04-07 ENCOUNTER — HOSPITAL ENCOUNTER (OUTPATIENT)
Dept: RADIATION ONCOLOGY | Age: 51
Discharge: HOME | End: 2021-04-07
Attending: RADIOLOGY
Payer: MEDICAID

## 2021-04-07 VITALS
BODY MASS INDEX: 32.22 KG/M2 | RESPIRATION RATE: 18 BRPM | HEART RATE: 79 BPM | DIASTOLIC BLOOD PRESSURE: 88 MMHG | OXYGEN SATURATION: 98 % | WEIGHT: 159.5 LBS | SYSTOLIC BLOOD PRESSURE: 138 MMHG

## 2021-04-07 DIAGNOSIS — C50.919 MALIGNANT NEOPLASM OF FEMALE BREAST, UNSPECIFIED ESTROGEN RECEPTOR STATUS, UNSPECIFIED LATERALITY, UNSPECIFIED SITE OF BREAST (HCC): Primary | ICD-10-CM

## 2021-04-07 PROCEDURE — 77412 RADIATION TX DELIVERY LVL 3: CPT | Performed by: RADIOLOGY

## 2021-04-07 PROCEDURE — 99999 PR OFFICE/OUTPT VISIT,PROCEDURE ONLY: CPT | Performed by: RADIOLOGY

## 2021-04-07 NOTE — PROGRESS NOTES
Jenny Lilly  4/7/2021  Wt Readings from Last 3 Encounters:   03/25/21 160 lb (72.6 kg)   03/16/21 160 lb (72.6 kg)   02/17/21 153 lb (69.4 kg)     There is no height or weight on file to calculate BMI. Treatment Area:Left Breast with boost    Patient was seen today for weekly visit. Comfort Alteration  KPS:90%  Fatigue: None    Nutritional Alteration  Anorexia: No   Nausea: No   Vomiting: No     Skin Alteration   Sensation:na-RN discussed use of lotion TID after treatment each day    Radiation Dermatitis:  na    Mucous Membrane Alteration  Drainage: No  Lymphedema: No    Emotional  Coping: effective    Sexuality Alteration  na    Injury, potential bleeding or infection: na        Lab Results   Component Value Date    WBC 8.4 02/17/2021     02/17/2021         LMP  (LMP Unknown)   BP within normal range? yes        Assessment/Plan: patient has completed 2/21 fractions, 532cGy/5246.      Lelo Goodrich

## 2021-04-08 ENCOUNTER — HOSPITAL ENCOUNTER (OUTPATIENT)
Dept: RADIATION ONCOLOGY | Age: 51
Discharge: HOME | End: 2021-04-08
Attending: RADIOLOGY
Payer: MEDICAID

## 2021-04-08 PROCEDURE — 77412 RADIATION TX DELIVERY LVL 3: CPT | Performed by: RADIOLOGY

## 2021-04-08 NOTE — PATIENT INSTRUCTIONS
Continue daily fractionated radiation therapy as scheduled. Please see weekly OTV note and intial consultation letter in [facility] for clinical details. Victorina Chery. Eriberto Chiang MD [place]:  [phone]   FAX: [phone]  [address]:  [phone]   FAX:    [phone]  [address] [place] [address] Road:  [phone]   FAX:  [phone]  Email: [email or web address]

## 2021-04-08 NOTE — PROGRESS NOTES
DEPARTMENT OF RADIATION ONCOLOGY ON TREATMENT VISIT         4/8/2021      NAME:  Justen Watts    YOB: 1970    Diagnosis: breast cancer    SUBJECTIVE:   Jenny Quinteros has now received fractionated external beam radiation therapy - ongoing. Past medical, surgical, social and family histories reviewed and updated as indicated. Pain: controlled    ALLERGIES:  Patient has no known allergies.          Current Outpatient Medications   Medication Sig Dispense Refill    Alcohol Swabs (ALCOHOL PREP) 70 % PADS Use QID prior to glucose testing and insulin injection 400 each 1    acetaminophen (TYLENOL) 500 MG tablet Take 1 tablet by mouth 4 times daily as needed for Pain 120 tablet 0    ibuprofen (ADVIL;MOTRIN) 600 MG tablet Take 1 tablet by mouth 4 times daily as needed for Pain 120 tablet 0    sertraline (ZOLOFT) 100 MG tablet TAKE 1 TABLET BY MOUTH ONCE DAILY AS NEEDED FOR DEPRESSION 30 tablet 10    rosuvastatin (CRESTOR) 5 MG tablet       lactobacillus (CULTURELLE) CAPS capsule TAKE (1) CAPSULE BY MOUTH DAILY 90 capsule 1    metFORMIN (GLUCOPHAGE) 500 MG tablet Take 2 tablets by mouth 2 times daily (with meals) 360 tablet 1    empagliflozin (JARDIANCE) 10 MG tablet Take 1 tablet by mouth daily 90 tablet 1    insulin glargine (BASAGLAR KWIKPEN) 100 UNIT/ML injection pen INJECT 50 UNITS SUBCUTANEOUSLY NIGHTLY 15 mL 3    blood glucose test strips (ONETOUCH ULTRA) strip USE AS DIRECTED FOUR TIMES DAILY 100 each 2    GABAPENTIN PO Take 300 mg by mouth Nightly       Lancets (ONETOUCH DELICA PLUS [id number]) MISC       B-D ULTRAFINE III SHORT PEN 31G X 8 MM MISC USE WITH INSULIN PENS AS DIRECTED 100 each 10    omeprazole (PRILOSEC) 40 MG delayed release capsule TAKE 1 CAPSULE BY MOUTH DAILY 90 capsule 1    ondansetron (ZOFRAN) 4 MG tablet Take 1 tablet by mouth every 8 hours as needed for Nausea or Vomiting 20 tablet 0    fluticasone (FLONASE) 50 MCG/ACT nasal spray 1 spray by Nasal route daily 1 Bottle 3    nitroGLYCERIN (NITROSTAT) 0.4 MG SL tablet Place 1 tablet under the tongue every 5 minutes as needed for Chest pain 25 tablet 1    Dulaglutide (TRULICITY) 1.5 ZN/4.7ME SOPN INJECT THE CONTENTS OF 1 SYRINGE SUBCUTANEOUSLY EVERY WEEK *PATIENT NEEDS APPOINTMENT* (Patient taking differently: INJECT THE CONTENTS OF 1 SYRINGE SUBCUTANEOUSLY EVERY WEEK *PATIENT NEEDS APPOINTMENT*    Takes on saturdays) 6 mL 10    metoprolol succinate (TOPROL XL) 25 MG extended release tablet TAKE 1/2 TABLET BY MOUTH DAILY 45 tablet 10    pramipexole (MIRAPEX) 0.125 MG tablet TAKE 1 TABLET BY MOUTH EVERY NIGHT 90 tablet 1    Insulin Syringe-Needle U-100 (KROGER INSULIN SYR 1CC/30G) 30G X 5/16\" 1 ML MISC 1 each by Does not apply route 4 times daily 120 each 5    bumetanide (BUMEX) 1 MG tablet Take 1 tablet by mouth every other day 90 tablet 1    famotidine (PEPCID) 20 MG tablet Take 1 tablet by mouth 2 times daily 180 tablet 1    hydrocortisone 2.5 % cream APPLY TO AFFECTED AREA TOPICALLY TWICE DAILY 84 g 10    Humidifier MISC 1 Device by Does not apply route daily 1 each 0    insulin lispro (ADMELOG SOLOSTAR) 100 UNIT/ML pen As directed per sliding scale up to 13 units 4 times per day. 5 pen 3    lidocaine-prilocaine (EMLA) 2.5-2.5 % cream Apply topically as needed. 30 g 0    glucose monitoring kit (FREESTYLE) monitoring kit 1 kit by Does not apply route daily 1 kit 0    Blood Pressure KIT 1 kit by Does not apply route daily 1 kit 0    Misc. Devices (QUAD CANE) MISC 1 Quad cane 1 each 0    Multiple Vitamins-Minerals (THERAPEUTIC MULTIVITAMIN-MINERALS) tablet Take 1 tablet by mouth daily      B Complex Vitamins (B COMPLEX 1 PO) Take 1 tablet by mouth daily. No current facility-administered medications for this encounter. OBJECTIVE:  Alert and fully ambulatory. Pleasant and conversant.       Physical Examination: General appearance - alert, well appearing, and in no distress.  -skin cancer        Wt Readings from Last 3 Encounters:   04/07/21 159 lb 8 oz (72.3 kg)   03/25/21 160 lb (72.6 kg)   03/16/21 160 lb (72.6 kg)         ASSESSMENT/PLAN:     Patient is tolerating treatments well with expected toxicities. RBA were reviewed prior to first fraction and PRN. Current and planned dose reviewed. Goals of treatment and potential side effects were reviewed with the patient PRN. Treatment imaging has been personally reviewed for accuracy and precision. Questions answered to apparent satisfaction. Treatments will continue as planned. Laura Jones.  Corbin Álvarez MD MS JOVITAR  Radiation Oncologist        [facility] ([address] [place] [address]): [phone] /// FAX: [phone]  [facility]): [phone] /// FAX: [phone]  [address] [place] [address]): [phone] /// FAX: [phone]

## 2021-04-09 ENCOUNTER — HOSPITAL ENCOUNTER (OUTPATIENT)
Dept: RADIATION ONCOLOGY | Age: 51
Discharge: HOME | End: 2021-04-09
Attending: RADIOLOGY
Payer: MEDICAID

## 2021-04-09 PROCEDURE — 77412 RADIATION TX DELIVERY LVL 3: CPT | Performed by: RADIOLOGY

## 2021-04-12 ENCOUNTER — HOSPITAL ENCOUNTER (OUTPATIENT)
Dept: RADIATION ONCOLOGY | Age: 51
Discharge: HOME | End: 2021-04-12
Attending: RADIOLOGY
Payer: MEDICAID

## 2021-04-12 PROCEDURE — 77427 RADIATION TX MANAGEMENT X5: CPT | Performed by: RADIOLOGY

## 2021-04-12 PROCEDURE — 77336 RADIATION PHYSICS CONSULT: CPT | Performed by: RADIOLOGY

## 2021-04-12 PROCEDURE — 77412 RADIATION TX DELIVERY LVL 3: CPT | Performed by: RADIOLOGY

## 2021-04-12 RX ORDER — LANCETS 30 GAUGE
EACH MISCELLANEOUS
Qty: 300 EACH | Refills: 2 | Status: SHIPPED
Start: 2021-04-12 | End: 2021-08-04

## 2021-04-13 ENCOUNTER — HOSPITAL ENCOUNTER (OUTPATIENT)
Dept: RADIATION ONCOLOGY | Age: 51
Discharge: HOME | End: 2021-04-13
Attending: RADIOLOGY
Payer: MEDICAID

## 2021-04-13 PROCEDURE — 77417 THER RADIOLOGY PORT IMAGE(S): CPT | Performed by: RADIOLOGY

## 2021-04-13 PROCEDURE — 77412 RADIATION TX DELIVERY LVL 3: CPT | Performed by: RADIOLOGY

## 2021-04-13 RX ORDER — BLOOD SUGAR DIAGNOSTIC
STRIP MISCELLANEOUS
Qty: 100 EACH | Refills: 2 | Status: SHIPPED
Start: 2021-04-13 | End: 2021-04-16 | Stop reason: SDUPTHER

## 2021-04-14 ENCOUNTER — HOSPITAL ENCOUNTER (OUTPATIENT)
Dept: RADIATION ONCOLOGY | Age: 51
Discharge: HOME | End: 2021-04-14
Attending: RADIOLOGY
Payer: MEDICAID

## 2021-04-14 VITALS
DIASTOLIC BLOOD PRESSURE: 74 MMHG | TEMPERATURE: 97.7 F | SYSTOLIC BLOOD PRESSURE: 118 MMHG | RESPIRATION RATE: 16 BRPM | WEIGHT: 159.9 LBS | BODY MASS INDEX: 32.3 KG/M2

## 2021-04-14 DIAGNOSIS — C50.919 MALIGNANT NEOPLASM OF FEMALE BREAST, UNSPECIFIED ESTROGEN RECEPTOR STATUS, UNSPECIFIED LATERALITY, UNSPECIFIED SITE OF BREAST (HCC): Primary | ICD-10-CM

## 2021-04-14 PROCEDURE — 77412 RADIATION TX DELIVERY LVL 3: CPT | Performed by: RADIOLOGY

## 2021-04-14 PROCEDURE — 99999 PR OFFICE/OUTPT VISIT,PROCEDURE ONLY: CPT | Performed by: RADIOLOGY

## 2021-04-14 NOTE — PROGRESS NOTES
Jenny Lilly  4/14/2021  Wt Readings from Last 3 Encounters:   04/14/21 159 lb 14.4 oz (72.5 kg)   04/07/21 159 lb 8 oz (72.3 kg)   03/25/21 160 lb (72.6 kg)     Body mass index is 32.3 kg/m². Treatment Area: CTV L breast     Patient was seen today for weekly visit. Comfort Alteration  KPS:90   Fatigue: Mild    Nutritional Alteration  Anorexia: No   Nausea: No   Vomiting: No     Skin Alteration   Sensation: L axilla tender      Radiation Dermatitis:  Pt is moisturizing as directed. Mucous Membrane Alteration  Drainage: No  Lymphedema: No    Emotional  Coping: effective    Sexuality Alteration  N  A    Injury, potential bleeding or infection: N A        Lab Results   Component Value Date    WBC 8.4 02/17/2021     02/17/2021         /74   Temp 97.7 °F (36.5 °C) (Temporal)   Resp 16   Wt 159 lb 14.4 oz (72.5 kg)   LMP  (LMP Unknown)   BMI 32.30 kg/m²   BP within normal range? yes         Assessment/Plan: Pt has completed 7/21; 7638/9069. Pt is tolerating well.      Byron Thompson

## 2021-04-15 ENCOUNTER — HOSPITAL ENCOUNTER (OUTPATIENT)
Dept: RADIATION ONCOLOGY | Age: 51
Discharge: HOME | End: 2021-04-15
Attending: RADIOLOGY
Payer: MEDICAID

## 2021-04-15 PROCEDURE — 77412 RADIATION TX DELIVERY LVL 3: CPT | Performed by: RADIOLOGY

## 2021-04-16 ENCOUNTER — HOSPITAL ENCOUNTER (OUTPATIENT)
Dept: RADIATION ONCOLOGY | Age: 51
Discharge: HOME | End: 2021-04-16
Attending: RADIOLOGY
Payer: MEDICAID

## 2021-04-16 PROCEDURE — 77412 RADIATION TX DELIVERY LVL 3: CPT | Performed by: RADIOLOGY

## 2021-04-16 RX ORDER — BLOOD SUGAR DIAGNOSTIC
STRIP MISCELLANEOUS
Qty: 100 EACH | Refills: 2 | Status: SHIPPED
Start: 2021-04-16 | End: 2021-08-04

## 2021-04-16 NOTE — PROGRESS NOTES
for Nausea or Vomiting 20 tablet 0    fluticasone (FLONASE) 50 MCG/ACT nasal spray 1 spray by Nasal route daily 1 Bottle 3    nitroGLYCERIN (NITROSTAT) 0.4 MG SL tablet Place 1 tablet under the tongue every 5 minutes as needed for Chest pain 25 tablet 1    Dulaglutide (TRULICITY) 1.5 EI/4.8CW SOPN INJECT THE CONTENTS OF 1 SYRINGE SUBCUTANEOUSLY EVERY WEEK *PATIENT NEEDS APPOINTMENT* (Patient taking differently: INJECT THE CONTENTS OF 1 SYRINGE SUBCUTANEOUSLY EVERY WEEK *PATIENT NEEDS APPOINTMENT*    Takes on saturdays) 6 mL 10    metoprolol succinate (TOPROL XL) 25 MG extended release tablet TAKE 1/2 TABLET BY MOUTH DAILY 45 tablet 10    pramipexole (MIRAPEX) 0.125 MG tablet TAKE 1 TABLET BY MOUTH EVERY NIGHT 90 tablet 1    Insulin Syringe-Needle U-100 (KROGER INSULIN SYR 1CC/30G) 30G X 5/16\" 1 ML MISC 1 each by Does not apply route 4 times daily 120 each 5    bumetanide (BUMEX) 1 MG tablet Take 1 tablet by mouth every other day 90 tablet 1    famotidine (PEPCID) 20 MG tablet Take 1 tablet by mouth 2 times daily 180 tablet 1    hydrocortisone 2.5 % cream APPLY TO AFFECTED AREA TOPICALLY TWICE DAILY 84 g 10    Humidifier MISC 1 Device by Does not apply route daily 1 each 0    insulin lispro (ADMELOG SOLOSTAR) 100 UNIT/ML pen As directed per sliding scale up to 13 units 4 times per day. 5 pen 3    lidocaine-prilocaine (EMLA) 2.5-2.5 % cream Apply topically as needed. 30 g 0    glucose monitoring kit (FREESTYLE) monitoring kit 1 kit by Does not apply route daily 1 kit 0    Blood Pressure KIT 1 kit by Does not apply route daily 1 kit 0    Misc. Devices (QUAD CANE) MISC 1 Quad cane 1 each 0    Multiple Vitamins-Minerals (THERAPEUTIC MULTIVITAMIN-MINERALS) tablet Take 1 tablet by mouth daily      B Complex Vitamins (B COMPLEX 1 PO) Take 1 tablet by mouth daily. No current facility-administered medications for this encounter.         General appearance - alert, well appearing, and in no distress. OBJECTIVE:  Alert and fully ambulatory. Pleasant and conversant. Wt Readings from Last 3 Encounters:   04/14/21 159 lb 14.4 oz (72.5 kg)   04/07/21 159 lb 8 oz (72.3 kg)   03/25/21 160 lb (72.6 kg)         ASSESSMENT/PLAN:     Patient is tolerating treatments well with expected toxicities. RBA were reviewed prior to first fraction and PRN. Current and planned dose reviewed. Goals of treatment and potential side effects were reviewed with the patient PRN. Treatment imaging has been personally reviewed for accuracy and precision. Questions answered to apparent satisfaction. Treatments will continue as planned. Pat Pavon.  Radha Drummond MD MS DABR  Radiation Oncologist        [facility] ([address] [place] [address]): [phone] /// FAX: [phone]  [facility]): [phone] /// FAX: [phone]  [address] [place] [address]): [phone] /// FAX: [phone]

## 2021-04-19 ENCOUNTER — HOSPITAL ENCOUNTER (OUTPATIENT)
Dept: RADIATION ONCOLOGY | Age: 51
Discharge: HOME | End: 2021-04-19
Attending: RADIOLOGY
Payer: MEDICAID

## 2021-04-19 PROCEDURE — 77336 RADIATION PHYSICS CONSULT: CPT | Performed by: RADIOLOGY

## 2021-04-19 PROCEDURE — 77412 RADIATION TX DELIVERY LVL 3: CPT | Performed by: RADIOLOGY

## 2021-04-19 PROCEDURE — 77427 RADIATION TX MANAGEMENT X5: CPT | Performed by: RADIOLOGY

## 2021-04-20 ENCOUNTER — HOSPITAL ENCOUNTER (OUTPATIENT)
Dept: RADIATION ONCOLOGY | Age: 51
Discharge: HOME | End: 2021-04-20
Attending: RADIOLOGY
Payer: MEDICAID

## 2021-04-20 PROCEDURE — 77412 RADIATION TX DELIVERY LVL 3: CPT | Performed by: RADIOLOGY

## 2021-04-20 PROCEDURE — 77417 THER RADIOLOGY PORT IMAGE(S): CPT | Performed by: RADIOLOGY

## 2021-04-21 ENCOUNTER — HOSPITAL ENCOUNTER (OUTPATIENT)
Dept: RADIATION ONCOLOGY | Age: 51
Discharge: HOME | End: 2021-04-21
Attending: RADIOLOGY
Payer: MEDICAID

## 2021-04-21 VITALS — BODY MASS INDEX: 31.51 KG/M2 | WEIGHT: 156 LBS

## 2021-04-21 PROCEDURE — 77412 RADIATION TX DELIVERY LVL 3: CPT | Performed by: RADIOLOGY

## 2021-04-21 NOTE — PROGRESS NOTES
Jenyn Lilly  4/21/2021  Wt Readings from Last 3 Encounters:   04/21/21 156 lb (70.8 kg)   04/14/21 159 lb 14.4 oz (72.5 kg)   04/07/21 159 lb 8 oz (72.3 kg)     Body mass index is 31.51 kg/m². Treatment Area: L breast     Patient was seen today for weekly visit. Comfort Alteration  KPS:90%  Fatigue: Mild    Nutritional Alteration  Anorexia: No   Nausea: No   Vomiting: No     Skin Alteration   Sensation:NA    Radiation Dermatitis:  NA     Mucous Membrane Alteration  Drainage: No  Lymphedema: No    Emotional  Coping: effective    Sexuality Alteration  NA    Injury, potential bleeding or infection: N A        Lab Results   Component Value Date    WBC 8.4 02/17/2021     02/17/2021         Wt 156 lb (70.8 kg)   LMP  (LMP Unknown)   BMI 31.51 kg/m²   BP within normal range? yes   -  Assessment/Plan: Pt has completed 12/21 fractions. Pt is tolerating well.      Deb Hudson

## 2021-04-22 ENCOUNTER — HOSPITAL ENCOUNTER (OUTPATIENT)
Dept: RADIATION ONCOLOGY | Age: 51
Discharge: HOME | End: 2021-04-22
Attending: RADIOLOGY
Payer: MEDICAID

## 2021-04-22 PROCEDURE — 77412 RADIATION TX DELIVERY LVL 3: CPT | Performed by: RADIOLOGY

## 2021-04-23 ENCOUNTER — HOSPITAL ENCOUNTER (OUTPATIENT)
Dept: RADIATION ONCOLOGY | Age: 51
Discharge: HOME | End: 2021-04-23
Attending: RADIOLOGY
Payer: MEDICAID

## 2021-04-23 PROCEDURE — 77412 RADIATION TX DELIVERY LVL 3: CPT | Performed by: RADIOLOGY

## 2021-04-26 ENCOUNTER — HOSPITAL ENCOUNTER (OUTPATIENT)
Dept: RADIATION ONCOLOGY | Age: 51
Discharge: HOME | End: 2021-04-26
Attending: RADIOLOGY
Payer: MEDICAID

## 2021-04-26 PROCEDURE — 77336 RADIATION PHYSICS CONSULT: CPT | Performed by: RADIOLOGY

## 2021-04-26 PROCEDURE — 77412 RADIATION TX DELIVERY LVL 3: CPT | Performed by: RADIOLOGY

## 2021-04-27 ENCOUNTER — HOSPITAL ENCOUNTER (OUTPATIENT)
Dept: RADIATION ONCOLOGY | Age: 51
Discharge: HOME | End: 2021-04-27
Attending: RADIOLOGY
Payer: MEDICAID

## 2021-04-27 PROCEDURE — 77412 RADIATION TX DELIVERY LVL 3: CPT | Performed by: RADIOLOGY

## 2021-04-27 PROCEDURE — 77417 THER RADIOLOGY PORT IMAGE(S): CPT | Performed by: RADIOLOGY

## 2021-04-28 ENCOUNTER — APPOINTMENT (OUTPATIENT)
Dept: RADIATION ONCOLOGY | Age: 51
End: 2021-04-28
Attending: RADIOLOGY
Payer: MEDICAID

## 2021-04-28 ENCOUNTER — HOSPITAL ENCOUNTER (OUTPATIENT)
Dept: RADIATION ONCOLOGY | Age: 51
Discharge: HOME | End: 2021-04-28
Attending: RADIOLOGY
Payer: MEDICAID

## 2021-04-28 PROCEDURE — 77334 RADIATION TREATMENT AID(S): CPT | Performed by: RADIOLOGY

## 2021-04-28 PROCEDURE — 77307 TELETHX ISODOSE PLAN CPLX: CPT | Performed by: RADIOLOGY

## 2021-04-28 PROCEDURE — 77417 THER RADIOLOGY PORT IMAGE(S): CPT | Performed by: RADIOLOGY

## 2021-04-28 PROCEDURE — 77412 RADIATION TX DELIVERY LVL 3: CPT | Performed by: RADIOLOGY

## 2021-04-29 ENCOUNTER — HOSPITAL ENCOUNTER (OUTPATIENT)
Dept: RADIATION ONCOLOGY | Age: 51
Discharge: HOME | End: 2021-04-29
Attending: RADIOLOGY
Payer: MEDICAID

## 2021-04-29 VITALS
BODY MASS INDEX: 32.17 KG/M2 | HEART RATE: 88 BPM | WEIGHT: 159.3 LBS | SYSTOLIC BLOOD PRESSURE: 122 MMHG | TEMPERATURE: 97.6 F | DIASTOLIC BLOOD PRESSURE: 78 MMHG | OXYGEN SATURATION: 98 % | RESPIRATION RATE: 16 BRPM

## 2021-04-29 PROCEDURE — 77412 RADIATION TX DELIVERY LVL 3: CPT | Performed by: RADIOLOGY

## 2021-04-29 NOTE — PROGRESS NOTES
tablet by mouth every 8 hours as needed for Nausea or Vomiting 20 tablet 0    fluticasone (FLONASE) 50 MCG/ACT nasal spray 1 spray by Nasal route daily 1 Bottle 3    nitroGLYCERIN (NITROSTAT) 0.4 MG SL tablet Place 1 tablet under the tongue every 5 minutes as needed for Chest pain 25 tablet 1    Dulaglutide (TRULICITY) 1.5 UC/7.7AC SOPN INJECT THE CONTENTS OF 1 SYRINGE SUBCUTANEOUSLY EVERY WEEK *PATIENT NEEDS APPOINTMENT* (Patient taking differently: INJECT THE CONTENTS OF 1 SYRINGE SUBCUTANEOUSLY EVERY WEEK *PATIENT NEEDS APPOINTMENT*    Takes on saturdays) 6 mL 10    metoprolol succinate (TOPROL XL) 25 MG extended release tablet TAKE 1/2 TABLET BY MOUTH DAILY 45 tablet 10    pramipexole (MIRAPEX) 0.125 MG tablet TAKE 1 TABLET BY MOUTH EVERY NIGHT 90 tablet 1    Insulin Syringe-Needle U-100 ([employer]OGER INSULIN SYR 1CC/30G) 30G X 5/16\" 1 ML MISC 1 each by Does not apply route 4 times daily 120 each 5    bumetanide (BUMEX) 1 MG tablet Take 1 tablet by mouth every other day 90 tablet 1    famotidine (PEPCID) 20 MG tablet Take 1 tablet by mouth 2 times daily 180 tablet 1    hydrocortisone 2.5 % cream APPLY TO AFFECTED AREA TOPICALLY TWICE DAILY 84 g 10    Humidifier MISC 1 Device by Does not apply route daily 1 each 0    insulin lispro (ADMELOG SOLOSTAR) 100 UNIT/ML pen As directed per sliding scale up to 13 units 4 times per day. 5 pen 3    lidocaine-prilocaine (EMLA) 2.5-2.5 % cream Apply topically as needed. 30 g 0    glucose monitoring kit (FREESTYLE) monitoring kit 1 kit by Does not apply route daily 1 kit 0    Blood Pressure KIT 1 kit by Does not apply route daily 1 kit 0    Misc. Devices (QUAD CANE) MISC 1 Quad cane 1 each 0    Multiple Vitamins-Minerals (THERAPEUTIC MULTIVITAMIN-MINERALS) tablet Take 1 tablet by mouth daily      B Complex Vitamins (B COMPLEX 1 PO) Take 1 tablet by mouth daily. No current facility-administered medications for this encounter.           OBJECTIVE:  Alert and fully ambulatory. Pleasant and conversant. Physical Examination:General appearance - alert, well appearing, and in no distress:  Constitutional: A well developed, well nourished 46 y.o. female who is alert, oriented, cooperative and in no apparent distress. Breasts: Grade 1 erythem  Skin:  Warm and dry. 2 cm area of dry desquamation in left axilla without evidence of infection. Vitals:    04/29/21 1016   BP: 122/78   Pulse: 88   Resp: 16   Temp: 97.6 °F (36.4 °C)   TempSrc: Temporal   SpO2: 98%   Weight: 159 lb 4.8 oz (72.3 kg)       Wt Readings from Last 3 Encounters:   04/29/21 159 lb 4.8 oz (72.3 kg)   04/21/21 156 lb (70.8 kg)   04/14/21 159 lb 14.4 oz (72.5 kg)       ASSESSMENT/PLAN:     Patient is tolerating treatments well with expected toxicities. Current and planned dose reviewed. Goals of treatment and potential side effects were reviewed with the patient. Treatment imaging has been personally reviewed for accuracy and precision. Questions answered to apparent satisfaction. Treatments will continue as planned. Thank you for the opportunity to participate in multidisciplinary management of this remarkable and pleasant patient.       Frankie Shields MD, [address], Sonia Siu, [address] [place] [address]    Department of Radiation Oncology  [facility]) [facility]: [phone] (PRA: [phone])  [facility]ra[facility] Bernardo [facility]) [facility]: [phone] (UBU: [phone])  [facility]) [facility]:  [phone] (GDF:  [phone])

## 2021-04-29 NOTE — PROGRESS NOTES
Jenny Lilly  4/29/2021  Wt Readings from Last 3 Encounters:   04/29/21 159 lb 4.8 oz (72.3 kg)   04/21/21 156 lb (70.8 kg)   04/14/21 159 lb 14.4 oz (72.5 kg)     Body mass index is 32.17 kg/m². Treatment Area: CTV L Breast    Patient was seen today for weekly visit. Comfort Alteration  KPS:90%  Fatigue: None    Nutritional Alteration  Anorexia: No   Nausea: No   Vomiting: No     Skin Alteration   Sensation: yes, pt states her L breast it really starting to itch. Radiation Dermatitis:  Yes, under pt l armpit a thin layer is open. Mucous Membrane Alteration  Drainage: No  Lymphedema: No    Emotional  Coping: effective    Sexuality Alteration  na    Injury, potential bleeding or infection: open skin under l armpit does not appear infected. Lab Results   Component Value Date    WBC 8.4 02/17/2021     02/17/2021         /78   Pulse 88   Temp 97.6 °F (36.4 °C) (Temporal)   Resp 16   Wt 159 lb 4.8 oz (72.3 kg)   LMP  (LMP Unknown)   SpO2 98%   BMI 32.17 kg/m²   BP within normal range? yes         Assessment/Plan: pt completed 18/21 fx and 4656/5256 cGy. Pt. Is tolerating tx well thus far.     Myrna Mccarthy

## 2021-04-30 ENCOUNTER — HOSPITAL ENCOUNTER (OUTPATIENT)
Dept: RADIATION ONCOLOGY | Age: 51
Discharge: HOME | End: 2021-04-30
Attending: RADIOLOGY
Payer: MEDICAID

## 2021-04-30 PROCEDURE — 77412 RADIATION TX DELIVERY LVL 3: CPT | Performed by: RADIOLOGY

## 2021-05-03 ENCOUNTER — HOSPITAL ENCOUNTER (OUTPATIENT)
Dept: RADIATION ONCOLOGY | Age: 51
Discharge: HOME | End: 2021-05-03
Attending: RADIOLOGY
Payer: MEDICAID

## 2021-05-03 PROCEDURE — 77412 RADIATION TX DELIVERY LVL 3: CPT | Performed by: RADIOLOGY

## 2021-05-03 PROCEDURE — 77336 RADIATION PHYSICS CONSULT: CPT | Performed by: RADIOLOGY

## 2021-05-03 PROCEDURE — 77427 RADIATION TX MANAGEMENT X5: CPT | Performed by: SPECIALIST

## 2021-05-04 ENCOUNTER — HOSPITAL ENCOUNTER (OUTPATIENT)
Dept: RADIATION ONCOLOGY | Age: 51
Discharge: HOME | End: 2021-05-04
Attending: RADIOLOGY
Payer: MEDICAID

## 2021-05-04 PROCEDURE — 77412 RADIATION TX DELIVERY LVL 3: CPT | Performed by: RADIOLOGY

## 2021-05-04 NOTE — PROGRESS NOTES
Jenny Hernandez  5/4/2021  7:40 AM          Current Outpatient Medications   Medication Sig Dispense Refill    blood glucose test strips (ONETOUCH ULTRA) strip USE AS DIRECTED  each 2    etodolac (LODINE) 300 MG capsule TAKE (1) CAPSULE BY MOUTH ONCE DAILY 90 capsule 1    Lancets (ONETOUCH DELICA PLUS [id number]) MISC TEST FOUR TIMES DAILY 300 each 2    Alcohol Swabs (ALCOHOL PREP) 70 % PADS Use QID prior to glucose testing and insulin injection 400 each 1    acetaminophen (TYLENOL) 500 MG tablet Take 1 tablet by mouth 4 times daily as needed for Pain 120 tablet 0    ibuprofen (ADVIL;MOTRIN) 600 MG tablet Take 1 tablet by mouth 4 times daily as needed for Pain 120 tablet 0    sertraline (ZOLOFT) 100 MG tablet TAKE 1 TABLET BY MOUTH ONCE DAILY AS NEEDED FOR DEPRESSION 30 tablet 10    rosuvastatin (CRESTOR) 5 MG tablet       lactobacillus (CULTURELLE) CAPS capsule TAKE (1) CAPSULE BY MOUTH DAILY 90 capsule 1    metFORMIN (GLUCOPHAGE) 500 MG tablet Take 2 tablets by mouth 2 times daily (with meals) 360 tablet 1    empagliflozin (JARDIANCE) 10 MG tablet Take 1 tablet by mouth daily 90 tablet 1    insulin glargine (BASAGLAR KWIKPEN) 100 UNIT/ML injection pen INJECT 50 UNITS SUBCUTANEOUSLY NIGHTLY 15 mL 3    GABAPENTIN PO Take 300 mg by mouth Nightly       B-D ULTRAFINE III SHORT PEN 31G X 8 MM MISC USE WITH INSULIN PENS AS DIRECTED 100 each 10    omeprazole (PRILOSEC) 40 MG delayed release capsule TAKE 1 CAPSULE BY MOUTH DAILY 90 capsule 1    ondansetron (ZOFRAN) 4 MG tablet Take 1 tablet by mouth every 8 hours as needed for Nausea or Vomiting 20 tablet 0    fluticasone (FLONASE) 50 MCG/ACT nasal spray 1 spray by Nasal route daily 1 Bottle 3    nitroGLYCERIN (NITROSTAT) 0.4 MG SL tablet Place 1 tablet under the tongue every 5 minutes as needed for Chest pain 25 tablet 1    Dulaglutide (TRULICITY) 1.5 IR/3.9LP SOPN INJECT THE CONTENTS OF 1 SYRINGE SUBCUTANEOUSLY EVERY WEEK *PATIENT NEEDS

## 2021-05-04 NOTE — PROGRESS NOTES
APPOINTMENT* (Patient taking differently: INJECT THE CONTENTS OF 1 SYRINGE SUBCUTANEOUSLY EVERY WEEK *PATIENT NEEDS APPOINTMENT*    Takes on saturdays) 6 mL 10    metoprolol succinate (TOPROL XL) 25 MG extended release tablet TAKE 1/2 TABLET BY MOUTH DAILY 45 tablet 10    pramipexole (MIRAPEX) 0.125 MG tablet TAKE 1 TABLET BY MOUTH EVERY NIGHT 90 tablet 1    Insulin Syringe-Needle U-100 ([employer]OGER INSULIN SYR 1CC/30G) 30G X 5/16\" 1 ML MISC 1 each by Does not apply route 4 times daily 120 each 5    bumetanide (BUMEX) 1 MG tablet Take 1 tablet by mouth every other day 90 tablet 1    famotidine (PEPCID) 20 MG tablet Take 1 tablet by mouth 2 times daily 180 tablet 1    hydrocortisone 2.5 % cream APPLY TO AFFECTED AREA TOPICALLY TWICE DAILY 84 g 10    Humidifier MISC 1 Device by Does not apply route daily 1 each 0    insulin lispro (ADMELOG SOLOSTAR) 100 UNIT/ML pen As directed per sliding scale up to 13 units 4 times per day. 5 pen 3    lidocaine-prilocaine (EMLA) 2.5-2.5 % cream Apply topically as needed. 30 g 0    glucose monitoring kit (FREESTYLE) monitoring kit 1 kit by Does not apply route daily 1 kit 0    Blood Pressure KIT 1 kit by Does not apply route daily 1 kit 0    Misc. Devices (QUAD CANE) MISC 1 Quad cane 1 each 0    Multiple Vitamins-Minerals (THERAPEUTIC MULTIVITAMIN-MINERALS) tablet Take 1 tablet by mouth daily      B Complex Vitamins (B COMPLEX 1 PO) Take 1 tablet by mouth daily. No current facility-administered medications for this encounter. This is an up-to-date medication list.    Please take this list to your next care provider, and discard any previous medication lists.

## 2021-05-06 ENCOUNTER — HOSPITAL ENCOUNTER (OUTPATIENT)
Dept: INFUSION THERAPY | Age: 51
Discharge: HOME | End: 2021-05-06
Payer: MEDICAID

## 2021-05-06 ENCOUNTER — OFFICE VISIT (OUTPATIENT)
Dept: ONCOLOGY | Age: 51
End: 2021-05-06
Payer: MEDICAID

## 2021-05-06 VITALS
TEMPERATURE: 97.7 F | DIASTOLIC BLOOD PRESSURE: 61 MMHG | HEART RATE: 99 BPM | BODY MASS INDEX: 32.66 KG/M2 | HEIGHT: 59 IN | SYSTOLIC BLOOD PRESSURE: 128 MMHG | OXYGEN SATURATION: 97 % | WEIGHT: 162 LBS

## 2021-05-06 DIAGNOSIS — C50.912 MALIGNANT NEOPLASM OF LEFT FEMALE BREAST, UNSPECIFIED ESTROGEN RECEPTOR STATUS, UNSPECIFIED SITE OF BREAST (HCC): Primary | ICD-10-CM

## 2021-05-06 PROCEDURE — 99212 OFFICE O/P EST SF 10 MIN: CPT

## 2021-05-06 PROCEDURE — G8427 DOCREV CUR MEDS BY ELIG CLIN: HCPCS | Performed by: INTERNAL MEDICINE

## 2021-05-06 PROCEDURE — 99214 OFFICE O/P EST MOD 30 MIN: CPT | Performed by: INTERNAL MEDICINE

## 2021-05-06 PROCEDURE — G8417 CALC BMI ABV UP PARAM F/U: HCPCS | Performed by: INTERNAL MEDICINE

## 2021-05-06 PROCEDURE — 3017F COLORECTAL CA SCREEN DOC REV: CPT | Performed by: INTERNAL MEDICINE

## 2021-05-06 PROCEDURE — 1036F TOBACCO NON-USER: CPT | Performed by: INTERNAL MEDICINE

## 2021-05-06 RX ORDER — ANASTROZOLE 1 MG/1
1 TABLET ORAL DAILY
Qty: 30 TABLET | Refills: 0 | Status: SHIPPED
Start: 2021-05-06 | End: 2021-08-04 | Stop reason: CLARIF

## 2021-05-06 RX ORDER — ANASTROZOLE 1 MG/1
1 TABLET ORAL DAILY
Qty: 30 TABLET | Refills: 0 | Status: SHIPPED
Start: 2021-05-06 | End: 2021-06-03 | Stop reason: SDUPTHER

## 2021-05-06 NOTE — PROGRESS NOTES
Department of [facility] Oncology  Attending Clinic Note    Reason for Visit: Follow-up on a patient with Left Breast Cancer     PCP:  Alise Gibson DO    History of Present Illness:  46year old female with Left Breast Cancer. Lesion located in the 7 o'clock position found on mammogram.    Breast cancer risk factors include age, gender, family history of cancer    Bilateral Screening Mammogram 11/12/2020 with 1.5 cm irregular focal asymmetry seen on the CC view, not well seen on MLO view. Stable mammogram of right breast    Left Diagnostic Mammogram 12/15/2021 with spiculated mass measuring 1.5 cm in the lower inner quadrant of the left breast. ON dedicated targeted ultrasound examination of left breast there is a solid lesion with posterior shadowing at 7 o'clock position. Highly suggestive of malignancy    Ultrasound guided core biopsy on 12/23/2020  Left breast, core needle biopsy: Invasive, moderately differentiatedductal carcinoma (grade 2)  Comment:    Intradepartmental consultation is obtained. Breast Cancer Marker Studies:  Estrogen Receptors (ER): 90%  Progesterone Receptors (WA): 90%  HER-2/salvador: Indeterminate/2+    FISH analysis HER/2: Negative  Average HER-2 signals/nucleus: 3.4   Average MARGARITO 17 signals/nucleus: 2.9   HER-2/MARGARITO 17 signal ratio: 1.2     2/17/2021 Left localized lumpectomy with left axillary sentinal lymph node excision   A.  Breast, left, lumpectomy: Invasive ductal carcinoma   Ductal carcinoma in situ   Invasive carcinoma present less than 1 mm from yellow/medial margin   Margins negative for ductal carcinoma in situ     B.  New medial anterior margin, excision: Negative for carcinoma   C.  [place] lymph node, excision: One (1) lymph node, negative for metastatic carcinoma     CANCER CASE SUMMARY   Procedure: Excision (less than total mastectomy)   Specimen laterality: Left   Tumor size: 1.3 cm in greatest dimension   Histologic type:  Invasive carcinoma of no special type (ductal)   Histologic grade (Nika Histologic Score):        Glandular/tubular differentiation: Score 2        Nuclear pleomorphism: Score 2        Mitotic rate: Score 3        Overall grade: Grade 2, score 7   Ductal carcinoma in situ: Present        Nuclear grade: Grade II (intermediate)   Margins:        Invasive carcinoma margins: Uninvolved by invasive carcinoma             Distance from closest margin: 3 mm-blue/inferior        Ductal carcinoma in situ margins: Uninvolved by DCIS             Distance from closest margin: 2 mm from blue/inferior and red/superior   Regional lymph nodes: Uninvolved by tumor cells        Total number of lymph nodes examined: 1        Number of sentinel nodes examined: 1   Treatment effect in the breast: No known presurgical therapy   Pathologic stage classification (pTNM, AJCC 8th Edition):   pT1c (sn)pN0     Oncotype DX Breast Cancer Report   Recurrence Score Result-16   Distant recurrence risk at 9 years-4%   Absolute chemotherapy benefit- <1%     No indication for adjuvant chemotherapy  Recommended adjuvant RT followed by adjuvant endocrine therapy  RT was completed on 05/04/2021. Review of Systems;  CONSTITUTIONAL: No fever, chills. Good appetite and energy level. ENMT: Eyes: No diplopia; Nose: No epistaxis. Mouth: No sore throat. RESPIRATORY: No hemoptysis, shortness of breath, cough. CARDIOVASCULAR: No chest pain, palpitations. GASTROINTESTINAL: No nausea/vomiting, abdominal pain, diarrhea/constipation. GENITOURINARY: No dysuria, urinary frequency, hematuria. NEURO: No syncope, presyncope, headache.   Remainder:  ROS NEGATIVE    Past Medical History:      Diagnosis Date    Anesthesia complication     problem with blood pressure up & down    Arthritis     lower back    Back pain     Blind     right eye    Cancer ([facility] [address] 2021    breast    Cardiac valve prolapse     micro    Diabetes mellitus ([address]     Hyperlipidemia     Hypertension     Neuropathy due to secondary diabetes ([facility] [address]     in marcial feet    Prolonged emergence from general anesthesia     Psoriasis      Medications:  Reviewed and reconciled. Allergies:  No Known Allergies    Physical Exam:  /61 (Site: Right Upper Arm, Position: Sitting, Cuff Size: Medium Adult)   Pulse 99   Temp 97.7 °F (36.5 °C) (Temporal)   Ht 4' 11\" (1.499 m)   Wt 162 lb (73.5 kg)   LMP  (LMP Unknown)   SpO2 97%   BMI 32.72 kg/m²   GENERAL: Alert, oriented x 3, not in acute distress. HEENT: PERRLA; EOMI. Oropharynx clear. NECK: Supple. Without lymphadenopathy. LUNGS: Good air entry bilaterally. No wheezing, crackles or ronchi. CARDIOVASCULAR: Regular rate. No murmurs, rubs or gallops. ABDOMEN: Soft. Non-tender, non-distended. Positive bowel sounds. EXTREMITIES: Without clubbing, cyanosis, or edema. NEUROLOGIC: No focal deficits. ECOG PS 1    Impression/Plan:  45 y/o female who on 02/17/2021 underwent Left localized lumpectomy with left axillary sentinal lymph node excision   A.  Breast, left, lumpectomy: Invasive ductal carcinoma   Ductal carcinoma in situ   Invasive carcinoma present less than 1 mm from yellow/medial margin   Margins negative for ductal carcinoma in situ     B.  New medial anterior margin, excision: Negative for carcinoma   C.  [place] lymph node, excision: One (1) lymph node, negative for metastatic carcinoma     CANCER CASE SUMMARY   Procedure: Excision (less than total mastectomy)   Specimen laterality: Left   Tumor size: 1.3 cm in greatest dimension   Histologic type:  Invasive carcinoma of no special type (ductal)   Histologic grade (Nika Histologic Score):        Glandular/tubular differentiation: Score 2        Nuclear pleomorphism: Score 2        Mitotic rate: Score 3        Overall grade: Grade 2, score 7   Ductal carcinoma in situ: Present        Nuclear grade: Grade II (intermediate)   Margins:        Invasive carcinoma margins: Uninvolved by invasive carcinoma

## 2021-05-10 LAB — DIABETIC RETINOPATHY: POSITIVE

## 2021-05-11 DIAGNOSIS — L30.9 DERMATITIS: ICD-10-CM

## 2021-05-12 DIAGNOSIS — K21.9 GASTROESOPHAGEAL REFLUX DISEASE WITHOUT ESOPHAGITIS: ICD-10-CM

## 2021-05-12 DIAGNOSIS — E11.65 UNCONTROLLED TYPE 2 DIABETES MELLITUS WITH HYPERGLYCEMIA (HCC): ICD-10-CM

## 2021-05-12 RX ORDER — OMEPRAZOLE 40 MG/1
CAPSULE, DELAYED RELEASE ORAL
Qty: 90 CAPSULE | Refills: 0 | Status: SHIPPED
Start: 2021-05-12 | End: 2021-08-16

## 2021-05-12 RX ORDER — INSULIN GLARGINE 100 [IU]/ML
INJECTION, SOLUTION SUBCUTANEOUS
Qty: 15 ML | Refills: 3 | Status: SHIPPED
Start: 2021-05-12 | End: 2021-06-02

## 2021-06-02 DIAGNOSIS — E11.65 UNCONTROLLED TYPE 2 DIABETES MELLITUS WITH HYPERGLYCEMIA (HCC): ICD-10-CM

## 2021-06-02 DIAGNOSIS — C50.912 MALIGNANT NEOPLASM OF LEFT FEMALE BREAST, UNSPECIFIED ESTROGEN RECEPTOR STATUS, UNSPECIFIED SITE OF BREAST (HCC): Primary | ICD-10-CM

## 2021-06-02 RX ORDER — INSULIN GLARGINE 100 [IU]/ML
INJECTION, SOLUTION SUBCUTANEOUS
Qty: 15 ML | Refills: 1 | Status: SHIPPED
Start: 2021-06-02 | End: 2021-07-12

## 2021-06-03 ENCOUNTER — OFFICE VISIT (OUTPATIENT)
Dept: ONCOLOGY | Age: 51
End: 2021-06-03
Payer: MEDICAID

## 2021-06-03 ENCOUNTER — HOSPITAL ENCOUNTER (OUTPATIENT)
Dept: INFUSION THERAPY | Age: 51
Discharge: HOME | End: 2021-06-03
Payer: MEDICAID

## 2021-06-03 VITALS
DIASTOLIC BLOOD PRESSURE: 77 MMHG | TEMPERATURE: 97.3 F | WEIGHT: 158.5 LBS | BODY MASS INDEX: 31.95 KG/M2 | HEIGHT: 59 IN | HEART RATE: 97 BPM | OXYGEN SATURATION: 97 % | SYSTOLIC BLOOD PRESSURE: 128 MMHG

## 2021-06-03 DIAGNOSIS — C50.912 MALIGNANT NEOPLASM OF LEFT FEMALE BREAST, UNSPECIFIED ESTROGEN RECEPTOR STATUS, UNSPECIFIED SITE OF BREAST (HCC): ICD-10-CM

## 2021-06-03 DIAGNOSIS — C50.912 MALIGNANT NEOPLASM OF LEFT FEMALE BREAST, UNSPECIFIED ESTROGEN RECEPTOR STATUS, UNSPECIFIED SITE OF BREAST (HCC): Primary | ICD-10-CM

## 2021-06-03 LAB
ALBUMIN SERPL-MCNC: 4 G/DL (ref 3.5–5.2)
ALP BLD-CCNC: 132 U/L (ref 35–104)
ALT SERPL-CCNC: 47 U/L (ref 0–32)
ANION GAP SERPL CALCULATED.3IONS-SCNC: 11 MMOL/L (ref 7–16)
AST SERPL-CCNC: 57 U/L (ref 0–31)
BASOPHILS ABSOLUTE: 0.01 E9/L (ref 0–0.2)
BASOPHILS RELATIVE PERCENT: 0.2 % (ref 0–2)
BILIRUB SERPL-MCNC: 0.3 MG/DL (ref 0–1.2)
BUN BLDV-MCNC: 13 MG/DL (ref 6–20)
CALCIUM SERPL-MCNC: 10.3 MG/DL (ref 8.6–10.2)
CHLORIDE BLD-SCNC: 102 MMOL/L (ref 98–107)
CO2: 26 MMOL/L (ref 22–29)
CREAT SERPL-MCNC: 0.7 MG/DL (ref 0.5–1)
EOSINOPHILS ABSOLUTE: 0.15 E9/L (ref 0.05–0.5)
EOSINOPHILS RELATIVE PERCENT: 2.3 % (ref 0–6)
GFR AFRICAN AMERICAN: >60
GFR NON-AFRICAN AMERICAN: >60 ML/MIN/1.73
GLUCOSE BLD-MCNC: 240 MG/DL (ref 74–99)
HCT VFR BLD CALC: 38.7 % (ref 34–48)
HEMOGLOBIN: 12.3 G/DL (ref 11.5–15.5)
IMMATURE GRANULOCYTES #: 0.02 E9/L
IMMATURE GRANULOCYTES %: 0.3 % (ref 0–5)
LYMPHOCYTES ABSOLUTE: 1.75 E9/L (ref 1.5–4)
LYMPHOCYTES RELATIVE PERCENT: 26.4 % (ref 20–42)
MCH RBC QN AUTO: 29.9 PG (ref 26–35)
MCHC RBC AUTO-ENTMCNC: 31.8 % (ref 32–34.5)
MCV RBC AUTO: 93.9 FL (ref 80–99.9)
MONOCYTES ABSOLUTE: 0.56 E9/L (ref 0.1–0.95)
MONOCYTES RELATIVE PERCENT: 8.5 % (ref 2–12)
NEUTROPHILS ABSOLUTE: 4.13 E9/L (ref 1.8–7.3)
NEUTROPHILS RELATIVE PERCENT: 62.3 % (ref 43–80)
PDW BLD-RTO: 13.4 FL (ref 11.5–15)
PLATELET # BLD: 242 E9/L (ref 130–450)
PMV BLD AUTO: 10.6 FL (ref 7–12)
POTASSIUM SERPL-SCNC: 4.8 MMOL/L (ref 3.5–5)
RBC # BLD: 4.12 E12/L (ref 3.5–5.5)
SODIUM BLD-SCNC: 139 MMOL/L (ref 132–146)
TOTAL PROTEIN: 7.5 G/DL (ref 6.4–8.3)
WBC # BLD: 6.6 E9/L (ref 4.5–11.5)

## 2021-06-03 PROCEDURE — 85025 COMPLETE CBC W/AUTO DIFF WBC: CPT

## 2021-06-03 PROCEDURE — 1036F TOBACCO NON-USER: CPT | Performed by: INTERNAL MEDICINE

## 2021-06-03 PROCEDURE — 99214 OFFICE O/P EST MOD 30 MIN: CPT | Performed by: INTERNAL MEDICINE

## 2021-06-03 PROCEDURE — G8427 DOCREV CUR MEDS BY ELIG CLIN: HCPCS | Performed by: INTERNAL MEDICINE

## 2021-06-03 PROCEDURE — 3017F COLORECTAL CA SCREEN DOC REV: CPT | Performed by: INTERNAL MEDICINE

## 2021-06-03 PROCEDURE — 36415 COLL VENOUS BLD VENIPUNCTURE: CPT

## 2021-06-03 PROCEDURE — G8417 CALC BMI ABV UP PARAM F/U: HCPCS | Performed by: INTERNAL MEDICINE

## 2021-06-03 PROCEDURE — 80053 COMPREHEN METABOLIC PANEL: CPT

## 2021-06-03 PROCEDURE — 99212 OFFICE O/P EST SF 10 MIN: CPT

## 2021-06-03 RX ORDER — ANASTROZOLE 1 MG/1
1 TABLET ORAL DAILY
Qty: 90 TABLET | Refills: 1 | Status: SHIPPED
Start: 2021-06-03 | End: 2021-12-01

## 2021-06-03 NOTE — PROGRESS NOTES
Department of [facility] Oncology  Attending Clinic Note    Reason for Visit: Follow-up on a patient with Left Breast Cancer     PCP:  Eunice Silva DO    History of Present Illness:  46year old female with Left Breast Cancer. Lesion located in the 7 o'clock position found on mammogram.    Breast cancer risk factors include age, gender, family history of cancer    Bilateral Screening Mammogram 11/12/2020 with 1.5 cm irregular focal asymmetry seen on the CC view, not well seen on MLO view. Stable mammogram of right breast    Left Diagnostic Mammogram 12/15/2021 with spiculated mass measuring 1.5 cm in the lower inner quadrant of the left breast. ON dedicated targeted ultrasound examination of left breast there is a solid lesion with posterior shadowing at 7 o'clock position. Highly suggestive of malignancy    Ultrasound guided core biopsy on 12/23/2020  Left breast, core needle biopsy: Invasive, moderately differentiatedductal carcinoma (grade 2)  Comment:    Intradepartmental consultation is obtained. Breast Cancer Marker Studies:  Estrogen Receptors (ER): 90%  Progesterone Receptors (OH): 90%  HER-2/salvador: Indeterminate/2+    FISH analysis HER/2: Negative  Average HER-2 signals/nucleus: 3.4   Average MARGARITO 17 signals/nucleus: 2.9   HER-2/MARGARITO 17 signal ratio: 1.2     2/17/2021 Left localized lumpectomy with left axillary sentinal lymph node excision   A.  Breast, left, lumpectomy: Invasive ductal carcinoma   Ductal carcinoma in situ   Invasive carcinoma present less than 1 mm from yellow/medial margin   Margins negative for ductal carcinoma in situ     B.  New medial anterior margin, excision: Negative for carcinoma   C.  [place] lymph node, excision: One (1) lymph node, negative for metastatic carcinoma     CANCER CASE SUMMARY   Procedure: Excision (less than total mastectomy)   Specimen laterality: Left   Tumor size: 1.3 cm in greatest dimension   Histologic type:  Invasive carcinoma of no special type (ductal)   Histologic grade (Nika Histologic Score):        Glandular/tubular differentiation: Score 2        Nuclear pleomorphism: Score 2        Mitotic rate: Score 3        Overall grade: Grade 2, score 7   Ductal carcinoma in situ: Present        Nuclear grade: Grade II (intermediate)   Margins:        Invasive carcinoma margins: Uninvolved by invasive carcinoma             Distance from closest margin: 3 mm-blue/inferior        Ductal carcinoma in situ margins: Uninvolved by DCIS             Distance from closest margin: 2 mm from blue/inferior and red/superior   Regional lymph nodes: Uninvolved by tumor cells        Total number of lymph nodes examined: 1        Number of sentinel nodes examined: 1   Treatment effect in the breast: No known presurgical therapy   Pathologic stage classification (pTNM, AJCC 8th Edition):   pT1c (sn)pN0     Oncotype DX Breast Cancer Report   Recurrence Score Result-16   Distant recurrence risk at 9 years-4%   Absolute chemotherapy benefit- <1%     No indication for adjuvant chemotherapy  Recommended adjuvant RT followed by adjuvant endocrine therapy  RT was completed on 05/04/2021. Arimidex 1 mg po daily was started on 05/06/2021 with fair tolerance so far. Review of Systems;  CONSTITUTIONAL: No fever, chills. Good appetite and energy level. ENMT: Eyes: No diplopia; Nose: No epistaxis. Mouth: No sore throat. RESPIRATORY: No hemoptysis, shortness of breath, cough. CARDIOVASCULAR: No chest pain, palpitations. GASTROINTESTINAL: No nausea/vomiting, abdominal pain, diarrhea/constipation. GENITOURINARY: No dysuria, urinary frequency, hematuria. NEURO: No syncope, presyncope, headache.   Remainder:  ROS NEGATIVE    Past Medical History:      Diagnosis Date    Anesthesia complication     problem with blood pressure up & down    Arthritis     lower back    Back pain     Blind     right eye    Cancer ([address] 2021    breast    Cardiac valve prolapse     micro    Diabetes mellitus [facility]     Hyperlipidemia     Hypertension     Neuropathy due to secondary diabetes ([facility]     in marcial feet    Prolonged emergence from general anesthesia     Psoriasis      Medications:  Reviewed and reconciled. Allergies:  No Known Allergies    Physical Exam:  /77   Pulse 97   Temp 97.3 °F (36.3 °C)   Ht 4' 11\" (1.499 m)   Wt 158 lb 8 oz (71.9 kg)   LMP  (LMP Unknown)   SpO2 97%   BMI 32.01 kg/m²   GENERAL: Alert, oriented x 3, not in acute distress. HEENT: PERRLA; EOMI. Oropharynx clear. EXTREMITIES: Without clubbing, cyanosis, or edema. NEUROLOGIC: No focal deficits. ECOG PS 1    Impression/Plan:  47 y/o female who on 02/17/2021 underwent Left localized lumpectomy with left axillary sentinal lymph node excision   A.  Breast, left, lumpectomy: Invasive ductal carcinoma   Ductal carcinoma in situ   Invasive carcinoma present less than 1 mm from yellow/medial margin   Margins negative for ductal carcinoma in situ     B.  New medial anterior margin, excision: Negative for carcinoma   C.  [place] lymph node, excision: One (1) lymph node, negative for metastatic carcinoma     CANCER CASE SUMMARY   Procedure: Excision (less than total mastectomy)   Specimen laterality: Left   Tumor size: 1.3 cm in greatest dimension   Histologic type:  Invasive carcinoma of no special type (ductal)   Histologic grade (Willington Histologic Score):        Glandular/tubular differentiation: Score 2        Nuclear pleomorphism: Score 2        Mitotic rate: Score 3        Overall grade: Grade 2, score 7   Ductal carcinoma in situ: Present        Nuclear grade: Grade II (intermediate)   Margins:        Invasive carcinoma margins: Uninvolved by invasive carcinoma             Distance from closest margin: 3 mm-blue/inferior        Ductal carcinoma in situ margins: Uninvolved by DCIS             Distance from closest margin: 2 mm from blue/inferior and red/superior   Regional lymph nodes: Uninvolved by tumor cells        Total number of lymph nodes examined: 1        Number of sentinel nodes examined: 1   Treatment effect in the breast: No known presurgical therapy   Pathologic stage classification (pTNM, AJCC 8th Edition):   pT1c (sn)pN0     Breast Cancer Marker Studies:  Estrogen Receptors (ER): 90%  Progesterone Receptors (MO): 90%  HER-2/salvador: Indeterminate/2+    FISH analysis HER/2: Negative  Average HER-2 signals/nucleus: 3.4   Average MARGARITO 17 signals/nucleus: 2.9   HER-2/MARGARITO 17 signal ratio: 1.2     Oncotype DX Breast Cancer Report   Recurrence Score Result-16   Distant recurrence risk at 9 years-4%   Absolute chemotherapy benefit- <1%     No indication for adjuvant chemotherapy  Recommended adjuvant RT followed by adjuvant endocrine therapy  RT was completed on 05/04/2021. DEXA scan 04/06/2021 normal in LS and bilateral hips. On Ca. VitD  Imaging reviewed  Arimidex 1 mg po daily was started on 05/06/2021 with fair tolerance so far. Labs reviewed. Continue Arimidex, Ca. VitD    RTC 4 months    06/03/2021  Tejas Lane MD

## 2021-06-09 ENCOUNTER — HOSPITAL ENCOUNTER (OUTPATIENT)
Dept: RADIATION ONCOLOGY | Age: 51
Discharge: HOME | End: 2021-06-09
Attending: RADIOLOGY
Payer: MEDICAID

## 2021-06-09 VITALS
BODY MASS INDEX: 31.97 KG/M2 | DIASTOLIC BLOOD PRESSURE: 76 MMHG | HEART RATE: 86 BPM | OXYGEN SATURATION: 98 % | SYSTOLIC BLOOD PRESSURE: 134 MMHG | RESPIRATION RATE: 18 BRPM | WEIGHT: 158.3 LBS | TEMPERATURE: 96.8 F

## 2021-06-09 DIAGNOSIS — C50.912 MALIGNANT NEOPLASM OF LEFT BREAST IN FEMALE, ESTROGEN RECEPTOR POSITIVE, UNSPECIFIED SITE OF BREAST (HCC): Primary | ICD-10-CM

## 2021-06-09 DIAGNOSIS — Z17.0 MALIGNANT NEOPLASM OF LEFT BREAST IN FEMALE, ESTROGEN RECEPTOR POSITIVE, UNSPECIFIED SITE OF BREAST (HCC): Primary | ICD-10-CM

## 2021-06-09 PROCEDURE — 99999 PR OFFICE/OUTPT VISIT,PROCEDURE ONLY: CPT | Performed by: NURSE PRACTITIONER

## 2021-06-09 RX ORDER — MULTIVIT-MIN/IRON/FOLIC ACID/K 18-600-40
CAPSULE ORAL
COMMUNITY

## 2021-06-09 NOTE — PROGRESS NOTES
Jenny Lilly  6/9/2021  10:34 AM      Vitals:    06/09/21 1031   BP: 134/76   Pulse: 86   Resp: 18   Temp: 96.8 °F (36 °C)   SpO2: 98%    :     Wt Readings from Last 3 Encounters:   06/09/21 158 lb 4.8 oz (71.8 kg)   06/03/21 158 lb 8 oz (71.9 kg)   05/06/21 162 lb (73.5 kg)                Current Outpatient Medications:     Cholecalciferol (VITAMIN D) 50 MCG (2000 UT) CAPS capsule, Take by mouth, Disp: , Rfl:     anastrozole (ARIMIDEX) 1 MG tablet, Take 1 tablet by mouth daily, Disp: 90 tablet, Rfl: 1    BASAGLAR KWIKPEN 100 UNIT/ML injection pen, INJECT 50 UNITS SUBCUTANEOUSLY NIGHTLY, Disp: 15 mL, Rfl: 1    hydrocortisone 2.5 % cream, APPLY TO AFFECTED AREA TOPICALLY TWICE DAILY, Disp: 30 g, Rfl: 10    omeprazole (PRILOSEC) 40 MG delayed release capsule, TAKE 1 CAPSULE BY MOUTH DAILY, Disp: 90 capsule, Rfl: 0    anastrozole (ARIMIDEX) 1 MG tablet, Take 1 tablet by mouth daily, Disp: 30 tablet, Rfl: 0    blood glucose test strips (ONETOUCH ULTRA) strip, USE AS DIRECTED QID, Disp: 100 each, Rfl: 2    etodolac (LODINE) 300 MG capsule, TAKE (1) CAPSULE BY MOUTH ONCE DAILY, Disp: 90 capsule, Rfl: 1    Lancets (ONETOUCH DELICA PLUS [id number]) MISC, TEST FOUR TIMES DAILY, Disp: 300 each, Rfl: 2    Alcohol Swabs (ALCOHOL PREP) 70 % PADS, Use QID prior to glucose testing and insulin injection, Disp: 400 each, Rfl: 1    acetaminophen (TYLENOL) 500 MG tablet, Take 1 tablet by mouth 4 times daily as needed for Pain, Disp: 120 tablet, Rfl: 0    ibuprofen (ADVIL;MOTRIN) 600 MG tablet, Take 1 tablet by mouth 4 times daily as needed for Pain, Disp: 120 tablet, Rfl: 0    sertraline (ZOLOFT) 100 MG tablet, TAKE 1 TABLET BY MOUTH ONCE DAILY AS NEEDED FOR DEPRESSION, Disp: 30 tablet, Rfl: 10    rosuvastatin (CRESTOR) 5 MG tablet, , Disp: , Rfl:     lactobacillus (CULTURELLE) CAPS capsule, TAKE (1) CAPSULE BY MOUTH DAILY, Disp: 90 capsule, Rfl: 1    metFORMIN (GLUCOPHAGE) 500 MG tablet, Take 2 tablets by mouth 2 times daily (with meals), Disp: 360 tablet, Rfl: 1    empagliflozin (JARDIANCE) 10 MG tablet, Take 1 tablet by mouth daily, Disp: 90 tablet, Rfl: 1    GABAPENTIN PO, Take 300 mg by mouth Nightly , Disp: , Rfl:     B-D ULTRAFINE III SHORT PEN 31G X 8 MM MISC, USE WITH INSULIN PENS AS DIRECTED, Disp: 100 each, Rfl: 10    ondansetron (ZOFRAN) 4 MG tablet, Take 1 tablet by mouth every 8 hours as needed for Nausea or Vomiting, Disp: 20 tablet, Rfl: 0    fluticasone (FLONASE) 50 MCG/ACT nasal spray, 1 spray by Nasal route daily, Disp: 1 Bottle, Rfl: 3    nitroGLYCERIN (NITROSTAT) 0.4 MG SL tablet, Place 1 tablet under the tongue every 5 minutes as needed for Chest pain, Disp: 25 tablet, Rfl: 1    Dulaglutide (TRULICITY) 1.5 FF/8.5NJ SOPN, INJECT THE CONTENTS OF 1 SYRINGE SUBCUTANEOUSLY EVERY WEEK *PATIENT NEEDS APPOINTMENT* (Patient taking differently: INJECT THE CONTENTS OF 1 SYRINGE SUBCUTANEOUSLY EVERY WEEK *PATIENT NEEDS APPOINTMENT*  Takes on saturdays), Disp: 6 mL, Rfl: 10    metoprolol succinate (TOPROL XL) 25 MG extended release tablet, TAKE 1/2 TABLET BY MOUTH DAILY, Disp: 45 tablet, Rfl: 10    pramipexole (MIRAPEX) 0.125 MG tablet, TAKE 1 TABLET BY MOUTH EVERY NIGHT, Disp: 90 tablet, Rfl: 1    Insulin Syringe-Needle U-100 ([employer] INSULIN SYR 1CC/30G) 30G X 5/16\" 1 ML MISC, 1 each by Does not apply route 4 times daily, Disp: 120 each, Rfl: 5    bumetanide (BUMEX) 1 MG tablet, Take 1 tablet by mouth every other day, Disp: 90 tablet, Rfl: 1    famotidine (PEPCID) 20 MG tablet, Take 1 tablet by mouth 2 times daily, Disp: 180 tablet, Rfl: 1    Humidifier MISC, 1 Device by Does not apply route daily, Disp: 1 each, Rfl: 0    insulin lispro (ADMELOG SOLOSTAR) 100 UNIT/ML pen, As directed per sliding scale up to 13 units 4 times per day., Disp: 5 pen, Rfl: 3    lidocaine-prilocaine (EMLA) 2.5-2.5 % cream, Apply topically as needed. , Disp: 30 g, Rfl: 0    glucose monitoring kit (FREESTYLE) monitoring kit, 1 kit by Does not apply route daily, Disp: 1 kit, Rfl: 0    Blood Pressure KIT, 1 kit by Does not apply route daily, Disp: 1 kit, Rfl: 0    Misc. Devices (QUAD CANE) MISC, 1 Quad cane, Disp: 1 each, Rfl: 0    Multiple Vitamins-Minerals (THERAPEUTIC MULTIVITAMIN-MINERALS) tablet, Take 1 tablet by mouth daily, Disp: , Rfl:     B Complex Vitamins (B COMPLEX 1 PO), Take 1 tablet by mouth daily. , Disp: , Rfl:       Patient is seen today in follow up with [facility] NP. Pt received left breast radiation therapy 4/6/21-5/4/2021  [id number]. Pt tolerated radiation therapy and skin is clear per patient. All irritation cleared. Following with Dr. Valencia Chinchilla, next appointment in [facility]. FALLS RISK SCREENING ASSESSMENT    Instructions:  Assess the patient and enter the appropriate indicators that are present for fall risk identification. Total the numbers entered and assign a fall risk score from Table 2.  Reassess patient at a minimum every 12 weeks or with status change. Assessment   Date  6/9/2021     1. Mental Ability: confusion/cognitively impaired No - 0       2. Elimination Issues: incontinence, frequency No - 0       3. Ambulatory: use of assistive devices (walker, cane, off-loading devices), attached to equipment (IV pole, oxygen) No - 0     4. Sensory Limitations: dizziness, vertigo, impaired vision No - 0       5. Age Less than 65 years - 0       6. Medication: diuretics, strong analgesics, hypnotics, sedatives, antihypertensive agents   No - 0   7. Falls:  recent history of falls within the last 3 months (not to include slipping or tripping)   No - 0   TOTAL 0    If score of 4 or greater was education given? No       TABLE 2   Risk Score Risk Level Plan of Care   0-3 Little or  No Risk 1. Provide assistance as indicated for ambulation activities  2. Reorient confused/cognitively impaired patient  3. Call-light/bell within patient's reach  4.   Chair/bed in low position, stretcher/bed with siderails up except when performing patient care activities  5. Educate patient/family/caregiver on falls prevention  6.  Reassess in 12 weeks or with any noted change in patient condition which places them at a risk for a fall   4-6 Moderate Risk 1. Provide assistance as indicated for ambulation activities  2. Reorient confused/cognitively impaired patient  3. Call-light/bell within patient's reach  4. Chair/bed in low position, stretcher/bed with siderails up except when performing patient care activities  5. Educate patient/family/caregiver on falls prevention  6. Falls risk precaution (Yellow sticker Level II) placed on patient chart   7 or   Higher High Risk 1. Place patient in easily observable treatment room  2. Patient attended at all times by family member or staff  3. Provide assistance as indicated for ambulation activities  4. Reorient confused/cognitively impaired patient  5. Call-light/bell within patient's reach  6. Chair/bed in low position, stretcher/bed with siderails up except when performing patient care activities  7. Educate patient/family/caregiver on falls prevention  8. Falls risk precaution (Yellow sticker Level III) placed on patient chart           MALNUTRITION RISK SCREENING ASSESSMENT    6/9/2021   Patient:  Sandra Carlisle  Sex:  female    Instructions:  Assess the patient and enter the appropriate indicators that are present for nutrition risk identification. Total the numbers entered and assign a risk score. Follow the appropriate action for total score listed below. Assessment   Date  6/9/2021     1. Have you lost weight without trying? 0- No     2. Have you been eating poorly because of a decreased appetite? 0- No   3. Do you have a diagnosis of head and neck cancer?       0- No                                                                                    TOTAL 0          Score of 0-1: No action  Score 2 or greater:  · For Non-Diabetic Patient:

## 2021-06-09 NOTE — PROGRESS NOTES
RADIATION ONCOLOGY  5 week follow up         6/9/2021      NAME:  Fan Washburn OF BIRTH:  1970    CC: Pt returns today for re-assessment of radiation treatment area. HPI: Atif Alaniz is a pleasant 46 y.o. female with a diagnosis of left breast cancer (ER/ME+, Her2-), s/p BCS and adjuvant XRT. The patient underwent a course of radiation therapy to the left breast, which was completed on 5/4/21. She completed 5256 cGy in 21 fractions which includes a 1000 cGy in 5 fraction boost.    States that she is doing well. She is continuing to use moisturizing cream to treatment site but skin has healed well. Completes monthly self breast exams. Denies any new lumps, bumps, or discharge from the nipple. Pt is following with Dr aVlencia Chinchilla for medical oncology. Started Arimidex and tolerating well. Next appt 10/4/21. Pt is following with AMANDA Obrien for breast surgery. Next appt 9/2/21. Pain: Denies pain at this time. Past medical, surgical, social and family histories reviewed and updated as indicated. ALLERGIES:  Patient has no known allergies.          Current Outpatient Medications   Medication Sig Dispense Refill    Cholecalciferol (VITAMIN D) 50 MCG (2000 UT) CAPS capsule Take by mouth      anastrozole (ARIMIDEX) 1 MG tablet Take 1 tablet by mouth daily 90 tablet 1    BASAGLAR KWIKPEN 100 UNIT/ML injection pen INJECT 50 UNITS SUBCUTANEOUSLY NIGHTLY 15 mL 1    hydrocortisone 2.5 % cream APPLY TO AFFECTED AREA TOPICALLY TWICE DAILY 30 g 10    omeprazole (PRILOSEC) 40 MG delayed release capsule TAKE 1 CAPSULE BY MOUTH DAILY 90 capsule 0    anastrozole (ARIMIDEX) 1 MG tablet Take 1 tablet by mouth daily 30 tablet 0    blood glucose test strips (ONETOUCH ULTRA) strip USE AS DIRECTED  each 2    etodolac (LODINE) 300 MG capsule TAKE (1) CAPSULE BY MOUTH ONCE DAILY 90 capsule 1    Lancets (ONETOUCH DELICA PLUS [id number]) MISC TEST FOUR TIMES DAILY 300 each 2  Alcohol Swabs (ALCOHOL PREP) 70 % PADS Use QID prior to glucose testing and insulin injection 400 each 1    acetaminophen (TYLENOL) 500 MG tablet Take 1 tablet by mouth 4 times daily as needed for Pain 120 tablet 0    ibuprofen (ADVIL;MOTRIN) 600 MG tablet Take 1 tablet by mouth 4 times daily as needed for Pain 120 tablet 0    sertraline (ZOLOFT) 100 MG tablet TAKE 1 TABLET BY MOUTH ONCE DAILY AS NEEDED FOR DEPRESSION 30 tablet 10    rosuvastatin (CRESTOR) 5 MG tablet       lactobacillus (CULTURELLE) CAPS capsule TAKE (1) CAPSULE BY MOUTH DAILY 90 capsule 1    metFORMIN (GLUCOPHAGE) 500 MG tablet Take 2 tablets by mouth 2 times daily (with meals) 360 tablet 1    empagliflozin (JARDIANCE) 10 MG tablet Take 1 tablet by mouth daily 90 tablet 1    GABAPENTIN PO Take 300 mg by mouth Nightly       B-D ULTRAFINE III SHORT PEN 31G X 8 MM MISC USE WITH INSULIN PENS AS DIRECTED 100 each 10    ondansetron (ZOFRAN) 4 MG tablet Take 1 tablet by mouth every 8 hours as needed for Nausea or Vomiting 20 tablet 0    fluticasone (FLONASE) 50 MCG/ACT nasal spray 1 spray by Nasal route daily 1 Bottle 3    nitroGLYCERIN (NITROSTAT) 0.4 MG SL tablet Place 1 tablet under the tongue every 5 minutes as needed for Chest pain 25 tablet 1    Dulaglutide (TRULICITY) 1.5 UA/8.0QJ SOPN INJECT THE CONTENTS OF 1 SYRINGE SUBCUTANEOUSLY EVERY WEEK *PATIENT NEEDS APPOINTMENT* (Patient taking differently: INJECT THE CONTENTS OF 1 SYRINGE SUBCUTANEOUSLY EVERY WEEK *PATIENT NEEDS APPOINTMENT*    Takes on saturdays) 6 mL 10    metoprolol succinate (TOPROL XL) 25 MG extended release tablet TAKE 1/2 TABLET BY MOUTH DAILY 45 tablet 10    pramipexole (MIRAPEX) 0.125 MG tablet TAKE 1 TABLET BY MOUTH EVERY NIGHT 90 tablet 1    Insulin Syringe-Needle U-100 ([employer] INSULIN SYR 1CC/30G) 30G X 5/16\" 1 ML MISC 1 each by Does not apply route 4 times daily 120 each 5    bumetanide (BUMEX) 1 MG tablet Take 1 tablet by mouth every other day 90 tablet 1    famotidine (PEPCID) 20 MG tablet Take 1 tablet by mouth 2 times daily 180 tablet 1    Humidifier MISC 1 Device by Does not apply route daily 1 each 0    insulin lispro (ADMELOG SOLOSTAR) 100 UNIT/ML pen As directed per sliding scale up to 13 units 4 times per day. 5 pen 3    lidocaine-prilocaine (EMLA) 2.5-2.5 % cream Apply topically as needed. 30 g 0    glucose monitoring kit (FREESTYLE) monitoring kit 1 kit by Does not apply route daily 1 kit 0    Blood Pressure KIT 1 kit by Does not apply route daily 1 kit 0    Misc. Devices (QUAD CANE) MISC 1 Quad cane 1 each 0    Multiple Vitamins-Minerals (THERAPEUTIC MULTIVITAMIN-MINERALS) tablet Take 1 tablet by mouth daily      B Complex Vitamins (B COMPLEX 1 PO) Take 1 tablet by mouth daily. No current facility-administered medications for this encounter. Physical Examination:   Vitals:    06/09/21 1031   BP: 134/76   Pulse: 86   Resp: 18   Temp: 96.8 °F (36 °C)   TempSrc: Skin   SpO2: 98%   Weight: 158 lb 4.8 oz (71.8 kg)       Wt Readings from Last 3 Encounters:   06/09/21 158 lb 4.8 oz (71.8 kg)   06/03/21 158 lb 8 oz (71.9 kg)   05/06/21 162 lb (73.5 kg)       Alert and fully ambulatory. Pleasant and conversant. Irradiated skin shows mild hyperpigmentation. HR reg, rhythm reg. Lungs CTA. ASSESSMENT/PLAN:     Left breast cancer (ER/[place]+, Her2-), s/p BCS and adjuvant radiation therapy to the left breastcompleted on 5/4/21 (5256 cGy in 21 fractions which includes a 1000 cGy in 5 fraction boost). Patient is doing well post-radiation completion. Skin care and sun care reviewed. Signs and symptoms of recurrence reviewed. Encouraged monthly self breast exams. Imaging per med onc/ breast surgery. Cont to follow with Dr Ami Foy for medical oncology. Started Arimidex and tolerating well. Next appt 10/4/21. Cont to follow with AMANDA Mcdonald for breast surgery. Next appt 9/2/21.       I discussed follow up plans with [place]. At this time, I will see the patient back in 6 months for a post-radiation completion follow-up visit. Glen [place] is to follow up with other providers involved in their care as directed (including but not limited to Medical Oncology, Primary Care, Pulmonary, and Surgery). The patient was given our contact number in the event that if at any time they change their mind and would like to return to the clinic to see either myself or one of the Radiation Oncologists, they can simply call us and we would be happy to see them sooner. Thank you for involving us in the management of this extremely pleasant patient. More than 15 min was in direct contact with pt coordinating/giving care. >50% of the visit was spent in counseling the pt on the following: Follow up care    The nurses notes were reviewed and incorporated into this assessment and plan. Questions answered to apparent satisfaction.       Verónica Ku, MSN, RN, APRN-CNP  Nurse Practitioner for Radiation Oncology    [address] [place] [address]) [facility]: [phone] ([facility]: [phone])  [address] [place] [address]: [phone] ([facility]: [phone])  [facility]:  [phone] ([facility]:  [phone])

## 2021-06-17 DIAGNOSIS — E11.65 UNCONTROLLED TYPE 2 DIABETES MELLITUS WITH HYPERGLYCEMIA (HCC): Primary | ICD-10-CM

## 2021-06-17 RX ORDER — LANCETS 30 GAUGE
EACH MISCELLANEOUS
Qty: 300 EACH | Refills: 3 | Status: SHIPPED
Start: 2021-06-17 | End: 2022-03-11

## 2021-06-17 RX ORDER — GLUCOSAMINE HCL/CHONDROITIN SU 500-400 MG
CAPSULE ORAL
Qty: 300 STRIP | Refills: 3 | Status: SHIPPED
Start: 2021-06-17 | End: 2022-01-11

## 2021-06-17 RX ORDER — BLOOD-GLUCOSE METER
EACH MISCELLANEOUS
Qty: 1 KIT | Refills: 0 | Status: SHIPPED | OUTPATIENT
Start: 2021-06-17

## 2021-06-17 NOTE — TELEPHONE ENCOUNTER
"\"PA not required\" per [employer], however when calling [facility] to verify, they report PA is required.    PA comment sent to Cindy in [employer] to  FU on this and resubmit PA if needed.   " Pt left message for [address] [place] [address] requesting a OneTouch Verio due to not being able to properly see the screen on her current glucometer. Pt states that her eyesight is getting worse and the Verio has a screen she is able to see.     Electronically signed by Roger Bentley MA on 6/17/21 at 4:08 PM EDT

## 2021-07-12 DIAGNOSIS — E11.65 UNCONTROLLED TYPE 2 DIABETES MELLITUS WITH HYPERGLYCEMIA (HCC): ICD-10-CM

## 2021-07-12 RX ORDER — INSULIN GLARGINE 100 [IU]/ML
INJECTION, SOLUTION SUBCUTANEOUS
Qty: 15 ML | Refills: 0 | Status: SHIPPED
Start: 2021-07-12 | End: 2021-08-17

## 2021-07-12 RX ORDER — EMPAGLIFLOZIN 10 MG/1
10 TABLET, FILM COATED ORAL DAILY
Qty: 30 TABLET | Refills: 0 | Status: SHIPPED
Start: 2021-07-12 | End: 2021-08-17

## 2021-07-22 ENCOUNTER — HOSPITAL ENCOUNTER (EMERGENCY)
Age: 51
Discharge: HOME | End: 2021-07-22
Payer: MEDICAID

## 2021-07-22 ENCOUNTER — TELEPHONE (OUTPATIENT)
Dept: FAMILY MEDICINE CLINIC | Age: 51
End: 2021-07-22

## 2021-07-22 VITALS
DIASTOLIC BLOOD PRESSURE: 79 MMHG | HEIGHT: 59 IN | HEART RATE: 96 BPM | RESPIRATION RATE: 20 BRPM | BODY MASS INDEX: 32.25 KG/M2 | OXYGEN SATURATION: 99 % | TEMPERATURE: 97.3 F | SYSTOLIC BLOOD PRESSURE: 126 MMHG | WEIGHT: 160 LBS

## 2021-07-22 DIAGNOSIS — N30.01 ACUTE CYSTITIS WITH HEMATURIA: Primary | ICD-10-CM

## 2021-07-22 LAB
BACTERIA: ABNORMAL /HPF
BILIRUBIN URINE: NEGATIVE
BLOOD, URINE: ABNORMAL
CLARITY: ABNORMAL
COLOR: ABNORMAL
EPITHELIAL CELLS, UA: ABNORMAL /HPF
GLUCOSE URINE: >=1000 MG/DL
KETONES, URINE: NEGATIVE MG/DL
LEUKOCYTE ESTERASE, URINE: NEGATIVE
NITRITE, URINE: NEGATIVE
PH UA: 6 (ref 5–9)
PROTEIN UA: NEGATIVE MG/DL
RBC UA: ABNORMAL /HPF (ref 0–2)
SPECIFIC GRAVITY UA: <=1.005 (ref 1–1.03)
UROBILINOGEN, URINE: 0.2 E.U./DL
WBC UA: ABNORMAL /HPF (ref 0–5)

## 2021-07-22 PROCEDURE — 87088 URINE BACTERIA CULTURE: CPT

## 2021-07-22 PROCEDURE — 81001 URINALYSIS AUTO W/SCOPE: CPT

## 2021-07-22 PROCEDURE — 99211 OFF/OP EST MAY X REQ PHY/QHP: CPT

## 2021-07-22 RX ORDER — CEPHALEXIN 500 MG/1
500 CAPSULE ORAL 4 TIMES DAILY
Qty: 40 CAPSULE | Refills: 0 | Status: SHIPPED | OUTPATIENT
Start: 2021-07-22 | End: 2021-08-01

## 2021-07-22 RX ORDER — PHENAZOPYRIDINE HYDROCHLORIDE 100 MG/1
100 TABLET, FILM COATED ORAL 3 TIMES DAILY PRN
Qty: 9 TABLET | Refills: 0 | Status: SHIPPED | OUTPATIENT
Start: 2021-07-22 | End: 2021-07-25

## 2021-07-22 ASSESSMENT — PAIN DESCRIPTION - PAIN TYPE: TYPE: ACUTE PAIN

## 2021-07-22 ASSESSMENT — PAIN DESCRIPTION - FREQUENCY: FREQUENCY: INTERMITTENT

## 2021-07-22 ASSESSMENT — PAIN SCALES - GENERAL: PAINLEVEL_OUTOF10: 2

## 2021-07-22 ASSESSMENT — PAIN DESCRIPTION - DESCRIPTORS: DESCRIPTORS: SPASM

## 2021-07-22 ASSESSMENT — PAIN DESCRIPTION - PROGRESSION: CLINICAL_PROGRESSION: GRADUALLY WORSENING

## 2021-07-22 ASSESSMENT — PAIN DESCRIPTION - LOCATION: LOCATION: ABDOMEN

## 2021-07-22 ASSESSMENT — PAIN DESCRIPTION - ONSET: ONSET: GRADUAL

## 2021-07-22 NOTE — ED PROVIDER NOTES
[address] [facility]  Department of Emergency Medicine   ED  Encounter Note  Admit Date/RoomTime: 2021 12:08 PM  ED Room:     NAME: Modesta Lora  : 1970  MRN: [id number]     Chief Complaint:  Dysuria (Having bladder spasms.)    History of Present Illness       Jenny Ravi is a 46 y.o. old female who presents to the emergency department with a complaint of urinary tract infection. Patient states yesterday she started with bladder pain, bladder spasms. Does admit she is urinating frequently, but she is drinking a lot of water to try to flush her bladder. She denies any burning with urination, but states she knows that is next. Patient states she has had urinary tract infections before, and this is how it always feels. Patient is a diabetic. States her sugars were in the 120s today. Patient denies any fevers or chills. Denies back pain or kidney pain. Denies any nausea or vomiting. She rates the bladder pain a 2 out of 10. ROS   Pertinent positives and negatives are stated within HPI, all other systems reviewed and are negative. Past Medical History:  has a past medical history of Anesthesia complication, Arthritis, Back pain, Blind, Cancer ([address] Cardiac valve prolapse, Diabetes mellitus ([address] Hyperlipidemia, Hypertension, Neuropathy due to secondary diabetes ([address] Prolonged emergence from general anesthesia, and Psoriasis. Surgical History:  has a past surgical history that includes Carpal tunnel release;  section; Ankle surgery; Carpal tunnel release (2012); Shoulder arthroscopy (Left, 2016); Cardiac surgery; Cholecystectomy, laparoscopic (N/A, 2019); Upper gastrointestinal endoscopy (N/A, 2019); Colonoscopy (N/A, 2019); Hysterectomy, total abdominal; Shoulder arthroscopy (Right, 2020); US BREAST BIOPSY W LOC DEVICE 1ST LESION LEFT (2020); and Breast biopsy (Left, 2021).     Social History:  reports that she has never smoked. She has never used smokeless tobacco. She reports previous alcohol use of about 1.0 standard drinks of alcohol per week. She reports that she does not use drugs. Family History: family history includes Cancer in her father; Cancer (age of onset: 54) in her maternal aunt; Cancer (age of onset: 62) in her maternal uncle and another family member; Cancer (age of onset: 58) in her paternal aunt; Diabetes in her father and mother; Heart Disease in her father and mother. Allergies: Patient has no known allergies. Physical Exam   Oxygen Saturation Interpretation: Normal.        ED Triage Vitals [07/22/21 1209]   BP Temp Temp Source Pulse Resp SpO2 Height Weight   126/79 97.3 °F (36.3 °C) Infrared 96 20 99 % 4' 11\" (1.499 m) 160 lb (72.6 kg)         General:  NAD. Alert and Oriented. Well-appearing. Skin:  Warm, dry. No rashes. Head:  Normocephalic. Atraumatic. Eyes:  EOMI. Conjunctiva normal.  ENT:  Oral mucosa moist.  Airway patent. Neck:  Supple. Normal ROM. Respiratory:  No respiratory distress. No labored breathing. Lungs clear without rales, rhonchi or wheezing. Cardiovascular:  Regular rate. No Murmur. No peripheral edema. Extremities warm and good color. Chest:  Abdomen:  Soft, nondistended. Normal bowel sounds. Nontender to palpation all 4 quadrants. Negative rebound, negative guarding. Rectal:  Gu: Bladder nontender and non distended. No CVA tenderness. Pelvic:  Extremities:  Normal ROM. Nontender to palpation. Atraumatic. Back:  Normal ROM. Nontender to palpation. Neuro:  Alert and Oriented to person, place, time and situation. Normal LOC. Moves all extremities. Speech fluent. Psych:  Calm and Cooperative. Normal thought process. Normal judgement.         Lab / Imaging Results   (All laboratory and radiology results have been personally reviewed by myself)  Labs:  Results for orders placed or performed during the hospital encounter of 07/22/21 Urinalysis, reflex to microscopic   Result Value Ref Range    Color, UA Straw Straw/Yellow    Clarity, UA SL CLOUDY Clear    Glucose, Ur >=1000 (A) Negative mg/dL    Bilirubin Urine Negative Negative    Ketones, Urine Negative Negative mg/dL    Specific Gravity, UA <=1.005 1.005 - 1.030    Blood, Urine TRACE (A) Negative    pH, UA 6.0 5.0 - 9.0    Protein, UA Negative Negative mg/dL    Urobilinogen, Urine 0.2 <2.0 E.U./dL    Nitrite, Urine Negative Negative    Leukocyte Esterase, Urine Negative Negative   Microscopic Urinalysis   Result Value Ref Range    WBC, UA 10-20 (A) 0 - 5 /HPF    RBC, UA 0-1 0 - 2 /HPF    Epithelial Cells, UA FEW /HPF    Bacteria, UA FEW (A) None Seen /HPF       Imaging: All Radiology results interpreted by Radiologist unless otherwise noted. No orders to display     ED Course / Medical Decision Making   Medications - No data to display     Re-examination:  7/22/21       Time:     Patients symptoms . Consults:   None    Procedures:   none    Medical Decision Making:    Patient is well appearing, non toxic and appropriate for outpatient management. Plan is for symptom management and PCP follow up. Plan of Care/Counseling:  Physician Assistant on duty reviewed today's visit with the patient in addition to providing specific details for the plan of care and counseling regarding the diagnosis and prognosis. Questions are answered at this time and are agreeable with the plan. Assessment      1. Acute cystitis with hematuria New Problem     Plan   Disposition:   Discharged home. Patient condition is good    New Medications     New Prescriptions    CEPHALEXIN (KEFLEX) 500 MG CAPSULE    Take 1 capsule by mouth 4 times daily for 10 days    PHENAZOPYRIDINE (PYRIDIUM) 100 MG TABLET    Take 1 tablet by mouth 3 times daily as needed for Pain     Electronically signed by AMBER Alcantara   DD: 7/22/21  **This report was transcribed using voice recognition software.  Every effort was made to ensure accuracy; however, inadvertent computerized transcription errors may be present.   END OF ED PROVIDER NOTE       Jordin Doss  07/22/21 6669

## 2021-07-22 NOTE — TELEPHONE ENCOUNTER
----- Message from Whit Ceronchristine sent at 7/22/2021  9:48 AM EDT -----  Subject: Message to Provider    QUESTIONS  Information for Provider? Jayy Chirinos needs a follow up for her blood sugar and   diabetes management. She also needs her A1C checked. She mentioned that   she usually has a phone visit with Dr. Bates. Please check with her   provider and let her know what type of visit she wants her to come in for. More importantly, she suspects that she has a bladder infection and wants   to know if her provider can order her a medication to treat this. Please   follow up with her as soon as possible.  ---------------------------------------------------------------------------  --------------  CALL BACK INFO  What is the best way for the office to contact you? OK to leave message on   voicemail  Preferred Call Back Phone Number? [phone]  ---------------------------------------------------------------------------  --------------  SCRIPT ANSWERS  Relationship to Patient?  Self

## 2021-07-22 NOTE — TELEPHONE ENCOUNTER
This MA attempted to return call to pt. No answer. This MA left message for pt advising to return call to the office to schedule her diabetic check up visit, advised it should be in person. This MA also advised pt of utilizing walk in care for concern of UTI.     Electronically signed by Juliane Gutiérrez MA on 7/22/21 at 10:40 AM EDT

## 2021-07-24 LAB — URINE CULTURE, ROUTINE: NORMAL

## 2021-08-04 ENCOUNTER — OFFICE VISIT (OUTPATIENT)
Dept: FAMILY MEDICINE CLINIC | Age: 51
End: 2021-08-04
Payer: MEDICAID

## 2021-08-04 VITALS
WEIGHT: 159 LBS | DIASTOLIC BLOOD PRESSURE: 84 MMHG | TEMPERATURE: 98 F | RESPIRATION RATE: 18 BRPM | HEART RATE: 84 BPM | HEIGHT: 59 IN | SYSTOLIC BLOOD PRESSURE: 122 MMHG | BODY MASS INDEX: 32.05 KG/M2 | OXYGEN SATURATION: 98 %

## 2021-08-04 DIAGNOSIS — C50.912 MALIGNANT NEOPLASM OF LEFT BREAST IN FEMALE, ESTROGEN RECEPTOR POSITIVE, UNSPECIFIED SITE OF BREAST (HCC): ICD-10-CM

## 2021-08-04 DIAGNOSIS — R73.01 IFG (IMPAIRED FASTING GLUCOSE): ICD-10-CM

## 2021-08-04 DIAGNOSIS — H60.503 ACUTE OTITIS EXTERNA OF BOTH EARS, UNSPECIFIED TYPE: ICD-10-CM

## 2021-08-04 DIAGNOSIS — K21.9 GASTROESOPHAGEAL REFLUX DISEASE WITHOUT ESOPHAGITIS: ICD-10-CM

## 2021-08-04 DIAGNOSIS — Z17.0 MALIGNANT NEOPLASM OF LEFT BREAST IN FEMALE, ESTROGEN RECEPTOR POSITIVE, UNSPECIFIED SITE OF BREAST (HCC): ICD-10-CM

## 2021-08-04 DIAGNOSIS — E11.65 UNCONTROLLED TYPE 2 DIABETES MELLITUS WITH HYPERGLYCEMIA (HCC): Primary | ICD-10-CM

## 2021-08-04 DIAGNOSIS — G25.81 RLS (RESTLESS LEGS SYNDROME): ICD-10-CM

## 2021-08-04 DIAGNOSIS — I10 ESSENTIAL HYPERTENSION: ICD-10-CM

## 2021-08-04 DIAGNOSIS — E78.2 MIXED HYPERLIPIDEMIA: ICD-10-CM

## 2021-08-04 DIAGNOSIS — C50.912 INVASIVE DUCTAL CARCINOMA OF LEFT BREAST (HCC): ICD-10-CM

## 2021-08-04 DIAGNOSIS — R53.83 FATIGUE, UNSPECIFIED TYPE: ICD-10-CM

## 2021-08-04 DIAGNOSIS — E11.65 UNCONTROLLED TYPE 2 DIABETES MELLITUS WITH HYPERGLYCEMIA (HCC): ICD-10-CM

## 2021-08-04 LAB
ALBUMIN SERPL-MCNC: 4.1 G/DL (ref 3.5–5.2)
ALP BLD-CCNC: 114 U/L (ref 35–104)
ALT SERPL-CCNC: 30 U/L (ref 0–32)
ANION GAP SERPL CALCULATED.3IONS-SCNC: 12 MMOL/L (ref 7–16)
AST SERPL-CCNC: 39 U/L (ref 0–31)
BASOPHILS ABSOLUTE: 0.01 E9/L (ref 0–0.2)
BASOPHILS RELATIVE PERCENT: 0.1 % (ref 0–2)
BILIRUB SERPL-MCNC: 0.3 MG/DL (ref 0–1.2)
BUN BLDV-MCNC: 12 MG/DL (ref 6–20)
CALCIUM SERPL-MCNC: 10 MG/DL (ref 8.6–10.2)
CHLORIDE BLD-SCNC: 102 MMOL/L (ref 98–107)
CHOLESTEROL, TOTAL: 128 MG/DL (ref 0–199)
CHP ED QC CHECK: NORMAL
CO2: 27 MMOL/L (ref 22–29)
CREAT SERPL-MCNC: 0.7 MG/DL (ref 0.5–1)
CREATININE URINE POCT: 450
EOSINOPHILS ABSOLUTE: 0.1 E9/L (ref 0.05–0.5)
EOSINOPHILS RELATIVE PERCENT: 1.3 % (ref 0–6)
GFR AFRICAN AMERICAN: >60
GFR NON-AFRICAN AMERICAN: >60 ML/MIN/1.73
GLUCOSE BLD-MCNC: 136 MG/DL (ref 74–99)
GLUCOSE BLD-MCNC: 147 MG/DL
HBA1C MFR BLD: 8.1 %
HCT VFR BLD CALC: 39.5 % (ref 34–48)
HDLC SERPL-MCNC: 55 MG/DL
HEMOGLOBIN: 12.2 G/DL (ref 11.5–15.5)
IMMATURE GRANULOCYTES #: 0.03 E9/L
IMMATURE GRANULOCYTES %: 0.4 % (ref 0–5)
LDL CHOLESTEROL CALCULATED: 61 MG/DL (ref 0–99)
LYMPHOCYTES ABSOLUTE: 1.94 E9/L (ref 1.5–4)
LYMPHOCYTES RELATIVE PERCENT: 24.6 % (ref 20–42)
MCH RBC QN AUTO: 30.1 PG (ref 26–35)
MCHC RBC AUTO-ENTMCNC: 30.9 % (ref 32–34.5)
MCV RBC AUTO: 97.5 FL (ref 80–99.9)
MICROALBUMIN/CREAT 24H UR: 30 MG/G{CREAT}
MICROALBUMIN/CREAT UR-RTO: ABNORMAL
MONOCYTES ABSOLUTE: 0.48 E9/L (ref 0.1–0.95)
MONOCYTES RELATIVE PERCENT: 6.1 % (ref 2–12)
NEUTROPHILS ABSOLUTE: 5.34 E9/L (ref 1.8–7.3)
NEUTROPHILS RELATIVE PERCENT: 67.5 % (ref 43–80)
PDW BLD-RTO: 13.9 FL (ref 11.5–15)
PLATELET # BLD: 253 E9/L (ref 130–450)
PMV BLD AUTO: 11.6 FL (ref 7–12)
POTASSIUM SERPL-SCNC: 5.3 MMOL/L (ref 3.5–5)
RBC # BLD: 4.05 E12/L (ref 3.5–5.5)
SODIUM BLD-SCNC: 141 MMOL/L (ref 132–146)
TOTAL PROTEIN: 7.2 G/DL (ref 6.4–8.3)
TRIGL SERPL-MCNC: 62 MG/DL (ref 0–149)
TSH SERPL DL<=0.05 MIU/L-ACNC: 0.78 UIU/ML (ref 0.27–4.2)
VITAMIN D 25-HYDROXY: 46 NG/ML (ref 30–100)
VLDLC SERPL CALC-MCNC: 12 MG/DL
WBC # BLD: 7.9 E9/L (ref 4.5–11.5)

## 2021-08-04 PROCEDURE — 83036 HEMOGLOBIN GLYCOSYLATED A1C: CPT | Performed by: FAMILY MEDICINE

## 2021-08-04 PROCEDURE — 1036F TOBACCO NON-USER: CPT | Performed by: FAMILY MEDICINE

## 2021-08-04 PROCEDURE — 4130F TOPICAL PREP RX AOE: CPT | Performed by: FAMILY MEDICINE

## 2021-08-04 PROCEDURE — G8427 DOCREV CUR MEDS BY ELIG CLIN: HCPCS | Performed by: FAMILY MEDICINE

## 2021-08-04 PROCEDURE — 3052F HG A1C>EQUAL 8.0%<EQUAL 9.0%: CPT | Performed by: FAMILY MEDICINE

## 2021-08-04 PROCEDURE — G8417 CALC BMI ABV UP PARAM F/U: HCPCS | Performed by: FAMILY MEDICINE

## 2021-08-04 PROCEDURE — 2022F DILAT RTA XM EVC RTNOPTHY: CPT | Performed by: FAMILY MEDICINE

## 2021-08-04 PROCEDURE — 82962 GLUCOSE BLOOD TEST: CPT | Performed by: FAMILY MEDICINE

## 2021-08-04 PROCEDURE — 3017F COLORECTAL CA SCREEN DOC REV: CPT | Performed by: FAMILY MEDICINE

## 2021-08-04 PROCEDURE — 99214 OFFICE O/P EST MOD 30 MIN: CPT | Performed by: FAMILY MEDICINE

## 2021-08-04 PROCEDURE — 82044 UR ALBUMIN SEMIQUANTITATIVE: CPT | Performed by: FAMILY MEDICINE

## 2021-08-04 RX ORDER — SUCRALFATE 1 G/1
1 TABLET ORAL 4 TIMES DAILY
Qty: 120 TABLET | Refills: 3 | Status: SHIPPED | OUTPATIENT
Start: 2021-08-04

## 2021-08-04 SDOH — ECONOMIC STABILITY: FOOD INSECURITY: WITHIN THE PAST 12 MONTHS, THE FOOD YOU BOUGHT JUST DIDN'T LAST AND YOU DIDN'T HAVE MONEY TO GET MORE.: NEVER TRUE

## 2021-08-04 SDOH — ECONOMIC STABILITY: FOOD INSECURITY: WITHIN THE PAST 12 MONTHS, YOU WORRIED THAT YOUR FOOD WOULD RUN OUT BEFORE YOU GOT MONEY TO BUY MORE.: NEVER TRUE

## 2021-08-04 ASSESSMENT — SOCIAL DETERMINANTS OF HEALTH (SDOH): HOW HARD IS IT FOR YOU TO PAY FOR THE VERY BASICS LIKE FOOD, HOUSING, MEDICAL CARE, AND HEATING?: NOT HARD AT ALL

## 2021-08-04 ASSESSMENT — PATIENT HEALTH QUESTIONNAIRE - PHQ9
1. LITTLE INTEREST OR PLEASURE IN DOING THINGS: 0
SUM OF ALL RESPONSES TO PHQ9 QUESTIONS 1 & 2: 0
2. FEELING DOWN, DEPRESSED OR HOPELESS: 0
SUM OF ALL RESPONSES TO PHQ QUESTIONS 1-9: 0

## 2021-08-05 ENCOUNTER — TELEPHONE (OUTPATIENT)
Dept: FAMILY MEDICINE CLINIC | Age: 51
End: 2021-08-05

## 2021-08-05 DIAGNOSIS — K75.9 HEPATITIS: Primary | ICD-10-CM

## 2021-08-05 DIAGNOSIS — R74.8 ELEVATED LIVER ENZYMES: ICD-10-CM

## 2021-08-05 NOTE — TELEPHONE ENCOUNTER
----- Message from [facility] sent at 8/5/2021 12:33 PM EDT -----  Subject: Message to Provider    QUESTIONS  Information for Provider? Patient called on 08/05 in regards to the blood   test results that was taken yesterday, 08/04. She has an important call at   1:30pm, so if you want to call 2:30pm and after that be fine. Pt also   stated if something is wrong from blood work to call, if not then you do   not need to try contact her about the results. ---------------------------------------------------------------------------  --------------  Vince EVANS  What is the best way for the office to contact you? OK to leave message on   voicemail  Preferred Call Back Phone Number?  [phone]  ---------------------------------------------------------------------------  --------------  SCRIPT ANSWERS  undefined

## 2021-08-06 NOTE — TELEPHONE ENCOUNTER
This MA returned phone call to pt. Advised of elevated liver enzymes per Dr. Josiane Osborne and recommendation for hepatitis panel and US liver. Pt amendable. Orders placed, pt provided radiology scheduling contact number.     Electronically signed by Buster Henry MA on 8/6/21 at 2:52 PM EDT

## 2021-08-10 ASSESSMENT — ENCOUNTER SYMPTOMS
CHOKING: 0
COLOR CHANGE: 0
SHORTNESS OF BREATH: 0
RECTAL PAIN: 0
VOMITING: 0
EYE DISCHARGE: 0
TROUBLE SWALLOWING: 0
EYE ITCHING: 0
FACIAL SWELLING: 0
EYES NEGATIVE: 1
ANAL BLEEDING: 0
ABDOMINAL PAIN: 0
WHEEZING: 0
SINUS PRESSURE: 0
SORE THROAT: 0
VOICE CHANGE: 0
ABDOMINAL DISTENTION: 0
NAUSEA: 0
EYE REDNESS: 0
BACK PAIN: 0
ALLERGIC/IMMUNOLOGIC NEGATIVE: 1
CHEST TIGHTNESS: 0
EYE PAIN: 0
CONSTIPATION: 0
PHOTOPHOBIA: 0
RHINORRHEA: 0
COUGH: 0
BLOOD IN STOOL: 0
STRIDOR: 0
SINUS PAIN: 0
RESPIRATORY NEGATIVE: 1
DIARRHEA: 0

## 2021-08-11 ENCOUNTER — HOSPITAL ENCOUNTER (OUTPATIENT)
Age: 51
Discharge: HOME | End: 2021-08-11
Payer: MEDICAID

## 2021-08-11 ENCOUNTER — HOSPITAL ENCOUNTER (OUTPATIENT)
Dept: ULTRASOUND IMAGING | Age: 51
Discharge: HOME | End: 2021-08-11
Payer: MEDICAID

## 2021-08-11 DIAGNOSIS — R74.8 ELEVATED LIVER ENZYMES: ICD-10-CM

## 2021-08-11 DIAGNOSIS — K75.9 HEPATITIS: ICD-10-CM

## 2021-08-11 PROCEDURE — 80074 ACUTE HEPATITIS PANEL: CPT

## 2021-08-11 PROCEDURE — 76705 ECHO EXAM OF ABDOMEN: CPT

## 2021-08-11 PROCEDURE — 36415 COLL VENOUS BLD VENIPUNCTURE: CPT

## 2021-08-11 NOTE — PROGRESS NOTES
SUBJECTIVE  Jenny Frey is a 46 y.o. female. HPI/Chief C/O:  Chief Complaint   Patient presents with    Diabetes     Pt here for diabetic check up, denies needing refills at this time    Dizziness     Pt states that she has been experiencing dizziness, thinks it may be due to not eating enough    Gastroesophageal Reflux     Pt c/o worsening GERD symptoms, pt states that she feels like it is \"deeper\"    Leg Pain     Pt also c/o increased leg cramping    Health Maintenance     A1C/glucose pended and done, corrine pended     No Known Allergies    This 46year old female presents for physical exam.   ROS:  Review of Systems   Constitutional: Positive for fatigue. Negative for activity change, appetite change, chills, diaphoresis, fever and unexpected weight change. HENT: Negative. Negative for congestion, dental problem, drooling, ear discharge, ear pain, facial swelling, hearing loss, mouth sores, nosebleeds, postnasal drip, rhinorrhea, sinus pressure, sinus pain, sneezing, sore throat, tinnitus, trouble swallowing and voice change. Eyes: Negative. Negative for photophobia, pain, discharge, redness, itching and visual disturbance. Respiratory: Negative. Negative for cough, choking, chest tightness, shortness of breath, wheezing and stridor. Cardiovascular: Negative. Negative for chest pain, palpitations and leg swelling. Gastrointestinal: Negative for abdominal distention, abdominal pain, anal bleeding, blood in stool, constipation, diarrhea, nausea, rectal pain and vomiting. Endocrine: Negative. Negative for cold intolerance, heat intolerance, polydipsia, polyphagia and polyuria. Genitourinary: Positive for dysuria, frequency and urgency. Negative for decreased urine volume, difficulty urinating, flank pain, genital sores, hematuria, menstrual problem and pelvic pain. Musculoskeletal: Negative.   Negative for arthralgias, back pain, gait problem, joint swelling, myalgias, neck pain and neck stiffness. Skin: Negative. Negative for color change, pallor, rash and wound. Allergic/Immunologic: Negative. Neurological: Negative. Negative for dizziness, tremors, seizures, syncope, facial asymmetry, speech difficulty, weakness, light-headedness, numbness and headaches. Hematological: Negative. Negative for adenopathy. Does not bruise/bleed easily. Psychiatric/Behavioral: Negative for agitation, behavioral problems, confusion, decreased concentration, dysphoric mood, hallucinations, self-injury, sleep disturbance and suicidal ideas. The patient is nervous/anxious. The patient is not hyperactive.          Past Medical/Surgical Hx;  Reviewed with patient      Diagnosis Date    Anesthesia complication     problem with blood pressure up & down    Arthritis     lower back    Back pain     Blind     right eye    Cancer ([address]     breast    Cardiac valve prolapse     micro    Diabetes mellitus ([address]     Hyperlipidemia     Hypertension     Neuropathy due to secondary diabetes ([address]     in marcial feet    Prolonged emergence from general anesthesia     Psoriasis      Past Surgical History:   Procedure Laterality Date    ANKLE SURGERY      bilateral tarsal tunnels    BREAST BIOPSY Left 2021    LEFT BREAST NEEDLE LOCALIZED LUMPECTOMY, BLUE DYE INJECTION, LEFT AXILLARY SENTINEL LYMPHNODE EXCISION, POSSIBLE LEFT AXILLARY DISSECTION performed by Christian Holden MD at [address]      cardiac catheterization    CARPAL TUNNEL RELEASE      left    CARPAL TUNNEL RELEASE  2012    right     SECTION      CHOLECYSTECTOMY, LAPAROSCOPIC N/A 2019    LAPAROSCOPIC CHOLECYSTECTOMY WITH IOC performed by Leida Mayorga MD at [address]  had extremely high blood pressure and hard to wake up    COLONOSCOPY N/A 2019    COLORECTAL CANCER SCREENING, NOT HIGH RISK performed by Leida Mayorga MD at [address] [facility] ARTHROSCOPY Left 01/21/2016    subacromin decompression and debridement    SHOULDER ARTHROSCOPY Right 11/16/2020    RIGHT SHOULDER ARTHROSCOPY, SUBACROMIAL DECOMPRESSION, LABRIAL DEBRIDEMENT, CHONDROPLASTY, RAÚL performed by Arian Stauffer DO at [address] N/A 06/20/2019    EGD BIOPSY performed by Lev Acuna MD at [address] LEFT  12/23/2020     BREAST NEEDLE BIOPSY LEFT 12/23/2020 [facility]       Past Family Hx:  Reviewed with patient      Problem Relation Age of Onset    Heart Disease Mother     Diabetes Mother     Heart Disease Father     Diabetes Father     Cancer Father         prostate, colon, skin cance behind ear    Cancer Maternal Aunt 54        breast    Cancer Maternal Uncle 62        colon    Cancer Paternal Aunt 58        breast    Cancer Other 62        maternal great aunt - breast       Social Hx:  Reviewed with patient  Social History     Tobacco Use    Smoking status: Never Smoker    Smokeless tobacco: Never Used   Substance Use Topics    Alcohol use: Not Currently     Alcohol/week: 1.0 standard drinks     Types: 1 Glasses of wine per week     Comment: weekly       OBJECTIVE  /84   Pulse 84   Temp 98 °F (36.7 °C) (Temporal)   Resp 18   Ht 4' 11\" (1.499 m)   Wt 159 lb (72.1 kg)   LMP  (LMP Unknown)   SpO2 98%   Breastfeeding No   BMI 32.11 kg/m²     Problem List:  Richardson Resendiz does not have any pertinent problems on file. PHYS EX:  Physical Exam  Vitals and nursing note reviewed. Constitutional:       General: She is not in acute distress. Appearance: Normal appearance. She is well-developed. She is not ill-appearing, toxic-appearing or diaphoretic. Comments: Patient has morbid obesity. Patient instructed on low calorie, healthy ADA diet. HENT:      Head: Normocephalic and atraumatic. Nose: Nose normal. No congestion or rhinorrhea. Eyes:      General: No scleral icterus. Content: Thought content normal.         Judgment: Judgment normal.         ASSESSMENT/PLAN  Jenny was seen today for diabetes, dizziness, gastroesophageal reflux, leg pain and health maintenance. Diagnoses and all orders for this visit:    Uncontrolled type 2 diabetes mellitus with hyperglycemia (HCC)  -     POCT glycosylated hemoglobin (Hb A1C)  -     POCT Glucose  -     POCT Microalbumin  -     CBC Auto Differential; Future  -     Comprehensive Metabolic Panel; Future    Essential hypertension  -     CBC Auto Differential; Future  -     Comprehensive Metabolic Panel; Future    Gastroesophageal reflux disease without esophagitis  -     CBC Auto Differential; Future  -     Comprehensive Metabolic Panel; Future  -     sucralfate (CARAFATE) 1 GM tablet; Take 1 tablet by mouth 4 times daily    RLS (restless legs syndrome)  -     CBC Auto Differential; Future  -     Comprehensive Metabolic Panel; Future    Malignant neoplasm of left breast in female, estrogen receptor positive, unspecified site of breast ([facility]  -     CBC Auto Differential; Future  -     Comprehensive Metabolic Panel; Future    Invasive ductal carcinoma of left breast (HCC)  -     CBC Auto Differential; Future  -     Comprehensive Metabolic Panel; Future    Acute otitis externa of both ears, unspecified type  -     CBC Auto Differential; Future  -     Comprehensive Metabolic Panel; Future  -     neomycin-polymyxin-hydrocortisone (CORTISPORIN) 3.5-18072-5 otic solution; Place 4 drops into both ears 3 times daily for 17 days Instill into both Ear    Mixed hyperlipidemia  -     CBC Auto Differential; Future  -     Comprehensive Metabolic Panel; Future  -     Lipid Panel; Future    IFG (impaired fasting glucose)  -     CBC Auto Differential; Future  -     Comprehensive Metabolic Panel; Future    Fatigue, unspecified type  -     CBC Auto Differential; Future  -     Comprehensive Metabolic Panel; Future  -     TSH without Reflex;  Future  -     Vitamin D 25 Hydroxy; Future    Pt instructed if any worse go ED ASAP. Outpatient Encounter Medications as of 8/4/2021   Medication Sig Dispense Refill    sucralfate (CARAFATE) 1 GM tablet Take 1 tablet by mouth 4 times daily 120 tablet 3    neomycin-polymyxin-hydrocortisone (CORTISPORIN) 3.5-53887-3 otic solution Place 4 drops into both ears 3 times daily for 17 days Instill into both Ear 1 Bottle 0    JARDIANCE 10 MG tablet TAKE 1 TABLET BY MOUTH DAILY 30 tablet 0    BASAGLAR KWIKPEN 100 UNIT/ML injection pen INJECT 50 UNITS SUBCUTANEOUSLY NIGHTLY 15 mL 0    Blood Glucose Monitoring Suppl (ONE TOUCH ULTRA 2) w/Device KIT Test 3 times a day & as needed for symptoms of irregular blood glucose. Dispense sufficient amount for indicated testing frequency plus additional to accommodate PRN testing needs. 1 kit 0    Lancets MISC Test 3 times a day & as needed for symptoms of irregular blood glucose. Dispense sufficient amount for indicated testing frequency plus additional to accommodate PRN testing needs. 300 each 3    blood glucose monitor strips Test 3 times a day & as needed for symptoms of irregular blood glucose. Dispense sufficient amount for indicated testing frequency plus additional to accommodate PRN testing needs.  300 strip 3    Cholecalciferol (VITAMIN D) 50 MCG (2000 UT) CAPS capsule Take by mouth      anastrozole (ARIMIDEX) 1 MG tablet Take 1 tablet by mouth daily 90 tablet 1    hydrocortisone 2.5 % cream APPLY TO AFFECTED AREA TOPICALLY TWICE DAILY 30 g 10    omeprazole (PRILOSEC) 40 MG delayed release capsule TAKE 1 CAPSULE BY MOUTH DAILY 90 capsule 0    Alcohol Swabs (ALCOHOL PREP) 70 % PADS Use QID prior to glucose testing and insulin injection 400 each 1    acetaminophen (TYLENOL) 500 MG tablet Take 1 tablet by mouth 4 times daily as needed for Pain 120 tablet 0    sertraline (ZOLOFT) 100 MG tablet TAKE 1 TABLET BY MOUTH ONCE DAILY AS NEEDED FOR DEPRESSION 30 tablet 10    rosuvastatin (CRESTOR) 5 MG tablet       lactobacillus (CULTURELLE) CAPS capsule TAKE (1) CAPSULE BY MOUTH DAILY 90 capsule 1    metFORMIN (GLUCOPHAGE) 500 MG tablet Take 2 tablets by mouth 2 times daily (with meals) 360 tablet 1    GABAPENTIN PO Take 300 mg by mouth Nightly       B-D ULTRAFINE III SHORT PEN 31G X 8 MM MISC USE WITH INSULIN PENS AS DIRECTED 100 each 10    ondansetron (ZOFRAN) 4 MG tablet Take 1 tablet by mouth every 8 hours as needed for Nausea or Vomiting 20 tablet 0    fluticasone (FLONASE) 50 MCG/ACT nasal spray 1 spray by Nasal route daily 1 Bottle 3    nitroGLYCERIN (NITROSTAT) 0.4 MG SL tablet Place 1 tablet under the tongue every 5 minutes as needed for Chest pain 25 tablet 1    Dulaglutide (TRULICITY) 1.5 AY/2.7FG SOPN INJECT THE CONTENTS OF 1 SYRINGE SUBCUTANEOUSLY EVERY WEEK *PATIENT NEEDS APPOINTMENT* (Patient taking differently: INJECT THE CONTENTS OF 1 SYRINGE SUBCUTANEOUSLY EVERY WEEK *PATIENT NEEDS APPOINTMENT*    Takes on saturdays) 6 mL 10    metoprolol succinate (TOPROL XL) 25 MG extended release tablet TAKE 1/2 TABLET BY MOUTH DAILY 45 tablet 10    pramipexole (MIRAPEX) 0.125 MG tablet TAKE 1 TABLET BY MOUTH EVERY NIGHT 90 tablet 1    Insulin Syringe-Needle U-100 ([employer] INSULIN SYR 1CC/30G) 30G X 5/16\" 1 ML MISC 1 each by Does not apply route 4 times daily 120 each 5    bumetanide (BUMEX) 1 MG tablet Take 1 tablet by mouth every other day 90 tablet 1    famotidine (PEPCID) 20 MG tablet Take 1 tablet by mouth 2 times daily 180 tablet 1    insulin lispro (ADMELOG SOLOSTAR) 100 UNIT/ML pen As directed per sliding scale up to 13 units 4 times per day. 5 pen 3    Blood Pressure KIT 1 kit by Does not apply route daily 1 kit 0    Misc. Devices (QUAD CANE) MISC 1 Quad cane 1 each 0    Multiple Vitamins-Minerals (THERAPEUTIC MULTIVITAMIN-MINERALS) tablet Take 1 tablet by mouth daily      B Complex Vitamins (B COMPLEX 1 PO) Take 1 tablet by mouth daily.         [DISCONTINUED] anastrozole (ARIMIDEX) 1 MG tablet Take 1 tablet by mouth daily 30 tablet 0    [DISCONTINUED] blood glucose test strips (ONETOUCH ULTRA) strip USE AS DIRECTED  each 2    [DISCONTINUED] etodolac (LODINE) 300 MG capsule TAKE (1) CAPSULE BY MOUTH ONCE DAILY 90 capsule 1    [DISCONTINUED] Lancets (ONETOUCH DELICA PLUS [id number]) MISC TEST FOUR TIMES DAILY 300 each 2    [DISCONTINUED] Humidifier MISC 1 Device by Does not apply route daily 1 each 0    [DISCONTINUED] lidocaine-prilocaine (EMLA) 2.5-2.5 % cream Apply topically as needed. 30 g 0    [DISCONTINUED] glucose monitoring kit (FREESTYLE) monitoring kit 1 kit by Does not apply route daily 1 kit 0     No facility-administered encounter medications on file as of 8/4/2021. Return in about 3 months (around 11/4/2021).         Reviewed recent labs related to Jenny's current problems      Discussed importance of regular Health Maintenance follow up  Health Maintenance   Topic    Diabetic foot exam     Hepatitis B vaccine (1 of 3 - Risk 3-dose series)    DTaP/Tdap/Td vaccine (1 - Tdap)    Shingles Vaccine (1 of 2)    Pneumococcal 0-64 years Vaccine (2 of 4 - PCV13)    Flu vaccine (1)    Diabetic retinal exam     Breast cancer screen     A1C test (Diabetic or Prediabetic)     Diabetic microalbuminuria test     Lipid screen     Potassium monitoring     Creatinine monitoring     Colon cancer screen colonoscopy     COVID-19 Vaccine     Hepatitis C screen     HIV screen     Hepatitis A vaccine     Hib vaccine     Meningococcal (ACWY) vaccine

## 2021-08-12 LAB
HAV IGM SER IA-ACNC: NORMAL
HEPATITIS B CORE IGM ANTIBODY: NORMAL
HEPATITIS B SURFACE ANTIGEN INTERPRETATION: NORMAL
HEPATITIS C ANTIBODY INTERPRETATION: NORMAL

## 2021-08-16 DIAGNOSIS — K58.9 IRRITABLE BOWEL SYNDROME, UNSPECIFIED TYPE: ICD-10-CM

## 2021-08-16 DIAGNOSIS — K21.9 GASTROESOPHAGEAL REFLUX DISEASE WITHOUT ESOPHAGITIS: ICD-10-CM

## 2021-08-16 DIAGNOSIS — E11.65 UNCONTROLLED TYPE 2 DIABETES MELLITUS WITH HYPERGLYCEMIA (HCC): ICD-10-CM

## 2021-08-16 RX ORDER — OMEPRAZOLE 40 MG/1
CAPSULE, DELAYED RELEASE ORAL
Qty: 90 CAPSULE | Refills: 1 | Status: SHIPPED
Start: 2021-08-16 | End: 2022-01-10 | Stop reason: SDUPTHER

## 2021-08-16 RX ORDER — LACTOBACILLUS RHAMNOSUS GG 10B CELL
CAPSULE ORAL
Qty: 90 CAPSULE | Refills: 1 | Status: SHIPPED
Start: 2021-08-16 | End: 2022-03-11

## 2021-08-16 NOTE — TELEPHONE ENCOUNTER
----- Message from Porfirio Rose sent at 8/16/2021  9:35 AM EDT -----  Subject: Referral Request    QUESTIONS   Reason for referral request? Results on ultra sound and blood work, Home   Phone? [phone]   Has the physician seen you for this condition before? Yes  Select a date? 2021-08-09  Select the Provider the patient wants to be referred to, if known (PCP or   Specialist)? Jeremy Velez   Preferred Specialist (if applicable)? Do you already have an appointment scheduled? No  Additional Information for Provider? Results on ultra sound and blood   work, Home Phone? [phone]  ---------------------------------------------------------------------------  --------------  [address]  What is the best way for the office to contact you? OK to leave message on   voicemail  Preferred Call Back Phone Number?  [phone]

## 2021-08-17 DIAGNOSIS — E11.65 UNCONTROLLED TYPE 2 DIABETES MELLITUS WITH HYPERGLYCEMIA (HCC): ICD-10-CM

## 2021-08-17 RX ORDER — EMPAGLIFLOZIN 10 MG/1
10 TABLET, FILM COATED ORAL DAILY
Qty: 90 TABLET | Refills: 1 | Status: SHIPPED
Start: 2021-08-17 | End: 2022-03-14

## 2021-08-17 RX ORDER — INSULIN GLARGINE 100 [IU]/ML
INJECTION, SOLUTION SUBCUTANEOUS
Qty: 15 ML | Refills: 2 | Status: SHIPPED
Start: 2021-08-17 | End: 2021-11-12

## 2021-08-23 ASSESSMENT — ENCOUNTER SYMPTOMS
DIARRHEA: 0
VOICE CHANGE: 0
CHOKING: 0
TROUBLE SWALLOWING: 0
BACK PAIN: 0
NAUSEA: 0
VOMITING: 0
CHEST TIGHTNESS: 0
COUGH: 0
ABDOMINAL DISTENTION: 0
SHORTNESS OF BREATH: 0
SORE THROAT: 0
RHINORRHEA: 0
SINUS PAIN: 0
EYE ITCHING: 0
CONSTIPATION: 0
SINUS PRESSURE: 0
EYE DISCHARGE: 0
BLOOD IN STOOL: 0
WHEEZING: 0
ABDOMINAL PAIN: 0

## 2021-08-23 NOTE — PROGRESS NOTES
Subjective: Left breast cancer; ER/ME +; Her-2/Edna negative by [address]  2/17/2021 Left localized lumpectomy with left axillary sentinal lymph node excision   -Oncotype RS 16  -Adjuvant RT completed 05/04/2021  -ET with Arimidex started 05/06/2021. Patient ID: Terrence Baum is a 46 y.o. female. HPI   Follow up for left breast cancer    Ultrasound guided core biopsy on 12/23/2020  Left breast, core needle biopsy: Invasive, moderately differentiatedductal carcinoma (grade 2)  Comment:    Intradepartmental consultation is obtained. Breast Cancer Marker Studies:  Estrogen Receptors (ER): 90%  Progesterone Receptors (ME): 90%  HER-2/edna: Indeterminate/2+     FISH analysis HER/2: Negative  Average HER-2 signals/nucleus: 3.4   Average MARGARITO 17 signals/nucleus: 2.9   HER-2/MARGARITO 17 signal ratio: 1.2      2/17/2021 Left localized lumpectomy with left axillary sentinal lymph node excision   A.  Breast, left, lumpectomy: Invasive ductal carcinoma   Ductal carcinoma in situ   Invasive carcinoma present less than 1 mm from yellow/medial margin   Margins negative for ductal carcinoma in situ     B.  New medial anterior margin, excision: Negative for carcinoma   C.  [place] lymph node, excision: One (1) lymph node, negative for metastatic carcinoma     CANCER CASE SUMMARY   Procedure: Excision (less than total mastectomy)   Specimen laterality: Left   Tumor size: 1.3 cm in greatest dimension   Histologic type:  Invasive carcinoma of no special type (ductal)   Histologic grade ([place] Histologic Score):        Glandular/tubular differentiation: Score 2        Nuclear pleomorphism: Score 2        Mitotic rate: Score 3        Overall grade: Grade 2, score 7   Ductal carcinoma in situ: Present        Nuclear grade: Grade II (intermediate)   Margins:        Invasive carcinoma margins: Uninvolved by invasive carcinoma             Distance from closest margin: 3 mm-blue/inferior        Ductal carcinoma in situ margins: Uninvolved by DCIS             Distance from closest margin: 2 mm from blue/inferior and red/superior   Regional lymph nodes: Uninvolved by tumor cells        Total number of lymph nodes examined: 1        Number of sentinel nodes examined: 1   Treatment effect in the breast: No known presurgical therapy   Pathologic stage classification (pTNM, AJCC 8th Edition):   pT1c (sn)pN0     Oncotype DX Breast Cancer Report Recurrence Score Result-16   Distant recurrence risk at 9 years-4%   Absolute chemotherapy benefit- <1%      -2021 completed adjuvant RT .  -2021 endocrine therapy with Arimidex 1 mg by mouth daily started per med-onc, Yaneth Alamo MD.    Past Medical History:   Diagnosis Date    Anesthesia complication     problem with blood pressure up & down    Arthritis     lower back    Back pain     Blind     right eye    Cancer ([address]     breast    Cardiac valve prolapse     micro    Diabetes mellitus ([address]     Hyperlipidemia     Hypertension     Neuropathy due to secondary diabetes ([address]     in marcial feet    Prolonged emergence from general anesthesia     Psoriasis      Past Surgical History:   Procedure Laterality Date    ANKLE SURGERY      bilateral tarsal tunnels    BREAST BIOPSY Left 2021    LEFT BREAST NEEDLE LOCALIZED LUMPECTOMY, BLUE DYE INJECTION, LEFT AXILLARY SENTINEL LYMPHNODE EXCISION, POSSIBLE LEFT AXILLARY DISSECTION performed by Archana New MD at [facility]      cardiac catheterization    CARPAL TUNNEL RELEASE      left    CARPAL TUNNEL RELEASE  2012    right     SECTION      CHOLECYSTECTOMY, LAPAROSCOPIC N/A 2019    LAPAROSCOPIC CHOLECYSTECTOMY WITH IOC performed by Dixie Orozco MD at [address]  had extremely high blood pressure and hard to wake up    COLONOSCOPY N/A 2019    COLORECTAL CANCER SCREENING, NOT HIGH RISK performed by Dixie Orozco MD at [id number] [facility] SHOULDER ARTHROSCOPY Left 01/21/2016    subacromin decompression and debridement    SHOULDER ARTHROSCOPY Right 11/16/2020    RIGHT SHOULDER ARTHROSCOPY, SUBACROMIAL DECOMPRESSION, LABRIAL DEBRIDEMENT, CHONDROPLASTY, RAÚL performed by Saurav Whitman DO at [address] 06/20/2019    EGD BIOPSY performed by Lian Joshi MD at [address] LEFT  12/23/2020     BREAST NEEDLE BIOPSY LEFT 12/23/2020 [facility]     Social History     Tobacco Use    Smoking status: Never Smoker    Smokeless tobacco: Never Used   Vaping Use    Vaping Use: Never used   Substance Use Topics    Alcohol use: Not Currently     Alcohol/week: 1.0 standard drinks     Types: 1 Glasses of wine per week     Comment: weekly    Drug use: No     No Known Allergies  Current Outpatient Medications on File Prior to Visit   Medication Sig Dispense Refill    JARDIANCE 10 MG tablet TAKE 1 TABLET BY MOUTH DAILY 90 tablet 1    BASAGLAR KWIKPEN 100 UNIT/ML injection pen INJECT 50 UNITS SUBCUTANEOUSLY NIGHTLY 15 mL 2    lactobacillus (CULTURELLE) CAPS capsule TAKE (1) CAPSULE BY MOUTH DAILY 90 capsule 1    metFORMIN (GLUCOPHAGE) 500 MG tablet TAKE TWO (2) TABLETS BY MOUTH TWICE DAILY (WITH MEALS) 360 tablet 1    omeprazole (PRILOSEC) 40 MG delayed release capsule TAKE 1 CAPSULE BY MOUTH DAILY 90 capsule 1    sucralfate (CARAFATE) 1 GM tablet Take 1 tablet by mouth 4 times daily 120 tablet 3    Blood Glucose Monitoring Suppl (ONE TOUCH ULTRA 2) w/Device KIT Test 3 times a day & as needed for symptoms of irregular blood glucose. Dispense sufficient amount for indicated testing frequency plus additional to accommodate PRN testing needs. 1 kit 0    Lancets MISC Test 3 times a day & as needed for symptoms of irregular blood glucose. Dispense sufficient amount for indicated testing frequency plus additional to accommodate PRN testing needs.  300 each 3    blood glucose monitor strips Test 3 times a day & as needed for symptoms of irregular blood glucose. Dispense sufficient amount for indicated testing frequency plus additional to accommodate PRN testing needs.  300 strip 3    Cholecalciferol (VITAMIN D) 50 MCG (2000 UT) CAPS capsule Take by mouth      anastrozole (ARIMIDEX) 1 MG tablet Take 1 tablet by mouth daily 90 tablet 1    hydrocortisone 2.5 % cream APPLY TO AFFECTED AREA TOPICALLY TWICE DAILY 30 g 10    Alcohol Swabs (ALCOHOL PREP) 70 % PADS Use QID prior to glucose testing and insulin injection 400 each 1    acetaminophen (TYLENOL) 500 MG tablet Take 1 tablet by mouth 4 times daily as needed for Pain 120 tablet 0    sertraline (ZOLOFT) 100 MG tablet TAKE 1 TABLET BY MOUTH ONCE DAILY AS NEEDED FOR DEPRESSION 30 tablet 10    rosuvastatin (CRESTOR) 5 MG tablet       GABAPENTIN PO Take 300 mg by mouth Nightly       B-D ULTRAFINE III SHORT PEN 31G X 8 MM MISC USE WITH INSULIN PENS AS DIRECTED 100 each 10    ondansetron (ZOFRAN) 4 MG tablet Take 1 tablet by mouth every 8 hours as needed for Nausea or Vomiting 20 tablet 0    fluticasone (FLONASE) 50 MCG/ACT nasal spray 1 spray by Nasal route daily 1 Bottle 3    nitroGLYCERIN (NITROSTAT) 0.4 MG SL tablet Place 1 tablet under the tongue every 5 minutes as needed for Chest pain 25 tablet 1    Dulaglutide (TRULICITY) 1.5 YE/9.6PA SOPN INJECT THE CONTENTS OF 1 SYRINGE SUBCUTANEOUSLY EVERY WEEK *PATIENT NEEDS APPOINTMENT* (Patient taking differently: INJECT THE CONTENTS OF 1 SYRINGE SUBCUTANEOUSLY EVERY WEEK *PATIENT NEEDS APPOINTMENT*    Takes on saturdays) 6 mL 10    metoprolol succinate (TOPROL XL) 25 MG extended release tablet TAKE 1/2 TABLET BY MOUTH DAILY 45 tablet 10    pramipexole (MIRAPEX) 0.125 MG tablet TAKE 1 TABLET BY MOUTH EVERY NIGHT 90 tablet 1    Insulin Syringe-Needle U-100 ([employer] INSULIN SYR 1CC/30G) 30G X 5/16\" 1 ML MISC 1 each by Does not apply route 4 times daily 120 each 5    bumetanide (BUMEX) 1 MG tablet Take 1 tablet by mouth every other day 90 tablet 1    famotidine (PEPCID) 20 MG tablet Take 1 tablet by mouth 2 times daily 180 tablet 1    insulin lispro (ADMELOG SOLOSTAR) 100 UNIT/ML pen As directed per sliding scale up to 13 units 4 times per day. 5 pen 3    Blood Pressure KIT 1 kit by Does not apply route daily 1 kit 0    Misc. Devices (QUAD CANE) MISC 1 Quad cane 1 each 0    Multiple Vitamins-Minerals (THERAPEUTIC MULTIVITAMIN-MINERALS) tablet Take 1 tablet by mouth daily      B Complex Vitamins (B COMPLEX 1 PO) Take 1 tablet by mouth daily. No current facility-administered medications on file prior to visit. Review of Systems   Constitutional: Negative for activity change, appetite change, chills, fatigue, fever and unexpected weight change. Kat Arnold is doing well. She enjoys spending time with her 9 grandchildren and there is one on the way. Tolerating Arimidex well. HENT: Negative for congestion, postnasal drip, rhinorrhea, sinus pressure, sinus pain, sore throat, trouble swallowing and voice change. Eyes: Negative for discharge, itching and visual disturbance. Respiratory: Negative for cough, choking, chest tightness, shortness of breath and wheezing. Cardiovascular: Negative for chest pain, palpitations and leg swelling. Gastrointestinal: Negative for abdominal distention, abdominal pain, blood in stool, constipation, diarrhea, nausea and vomiting. Endocrine: Negative for cold intolerance and heat intolerance. Genitourinary: Negative for difficulty urinating, dysuria, frequency and hematuria. Musculoskeletal: Negative for arthralgias, back pain, gait problem, joint swelling, myalgias, neck pain and neck stiffness. Allergic/Immunologic: Negative for environmental allergies and food allergies. Neurological: Negative for dizziness, seizures, syncope, speech difficulty, weakness, light-headedness and headaches. Hematological: Negative for adenopathy. Does not bruise/bleed easily. Psychiatric/Behavioral: Negative for agitation, confusion and decreased concentration. The patient is not nervous/anxious. Objective:   Physical Exam  Vitals and nursing note reviewed. Constitutional:       General: She is not in acute distress. Appearance: She is well-developed. She is not diaphoretic. Comments: ECOG 0; pleasant and conversant   HENT:      Head: Normocephalic and atraumatic. Mouth/Throat:      Pharynx: No oropharyngeal exudate. Eyes:      General: No scleral icterus. Right eye: No discharge. Left eye: No discharge. Conjunctiva/sclera: Conjunctivae normal.   Neck:      Thyroid: No thyromegaly. Vascular: No JVD. Trachea: No tracheal deviation. Cardiovascular:      Rate and Rhythm: Normal rate and regular rhythm. Heart sounds: No murmur heard. No friction rub. No gallop. Pulmonary:      Effort: Pulmonary effort is normal. No respiratory distress or retractions. Breath sounds: Normal breath sounds. No stridor. No wheezing or rales. Chest:      Chest wall: No mass, lacerations, deformity, swelling, tenderness or edema. Breasts: Breasts are symmetrical.         Right: No inverted nipple, mass, nipple discharge, skin change or tenderness. Left: No inverted nipple, mass, nipple discharge, skin change or tenderness. Comments: Breast tissue is dense bilaterally. Her exam is unremarkable on the right. No clinically suspicious findings identified. No evidence of recurrent disease. Abdominal:      General: There is no distension. Palpations: Abdomen is soft. Tenderness: There is no abdominal tenderness. There is no guarding or rebound. Musculoskeletal:         General: No tenderness or deformity. Normal range of motion. Right shoulder: Normal.      Left shoulder: Normal.      Cervical back: Normal range of motion and neck supple.    Lymphadenopathy:      Cervical: No cervical adenopathy. Right cervical: No superficial, deep or posterior cervical adenopathy. Left cervical: No superficial, deep or posterior cervical adenopathy. Upper Body:      Right upper body: No pectoral adenopathy. Left upper body: No pectoral adenopathy. Skin:     General: Skin is warm and dry. Coloration: Skin is not pale. Findings: No erythema or rash. Neurological:      Mental Status: She is alert and oriented to person, place, and time. Coordination: Coordination normal.   Psychiatric:         Behavior: Behavior normal.         Thought Content: Thought content normal.         Judgment: Judgment normal.         Assessment:     46 y.o. extremely pleasant female with Left breast cancer     Ultrasound guided core biopsy on 12/23/2020  Left breast, core needle biopsy: Invasive, moderately differentiatedductal carcinoma (grade 2)  Comment:    Intradepartmental consultation is obtained. Breast Cancer Marker Studies:  Estrogen Receptors (ER): 90%  Progesterone Receptors ([place]): 90%  HER-2/salvador: Indeterminate/2+     FISH analysis HER/2: Negative  Average HER-2 signals/nucleus: 3.4   Average MARGARITO 17 signals/nucleus: 2.9   HER-2/MARGARITO 17 signal ratio: 1.2      2/17/2021 Left localized lumpectomy with left axillary sentinal lymph node excision   A.  Breast, left, lumpectomy: Invasive ductal carcinoma   Ductal carcinoma in situ   Invasive carcinoma present less than 1 mm from yellow/medial margin   Margins negative for ductal carcinoma in situ     B.  New medial anterior margin, excision: Negative for carcinoma   C.  [place] lymph node, excision: One (1) lymph node, negative for metastatic carcinoma     CANCER CASE SUMMARY tumor size 1.3 cm's. Overall grade 2, score 7. DCIS present, grade 2. Margins negative. Lymph nodes negative.     Pathologic stage classification (pTNM, AJCC 8th Edition): pT1c (sn)pN0     Oncotype DX Breast Cancer Report Recurrence Score Result-16   Distant recurrence risk at 9 years-4%   Absolute chemotherapy benefit- <1%     -04/06/2021 DEXA bone density normal and lumbar spine and bilateral hips.  -05/04/2021 completed adjuvant RT .  -05/06/2021 ET with Arimidex 1 mg by mouth daily started per med-onc, Arelis Arellano MD.  -09/02/2021 clinical follow-up without evidence of recurrent disease. She has recovered from radiation therapy. We reviewed NCCN guidelines for breast cancer follow-up, breast massage, importance of a good supportive bra at all times while awake, breast self-examination, and limiting alcohol. Plan:   1. Continue monthly breast/chest wall self examination; detailed instructions reviewed today. Bring any changes to your physician's attention. 2. Continue healthy diet and exercise routinely as tolerated. 3. Maintaining ideal body weight (20-25 BMI) may lead to optimal breast cancer outcomes. 4. Avoid alcohol. 5. Repeat mammogram November 2021.  6. Continue Arimidex 1 mg daily at the discretion of medical oncology team.  7. Ca+/VitD while on Arimidex. 8. Continue follow up with Medical Oncology and Primary Care. 9. RTC November with mammogram same day. During today's visit, face-to-face time 25 minutes, greater than 50% in counseling education and coordination of care. All questions were answered to her apparent satisfaction, and she is agreeable to the plan as outlined above. Mic Griffin, RN, MSN, APRN-CNP, [address] [place]  Advanced Oncology Certified Nurse Practitioner  Department of Breast Surgery  [facility]/  Care in collaboration with Dr. Jorge Gilliland.  Roberta/Rehana [id number] [facility], APRN - CNP

## 2021-09-02 ENCOUNTER — OFFICE VISIT (OUTPATIENT)
Dept: BREAST CENTER | Age: 51
End: 2021-09-02
Payer: MEDICAID

## 2021-09-02 VITALS
RESPIRATION RATE: 18 BRPM | OXYGEN SATURATION: 98 % | SYSTOLIC BLOOD PRESSURE: 98 MMHG | TEMPERATURE: 97.5 F | DIASTOLIC BLOOD PRESSURE: 60 MMHG | HEART RATE: 85 BPM | HEIGHT: 59 IN | BODY MASS INDEX: 32.05 KG/M2 | WEIGHT: 159 LBS

## 2021-09-02 DIAGNOSIS — Z12.31 VISIT FOR SCREENING MAMMOGRAM: Primary | ICD-10-CM

## 2021-09-02 PROBLEM — R07.89 ATYPICAL CHEST PAIN: Status: RESOLVED | Noted: 2020-09-30 | Resolved: 2021-09-02

## 2021-09-02 PROBLEM — J01.90 ACUTE BACTERIAL SINUSITIS: Status: RESOLVED | Noted: 2020-08-21 | Resolved: 2021-09-02

## 2021-09-02 PROBLEM — R30.0 DYSURIA: Status: RESOLVED | Noted: 2021-02-09 | Resolved: 2021-09-02

## 2021-09-02 PROBLEM — S70.02XA CONTUSION OF LEFT HIP: Status: RESOLVED | Noted: 2018-11-17 | Resolved: 2021-09-02

## 2021-09-02 PROBLEM — N39.0 URINARY TRACT INFECTION WITHOUT HEMATURIA: Status: RESOLVED | Noted: 2021-02-09 | Resolved: 2021-09-02

## 2021-09-02 PROBLEM — R10.31 ABDOMINAL PAIN, RIGHT LOWER QUADRANT: Status: RESOLVED | Noted: 2018-09-17 | Resolved: 2021-09-02

## 2021-09-02 PROBLEM — R10.84 GENERALIZED ABDOMINAL PAIN: Status: RESOLVED | Noted: 2019-02-13 | Resolved: 2021-09-02

## 2021-09-02 PROBLEM — B96.89 ACUTE BACTERIAL SINUSITIS: Status: RESOLVED | Noted: 2020-08-21 | Resolved: 2021-09-02

## 2021-09-02 PROBLEM — R10.13 EPIGASTRIC PAIN: Status: RESOLVED | Noted: 2018-09-17 | Resolved: 2021-09-02

## 2021-09-02 PROBLEM — S09.90XA HEAD INJURY: Status: RESOLVED | Noted: 2018-11-17 | Resolved: 2021-09-02

## 2021-09-02 PROBLEM — M75.101 NONTRAUMATIC TEAR OF RIGHT ROTATOR CUFF: Status: RESOLVED | Noted: 2020-11-30 | Resolved: 2021-09-02

## 2021-09-02 PROCEDURE — 99213 OFFICE O/P EST LOW 20 MIN: CPT | Performed by: NURSE PRACTITIONER

## 2021-09-02 PROCEDURE — G8417 CALC BMI ABV UP PARAM F/U: HCPCS | Performed by: NURSE PRACTITIONER

## 2021-09-02 PROCEDURE — G8427 DOCREV CUR MEDS BY ELIG CLIN: HCPCS | Performed by: NURSE PRACTITIONER

## 2021-09-02 PROCEDURE — 1036F TOBACCO NON-USER: CPT | Performed by: NURSE PRACTITIONER

## 2021-09-02 PROCEDURE — 3017F COLORECTAL CA SCREEN DOC REV: CPT | Performed by: NURSE PRACTITIONER

## 2021-09-03 ENCOUNTER — NURSE TRIAGE (OUTPATIENT)
Dept: OTHER | Facility: CLINIC | Age: 51
End: 2021-09-03

## 2021-09-03 NOTE — TELEPHONE ENCOUNTER
Received call from Corrina Bermudez  at  [facility]  with [employer] Brief description of triage: woke the last 2 days dry heaving, legs swelling for the last couple of days, she reports diarrhea X1., she reports fatigue. Triage indicates for patient to be seen within 3 days. Care advice provided, patient verbalizes understanding; denies any other questions or concerns; instructed to call back for any new or worsening symptoms. Writer provided warm [facility] [address] [facility]  for appointment scheduling. Attention Provider: Thank you for allowing me to participate in the care of your patient. The patient was connected to triage in response to information provided to the [facility]. Please do not respond through this encounter as the response is not directed to a shared pool. Reason for Disposition   Swollen ankle joint  (Exception: area of localized swelling which is itchy)    Answer Assessment - Initial Assessment Questions  1. LOCATION: \"Which joint is swollen? \"      Both ankles are swollen    2. ONSET: \"When did the swelling start? \"        Over a week    3. SIZE: \"How large is the swelling? \"      Feet and ankles    4. PAIN: \"Is there any pain? \" If so, ask: \"How bad is it? \" (Scale 1-10; or mild, moderate, severe)      Sore and tender  7/10    5. CAUSE: \"What do you think caused the swollen joint? \"      Unknown- mitral valve prolapse      6. OTHER SYMPTOMS: \"Do you have any other symptoms? \" (e.g., fever, chest pain, difficulty breathing, calf pain)        Chest pain occasional she reports   NO fever  Denies SOB  Pulse oximeter has HR as 95-98  currently      7. PREGNANCY: \"Is there any chance you are pregnant? \" \"When was your last menstrual period? \"      N/a age    Protocols used: ANKLE SWELLING-ADULT-    See above

## 2021-09-07 ENCOUNTER — HOSPITAL ENCOUNTER (EMERGENCY)
Age: 51
Discharge: HOME | End: 2021-09-07
Attending: EMERGENCY MEDICINE
Payer: MEDICAID

## 2021-09-07 ENCOUNTER — APPOINTMENT (OUTPATIENT)
Dept: GENERAL RADIOLOGY | Age: 51
End: 2021-09-07
Payer: MEDICAID

## 2021-09-07 VITALS
HEIGHT: 59 IN | RESPIRATION RATE: 20 BRPM | TEMPERATURE: 98.3 F | BODY MASS INDEX: 31.45 KG/M2 | SYSTOLIC BLOOD PRESSURE: 141 MMHG | WEIGHT: 156 LBS | DIASTOLIC BLOOD PRESSURE: 92 MMHG | HEART RATE: 91 BPM | OXYGEN SATURATION: 96 %

## 2021-09-07 DIAGNOSIS — R07.9 CHEST PAIN, UNSPECIFIED TYPE: Primary | ICD-10-CM

## 2021-09-07 LAB
ANION GAP SERPL CALCULATED.3IONS-SCNC: 6 MMOL/L (ref 7–16)
BASOPHILS ABSOLUTE: 0.02 E9/L (ref 0–0.2)
BASOPHILS RELATIVE PERCENT: 0.3 % (ref 0–2)
BUN BLDV-MCNC: 17 MG/DL (ref 6–20)
CALCIUM SERPL-MCNC: 9.7 MG/DL (ref 8.6–10.2)
CHLORIDE BLD-SCNC: 101 MMOL/L (ref 98–107)
CO2: 29 MMOL/L (ref 22–29)
CREAT SERPL-MCNC: 0.7 MG/DL (ref 0.5–1)
EKG ATRIAL RATE: 90 BPM
EKG P AXIS: 64 DEGREES
EKG P-R INTERVAL: 136 MS
EKG Q-T INTERVAL: 362 MS
EKG QRS DURATION: 84 MS
EKG QTC CALCULATION (BAZETT): 442 MS
EKG R AXIS: 48 DEGREES
EKG T AXIS: 51 DEGREES
EKG VENTRICULAR RATE: 90 BPM
EOSINOPHILS ABSOLUTE: 0.12 E9/L (ref 0.05–0.5)
EOSINOPHILS RELATIVE PERCENT: 1.6 % (ref 0–6)
GFR AFRICAN AMERICAN: >60
GFR NON-AFRICAN AMERICAN: >60 ML/MIN/1.73
GLUCOSE BLD-MCNC: 339 MG/DL (ref 74–99)
HCT VFR BLD CALC: 39 % (ref 34–48)
HEMOGLOBIN: 12.7 G/DL (ref 11.5–15.5)
IMMATURE GRANULOCYTES #: 0.03 E9/L
IMMATURE GRANULOCYTES %: 0.4 % (ref 0–5)
LYMPHOCYTES ABSOLUTE: 1.97 E9/L (ref 1.5–4)
LYMPHOCYTES RELATIVE PERCENT: 26.6 % (ref 20–42)
MCH RBC QN AUTO: 30.2 PG (ref 26–35)
MCHC RBC AUTO-ENTMCNC: 32.6 % (ref 32–34.5)
MCV RBC AUTO: 92.9 FL (ref 80–99.9)
MONOCYTES ABSOLUTE: 0.7 E9/L (ref 0.1–0.95)
MONOCYTES RELATIVE PERCENT: 9.5 % (ref 2–12)
NEUTROPHILS ABSOLUTE: 4.56 E9/L (ref 1.8–7.3)
NEUTROPHILS RELATIVE PERCENT: 61.6 % (ref 43–80)
PDW BLD-RTO: 13.2 FL (ref 11.5–15)
PLATELET # BLD: 265 E9/L (ref 130–450)
PMV BLD AUTO: 10.8 FL (ref 7–12)
POTASSIUM SERPL-SCNC: 4.8 MMOL/L (ref 3.5–5)
PRO-BNP: 175 PG/ML (ref 0–125)
RBC # BLD: 4.2 E12/L (ref 3.5–5.5)
REASON FOR REJECTION: NORMAL
REJECTED TEST: NORMAL
SODIUM BLD-SCNC: 136 MMOL/L (ref 132–146)
TROPONIN, HIGH SENSITIVITY: 26 NG/L (ref 0–9)
TROPONIN, HIGH SENSITIVITY: 27 NG/L (ref 0–9)
WBC # BLD: 7.4 E9/L (ref 4.5–11.5)

## 2021-09-07 PROCEDURE — 93005 ELECTROCARDIOGRAM TRACING: CPT | Performed by: EMERGENCY MEDICINE

## 2021-09-07 PROCEDURE — 71045 X-RAY EXAM CHEST 1 VIEW: CPT

## 2021-09-07 PROCEDURE — 99283 EMERGENCY DEPT VISIT LOW MDM: CPT

## 2021-09-07 PROCEDURE — 85025 COMPLETE CBC W/AUTO DIFF WBC: CPT

## 2021-09-07 PROCEDURE — 6370000000 HC RX 637 (ALT 250 FOR IP): Performed by: EMERGENCY MEDICINE

## 2021-09-07 PROCEDURE — 84484 ASSAY OF TROPONIN QUANT: CPT

## 2021-09-07 PROCEDURE — 36415 COLL VENOUS BLD VENIPUNCTURE: CPT

## 2021-09-07 PROCEDURE — 80048 BASIC METABOLIC PNL TOTAL CA: CPT

## 2021-09-07 PROCEDURE — 93010 ELECTROCARDIOGRAM REPORT: CPT | Performed by: INTERNAL MEDICINE

## 2021-09-07 PROCEDURE — 83880 ASSAY OF NATRIURETIC PEPTIDE: CPT

## 2021-09-07 RX ORDER — ASPIRIN 81 MG/1
324 TABLET, CHEWABLE ORAL ONCE
Status: COMPLETED | OUTPATIENT
Start: 2021-09-07 | End: 2021-09-07

## 2021-09-07 RX ADMIN — ASPIRIN 81 MG CHEWABLE TABLET 324 MG: 81 TABLET CHEWABLE at 05:55

## 2021-09-07 ASSESSMENT — PAIN SCALES - GENERAL: PAINLEVEL_OUTOF10: 8

## 2021-09-07 ASSESSMENT — PAIN DESCRIPTION - DESCRIPTORS: DESCRIPTORS: TIGHTNESS;PRESSURE

## 2021-09-07 ASSESSMENT — PAIN DESCRIPTION - PAIN TYPE: TYPE: ACUTE PAIN

## 2021-09-07 ASSESSMENT — PAIN DESCRIPTION - ORIENTATION: ORIENTATION: LEFT

## 2021-09-07 ASSESSMENT — PAIN DESCRIPTION - LOCATION: LOCATION: CHEST

## 2021-09-07 ASSESSMENT — PAIN DESCRIPTION - FREQUENCY: FREQUENCY: CONTINUOUS

## 2021-09-07 NOTE — ED NOTES
Bed: 15  Expected date:   Expected time:   Means of arrival:   Comments:     Vicente Feliciano RN  09/07/21 0043

## 2021-09-07 NOTE — ED NOTES
Bed: 07  Expected date:   Expected time:   Means of arrival:   Comments:  diego Saul, BLAIRE  09/07/21 4552

## 2021-09-07 NOTE — ED PROVIDER NOTES
7:22 AM EDT  I received this patient at sign out from Dr. Queen Trinh. I have discussed the patient's initial exam, treatment and plan of care with the out going physician. I have introduced my self to the patient / family and have answered their questions to this point. I have examined the patient myself and reviewed ordered tests / medications and  reviewed any available results to this point. If a resident is involved in the Emergency Department care, I have discussed my findings and plan with them as well. Hemolyzed labs have been reordered. --------------------------------------------- PAST HISTORY ---------------------------------------------  Past Medical History:  has a past medical history of Anesthesia complication, Arthritis, Back pain, Blind, Cancer ([facility] Cardiac valve prolapse, Diabetes mellitus ([facility] Head injury, Hyperlipidemia, Hypertension, Neuropathy due to secondary diabetes [facility]), Nontraumatic tear of right rotator cuff, Prolonged emergence from general anesthesia, and Psoriasis. Past Surgical History:  has a past surgical history that includes Carpal tunnel release;  section; Ankle surgery; Carpal tunnel release (2012); Shoulder arthroscopy (Left, 2016); Cardiac surgery; Cholecystectomy, laparoscopic (N/A, 2019); Upper gastrointestinal endoscopy (N/A, 2019); Colonoscopy (N/A, 2019); Hysterectomy, total abdominal; Shoulder arthroscopy (Right, 2020); US BREAST BIOPSY W LOC DEVICE 1ST LESION LEFT (2020); and Breast biopsy (Left, 2021). Social History:  reports that she has never smoked. She has never used smokeless tobacco. She reports previous alcohol use of about 1.0 standard drinks of alcohol per week. She reports that she does not use drugs. Family History: family history includes Cancer in her father; Cancer (age of onset: 54) in her maternal aunt;  Cancer (age of onset: 62) in her maternal uncle and another family member; Cancer (age of onset: 58) in her paternal aunt; Diabetes in her father and mother; Heart Disease in her father and mother. The patients home medications have been reviewed. Allergies: Patient has no known allergies.     -------------------------------------------------- RESULTS -------------------------------------------------  Labs:  Results for orders placed or performed during the hospital encounter of 09/07/21   CBC Auto Differential   Result Value Ref Range    WBC 7.4 4.5 - 11.5 E9/L    RBC 4.20 3.50 - 5.50 E12/L    Hemoglobin 12.7 11.5 - 15.5 g/dL    Hematocrit 39.0 34.0 - 48.0 %    MCV 92.9 80.0 - 99.9 fL    MCH 30.2 26.0 - 35.0 pg    MCHC 32.6 32.0 - 34.5 %    RDW 13.2 11.5 - 15.0 fL    Platelets 184 294 - 348 E9/L    MPV 10.8 7.0 - 12.0 fL    Neutrophils % 61.6 43.0 - 80.0 %    Immature Granulocytes % 0.4 0.0 - 5.0 %    Lymphocytes % 26.6 20.0 - 42.0 %    Monocytes % 9.5 2.0 - 12.0 %    Eosinophils % 1.6 0.0 - 6.0 %    Basophils % 0.3 0.0 - 2.0 %    Neutrophils Absolute 4.56 1.80 - 7.30 E9/L    Immature Granulocytes # 0.03 E9/L    Lymphocytes Absolute 1.97 1.50 - 4.00 E9/L    Monocytes Absolute 0.70 0.10 - 0.95 E9/L    Eosinophils Absolute 0.12 0.05 - 0.50 E9/L    Basophils Absolute 0.02 0.00 - 0.20 E9/L   Brain Natriuretic Peptide   Result Value Ref Range    Pro- (H) 0 - 125 pg/mL   SPECIMEN REJECTION   Result Value Ref Range    Rejected Test BMPX TRP5     Reason for Rejection see below    Basic Metabolic Panel   Result Value Ref Range    Sodium 136 132 - 146 mmol/L    Potassium 4.8 3.5 - 5.0 mmol/L    Chloride 101 98 - 107 mmol/L    CO2 29 22 - 29 mmol/L    Anion Gap 6 (L) 7 - 16 mmol/L    Glucose 339 (H) 74 - 99 mg/dL    BUN 17 6 - 20 mg/dL    CREATININE 0.7 0.5 - 1.0 mg/dL    GFR Non-African American >60 >=60 mL/min/1.73    GFR African American >60     Calcium 9.7 8.6 - 10.2 mg/dL   Troponin   Result Value Ref Range    Troponin, High Sensitivity 27 (H) 0 - 9 ng/L   Troponin Result Value Ref Range    Troponin, High Sensitivity 26 (H) 0 - 9 ng/L   EKG 12 Lead   Result Value Ref Range    Ventricular Rate 90 BPM    Atrial Rate 90 BPM    P-R Interval 136 ms    QRS Duration 84 ms    Q-T Interval 362 ms    QTc Calculation (Bazett) 442 ms    P Axis 64 degrees    R Axis 48 degrees    T Axis 51 degrees       Radiology:  XR CHEST PORTABLE   Final Result   Diminished lung volume. PA and lateral views would be useful for further   assessment, if symptoms persist.             ------------------------- NURSING NOTES AND VITALS REVIEWED ---------------------------  Date / Time Roomed:  9/7/2021  4:57 AM  ED Bed Assignment:  07/07    The nursing notes within the ED encounter and vital signs as below have been reviewed. BP (!) 141/92   Pulse 91   Temp 98.3 °F (36.8 °C) (Oral)   Resp 20   Ht 4' 11\" (1.499 m)   Wt 156 lb (70.8 kg)   LMP  (LMP Unknown)   SpO2 96%   BMI 31.51 kg/m²   Oxygen Saturation Interpretation: Normal      ------------------------------------------ PROGRESS NOTES ------------------------------------------  10:18 AM EDT  I have spoken with the patient and discussed todays results, in addition to providing specific details for the plan of care and counseling regarding the diagnosis and prognosis. Their questions are answered at this time and they are agreeable with the plan. I discussed at length with them reasons for immediate return here for re evaluation. They will followup with their primary care physician by calling their office tomorrow. --------------------------------- ADDITIONAL PROVIDER NOTES ---------------------------------  At this time the patient is without objective evidence of an acute process requiring hospitalization or inpatient management. They have remained hemodynamically stable throughout their entire ED visit and are stable for discharge with outpatient follow-up.      The plan has been discussed in detail and they are aware of the specific conditions for emergent return, as well as the importance of follow-up. New Prescriptions    No medications on file       Diagnosis:  1. Chest pain, unspecified type        Disposition:  Patient's disposition: Discharge to home  Patient's condition is stable.        Leroy Schulte DO  09/07/21 1018

## 2021-09-16 DIAGNOSIS — G25.81 RLS (RESTLESS LEGS SYNDROME): ICD-10-CM

## 2021-09-16 DIAGNOSIS — I10 ESSENTIAL HYPERTENSION: ICD-10-CM

## 2021-09-17 ENCOUNTER — TELEPHONE (OUTPATIENT)
Dept: FAMILY MEDICINE CLINIC | Age: 51
End: 2021-09-17

## 2021-09-17 DIAGNOSIS — M25.531 WRIST PAIN, RIGHT: Primary | ICD-10-CM

## 2021-09-17 RX ORDER — METOPROLOL SUCCINATE 25 MG/1
TABLET, EXTENDED RELEASE ORAL
Qty: 45 TABLET | Refills: 1 | Status: SHIPPED
Start: 2021-09-17 | End: 2022-03-11

## 2021-09-17 RX ORDER — PRAMIPEXOLE DIHYDROCHLORIDE 0.12 MG/1
TABLET ORAL
Qty: 90 TABLET | Refills: 1 | Status: SHIPPED
Start: 2021-09-17 | End: 2022-02-28 | Stop reason: SDUPTHER

## 2021-09-17 NOTE — TELEPHONE ENCOUNTER
Pt called in she would like a referral to a ortho surgeon for her right wrist pain, she said she thinks its carpal tunnel. She would like to see Dr. Vianca Barber because he did her shoulder.

## 2021-09-20 NOTE — TELEPHONE ENCOUNTER
This MA returned phone call to pt to inquire where Dr. Chase Cheng is located. Pt explained he is part of [facility]) in [place]. After reviewing pt's chart, pt is requesting referral back to Dr. Caitlyn Cardoza. Referral placed.     Electronically signed by Victoria Ramirez MA on 9/20/21 at 1:55 PM EDT

## 2021-09-23 ENCOUNTER — OFFICE VISIT (OUTPATIENT)
Dept: ORTHOPEDIC SURGERY | Age: 51
End: 2021-09-23
Payer: MEDICAID

## 2021-09-23 VITALS — WEIGHT: 156 LBS | HEIGHT: 59 IN | BODY MASS INDEX: 31.45 KG/M2

## 2021-09-23 DIAGNOSIS — R20.2 PARESTHESIA OF LEFT UPPER EXTREMITY: Primary | ICD-10-CM

## 2021-09-23 DIAGNOSIS — M25.531 RIGHT WRIST PAIN: Primary | ICD-10-CM

## 2021-09-23 DIAGNOSIS — M25.532 LEFT WRIST PAIN: Primary | ICD-10-CM

## 2021-09-23 PROCEDURE — 99213 OFFICE O/P EST LOW 20 MIN: CPT | Performed by: ORTHOPAEDIC SURGERY

## 2021-09-23 PROCEDURE — G8427 DOCREV CUR MEDS BY ELIG CLIN: HCPCS | Performed by: ORTHOPAEDIC SURGERY

## 2021-09-23 PROCEDURE — 1036F TOBACCO NON-USER: CPT | Performed by: ORTHOPAEDIC SURGERY

## 2021-09-23 PROCEDURE — G8417 CALC BMI ABV UP PARAM F/U: HCPCS | Performed by: ORTHOPAEDIC SURGERY

## 2021-09-23 PROCEDURE — 3017F COLORECTAL CA SCREEN DOC REV: CPT | Performed by: ORTHOPAEDIC SURGERY

## 2021-09-23 NOTE — PROGRESS NOTES
Chief Complaint   Patient presents with    Wrist Pain     Left wrist pain for the past week. No history of injury. Complains thumb pain into forearm, also index finger numbness and swelling. HPI:    Patient is 46 y.o. female complaining of Left wrist pain for the past week. No history of injury. Complains thumb pain into forearm, also index finger numbness and swelling. Patient treatments include rest, ice, heat, NSAIDs, HEP without much relief. ROS:    Skin: (-) rash,(-) psoriasis,(-) eczema, (-)skin cancer. Neurologic: (-)numbness, (-)tingling, (-)headaches, (-) LOC. Cardiovascular: (-) Chest pain, (-) swelling in legs/feet, (-) SOB, (-) cramping in legs/feet with walking.     All other review of systems negative except stated above or in HPI      Past Medical History:   Diagnosis Date    Anesthesia complication     problem with blood pressure up & down    Arthritis     lower back    Back pain     Blind     right eye    Cancer ([address]     breast    Cardiac valve prolapse     micro    Diabetes mellitus ([address]     Head injury 2018    Hyperlipidemia     Hypertension     Neuropathy due to secondary diabetes ([address]     in marcial feet    Nontraumatic tear of right rotator cuff 2020    Prolonged emergence from general anesthesia     Psoriasis      Past Surgical History:   Procedure Laterality Date    ANKLE SURGERY      bilateral tarsal tunnels    BREAST BIOPSY Left 2021    LEFT BREAST NEEDLE LOCALIZED LUMPECTOMY, BLUE DYE INJECTION, LEFT AXILLARY SENTINEL LYMPHNODE EXCISION, POSSIBLE LEFT AXILLARY DISSECTION performed by Reny Gilbert MD at [address]      cardiac catheterization    CARPAL TUNNEL RELEASE      left    CARPAL TUNNEL RELEASE  2012    right     SECTION      CHOLECYSTECTOMY, LAPAROSCOPIC N/A 2019    LAPAROSCOPIC CHOLECYSTECTOMY WITH IOC performed by Regina Houser MD at [address]  had extremely high blood pressure and hard to wake up    COLONOSCOPY N/A 06/20/2019    COLORECTAL CANCER SCREENING, NOT HIGH RISK performed by Phill Page MD at [address] [facility] ARTHROSCOPY Left 01/21/2016    subacromin decompression and debridement    SHOULDER ARTHROSCOPY Right 11/16/2020    RIGHT SHOULDER ARTHROSCOPY, SUBACROMIAL DECOMPRESSION, LABRIAL DEBRIDEMENT, CHONDROPLASTY, RAÚL performed by Mally Funk DO at [address] N/A 06/20/2019    EGD BIOPSY performed by Phill Page MD at [address] [facility] LEFT  12/23/2020     BREAST NEEDLE BIOPSY LEFT 12/23/2020 [facility]       Current Outpatient Medications:     metoprolol succinate (TOPROL XL) 25 MG extended release tablet, TAKE 1/2 TABLET BY MOUTH ONCE DAILY, Disp: 45 tablet, Rfl: 1    pramipexole (MIRAPEX) 0.125 MG tablet, TAKE 1 TABLET BY MOUTH NIGHTLY, Disp: 90 tablet, Rfl: 1    BASAGLAR KWIKPEN 100 UNIT/ML injection pen, INJECT 50 UNITS SUBCUTANEOUSLY NIGHTLY, Disp: 15 mL, Rfl: 2    JARDIANCE 10 MG tablet, TAKE 1 TABLET BY MOUTH DAILY, Disp: 90 tablet, Rfl: 1    lactobacillus (CULTURELLE) CAPS capsule, TAKE (1) CAPSULE BY MOUTH DAILY, Disp: 90 capsule, Rfl: 1    metFORMIN (GLUCOPHAGE) 500 MG tablet, TAKE TWO (2) TABLETS BY MOUTH TWICE DAILY (WITH MEALS), Disp: 360 tablet, Rfl: 1    omeprazole (PRILOSEC) 40 MG delayed release capsule, TAKE 1 CAPSULE BY MOUTH DAILY, Disp: 90 capsule, Rfl: 1    sucralfate (CARAFATE) 1 GM tablet, Take 1 tablet by mouth 4 times daily, Disp: 120 tablet, Rfl: 3    Blood Glucose Monitoring Suppl (ONE TOUCH ULTRA 2) w/Device KIT, Test 3 times a day & as needed for symptoms of irregular blood glucose. Dispense sufficient amount for indicated testing frequency plus additional to accommodate PRN testing needs. , Disp: 1 kit, Rfl: 0    Lancets MISC, Test 3 times a day & as needed for symptoms of irregular blood glucose.  Dispense sufficient amount for indicated testing frequency plus additional to accommodate PRN testing needs. , Disp: 300 each, Rfl: 3    blood glucose monitor strips, Test 3 times a day & as needed for symptoms of irregular blood glucose. Dispense sufficient amount for indicated testing frequency plus additional to accommodate PRN testing needs. , Disp: 300 strip, Rfl: 3    Cholecalciferol (VITAMIN D) 50 MCG (2000 UT) CAPS capsule, Take by mouth, Disp: , Rfl:     anastrozole (ARIMIDEX) 1 MG tablet, Take 1 tablet by mouth daily, Disp: 90 tablet, Rfl: 1    hydrocortisone 2.5 % cream, APPLY TO AFFECTED AREA TOPICALLY TWICE DAILY, Disp: 30 g, Rfl: 10    Alcohol Swabs (ALCOHOL PREP) 70 % PADS, Use QID prior to glucose testing and insulin injection, Disp: 400 each, Rfl: 1    acetaminophen (TYLENOL) 500 MG tablet, Take 1 tablet by mouth 4 times daily as needed for Pain, Disp: 120 tablet, Rfl: 0    sertraline (ZOLOFT) 100 MG tablet, TAKE 1 TABLET BY MOUTH ONCE DAILY AS NEEDED FOR DEPRESSION, Disp: 30 tablet, Rfl: 10    rosuvastatin (CRESTOR) 5 MG tablet, , Disp: , Rfl:     GABAPENTIN PO, Take 300 mg by mouth Nightly , Disp: , Rfl:     B-D ULTRAFINE III SHORT PEN 31G X 8 MM MISC, USE WITH INSULIN PENS AS DIRECTED, Disp: 100 each, Rfl: 10    ondansetron (ZOFRAN) 4 MG tablet, Take 1 tablet by mouth every 8 hours as needed for Nausea or Vomiting, Disp: 20 tablet, Rfl: 0    fluticasone (FLONASE) 50 MCG/ACT nasal spray, 1 spray by Nasal route daily, Disp: 1 Bottle, Rfl: 3    nitroGLYCERIN (NITROSTAT) 0.4 MG SL tablet, Place 1 tablet under the tongue every 5 minutes as needed for Chest pain, Disp: 25 tablet, Rfl: 1    Dulaglutide (TRULICITY) 1.5 UT/3.2IG SOPN, INJECT THE CONTENTS OF 1 SYRINGE SUBCUTANEOUSLY EVERY WEEK *PATIENT NEEDS APPOINTMENT* (Patient taking differently: INJECT THE CONTENTS OF 1 SYRINGE SUBCUTANEOUSLY EVERY WEEK *PATIENT NEEDS APPOINTMENT*  Takes on saturdays), Disp: 6 mL, Rfl: 10    Insulin Syringe-Needle U-100 ([employer] INSULIN SYR 1CC/30G) 30G X 5/16\" 1 ML MISC, 1 each by Does not apply route 4 times daily, Disp: 120 each, Rfl: 5    bumetanide (BUMEX) 1 MG tablet, Take 1 tablet by mouth every other day, Disp: 90 tablet, Rfl: 1    famotidine (PEPCID) 20 MG tablet, Take 1 tablet by mouth 2 times daily (Patient not taking: Reported on 9/2/2021), Disp: 180 tablet, Rfl: 1    insulin lispro (ADMELOG SOLOSTAR) 100 UNIT/ML pen, As directed per sliding scale up to 13 units 4 times per day., Disp: 5 pen, Rfl: 3    Blood Pressure KIT, 1 kit by Does not apply route daily, Disp: 1 kit, Rfl: 0    Misc. Devices (QUAD CANE) MISC, 1 Quad cane, Disp: 1 each, Rfl: 0    Multiple Vitamins-Minerals (THERAPEUTIC MULTIVITAMIN-MINERALS) tablet, Take 1 tablet by mouth daily, Disp: , Rfl:     B Complex Vitamins (B COMPLEX 1 PO), Take 1 tablet by mouth daily. , Disp: , Rfl:   No Known Allergies  Social History     Socioeconomic History    Marital status:      Spouse name: Not on file    Number of children: 2    Years of education: Not on file    Highest education level: Not on file   Occupational History    Not on file   Tobacco Use    Smoking status: Never Smoker    Smokeless tobacco: Never Used   Vaping Use    Vaping Use: Never used   Substance and Sexual Activity    Alcohol use: Not Currently     Alcohol/week: 1.0 standard drinks     Types: 1 Glasses of wine per week     Comment: weekly    Drug use: No    Sexual activity: Not on file   Other Topics Concern    Not on file   Social History Narrative    Not on file     Social Determinants of Health     Financial Resource Strain: Low Risk     Difficulty of Paying Living Expenses: Not hard at all   Food Insecurity: No Food Insecurity    Worried About Running Out of Food in the Last Year: Never true    Debbie of Food in the Last Year: Never true   Transportation Needs:     Lack of Transportation (Medical):      Lack of Transportation (Non-Medical):    Physical Activity:     Days of Exercise per Week:     Minutes of Exercise per Session:    Stress:     Feeling of Stress :    Social Connections:     Frequency of Communication with Friends and Family:     Frequency of Social Gatherings with Friends and Family:     Attends [employer] Services:     Active Member of Clubs or Organizations:     Attends Club or Organization Meetings:     Marital Status:    Intimate Partner Violence:     Fear of Current or Ex-Partner:     Emotionally Abused:     Physically Abused:     Sexually Abused:      Family History   Problem Relation Age of Onset    Heart Disease Mother     Diabetes Mother     Heart Disease Father     Diabetes Father     Cancer Father         prostate, colon, skin cance behind ear    Cancer Maternal Aunt 54        breast    Cancer Maternal Uncle 62        colon    Cancer Paternal Aunt 58        breast    Cancer Other 62        maternal great aunt - breast           Physical Exam:    Ht 4' 11\" (1.499 m)   Wt 156 lb (70.8 kg)   LMP  (LMP Unknown)   BMI 31.51 kg/m²     GENERAL: alert, appears stated age, cooperative, no acute distress    HEENT: Head is normocephalic, atraumatic. PERRLA. SKIN: Clean, dry, intact. There is not any cellulitis or cutaneous lesions noted in the lower extremities    PULMONARY: breathing is regular and unlabored, no acute distress    CV: The bilateral upper and lower extremities are warm and well-perfused with brisk capillary refill. 2+ pulses UE and LE bilateral.     PSYCHIATRY: Pleasant mood, appropriate behavior, follows commands    NEURO: Sensation is intact distally with light touch with no alteration. Motor exam of the lower extremities show quadriceps, hamstrings, foot dorsiflexion and plantarflexion grossly intact 5/5. LYMPH: No lymphedema present distally in upper or lower extremity.      MUSCULOSKELETAL:  Cervical Exam:    On physical exam, patient is well-developed, well-nourished, oriented to person, place and upper extremities. Radial pulses are 2+ and symmetric bilaterally. Capillary refill is intact and < 2 seconds. Motor strength is 5/5 with flexion and extension of the small finger joints. Right:  Phallens sign(-), Tinnells sign (+), Median nerve compression test (+),  Finklesteins (-), [employer] Grind test (-), [employer](-). Left:  Phallens sign(+), Tinnells sign (+), Median nerve compression test (+),  Finklesteins (-), [employer] Grind test (-), [employer](-). Imaging:  XR WRIST LEFT (MIN 3 VIEWS)    Result Date: 9/24/2021  EXAMINATION: 4 XRAY VIEWS OF THE LEFT WRIST 9/23/2021 12:13 pm COMPARISON: None. HISTORY: ORDERING SYSTEM PROVIDED HISTORY: Left wrist pain FINDINGS: Carpal bones and alignment are maintained. Distal radius and ulna are intact. No acute fracture or dislocation. Unremarkable left wrist.             Jenny was seen today for wrist pain. Diagnoses and all orders for this visit:    Paresthesia of left upper extremity  -     Ambulatory referral to [address] [place], [place] [address]         Patient seen and examined. X-rays reviewed. Natural history and course discussed with patient. Treatment options discussed with patient in detail including risks and benefits. Patient does have vague forearm pain with paresthesias down into the hand and wrist specifically around thumb. Patient is a brittle diabetic and may be experiencing carpal tunnel syndrome with neuropathic elements. Patient also shows calcification over radial artery and possible vascular complications to this as well. Patient will be referred to physical medicine rehabilitation for work-up of carpal tunnel versus neuropathy.           Corby Smart, DO

## 2021-09-28 ASSESSMENT — ENCOUNTER SYMPTOMS
CHEST TIGHTNESS: 0
ABDOMINAL PAIN: 0
SHORTNESS OF BREATH: 0

## 2021-09-28 NOTE — ED PROVIDER NOTES
47 yo presenting with chest pain. Nondescript type discomfort, gradual onset, persistent, mild severity. No significant SOB, no syncope, no lightheadedness. Family History   Problem Relation Age of Onset    Heart Disease Mother     Diabetes Mother     Heart Disease Father     Diabetes Father     Cancer Father         prostate, colon, skin cance behind ear    Cancer Maternal Aunt 54        breast    Cancer Maternal Uncle 62        colon    Cancer Paternal Aunt 58        breast    Cancer Other 62        maternal great aunt - breast     Past Surgical History:   Procedure Laterality Date    ANKLE SURGERY      bilateral tarsal tunnels    BREAST BIOPSY Left 2021    LEFT BREAST NEEDLE LOCALIZED LUMPECTOMY, BLUE DYE INJECTION, LEFT AXILLARY SENTINEL LYMPHNODE EXCISION, POSSIBLE LEFT AXILLARY DISSECTION performed by Archana New MD at [address]      cardiac catheterization    CARPAL TUNNEL RELEASE      left    CARPAL TUNNEL RELEASE  2012    right     SECTION      CHOLECYSTECTOMY, LAPAROSCOPIC N/A 2019    LAPAROSCOPIC CHOLECYSTECTOMY WITH IOC performed by Dixie Orozco MD at [address]  had extremely high blood pressure and hard to wake up    COLONOSCOPY N/A 2019    COLORECTAL CANCER SCREENING, NOT HIGH RISK performed by Dixie Orozco MD at [address] [facility], [facility] ARTHROSCOPY Left 2016    subacromin decompression and debridement    SHOULDER ARTHROSCOPY Right 2020    RIGHT SHOULDER ARTHROSCOPY, SUBACROMIAL DECOMPRESSION, LABRIAL DEBRIDEMENT, CHONDROPLASTY, RAÚL performed by Chan Jules DO at [facility] 2019    EGD BIOPSY performed by Dixie Orozco MD at [address] [facility]  2020     BREAST NEEDLE BIOPSY LEFT 2020 [facility]       Review of Systems   Constitutional: Negative for chills and fever. Respiratory: Negative for chest tightness and shortness of breath. Cardiovascular: Positive for chest pain. Negative for palpitations. Gastrointestinal: Negative for abdominal pain. All other systems reviewed and are negative. Physical Exam  Constitutional:       General: She is not in acute distress. Appearance: She is well-developed. HENT:      Head: Normocephalic and atraumatic. Eyes:      Conjunctiva/sclera: Conjunctivae normal.      Pupils: Pupils are equal, round, and reactive to light. Neck:      Thyroid: No thyromegaly. Cardiovascular:      Rate and Rhythm: Normal rate and regular rhythm. Pulmonary:      Effort: Pulmonary effort is normal. No respiratory distress. Breath sounds: Normal breath sounds. Abdominal:      General: There is no distension. Palpations: Abdomen is soft. Tenderness: There is no abdominal tenderness. There is no guarding or rebound. Musculoskeletal:         General: No tenderness. Normal range of motion. Cervical back: Normal range of motion. Skin:     General: Skin is warm and dry. Findings: No erythema. Neurological:      Mental Status: She is alert and oriented to person, place, and time. Cranial Nerves: No cranial nerve deficit. Coordination: Coordination normal.          Procedures     MDM           I have signed this patient out to the oncoming physician, Dr. Elma Delatorre. I have discussed the patient's initial exam, treatment and plan of care with the on coming physician. I have notified the patient / family of the change in treating physician and answered their questions to this point.             Teryl Schaumann,   09/28/21 [id number]

## 2021-10-04 ENCOUNTER — HOSPITAL ENCOUNTER (OUTPATIENT)
Dept: INFUSION THERAPY | Age: 51
Discharge: HOME | End: 2021-10-04
Payer: MEDICAID

## 2021-10-04 ENCOUNTER — OFFICE VISIT (OUTPATIENT)
Dept: ONCOLOGY | Age: 51
End: 2021-10-04
Payer: MEDICAID

## 2021-10-04 ENCOUNTER — TELEPHONE (OUTPATIENT)
Dept: CASE MANAGEMENT | Age: 51
End: 2021-10-04

## 2021-10-04 VITALS
OXYGEN SATURATION: 98 % | WEIGHT: 163 LBS | SYSTOLIC BLOOD PRESSURE: 130 MMHG | DIASTOLIC BLOOD PRESSURE: 70 MMHG | HEIGHT: 59 IN | HEART RATE: 94 BPM | BODY MASS INDEX: 32.86 KG/M2

## 2021-10-04 DIAGNOSIS — C50.912 MALIGNANT NEOPLASM OF LEFT FEMALE BREAST, UNSPECIFIED ESTROGEN RECEPTOR STATUS, UNSPECIFIED SITE OF BREAST (HCC): ICD-10-CM

## 2021-10-04 DIAGNOSIS — C50.912 MALIGNANT NEOPLASM OF LEFT FEMALE BREAST, UNSPECIFIED ESTROGEN RECEPTOR STATUS, UNSPECIFIED SITE OF BREAST (HCC): Primary | ICD-10-CM

## 2021-10-04 LAB
ALBUMIN SERPL-MCNC: 3.7 G/DL (ref 3.5–5.2)
ALP BLD-CCNC: 131 U/L (ref 35–104)
ALT SERPL-CCNC: 28 U/L (ref 0–32)
ANION GAP SERPL CALCULATED.3IONS-SCNC: 10 MMOL/L (ref 7–16)
AST SERPL-CCNC: 26 U/L (ref 0–31)
BASOPHILS ABSOLUTE: 0.01 E9/L (ref 0–0.2)
BASOPHILS RELATIVE PERCENT: 0.1 % (ref 0–2)
BILIRUB SERPL-MCNC: 0.2 MG/DL (ref 0–1.2)
BUN BLDV-MCNC: 16 MG/DL (ref 6–20)
CALCIUM SERPL-MCNC: 10.5 MG/DL (ref 8.6–10.2)
CHLORIDE BLD-SCNC: 103 MMOL/L (ref 98–107)
CO2: 28 MMOL/L (ref 22–29)
CREAT SERPL-MCNC: 0.8 MG/DL (ref 0.5–1)
EOSINOPHILS ABSOLUTE: 0.13 E9/L (ref 0.05–0.5)
EOSINOPHILS RELATIVE PERCENT: 1.4 % (ref 0–6)
GFR AFRICAN AMERICAN: >60
GFR NON-AFRICAN AMERICAN: >60 ML/MIN/1.73
GLUCOSE BLD-MCNC: 258 MG/DL (ref 74–99)
HCT VFR BLD CALC: 37.5 % (ref 34–48)
HEMOGLOBIN: 12.1 G/DL (ref 11.5–15.5)
IMMATURE GRANULOCYTES #: 0.04 E9/L
IMMATURE GRANULOCYTES %: 0.4 % (ref 0–5)
LYMPHOCYTES ABSOLUTE: 1.42 E9/L (ref 1.5–4)
LYMPHOCYTES RELATIVE PERCENT: 15.1 % (ref 20–42)
MCH RBC QN AUTO: 30.2 PG (ref 26–35)
MCHC RBC AUTO-ENTMCNC: 32.3 % (ref 32–34.5)
MCV RBC AUTO: 93.5 FL (ref 80–99.9)
MONOCYTES ABSOLUTE: 0.55 E9/L (ref 0.1–0.95)
MONOCYTES RELATIVE PERCENT: 5.9 % (ref 2–12)
NEUTROPHILS ABSOLUTE: 7.25 E9/L (ref 1.8–7.3)
NEUTROPHILS RELATIVE PERCENT: 77.1 % (ref 43–80)
PDW BLD-RTO: 13.3 FL (ref 11.5–15)
PLATELET # BLD: 257 E9/L (ref 130–450)
PMV BLD AUTO: 10.7 FL (ref 7–12)
POTASSIUM SERPL-SCNC: 4.5 MMOL/L (ref 3.5–5)
RBC # BLD: 4.01 E12/L (ref 3.5–5.5)
SODIUM BLD-SCNC: 141 MMOL/L (ref 132–146)
TOTAL PROTEIN: 7 G/DL (ref 6.4–8.3)
WBC # BLD: 9.4 E9/L (ref 4.5–11.5)

## 2021-10-04 PROCEDURE — 80053 COMPREHEN METABOLIC PANEL: CPT

## 2021-10-04 PROCEDURE — G8417 CALC BMI ABV UP PARAM F/U: HCPCS | Performed by: INTERNAL MEDICINE

## 2021-10-04 PROCEDURE — G8427 DOCREV CUR MEDS BY ELIG CLIN: HCPCS | Performed by: INTERNAL MEDICINE

## 2021-10-04 PROCEDURE — 1036F TOBACCO NON-USER: CPT | Performed by: INTERNAL MEDICINE

## 2021-10-04 PROCEDURE — G8484 FLU IMMUNIZE NO ADMIN: HCPCS | Performed by: INTERNAL MEDICINE

## 2021-10-04 PROCEDURE — 99212 OFFICE O/P EST SF 10 MIN: CPT

## 2021-10-04 PROCEDURE — 3017F COLORECTAL CA SCREEN DOC REV: CPT | Performed by: INTERNAL MEDICINE

## 2021-10-04 PROCEDURE — 36415 COLL VENOUS BLD VENIPUNCTURE: CPT

## 2021-10-04 PROCEDURE — 99214 OFFICE O/P EST MOD 30 MIN: CPT | Performed by: INTERNAL MEDICINE

## 2021-10-04 PROCEDURE — 85025 COMPLETE CBC W/AUTO DIFF WBC: CPT

## 2021-10-04 RX ORDER — DULOXETIN HYDROCHLORIDE 30 MG/1
CAPSULE, DELAYED RELEASE ORAL
COMMUNITY
Start: 2021-09-29 | End: 2021-10-21

## 2021-10-04 NOTE — TELEPHONE ENCOUNTER
Met with patient after her follow up appointment with Dr. Nikhil Erazo today. Patient completed her active treatment for her breast cancer. Patient appears in good spirits. Provided support and encouragement. Instructed patient in detail on her Cancer Treatment Summary and Survivorship Care Plan. Copy sent to patient's PCP Vijay Velez DO. Provided patient with [facility] Cancer Survivorship,Local Resources for Cancer Survivors and Oncology Nutrition booklet. I also provided patient a copy of  [facility] Lymphedema, and [employer], [employer], [employer] and my business card. Patient is scheduled for follow up appointment 4 months. Instructed to call with any questions or concerns. Verbally agreed. Patient appreciative of visit and information. I will discharge patient from a navigation standpoint.

## 2021-10-04 NOTE — PROGRESS NOTES
Department of [facility] Oncology  Attending Clinic Note    Reason for Visit: Follow-up on a patient with Left Breast Cancer     PCP:  Marcy Summers DO    History of Present Illness:  46year old female with Left Breast Cancer. Lesion located in the 7 o'clock position found on mammogram.    Breast cancer risk factors include age, gender, family history of cancer    Bilateral Screening Mammogram 11/12/2020 with 1.5 cm irregular focal asymmetry seen on the CC view, not well seen on MLO view. Stable mammogram of right breast    Left Diagnostic Mammogram 12/15/2021 with spiculated mass measuring 1.5 cm in the lower inner quadrant of the left breast. ON dedicated targeted ultrasound examination of left breast there is a solid lesion with posterior shadowing at 7 o'clock position. Highly suggestive of malignancy    Ultrasound guided core biopsy on 12/23/2020  Left breast, core needle biopsy: Invasive, moderately differentiatedductal carcinoma (grade 2)  Comment:    Intradepartmental consultation is obtained. Breast Cancer Marker Studies:  Estrogen Receptors (ER): 90%  Progesterone Receptors (AK): 90%  HER-2/salvador: Indeterminate/2+    FISH analysis HER/2: Negative  Average HER-2 signals/nucleus: 3.4   Average MARGARITO 17 signals/nucleus: 2.9   HER-2/MARGARITO 17 signal ratio: 1.2     2/17/2021 Left localized lumpectomy with left axillary sentinal lymph node excision   A.  Breast, left, lumpectomy: Invasive ductal carcinoma   Ductal carcinoma in situ   Invasive carcinoma present less than 1 mm from yellow/medial margin   Margins negative for ductal carcinoma in situ     B.  New medial anterior margin, excision: Negative for carcinoma   C.  [place] lymph node, excision: One (1) lymph node, negative for metastatic carcinoma     CANCER CASE SUMMARY   Procedure: Excision (less than total mastectomy)   Specimen laterality: Left   Tumor size: 1.3 cm in greatest dimension   Histologic type:  Invasive carcinoma of no special type (ductal)   Histologic grade (Nika Histologic Score):        Glandular/tubular differentiation: Score 2        Nuclear pleomorphism: Score 2        Mitotic rate: Score 3        Overall grade: Grade 2, score 7   Ductal carcinoma in situ: Present        Nuclear grade: Grade II (intermediate)   Margins:        Invasive carcinoma margins: Uninvolved by invasive carcinoma             Distance from closest margin: 3 mm-blue/inferior        Ductal carcinoma in situ margins: Uninvolved by DCIS             Distance from closest margin: 2 mm from blue/inferior and red/superior   Regional lymph nodes: Uninvolved by tumor cells        Total number of lymph nodes examined: 1        Number of sentinel nodes examined: 1   Treatment effect in the breast: No known presurgical therapy   Pathologic stage classification (pTNM, AJCC 8th Edition):   pT1c (sn)pN0     Oncotype DX Breast Cancer Report   Recurrence Score Result-16   Distant recurrence risk at 9 years-4%   Absolute chemotherapy benefit- <1%     No indication for adjuvant chemotherapy  Recommended adjuvant RT followed by adjuvant endocrine therapy  RT was completed on 05/04/2021. Arimidex 1 mg po daily was started on 05/06/2021 with fair tolerance so far. Review of Systems;  CONSTITUTIONAL: No fever, chills. Good appetite and energy level. ENMT: Eyes: No diplopia; Nose: No epistaxis. Mouth: No sore throat. RESPIRATORY: No hemoptysis, shortness of breath, cough. CARDIOVASCULAR: No chest pain, palpitations. GASTROINTESTINAL: No nausea/vomiting, abdominal pain, diarrhea/constipation. GENITOURINARY: No dysuria, urinary frequency, hematuria. NEURO: No syncope, presyncope, headache.   Remainder:  ROS NEGATIVE    Past Medical History:      Diagnosis Date    Anesthesia complication     problem with blood pressure up & down    Arthritis     lower back    Back pain     Blind     right eye    Cancer ([address] 2021    breast    Cardiac valve prolapse     micro    Diabetes mellitus ([facility]     Head injury 11/17/2018    Hyperlipidemia     Hypertension     Neuropathy due to secondary diabetes ([facility]     in siddharth feet    Nontraumatic tear of right rotator cuff 11/30/2020    Prolonged emergence from general anesthesia     Psoriasis      Medications:  Reviewed and reconciled. Allergies:  No Known Allergies    Physical Exam:  /70   Pulse 94   Ht 4' 11\" (1.499 m)   Wt 163 lb (73.9 kg)   LMP  (LMP Unknown)   SpO2 98%   BMI 32.92 kg/m²   GENERAL: Alert, oriented x 3, not in acute distress. Lungs: CTA Siddharth  CVS: RRR  EXTREMITIES: Without clubbing, cyanosis, or edema. NEUROLOGIC: No focal deficits. ECOG PS 1    Impression/Plan:  45 y/o female who on 02/17/2021 underwent Left localized lumpectomy with left axillary sentinal lymph node excision   A.  Breast, left, lumpectomy: Invasive ductal carcinoma   Ductal carcinoma in situ   Invasive carcinoma present less than 1 mm from yellow/medial margin   Margins negative for ductal carcinoma in situ     B.  New medial anterior margin, excision: Negative for carcinoma   C.  [place] lymph node, excision: One (1) lymph node, negative for metastatic carcinoma     CANCER CASE SUMMARY   Procedure: Excision (less than total mastectomy)   Specimen laterality: Left   Tumor size: 1.3 cm in greatest dimension   Histologic type:  Invasive carcinoma of no special type (ductal)   Histologic grade (Nika Histologic Score):        Glandular/tubular differentiation: Score 2        Nuclear pleomorphism: Score 2        Mitotic rate: Score 3        Overall grade: Grade 2, score 7   Ductal carcinoma in situ: Present        Nuclear grade: Grade II (intermediate)   Margins:        Invasive carcinoma margins: Uninvolved by invasive carcinoma             Distance from closest margin: 3 mm-blue/inferior        Ductal carcinoma in situ margins: Uninvolved by DCIS             Distance from closest margin: 2 mm from blue/inferior and red/superior Regional lymph nodes: Uninvolved by tumor cells        Total number of lymph nodes examined: 1        Number of sentinel nodes examined: 1   Treatment effect in the breast: No known presurgical therapy   Pathologic stage classification (pTNM, AJCC 8th Edition):   pT1c (sn)pN0     Breast Cancer Marker Studies:  Estrogen Receptors (ER): 90%  Progesterone Receptors (MS): 90%  HER-2/salvador: Indeterminate/2+    FISH analysis HER/2: Negative  Average HER-2 signals/nucleus: 3.4   Average MARGARITO 17 signals/nucleus: 2.9   HER-2/MARGARITO 17 signal ratio: 1.2     Oncotype DX Breast Cancer Report   Recurrence Score Result-16   Distant recurrence risk at 9 years-4%   Absolute chemotherapy benefit- <1%     No indication for adjuvant chemotherapy  Recommended adjuvant RT followed by adjuvant endocrine therapy  RT was completed on 05/04/2021. DEXA scan 04/06/2021 normal in LS and bilateral hips. On Ca. VitD    Arimidex 1 mg po daily was started on 05/06/2021 with fair tolerance so far. Left wrist X-Ray 09/23/2021 unremarkable left wrist.  Imaging reviewed. Labs reviewed. Continue Arimidex, Ca. VitD    RTC 4 months    10/04/2021  Megha Coe MD

## 2021-10-22 RX ORDER — DULOXETIN HYDROCHLORIDE 30 MG/1
CAPSULE, DELAYED RELEASE ORAL
Qty: 90 CAPSULE | Refills: 0 | Status: SHIPPED
Start: 2021-10-22 | End: 2022-01-07

## 2021-10-29 ENCOUNTER — OFFICE VISIT (OUTPATIENT)
Dept: PHYSICAL MEDICINE AND REHAB | Age: 51
End: 2021-10-29
Payer: MEDICAID

## 2021-10-29 VITALS
WEIGHT: 167 LBS | DIASTOLIC BLOOD PRESSURE: 81 MMHG | HEIGHT: 59 IN | SYSTOLIC BLOOD PRESSURE: 138 MMHG | HEART RATE: 83 BPM | BODY MASS INDEX: 33.67 KG/M2

## 2021-10-29 DIAGNOSIS — M65.4 DE QUERVAIN'S TENOSYNOVITIS, LEFT: ICD-10-CM

## 2021-10-29 DIAGNOSIS — G56.03 BILATERAL CARPAL TUNNEL SYNDROME: Primary | ICD-10-CM

## 2021-10-29 PROCEDURE — G8417 CALC BMI ABV UP PARAM F/U: HCPCS | Performed by: PHYSICAL MEDICINE & REHABILITATION

## 2021-10-29 PROCEDURE — 99244 OFF/OP CNSLTJ NEW/EST MOD 40: CPT | Performed by: PHYSICAL MEDICINE & REHABILITATION

## 2021-10-29 PROCEDURE — G8484 FLU IMMUNIZE NO ADMIN: HCPCS | Performed by: PHYSICAL MEDICINE & REHABILITATION

## 2021-10-29 PROCEDURE — G8427 DOCREV CUR MEDS BY ELIG CLIN: HCPCS | Performed by: PHYSICAL MEDICINE & REHABILITATION

## 2021-10-29 RX ORDER — SERTRALINE HYDROCHLORIDE 100 MG/1
100 TABLET, FILM COATED ORAL DAILY PRN
COMMUNITY
Start: 2021-10-27 | End: 2022-01-05

## 2021-10-29 NOTE — PROGRESS NOTES
Muriel Prather D.O. [facility]  [address] [facility] [address]. [facility] [place]  Phone: [phone]  Fax: [phone]      PCP: Emili Fuentes DO  Date of visit: 10/29/21    Chief Complaint   Patient presents with    Hand Pain     new patient        Dear Dr. Lelo Hilliard,    As you know,  Aman Posada is a 46 y.o.  right hand dominant woman with PSH of bilateral carpal tunnel release in her twenties with a sudden onset of bilateral hand pain after no known injury a few months ago. She denies any associated neck pain. Now, the pain is constant and occurs daily. The pain is rated Pain Score:   8, is described as burning, tingling, and is located in the left wrist with radiation to the left hand. The symptoms have been worse since onset. The pain is better with nothing. The pain is worse with using hand. The prior workup has included: none. The prior treatment has included: ace wrap, icy hot.      Past Medical History:   Diagnosis Date    Anesthesia complication     problem with blood pressure up & down    Arthritis     lower back    Back pain     Blind     right eye    Cancer ([address] 2021    breast    Cardiac valve prolapse     micro    Diabetes mellitus ([address]     Head injury 11/17/2018    Hyperlipidemia     Hypertension     Neuropathy due to secondary diabetes ([address]     in marcial feet    Nontraumatic tear of right rotator cuff 11/30/2020    Prolonged emergence from general anesthesia     Psoriasis        Past Surgical History:   Procedure Laterality Date    ANKLE SURGERY      bilateral tarsal tunnels    BREAST BIOPSY Left 2/17/2021    LEFT BREAST NEEDLE LOCALIZED LUMPECTOMY, BLUE DYE INJECTION, LEFT AXILLARY SENTINEL LYMPHNODE EXCISION, POSSIBLE LEFT AXILLARY DISSECTION performed by Alan Yo MD at [address]      cardiac catheterization    CARPAL TUNNEL RELEASE      left    CARPAL TUNNEL RELEASE  04/02/2012    right   [address] [place] [address]  CHOLECYSTECTOMY, LAPAROSCOPIC N/A 02/28/2019    LAPAROSCOPIC CHOLECYSTECTOMY WITH IOC performed by Narinder Van MD at [address]  had extremely high blood pressure and hard to wake up    COLONOSCOPY N/A 06/20/2019    COLORECTAL CANCER SCREENING, NOT HIGH RISK performed by Narinder Van MD at [address] [facility], [address] [facility] ARTHROSCOPY Left 01/21/2016    subacromin decompression and debridement    SHOULDER ARTHROSCOPY Right 11/16/2020    RIGHT SHOULDER ARTHROSCOPY, SUBACROMIAL DECOMPRESSION, LABRIAL DEBRIDEMENT, CHONDROPLASTY, RAÚL performed by Claudetta Goodie, DO at [address] [facility] 06/20/2019    EGD BIOPSY performed by Narinder Van MD at [address]  12/23/2020    US BREAST NEEDLE BIOPSY LEFT 12/23/2020 [facility]     Social History     Tobacco Use    Smoking status: Never Smoker    Smokeless tobacco: Never Used   Vaping Use    Vaping Use: Never used   Substance Use Topics    Alcohol use: Not Currently     Alcohol/week: 1.0 standard drinks     Types: 1 Glasses of wine per week     Comment: weekly    Drug use: No   Disabled. Family History   Problem Relation Age of Onset    Heart Disease Mother     Diabetes Mother     Heart Disease Father     Diabetes Father     Cancer Father         prostate, colon, skin cance behind ear    Cancer Maternal Aunt 54        breast    Cancer Maternal Uncle 62        colon    Cancer Paternal Aunt 58        breast    Cancer Other 62        maternal great aunt - breast       No Known Allergies    Current Outpatient Medications   Medication Sig Dispense Refill    sertraline (ZOLOFT) 100 MG tablet Take 100 mg by mouth daily as needed      diclofenac sodium (VOLTAREN) 1 % GEL Apply 2 g topically 4 times daily 60 g 2    DULoxetine (CYMBALTA) 30 MG extended release capsule Take 1 capsule by mouth every morning.  90 capsule 0    metoprolol succinate (TOPROL XL) 25 MG extended release tablet TAKE 1/2 TABLET BY MOUTH ONCE DAILY 45 tablet 1    pramipexole (MIRAPEX) 0.125 MG tablet TAKE 1 TABLET BY MOUTH NIGHTLY 90 tablet 1    BASAGLAR KWIKPEN 100 UNIT/ML injection pen INJECT 50 UNITS SUBCUTANEOUSLY NIGHTLY 15 mL 2    JARDIANCE 10 MG tablet TAKE 1 TABLET BY MOUTH DAILY 90 tablet 1    lactobacillus (CULTURELLE) CAPS capsule TAKE (1) CAPSULE BY MOUTH DAILY 90 capsule 1    metFORMIN (GLUCOPHAGE) 500 MG tablet TAKE TWO (2) TABLETS BY MOUTH TWICE DAILY (WITH MEALS) 360 tablet 1    omeprazole (PRILOSEC) 40 MG delayed release capsule TAKE 1 CAPSULE BY MOUTH DAILY 90 capsule 1    sucralfate (CARAFATE) 1 GM tablet Take 1 tablet by mouth 4 times daily 120 tablet 3    Blood Glucose Monitoring Suppl (ONE TOUCH ULTRA 2) w/Device KIT Test 3 times a day & as needed for symptoms of irregular blood glucose. Dispense sufficient amount for indicated testing frequency plus additional to accommodate PRN testing needs. 1 kit 0    Lancets MISC Test 3 times a day & as needed for symptoms of irregular blood glucose. Dispense sufficient amount for indicated testing frequency plus additional to accommodate PRN testing needs. 300 each 3    blood glucose monitor strips Test 3 times a day & as needed for symptoms of irregular blood glucose. Dispense sufficient amount for indicated testing frequency plus additional to accommodate PRN testing needs.  300 strip 3    Cholecalciferol (VITAMIN D) 50 MCG (2000 UT) CAPS capsule Take by mouth      anastrozole (ARIMIDEX) 1 MG tablet Take 1 tablet by mouth daily 90 tablet 1    hydrocortisone 2.5 % cream APPLY TO AFFECTED AREA TOPICALLY TWICE DAILY 30 g 10    Alcohol Swabs (ALCOHOL PREP) 70 % PADS Use QID prior to glucose testing and insulin injection 400 each 1    acetaminophen (TYLENOL) 500 MG tablet Take 1 tablet by mouth 4 times daily as needed for Pain 120 tablet 0    rosuvastatin (CRESTOR) 5 MG tablet       GABAPENTIN PO Take 300 mg by mouth Nightly       B-D ULTRAFINE III SHORT PEN 31G X 8 MM MISC USE WITH INSULIN PENS AS DIRECTED 100 each 10    ondansetron (ZOFRAN) 4 MG tablet Take 1 tablet by mouth every 8 hours as needed for Nausea or Vomiting 20 tablet 0    fluticasone (FLONASE) 50 MCG/ACT nasal spray 1 spray by Nasal route daily 1 Bottle 3    nitroGLYCERIN (NITROSTAT) 0.4 MG SL tablet Place 1 tablet under the tongue every 5 minutes as needed for Chest pain 25 tablet 1    Dulaglutide (TRULICITY) 1.5 CL/2.2UK SOPN INJECT THE CONTENTS OF 1 SYRINGE SUBCUTANEOUSLY EVERY WEEK *PATIENT NEEDS APPOINTMENT* (Patient taking differently: INJECT THE CONTENTS OF 1 SYRINGE SUBCUTANEOUSLY EVERY WEEK *PATIENT NEEDS APPOINTMENT*    Takes on saturdays) 6 mL 10    Insulin Syringe-Needle U-100 ([employer] INSULIN SYR 1CC/30G) 30G X 5/16\" 1 ML MISC 1 each by Does not apply route 4 times daily 120 each 5    bumetanide (BUMEX) 1 MG tablet Take 1 tablet by mouth every other day 90 tablet 1    famotidine (PEPCID) 20 MG tablet Take 1 tablet by mouth 2 times daily 180 tablet 1    insulin lispro (ADMELOG SOLOSTAR) 100 UNIT/ML pen As directed per sliding scale up to 13 units 4 times per day. 5 pen 3    Blood Pressure KIT 1 kit by Does not apply route daily 1 kit 0    Misc. Devices (QUAD CANE) MISC 1 Quad cane 1 each 0    Multiple Vitamins-Minerals (THERAPEUTIC MULTIVITAMIN-MINERALS) tablet Take 1 tablet by mouth daily      B Complex Vitamins (B COMPLEX 1 PO) Take 1 tablet by mouth daily. No current facility-administered medications for this visit. Review of Systems - For review of systems, positive symptoms are underlined and negative findings are not underlined. General: chills, fatigue, fever, malaise, night sweats, weight gain,  weight loss.  Psychological: anxiety, depression, suicidal ideation, sleep disturbances, behavioral disorder, difficulty concentrating, disorientation, hallucinations, mood swings, obsessive thoughts, physical abuse, sexual abuse. Ophthalmic: blurry vision, decreased vision, double vision, loss of vision, photophobia, use of corrective device. Ear Nose Throat: hearing loss, tinnitus, phonophobia, sensitivity to smells, vertigo, or vocal changes. Allergy/Immunology: seasonal allergies, watery eyes, itchy eyes, frequent infections. Hematological and Lymphatic: bleeding problems, blood clots, bruising,  yellowing of the skin, swollen lymph nodes. Endocrine:  polydypsia, polyuria, temperature intolerance. Respiratory: cough, shortness of breath, wheezing. Cardiovascular: syncope, chest pain, dyspnea on exertion, edema, irregular heartbeat,  palpitations. Gastrointestinal: abdominal pain, constipation, diarrhea,  decreased appetite, heartburn, hematemesis, melena, nausea, vomiting, stool incontinence, abnormal swallowing. Genito-Urinary: dysuria, hematuria, incontinence, frequency, urgency. Musculoskeletal: joint pain, stiffness, swelling, muscle pain, muscle  tenderness. Neurological: confusion, memory loss, dizziness, gait disturbance, headaches, impaired coordination, decreased balance, numbness/tingling, seizures, speech problems, tremors,weakness. Dermatological:  hair changes, nail changes, pruritus, rash. Physical Exam: Blood pressure 138/81, pulse 83, height 4' 11\" (1.499 m), weight 167 lb (75.8 kg), not currently breastfeeding. General: The patient is in no apparent distress. Body habitus is non-obese. HEENT: No rhinorrhea, sneezing, yawning, or lacrimation. No scleral icterus or conjunctival injection. SKIN: No piloerection. No track marks. No rash. Normal turgor. No erythema or ecchymosis. Psychological: Mood and affect are appropriate. Hygiene is appropriate. Cardiovascular:  Heart is regular rate and rhythm. Peripheral pulses are 2+ at the dorsalis pedis, posterior tibial and radial arteries. There is no edema. Respiratory: Respirations are regular and unlabored. There is no cyanosis.   Lymphatic: There is no cervical or inguinal lymphadenopathy. Gastrointestinal: Soft abdomen, non-tender. No pulsating abdominal mass. Genitourinary: No costovertebral angle tenderness. MSK: BilateralHand/Wrist:No edema, warmth, erythema, ecchymosis, effusion, instability, atrophy or deformity of the hand and wrist.  No nodules. No atrophy. No tenderness or fullness in the anatomic snuffbox bilaterally. No tenderness to palpation at the  carpal-metacarpal, metacarpal-phalangeal or interphalageal joint. No trigger finger or thumb. No pain with valgus or varus stress at the thumb metacarpal-phalangeal joints. + left Finkelstein's. Negative Froment's Palmaris brevis sign and Wartenberg's. Negative Ok sign.  + Tinel at bilateral wrist  Phalen, and Carpal compression tests. Thumb ulnar collateral stress test shows no instability. Digit collateral stress testing showed no instability. Lopez's sign was negative. Supination lift test is negative. Full and painless AROM of the hand and wrist without crepitus. Negative Spurling. Neurologic: Awake, alert and oriented in three planes. Speech is fluent. No facial weakness. Tongue is midline. Hearing is intact for conversation. Pupils are equal and round. Extraocular muscles are intact. Hearing is intact for conversation. Shoulder shrug symmetric. Sensation: Intact for light touch and pin prick in all upper and lower extremity dermatomes. Vibration and proprioception are intact at the bilateral first MTP. Strength: 5/5 in all myotomes in the upper and lower extremities. Muscle Tendon Reflexes: 2+ symmetric in the bilateral upper and lower extremities. Babinski is downgoing bilaterally. Blanca Gonzalez is negative bilaterally. Gait is Normal.   Romberg is negative. Heel and toe walk are normal.  Tandem walk is normal.  No clonus or spasticity. The patient was able to rise from a chair and squat without difficulty. There is no tremor. Impression:   1. Bilateral carpal tunnel syndrome    2. De Quervain's tenosynovitis, left        Plan:   Orders Placed This Encounter   Procedures    Brace thumb spica   [address] [facility]     Referral Priority:   Routine     Referral Type:   Eval and Treat     Referral Reason:   Specialty Services Required     Requested Specialty:   Occupational Therapy     Number of Visits Requested:   1    [facility]     Order Specific Question:   Which body part? Answer:   bilateral upper extremities regarding cts    SPLINT VELCRO WRIST     Orders Placed This Encounter   Medications    diclofenac sodium (VOLTAREN) 1 % GEL     Sig: Apply 2 g topically 4 times daily     Dispense:  60 g     Refill:  2      If not improving with therapy, consider injection.  The patient was educated about the diagnosis, prognosis, indications, risks and benefits of treatment. An opportunity to ask questions was given to the patient and questions were answered. The patient agreed to proceed with the recommended treatment as described above.  Follow up at [facility]     Thank you for the consultation and for allowing me to participate in the care of this patient. Sincerely,         Divina Purvis D.O., P.T.   Board Certified Physical Medicine and Rehabilitation  Board Certified Electrodiagnostic Medicine

## 2021-11-05 ASSESSMENT — ENCOUNTER SYMPTOMS
EYE DISCHARGE: 0
TROUBLE SWALLOWING: 0
CHOKING: 0
WHEEZING: 0
CHEST TIGHTNESS: 0
NAUSEA: 0
SORE THROAT: 0
BLOOD IN STOOL: 0
SINUS PRESSURE: 0
CONSTIPATION: 0
ABDOMINAL PAIN: 0
SINUS PAIN: 0
COUGH: 0
EYE ITCHING: 0
RHINORRHEA: 0
SHORTNESS OF BREATH: 0
DIARRHEA: 0
VOMITING: 0
ABDOMINAL DISTENTION: 0
VOICE CHANGE: 0
BACK PAIN: 0

## 2021-11-05 NOTE — PROGRESS NOTES
Subjective: Left breast cancer; ER/KS +; Her-2/Salvador negative by [facility]  2/17/2021 Left localized lumpectomy with left axillary sentinal lymph node excision   -Oncotype RS 16  -Adjuvant RT completed 05/04/2021  -ET with Arimidex started 05/06/2021. Patient ID: Mikey Nguyễn is a 46 y.o. female. HPI   Follow up for left breast cancer    Ultrasound guided core biopsy on 12/23/2020  Left breast, core needle biopsy: Invasive, moderately differentiatedductal carcinoma (grade 2)  Comment:    Intradepartmental consultation is obtained. Breast Cancer Marker Studies:  Estrogen Receptors (ER): 90%  Progesterone Receptors (KS): 90%  HER-2/salvador: Indeterminate/2+     FISH analysis HER/2: Negative  Average HER-2 signals/nucleus: 3.4   Average MARGARITO 17 signals/nucleus: 2.9   HER-2/MARGARITO 17 signal ratio: 1.2      2/17/2021 Left localized lumpectomy with left axillary sentinal lymph node excision   A.  Breast, left, lumpectomy: Invasive ductal carcinoma   Ductal carcinoma in situ   Invasive carcinoma present less than 1 mm from yellow/medial margin   Margins negative for ductal carcinoma in situ     B.  New medial anterior margin, excision: Negative for carcinoma   C.  [place] lymph node, excision: One (1) lymph node, negative for metastatic carcinoma     CANCER CASE SUMMARY   Procedure: Excision (less than total mastectomy)   Specimen laterality: Left   Tumor size: 1.3 cm in greatest dimension   Histologic type:  Invasive carcinoma of no special type (ductal)   Histologic grade ([place] Histologic Score):        Glandular/tubular differentiation: Score 2        Nuclear pleomorphism: Score 2        Mitotic rate: Score 3        Overall grade: Grade 2, score 7   Ductal carcinoma in situ: Present        Nuclear grade: Grade II (intermediate)   Margins:        Invasive carcinoma margins: Uninvolved by invasive carcinoma             Distance from closest margin: 3 mm-blue/inferior        Ductal carcinoma in situ margins: Uninvolved by DCIS             Distance from closest margin: 2 mm from blue/inferior and red/superior   Regional lymph nodes: Uninvolved by tumor cells        Total number of lymph nodes examined: 1        Number of sentinel nodes examined: 1   Treatment effect in the breast: No known presurgical therapy   Pathologic stage classification (pTNM, AJCC 8th Edition):   pT1c (sn)pN0     Oncotype DX Breast Cancer Report Recurrence Score Result-16   Distant recurrence risk at 9 years-4%   Absolute chemotherapy benefit- <1%      -2021 completed adjuvant RT .  -2021 endocrine therapy with Arimidex 1 mg by mouth daily started per med-oncEvan MD.    Past Medical History:   Diagnosis Date    Anesthesia complication     problem with blood pressure up & down    Arthritis     lower back    Back pain     Blind     right eye    Cancer ([address]     breast    Cardiac valve prolapse     micro    Diabetes mellitus ([address]     Head injury 2018    Hyperlipidemia     Hypertension     Neuropathy due to secondary diabetes ([address]     in marcial feet    Nontraumatic tear of right rotator cuff 2020    Prolonged emergence from general anesthesia     Psoriasis      Past Surgical History:   Procedure Laterality Date    ANKLE SURGERY      bilateral tarsal tunnels    BREAST BIOPSY Left 2021    LEFT BREAST NEEDLE LOCALIZED LUMPECTOMY, BLUE DYE INJECTION, LEFT AXILLARY SENTINEL LYMPHNODE EXCISION, POSSIBLE LEFT AXILLARY DISSECTION performed by Ricky Leal MD at [address]      cardiac catheterization    CARPAL TUNNEL RELEASE      left    CARPAL TUNNEL RELEASE  2012    right     SECTION      CHOLECYSTECTOMY, LAPAROSCOPIC N/A 2019    LAPAROSCOPIC CHOLECYSTECTOMY WITH IOC performed by Aleksey Escudero MD at [address]  had extremely high blood pressure and hard to wake up    COLONOSCOPY N/A 2019    COLORECTAL CANCER SCREENING, NOT HIGH RISK performed by Take 1 tablet by mouth 4 times daily 120 tablet 3    Blood Glucose Monitoring Suppl (ONE TOUCH ULTRA 2) w/Device KIT Test 3 times a day & as needed for symptoms of irregular blood glucose. Dispense sufficient amount for indicated testing frequency plus additional to accommodate PRN testing needs. 1 kit 0    Lancets MISC Test 3 times a day & as needed for symptoms of irregular blood glucose. Dispense sufficient amount for indicated testing frequency plus additional to accommodate PRN testing needs. 300 each 3    blood glucose monitor strips Test 3 times a day & as needed for symptoms of irregular blood glucose. Dispense sufficient amount for indicated testing frequency plus additional to accommodate PRN testing needs.  300 strip 3    Cholecalciferol (VITAMIN D) 50 MCG (2000 UT) CAPS capsule Take by mouth      anastrozole (ARIMIDEX) 1 MG tablet Take 1 tablet by mouth daily 90 tablet 1    hydrocortisone 2.5 % cream APPLY TO AFFECTED AREA TOPICALLY TWICE DAILY 30 g 10    Alcohol Swabs (ALCOHOL PREP) 70 % PADS Use QID prior to glucose testing and insulin injection 400 each 1    acetaminophen (TYLENOL) 500 MG tablet Take 1 tablet by mouth 4 times daily as needed for Pain 120 tablet 0    rosuvastatin (CRESTOR) 5 MG tablet       GABAPENTIN PO Take 300 mg by mouth Nightly       B-D ULTRAFINE III SHORT PEN 31G X 8 MM MISC USE WITH INSULIN PENS AS DIRECTED 100 each 10    ondansetron (ZOFRAN) 4 MG tablet Take 1 tablet by mouth every 8 hours as needed for Nausea or Vomiting 20 tablet 0    fluticasone (FLONASE) 50 MCG/ACT nasal spray 1 spray by Nasal route daily 1 Bottle 3    nitroGLYCERIN (NITROSTAT) 0.4 MG SL tablet Place 1 tablet under the tongue every 5 minutes as needed for Chest pain 25 tablet 1    Dulaglutide (TRULICITY) 1.5 WG/9.7TV SOPN INJECT THE CONTENTS OF 1 SYRINGE SUBCUTANEOUSLY EVERY WEEK *PATIENT NEEDS APPOINTMENT* (Patient taking differently: INJECT THE CONTENTS OF 1 SYRINGE SUBCUTANEOUSLY EVERY WEEK *PATIENT NEEDS APPOINTMENT*    Takes on saturdays) 6 mL 10    Insulin Syringe-Needle U-100 ([employer] INSULIN SYR 1CC/30G) 30G X 5/16\" 1 ML MISC 1 each by Does not apply route 4 times daily 120 each 5    bumetanide (BUMEX) 1 MG tablet Take 1 tablet by mouth every other day 90 tablet 1    famotidine (PEPCID) 20 MG tablet Take 1 tablet by mouth 2 times daily 180 tablet 1    insulin lispro (ADMELOG SOLOSTAR) 100 UNIT/ML pen As directed per sliding scale up to 13 units 4 times per day. 5 pen 3    Blood Pressure KIT 1 kit by Does not apply route daily 1 kit 0    Misc. Devices (QUAD CANE) MISC 1 Quad cane 1 each 0    Multiple Vitamins-Minerals (THERAPEUTIC MULTIVITAMIN-MINERALS) tablet Take 1 tablet by mouth daily      B Complex Vitamins (B COMPLEX 1 PO) Take 1 tablet by mouth daily. No current facility-administered medications on file prior to visit. Review of Systems   Constitutional: Negative for activity change, appetite change, chills, fatigue, fever and unexpected weight change. Ventura Nguyen is doing well. She enjoys spending time with her 10 grandchildren. Tolerating Arimidex well. HENT: Negative for congestion, postnasal drip, rhinorrhea, sinus pressure, sinus pain, sore throat, trouble swallowing and voice change. Eyes: Negative for discharge, itching and visual disturbance. Respiratory: Negative for cough, choking, chest tightness, shortness of breath and wheezing. Cardiovascular: Negative for chest pain, palpitations and leg swelling. Gastrointestinal: Negative for abdominal distention, abdominal pain, blood in stool, constipation, diarrhea, nausea and vomiting. Endocrine: Negative for cold intolerance and heat intolerance. Genitourinary: Negative for difficulty urinating, dysuria, frequency and hematuria. Musculoskeletal: Negative for arthralgias, back pain, gait problem, joint swelling, myalgias, neck pain and neck stiffness.    Allergic/Immunologic: Negative for environmental allergies and food allergies. Neurological: Negative for dizziness, seizures, syncope, speech difficulty, weakness, light-headedness and headaches. Hematological: Negative for adenopathy. Does not bruise/bleed easily. Psychiatric/Behavioral: Negative for agitation, confusion and decreased concentration. The patient is not nervous/anxious. Objective:   Physical Exam  Vitals and nursing note reviewed. Constitutional:       General: She is not in acute distress. Appearance: She is well-developed. She is not diaphoretic. Comments: ECOG remains stable at 0; pleasant and conversant   HENT:      Head: Normocephalic and atraumatic. Mouth/Throat:      Pharynx: No oropharyngeal exudate. Eyes:      General: No scleral icterus. Right eye: No discharge. Left eye: No discharge. Conjunctiva/sclera: Conjunctivae normal.   Neck:      Thyroid: No thyromegaly. Vascular: No JVD. Trachea: No tracheal deviation. Cardiovascular:      Rate and Rhythm: Normal rate and regular rhythm. Heart sounds: No murmur heard. No friction rub. No gallop. Pulmonary:      Effort: Pulmonary effort is normal. No respiratory distress or retractions. Breath sounds: Normal breath sounds. No stridor. No wheezing or rales. Chest:      Chest wall: No mass, lacerations, deformity, swelling, tenderness or edema. Breasts: Breasts are symmetrical.      Right: No inverted nipple, mass, nipple discharge, skin change or tenderness. Left: No inverted nipple, mass, nipple discharge, skin change or tenderness. Comments: Breast tissue is dense bilaterally. Her exam remains stable and without evidence of recurrent disease. Abdominal:      General: There is no distension. Palpations: Abdomen is soft. Tenderness: There is no abdominal tenderness. There is no guarding or rebound. Musculoskeletal:         General: No tenderness or deformity.  Normal range of motion. Right shoulder: Normal.      Left shoulder: Normal.      Cervical back: Normal range of motion and neck supple. Lymphadenopathy:      Cervical: No cervical adenopathy. Right cervical: No superficial, deep or posterior cervical adenopathy. Left cervical: No superficial, deep or posterior cervical adenopathy. Upper Body:      Right upper body: No pectoral adenopathy. Left upper body: No pectoral adenopathy. Skin:     General: Skin is warm and dry. Coloration: Skin is not pale. Findings: No erythema or rash. Neurological:      Mental Status: She is alert and oriented to person, place, and time. Coordination: Coordination normal.   Psychiatric:         Behavior: Behavior normal.         Thought Content: Thought content normal.         Judgment: Judgment normal.         Assessment:     46 y.o. extremely pleasant female with Left breast cancer     Ultrasound guided core biopsy on 12/23/2020  Left breast, core needle biopsy: Invasive, moderately differentiatedductal carcinoma (grade 2)  Comment:    Intradepartmental consultation is obtained. Breast Cancer Marker Studies:  Estrogen Receptors (ER): 90%  Progesterone Receptors (AK): 90%  HER-2/salvador: Indeterminate/2+     FISH analysis HER/2: Negative  Average HER-2 signals/nucleus: 3.4   Average MARGARITO 17 signals/nucleus: 2.9   HER-2/MARGARITO 17 signal ratio: 1.2      2/17/2021 Left localized lumpectomy with left axillary sentinal lymph node excision   A.  Breast, left, lumpectomy: Invasive ductal carcinoma   Ductal carcinoma in situ   Invasive carcinoma present less than 1 mm from yellow/medial margin   Margins negative for ductal carcinoma in situ     B.  New medial anterior margin, excision: Negative for carcinoma   C.  [place] lymph node, excision: One (1) lymph node, negative for metastatic carcinoma     CANCER CASE SUMMARY tumor size 1.3 cm's. Overall grade 2, score 7. DCIS present, grade 2. Margins negative.   Lymph nodes negative. Pathologic stage classification (pTNM, AJCC 8th Edition): pT1c (sn)pN0     Oncotype DX Breast Cancer Report Recurrence Score Result-16   Distant recurrence risk at 9 years-4%   Absolute chemotherapy benefit- <1%     -04/06/2021 DEXA bone density normal and lumbar spine and bilateral hips.  -05/04/2021 completed adjuvant RT .  -05/06/2021 ET with Arimidex 1 mg by mouth daily started per med-onc, Caren Wolfe MD.  -09/02/2021 clinical follow-up without evidence of recurrent disease. She has recovered from radiation therapy. We reviewed NCCN guidelines for breast cancer follow-up, breast massage, importance of a good supportive bra at all times while awake, breast self-examination, and limiting alcohol. -11/15/2021 bilateral screening mammogram:  Negative, BI-RADS 1.  -11/15/2021 clinical follow-up is without evidence of recurrent disease. Imaging reviewed, including her BI-RADS result. We again reviewed importance of good supportive bra, breast massage twice daily, and limiting alcohol. We discussed importance of calcium and vitamin D supplementation as directed as well as weightbearing exercises. Plan:   1. Continue monthly breast/chest wall self examination; detailed instructions reviewed today. Bring any changes to your physician's attention. 2. Continue healthy diet and exercise routinely as tolerated. 3. Maintaining ideal body weight (20-25 BMI) may lead to optimal breast cancer outcomes. 4. Avoid alcohol. 5. Repeat mammogram December 2022  6. Continue Arimidex 1 mg daily at the discretion of medical oncology team.  7. Ca+/VitD while on Arimidex. 8. Continue follow up with Medical Oncology and Primary Care. 9. RTC 6 months. During today's visit, face-to-face time 25 minutes, greater than 50% in counseling education and coordination of care. All questions were answered to her apparent satisfaction, and she is agreeable to the plan as outlined above.         Joanna Rubi Benites, MSN, APRN-CNP, [address]  Advanced Oncology Certified Nurse Practitioner  Department of Breast Surgery  [facility]/  [facility] in collaboration with Dr. Bola Arellano.  Roberta/Rehana [id number] [facility], APRN - CNP

## 2021-11-08 ENCOUNTER — OFFICE VISIT (OUTPATIENT)
Dept: PHYSICAL MEDICINE AND REHAB | Age: 51
End: 2021-11-08
Payer: MEDICAID

## 2021-11-08 VITALS — HEIGHT: 59 IN | BODY MASS INDEX: 33.67 KG/M2 | WEIGHT: 167 LBS

## 2021-11-08 DIAGNOSIS — G56.03 BILATERAL CARPAL TUNNEL SYNDROME: Primary | ICD-10-CM

## 2021-11-08 DIAGNOSIS — G56.23 ULNAR NEUROPATHY OF BOTH UPPER EXTREMITIES: ICD-10-CM

## 2021-11-08 PROCEDURE — 95886 MUSC TEST DONE W/N TEST COMP: CPT | Performed by: PHYSICAL MEDICINE & REHABILITATION

## 2021-11-08 PROCEDURE — 95913 NRV CNDJ TEST 13/> STUDIES: CPT | Performed by: PHYSICAL MEDICINE & REHABILITATION

## 2021-11-08 NOTE — PROGRESS NOTES
[address] [facility]  Electrodiagnostic Laboratory  *Accredited by the [address] [place] [address] with exemplary status  [address] [facility] [place]  Phone: [phone]  Fax: [phone]    Referring Provider: Jovani Looney DO  Primary Care Physician: Sofia Gonzalez DO  Patient Name: Meaghan Meneses  Patient YOB: 1970  Gender: female  BMI: Body mass index is 33.73 kg/m². Height 4' 11\" (1.499 m), weight 167 lb (75.8 kg), not currently breastfeeding. 11/8/2021    Description of clinical problem:   Chief Complaint   Patient presents with    Extremity Pain     pain in the left wrist and fingers. 8/10 pain. comes and goes. no acute injury. 2+ weeks of symp    Numbness     numbness in all the fingers on both sides. left side is worse.  Extremity Weakness     decrease  strength. Sensory NCS      Nerve / Sites Rec. Site Peak Lat PP Amp Segments Distance Velocity Temp.      ms µV  cm m/s °C   L Median - Digit II (Antidromic)      Palm Dig II 2.03 3.4* Palm - Dig II 7 56 32      Wrist Dig II 4.84* 3.2* Wrist - Dig II 14 35 32   R Median - Digit II (Antidromic)      Palm Dig II 2.24 17.3 Palm - Dig II 7 46 32      Wrist Dig II 4.74* 13.4 Wrist - Dig II 14 36 32   L Ulnar - Digit V (Antidromic)      Wrist Dig V 4.11* 2.1* Wrist - Dig V 14 43 32   R Ulnar - Digit V (Antidromic)      Wrist Dig V 7.81* 5.0 Wrist - Dig V 14 23 32   L Radial - Anatomical snuff box (Forearm)      Forearm Wrist 2.24 17.9 Forearm - Wrist 10 64 32   L Dorsal ulnar cutaneous - Hand dorsum (Forearm)      Forearm Hand dorsum NR* NR Forearm - Hand dorsum 8 NR 32   R Dorsal ulnar cutaneous - Hand dorsum (Forearm)      Forearm Hand dorsum NR* NR Forearm - Hand dorsum 8 NR 32       Motor NCS      Nerve / Sites Muscle Onset Amplitude Segments Distance Velocity Temp.     ms mV  cm m/s °C   L Median - APB      Palm APB 2.14 5.1 Palm - APB   32      Wrist APB 5.73* 3.7 Wrist - Palm 8 22* 32      Elbow APB 9.53 3.2 Elbow - Wrist 16 42* 32   R Median - APB      Palm APB 2.08 7.5 Palm - APB   32      Wrist APB 4.69* 7.5 Wrist - Palm 8 31* 32      Elbow APB 8.80 6.1 Elbow - Wrist 16 39* 32   L Ulnar - ADM      Wrist ADM 4.01* 6.6* Wrist - ADM 8  32      B. Elbow ADM 7.40 6.0* B. Elbow - Wrist 15 44* 32      A. Elbow ADM 10.47 5.5* A. Elbow - B. Elbow 10 33* 32   R Ulnar - ADM      Wrist ADM 3.33 2.1* Wrist - ADM 8  32      B. Elbow ADM 8.07 1.3* B. Elbow - Wrist 14 30* 32      A. Elbow ADM 15.94 0.6* A.Elbow - B. Elbow 10 13* 32       F Wave      Nerve Fmin % F    ms %   L Median - APB 30.73 30   L Ulnar - ADM 30.57 40   R Median - APB 29.79 80   R Ulnar - ADM 37.19* 80       EMG      EMG Summary Table     Spontaneous MUAP Recruitment   Muscle Nerve Roots IA Fib PSW Fasc Amp Dur. PPP Pattern   L. Biceps brachii Musculocutaneous C5-C6 N None None None N N N N   L. Triceps brachii Radial C6-C8 N None None None N N N N   L. Pronator teres Median C6-C7 N None None None N N N N   L. First dorsal interosseous Ulnar C8-T1 N None None None N N N N   L. Abductor pollicis brevis Median X5-P4 N None None None N N N Reduced   R. Biceps brachii Musculocutaneous C5-C6 N None None None N N N N   R. Triceps brachii Radial C6-C8 N None None None N N N N   R. Pronator teres Median C6-C7 N None None None N N N N   R. First dorsal interosseous Ulnar C8-T1 N None None None N N N Discrete   R. Abductor pollicis brevis Median S8-D8 N None None None N N N N   R. Cervical paraspinals (low)  - N None None None N N N N   R. Cervical paraspinals (mid)  - N None None None N N N N   L. Cervical paraspinals (low)  - N None None None N N N N   L. Cervical paraspinals (mid)  - N None None None N N N N      Study Limitations:  none    Summary of Findings:   Nerve conduction studies:   · The following nerve conduction studies were abnormal:   · The bilateral median sensory latency at the wrist was prolonged. The amplitude on the left was small.    · The bilateral ulnar sensory latency at the wrist was prolonged and the amplitude on the left was small. · Bilateral dorsal ulnar cutaneous responses were absent. · Bilateral median motor latency was prolonged and conduction velocity was slow. · Bilateral ulnar motor amplitude was small and conduction velocity slow. Left ulnar motor latency at the wrist was prolonged. · All other nerve conduction studies listed in the table above were normal in latency, amplitude and conduction velocity. Needle EMG:   · Needle EMG was performed using a concentric needle. The following abnormalities were seen on needle EMG: The left abductor pollicis brevis  Motor unit recruitment was reduced and there was only one recruitable motor unit in the right first dorsal interossei.  All other muscles tested, as listed in the table above demonstrated normal amplitude, duration, phases and recruitment and no active denervation signs were seen. Diagnostic Interpretation: This study was complexly abnormal.     Electrodiagnosis: There is electrodiagnostic evidence of a median mononeuropathy. · Location: bilateral, due to axonal component not specifically localizable   · Nature: [  ] Axonal   [  ] Demyelinating  [ X ] Mixed axonal and demyelinating     [  ] Sensory [  ] Motor               [ X ] Mixed sensorimotor     [  ] with active denervation       [ X ] without active denervation  · Duration: Acute on right, subacute on left  · Severity: moderate  · Prognosis: Fair. The prognosis for recovery of demyelinating lesions is good if the cause is alleviated. The prognosis for recovery of axonal lesions is poor and dependant on collateral sprouting and reinnervation. Electrodiagnosis: There is electrodiagnostic evidence of a ulnar neuropathy.    · Location: bilateral, not localizable due to axonal component   · Nature: [  ] Axonal   [  ] Demyelinating  [ X ] Mixed axonal and demyelinating     [  ] Sensory [  ] Motor               Karimeey  ] Mixed sensorimotor     [  ] with active denervation       [ X ] without active denervation  · Duration: Subacute on right, acute on left  · Severity: moderate  · Prognosis: Fair. The prognosis for recovery of axonal lesions is poor and dependant on collateral sprouting and reinnervation. The prognosis for recovery of demyelinating lesions is good if the cause is alleviated. Previous Study: no       Multiple upper extremity entrapment neuropathies can be suggestive of an underlying peripheral neuropathy. If there is clinical concern for peripheral polyneuropathy, recommend EMG evaluation of the lower extremities to further evaluate. Technologist: Jasmyne Serum  Physician:    Patricia Carcamo D.O., P.T. Board Certified Physical Medicine and Rehabilitation  Board Certified Electrodiagnostic Medicine      Nerve conduction studies and electromyography were performed according to our laboratory policies and procedures which can be provided upon request. All abnormal values are identified in the table.  Laboratory normal values can also be provided upon request.       Cc: DO Debbi Carpenter DO

## 2021-11-08 NOTE — PATIENT INSTRUCTIONS
Electrodiagnotic Laboratory  Accredited by the [facility] with Exemplary status  JANY Zhang D.O. [facility]  [address] [place]  Phone: [phone]  Fax: [phone]        Today you had an electrodiagnostic exam which included nerve conduction studies (NCS) and electromyography (EMG). This test evaluated the electrical activity of your nerves and muscles to help determine if you have a nerve or muscle disease. This test can help determine the location and type of a nerve or muscle problem. This will help your referring doctor diagnose your condition and determine the appropriate next step in your treatment plan. After your test:    1. There are no long lasting side effects of the test.     2. You may resume your normal activities without restrictions. 3.  Resume any medications that were stopped for the test.     4  If you have sore areas or bruising in your muscles where the needle was placed, apply a cold pack to the sore area for 15-20 minutes three to four times a day as needed for pain. The soreness should go away in about 1-2 days. 5. Your results were provided  Briefly at the end of your test and the final detailed report will be provided to your referring physician, and/or primary care physician and any other parties you requested within 1-2 days of the examination. You may wish to contact your referring provider after a few days to determine what they would like you to do next. 6.  Please call [phone] with any questions or concerns and if you develop increased body temperature/fever, swelling, tenderness, increased pain and/or drainage from the sites where the needle was placed. Thank you for choosing us for your health care needs.

## 2021-11-12 DIAGNOSIS — E11.65 UNCONTROLLED TYPE 2 DIABETES MELLITUS WITH HYPERGLYCEMIA (HCC): ICD-10-CM

## 2021-11-12 RX ORDER — INSULIN GLARGINE 100 [IU]/ML
INJECTION, SOLUTION SUBCUTANEOUS
Qty: 15 ML | Refills: 3 | Status: SHIPPED
Start: 2021-11-12 | End: 2022-03-11

## 2021-11-18 ENCOUNTER — TELEPHONE (OUTPATIENT)
Dept: ADMINISTRATIVE | Age: 51
End: 2021-11-18

## 2021-11-18 NOTE — TELEPHONE ENCOUNTER
Patient called in to request an EMG follow-up with Dr. Wilbur Kapadia. Upon reviewing chart I noticed Dr. Kristie Macdonald ordered the EMG. Should patient follow-up with Dr. Wilbur Kapadia or Dr. Kristie Macdonald?

## 2021-11-18 NOTE — TELEPHONE ENCOUNTER
Do you want this patient to follow up with you or Dr Georgette Tobar? I see in your note she would need lower EMG but she was originally referred for upper extremities.   Please advise thanks

## 2021-11-19 ENCOUNTER — TELEPHONE (OUTPATIENT)
Dept: PHYSICAL MEDICINE AND REHAB | Age: 51
End: 2021-11-19

## 2021-11-19 NOTE — TELEPHONE ENCOUNTER
She needs scheduled for the lower extremity EMG to complete the work up for neuropathy. The upper extremity report is in there if she wants to see Dr. Toney Mars for that.

## 2021-11-19 NOTE — TELEPHONE ENCOUNTER
Called patient to schedule lower extremity EMG but patient states she is not interested in having that,  already knows she has neuropathy stated she was going to make appointment to follow up with

## 2021-11-19 NOTE — TELEPHONE ENCOUNTER
We don't have a referral for lower extremities to do an EMG, Do you want us to get another referral from Dr. Wilbur Kapadia and I can schedule EMG BLE ? Or should she just follow up with  for the upper extremity EMG that she already had ?   Please advise  Thanks

## 2021-11-28 NOTE — PROGRESS NOTES
Chief Complaint   Patient presents with    Wrist Pain     Left wrist follow up. SHe has seen some relief from topical pain cream. She had EMG completed. HPI:    Patient is 46 y.o. female complaining of Left wrist pain for the past week. No history of injury. Complains thumb pain into forearm, also index finger numbness and swelling. Patient treatments include rest, ice, heat, NSAIDs, HEP without much relief. Left wrist follow up. SHe has seen some relief from topical pain cream. She had EMG completed. ROS:    Skin: (-) rash,(-) psoriasis,(-) eczema, (-)skin cancer. Neurologic: (-)numbness, (-)tingling, (-)headaches, (-) LOC. Cardiovascular: (-) Chest pain, (-) swelling in legs/feet, (-) SOB, (-) cramping in legs/feet with walking.     All other review of systems negative except stated above or in HPI      Past Medical History:   Diagnosis Date    Anesthesia complication     problem with blood pressure up & down    Arthritis     lower back    Back pain     Blind     right eye    Cancer ([address]     breast    Cardiac valve prolapse     micro    Diabetes mellitus ([address]     Head injury 2018    History of therapeutic radiation     Hyperlipidemia     Hypertension     Neuropathy due to secondary diabetes ([address]     in marcial feet    Nontraumatic tear of right rotator cuff 2020    Prolonged emergence from general anesthesia     Psoriasis      Past Surgical History:   Procedure Laterality Date    ANKLE SURGERY      bilateral tarsal tunnels    BREAST BIOPSY Left 2021    LEFT BREAST NEEDLE LOCALIZED LUMPECTOMY, BLUE DYE INJECTION, LEFT AXILLARY SENTINEL LYMPHNODE EXCISION, POSSIBLE LEFT AXILLARY DISSECTION performed by Ricky Leal MD at [address]      cardiac catheterization    CARPAL TUNNEL RELEASE      left    CARPAL TUNNEL RELEASE  2012    right     SECTION      CHOLECYSTECTOMY, LAPAROSCOPIC N/A 2019    LAPAROSCOPIC CHOLECYSTECTOMY TABLETS BY MOUTH TWICE DAILY (WITH MEALS), Disp: 360 tablet, Rfl: 1    omeprazole (PRILOSEC) 40 MG delayed release capsule, TAKE 1 CAPSULE BY MOUTH DAILY, Disp: 90 capsule, Rfl: 1    sucralfate (CARAFATE) 1 GM tablet, Take 1 tablet by mouth 4 times daily, Disp: 120 tablet, Rfl: 3    Blood Glucose Monitoring Suppl (ONE TOUCH ULTRA 2) w/Device KIT, Test 3 times a day & as needed for symptoms of irregular blood glucose. Dispense sufficient amount for indicated testing frequency plus additional to accommodate PRN testing needs. , Disp: 1 kit, Rfl: 0    Lancets MISC, Test 3 times a day & as needed for symptoms of irregular blood glucose. Dispense sufficient amount for indicated testing frequency plus additional to accommodate PRN testing needs. , Disp: 300 each, Rfl: 3    blood glucose monitor strips, Test 3 times a day & as needed for symptoms of irregular blood glucose. Dispense sufficient amount for indicated testing frequency plus additional to accommodate PRN testing needs. , Disp: 300 strip, Rfl: 3    Cholecalciferol (VITAMIN D) 50 MCG (2000 UT) CAPS capsule, Take by mouth, Disp: , Rfl:     hydrocortisone 2.5 % cream, APPLY TO AFFECTED AREA TOPICALLY TWICE DAILY, Disp: 30 g, Rfl: 10    acetaminophen (TYLENOL) 500 MG tablet, Take 1 tablet by mouth 4 times daily as needed for Pain, Disp: 120 tablet, Rfl: 0    rosuvastatin (CRESTOR) 5 MG tablet, , Disp: , Rfl:     GABAPENTIN PO, Take 300 mg by mouth Nightly , Disp: , Rfl:     B-D ULTRAFINE III SHORT PEN 31G X 8 MM MISC, USE WITH INSULIN PENS AS DIRECTED, Disp: 100 each, Rfl: 10    ondansetron (ZOFRAN) 4 MG tablet, Take 1 tablet by mouth every 8 hours as needed for Nausea or Vomiting, Disp: 20 tablet, Rfl: 0    fluticasone (FLONASE) 50 MCG/ACT nasal spray, 1 spray by Nasal route daily, Disp: 1 Bottle, Rfl: 3    nitroGLYCERIN (NITROSTAT) 0.4 MG SL tablet, Place 1 tablet under the tongue every 5 minutes as needed for Chest pain, Disp: 25 tablet, Rfl: 1   Dulaglutide (TRULICITY) 1.5 CJ/8.1RH SOPN, INJECT THE CONTENTS OF 1 SYRINGE SUBCUTANEOUSLY EVERY WEEK *PATIENT NEEDS APPOINTMENT* (Patient taking differently: INJECT THE CONTENTS OF 1 SYRINGE SUBCUTANEOUSLY EVERY WEEK *PATIENT NEEDS APPOINTMENT*  Takes on saturdays), Disp: 6 mL, Rfl: 10    Insulin Syringe-Needle U-100 ([employer] INSULIN SYR 1CC/30G) 30G X 5/16\" 1 ML MISC, 1 each by Does not apply route 4 times daily, Disp: 120 each, Rfl: 5    bumetanide (BUMEX) 1 MG tablet, Take 1 tablet by mouth every other day, Disp: 90 tablet, Rfl: 1    famotidine (PEPCID) 20 MG tablet, Take 1 tablet by mouth 2 times daily, Disp: 180 tablet, Rfl: 1    insulin lispro (ADMELOG SOLOSTAR) 100 UNIT/ML pen, As directed per sliding scale up to 13 units 4 times per day., Disp: 5 pen, Rfl: 3    Blood Pressure KIT, 1 kit by Does not apply route daily, Disp: 1 kit, Rfl: 0    Misc. Devices (QUAD CANE) MISC, 1 Quad cane, Disp: 1 each, Rfl: 0    Multiple Vitamins-Minerals (THERAPEUTIC MULTIVITAMIN-MINERALS) tablet, Take 1 tablet by mouth daily, Disp: , Rfl:     B Complex Vitamins (B COMPLEX 1 PO), Take 1 tablet by mouth daily.   , Disp: , Rfl:   No Known Allergies  Social History     Socioeconomic History    Marital status:      Spouse name: Not on file    Number of children: 2    Years of education: Not on file    Highest education level: Not on file   Occupational History    Not on file   Tobacco Use    Smoking status: Never Smoker    Smokeless tobacco: Never Used   Vaping Use    Vaping Use: Never used   Substance and Sexual Activity    Alcohol use: Not Currently     Alcohol/week: 1.0 standard drink     Types: 1 Glasses of wine per week     Comment: weekly    Drug use: No    Sexual activity: Not on file   Other Topics Concern    Not on file   Social History Narrative    Not on file     Social Determinants of Health     Financial Resource Strain: Low Risk     Difficulty of Paying Living Expenses: Not hard at all Food Insecurity: No Food Insecurity    Worried About Running Out of Food in the Last Year: Never true    Debbie of Food in the Last Year: Never true   Transportation Needs:     Lack of Transportation (Medical): Not on file    Lack of Transportation (Non-Medical): Not on file   Physical Activity:     Days of Exercise per Week: Not on file    Minutes of Exercise per Session: Not on file   Stress:     Feeling of Stress : Not on file   Social Connections:     Frequency of Communication with Friends and Family: Not on file    Frequency of Social Gatherings with Friends and Family: Not on file    Attends Ca[employer] Services: Not on file    Active Member of [address] [place] [address] [employer] or Organizations: Not on file    Attends Club or Organization Meetings: Not on file    Marital Status: Not on file   Intimate Partner Violence:     Fear of Current or Ex-Partner: Not on file    Emotionally Abused: Not on file    Physically Abused: Not on file    Sexually Abused: Not on file   Housing Stability:     Unable to Pay for Housing in the Last Year: Not on file    Number of [place] in the Last Year: Not on file    Unstable Housing in the Last Year: Not on file     Family History   Problem Relation Age of Onset    Heart Disease Mother     Diabetes Mother     Heart Disease Father     Diabetes Father     Cancer Father         prostate, colon, skin cance behind ear    Breast Cancer Maternal Aunt 54        breast    Cancer Maternal Uncle 62        colon    Cancer Paternal Aunt 58        breast    Cancer Other 62        maternal great aunt - breast           Physical Exam:    Ht 4' 11\" (1.499 m)   Wt 167 lb (75.8 kg)   LMP  (LMP Unknown)   BMI 33.73 kg/m²     GENERAL: alert, appears stated age, cooperative, no acute distress    HEENT: Head is normocephalic, atraumatic. PERRLA. SKIN: Clean, dry, intact.  There is not any cellulitis or cutaneous lesions noted in the lower extremities    PULMONARY: breathing is regular and unlabored, no acute distress    CV: The bilateral upper and lower extremities are warm and well-perfused with brisk capillary refill. 2+ pulses UE and LE bilateral.     PSYCHIATRY: Pleasant mood, appropriate behavior, follows commands    NEURO: Sensation is intact distally with light touch with no alteration. Motor exam of the lower extremities show quadriceps, hamstrings, foot dorsiflexion and plantarflexion grossly intact 5/5. LYMPH: No lymphedema present distally in upper or lower extremity. MUSCULOSKELETAL:  Cervical Exam:    On physical exam, patient is well-developed, well-nourished, oriented to person, place and time. her gait is normal.  On evaluation of her cervical spine, she has full range of motion of the cervical spine without pain. There is no cervical tenderness to palpation. Shoulder Exam:    On evaluation of her bilaterally upper extremities, her bilateral shoulder has no deformity. There is not evidence of scapular dyskinesis. There is not muscle atrophy in shoulder girdle. The range of motion for the Right Shoulder is 160/160/60 and for the Left shoulder is 160/160/60. Right shoulder Motor strength is 5/5 in the supraspinatus, 5/5 internal rotation and 5/5 in external rotation, and Left shoulder motor strength 5/5 in supraspinatus, 5/5 in internal rotation, 5/5 in external rotation. Elbow exam:    Evaluation of the elbow, reveals no signs of swelling or deformity. ROM is 0-130. There is not instability with varus/valgus stresses. Motor strength is 5/5 with flexion/extension. Wrist exam:       Inspection of the bilateral upper extremities, there is no evidence of deformity of the wrist.  ROM Wrist ROM R wrist DF 90, VF 80, L wrist DF 80, VF 90, R pronation 40/ supination 40, L pronation 40/supination 40. Motor strength is 5/5 with Dorsiflexion/Volarflexion/Supination/Pronation.  strength 5/5.   Thenar eminence appears bilaterally symmetrical.   Motor and sensation is intact and symmetric throughout the bilateral upper extremities in the ulnar and radial , musclcutaneous, and axillary nerve distributions. There is paresthesia in the median nerve distribution of the left hand. Hand exam:    The skin overlying the hand is  intact. There is not evidence of scar, lesion, laceration, or abrasion. The motion in the small joints of the hand are intact with no stiffness or deformity. The ROM in the MCP flexion full/ extension full , PIP flexion full/ extension full, DIP flexion full/ extension full. There is not rotational deformity. There is no masses or adenopathy in bilateral upper extremities. Radial pulses are 2+ and symmetric bilaterally. Capillary refill is intact and < 2 seconds. Motor strength is 5/5 with flexion and extension of the small finger joints. Right:  Phallens sign(-), Tinnells sign (+), Median nerve compression test (+),  Finklesteins (-), [employer] Grind test (-), [employer](-). Left:  Phallens sign(+), Tinnells sign (+), Median nerve compression test (+),  Finklesteins (-), [employer] Grind test (-), [employer](-).          no change since last visit. Imaging:  XR WRIST LEFT (MIN 3 VIEWS)    Result Date: 9/24/2021  EXAMINATION: 4 XRAY VIEWS OF THE LEFT WRIST 9/23/2021 12:13 pm COMPARISON: None. HISTORY: ORDERING SYSTEM PROVIDED HISTORY: Left wrist pain FINDINGS: Carpal bones and alignment are maintained. Distal radius and ulna are intact. No acute fracture or dislocation. Unremarkable left wrist.       Impression    Diagnostic Interpretation: This study was complexly abnormal.       Electrodiagnosis: There is electrodiagnostic evidence of a median mononeuropathy.    \" Location: bilateral, due to axonal component not specifically localizable   \" Nature: [  ] Axonal   [  ] Demyelinating  [ X ] Mixed axonal and demyelinating                      [  ] Sensory [  ] Motor               [ X ] Mixed sensorimotor                      [  ] with active denervation       [ X ] without active denervation   \" Duration: Acute on right, subacute on left   \" Severity: moderate   \" Prognosis: Fair. The prognosis for recovery of demyelinating lesions is good if the cause is alleviated.  The prognosis for recovery of axonal lesions is poor and dependant on collateral sprouting and reinnervation.         Electrodiagnosis: There is electrodiagnostic evidence of a ulnar neuropathy. \" Location: bilateral, not localizable due to axonal component   \" Nature: [  ] Axonal   [  ] Demyelinating  [ X ] Mixed axonal and demyelinating                      [  ] Sensory [  ] Motor               [X  ] Mixed sensorimotor                      [  ] with active denervation       [ X ] without active denervation   \" Duration: Subacute on right, acute on left   \" Severity: moderate   \" Prognosis: Fair.  The prognosis for recovery of axonal lesions is poor and dependant on collateral sprouting and reinnervation.  The prognosis for recovery of demyelinating lesions is good if the cause is alleviated.       Previous Study: no          Multiple upper extremity entrapment neuropathies can be suggestive of an underlying peripheral neuropathy. If there is clinical concern for peripheral polyneuropathy, recommend EMG evaluation of the lower extremities to further evaluate. Krystal Zhang was seen today for wrist pain. Diagnoses and all orders for this visit:    Paresthesia of left upper extremity    Adhesive capsulitis of right shoulder    Neuropathy    Exercise counseling        Patient seen and examined. X-rays reviewed. Natural history and course discussed with patient. Treatment options discussed with patient in detail including risks and benefits. Patient does have vague forearm pain with paresthesias down into the hand and wrist specifically around thumb. Patient is a brittle diabetic and may be experiencing carpal tunnel syndrome with neuropathic elements.   Patient also shows calcification over radial artery and possible vascular complications to this as well. Patient seen and examined. Imaging and nerve study reviewed with patient in detail. Natural history and course discussed with patient in long discussion  Treatment options discussed with patient in detail including risks and benefits. Patient should do well with conservative management as patient would like to avoid surgery at this time. In a 15 minute assessment and discussion, patient was counseled on continued weight loss, diet, and physical activity relating to this condition. She was educated with options in detail including nutrition, joining a health club/ weight loss program, and use of cardio equipment such as the Arc Trainer and the importance of use as well as range of motion and HEP exercises for weight loss. Andres Curiel, DO          25 minutes was spent with patient. 50% or greater was spent counseling the patient.

## 2021-12-01 ENCOUNTER — OFFICE VISIT (OUTPATIENT)
Dept: ORTHOPEDIC SURGERY | Age: 51
End: 2021-12-01
Payer: MEDICAID

## 2021-12-01 VITALS — BODY MASS INDEX: 33.67 KG/M2 | HEIGHT: 59 IN | WEIGHT: 167 LBS

## 2021-12-01 DIAGNOSIS — Z71.82 EXERCISE COUNSELING: ICD-10-CM

## 2021-12-01 DIAGNOSIS — G62.9 NEUROPATHY: ICD-10-CM

## 2021-12-01 DIAGNOSIS — Z79.811 USE OF AROMATASE INHIBITORS: ICD-10-CM

## 2021-12-01 DIAGNOSIS — R20.2 PARESTHESIA OF LEFT UPPER EXTREMITY: Primary | ICD-10-CM

## 2021-12-01 DIAGNOSIS — C50.912 MALIGNANT NEOPLASM OF LEFT FEMALE BREAST, UNSPECIFIED ESTROGEN RECEPTOR STATUS, UNSPECIFIED SITE OF BREAST (HCC): Primary | ICD-10-CM

## 2021-12-01 DIAGNOSIS — M75.01 ADHESIVE CAPSULITIS OF RIGHT SHOULDER: ICD-10-CM

## 2021-12-01 PROCEDURE — G8417 CALC BMI ABV UP PARAM F/U: HCPCS | Performed by: ORTHOPAEDIC SURGERY

## 2021-12-01 PROCEDURE — 1036F TOBACCO NON-USER: CPT | Performed by: ORTHOPAEDIC SURGERY

## 2021-12-01 PROCEDURE — G8427 DOCREV CUR MEDS BY ELIG CLIN: HCPCS | Performed by: ORTHOPAEDIC SURGERY

## 2021-12-01 PROCEDURE — 99214 OFFICE O/P EST MOD 30 MIN: CPT | Performed by: ORTHOPAEDIC SURGERY

## 2021-12-01 PROCEDURE — 3017F COLORECTAL CA SCREEN DOC REV: CPT | Performed by: ORTHOPAEDIC SURGERY

## 2021-12-01 PROCEDURE — G8484 FLU IMMUNIZE NO ADMIN: HCPCS | Performed by: ORTHOPAEDIC SURGERY

## 2021-12-01 RX ORDER — ANASTROZOLE 1 MG/1
1 TABLET ORAL DAILY
Qty: 90 TABLET | Refills: 1 | Status: SHIPPED
Start: 2021-12-01 | End: 2022-05-18 | Stop reason: SDUPTHER

## 2021-12-01 NOTE — TELEPHONE ENCOUNTER
Patient requested refill for Femara via interface. Order sent to [facility] with confirmation of receipt.

## 2021-12-09 ENCOUNTER — HOSPITAL ENCOUNTER (OUTPATIENT)
Dept: GENERAL RADIOLOGY | Age: 51
Discharge: HOME | End: 2021-12-11
Payer: MEDICAID

## 2021-12-09 ENCOUNTER — OFFICE VISIT (OUTPATIENT)
Dept: BREAST CENTER | Age: 51
End: 2021-12-09
Payer: MEDICAID

## 2021-12-09 VITALS
HEIGHT: 59 IN | SYSTOLIC BLOOD PRESSURE: 116 MMHG | HEART RATE: 83 BPM | BODY MASS INDEX: 32.25 KG/M2 | OXYGEN SATURATION: 97 % | WEIGHT: 160 LBS | DIASTOLIC BLOOD PRESSURE: 70 MMHG | TEMPERATURE: 97.3 F | RESPIRATION RATE: 18 BRPM

## 2021-12-09 DIAGNOSIS — Z12.31 VISIT FOR SCREENING MAMMOGRAM: ICD-10-CM

## 2021-12-09 DIAGNOSIS — Z85.3 HISTORY OF BREAST CANCER: ICD-10-CM

## 2021-12-09 PROCEDURE — 1036F TOBACCO NON-USER: CPT | Performed by: NURSE PRACTITIONER

## 2021-12-09 PROCEDURE — G8417 CALC BMI ABV UP PARAM F/U: HCPCS | Performed by: NURSE PRACTITIONER

## 2021-12-09 PROCEDURE — 99213 OFFICE O/P EST LOW 20 MIN: CPT | Performed by: NURSE PRACTITIONER

## 2021-12-09 PROCEDURE — 77063 BREAST TOMOSYNTHESIS BI: CPT

## 2021-12-09 PROCEDURE — 3017F COLORECTAL CA SCREEN DOC REV: CPT | Performed by: NURSE PRACTITIONER

## 2021-12-09 PROCEDURE — G8484 FLU IMMUNIZE NO ADMIN: HCPCS | Performed by: NURSE PRACTITIONER

## 2021-12-09 PROCEDURE — G8427 DOCREV CUR MEDS BY ELIG CLIN: HCPCS | Performed by: NURSE PRACTITIONER

## 2021-12-09 NOTE — PATIENT INSTRUCTIONS

## 2021-12-10 RX ORDER — ISOPROPYL ALCOHOL 70 ML/100ML
SWAB TOPICAL
Qty: 100 EACH | Refills: 2 | Status: SHIPPED
Start: 2021-12-10 | End: 2022-03-11

## 2022-01-07 RX ORDER — SERTRALINE HYDROCHLORIDE 100 MG/1
TABLET, FILM COATED ORAL
Qty: 90 TABLET | Refills: 0 | Status: SHIPPED
Start: 2022-01-07 | End: 2022-04-18

## 2022-01-10 ENCOUNTER — OFFICE VISIT (OUTPATIENT)
Dept: FAMILY MEDICINE CLINIC | Age: 52
End: 2022-01-10
Payer: MEDICAID

## 2022-01-10 VITALS
DIASTOLIC BLOOD PRESSURE: 74 MMHG | BODY MASS INDEX: 31.65 KG/M2 | RESPIRATION RATE: 18 BRPM | TEMPERATURE: 97.9 F | WEIGHT: 157 LBS | OXYGEN SATURATION: 96 % | SYSTOLIC BLOOD PRESSURE: 124 MMHG | HEART RATE: 89 BPM | HEIGHT: 59 IN

## 2022-01-10 DIAGNOSIS — E78.2 MIXED HYPERLIPIDEMIA: ICD-10-CM

## 2022-01-10 DIAGNOSIS — Z17.0 MALIGNANT NEOPLASM OF LEFT BREAST IN FEMALE, ESTROGEN RECEPTOR POSITIVE, UNSPECIFIED SITE OF BREAST (HCC): ICD-10-CM

## 2022-01-10 DIAGNOSIS — Z23 IMMUNIZATION DUE: ICD-10-CM

## 2022-01-10 DIAGNOSIS — E11.65 UNCONTROLLED TYPE 2 DIABETES MELLITUS WITH HYPERGLYCEMIA (HCC): ICD-10-CM

## 2022-01-10 DIAGNOSIS — K21.9 GASTROESOPHAGEAL REFLUX DISEASE WITHOUT ESOPHAGITIS: ICD-10-CM

## 2022-01-10 DIAGNOSIS — L30.9 DERMATITIS: ICD-10-CM

## 2022-01-10 DIAGNOSIS — C50.912 MALIGNANT NEOPLASM OF LEFT BREAST IN FEMALE, ESTROGEN RECEPTOR POSITIVE, UNSPECIFIED SITE OF BREAST (HCC): ICD-10-CM

## 2022-01-10 DIAGNOSIS — I10 ESSENTIAL HYPERTENSION: ICD-10-CM

## 2022-01-10 DIAGNOSIS — R19.7 DIARRHEA, UNSPECIFIED TYPE: Primary | ICD-10-CM

## 2022-01-10 LAB
CHP ED QC CHECK: NORMAL
GLUCOSE BLD-MCNC: 250 MG/DL
HBA1C MFR BLD: 8.6 %

## 2022-01-10 PROCEDURE — 83036 HEMOGLOBIN GLYCOSYLATED A1C: CPT | Performed by: FAMILY MEDICINE

## 2022-01-10 PROCEDURE — G8427 DOCREV CUR MEDS BY ELIG CLIN: HCPCS | Performed by: FAMILY MEDICINE

## 2022-01-10 PROCEDURE — 99214 OFFICE O/P EST MOD 30 MIN: CPT | Performed by: FAMILY MEDICINE

## 2022-01-10 PROCEDURE — 90471 IMMUNIZATION ADMIN: CPT | Performed by: FAMILY MEDICINE

## 2022-01-10 PROCEDURE — 3052F HG A1C>EQUAL 8.0%<EQUAL 9.0%: CPT | Performed by: FAMILY MEDICINE

## 2022-01-10 PROCEDURE — 1036F TOBACCO NON-USER: CPT | Performed by: FAMILY MEDICINE

## 2022-01-10 PROCEDURE — G8482 FLU IMMUNIZE ORDER/ADMIN: HCPCS | Performed by: FAMILY MEDICINE

## 2022-01-10 PROCEDURE — 90674 CCIIV4 VAC NO PRSV 0.5 ML IM: CPT | Performed by: FAMILY MEDICINE

## 2022-01-10 PROCEDURE — 2022F DILAT RTA XM EVC RTNOPTHY: CPT | Performed by: FAMILY MEDICINE

## 2022-01-10 PROCEDURE — G8417 CALC BMI ABV UP PARAM F/U: HCPCS | Performed by: FAMILY MEDICINE

## 2022-01-10 PROCEDURE — 3017F COLORECTAL CA SCREEN DOC REV: CPT | Performed by: FAMILY MEDICINE

## 2022-01-10 PROCEDURE — 82962 GLUCOSE BLOOD TEST: CPT | Performed by: FAMILY MEDICINE

## 2022-01-10 RX ORDER — DESOXIMETASONE 2.5 MG/G
CREAM TOPICAL
Qty: 30 G | Refills: 0 | Status: SHIPPED
Start: 2022-01-10 | End: 2022-02-21

## 2022-01-10 RX ORDER — ROSUVASTATIN CALCIUM 5 MG/1
TABLET, COATED ORAL
Qty: 90 TABLET | Refills: 1 | Status: SHIPPED
Start: 2022-01-10 | End: 2022-09-14

## 2022-01-10 RX ORDER — OMEPRAZOLE 40 MG/1
CAPSULE, DELAYED RELEASE ORAL
Qty: 90 CAPSULE | Refills: 1 | Status: SHIPPED
Start: 2022-01-10 | End: 2022-03-14

## 2022-01-11 DIAGNOSIS — E11.65 UNCONTROLLED TYPE 2 DIABETES MELLITUS WITH HYPERGLYCEMIA (HCC): ICD-10-CM

## 2022-01-11 RX ORDER — DULOXETIN HYDROCHLORIDE 30 MG/1
CAPSULE, DELAYED RELEASE ORAL
Qty: 30 CAPSULE | Refills: 0 | OUTPATIENT
Start: 2022-01-11

## 2022-01-12 RX ORDER — BLOOD SUGAR DIAGNOSTIC
STRIP MISCELLANEOUS
Qty: 300 EACH | Refills: 2 | Status: SHIPPED
Start: 2022-01-12 | End: 2022-10-05

## 2022-01-13 PROBLEM — Z23 IMMUNIZATION DUE: Status: ACTIVE | Noted: 2022-01-13

## 2022-01-13 PROBLEM — R19.7 DIARRHEA: Status: ACTIVE | Noted: 2022-01-13

## 2022-01-13 ASSESSMENT — ENCOUNTER SYMPTOMS
COLOR CHANGE: 0
ANAL BLEEDING: 0
SORE THROAT: 0
CONSTIPATION: 0
SINUS PAIN: 0
EYE PAIN: 0
RESPIRATORY NEGATIVE: 1
CHOKING: 0
PHOTOPHOBIA: 0
COUGH: 0
EYE ITCHING: 0
SINUS PRESSURE: 0
NAUSEA: 0
VOMITING: 0
DIARRHEA: 0
EYE REDNESS: 0
STRIDOR: 0
WHEEZING: 0
SHORTNESS OF BREATH: 0
BACK PAIN: 0
EYE DISCHARGE: 0
BLOOD IN STOOL: 0
TROUBLE SWALLOWING: 0
RHINORRHEA: 0
VOICE CHANGE: 0
FACIAL SWELLING: 0
ALLERGIC/IMMUNOLOGIC NEGATIVE: 1
ABDOMINAL PAIN: 0
ABDOMINAL DISTENTION: 0
EYES NEGATIVE: 1
CHEST TIGHTNESS: 0
RECTAL PAIN: 0

## 2022-01-14 NOTE — PROGRESS NOTES
SUBJECTIVE  Jenny Grady is a 45 y.o. female. HPI/Chief C/O:  Chief Complaint   Patient presents with    Diabetes     A1C and RBS pended and done    Medication Refill     pharmacy verified and meds pended     No Known Allergies    This 45year old female presents for physical exam.  Pt has type 2 DM, GERD, hypertension, hyperlipidemia, and malignant neoplasm of left breast ([address] Pt c/o diarrhea, has followed with Dr. Shemar Julian in past. Pt has carpal tunnel syndrome with neuropathy, follow with ortho, has brace and uses voltaren cream. A1C is 8.5.   ROS:  Review of Systems   Constitutional: Positive for fatigue. Negative for activity change, appetite change, chills, diaphoresis, fever and unexpected weight change. HENT: Negative. Negative for congestion, dental problem, drooling, ear discharge, ear pain, facial swelling, hearing loss, mouth sores, nosebleeds, postnasal drip, rhinorrhea, sinus pressure, sinus pain, sneezing, sore throat, tinnitus, trouble swallowing and voice change. Eyes: Negative. Negative for photophobia, pain, discharge, redness, itching and visual disturbance. Respiratory: Negative. Negative for cough, choking, chest tightness, shortness of breath, wheezing and stridor. Cardiovascular: Negative. Negative for chest pain, palpitations and leg swelling. Gastrointestinal: Negative for abdominal distention, abdominal pain, anal bleeding, blood in stool, constipation, diarrhea, nausea, rectal pain and vomiting. Endocrine: Negative. Negative for cold intolerance, heat intolerance, polydipsia, polyphagia and polyuria. Genitourinary: Positive for frequency and urgency. Negative for decreased urine volume, difficulty urinating, dysuria, flank pain, genital sores, hematuria, menstrual problem and pelvic pain. Musculoskeletal: Positive for arthralgias and myalgias. Negative for back pain, gait problem, joint swelling, neck pain and neck stiffness. Skin: Positive for rash. Negative for color change, pallor and wound. Allergic/Immunologic: Negative. Neurological: Negative. Negative for dizziness, tremors, seizures, syncope, facial asymmetry, speech difficulty, weakness, light-headedness, numbness and headaches. Hematological: Negative. Negative for adenopathy. Does not bruise/bleed easily. Psychiatric/Behavioral: Negative for agitation, behavioral problems, confusion, decreased concentration, dysphoric mood, hallucinations, self-injury, sleep disturbance and suicidal ideas. The patient is nervous/anxious. The patient is not hyperactive.          Past Medical/Surgical Hx;  Reviewed with patient      Diagnosis Date    Anesthesia complication     problem with blood pressure up & down    Arthritis     lower back    Back pain     Blind     right eye    Cancer ([address]     breast    Cardiac valve prolapse     micro    Diabetes mellitus ([address]     Head injury 2018    History of therapeutic radiation     Hyperlipidemia     Hypertension     Neuropathy due to secondary diabetes ([address]     in marcial feet    Nontraumatic tear of right rotator cuff 2020    Prolonged emergence from general anesthesia     Psoriasis      Past Surgical History:   Procedure Laterality Date    ANKLE SURGERY      bilateral tarsal tunnels    BREAST BIOPSY Left 2021    LEFT BREAST NEEDLE LOCALIZED LUMPECTOMY, BLUE DYE INJECTION, LEFT AXILLARY SENTINEL LYMPHNODE EXCISION, POSSIBLE LEFT AXILLARY DISSECTION performed by Kate Kaufman MD at [address]      cardiac catheterization    CARPAL TUNNEL RELEASE      left    CARPAL TUNNEL RELEASE  2012    right     SECTION      CHOLECYSTECTOMY, LAPAROSCOPIC N/A 2019    LAPAROSCOPIC CHOLECYSTECTOMY WITH IOC performed by Aria Godfrey MD at [address]  had extremely high blood pressure and hard to wake up    COLONOSCOPY N/A 2019    COLORECTAL CANCER SCREENING, NOT HIGH RISK performed by Ulices Schneider Benji Duran MD at [address] [facility], [address] [facility] ARTHROSCOPY Left 01/21/2016    subacromin decompression and debridement    SHOULDER ARTHROSCOPY Right 11/16/2020    RIGHT SHOULDER ARTHROSCOPY, SUBACROMIAL DECOMPRESSION, LABRIAL DEBRIDEMENT, CHONDROPLASTY, RAÚL performed by Luis A Milligan DO at [facility] N/A 06/20/2019    EGD BIOPSY performed by Dmitri Abdul MD at [facility] LEFT  12/23/2020    US BREAST NEEDLE BIOPSY LEFT 12/23/2020 [facility]       Past Family Hx:  Reviewed with patient      Problem Relation Age of Onset    Heart Disease Mother     Diabetes Mother     Heart Disease Father     Diabetes Father     Cancer Father         prostate, colon, skin cance behind ear    Breast Cancer Maternal Aunt 54        breast    Cancer Maternal Uncle 62        colon    Cancer Paternal Aunt 58        breast    Cancer Other 62        maternal great aunt - breast       Social Hx:  Reviewed with patient  Social History     Tobacco Use    Smoking status: Never Smoker    Smokeless tobacco: Never Used   Substance Use Topics    Alcohol use: Not Currently     Alcohol/week: 1.0 standard drink     Types: 1 Glasses of wine per week     Comment: weekly       OBJECTIVE  /74   Pulse 89   Temp 97.9 °F (36.6 °C)   Resp 18   Ht 4' 11\" (1.499 m)   Wt 157 lb (71.2 kg)   LMP  (LMP Unknown)   SpO2 96%   Breastfeeding No   BMI 31.71 kg/m²     Problem List:  Jenifer Sanches does not have any pertinent problems on file. PHYS EX:  Physical Exam  Vitals and nursing note reviewed. Constitutional:       General: She is not in acute distress. Appearance: Normal appearance. She is well-developed. She is obese. She is not ill-appearing, toxic-appearing or diaphoretic. Comments: Patient has morbid obesity. Patient instructed on low calorie, healthy ADA diet. HENT:      Head: Normocephalic and atraumatic. Nose: Nose normal. No congestion or rhinorrhea. Eyes:      General: No scleral icterus. Right eye: No discharge. Left eye: No discharge. Conjunctiva/sclera: Conjunctivae normal.      Pupils: Pupils are equal, round, and reactive to light. Neck:      Thyroid: No thyromegaly. Vascular: No carotid bruit or JVD. Trachea: No tracheal deviation. Cardiovascular:      Rate and Rhythm: Normal rate and regular rhythm. Pulses: Normal pulses. Heart sounds: Normal heart sounds. No murmur heard. No friction rub. No gallop. Pulmonary:      Effort: Pulmonary effort is normal. No respiratory distress. Breath sounds: Normal breath sounds. No stridor. No wheezing, rhonchi or rales. Chest:      Chest wall: No tenderness. Abdominal:      General: Bowel sounds are normal. There is no distension. Palpations: Abdomen is soft. There is no mass. Tenderness: There is no abdominal tenderness. There is no right CVA tenderness, left CVA tenderness, guarding or rebound. Hernia: No hernia is present. Musculoskeletal:         General: Tenderness present. No swelling, deformity or signs of injury. Cervical back: Normal range of motion and neck supple. No rigidity or tenderness. No muscular tenderness. Right lower leg: No edema. Left lower leg: No edema. Comments: Pain and decreased ROM wrist.      Lymphadenopathy:      Cervical: No cervical adenopathy. Skin:     General: Skin is warm. Coloration: Skin is not jaundiced or pale. Findings: Rash present. No bruising, erythema or lesion. Comments: Pt has bed bug bites. Neurological:      General: No focal deficit present. Mental Status: She is alert and oriented to person, place, and time. Cranial Nerves: No cranial nerve deficit. Sensory: No sensory deficit. Motor: No weakness or abnormal muscle tone.       Coordination: Coordination normal.      Gait: Gait normal. Deep Tendon Reflexes: Reflexes are normal and symmetric. Reflexes normal.   Psychiatric:         Mood and Affect: Mood normal.         Behavior: Behavior normal.         Thought Content: Thought content normal.         Judgment: Judgment normal.         ASSESSMENT/PLAN  Jenny was seen today for diabetes and medication refill. Diagnoses and all orders for this visit:    Diarrhea, unspecified type  -     AFL - Ramana Castillo MD, Gastroenterology, [facility]    Uncontrolled type 2 diabetes mellitus with hyperglycemia ([facility]  -     POCT glycosylated hemoglobin (Hb A1C)  -     POCT Glucose  -     metFORMIN (GLUCOPHAGE) 500 MG tablet; TAKE TWO (2) TABLETS BY MOUTH TWICE DAILY (WITH MEALS)  -     rosuvastatin (CRESTOR) 5 MG tablet; Take 1 pill by mouth daily  -     Maylin Deutsch MD, Endocrinology, [place]  Controlled. Metformin, jardiance,  trulicity, admelog, insulin, statin, ace, ADA diet. Gastroesophageal reflux disease without esophagitis  -     omeprazole (PRILOSEC) 40 MG delayed release capsule; TAKE 1 CAPSULE BY MOUTH DAILY  -     ADELA Castillo MD, Gastroenterology, [facility]    Essential hypertension  Controlled. BB, bumex, statin, low salt diet. Malignant neoplasm of left breast in female, estrogen receptor positive, unspecified site of breast ([facility]    Dermatitis  Not controlled. Hydrocortisone cream 2.5%. Immunization due  -     INFLUENZA, MDCK QUADV, 2 YRS AND OLDER, IM, PF, PREFILL SYR OR SDV, 0.5ML (FLUCELVAX QUADV, PF)    Mixed hyperlipidemia  -     rosuvastatin (CRESTOR) 5 MG tablet; Take 1 pill by mouth daily  Pt instructed on low chol. Diet. Other orders  -     desoximetasone (TOPICORT) 0.25 % cream; Apply topically 2 times daily. Pt instructed to stop cymbalta on decreasing dose. Pt instructed if any worse go ED ASAP.     Outpatient Encounter Medications as of 1/10/2022   Medication Sig Dispense Refill    metFORMIN (GLUCOPHAGE) 500 MG tablet TAKE TWO (2) TABLETS BY MOUTH TWICE DAILY (WITH MEALS) 360 tablet 1    omeprazole (PRILOSEC) 40 MG delayed release capsule TAKE 1 CAPSULE BY MOUTH DAILY 90 capsule 1    rosuvastatin (CRESTOR) 5 MG tablet Take 1 pill by mouth daily 90 tablet 1    desoximetasone (TOPICORT) 0.25 % cream Apply topically 2 times daily. 30 g 0    sertraline (ZOLOFT) 100 MG tablet TAKE 1 TABLET BY MOUTH ONCE DAILY AS NEEDED FOR DEPRESSION 90 tablet 0    Alcohol Swabs (EASY TOUCH ALCOHOL PREP MEDIUM) 70 % PADS USE AS DIRECTED PRIOR TO GLUCOSE TESTING AND INSULIN INJECTION FOUR TIMES A  each 2    anastrozole (ARIMIDEX) 1 MG tablet TAKE 1 TABLET BY MOUTH DAILY 90 tablet 1    BASAGLAR KWIKPEN 100 UNIT/ML injection pen INJECT 50 UNITS SUBCUTANEOUSLY NIGHTLY 15 mL 3    diclofenac sodium (VOLTAREN) 1 % GEL Apply 2 g topically 4 times daily 60 g 2    metoprolol succinate (TOPROL XL) 25 MG extended release tablet TAKE 1/2 TABLET BY MOUTH ONCE DAILY 45 tablet 1    pramipexole (MIRAPEX) 0.125 MG tablet TAKE 1 TABLET BY MOUTH NIGHTLY 90 tablet 1    JARDIANCE 10 MG tablet TAKE 1 TABLET BY MOUTH DAILY 90 tablet 1    lactobacillus (CULTURELLE) CAPS capsule TAKE (1) CAPSULE BY MOUTH DAILY 90 capsule 1    sucralfate (CARAFATE) 1 GM tablet Take 1 tablet by mouth 4 times daily 120 tablet 3    Blood Glucose Monitoring Suppl (ONE TOUCH ULTRA 2) w/Device KIT Test 3 times a day & as needed for symptoms of irregular blood glucose. Dispense sufficient amount for indicated testing frequency plus additional to accommodate PRN testing needs. 1 kit 0    Lancets MISC Test 3 times a day & as needed for symptoms of irregular blood glucose. Dispense sufficient amount for indicated testing frequency plus additional to accommodate PRN testing needs. 300 each 3    [DISCONTINUED] blood glucose monitor strips Test 3 times a day & as needed for symptoms of irregular blood glucose. Dispense sufficient amount for indicated testing frequency plus additional to accommodate PRN testing needs. 300 strip 3    Cholecalciferol (VITAMIN D) 50 MCG (2000 UT) CAPS capsule Take by mouth      hydrocortisone 2.5 % cream APPLY TO AFFECTED AREA TOPICALLY TWICE DAILY 30 g 10    acetaminophen (TYLENOL) 500 MG tablet Take 1 tablet by mouth 4 times daily as needed for Pain 120 tablet 0    GABAPENTIN PO Take 300 mg by mouth Nightly       B-D ULTRAFINE III SHORT PEN 31G X 8 MM MISC USE WITH INSULIN PENS AS DIRECTED 100 each 10    fluticasone (FLONASE) 50 MCG/ACT nasal spray 1 spray by Nasal route daily 1 Bottle 3    nitroGLYCERIN (NITROSTAT) 0.4 MG SL tablet Place 1 tablet under the tongue every 5 minutes as needed for Chest pain 25 tablet 1    Dulaglutide (TRULICITY) 1.5 GH/2.8TK SOPN INJECT THE CONTENTS OF 1 SYRINGE SUBCUTANEOUSLY EVERY WEEK *PATIENT NEEDS APPOINTMENT* (Patient taking differently: INJECT THE CONTENTS OF 1 SYRINGE SUBCUTANEOUSLY EVERY WEEK *PATIENT NEEDS APPOINTMENT*    Takes on saturdays) 6 mL 10    Insulin Syringe-Needle U-100 ([employer] INSULIN SYR 1CC/30G) 30G X 5/16\" 1 ML MISC 1 each by Does not apply route 4 times daily 120 each 5    bumetanide (BUMEX) 1 MG tablet Take 1 tablet by mouth every other day 90 tablet 1    famotidine (PEPCID) 20 MG tablet Take 1 tablet by mouth 2 times daily 180 tablet 1    insulin lispro (ADMELOG SOLOSTAR) 100 UNIT/ML pen As directed per sliding scale up to 13 units 4 times per day. 5 pen 3    Blood Pressure KIT 1 kit by Does not apply route daily 1 kit 0    Misc. Devices (QUAD CANE) MISC 1 Quad cane 1 each 0    Multiple Vitamins-Minerals (THERAPEUTIC MULTIVITAMIN-MINERALS) tablet Take 1 tablet by mouth daily      B Complex Vitamins (B COMPLEX 1 PO) Take 1 tablet by mouth daily.         [DISCONTINUED] metFORMIN (GLUCOPHAGE) 500 MG tablet TAKE TWO (2) TABLETS BY MOUTH TWICE DAILY (WITH MEALS) 360 tablet 1    [DISCONTINUED] omeprazole (PRILOSEC) 40 MG delayed release capsule TAKE 1 CAPSULE BY MOUTH DAILY 90 capsule 1    [DISCONTINUED] rosuvastatin (CRESTOR) 5 MG tablet       ondansetron (ZOFRAN) 4 MG tablet Take 1 tablet by mouth every 8 hours as needed for Nausea or Vomiting (Patient not taking: Reported on 1/10/2022) 20 tablet 0     No facility-administered encounter medications on file as of 1/10/2022. Return in about 3 months (around 4/10/2022).         Reviewed recent labs related to Jenny's current problems      Discussed importance of regular Health Maintenance follow up  Health Maintenance   Topic    Diabetic foot exam     Hepatitis B vaccine (1 of 3 - Risk 3-dose series)    DTaP/Tdap/Td vaccine (1 - Tdap)    Shingles Vaccine (1 of 2)    Pneumococcal 0-64 years Vaccine (2 of 4 - PCV13)    COVID-19 Vaccine (3 - Pfizer risk 4-dose series)    Diabetic retinal exam     Diabetic microalbuminuria test     Lipid screen     Depression Screen     Potassium monitoring     Creatinine monitoring     Breast cancer screen     A1C test (Diabetic or Prediabetic)     Colon cancer screen colonoscopy     Flu vaccine     Hepatitis C screen     HIV screen     Hepatitis A vaccine     Hib vaccine     Meningococcal (ACWY) vaccine

## 2022-01-18 ENCOUNTER — TELEPHONE (OUTPATIENT)
Dept: FAMILY MEDICINE CLINIC | Age: 52
End: 2022-01-18

## 2022-01-18 NOTE — TELEPHONE ENCOUNTER
Pt would like new referral to Endocrinologist closer to home. Pt resides in [facility]. Which Endocrinologist accepts [facility] and is closer to pt's home than [facility]?       Electronically signed by Erick Fuentes MA on 1/18/22 at 3:37 PM EST

## 2022-01-18 NOTE — TELEPHONE ENCOUNTER
----- Message from Veronique Perla sent at 1/18/2022  9:40 AM EST -----  Subject: Referral Request    QUESTIONS   Reason for referral request? Patient wants another endocrinology referral.   The previous one, Booker Centeno MD, is too far for her to travel. Wants closer location   Has the physician seen you for this condition before? No   Preferred Specialist (if applicable)? Do you already have an appointment scheduled? No  Additional Information for Provider?   ---------------------------------------------------------------------------  --------------  CALL BACK INFO  What is the best way for the office to contact you? OK to leave message on   voicemail  Preferred Call Back Phone Number?  [phone]

## 2022-01-19 ENCOUNTER — TELEPHONE (OUTPATIENT)
Dept: BREAST CENTER | Age: 52
End: 2022-01-19

## 2022-01-19 DIAGNOSIS — N63.0 BREAST LUMP: Primary | ICD-10-CM

## 2022-01-19 NOTE — TELEPHONE ENCOUNTER
Patient called stating she felt a new lump under right arm pit. She states she is very nervous about it since she has a history of left breast cancer. Breast Ultrasound and appointment scheduled.

## 2022-01-19 NOTE — TELEPHONE ENCOUNTER
Patient referred to Endocrinology [facility] in [place], [address] [place] [address] endocrinologist that takes patient's insurance

## 2022-01-21 ASSESSMENT — ENCOUNTER SYMPTOMS
EYE DISCHARGE: 0
VOICE CHANGE: 0
ABDOMINAL PAIN: 0
BACK PAIN: 0
ABDOMINAL DISTENTION: 0
CONSTIPATION: 0
DIARRHEA: 0
SINUS PAIN: 0
TROUBLE SWALLOWING: 0
VOMITING: 0
BLOOD IN STOOL: 0
COUGH: 0
SHORTNESS OF BREATH: 0
CHEST TIGHTNESS: 0
SORE THROAT: 0
SINUS PRESSURE: 0
CHOKING: 0
EYE ITCHING: 0
WHEEZING: 0
NAUSEA: 0
RHINORRHEA: 0

## 2022-01-21 NOTE — PROGRESS NOTES
Subjective: Left breast cancer; ER/NC +; Her-2/Salvador negative by [facility]  2/17/2021 Left localized lumpectomy with left axillary sentinal lymph node excision   -Oncotype RS 16  -Adjuvant RT completed 05/04/2021  -ET with Arimidex started 05/06/2021. Patient ID: Jamal Duke is a 45 y.o. female. HPI   Follow up for left breast cancer    Ultrasound guided core biopsy on 12/23/2020  Left breast, core needle biopsy: Invasive, moderately differentiatedductal carcinoma (grade 2)  Comment:    Intradepartmental consultation is obtained. Breast Cancer Marker Studies:  Estrogen Receptors (ER): 90%  Progesterone Receptors (NC): 90%  HER-2/salvador: Indeterminate/2+     FISH analysis HER/2: Negative  Average HER-2 signals/nucleus: 3.4   Average MARGARITO 17 signals/nucleus: 2.9   HER-2/MARGARITO 17 signal ratio: 1.2      2/17/2021 Left localized lumpectomy with left axillary sentinal lymph node excision   A.  Breast, left, lumpectomy: Invasive ductal carcinoma   Ductal carcinoma in situ   Invasive carcinoma present less than 1 mm from yellow/medial margin   Margins negative for ductal carcinoma in situ     B.  New medial anterior margin, excision: Negative for carcinoma   C.  [place] lymph node, excision: One (1) lymph node, negative for metastatic carcinoma     CANCER CASE SUMMARY   Procedure: Excision (less than total mastectomy)   Specimen laterality: Left   Tumor size: 1.3 cm in greatest dimension   Histologic type:  Invasive carcinoma of no special type (ductal)   Histologic grade ([place] Histologic Score):        Glandular/tubular differentiation: Score 2        Nuclear pleomorphism: Score 2        Mitotic rate: Score 3        Overall grade: Grade 2, score 7   Ductal carcinoma in situ: Present        Nuclear grade: Grade II (intermediate)   Margins:        Invasive carcinoma margins: Uninvolved by invasive carcinoma             Distance from closest margin: 3 mm-blue/inferior        Ductal carcinoma in situ margins: Uninvolved by DCIS             Distance from closest margin: 2 mm from blue/inferior and red/superior   Regional lymph nodes: Uninvolved by tumor cells        Total number of lymph nodes examined: 1        Number of sentinel nodes examined: 1   Treatment effect in the breast: No known presurgical therapy   Pathologic stage classification (pTNM, AJCC 8th Edition):   pT1c (sn)pN0     Oncotype DX Breast Cancer Report Recurrence Score Result-16   Distant recurrence risk at 9 years-4%   Absolute chemotherapy benefit- <1%      -2021 completed adjuvant RT.  -2021 endocrine therapy with Arimidex 1 mg by mouth daily started per med-oncCassandra MD.    Past Medical History:   Diagnosis Date    Anesthesia complication     problem with blood pressure up & down    Arthritis     lower back    Back pain     Blind     right eye    Cancer ([address]     breast    Cardiac valve prolapse     micro    Diabetes mellitus ([address]     Head injury 2018    History of therapeutic radiation     Hyperlipidemia     Hypertension     Neuropathy due to secondary diabetes ([address]     in marcial feet    Nontraumatic tear of right rotator cuff 2020    Prolonged emergence from general anesthesia     Psoriasis      Past Surgical History:   Procedure Laterality Date    ANKLE SURGERY      bilateral tarsal tunnels    BREAST BIOPSY Left 2021    LEFT BREAST NEEDLE LOCALIZED LUMPECTOMY, BLUE DYE INJECTION, LEFT AXILLARY SENTINEL LYMPHNODE EXCISION, POSSIBLE LEFT AXILLARY DISSECTION performed by Barbara Luther MD at [address]      cardiac catheterization    CARPAL TUNNEL RELEASE      left    CARPAL TUNNEL RELEASE  2012    right     SECTION      CHOLECYSTECTOMY, LAPAROSCOPIC N/A 2019    LAPAROSCOPIC CHOLECYSTECTOMY WITH IOC performed by Joy Henson MD at [address]  had extremely high blood pressure and hard to wake up    COLONOSCOPY N/A 2019    COLORECTAL CANCER SCREENING, NOT HIGH RISK performed by Amaryllis Boeck, MD at [address], [address] [facility] ARTHROSCOPY Left 01/21/2016    subacromin decompression and debridement    SHOULDER ARTHROSCOPY Right 11/16/2020    RIGHT SHOULDER ARTHROSCOPY, SUBACROMIAL DECOMPRESSION, LABRIAL DEBRIDEMENT, CHONDROPLASTY, RAÚL performed by Shani Whitaker DO at [address] [facility] 06/20/2019    EGD BIOPSY performed by Amaryllis Boeck, MD at [address] [facility] LEFT  12/23/2020     BREAST NEEDLE BIOPSY LEFT 12/23/2020 [facility]     Social History     Tobacco Use    Smoking status: Never Smoker    Smokeless tobacco: Never Used   Vaping Use    Vaping Use: Never used   Substance Use Topics    Alcohol use: Not Currently     Alcohol/week: 1.0 standard drink     Types: 1 Glasses of wine per week     Comment: weekly    Drug use: No     No Known Allergies  Current Outpatient Medications on File Prior to Visit   Medication Sig Dispense Refill    blood glucose test strips (ONETOUCH ULTRA) strip USE AS DIRECTED FOUR TIMES A  each 2    metFORMIN (GLUCOPHAGE) 500 MG tablet TAKE TWO (2) TABLETS BY MOUTH TWICE DAILY (WITH MEALS) 360 tablet 1    omeprazole (PRILOSEC) 40 MG delayed release capsule TAKE 1 CAPSULE BY MOUTH DAILY 90 capsule 1    rosuvastatin (CRESTOR) 5 MG tablet Take 1 pill by mouth daily 90 tablet 1    desoximetasone (TOPICORT) 0.25 % cream Apply topically 2 times daily.  30 g 0    sertraline (ZOLOFT) 100 MG tablet TAKE 1 TABLET BY MOUTH ONCE DAILY AS NEEDED FOR DEPRESSION 90 tablet 0    Alcohol Swabs (EASY TOUCH ALCOHOL PREP MEDIUM) 70 % PADS USE AS DIRECTED PRIOR TO GLUCOSE TESTING AND INSULIN INJECTION FOUR TIMES A  each 2    anastrozole (ARIMIDEX) 1 MG tablet TAKE 1 TABLET BY MOUTH DAILY 90 tablet 1    BASAGLAR KWIKPEN 100 UNIT/ML injection pen INJECT 50 UNITS SUBCUTANEOUSLY NIGHTLY 15 mL 3    diclofenac sodium (VOLTAREN) 1 % GEL Apply 2 g topically 4 times daily 60 g 2    metoprolol succinate (TOPROL XL) 25 MG extended release tablet TAKE 1/2 TABLET BY MOUTH ONCE DAILY 45 tablet 1    pramipexole (MIRAPEX) 0.125 MG tablet TAKE 1 TABLET BY MOUTH NIGHTLY 90 tablet 1    JARDIANCE 10 MG tablet TAKE 1 TABLET BY MOUTH DAILY 90 tablet 1    lactobacillus (CULTURELLE) CAPS capsule TAKE (1) CAPSULE BY MOUTH DAILY 90 capsule 1    sucralfate (CARAFATE) 1 GM tablet Take 1 tablet by mouth 4 times daily 120 tablet 3    Blood Glucose Monitoring Suppl (ONE TOUCH ULTRA 2) w/Device KIT Test 3 times a day & as needed for symptoms of irregular blood glucose. Dispense sufficient amount for indicated testing frequency plus additional to accommodate PRN testing needs. 1 kit 0    Lancets MISC Test 3 times a day & as needed for symptoms of irregular blood glucose. Dispense sufficient amount for indicated testing frequency plus additional to accommodate PRN testing needs.  300 each 3    Cholecalciferol (VITAMIN D) 50 MCG (2000 UT) CAPS capsule Take by mouth      hydrocortisone 2.5 % cream APPLY TO AFFECTED AREA TOPICALLY TWICE DAILY 30 g 10    acetaminophen (TYLENOL) 500 MG tablet Take 1 tablet by mouth 4 times daily as needed for Pain 120 tablet 0    GABAPENTIN PO Take 300 mg by mouth Nightly       B-D ULTRAFINE III SHORT PEN 31G X 8 MM MISC USE WITH INSULIN PENS AS DIRECTED 100 each 10    ondansetron (ZOFRAN) 4 MG tablet Take 1 tablet by mouth every 8 hours as needed for Nausea or Vomiting (Patient not taking: Reported on 1/10/2022) 20 tablet 0    fluticasone (FLONASE) 50 MCG/ACT nasal spray 1 spray by Nasal route daily 1 Bottle 3    nitroGLYCERIN (NITROSTAT) 0.4 MG SL tablet Place 1 tablet under the tongue every 5 minutes as needed for Chest pain 25 tablet 1    Dulaglutide (TRULICITY) 1.5 UQ/6.4GO SOPN INJECT THE CONTENTS OF 1 SYRINGE SUBCUTANEOUSLY EVERY WEEK *PATIENT NEEDS APPOINTMENT* (Patient taking differently: INJECT THE CONTENTS OF 1 SYRINGE SUBCUTANEOUSLY EVERY WEEK *PATIENT NEEDS APPOINTMENT*    Takes on saturdays) 6 mL 10    Insulin Syringe-Needle U-100 ([employer] INSULIN SYR 1CC/30G) 30G X 5/16\" 1 ML MISC 1 each by Does not apply route 4 times daily 120 each 5    bumetanide (BUMEX) 1 MG tablet Take 1 tablet by mouth every other day 90 tablet 1    famotidine (PEPCID) 20 MG tablet Take 1 tablet by mouth 2 times daily 180 tablet 1    insulin lispro (ADMELOG SOLOSTAR) 100 UNIT/ML pen As directed per sliding scale up to 13 units 4 times per day. 5 pen 3    Blood Pressure KIT 1 kit by Does not apply route daily 1 kit 0    Misc. Devices (QUAD CANE) MISC 1 Quad cane 1 each 0    Multiple Vitamins-Minerals (THERAPEUTIC MULTIVITAMIN-MINERALS) tablet Take 1 tablet by mouth daily      B Complex Vitamins (B COMPLEX 1 PO) Take 1 tablet by mouth daily. No current facility-administered medications on file prior to visit. Review of Systems   Constitutional: Negative for activity change, appetite change, chills, fatigue, fever and unexpected weight change. Simone Delarosa presents today for evaluation of a new lump that she noted under her right axilla. She reports it is nontender. She continues to tolerate her Arimidex well. She enjoys spending time with her 10 grandchildren. HENT: Negative for congestion, postnasal drip, rhinorrhea, sinus pressure, sinus pain, sore throat, trouble swallowing and voice change. Eyes: Negative for discharge, itching and visual disturbance. Respiratory: Negative for cough, choking, chest tightness, shortness of breath and wheezing. Cardiovascular: Negative for chest pain, palpitations and leg swelling. Gastrointestinal: Negative for abdominal distention, abdominal pain, blood in stool, constipation, diarrhea, nausea and vomiting. Endocrine: Negative for cold intolerance and heat intolerance. Genitourinary: Negative for difficulty urinating, dysuria, frequency and hematuria. Musculoskeletal: Negative for arthralgias, back pain, gait problem, joint swelling, myalgias, neck pain and neck stiffness. Allergic/Immunologic: Negative for environmental allergies and food allergies. Neurological: Negative for dizziness, seizures, syncope, speech difficulty, weakness, light-headedness and headaches. Hematological: Negative for adenopathy. Does not bruise/bleed easily. Psychiatric/Behavioral: Negative for agitation, confusion and decreased concentration. The patient is not nervous/anxious. Objective:   Physical Exam  Vitals and nursing note reviewed. Constitutional:       General: She is not in acute distress. Appearance: She is well-developed. She is not diaphoretic. Comments: ECOG once again remains stable at 0; pleasant and conversant   HENT:      Head: Normocephalic and atraumatic. Mouth/Throat:      Pharynx: No oropharyngeal exudate. Eyes:      General: No scleral icterus. Right eye: No discharge. Left eye: No discharge. Conjunctiva/sclera: Conjunctivae normal.   Neck:      Thyroid: No thyromegaly. Vascular: No JVD. Trachea: No tracheal deviation. Cardiovascular:      Rate and Rhythm: Normal rate and regular rhythm. Heart sounds: No murmur heard. No friction rub. No gallop. Pulmonary:      Effort: Pulmonary effort is normal. No respiratory distress or retractions. Breath sounds: Normal breath sounds. No stridor. No wheezing or rales. Chest:      Chest wall: No mass, lacerations, deformity, swelling, tenderness or edema. Breasts: Breasts are symmetrical.      Right: No inverted nipple, mass, nipple discharge, skin change or tenderness. Left: No inverted nipple, mass, nipple discharge, skin change or tenderness. Comments: Right breast exam is unremarkable. Abdominal:      General: There is no distension. Palpations: Abdomen is soft. Tenderness: There is no abdominal tenderness.  There is no guarding or rebound. Musculoskeletal:         General: No tenderness or deformity. Normal range of motion. Right shoulder: Normal.      Left shoulder: Normal.      Cervical back: Normal range of motion and neck supple. Comments: She has evidence of bites from reported history of bedbugs in her apartment complex. Has Topicort cream prescribed by her primary care physician. Lymphadenopathy:      Cervical: No cervical adenopathy. Right cervical: No superficial, deep or posterior cervical adenopathy. Left cervical: No superficial, deep or posterior cervical adenopathy. Upper Body:      Right upper body: No pectoral adenopathy. Left upper body: No pectoral adenopathy. Skin:     General: Skin is warm and dry. Coloration: Skin is not pale. Findings: No erythema or rash. Neurological:      Mental Status: She is alert and oriented to person, place, and time. Coordination: Coordination normal.   Psychiatric:         Behavior: Behavior normal.         Thought Content: Thought content normal.         Judgment: Judgment normal.         Assessment:     45 y.o. extremely pleasant female with Left breast cancer     Ultrasound guided core biopsy on 12/23/2020  Left breast, core needle biopsy: Invasive, moderately differentiatedductal carcinoma (grade 2)  Comment:    Intradepartmental consultation is obtained. Breast Cancer Marker Studies:  Estrogen Receptors (ER): 90%  Progesterone Receptors ([place]): 90%  HER-2/salvador:  Indeterminate/2+     FISH analysis HER/2: Negative  Average HER-2 signals/nucleus: 3.4   Average MARGARITO 17 signals/nucleus: 2.9   HER-2/MARGARITO 17 signal ratio: 1.2      2/17/2021 Left localized lumpectomy with left axillary sentinal lymph node excision   A.  Breast, left, lumpectomy: Invasive ductal carcinoma   Ductal carcinoma in situ   Invasive carcinoma present less than 1 mm from yellow/medial margin   Margins negative for ductal carcinoma in situ     B.  New medial anterior margin, excision: Negative for carcinoma   C.  [place] lymph node, excision: One (1) lymph node, negative for metastatic carcinoma     CANCER CASE SUMMARY tumor size 1.3 cm's. Overall grade 2, score 7. DCIS present, grade 2. Margins negative. Lymph nodes negative. Pathologic stage classification (pTNM, AJCC 8th Edition): pT1c (sn)pN0     Oncotype DX Breast Cancer Report Recurrence Score Result-16   Distant recurrence risk at 9 years-4%   Absolute chemotherapy benefit- <1%     -04/06/2021 DEXA bone density normal and lumbar spine and bilateral hips.  -05/04/2021 completed adjuvant RT .  -05/06/2021 ET with Arimidex 1 mg by mouth daily started per med-onc, Verenice Kincaid MD.  -09/02/2021 clinical follow-up without evidence of recurrent disease. She has recovered from radiation therapy. We reviewed NCCN guidelines for breast cancer follow-up, breast massage, importance of a good supportive bra at all times while awake, breast self-examination, and limiting alcohol. -11/15/2021 bilateral screening mammogram:  Negative, BI-RADS 1.  -11/15/2021 clinical follow-up is without evidence of recurrent disease. Imaging reviewed, including her BI-RADS result. We again reviewed importance of good supportive bra, breast massage twice daily, and limiting alcohol. We discussed importance of calcium and vitamin D supplementation as directed as well as weightbearing exercises. -01/31/2022 right diagnostic mammogram and right breast US for palpable lump: Benign with no evidence of malignancy. BI-RADS Category 2. She has a 5 x 2 x 6 mm intradermal lesion consistent with epidermal inclusion cyst.  -01/31/2022 clinical follow up: Is without evidence of recurrent disease. She has a non-inflamed, nontender epidermal inclusion cyst of the right axilla. We discussed management of epidermal inclusion cysts and discussed symptom management.   We reviewed that sometimes epidermal inclusion cysts will resolve on their own. She will try warm compress 3-4 times daily. She will call us in the event the cyst becomes inflamed. We discussed that for recurrent cysts, definitive treatment is the surgical excision of the cyst along with the cystic sac. Will plan to see her in 6 months. She will can for any new or worsening symptoms. Plan:   1. Continue monthly breast/chest wall self examination; detailed instructions reviewed today. Bring any changes to your physician's attention. 2. Continue healthy diet and exercise routinely as tolerated. 3. Maintaining ideal body weight (20-25 BMI) may lead to optimal breast cancer outcomes. 4. Avoid alcohol. 5. Repeat B/L mammogram December 2022  6. Continue Arimidex 1 mg daily at the discretion of medical oncology team.  7. Ca+/VitD while on Arimidex. 8. Continue follow up with Medical Oncology and Primary Care. 9. RTC 6 months. During today's visit, face-to-face time 25 minutes, greater than 50% in counseling education and coordination of care. All questions were answered to her apparent satisfaction, and she is agreeable to the plan as outlined above. Ismael Wells, RN, MSN, APRN-CNP, [address] [place]  Advanced Oncology Certified Nurse Practitioner  Department of Breast Surgery  [facility]  [facility] in collaboration with Dr. Karla Gordon.  Roberta/Rehana [id number] [facility], APRN - CNP

## 2022-01-31 ENCOUNTER — HOSPITAL ENCOUNTER (OUTPATIENT)
Dept: GENERAL RADIOLOGY | Age: 52
Discharge: HOME | End: 2022-02-02
Payer: MEDICAID

## 2022-01-31 ENCOUNTER — OFFICE VISIT (OUTPATIENT)
Dept: BREAST CENTER | Age: 52
End: 2022-01-31
Payer: MEDICAID

## 2022-01-31 VITALS — BODY MASS INDEX: 31.57 KG/M2 | WEIGHT: 156.3 LBS

## 2022-01-31 VITALS
HEIGHT: 59 IN | OXYGEN SATURATION: 98 % | RESPIRATION RATE: 17 BRPM | WEIGHT: 156 LBS | BODY MASS INDEX: 31.45 KG/M2 | DIASTOLIC BLOOD PRESSURE: 73 MMHG | HEART RATE: 91 BPM | SYSTOLIC BLOOD PRESSURE: 130 MMHG

## 2022-01-31 DIAGNOSIS — N63.0 BREAST LUMP: ICD-10-CM

## 2022-01-31 PROCEDURE — 3017F COLORECTAL CA SCREEN DOC REV: CPT | Performed by: NURSE PRACTITIONER

## 2022-01-31 PROCEDURE — G8417 CALC BMI ABV UP PARAM F/U: HCPCS | Performed by: NURSE PRACTITIONER

## 2022-01-31 PROCEDURE — 76882 US LMTD JT/FCL EVL NVASC XTR: CPT

## 2022-01-31 PROCEDURE — G8482 FLU IMMUNIZE ORDER/ADMIN: HCPCS | Performed by: NURSE PRACTITIONER

## 2022-01-31 PROCEDURE — 99213 OFFICE O/P EST LOW 20 MIN: CPT | Performed by: NURSE PRACTITIONER

## 2022-01-31 PROCEDURE — G8427 DOCREV CUR MEDS BY ELIG CLIN: HCPCS | Performed by: NURSE PRACTITIONER

## 2022-01-31 PROCEDURE — G0279 TOMOSYNTHESIS, MAMMO: HCPCS

## 2022-01-31 PROCEDURE — 1036F TOBACCO NON-USER: CPT | Performed by: NURSE PRACTITIONER

## 2022-02-04 DIAGNOSIS — K21.9 GASTROESOPHAGEAL REFLUX DISEASE WITHOUT ESOPHAGITIS: ICD-10-CM

## 2022-02-07 ENCOUNTER — TELEPHONE (OUTPATIENT)
Dept: ORTHOPEDIC SURGERY | Age: 52
End: 2022-02-07
Payer: MEDICAID

## 2022-02-07 DIAGNOSIS — M75.01 ADHESIVE CAPSULITIS OF RIGHT SHOULDER: Primary | ICD-10-CM

## 2022-02-07 DIAGNOSIS — Z79.811 USE OF AROMATASE INHIBITORS: Primary | ICD-10-CM

## 2022-02-07 PROCEDURE — 99421 OL DIG E/M SVC 5-10 MIN: CPT | Performed by: ORTHOPAEDIC SURGERY

## 2022-02-07 NOTE — TELEPHONE ENCOUNTER
Patient called in for refill of diclofenac sodium (VOLTAREN) 1 % GEL    To   [facility] [id number] [facility], [address] [facility] [phone]        Patient's chart was reviewed including pertinent information including allergies, current medications, medical conditions, last exam and office notes, available musculoskeletal imaging, most recent labs and other physician and provider notes. In 6-minute chart review, the most appropriate medication/treatment was ordered. In addition, patient did not/ does not have any scheduled office visits within 7 days of this encounter. Patient was educated to monitor for adverse reactions and to notify office of any issues. Calista Blizzard was seen today for medication refill. Diagnoses and all orders for this visit:    Adhesive capsulitis of right shoulder    Other orders  -     diclofenac sodium (VOLTAREN) 1 % GEL;  Apply topically 2 times daily

## 2022-02-08 RX ORDER — OMEPRAZOLE 40 MG/1
CAPSULE, DELAYED RELEASE ORAL
Qty: 90 CAPSULE | Refills: 1 | OUTPATIENT
Start: 2022-02-08

## 2022-02-15 ENCOUNTER — TELEPHONE (OUTPATIENT)
Dept: FAMILY MEDICINE CLINIC | Age: 52
End: 2022-02-15

## 2022-02-16 NOTE — TELEPHONE ENCOUNTER
Pt last seen 1/10/2022 - okay to refill Gabapentin?       Electronically signed by Jeanine Londono MA on 2/16/22 at 9:07 AM EST

## 2022-02-18 NOTE — TELEPHONE ENCOUNTER
This MA attempted to reach pt. No answer. This MA left message for pt advising to please request gabapentin refill from the original prescriber. Advised pt to return call to office if she has any further issues.       Electronically signed by Bassem Schilling MA on 2/18/22 at 4:50 PM EST

## 2022-02-21 ENCOUNTER — OFFICE VISIT (OUTPATIENT)
Dept: FAMILY MEDICINE CLINIC | Age: 52
End: 2022-02-21
Payer: MEDICAID

## 2022-02-21 VITALS
SYSTOLIC BLOOD PRESSURE: 116 MMHG | OXYGEN SATURATION: 96 % | HEART RATE: 88 BPM | DIASTOLIC BLOOD PRESSURE: 78 MMHG | RESPIRATION RATE: 16 BRPM | TEMPERATURE: 98.2 F | WEIGHT: 160 LBS | BODY MASS INDEX: 32.25 KG/M2 | HEIGHT: 59 IN

## 2022-02-21 DIAGNOSIS — Z17.0 MALIGNANT NEOPLASM OF LEFT BREAST IN FEMALE, ESTROGEN RECEPTOR POSITIVE, UNSPECIFIED SITE OF BREAST (HCC): ICD-10-CM

## 2022-02-21 DIAGNOSIS — L30.9 DERMATITIS: ICD-10-CM

## 2022-02-21 DIAGNOSIS — G25.81 RLS (RESTLESS LEGS SYNDROME): ICD-10-CM

## 2022-02-21 DIAGNOSIS — K21.9 GASTROESOPHAGEAL REFLUX DISEASE WITHOUT ESOPHAGITIS: ICD-10-CM

## 2022-02-21 DIAGNOSIS — E78.2 MIXED HYPERLIPIDEMIA: ICD-10-CM

## 2022-02-21 DIAGNOSIS — E11.65 UNCONTROLLED TYPE 2 DIABETES MELLITUS WITH HYPERGLYCEMIA (HCC): Primary | ICD-10-CM

## 2022-02-21 DIAGNOSIS — E11.65 UNCONTROLLED TYPE 2 DIABETES MELLITUS WITH HYPERGLYCEMIA (HCC): ICD-10-CM

## 2022-02-21 DIAGNOSIS — C50.912 INVASIVE DUCTAL CARCINOMA OF LEFT BREAST (HCC): ICD-10-CM

## 2022-02-21 DIAGNOSIS — G62.9 NEUROPATHY: ICD-10-CM

## 2022-02-21 DIAGNOSIS — R53.83 FATIGUE, UNSPECIFIED TYPE: ICD-10-CM

## 2022-02-21 DIAGNOSIS — C50.912 MALIGNANT NEOPLASM OF LEFT BREAST IN FEMALE, ESTROGEN RECEPTOR POSITIVE, UNSPECIFIED SITE OF BREAST (HCC): ICD-10-CM

## 2022-02-21 DIAGNOSIS — I10 ESSENTIAL HYPERTENSION: ICD-10-CM

## 2022-02-21 DIAGNOSIS — H25.9 AGE-RELATED CATARACT OF BOTH EYES, UNSPECIFIED AGE-RELATED CATARACT TYPE: ICD-10-CM

## 2022-02-21 LAB
ALBUMIN SERPL-MCNC: 4.5 G/DL (ref 3.5–5.2)
ALP BLD-CCNC: 107 U/L (ref 35–104)
ALT SERPL-CCNC: 26 U/L (ref 0–32)
ANION GAP SERPL CALCULATED.3IONS-SCNC: 15 MMOL/L (ref 7–16)
AST SERPL-CCNC: 35 U/L (ref 0–31)
BASOPHILS ABSOLUTE: 0.01 E9/L (ref 0–0.2)
BASOPHILS RELATIVE PERCENT: 0.1 % (ref 0–2)
BILIRUB SERPL-MCNC: 0.3 MG/DL (ref 0–1.2)
BUN BLDV-MCNC: 11 MG/DL (ref 6–20)
CALCIUM SERPL-MCNC: 10.8 MG/DL (ref 8.6–10.2)
CHLORIDE BLD-SCNC: 103 MMOL/L (ref 98–107)
CHOLESTEROL, TOTAL: 138 MG/DL (ref 0–199)
CO2: 24 MMOL/L (ref 22–29)
CREAT SERPL-MCNC: 0.7 MG/DL (ref 0.5–1)
EOSINOPHILS ABSOLUTE: 0.12 E9/L (ref 0.05–0.5)
EOSINOPHILS RELATIVE PERCENT: 1.5 % (ref 0–6)
GFR AFRICAN AMERICAN: >60
GFR NON-AFRICAN AMERICAN: >60 ML/MIN/1.73
GLUCOSE BLD-MCNC: 78 MG/DL (ref 74–99)
HBA1C MFR BLD: 8.5 % (ref 4–5.6)
HCT VFR BLD CALC: 40.6 % (ref 34–48)
HDLC SERPL-MCNC: 60 MG/DL
HEMOGLOBIN: 12.4 G/DL (ref 11.5–15.5)
IMMATURE GRANULOCYTES #: 0.03 E9/L
IMMATURE GRANULOCYTES %: 0.4 % (ref 0–5)
LDL CHOLESTEROL CALCULATED: 54 MG/DL (ref 0–99)
LYMPHOCYTES ABSOLUTE: 2.14 E9/L (ref 1.5–4)
LYMPHOCYTES RELATIVE PERCENT: 27.1 % (ref 20–42)
MCH RBC QN AUTO: 29.5 PG (ref 26–35)
MCHC RBC AUTO-ENTMCNC: 30.5 % (ref 32–34.5)
MCV RBC AUTO: 96.4 FL (ref 80–99.9)
MONOCYTES ABSOLUTE: 0.59 E9/L (ref 0.1–0.95)
MONOCYTES RELATIVE PERCENT: 7.5 % (ref 2–12)
NEUTROPHILS ABSOLUTE: 5.01 E9/L (ref 1.8–7.3)
NEUTROPHILS RELATIVE PERCENT: 63.4 % (ref 43–80)
PDW BLD-RTO: 14.7 FL (ref 11.5–15)
PLATELET # BLD: 309 E9/L (ref 130–450)
PMV BLD AUTO: 11.1 FL (ref 7–12)
POTASSIUM SERPL-SCNC: 5 MMOL/L (ref 3.5–5)
RBC # BLD: 4.21 E12/L (ref 3.5–5.5)
SODIUM BLD-SCNC: 142 MMOL/L (ref 132–146)
TOTAL PROTEIN: 8.1 G/DL (ref 6.4–8.3)
TRIGL SERPL-MCNC: 118 MG/DL (ref 0–149)
TSH SERPL DL<=0.05 MIU/L-ACNC: 1.75 UIU/ML (ref 0.27–4.2)
VLDLC SERPL CALC-MCNC: 24 MG/DL
WBC # BLD: 7.9 E9/L (ref 4.5–11.5)

## 2022-02-21 PROCEDURE — 3017F COLORECTAL CA SCREEN DOC REV: CPT | Performed by: FAMILY MEDICINE

## 2022-02-21 PROCEDURE — 1036F TOBACCO NON-USER: CPT | Performed by: FAMILY MEDICINE

## 2022-02-21 PROCEDURE — 99214 OFFICE O/P EST MOD 30 MIN: CPT | Performed by: FAMILY MEDICINE

## 2022-02-21 PROCEDURE — 3052F HG A1C>EQUAL 8.0%<EQUAL 9.0%: CPT | Performed by: FAMILY MEDICINE

## 2022-02-21 PROCEDURE — G8482 FLU IMMUNIZE ORDER/ADMIN: HCPCS | Performed by: FAMILY MEDICINE

## 2022-02-21 PROCEDURE — 2022F DILAT RTA XM EVC RTNOPTHY: CPT | Performed by: FAMILY MEDICINE

## 2022-02-21 PROCEDURE — G8417 CALC BMI ABV UP PARAM F/U: HCPCS | Performed by: FAMILY MEDICINE

## 2022-02-21 PROCEDURE — G8427 DOCREV CUR MEDS BY ELIG CLIN: HCPCS | Performed by: FAMILY MEDICINE

## 2022-02-21 RX ORDER — GABAPENTIN 300 MG/1
CAPSULE ORAL
Qty: 90 CAPSULE | Refills: 1 | Status: CANCELLED | OUTPATIENT
Start: 2022-02-21 | End: 2024-05-21

## 2022-02-22 LAB
6-MONOACETYLMORPHINE, URINE: NOT DETECTED
ALCOHOL URINE: NOT DETECTED
AMPHETAMINE SCREEN, URINE: NOT DETECTED
BARBITURATE SCREEN URINE: NOT DETECTED
BENZODIAZEPINE SCREEN, URINE: NOT DETECTED
BUPRENORPHINE URINE: NOT DETECTED
CANNABINOID SCREEN URINE: NOT DETECTED
COCAINE METABOLITE SCREEN URINE: NOT DETECTED
FENTANYL SCREEN, URINE: NOT DETECTED
INTEGRITY CHECK, CREATININE, URINE: 21.3
INTEGRITY CHECK, OXIDANT, URINE: 72
INTEGRITY CHECK, PH, URINE: 5.1 (ref 4.5–9)
INTEGRITY CHECK, SPECIFIC GRAVITY, URINE: 1 (ref 1–1.03)
INTEGRITY CHECK, SPECIMEN INTEGRITY, URINE: ABNORMAL
Lab: ABNORMAL
METHADONE SCREEN, URINE: NOT DETECTED
OPIATE SCREEN URINE: NOT DETECTED
OXYCODONE URINE: NOT DETECTED
PHENCYCLIDINE SCREEN URINE: NOT DETECTED
TRAMADOL SCREEN URINE: NOT DETECTED

## 2022-02-23 PROBLEM — H25.9 AGE-RELATED CATARACT OF BOTH EYES: Status: ACTIVE | Noted: 2022-02-23

## 2022-02-23 ASSESSMENT — ENCOUNTER SYMPTOMS
PHOTOPHOBIA: 0
STRIDOR: 0
EYE ITCHING: 0
COUGH: 0
NAUSEA: 0
TROUBLE SWALLOWING: 0
BLOOD IN STOOL: 0
SORE THROAT: 0
VOICE CHANGE: 0
WHEEZING: 0
EYE DISCHARGE: 0
CONSTIPATION: 0
VOMITING: 0
COLOR CHANGE: 0
RHINORRHEA: 0
RESPIRATORY NEGATIVE: 1
SINUS PRESSURE: 0
CHOKING: 0
ALLERGIC/IMMUNOLOGIC NEGATIVE: 1
CHEST TIGHTNESS: 0
FACIAL SWELLING: 0
BACK PAIN: 0
ABDOMINAL DISTENTION: 0
EYE REDNESS: 0
ABDOMINAL PAIN: 0
ANAL BLEEDING: 0
EYE PAIN: 0
DIARRHEA: 0
RECTAL PAIN: 0
SHORTNESS OF BREATH: 0
SINUS PAIN: 0

## 2022-02-23 NOTE — PROGRESS NOTES
SUBJECTIVE  Jenny Gutierrez is a 45 y.o. female. HPI/Chief C/O:  Chief Complaint   Patient presents with    Diabetes     glucose pended and done    Spasms     pt c/o legs spasms during he night    Medication Refill     pharmacy verified and med pended     No Known Allergies    This 45year old female presents for physical exam. Pt has hypertension, hyperlipidemia, type 2 DM on insulin, neuropathy, invasive ductal carcinoma of left breast ([address] RLS, bed bug bites, and marcial. Cataracts. Pt follows with endocrinology, oncology,  ophthalmology, and GI specialist. Pt has been off neurontin 1 week. Pt c/o muscle spasm at night. ROS:  Review of Systems   Constitutional: Positive for fatigue. Negative for activity change, appetite change, chills, diaphoresis, fever and unexpected weight change. HENT: Negative. Negative for congestion, dental problem, drooling, ear discharge, ear pain, facial swelling, hearing loss, mouth sores, nosebleeds, postnasal drip, rhinorrhea, sinus pressure, sinus pain, sneezing, sore throat, tinnitus, trouble swallowing and voice change. Eyes: Positive for visual disturbance. Negative for photophobia, pain, discharge, redness and itching. Respiratory: Negative. Negative for cough, choking, chest tightness, shortness of breath, wheezing and stridor. Cardiovascular: Negative. Negative for chest pain, palpitations and leg swelling. Gastrointestinal: Negative for abdominal distention, abdominal pain, anal bleeding, blood in stool, constipation, diarrhea, nausea, rectal pain and vomiting. Endocrine: Negative. Negative for cold intolerance, heat intolerance, polydipsia, polyphagia and polyuria. Genitourinary: Positive for frequency and urgency. Negative for decreased urine volume, difficulty urinating, dysuria, flank pain, genital sores, hematuria, menstrual problem and pelvic pain. Musculoskeletal: Positive for arthralgias and myalgias.  Negative for back pain, gait problem, joint swelling, neck pain and neck stiffness. Skin: Positive for rash. Negative for color change, pallor and wound. Allergic/Immunologic: Negative. Neurological: Negative. Negative for dizziness, tremors, seizures, syncope, facial asymmetry, speech difficulty, weakness, light-headedness, numbness and headaches. Hematological: Negative. Negative for adenopathy. Does not bruise/bleed easily. Psychiatric/Behavioral: Negative for agitation, behavioral problems, confusion, decreased concentration, dysphoric mood, hallucinations, self-injury, sleep disturbance and suicidal ideas. The patient is nervous/anxious. The patient is not hyperactive.          Past Medical/Surgical Hx;  Reviewed with patient      Diagnosis Date    Anesthesia complication     problem with blood pressure up & down    Arthritis     lower back    Back pain     Blind     right eye    Cancer ([address]     breast    Cardiac valve prolapse     micro    Diabetes mellitus ([address]     Head injury 2018    History of therapeutic radiation     Hyperlipidemia     Hypertension     Neuropathy due to secondary diabetes ([address]     in marcial feet    Nontraumatic tear of right rotator cuff 2020    Prolonged emergence from general anesthesia     Psoriasis      Past Surgical History:   Procedure Laterality Date    ANKLE SURGERY      bilateral tarsal tunnels    BREAST BIOPSY Left 2021    LEFT BREAST NEEDLE LOCALIZED LUMPECTOMY, BLUE DYE INJECTION, LEFT AXILLARY SENTINEL LYMPHNODE EXCISION, POSSIBLE LEFT AXILLARY DISSECTION performed by Oswald Nunn MD at [facility]      cardiac catheterization    CARPAL TUNNEL RELEASE      left    CARPAL TUNNEL RELEASE  2012    right     SECTION      CHOLECYSTECTOMY, LAPAROSCOPIC N/A 2019    LAPAROSCOPIC CHOLECYSTECTOMY WITH IOC performed by Winifred Owens MD at [address]  had extremely high blood pressure and hard to wake up   Romaine Escobar COLONOSCOPY N/A 06/20/2019    COLORECTAL CANCER SCREENING, NOT HIGH RISK performed by Yazmin Forde MD at [address], [address] [facility] ARTHROSCOPY Left 01/21/2016    subacromin decompression and debridement    SHOULDER ARTHROSCOPY Right 11/16/2020    RIGHT SHOULDER ARTHROSCOPY, SUBACROMIAL DECOMPRESSION, LABRIAL DEBRIDEMENT, CHONDROPLASTY, RAÚL performed by Oliverio Rosa DO at [facility] N/A 06/20/2019    EGD BIOPSY performed by Yazmin Forde MD at [facility] LEFT  12/23/2020    US BREAST NEEDLE BIOPSY LEFT 12/23/2020 [facility]       Past Family Hx:  Reviewed with patient      Problem Relation Age of Onset    Heart Disease Mother     Diabetes Mother     Heart Disease Father     Diabetes Father     Cancer Father         prostate, colon, skin cance behind ear    Breast Cancer Maternal Aunt 54        breast    Cancer Maternal Uncle 62        colon    Cancer Paternal Aunt 58        breast    Cancer Other 62        maternal great aunt - breast       Social Hx:  Reviewed with patient  Social History     Tobacco Use    Smoking status: Never Smoker    Smokeless tobacco: Never Used   Substance Use Topics    Alcohol use: Not Currently     Alcohol/week: 1.0 standard drink     Types: 1 Glasses of wine per week     Comment: weekly       OBJECTIVE  /78   Pulse 88   Temp 98.2 °F (36.8 °C)   Resp 16   Ht 4' 11\" (1.499 m)   Wt 160 lb (72.6 kg)   LMP  (LMP Unknown)   SpO2 96%   Breastfeeding No   BMI 32.32 kg/m²     Problem List:  Sarahy Briceno does not have any pertinent problems on file. PHYS EX:  Physical Exam  Vitals and nursing note reviewed. Constitutional:       General: She is not in acute distress. Appearance: Normal appearance. She is well-developed. She is obese. She is not ill-appearing, toxic-appearing or diaphoretic. Comments: Patient has morbid obesity.  Patient instructed on low calorie, healthy ADA diet. HENT:      Head: Normocephalic and atraumatic. Nose: Nose normal. No congestion or rhinorrhea. Eyes:      General: No scleral icterus. Right eye: No discharge. Left eye: No discharge. Conjunctiva/sclera: Conjunctivae normal.      Pupils: Pupils are equal, round, and reactive to light. Neck:      Thyroid: No thyromegaly. Vascular: No carotid bruit or JVD. Trachea: No tracheal deviation. Cardiovascular:      Rate and Rhythm: Normal rate and regular rhythm. Pulses: Normal pulses. Heart sounds: Normal heart sounds. No murmur heard. No friction rub. No gallop. Pulmonary:      Effort: Pulmonary effort is normal. No respiratory distress. Breath sounds: Normal breath sounds. No stridor. No wheezing, rhonchi or rales. Chest:      Chest wall: No tenderness. Abdominal:      General: Bowel sounds are normal. There is no distension. Palpations: Abdomen is soft. There is no mass. Tenderness: There is no abdominal tenderness. There is no right CVA tenderness, left CVA tenderness, guarding or rebound. Hernia: No hernia is present. Musculoskeletal:         General: Tenderness present. No swelling, deformity or signs of injury. Cervical back: Normal range of motion and neck supple. No rigidity or tenderness. No muscular tenderness. Right lower leg: No edema. Left lower leg: No edema. Comments: Pain and decreased ROM multiple joints. Lymphadenopathy:      Cervical: No cervical adenopathy. Skin:     General: Skin is warm. Coloration: Skin is not jaundiced or pale. Findings: Rash present. No bruising, erythema or lesion. Comments: Pt has bed bug bites. Neurological:      General: No focal deficit present. Mental Status: She is alert and oriented to person, place, and time. Cranial Nerves: No cranial nerve deficit. Sensory: No sensory deficit. Motor: No weakness or abnormal muscle tone. Coordination: Coordination normal.      Gait: Gait normal.      Deep Tendon Reflexes: Reflexes are normal and symmetric. Reflexes normal.   Psychiatric:         Mood and Affect: Mood normal.         Behavior: Behavior normal.         Thought Content: Thought content normal.         Judgment: Judgment normal.         ASSESSMENT/PLAN  Jenny was seen today for diabetes, spasms and medication refill. Diagnoses and all orders for this visit:    Uncontrolled type 2 diabetes mellitus with hyperglycemia ([facility]  -     POCT Glucose  -     CBC with Auto Differential; Future  -     Comprehensive Metabolic Panel; Future  -     Hemoglobin A1C; Future  basaglar was decreased to 46 units at night due to hypoglycemia. Essential hypertension  -     CBC with Auto Differential; Future  -     Comprehensive Metabolic Panel; Future    Gastroesophageal reflux disease without esophagitis  -     CBC with Auto Differential; Future  -     Comprehensive Metabolic Panel; Future    Neuropathy  -     CBC with Auto Differential; Future  -     Comprehensive Metabolic Panel; Future    Invasive ductal carcinoma of left breast (HCC)  -     CBC with Auto Differential; Future  -     Comprehensive Metabolic Panel; Future    Malignant neoplasm of left breast in female, estrogen receptor positive, unspecified site of breast ([facility]  -     CBC with Auto Differential; Future  -     Comprehensive Metabolic Panel; Future  Oncology, Dr. Jeanette Gerber. Mixed hyperlipidemia  -     CBC with Auto Differential; Future  -     Comprehensive Metabolic Panel; Future  -     Lipid Panel; Future    RLS (restless legs syndrome)  -     CBC with Auto Differential; Future  -     Comprehensive Metabolic Panel; Future  -     PAIN MANAGEMENT PROFILE 1 W/ CONFIRMATION, URINE; Future    Fatigue, unspecified type  -     CBC with Auto Differential; Future  -     Comprehensive Metabolic Panel; Future  -     TSH;  Future    Dermatitis  - CBC with Auto Differential; Future  -     Comprehensive Metabolic Panel; Future  -     hydrocortisone 2.5 % cream; Apply topically 2 times daily. Age-related cataract of both eyes, unspecified age-related cataract type  Opthalmology. Pt instructed if any worse go ED ASAP. Outpatient Encounter Medications as of 2/21/2022   Medication Sig Dispense Refill    hydrocortisone 2.5 % cream Apply topically 2 times daily.  1 each 1    diclofenac sodium (VOLTAREN) 1 % GEL Apply topically 2 times daily 240 g 1    blood glucose test strips (ONETOUCH ULTRA) strip USE AS DIRECTED FOUR TIMES A  each 2    metFORMIN (GLUCOPHAGE) 500 MG tablet TAKE TWO (2) TABLETS BY MOUTH TWICE DAILY (WITH MEALS) 360 tablet 1    omeprazole (PRILOSEC) 40 MG delayed release capsule TAKE 1 CAPSULE BY MOUTH DAILY 90 capsule 1    rosuvastatin (CRESTOR) 5 MG tablet Take 1 pill by mouth daily 90 tablet 1    sertraline (ZOLOFT) 100 MG tablet TAKE 1 TABLET BY MOUTH ONCE DAILY AS NEEDED FOR DEPRESSION 90 tablet 0    Alcohol Swabs (EASY TOUCH ALCOHOL PREP MEDIUM) 70 % PADS USE AS DIRECTED PRIOR TO GLUCOSE TESTING AND INSULIN INJECTION FOUR TIMES A  each 2    anastrozole (ARIMIDEX) 1 MG tablet TAKE 1 TABLET BY MOUTH DAILY 90 tablet 1    BASAGLAR KWIKPEN 100 UNIT/ML injection pen INJECT 50 UNITS SUBCUTANEOUSLY NIGHTLY (Patient taking differently: INJECT 46  UNITS SUBCUTANEOUSLY NIGHTLY) 15 mL 3    diclofenac sodium (VOLTAREN) 1 % GEL Apply 2 g topically 4 times daily 60 g 2    metoprolol succinate (TOPROL XL) 25 MG extended release tablet TAKE 1/2 TABLET BY MOUTH ONCE DAILY 45 tablet 1    pramipexole (MIRAPEX) 0.125 MG tablet TAKE 1 TABLET BY MOUTH NIGHTLY (Patient taking differently: TAKE 2 TABLET BY MOUTH NIGHTLY) 90 tablet 1    JARDIANCE 10 MG tablet TAKE 1 TABLET BY MOUTH DAILY 90 tablet 1    lactobacillus (CULTURELLE) CAPS capsule TAKE (1) CAPSULE BY MOUTH DAILY 90 capsule 1    sucralfate (CARAFATE) 1 GM tablet Take 1 tablet by mouth 4 times daily 120 tablet 3    Blood Glucose Monitoring Suppl (ONE TOUCH ULTRA 2) w/Device KIT Test 3 times a day & as needed for symptoms of irregular blood glucose. Dispense sufficient amount for indicated testing frequency plus additional to accommodate PRN testing needs. 1 kit 0    Lancets MISC Test 3 times a day & as needed for symptoms of irregular blood glucose. Dispense sufficient amount for indicated testing frequency plus additional to accommodate PRN testing needs. 300 each 3    Cholecalciferol (VITAMIN D) 50 MCG (2000 UT) CAPS capsule Take by mouth      acetaminophen (TYLENOL) 500 MG tablet Take 1 tablet by mouth 4 times daily as needed for Pain 120 tablet 0    GABAPENTIN PO Take 300 mg by mouth Nightly       B-D ULTRAFINE III SHORT PEN 31G X 8 MM MISC USE WITH INSULIN PENS AS DIRECTED 100 each 10    ondansetron (ZOFRAN) 4 MG tablet Take 1 tablet by mouth every 8 hours as needed for Nausea or Vomiting 20 tablet 0    fluticasone (FLONASE) 50 MCG/ACT nasal spray 1 spray by Nasal route daily 1 Bottle 3    nitroGLYCERIN (NITROSTAT) 0.4 MG SL tablet Place 1 tablet under the tongue every 5 minutes as needed for Chest pain 25 tablet 1    Dulaglutide (TRULICITY) 1.5 OF/2.1RN SOPN INJECT THE CONTENTS OF 1 SYRINGE SUBCUTANEOUSLY EVERY WEEK *PATIENT NEEDS APPOINTMENT* (Patient taking differently: INJECT THE CONTENTS OF 1 SYRINGE SUBCUTANEOUSLY EVERY WEEK *PATIENT NEEDS APPOINTMENT*    Takes on saturdays) 6 mL 10    Insulin Syringe-Needle U-100 ([employer] INSULIN SYR 1CC/30G) 30G X 5/16\" 1 ML MISC 1 each by Does not apply route 4 times daily 120 each 5    bumetanide (BUMEX) 1 MG tablet Take 1 tablet by mouth every other day 90 tablet 1    famotidine (PEPCID) 20 MG tablet Take 1 tablet by mouth 2 times daily 180 tablet 1    insulin lispro (ADMELOG SOLOSTAR) 100 UNIT/ML pen As directed per sliding scale up to 13 units 4 times per day.  5 pen 3    Blood Pressure KIT 1 kit by Does not apply

## 2022-02-25 DIAGNOSIS — G25.81 RLS (RESTLESS LEGS SYNDROME): ICD-10-CM

## 2022-02-25 NOTE — TELEPHONE ENCOUNTER
Pt called in she needs a refill on pramipexole (MIRAPEX) 0.125 MG tablet sent to [place] [address] #[id number] - Radha Aquino

## 2022-02-28 DIAGNOSIS — C50.912 MALIGNANT NEOPLASM OF LEFT FEMALE BREAST, UNSPECIFIED ESTROGEN RECEPTOR STATUS, UNSPECIFIED SITE OF BREAST (HCC): Primary | ICD-10-CM

## 2022-02-28 RX ORDER — PRAMIPEXOLE DIHYDROCHLORIDE 0.12 MG/1
TABLET ORAL
Qty: 180 TABLET | Refills: 1 | Status: SHIPPED
Start: 2022-02-28 | End: 2022-03-11

## 2022-03-02 ENCOUNTER — OFFICE VISIT (OUTPATIENT)
Dept: ONCOLOGY | Age: 52
End: 2022-03-02
Payer: MEDICAID

## 2022-03-02 ENCOUNTER — HOSPITAL ENCOUNTER (OUTPATIENT)
Dept: INFUSION THERAPY | Age: 52
Discharge: HOME | End: 2022-03-02
Payer: MEDICAID

## 2022-03-02 VITALS
WEIGHT: 162.6 LBS | HEART RATE: 101 BPM | SYSTOLIC BLOOD PRESSURE: 134 MMHG | OXYGEN SATURATION: 98 % | HEIGHT: 59 IN | DIASTOLIC BLOOD PRESSURE: 72 MMHG | TEMPERATURE: 97.4 F | BODY MASS INDEX: 32.78 KG/M2

## 2022-03-02 DIAGNOSIS — C50.912 MALIGNANT NEOPLASM OF LEFT FEMALE BREAST, UNSPECIFIED ESTROGEN RECEPTOR STATUS, UNSPECIFIED SITE OF BREAST (HCC): Primary | ICD-10-CM

## 2022-03-02 DIAGNOSIS — C50.912 MALIGNANT NEOPLASM OF LEFT FEMALE BREAST, UNSPECIFIED ESTROGEN RECEPTOR STATUS, UNSPECIFIED SITE OF BREAST (HCC): ICD-10-CM

## 2022-03-02 LAB
ALBUMIN SERPL-MCNC: 3.7 G/DL (ref 3.5–5.2)
ALP BLD-CCNC: 111 U/L (ref 35–104)
ALT SERPL-CCNC: 28 U/L (ref 0–32)
ANION GAP SERPL CALCULATED.3IONS-SCNC: 9 MMOL/L (ref 7–16)
AST SERPL-CCNC: 49 U/L (ref 0–31)
BASOPHILS ABSOLUTE: 0.01 E9/L (ref 0–0.2)
BASOPHILS RELATIVE PERCENT: 0.1 % (ref 0–2)
BILIRUB SERPL-MCNC: <0.2 MG/DL (ref 0–1.2)
BUN BLDV-MCNC: 14 MG/DL (ref 6–20)
CALCIUM SERPL-MCNC: 9.9 MG/DL (ref 8.6–10.2)
CHLORIDE BLD-SCNC: 105 MMOL/L (ref 98–107)
CO2: 24 MMOL/L (ref 22–29)
CREAT SERPL-MCNC: 0.7 MG/DL (ref 0.5–1)
EOSINOPHILS ABSOLUTE: 0.14 E9/L (ref 0.05–0.5)
EOSINOPHILS RELATIVE PERCENT: 1.9 % (ref 0–6)
GFR AFRICAN AMERICAN: >60
GFR NON-AFRICAN AMERICAN: >60 ML/MIN/1.73
GLUCOSE BLD-MCNC: 171 MG/DL (ref 74–99)
HCT VFR BLD CALC: 37.8 % (ref 34–48)
HEMOGLOBIN: 11.8 G/DL (ref 11.5–15.5)
IMMATURE GRANULOCYTES #: 0.03 E9/L
IMMATURE GRANULOCYTES %: 0.4 % (ref 0–5)
LYMPHOCYTES ABSOLUTE: 1.88 E9/L (ref 1.5–4)
LYMPHOCYTES RELATIVE PERCENT: 25 % (ref 20–42)
MCH RBC QN AUTO: 29.8 PG (ref 26–35)
MCHC RBC AUTO-ENTMCNC: 31.2 % (ref 32–34.5)
MCV RBC AUTO: 95.5 FL (ref 80–99.9)
MONOCYTES ABSOLUTE: 0.67 E9/L (ref 0.1–0.95)
MONOCYTES RELATIVE PERCENT: 8.9 % (ref 2–12)
NEUTROPHILS ABSOLUTE: 4.8 E9/L (ref 1.8–7.3)
NEUTROPHILS RELATIVE PERCENT: 63.7 % (ref 43–80)
PDW BLD-RTO: 14.1 FL (ref 11.5–15)
PLATELET # BLD: 259 E9/L (ref 130–450)
PMV BLD AUTO: 10.6 FL (ref 7–12)
POTASSIUM SERPL-SCNC: 5.2 MMOL/L (ref 3.5–5)
RBC # BLD: 3.96 E12/L (ref 3.5–5.5)
SODIUM BLD-SCNC: 138 MMOL/L (ref 132–146)
TOTAL PROTEIN: 7.6 G/DL (ref 6.4–8.3)
WBC # BLD: 7.5 E9/L (ref 4.5–11.5)

## 2022-03-02 PROCEDURE — G8417 CALC BMI ABV UP PARAM F/U: HCPCS | Performed by: INTERNAL MEDICINE

## 2022-03-02 PROCEDURE — 80053 COMPREHEN METABOLIC PANEL: CPT

## 2022-03-02 PROCEDURE — 1036F TOBACCO NON-USER: CPT | Performed by: INTERNAL MEDICINE

## 2022-03-02 PROCEDURE — 85025 COMPLETE CBC W/AUTO DIFF WBC: CPT

## 2022-03-02 PROCEDURE — 3017F COLORECTAL CA SCREEN DOC REV: CPT | Performed by: INTERNAL MEDICINE

## 2022-03-02 PROCEDURE — 36415 COLL VENOUS BLD VENIPUNCTURE: CPT

## 2022-03-02 PROCEDURE — 99212 OFFICE O/P EST SF 10 MIN: CPT

## 2022-03-02 PROCEDURE — 99214 OFFICE O/P EST MOD 30 MIN: CPT | Performed by: INTERNAL MEDICINE

## 2022-03-02 PROCEDURE — G8482 FLU IMMUNIZE ORDER/ADMIN: HCPCS | Performed by: INTERNAL MEDICINE

## 2022-03-02 PROCEDURE — G8427 DOCREV CUR MEDS BY ELIG CLIN: HCPCS | Performed by: INTERNAL MEDICINE

## 2022-03-02 NOTE — PROGRESS NOTES
Department of [facility] Oncology  Attending Clinic Note    Reason for Visit: Follow-up on a patient with Left Breast Cancer     PCP:  Allison Cullen DO    History of Present Illness:  45year old female with Left Breast Cancer. Lesion located in the 7 o'clock position found on mammogram.    Breast cancer risk factors include age, gender, family history of cancer    Bilateral Screening Mammogram 11/12/2020 with 1.5 cm irregular focal asymmetry seen on the CC view, not well seen on MLO view. Stable mammogram of right breast    Left Diagnostic Mammogram 12/15/2021 with spiculated mass measuring 1.5 cm in the lower inner quadrant of the left breast. ON dedicated targeted ultrasound examination of left breast there is a solid lesion with posterior shadowing at 7 o'clock position. Highly suggestive of malignancy    Ultrasound guided core biopsy on 12/23/2020  Left breast, core needle biopsy: Invasive, moderately differentiatedductal carcinoma (grade 2)  Comment:    Intradepartmental consultation is obtained. Breast Cancer Marker Studies:  Estrogen Receptors (ER): 90%  Progesterone Receptors (MO): 90%  HER-2/salvador: Indeterminate/2+    FISH analysis HER/2: Negative  Average HER-2 signals/nucleus: 3.4   Average MARGARITO 17 signals/nucleus: 2.9   HER-2/M[address]AR[address] 17 signal ratio: 1.2     2/17/2021 Left localized lumpectomy with left axillary sentinal lymph node excision   A.  Breast, left, lumpectomy: Invasive ductal carcinoma   Ductal carcinoma in situ   Invasive carcinoma present less than 1 mm from yellow/medial margin   Margins negative for ductal carcinoma in situ     B.  New medial anterior margin, excision: Negative for carcinoma   C.  [place] lymph node, excision: One (1) lymph node, negative for metastatic carcinoma     CANCER CASE SUMMARY   Procedure: Excision (less than total mastectomy)   Specimen laterality: Left   Tumor size: 1.3 cm in greatest dimension   Histologic type:  Invasive carcinoma of no special type (ductal)   Histologic grade (Nika Histologic Score):        Glandular/tubular differentiation: Score 2        Nuclear pleomorphism: Score 2        Mitotic rate: Score 3        Overall grade: Grade 2, score 7   Ductal carcinoma in situ: Present        Nuclear grade: Grade II (intermediate)   Margins:        Invasive carcinoma margins: Uninvolved by invasive carcinoma             Distance from closest margin: 3 mm-blue/inferior        Ductal carcinoma in situ margins: Uninvolved by DCIS             Distance from closest margin: 2 mm from blue/inferior and red/superior   Regional lymph nodes: Uninvolved by tumor cells        Total number of lymph nodes examined: 1        Number of sentinel nodes examined: 1   Treatment effect in the breast: No known presurgical therapy   Pathologic stage classification (pTNM, AJCC 8th Edition):   pT1c (sn)pN0     Oncotype DX Breast Cancer Report   Recurrence Score Result-16   Distant recurrence risk at 9 years-4%   Absolute chemotherapy benefit- <1%     No indication for adjuvant chemotherapy  Recommended adjuvant RT followed by adjuvant endocrine therapy  RT was completed on 05/04/2021. Arimidex 1 mg po daily was started on 05/06/2021 with fair tolerance so far. Review of Systems;  CONSTITUTIONAL: No fever, chills. Good appetite and energy level. ENMT: Eyes: No diplopia; Nose: No epistaxis. Mouth: No sore throat. RESPIRATORY: No hemoptysis, shortness of breath, cough. CARDIOVASCULAR: No chest pain, palpitations. GASTROINTESTINAL: No nausea/vomiting, abdominal pain, diarrhea/constipation. GENITOURINARY: No dysuria, urinary frequency, hematuria. NEURO: No syncope, presyncope, headache.   Remainder:  ROS NEGATIVE    Past Medical History:      Diagnosis Date    Anesthesia complication     problem with blood pressure up & down    Arthritis     lower back    Back pain     Blind     right eye    Cancer ([address] 2021    breast    Cardiac valve prolapse     micro    [facility]     Head injury 11/17/2018    History of therapeutic radiation     Hyperlipidemia     Hypertension     Neuropathy due to secondary diabetes ([facility]     in siddharth feet    Nontraumatic tear of right rotator cuff 11/30/2020    Prolonged emergence from general anesthesia     Psoriasis      Medications:  Reviewed and reconciled. Allergies:  No Known Allergies    Physical Exam:  /72   Pulse 101   Temp 97.4 °F (36.3 °C)   Ht 4' 11\" (1.499 m)   Wt 162 lb 9.6 oz (73.8 kg)   LMP  (LMP Unknown)   SpO2 98%   BMI 32.84 kg/m²   GENERAL: Alert, oriented x 3, not in acute distress. Lungs: CTA Siddharth  CVS: RRR  EXTREMITIES: Without clubbing, cyanosis, or edema. NEUROLOGIC: No focal deficits.    ECOG PS 1    Lab Results   Component Value Date    WBC 7.5 03/02/2022    HGB 11.8 03/02/2022    HCT 37.8 03/02/2022    MCV 95.5 03/02/2022     03/02/2022     Lab Results   Component Value Date     03/02/2022    K 5.2 (H) 03/02/2022     03/02/2022    CO2 24 03/02/2022    BUN 14 03/02/2022    CREATININE 0.7 03/02/2022    GLUCOSE 171 (H) 03/02/2022    CALCIUM 9.9 03/02/2022    PROT 7.6 03/02/2022    LABALBU 3.7 03/02/2022    BILITOT <0.2 03/02/2022    ALKPHOS 111 (H) 03/02/2022    AST 49 (H) 03/02/2022    ALT 28 03/02/2022    LABGLOM >60 03/02/2022    GFRAA >60 03/02/2022     Impression/Plan:  46 y/o female who on 02/17/2021 underwent Left localized lumpectomy with left axillary sentinal lymph node excision   A.  Breast, left, lumpectomy: Invasive ductal carcinoma   Ductal carcinoma in situ   Invasive carcinoma present less than 1 mm from yellow/medial margin   Margins negative for ductal carcinoma in situ     B.  New medial anterior margin, excision: Negative for carcinoma   C.  [place] lymph node, excision: One (1) lymph node, negative for metastatic carcinoma     CANCER CASE SUMMARY   Procedure: Excision (less than total mastectomy)   Specimen laterality: Left   Tumor size: 1.3 cm breast.  Imaging reviewed. Labs reviewed. JESSICA  Continue Arimidex, Ca. VitD    RTC 4 months with labs.  DEXA scan in the interim    03/02/2022  Jean Pierre Leon MD

## 2022-03-11 DIAGNOSIS — E11.65 UNCONTROLLED TYPE 2 DIABETES MELLITUS WITH HYPERGLYCEMIA (HCC): ICD-10-CM

## 2022-03-11 DIAGNOSIS — K21.9 GASTROESOPHAGEAL REFLUX DISEASE WITHOUT ESOPHAGITIS: ICD-10-CM

## 2022-03-11 DIAGNOSIS — K58.9 IRRITABLE BOWEL SYNDROME, UNSPECIFIED TYPE: ICD-10-CM

## 2022-03-11 DIAGNOSIS — G25.81 RLS (RESTLESS LEGS SYNDROME): ICD-10-CM

## 2022-03-11 DIAGNOSIS — I10 ESSENTIAL HYPERTENSION: ICD-10-CM

## 2022-03-11 RX ORDER — LACTOBACILLUS RHAMNOSUS GG 10B CELL
CAPSULE ORAL
Qty: 30 CAPSULE | Refills: 1 | Status: SHIPPED
Start: 2022-03-11 | End: 2022-05-16 | Stop reason: SDUPTHER

## 2022-03-11 RX ORDER — LANCETS 30 GAUGE
EACH MISCELLANEOUS
Qty: 100 EACH | Refills: 2 | Status: SHIPPED
Start: 2022-03-11 | End: 2022-06-14 | Stop reason: SDUPTHER

## 2022-03-11 RX ORDER — ISOPROPYL ALCOHOL 70 ML/100ML
SWAB TOPICAL
Qty: 100 EACH | Refills: 2 | Status: SHIPPED
Start: 2022-03-11 | End: 2022-06-14 | Stop reason: SDUPTHER

## 2022-03-11 RX ORDER — METOPROLOL SUCCINATE 25 MG/1
TABLET, EXTENDED RELEASE ORAL
Qty: 15 TABLET | Refills: 1 | Status: SHIPPED
Start: 2022-03-11 | End: 2022-05-16 | Stop reason: SDUPTHER

## 2022-03-11 RX ORDER — PRAMIPEXOLE DIHYDROCHLORIDE 0.12 MG/1
TABLET ORAL
Qty: 30 TABLET | Refills: 1 | Status: SHIPPED
Start: 2022-03-11 | End: 2022-10-28

## 2022-03-11 RX ORDER — INSULIN GLARGINE 100 [IU]/ML
INJECTION, SOLUTION SUBCUTANEOUS
Qty: 15 ML | Refills: 3 | Status: SHIPPED
Start: 2022-03-11 | End: 2022-06-27

## 2022-03-14 RX ORDER — DULOXETIN HYDROCHLORIDE 30 MG/1
CAPSULE, DELAYED RELEASE ORAL
Qty: 30 CAPSULE | Refills: 0 | Status: SHIPPED
Start: 2022-03-14 | End: 2022-04-18

## 2022-03-14 RX ORDER — EMPAGLIFLOZIN 10 MG/1
TABLET, FILM COATED ORAL
Qty: 30 TABLET | Refills: 0 | Status: SHIPPED
Start: 2022-03-14 | End: 2022-04-20 | Stop reason: SDUPTHER

## 2022-03-14 RX ORDER — OMEPRAZOLE 40 MG/1
CAPSULE, DELAYED RELEASE ORAL
Qty: 30 CAPSULE | Refills: 0 | Status: SHIPPED
Start: 2022-03-14 | End: 2022-04-20 | Stop reason: SDUPTHER

## 2022-04-18 DIAGNOSIS — K21.9 GASTROESOPHAGEAL REFLUX DISEASE WITHOUT ESOPHAGITIS: ICD-10-CM

## 2022-04-18 DIAGNOSIS — E11.65 UNCONTROLLED TYPE 2 DIABETES MELLITUS WITH HYPERGLYCEMIA (HCC): ICD-10-CM

## 2022-04-18 DIAGNOSIS — L30.9 DERMATITIS: ICD-10-CM

## 2022-04-18 RX ORDER — SERTRALINE HYDROCHLORIDE 100 MG/1
TABLET, FILM COATED ORAL
Qty: 30 TABLET | Refills: 10 | Status: SHIPPED | OUTPATIENT
Start: 2022-04-18

## 2022-04-20 RX ORDER — OMEPRAZOLE 40 MG/1
CAPSULE, DELAYED RELEASE ORAL
Qty: 30 CAPSULE | Refills: 10 | OUTPATIENT
Start: 2022-04-20

## 2022-04-20 RX ORDER — EMPAGLIFLOZIN 10 MG/1
TABLET, FILM COATED ORAL
Qty: 30 TABLET | Refills: 10 | OUTPATIENT
Start: 2022-04-20

## 2022-04-20 RX ORDER — EMPAGLIFLOZIN 10 MG/1
TABLET, FILM COATED ORAL
Qty: 30 TABLET | Refills: 0 | Status: SHIPPED
Start: 2022-04-20 | End: 2022-05-16 | Stop reason: SDUPTHER

## 2022-04-20 RX ORDER — DULOXETIN HYDROCHLORIDE 30 MG/1
CAPSULE, DELAYED RELEASE ORAL
Qty: 30 CAPSULE | Refills: 10 | OUTPATIENT
Start: 2022-04-20

## 2022-04-20 RX ORDER — OMEPRAZOLE 40 MG/1
CAPSULE, DELAYED RELEASE ORAL
Qty: 30 CAPSULE | Refills: 0 | Status: SHIPPED
Start: 2022-04-20 | End: 2022-05-16 | Stop reason: SDUPTHER

## 2022-04-20 NOTE — TELEPHONE ENCOUNTER
Per Dr. Richardson Yousif: Call in refills prilosec, metformin, and jardiance    3 months and 1 refill       cymbalta is discontinued     Rx pended.     Electronically signed by Ekta Wilson MA on 4/20/22 at 1:58 PM EDT

## 2022-04-22 DIAGNOSIS — E11.9 INSULIN DEPENDENT TYPE 2 DIABETES MELLITUS (HCC): ICD-10-CM

## 2022-04-22 DIAGNOSIS — Z79.4 INSULIN DEPENDENT TYPE 2 DIABETES MELLITUS (HCC): ICD-10-CM

## 2022-04-22 RX ORDER — DULOXETIN HYDROCHLORIDE 30 MG/1
CAPSULE, DELAYED RELEASE ORAL
Qty: 30 CAPSULE | Refills: 10 | OUTPATIENT
Start: 2022-04-22

## 2022-04-22 NOTE — TELEPHONE ENCOUNTER
Pt needs refills on Dulaglutide (TRULICITY) 1.5 MS/6.9SP SOPN and DULoxetine (CYMBALTA) 30 MG extended release capsule sent to [facility], [address] . Pt also states that they never received the meds sent over on 4/20 which were omeprazole (PRILOSEC) 40 MG delayed release capsule, empagliflozin (JARDIANCE) 10 MG tablet and metFORMIN (GLUCOPHAGE) 500 MG tablet.      Last seen 2/21/2022  Next appt Visit date not found

## 2022-04-25 RX ORDER — DULAGLUTIDE 1.5 MG/.5ML
INJECTION, SOLUTION SUBCUTANEOUS
Qty: 6 ML | Refills: 10 | Status: SHIPPED | OUTPATIENT
Start: 2022-04-25

## 2022-04-25 RX ORDER — DULOXETIN HYDROCHLORIDE 30 MG/1
CAPSULE, DELAYED RELEASE ORAL
Qty: 30 CAPSULE | Refills: 2 | OUTPATIENT
Start: 2022-04-25

## 2022-05-13 DIAGNOSIS — I10 ESSENTIAL HYPERTENSION: ICD-10-CM

## 2022-05-13 DIAGNOSIS — E11.65 UNCONTROLLED TYPE 2 DIABETES MELLITUS WITH HYPERGLYCEMIA (HCC): ICD-10-CM

## 2022-05-13 DIAGNOSIS — K21.9 GASTROESOPHAGEAL REFLUX DISEASE WITHOUT ESOPHAGITIS: ICD-10-CM

## 2022-05-13 DIAGNOSIS — K58.9 IRRITABLE BOWEL SYNDROME, UNSPECIFIED TYPE: ICD-10-CM

## 2022-05-16 RX ORDER — DULOXETIN HYDROCHLORIDE 30 MG/1
CAPSULE, DELAYED RELEASE ORAL
Qty: 30 CAPSULE | Refills: 10 | OUTPATIENT
Start: 2022-05-16

## 2022-05-16 RX ORDER — LACTOBACILLUS RHAMNOSUS GG 10B CELL
CAPSULE ORAL
Qty: 90 CAPSULE | Refills: 0 | Status: SHIPPED
Start: 2022-05-16 | End: 2022-08-29 | Stop reason: SDUPTHER

## 2022-05-16 RX ORDER — LACTOBACILLUS RHAMNOSUS GG 10B CELL
CAPSULE ORAL
Qty: 30 CAPSULE | Refills: 10 | OUTPATIENT
Start: 2022-05-16

## 2022-05-16 RX ORDER — OMEPRAZOLE 40 MG/1
CAPSULE, DELAYED RELEASE ORAL
Qty: 30 CAPSULE | Refills: 10 | OUTPATIENT
Start: 2022-05-16

## 2022-05-16 RX ORDER — OMEPRAZOLE 40 MG/1
40 CAPSULE, DELAYED RELEASE ORAL DAILY
Qty: 90 CAPSULE | Refills: 0 | Status: SHIPPED
Start: 2022-05-16 | End: 2022-08-05

## 2022-05-16 RX ORDER — METOPROLOL SUCCINATE 25 MG/1
12.5 TABLET, EXTENDED RELEASE ORAL DAILY
Qty: 45 TABLET | Refills: 0 | Status: SHIPPED
Start: 2022-05-16 | End: 2022-08-29 | Stop reason: SDUPTHER

## 2022-05-16 RX ORDER — METOPROLOL SUCCINATE 25 MG/1
TABLET, EXTENDED RELEASE ORAL
Qty: 15 TABLET | Refills: 10 | OUTPATIENT
Start: 2022-05-16

## 2022-05-18 DIAGNOSIS — C50.912 MALIGNANT NEOPLASM OF LEFT FEMALE BREAST, UNSPECIFIED ESTROGEN RECEPTOR STATUS, UNSPECIFIED SITE OF BREAST (HCC): ICD-10-CM

## 2022-05-18 DIAGNOSIS — K21.9 GASTROESOPHAGEAL REFLUX DISEASE WITHOUT ESOPHAGITIS: ICD-10-CM

## 2022-05-18 DIAGNOSIS — Z79.811 USE OF AROMATASE INHIBITORS: ICD-10-CM

## 2022-05-18 DIAGNOSIS — K58.9 IRRITABLE BOWEL SYNDROME, UNSPECIFIED TYPE: ICD-10-CM

## 2022-05-18 RX ORDER — ANASTROZOLE 1 MG/1
1 TABLET ORAL DAILY
Qty: 90 TABLET | Refills: 1 | Status: SHIPPED | OUTPATIENT
Start: 2022-05-18 | End: 2022-08-10

## 2022-05-19 RX ORDER — ANASTROZOLE 1 MG/1
1 TABLET ORAL DAILY
Qty: 90 TABLET | Refills: 1 | OUTPATIENT
Start: 2022-05-19

## 2022-05-20 RX ORDER — LACTOBACILLUS RHAMNOSUS GG 10B CELL
CAPSULE ORAL
Qty: 30 CAPSULE | Refills: 10 | OUTPATIENT
Start: 2022-05-20

## 2022-05-20 RX ORDER — DULOXETIN HYDROCHLORIDE 30 MG/1
CAPSULE, DELAYED RELEASE ORAL
Qty: 30 CAPSULE | Refills: 10 | OUTPATIENT
Start: 2022-05-20

## 2022-05-23 ASSESSMENT — ENCOUNTER SYMPTOMS
SHORTNESS OF BREATH: 0
CHEST TIGHTNESS: 0
EYE DISCHARGE: 0
COUGH: 0
BACK PAIN: 0
WHEEZING: 0
EYE ITCHING: 0
CHOKING: 0

## 2022-05-23 NOTE — PROGRESS NOTES
Subjective: Left breast cancer; ER/NV +; Her-2/Salvador negative by [facility]  2/17/2021 Left localized lumpectomy with left axillary sentinal lymph node excision   -Oncotype RS 16  -Adjuvant RT completed 05/04/2021  -ET with Arimidex started 05/06/2021. Patient ID: Natasha Cagle is a 45 y.o. female. HPI   Follow up for left breast cancer    Ultrasound guided core biopsy on 12/23/2020  Left breast, core needle biopsy: Invasive, moderately differentiatedductal carcinoma (grade 2)  Comment:    Intradepartmental consultation is obtained. Breast Cancer Marker Studies:  Estrogen Receptors (ER): 90%  Progesterone Receptors (NV): 90%  HER-2/salvador: Indeterminate/2+     FISH analysis HER/2: Negative  Average HER-2 signals/nucleus: 3.4   Average MARGARITO 17 signals/nucleus: 2.9   HER-2/MARGARITO 17 signal ratio: 1.2      2/17/2021 Left localized lumpectomy with left axillary sentinal lymph node excision   A.  Breast, left, lumpectomy: Invasive ductal carcinoma   Ductal carcinoma in situ   Invasive carcinoma present less than 1 mm from yellow/medial margin   Margins negative for ductal carcinoma in situ     B.  New medial anterior margin, excision: Negative for carcinoma   C.  [place] lymph node, excision: One (1) lymph node, negative for metastatic carcinoma     CANCER CASE SUMMARY   Procedure: Excision (less than total mastectomy)   Specimen laterality: Left   Tumor size: 1.3 cm in greatest dimension   Histologic type:  Invasive carcinoma of no special type (ductal)   Histologic grade (Meridian Histologic Score):        Glandular/tubular differentiation: Score 2        Nuclear pleomorphism: Score 2        Mitotic rate: Score 3        Overall grade: Grade 2, score 7   Ductal carcinoma in situ: Present        Nuclear grade: Grade II (intermediate)   Margins:        Invasive carcinoma margins: Uninvolved by invasive carcinoma             Distance from closest margin: 3 mm-blue/inferior        Ductal carcinoma in situ margins: Uninvolved by DCIS             Distance from closest margin: 2 mm from blue/inferior and red/superior   Regional lymph nodes: Uninvolved by tumor cells        Total number of lymph nodes examined: 1        Number of sentinel nodes examined: 1   Treatment effect in the breast: No known presurgical therapy   Pathologic stage classification (pTNM, AJCC 8th Edition):   pT1c (sn)pN0     Oncotype DX Breast Cancer Report Recurrence Score Result-16   Distant recurrence risk at 9 years-4%   Absolute chemotherapy benefit- <1%      -05/04/2021 completed adjuvant RT.  -05/06/2021 endocrine therapy with Arimidex 1 mg by mouth daily started per med-onc, Elina Romero MD.    Review of Systems   Constitutional: Negative for activity change, appetite change, chills, fatigue, fever and unexpected weight change. Rex Garcia continues to do well. She is excited to go on vacation to [facility] with her good friend. Tolerating Arimidex well. She enjoys spending time with her 10 grandchildren. Eyes: Negative for discharge, itching and visual disturbance. Respiratory: Negative for cough, choking, chest tightness, shortness of breath and wheezing. Cardiovascular: Negative for chest pain and palpitations. Genitourinary: Negative for difficulty urinating, dysuria, frequency and hematuria. Musculoskeletal: Negative for arthralgias, back pain, gait problem, joint swelling, myalgias, neck pain and neck stiffness. Neurological: Negative for dizziness, light-headedness and headaches. Psychiatric/Behavioral: The patient is not nervous/anxious. Objective:   Physical Exam  Vitals and nursing note reviewed. Constitutional:       General: She is not in acute distress. Appearance: She is well-developed. She is not diaphoretic. Comments: ECOG is stable at 0. HENT:      Head: Normocephalic and atraumatic. Mouth/Throat:      Pharynx: No oropharyngeal exudate. Eyes:      General: No scleral icterus.         Right eye: No discharge. Left eye: No discharge. Conjunctiva/sclera: Conjunctivae normal.   Neck:      Thyroid: No thyromegaly. Vascular: No JVD. Trachea: No tracheal deviation. Cardiovascular:      Rate and Rhythm: Normal rate and regular rhythm. Heart sounds: No murmur heard. No friction rub. No gallop. Pulmonary:      Effort: Pulmonary effort is normal. No respiratory distress or retractions. Breath sounds: Normal breath sounds. No stridor. No wheezing or rales. Chest:      Chest wall: No mass, lacerations, deformity, swelling, tenderness or edema. Breasts: Breasts are symmetrical.      Right: No inverted nipple, mass, nipple discharge, skin change or tenderness. Left: No inverted nipple, mass, nipple discharge, skin change or tenderness. Comments: Right breast exam is stable and unremarkable. Abdominal:      General: There is no distension. Palpations: Abdomen is soft. Tenderness: There is no abdominal tenderness. There is no guarding or rebound. Musculoskeletal:         General: No tenderness or deformity. Normal range of motion. Right shoulder: Normal.      Left shoulder: Normal.      Cervical back: Normal range of motion and neck supple. Comments: She has evidence of bites from reported history of bedbugs in her apartment complex. Has Topicort cream prescribed by her primary care physician. Lymphadenopathy:      Cervical: No cervical adenopathy. Right cervical: No superficial, deep or posterior cervical adenopathy. Left cervical: No superficial, deep or posterior cervical adenopathy. Upper Body:      Right upper body: No pectoral adenopathy. Left upper body: No pectoral adenopathy. Skin:     General: Skin is warm and dry. Coloration: Skin is not pale. Findings: No erythema or rash. Neurological:      Mental Status: She is alert and oriented to person, place, and time.       Coordination: Coordination normal.   Psychiatric:         Behavior: Behavior normal.         Thought Content: Thought content normal.         Judgment: Judgment normal.         Assessment:     45 y.o. extremely pleasant female with a history of Left breast cancer     Ultrasound guided core biopsy on 12/23/2020  Left breast, core needle biopsy: Invasive, moderately differentiatedductal carcinoma (grade 2)  Comment:    Intradepartmental consultation is obtained. Breast Cancer Marker Studies:  Estrogen Receptors (ER): 90%  Progesterone Receptors (SC): 90%  HER-2/salvador: Indeterminate/2+     FISH analysis HER/2: Negative  Average HER-2 signals/nucleus: 3.4   Average MARGARITO 17 signals/nucleus: 2.9   HER-2/MARGARITO 17 signal ratio: 1.2      2/17/2021 Left localized lumpectomy with left axillary sentinal lymph node excision   A.  Breast, left, lumpectomy: Invasive ductal carcinoma   Ductal carcinoma in situ   Invasive carcinoma present less than 1 mm from yellow/medial margin   Margins negative for ductal carcinoma in situ     B.  New medial anterior margin, excision: Negative for carcinoma   C.  [place] lymph node, excision: One (1) lymph node, negative for metastatic carcinoma     CANCER CASE SUMMARY tumor size 1.3 cm's. Overall grade 2, score 7. DCIS present, grade 2. Margins negative. Lymph nodes negative. Pathologic stage classification (pTNM, AJCC 8th Edition): pT1c (sn)pN0     Oncotype DX Recurrence Score Result-16. Absolute chemotherapy benefit- <1%     -04/06/2021 DEXA bone density normal and lumbar spine and bilateral hips.  -05/04/2021 completed adjuvant RT .  -05/06/2021 ET with Arimidex 1 mg by mouth daily started per med-onc, Geetha Villaseñor MD.  -09/02/2021 clinical follow-up without evidence of recurrent disease. She has recovered from radiation therapy.   We reviewed NCCN guidelines for breast cancer follow-up, breast massage, importance of a good supportive bra at all times while awake, breast self-examination, and limiting alcohol. -11/15/2021 bilateral screening mammogram:  Negative, BI-RADS 1. Clinical follow-up is without evidence of recurrent disease. Imaging reviewed, including her BI-RADS result. We again reviewed importance of good supportive bra, breast massage twice daily, and limiting alcohol. We discussed importance of calcium and vitamin D supplementation as directed as well as weightbearing exercises. -01/31/2022 right diagnostic mammogram and right breast US for palpable lump: Benign with no evidence of malignancy. BI-RADS Category 2. She has a 5 x 2 x 6 mm intradermal lesion consistent with epidermal inclusion cyst.  Clinical follow up: Is without evidence of recurrent disease. She has a non-inflamed, nontender epidermal inclusion cyst of the right axilla. We discussed management of epidermal inclusion cysts and discussed symptom management. We reviewed that sometimes epidermal inclusion cysts will resolve on their own. She will try warm compress 3-4 times daily. She will call us in the event the cyst becomes inflamed. We discussed that for recurrent cysts, definitive treatment is the surgical excision of the cyst along with the cystic sac. Will plan to see her in 6 months. She will can for any new or worsening symptoms. 06/09/2022 clinical follow up without evidence of recurrent disease. She has posttreatment changes of the left breast which are overall stable. Reviewed briefly NCCN guidelines for breast cancer follow-up. For nystatin powder for yeast and infection of the inframammary fold; she reports she does have access to nystatin powder and will begin using it. Plan to see in November with a bilateral screening mammogram same day. Plan:   1. Continue monthly breast/chest wall self examination; detailed instructions reviewed today. Bring any changes to your physician's attention. 2. Continue healthy diet and exercise routinely as tolerated.     3. Maintaining ideal body weight (20-25 BMI) may lead to optimal breast cancer outcomes. 4. Avoid alcohol. 5. Repeat B/L mammogram December 2022  6. Continue Arimidex 1 mg daily at the discretion of medical oncology team.  7. Ca+/VitD while on Arimidex. 8. Continue follow up with Medical Oncology and Primary Care. 9. RTC 6 months with B/L screening mammogram same day. During today's visit, face-to-face time 18  minutes, greater than 50% in counseling education and coordination of care. All questions were answered to her apparent satisfaction, and she is agreeable to the plan as outlined above. Felix Tan, RN, MSN, APRN-CNP, [address] [place]  Advanced Oncology Certified Nurse Practitioner  Department of Breast Surgery  [facility] [place]/  [facility] in collaboration with Dr. Jaylen Valadez.  Roberta/Rehana [address] [place] [address] Drive, APRN - CNP

## 2022-06-09 ENCOUNTER — OFFICE VISIT (OUTPATIENT)
Dept: BREAST CENTER | Age: 52
End: 2022-06-09
Payer: MEDICAID

## 2022-06-09 VITALS
BODY MASS INDEX: 32.62 KG/M2 | WEIGHT: 161.8 LBS | HEART RATE: 94 BPM | TEMPERATURE: 98.4 F | DIASTOLIC BLOOD PRESSURE: 70 MMHG | SYSTOLIC BLOOD PRESSURE: 118 MMHG | OXYGEN SATURATION: 98 % | RESPIRATION RATE: 16 BRPM | HEIGHT: 59 IN

## 2022-06-09 DIAGNOSIS — E11.65 UNCONTROLLED TYPE 2 DIABETES MELLITUS WITH HYPERGLYCEMIA (HCC): ICD-10-CM

## 2022-06-09 DIAGNOSIS — Z85.3 PERSONAL HISTORY OF BREAST CANCER: ICD-10-CM

## 2022-06-09 DIAGNOSIS — Z12.31 VISIT FOR SCREENING MAMMOGRAM: Primary | ICD-10-CM

## 2022-06-09 PROBLEM — R19.7 DIARRHEA: Status: RESOLVED | Noted: 2022-01-13 | Resolved: 2022-06-09

## 2022-06-09 PROCEDURE — 1036F TOBACCO NON-USER: CPT | Performed by: NURSE PRACTITIONER

## 2022-06-09 PROCEDURE — G8417 CALC BMI ABV UP PARAM F/U: HCPCS | Performed by: NURSE PRACTITIONER

## 2022-06-09 PROCEDURE — G8427 DOCREV CUR MEDS BY ELIG CLIN: HCPCS | Performed by: NURSE PRACTITIONER

## 2022-06-09 PROCEDURE — 99212 OFFICE O/P EST SF 10 MIN: CPT | Performed by: NURSE PRACTITIONER

## 2022-06-09 PROCEDURE — 3017F COLORECTAL CA SCREEN DOC REV: CPT | Performed by: NURSE PRACTITIONER

## 2022-06-09 PROCEDURE — 99213 OFFICE O/P EST LOW 20 MIN: CPT

## 2022-06-09 PROCEDURE — 99213 OFFICE O/P EST LOW 20 MIN: CPT | Performed by: NURSE PRACTITIONER

## 2022-06-10 RX ORDER — DULOXETIN HYDROCHLORIDE 30 MG/1
CAPSULE, DELAYED RELEASE ORAL
Qty: 30 CAPSULE | Refills: 10 | OUTPATIENT
Start: 2022-06-10

## 2022-06-10 RX ORDER — ISOPROPYL ALCOHOL 70 ML/100ML
SWAB TOPICAL
Refills: 10 | OUTPATIENT
Start: 2022-06-10

## 2022-06-10 RX ORDER — LANCETS 30 GAUGE
EACH MISCELLANEOUS
Refills: 10 | OUTPATIENT
Start: 2022-06-10

## 2022-06-14 RX ORDER — ISOPROPYL ALCOHOL 70 ML/100ML
SWAB TOPICAL
Qty: 100 EACH | Refills: 5 | Status: SHIPPED | OUTPATIENT
Start: 2022-06-14

## 2022-06-14 RX ORDER — LANCETS 30 GAUGE
EACH MISCELLANEOUS
Qty: 100 EACH | Refills: 2 | Status: SHIPPED
Start: 2022-06-14 | End: 2022-08-24

## 2022-06-24 DIAGNOSIS — E11.65 UNCONTROLLED TYPE 2 DIABETES MELLITUS WITH HYPERGLYCEMIA (HCC): ICD-10-CM

## 2022-06-24 RX ORDER — DULOXETIN HYDROCHLORIDE 30 MG/1
CAPSULE, DELAYED RELEASE ORAL
Qty: 30 CAPSULE | Refills: 10 | OUTPATIENT
Start: 2022-06-24

## 2022-06-24 NOTE — TELEPHONE ENCOUNTER
This MA attempted to reach pt. No answer. This MA left message for pt advising Cymbalta has been discontinued as she is taking Zoloft. This MA also contacted pharmacy to advise Rx has been d/c for alternative therapy.     Electronically signed by Monica Negron MA on 6/24/22 at 2:27 PM EDT

## 2022-06-27 RX ORDER — INSULIN GLARGINE 100 [IU]/ML
50 INJECTION, SOLUTION SUBCUTANEOUS NIGHTLY
Qty: 45 ML | Refills: 1 | Status: SHIPPED
Start: 2022-06-27 | End: 2022-07-06

## 2022-07-01 ENCOUNTER — OFFICE VISIT (OUTPATIENT)
Dept: FAMILY MEDICINE CLINIC | Age: 52
End: 2022-07-01
Payer: MEDICAID

## 2022-07-01 VITALS
TEMPERATURE: 98.1 F | OXYGEN SATURATION: 98 % | SYSTOLIC BLOOD PRESSURE: 132 MMHG | HEIGHT: 59 IN | HEART RATE: 85 BPM | BODY MASS INDEX: 33.06 KG/M2 | WEIGHT: 164 LBS | DIASTOLIC BLOOD PRESSURE: 76 MMHG | RESPIRATION RATE: 18 BRPM

## 2022-07-01 DIAGNOSIS — R53.83 FATIGUE, UNSPECIFIED TYPE: ICD-10-CM

## 2022-07-01 DIAGNOSIS — G62.9 NEUROPATHY: ICD-10-CM

## 2022-07-01 DIAGNOSIS — E11.65 UNCONTROLLED TYPE 2 DIABETES MELLITUS WITH HYPERGLYCEMIA (HCC): Primary | ICD-10-CM

## 2022-07-01 DIAGNOSIS — C50.912 MALIGNANT NEOPLASM OF LEFT BREAST IN FEMALE, ESTROGEN RECEPTOR POSITIVE, UNSPECIFIED SITE OF BREAST (HCC): ICD-10-CM

## 2022-07-01 DIAGNOSIS — E55.9 VITAMIN D DEFICIENCY: ICD-10-CM

## 2022-07-01 DIAGNOSIS — Z17.0 MALIGNANT NEOPLASM OF LEFT BREAST IN FEMALE, ESTROGEN RECEPTOR POSITIVE, UNSPECIFIED SITE OF BREAST (HCC): ICD-10-CM

## 2022-07-01 DIAGNOSIS — I10 ESSENTIAL HYPERTENSION: ICD-10-CM

## 2022-07-01 PROCEDURE — G8427 DOCREV CUR MEDS BY ELIG CLIN: HCPCS | Performed by: FAMILY MEDICINE

## 2022-07-01 PROCEDURE — 83036 HEMOGLOBIN GLYCOSYLATED A1C: CPT | Performed by: FAMILY MEDICINE

## 2022-07-01 PROCEDURE — 99214 OFFICE O/P EST MOD 30 MIN: CPT | Performed by: FAMILY MEDICINE

## 2022-07-01 PROCEDURE — 3017F COLORECTAL CA SCREEN DOC REV: CPT | Performed by: FAMILY MEDICINE

## 2022-07-01 PROCEDURE — G8417 CALC BMI ABV UP PARAM F/U: HCPCS | Performed by: FAMILY MEDICINE

## 2022-07-01 PROCEDURE — 1036F TOBACCO NON-USER: CPT | Performed by: FAMILY MEDICINE

## 2022-07-01 PROCEDURE — 3052F HG A1C>EQUAL 8.0%<EQUAL 9.0%: CPT | Performed by: FAMILY MEDICINE

## 2022-07-01 PROCEDURE — 2022F DILAT RTA XM EVC RTNOPTHY: CPT | Performed by: FAMILY MEDICINE

## 2022-07-01 PROCEDURE — 82962 GLUCOSE BLOOD TEST: CPT | Performed by: FAMILY MEDICINE

## 2022-07-01 RX ORDER — GABAPENTIN 300 MG
300 CAPSULE ORAL EVERY EVENING
Qty: 30 CAPSULE | Refills: 0
Start: 2022-07-01 | End: 2022-07-05 | Stop reason: SDUPTHER

## 2022-07-01 RX ORDER — GABAPENTIN 300 MG/1
300 CAPSULE ORAL NIGHTLY
Qty: 90 CAPSULE | Refills: 1 | Status: CANCELLED | OUTPATIENT
Start: 2022-07-01

## 2022-07-01 ASSESSMENT — PATIENT HEALTH QUESTIONNAIRE - PHQ9
SUM OF ALL RESPONSES TO PHQ9 QUESTIONS 1 & 2: 0
SUM OF ALL RESPONSES TO PHQ QUESTIONS 1-9: 0
1. LITTLE INTEREST OR PLEASURE IN DOING THINGS: 0
SUM OF ALL RESPONSES TO PHQ QUESTIONS 1-9: 0
SUM OF ALL RESPONSES TO PHQ QUESTIONS 1-9: 0
2. FEELING DOWN, DEPRESSED OR HOPELESS: 0
SUM OF ALL RESPONSES TO PHQ QUESTIONS 1-9: 0

## 2022-07-01 ASSESSMENT — LIFESTYLE VARIABLES: HOW OFTEN DO YOU HAVE A DRINK CONTAINING ALCOHOL: NEVER

## 2022-07-04 PROBLEM — E55.9 VITAMIN D DEFICIENCY: Status: ACTIVE | Noted: 2022-07-04

## 2022-07-04 ASSESSMENT — ENCOUNTER SYMPTOMS
VOMITING: 0
BACK PAIN: 0
PHOTOPHOBIA: 0
WHEEZING: 0
DIARRHEA: 0
EYE DISCHARGE: 0
TROUBLE SWALLOWING: 0
SINUS PAIN: 0
ANAL BLEEDING: 0
ABDOMINAL DISTENTION: 0
RHINORRHEA: 0
SHORTNESS OF BREATH: 0
NAUSEA: 0
RESPIRATORY NEGATIVE: 1
ALLERGIC/IMMUNOLOGIC NEGATIVE: 1
RECTAL PAIN: 0
ABDOMINAL PAIN: 0
CHOKING: 0
STRIDOR: 0
EYE REDNESS: 0
CHEST TIGHTNESS: 0
EYE PAIN: 0
COUGH: 0
SORE THROAT: 0
BLOOD IN STOOL: 0
EYE ITCHING: 0
SINUS PRESSURE: 0
COLOR CHANGE: 0
FACIAL SWELLING: 0
VOICE CHANGE: 0
CONSTIPATION: 0

## 2022-07-05 ENCOUNTER — TELEPHONE (OUTPATIENT)
Dept: FAMILY MEDICINE CLINIC | Age: 52
End: 2022-07-05

## 2022-07-05 DIAGNOSIS — G62.9 NEUROPATHY: ICD-10-CM

## 2022-07-05 DIAGNOSIS — E11.65 UNCONTROLLED TYPE 2 DIABETES MELLITUS WITH HYPERGLYCEMIA (HCC): ICD-10-CM

## 2022-07-05 LAB
6-MONOACETYLMORPHINE, URINE: NOT DETECTED
ALCOHOL URINE: 24
AMPHETAMINE SCREEN, URINE: NOT DETECTED
BARBITURATE SCREEN URINE: NOT DETECTED
BENZODIAZEPINE SCREEN, URINE: NOT DETECTED
BUPRENORPHINE URINE: NOT DETECTED
CANNABINOID SCREEN URINE: NOT DETECTED
COCAINE METABOLITE SCREEN URINE: NOT DETECTED
FENTANYL SCREEN, URINE: NOT DETECTED
INTEGRITY CHECK, CREATININE, URINE: 31.1
INTEGRITY CHECK, OXIDANT, URINE: 53
INTEGRITY CHECK, PH, URINE: 5.2 (ref 4.5–9)
INTEGRITY CHECK, SPECIFIC GRAVITY, URINE: 1.01 (ref 1–1.03)
INTEGRITY CHECK, SPECIMEN INTEGRITY, URINE: ABNORMAL
Lab: ABNORMAL
METHADONE SCREEN, URINE: NOT DETECTED
OPIATE SCREEN URINE: NOT DETECTED
OXYCODONE URINE: NOT DETECTED
PHENCYCLIDINE SCREEN URINE: NOT DETECTED
TRAMADOL SCREEN URINE: NOT DETECTED

## 2022-07-05 RX ORDER — GABAPENTIN 300 MG
300 CAPSULE ORAL EVERY EVENING
Qty: 30 CAPSULE | Refills: 0 | Status: SHIPPED
Start: 2022-07-05 | End: 2022-08-29

## 2022-07-05 NOTE — PROGRESS NOTES
SUBJECTIVE  Jenny Stone is a 45 y.o. female. HPI/Chief C/O:  Chief Complaint   Patient presents with    Diabetes     A1C and glucose pended and done.  Fatigue     Pt c/o  fatigue for the past few months.  Medication Refill     phamray verified and med pended. No Known Allergies    This 45year old female presents for physical exam. Pt has type 2 DM, neuropathy, hypertension, left breast cancer ([address] and vit. D deficiency. Pt c/o fatigue. Pt follows with oncology. Pt has cardiology appointment on 8/2/2022. ROS:  Review of Systems   Constitutional: Positive for fatigue. Negative for activity change, appetite change, chills, diaphoresis, fever and unexpected weight change. HENT: Negative. Negative for congestion, dental problem, drooling, ear discharge, ear pain, facial swelling, hearing loss, mouth sores, nosebleeds, postnasal drip, rhinorrhea, sinus pressure, sinus pain, sneezing, sore throat, tinnitus, trouble swallowing and voice change. Eyes: Positive for visual disturbance. Negative for photophobia, pain, discharge, redness and itching. Respiratory: Negative. Negative for cough, choking, chest tightness, shortness of breath, wheezing and stridor. Cardiovascular: Negative. Negative for chest pain, palpitations and leg swelling. Gastrointestinal: Negative for abdominal distention, abdominal pain, anal bleeding, blood in stool, constipation, diarrhea, nausea, rectal pain and vomiting. Endocrine: Negative. Negative for cold intolerance, heat intolerance, polydipsia, polyphagia and polyuria. Genitourinary: Positive for frequency and urgency. Negative for decreased urine volume, difficulty urinating, dysuria, flank pain, genital sores, hematuria, menstrual problem and pelvic pain. Musculoskeletal: Positive for arthralgias and myalgias. Negative for back pain, gait problem, joint swelling, neck pain and neck stiffness. Skin: Negative for color change, pallor, rash and wound. Allergic/Immunologic: Negative. Neurological: Positive for numbness. Negative for dizziness, tremors, seizures, syncope, facial asymmetry, speech difficulty, weakness, light-headedness and headaches. Hematological: Negative. Negative for adenopathy. Does not bruise/bleed easily. Psychiatric/Behavioral: Negative for agitation, behavioral problems, confusion, decreased concentration, dysphoric mood, hallucinations, self-injury, sleep disturbance and suicidal ideas. The patient is nervous/anxious. The patient is not hyperactive.          Past Medical/Surgical Hx;  Reviewed with patient      Diagnosis Date    Anesthesia complication     problem with blood pressure up & down    Arthritis     lower back    Back pain     Blind     right eye    Cancer ([address]     breast    Cardiac valve prolapse     micro    Diabetes mellitus ([address]     Head injury 2018    History of therapeutic radiation     Hyperlipidemia     Hypertension     Neuropathy due to secondary diabetes ([address]     in marcial feet    Nontraumatic tear of right rotator cuff 2020    Prolonged emergence from general anesthesia     Psoriasis      Past Surgical History:   Procedure Laterality Date    ANKLE SURGERY      bilateral tarsal tunnels    BREAST BIOPSY Left 2021    LEFT BREAST NEEDLE LOCALIZED LUMPECTOMY, BLUE DYE INJECTION, LEFT AXILLARY SENTINEL LYMPHNODE EXCISION, POSSIBLE LEFT AXILLARY DISSECTION performed by Danielle Mazariegos MD at [address] [facility]      cardiac catheterization    CARPAL TUNNEL RELEASE      left    CARPAL TUNNEL RELEASE  2012    right    CATARACT REMOVAL Left 2022     SECTION      CHOLECYSTECTOMY, LAPAROSCOPIC N/A 2019    LAPAROSCOPIC CHOLECYSTECTOMY WITH IOC performed by Jayant Thompson MD at [address]  had extremely high blood pressure and hard to wake up    COLONOSCOPY N/A 2019    COLORECTAL CANCER SCREENING, NOT HIGH RISK performed by Sarah Del Rio MD at [address] [facility] [address], TOTAL ABDOMINAL (CERVIX REMOVED)      SHOULDER ARTHROSCOPY Left 01/21/2016    subacromin decompression and debridement    SHOULDER ARTHROSCOPY Right 11/16/2020    RIGHT SHOULDER ARTHROSCOPY, SUBACROMIAL DECOMPRESSION, LABRIAL DEBRIDEMENT, CHONDROPLASTY, [place] performed by Aamir Jensen DO at [address] N/A 06/20/2019    EGD BIOPSY performed by Sarah Del Rio MD at [facility] LEFT  12/23/2020     BREAST NEEDLE BIOPSY LEFT 12/23/2020 [facility]       Past Family Hx:  Reviewed with patient      Problem Relation Age of Onset    Heart Disease Mother     Diabetes Mother     Heart Disease Father     Diabetes Father     Cancer Father         prostate, colon, skin cance behind ear    Breast Cancer Maternal Aunt 54        breast    Cancer Maternal Uncle 62        colon    Cancer Paternal Aunt 58        breast    Cancer Other 62        maternal great aunt - breast       Social Hx:  Reviewed with patient  Social History     Tobacco Use    Smoking status: Never Smoker    Smokeless tobacco: Never Used   Substance Use Topics    Alcohol use: Not Currently     Alcohol/week: 1.0 standard drink     Types: 1 Glasses of wine per week     Comment: weekly       OBJECTIVE  /76   Pulse 85   Temp 98.1 °F (36.7 °C)   Resp 18   Ht 4' 11\" (1.499 m)   Wt 164 lb (74.4 kg)   LMP  (LMP Unknown)   SpO2 98%   Breastfeeding No   BMI 33.12 kg/m²     Problem List:  [facility] [place] does not have any pertinent problems on file. PHYS EX:  Physical Exam  Vitals and nursing note reviewed. Constitutional:       General: She is not in acute distress. Appearance: Normal appearance. She is well-developed. She is obese. She is not ill-appearing, toxic-appearing or diaphoretic. Comments: Patient has morbid obesity. Patient instructed on low calorie, healthy ADA diet.       HENT: Deep Tendon Reflexes: Reflexes are normal and symmetric. Reflexes normal.   Psychiatric:         Mood and Affect: Mood normal.         Behavior: Behavior normal.         Thought Content: Thought content normal.         Judgment: Judgment normal.         ASSESSMENT/PLAN  Jenny was seen today for diabetes, fatigue and medication refill. Diagnoses and all orders for this visit:    Uncontrolled type 2 diabetes mellitus with hyperglycemia ([facility]  -     POCT glycosylated hemoglobin (Hb A1C)  -     POCT Glucose  -     NEURONTIN 300 MG capsule; Take 1 capsule by mouth every evening for 30 days. Controlled. Metformin, trulicity, jardiance, admelog, basaglar, statin, ADA diet. Neuropathy  -     PAIN MANAGEMENT PROFILE 1 W/ CONFIRMATION, URINE; Future  -     NEURONTIN 300 MG capsule; Take 1 capsule by mouth every evening for 30 days. Stable. Malignant neoplasm of left breast in female, estrogen receptor positive, unspecified site of breast ([facility]  Stable. Arimidex, oncology. Essential hypertension  Controlled. Bumex, BB, statin, low salt diet. Vitamin D deficiency  -     Vitamin D 25 Hydroxy; Future  Not controlled. Fatigue, unspecified type  -     Vitamin B12; Future    Pt instructed if any worse go ED ASAP. Outpatient Encounter Medications as of 7/1/2022   Medication Sig Dispense Refill    NEURONTIN 300 MG capsule Take 1 capsule by mouth every evening for 30 days.  30 capsule 0    insulin glargine (BASAGLAR KWIKPEN) 100 UNIT/ML injection pen Inject 50 Units into the skin nightly 45 mL 1    Alcohol Swabs (EASY TOUCH ALCOHOL PREP MEDIUM) 70 % PADS USE AS DIRECTED PRIOR TO GLUCOSE TESTING AND INSULIN INJECTION FOUR TIMES A  each 5    Lancets (ONETOUCH DELICA PLUS [id number]) MISC USE TO TEST BLOOD SUGAR FOUR TIMES A  each 2    anastrozole (ARIMIDEX) 1 MG tablet Take 1 tablet by mouth daily 90 tablet 1    empagliflozin (JARDIANCE) 10 MG tablet Take 1 tablet by mouth daily 90 tablet 0    omeprazole (PRILOSEC) 40 MG delayed release capsule Take 1 capsule by mouth daily 90 capsule 0    metoprolol succinate (TOPROL XL) 25 MG extended release tablet Take 0.5 tablets by mouth daily 45 tablet 0    lactobacillus (CULTURELLE) CAPS capsule TAKE 1 CAPSULE BY MOUTH DAILY 90 capsule 0    Dulaglutide (TRULICITY) 1.5 IF/9.2RW SOPN INJECT THE CONTENTS OF 1 SYRINGE SUBCUTANEOUSLY EVERY WEEK *PATIENT NEEDS APPOINTMENT* 6 mL 10    metFORMIN (GLUCOPHAGE) 500 MG tablet Take 2 tablets by mouth 2 times daily (with meals) TAKE TWO (2) TABLETS BY MOUTH TWICE DAILY WITH MEALS *EMERGENCY REFILL* 360 tablet 1    hydrocortisone 2.5 % cream APPLY TOPICALLY TO AFFECTED AREA TWICE DAILY 30 g 10    sertraline (ZOLOFT) 100 MG tablet TAKE 1 TABLET BY MOUTH ONCE DAILY AS NEEDED FOR DEPRESSION 30 tablet 10    pramipexole (MIRAPEX) 0.125 MG tablet TAKE 1 TABLET BY MOUTH NIGHTLY 30 tablet 1    blood glucose test strips (ONETOUCH ULTRA) strip USE AS DIRECTED FOUR TIMES A  each 2    rosuvastatin (CRESTOR) 5 MG tablet Take 1 pill by mouth daily 90 tablet 1    diclofenac sodium (VOLTAREN) 1 % GEL Apply 2 g topically 4 times daily 60 g 2    sucralfate (CARAFATE) 1 GM tablet Take 1 tablet by mouth 4 times daily 120 tablet 3    Blood Glucose Monitoring Suppl (ONE TOUCH ULTRA 2) w/Device KIT Test 3 times a day & as needed for symptoms of irregular blood glucose. Dispense sufficient amount for indicated testing frequency plus additional to accommodate PRN testing needs.  1 kit 0    Cholecalciferol (VITAMIN D) 50 MCG (2000 UT) CAPS capsule Take by mouth      acetaminophen (TYLENOL) 500 MG tablet Take 1 tablet by mouth 4 times daily as needed for Pain 120 tablet 0    B-D ULTRAFINE III SHORT PEN 31G X 8 MM MISC USE WITH INSULIN PENS AS DIRECTED 100 each 10    fluticasone (FLONASE) 50 MCG/ACT nasal spray 1 spray by Nasal route daily 1 Bottle 3    nitroGLYCERIN (NITROSTAT) 0.4 MG SL tablet Place 1 tablet under the tongue every 5 minutes as needed for Chest pain 25 tablet 1    Insulin Syringe-Needle U-100 ([employer] INSULIN SYR 1CC/30G) 30G X 5/16\" 1 ML MISC 1 each by Does not apply route 4 times daily 120 each 5    bumetanide (BUMEX) 1 MG tablet Take 1 tablet by mouth every other day 90 tablet 1    insulin lispro (ADMELOG SOLOSTAR) 100 UNIT/ML pen As directed per sliding scale up to 13 units 4 times per day. 5 pen 3    Blood Pressure KIT 1 kit by Does not apply route daily 1 kit 0    Misc. Devices (QUAD CANE) MISC 1 Quad cane 1 each 0    Multiple Vitamins-Minerals (THERAPEUTIC MULTIVITAMIN-MINERALS) tablet Take 1 tablet by mouth daily      B Complex Vitamins (B COMPLEX 1 PO) Take 1 tablet by mouth daily.  [DISCONTINUED] GABAPENTIN PO Take 300 mg by mouth Nightly        No facility-administered encounter medications on file as of 7/1/2022. Return in about 6 months (around 1/1/2023).         Reviewed recent labs related to Jenny's current problems      Discussed importance of regular Health Maintenance follow up  Health Maintenance   Topic    Diabetic foot exam     Hepatitis B vaccine (1 of 3 - Risk 3-dose series)    DTaP/Tdap/Td vaccine (1 - Tdap)    Shingles vaccine (1 of 2)    Pneumococcal 0-64 years Vaccine (2 - PCV)    COVID-19 Vaccine (4 - Booster for [employer] series)    Diabetic retinal exam     Diabetic microalbuminuria test     Flu vaccine (1)    Breast cancer screen     A1C test (Diabetic or Prediabetic)     Lipids     Depression Screen     Colorectal Cancer Screen     Hepatitis C screen     HIV screen     Hepatitis A vaccine     Hib vaccine     Meningococcal (ACWY) vaccine

## 2022-07-05 NOTE — TELEPHONE ENCOUNTER
This MA contacted pharmacy, pharmacy states they did not receive our script, script was resent to the pharmacy, pt was notified. Pt would like to know how her urine came back as well.

## 2022-07-05 NOTE — TELEPHONE ENCOUNTER
----- Message from Osman Cardenas sent at 7/5/2022 12:19 PM EDT -----  Subject: Message to Provider    QUESTIONS  Information for Provider? Patient was calling to see when the Rx for   Neurotin will be sent to her pharmacy. She express she reach out to the   pharmacy and they informed her they didn't receive the Rx yet. She will   need to have it sent to [id number] - [place], [address]   ---------------------------------------------------------------------------  --------------  [address]  [phone]; OK to leave message on voicemail  ---------------------------------------------------------------------------  --------------  SCRIPT ANSWERS  Relationship to Patient?  Self

## 2022-07-06 ENCOUNTER — HOSPITAL ENCOUNTER (OUTPATIENT)
Age: 52
Discharge: HOME | End: 2022-07-06
Payer: MEDICAID

## 2022-07-06 DIAGNOSIS — E11.65 UNCONTROLLED TYPE 2 DIABETES MELLITUS WITH HYPERGLYCEMIA (HCC): ICD-10-CM

## 2022-07-06 DIAGNOSIS — R53.83 FATIGUE, UNSPECIFIED TYPE: ICD-10-CM

## 2022-07-06 DIAGNOSIS — E55.9 VITAMIN D DEFICIENCY: ICD-10-CM

## 2022-07-06 LAB — VITAMIN B-12: 1006 PG/ML (ref 211–946)

## 2022-07-06 PROCEDURE — 36415 COLL VENOUS BLD VENIPUNCTURE: CPT

## 2022-07-06 PROCEDURE — 82306 VITAMIN D 25 HYDROXY: CPT

## 2022-07-06 PROCEDURE — 82607 VITAMIN B-12: CPT

## 2022-07-06 RX ORDER — INSULIN GLARGINE 100 [IU]/ML
INJECTION, SOLUTION SUBCUTANEOUS
Qty: 15 ML | Refills: 10 | Status: SHIPPED
Start: 2022-07-06 | End: 2022-07-18

## 2022-07-06 NOTE — TELEPHONE ENCOUNTER
Dr. Keenan Mancia asked me to call the pt's pharmacy to see why the medication for neurotin was not received, this MA contacted the pharmacy and they stated her insurance did not cover the name brand and needed to switch the medication to the generic brand. Gave verbal authorization to switch and pt was notified via detailed vm.

## 2022-07-07 ENCOUNTER — TELEPHONE (OUTPATIENT)
Dept: FAMILY MEDICINE CLINIC | Age: 52
End: 2022-07-07

## 2022-07-07 LAB — VITAMIN D 25-HYDROXY: 40 NG/ML (ref 30–100)

## 2022-07-07 NOTE — TELEPHONE ENCOUNTER
----- Message from Faustino Clancy sent at 7/6/2022  3:16 PM EDT -----  Subject: Results Request    QUESTIONS  Results: Lab work/ blood work; Ordered by: Adolfo Beaulieu   Date Performed: 2022-07-06  ---------------------------------------------------------------------------  --------------  [facility]    [phone]; OK to leave message on voicemail  ---------------------------------------------------------------------------  --------------

## 2022-07-08 RX ORDER — DULOXETIN HYDROCHLORIDE 30 MG/1
CAPSULE, DELAYED RELEASE ORAL
Qty: 30 CAPSULE | Refills: 10 | OUTPATIENT
Start: 2022-07-08

## 2022-07-08 NOTE — TELEPHONE ENCOUNTER
This MA spoke with pt - pt advised of results and recommendations per Dr. Rachana Covarrubias. Pt verbalized that she  understood and is not amendable to to stop drinking at this time.     Electronically signed by Adrián Bryant MA on 7/8/22 at 8:18 AM EDT

## 2022-07-18 DIAGNOSIS — E11.65 UNCONTROLLED TYPE 2 DIABETES MELLITUS WITH HYPERGLYCEMIA (HCC): ICD-10-CM

## 2022-07-18 RX ORDER — INSULIN GLARGINE 100 [IU]/ML
INJECTION, SOLUTION SUBCUTANEOUS
Qty: 15 ML | Refills: 10 | Status: SHIPPED
Start: 2022-07-18 | End: 2022-07-22

## 2022-07-20 DIAGNOSIS — E11.65 UNCONTROLLED TYPE 2 DIABETES MELLITUS WITH HYPERGLYCEMIA (HCC): ICD-10-CM

## 2022-07-22 RX ORDER — INSULIN GLARGINE 100 [IU]/ML
INJECTION, SOLUTION SUBCUTANEOUS
Qty: 15 ML | Refills: 1 | Status: SHIPPED
Start: 2022-07-22 | End: 2022-07-25

## 2022-07-25 DIAGNOSIS — E11.65 UNCONTROLLED TYPE 2 DIABETES MELLITUS WITH HYPERGLYCEMIA (HCC): ICD-10-CM

## 2022-07-25 RX ORDER — INSULIN GLARGINE 100 [IU]/ML
50 INJECTION, SOLUTION SUBCUTANEOUS NIGHTLY
Qty: 45 ML | Refills: 1 | Status: SHIPPED | OUTPATIENT
Start: 2022-07-25 | End: 2023-01-21

## 2022-07-25 NOTE — TELEPHONE ENCOUNTER
----- Message from [employer] sent at 7/21/2022  1:34 PM EDT -----  Subject: Refill Request    QUESTIONS  Name of Medication? BASAGLAR KWIKPEN 100 UNIT/ML injection pen  Patient-reported dosage and instructions? Once nightly, U-100 UNITS   How many days do you have left? 4  Preferred Pharmacy? [facility] phone number (if available)? [phone]  ---------------------------------------------------------------------------  --------------  CALL BACK INFO  What is the best way for the office to contact you? OK to leave message on   voicemail  Preferred Call Back Phone Number? [phone]  ---------------------------------------------------------------------------  --------------  SCRIPT ANSWERS  Relationship to Patient?  Self

## 2022-08-03 LAB
Lab: NORMAL
REPORT: NORMAL
THIS TEST SENT TO: NORMAL

## 2022-08-05 DIAGNOSIS — E11.65 UNCONTROLLED TYPE 2 DIABETES MELLITUS WITH HYPERGLYCEMIA (HCC): ICD-10-CM

## 2022-08-05 DIAGNOSIS — K21.9 GASTROESOPHAGEAL REFLUX DISEASE WITHOUT ESOPHAGITIS: ICD-10-CM

## 2022-08-05 RX ORDER — OMEPRAZOLE 40 MG/1
40 CAPSULE, DELAYED RELEASE ORAL DAILY
Qty: 30 CAPSULE | Refills: 10 | Status: SHIPPED | OUTPATIENT
Start: 2022-08-05 | End: 2022-11-03

## 2022-08-05 RX ORDER — EMPAGLIFLOZIN 10 MG/1
TABLET, FILM COATED ORAL
Qty: 30 TABLET | Refills: 10 | Status: SHIPPED
Start: 2022-08-05 | End: 2022-09-14

## 2022-08-10 DIAGNOSIS — C50.912 MALIGNANT NEOPLASM OF LEFT FEMALE BREAST, UNSPECIFIED ESTROGEN RECEPTOR STATUS, UNSPECIFIED SITE OF BREAST (HCC): ICD-10-CM

## 2022-08-10 DIAGNOSIS — Z79.811 USE OF AROMATASE INHIBITORS: ICD-10-CM

## 2022-08-10 RX ORDER — ANASTROZOLE 1 MG/1
1 TABLET ORAL DAILY
Qty: 30 TABLET | Refills: 5 | Status: SHIPPED
Start: 2022-08-10 | End: 2022-08-24 | Stop reason: SDUPTHER

## 2022-08-15 DIAGNOSIS — G62.9 NEUROPATHY: ICD-10-CM

## 2022-08-15 DIAGNOSIS — K21.9 GASTROESOPHAGEAL REFLUX DISEASE WITHOUT ESOPHAGITIS: ICD-10-CM

## 2022-08-15 DIAGNOSIS — E11.65 UNCONTROLLED TYPE 2 DIABETES MELLITUS WITH HYPERGLYCEMIA (HCC): ICD-10-CM

## 2022-08-15 DIAGNOSIS — K58.9 IRRITABLE BOWEL SYNDROME, UNSPECIFIED TYPE: ICD-10-CM

## 2022-08-15 RX ORDER — DULOXETIN HYDROCHLORIDE 30 MG/1
CAPSULE, DELAYED RELEASE ORAL
COMMUNITY
Start: 2022-05-26 | End: 2022-09-02

## 2022-08-17 RX ORDER — DULOXETIN HYDROCHLORIDE 30 MG/1
CAPSULE, DELAYED RELEASE ORAL
Qty: 90 CAPSULE | Refills: 0 | OUTPATIENT
Start: 2022-08-17

## 2022-08-17 RX ORDER — GABAPENTIN 300 MG
300 CAPSULE ORAL EVERY EVENING
Qty: 90 CAPSULE | Refills: 0 | OUTPATIENT
Start: 2022-08-17 | End: 2022-09-16

## 2022-08-17 RX ORDER — LACTOBACILLUS RHAMNOSUS GG 10B CELL
CAPSULE ORAL
Qty: 90 CAPSULE | Refills: 0 | OUTPATIENT
Start: 2022-08-17

## 2022-08-22 DIAGNOSIS — E11.65 UNCONTROLLED TYPE 2 DIABETES MELLITUS WITH HYPERGLYCEMIA (HCC): ICD-10-CM

## 2022-08-22 RX ORDER — SUCRALFATE 1 G/1
1 TABLET ORAL 4 TIMES DAILY
Qty: 120 TABLET | Refills: 3 | OUTPATIENT
Start: 2022-08-22

## 2022-08-24 ENCOUNTER — OFFICE VISIT (OUTPATIENT)
Dept: ONCOLOGY | Age: 52
End: 2022-08-24
Payer: MEDICAID

## 2022-08-24 ENCOUNTER — HOSPITAL ENCOUNTER (OUTPATIENT)
Dept: INFUSION THERAPY | Age: 52
Discharge: HOME | End: 2022-08-24
Payer: MEDICAID

## 2022-08-24 VITALS
HEIGHT: 59 IN | WEIGHT: 160 LBS | RESPIRATION RATE: 16 BRPM | HEART RATE: 86 BPM | OXYGEN SATURATION: 98 % | DIASTOLIC BLOOD PRESSURE: 85 MMHG | SYSTOLIC BLOOD PRESSURE: 137 MMHG | BODY MASS INDEX: 32.25 KG/M2 | TEMPERATURE: 97.4 F

## 2022-08-24 DIAGNOSIS — C50.912 MALIGNANT NEOPLASM OF LEFT FEMALE BREAST, UNSPECIFIED ESTROGEN RECEPTOR STATUS, UNSPECIFIED SITE OF BREAST (HCC): ICD-10-CM

## 2022-08-24 DIAGNOSIS — Z79.811 USE OF AROMATASE INHIBITORS: ICD-10-CM

## 2022-08-24 LAB
ALBUMIN SERPL-MCNC: 4.2 G/DL (ref 3.5–5.2)
ALP BLD-CCNC: 128 U/L (ref 35–104)
ALT SERPL-CCNC: 31 U/L (ref 0–32)
ANION GAP SERPL CALCULATED.3IONS-SCNC: 14 MMOL/L (ref 7–16)
AST SERPL-CCNC: 36 U/L (ref 0–31)
BASOPHILS ABSOLUTE: 0.01 E9/L (ref 0–0.2)
BASOPHILS RELATIVE PERCENT: 0.1 % (ref 0–2)
BILIRUB SERPL-MCNC: 0.2 MG/DL (ref 0–1.2)
BUN BLDV-MCNC: 14 MG/DL (ref 6–20)
CALCIUM SERPL-MCNC: 10.3 MG/DL (ref 8.6–10.2)
CHLORIDE BLD-SCNC: 108 MMOL/L (ref 98–107)
CO2: 17 MMOL/L (ref 22–29)
CREAT SERPL-MCNC: 0.9 MG/DL (ref 0.5–1)
EOSINOPHILS ABSOLUTE: 0.12 E9/L (ref 0.05–0.5)
EOSINOPHILS RELATIVE PERCENT: 1.4 % (ref 0–6)
GFR AFRICAN AMERICAN: >60
GFR NON-AFRICAN AMERICAN: >60 ML/MIN/1.73
GLUCOSE BLD-MCNC: 139 MG/DL (ref 74–99)
HCT VFR BLD CALC: 37.1 % (ref 34–48)
HEMOGLOBIN: 11.8 G/DL (ref 11.5–15.5)
IMMATURE GRANULOCYTES #: 0.01 E9/L
IMMATURE GRANULOCYTES %: 0.1 % (ref 0–5)
LYMPHOCYTES ABSOLUTE: 2.12 E9/L (ref 1.5–4)
LYMPHOCYTES RELATIVE PERCENT: 24 % (ref 20–42)
MCH RBC QN AUTO: 29.4 PG (ref 26–35)
MCHC RBC AUTO-ENTMCNC: 31.8 % (ref 32–34.5)
MCV RBC AUTO: 92.5 FL (ref 80–99.9)
MONOCYTES ABSOLUTE: 0.59 E9/L (ref 0.1–0.95)
MONOCYTES RELATIVE PERCENT: 6.7 % (ref 2–12)
NEUTROPHILS ABSOLUTE: 6 E9/L (ref 1.8–7.3)
NEUTROPHILS RELATIVE PERCENT: 67.7 % (ref 43–80)
PDW BLD-RTO: 13.9 FL (ref 11.5–15)
PLATELET # BLD: 271 E9/L (ref 130–450)
PMV BLD AUTO: 10.3 FL (ref 7–12)
POTASSIUM SERPL-SCNC: 5.1 MMOL/L (ref 3.5–5)
RBC # BLD: 4.01 E12/L (ref 3.5–5.5)
SODIUM BLD-SCNC: 139 MMOL/L (ref 132–146)
TOTAL PROTEIN: 7.8 G/DL (ref 6.4–8.3)
WBC # BLD: 8.9 E9/L (ref 4.5–11.5)

## 2022-08-24 PROCEDURE — G8427 DOCREV CUR MEDS BY ELIG CLIN: HCPCS | Performed by: INTERNAL MEDICINE

## 2022-08-24 PROCEDURE — 99214 OFFICE O/P EST MOD 30 MIN: CPT | Performed by: INTERNAL MEDICINE

## 2022-08-24 PROCEDURE — 36415 COLL VENOUS BLD VENIPUNCTURE: CPT

## 2022-08-24 PROCEDURE — 3017F COLORECTAL CA SCREEN DOC REV: CPT | Performed by: INTERNAL MEDICINE

## 2022-08-24 PROCEDURE — G8417 CALC BMI ABV UP PARAM F/U: HCPCS | Performed by: INTERNAL MEDICINE

## 2022-08-24 PROCEDURE — 1036F TOBACCO NON-USER: CPT | Performed by: INTERNAL MEDICINE

## 2022-08-24 PROCEDURE — 99212 OFFICE O/P EST SF 10 MIN: CPT

## 2022-08-24 RX ORDER — LANCETS 30 GAUGE
EACH MISCELLANEOUS
Qty: 100 EACH | Refills: 10 | Status: SHIPPED
Start: 2022-08-24 | End: 2022-09-07

## 2022-08-24 RX ORDER — ANASTROZOLE 1 MG/1
1 TABLET ORAL DAILY
Qty: 30 TABLET | Refills: 5 | Status: SHIPPED | OUTPATIENT
Start: 2022-08-24

## 2022-08-24 NOTE — PROGRESS NOTES
Department of [facility]  Attending Clinic Note    Reason for Visit: Follow-up on a patient with Left Breast Cancer     PCP:  Esther Stephenson DO    History of Present Illness:  45year old female with Left Breast Cancer. Lesion located in the 7 o'clock position found on mammogram.    Breast cancer risk factors include age, gender, family history of cancer    Bilateral Screening Mammogram 11/12/2020 with 1.5 cm irregular focal asymmetry seen on the CC view, not well seen on MLO view. Stable mammogram of right breast    Left Diagnostic Mammogram 12/15/2021 with spiculated mass measuring 1.5 cm in the lower inner quadrant of the left breast. ON dedicated targeted ultrasound examination of left breast there is a solid lesion with posterior shadowing at 7 o'clock position. Highly suggestive of malignancy    Ultrasound guided core biopsy on 12/23/2020  Left breast, core needle biopsy: Invasive, moderately differentiatedductal carcinoma (grade 2)  Comment:    Intradepartmental consultation is obtained. Breast Cancer Marker Studies:  Estrogen Receptors (ER): 90%  Progesterone Receptors (DC): 90%  HER-2/salvador: Indeterminate/2+    FISH analysis HER/2: Negative  Average HER-2 signals/nucleus: 3.4   Average MARGARITO 17 signals/nucleus: 2.9   HER-2/MARGARITO 17 signal ratio: 1.2     2/17/2021 Left localized lumpectomy with left axillary sentinal lymph node excision   A. Breast, left, lumpectomy: Invasive ductal carcinoma   Ductal carcinoma in situ   Invasive carcinoma present less than 1 mm from yellow/medial margin   Margins negative for ductal carcinoma in situ     B. New medial anterior margin, excision: Negative for carcinoma   C. [place] lymph node, excision: One (1) lymph node, negative for metastatic carcinoma     CANCER CASE SUMMARY   Procedure: Excision (less than total mastectomy)   Specimen laterality: Left   Tumor size: 1.3 cm in greatest dimension   Histologic type:  Invasive carcinoma of no special type (ductal)   Histologic grade (Nika Histologic Score):        Glandular/tubular differentiation: Score 2        Nuclear pleomorphism: Score 2        Mitotic rate: Score 3        Overall grade: Grade 2, score 7   Ductal carcinoma in situ: Present        Nuclear grade: Grade II (intermediate)   Margins:        Invasive carcinoma margins: Uninvolved by invasive carcinoma             Distance from closest margin: 3 mm-blue/inferior        Ductal carcinoma in situ margins: Uninvolved by DCIS             Distance from closest margin: 2 mm from blue/inferior and red/superior   Regional lymph nodes: Uninvolved by tumor cells        Total number of lymph nodes examined: 1        Number of sentinel nodes examined: 1   Treatment effect in the breast: No known presurgical therapy   Pathologic stage classification (pTNM, AJCC 8th Edition):   pT1c (sn)pN0     Oncotype DX Breast Cancer Report   Recurrence Score Result-16   Distant recurrence risk at 9 years-4%   Absolute chemotherapy benefit- <1%     No indication for adjuvant chemotherapy  Recommended adjuvant RT followed by adjuvant endocrine therapy  RT was completed on 05/04/2021. Arimidex 1 mg po daily was started on 05/06/2021 with fair tolerance so far. Today 08/24/2022. No fever, chills. Fair appetite and energy level. She continues to tolerate Arimidex well. Review of Systems;  CONSTITUTIONAL: No fever, chills. Fair appetite and energy level. ENMT: Eyes: No diplopia; Nose: No epistaxis. Mouth: No sore throat. RESPIRATORY: No hemoptysis, shortness of breath, cough. CARDIOVASCULAR: No chest pain, palpitations. GASTROINTESTINAL: No nausea/vomiting, abdominal pain  GENITOURINARY: No dysuria, urinary frequency, hematuria. NEURO: No syncope, presyncope, headache.   Remainder:ROS NEGATIVE    Past Medical History:      Diagnosis Date    Anesthesia complication     problem with blood pressure up & down    Arthritis     lower back    Back pain     Blind     right eye    Cancer ([facility] [address] 2021    breast    Cardiac valve prolapse     micro    Diabetes mellitus ([facility] [address]     Head injury 11/17/2018    History of therapeutic radiation     Hyperlipidemia     Hypertension     Neuropathy due to secondary diabetes ([facility] [address]     in siddharth feet    Nontraumatic tear of right rotator cuff 11/30/2020    Prolonged emergence from general anesthesia     Psoriasis      Medications:  Reviewed and reconciled. Allergies:  No Known Allergies    Physical Exam:  /85   Pulse 86   Temp 97.4 °F (36.3 °C) (Infrared)   Resp 16   Ht 4' 11\" (1.499 m)   Wt 160 lb (72.6 kg)   LMP  (LMP Unknown)   SpO2 98%   BMI 32.32 kg/m²   GENERAL: Alert, oriented x 3, not in acute distress. Lungs: CTA Siddharth  CVS: RRR  EXTREMITIES: Without clubbing, cyanosis, or edema. NEUROLOGIC: No focal deficits. ECOG PS 1    Lab Results   Component Value Date    WBC 8.9 08/24/2022    HGB 11.8 08/24/2022    HCT 37.1 08/24/2022    MCV 92.5 08/24/2022     08/24/2022     Lab Results   Component Value Date     08/24/2022    K 5.1 (H) 08/24/2022     (H) 08/24/2022    CO2 17 (L) 08/24/2022    BUN 14 08/24/2022    CREATININE 0.9 08/24/2022    GLUCOSE 139 (H) 08/24/2022    CALCIUM 10.3 (H) 08/24/2022    PROT 7.8 08/24/2022    LABALBU 4.2 08/24/2022    BILITOT 0.2 08/24/2022    ALKPHOS 128 (H) 08/24/2022    AST 36 (H) 08/24/2022    ALT 31 08/24/2022    LABGLOM >60 08/24/2022    GFRAA >60 08/24/2022     Impression/Plan:  46 y/o female who on 02/17/2021 underwent Left localized lumpectomy with left axillary sentinal lymph node excision   A. Breast, left, lumpectomy: Invasive ductal carcinoma   Ductal carcinoma in situ   Invasive carcinoma present less than 1 mm from yellow/medial margin   Margins negative for ductal carcinoma in situ     B. New medial anterior margin, excision: Negative for carcinoma   C.   [place] lymph node, excision: One (1) lymph node, negative for metastatic carcinoma     CANCER CASE SUMMARY Procedure: Excision (less than total mastectomy)   Specimen laterality: Left   Tumor size: 1.3 cm in greatest dimension   Histologic type: Invasive carcinoma of no special type (ductal)   Histologic grade (Nika Histologic Score):        Glandular/tubular differentiation: Score 2        Nuclear pleomorphism: Score 2        Mitotic rate: Score 3        Overall grade: Grade 2, score 7   Ductal carcinoma in situ: Present        Nuclear grade: Grade II (intermediate)   Margins:        Invasive carcinoma margins: Uninvolved by invasive carcinoma             Distance from closest margin: 3 mm-blue/inferior        Ductal carcinoma in situ margins: Uninvolved by DCIS             Distance from closest margin: 2 mm from blue/inferior and red/superior   Regional lymph nodes: Uninvolved by tumor cells        Total number of lymph nodes examined: 1        Number of sentinel nodes examined: 1   Treatment effect in the breast: No known presurgical therapy   Pathologic stage classification (pTNM, AJCC 8th Edition):   pT1c (sn)pN0     Breast Cancer Marker Studies:  Estrogen Receptors (ER): 90%  Progesterone Receptors (MN): 90%  HER-2/salvador: Indeterminate/2+    FISH analysis HER/2: Negative  Average HER-2 signals/nucleus: 3.4   Average MARGARITO 17 signals/nucleus: 2.9   HER-2/MARGARITO 17 signal ratio: 1.2     Oncotype DX Breast Cancer Report   Recurrence Score Result-16   Distant recurrence risk at 9 years-4%   Absolute chemotherapy benefit- <1%     No indication for adjuvant chemotherapy  Recommended adjuvant RT followed by adjuvant endocrine therapy  RT was completed on 05/04/2021. DEXA scan 04/06/2021 normal in LS and bilateral hips. On Ca. VitD    Arimidex 1 mg po daily was started on 05/06/2021 with fair tolerance so far. Left wrist X-Ray 09/23/2021 unremarkable left wrist.    Bilateral Screening Mammogram 12/09/2021: No evidence of malignancy.   Right Diagnostic Mammogram/US 01/31/2022 Benign finding with no sonographic evidence of malignancy in the right axilla. No mammographic evidence of malignancy in the right breast.  Imaging reviewed. Labs reviewed. JESSICA  Continue Arimidex, Ca. VitD    RTC 4 months with labs.  DEXA scan in April 2023 08/24/2022  Gertrudis Yusuf MD

## 2022-08-29 ENCOUNTER — OFFICE VISIT (OUTPATIENT)
Dept: FAMILY MEDICINE CLINIC | Age: 52
End: 2022-08-29
Payer: MEDICAID

## 2022-08-29 VITALS
OXYGEN SATURATION: 96 % | RESPIRATION RATE: 18 BRPM | BODY MASS INDEX: 32.86 KG/M2 | HEART RATE: 90 BPM | WEIGHT: 163 LBS | DIASTOLIC BLOOD PRESSURE: 82 MMHG | TEMPERATURE: 97.8 F | HEIGHT: 59 IN | SYSTOLIC BLOOD PRESSURE: 122 MMHG

## 2022-08-29 DIAGNOSIS — N30.00 ACUTE CYSTITIS WITHOUT HEMATURIA: ICD-10-CM

## 2022-08-29 DIAGNOSIS — G62.9 NEUROPATHY: ICD-10-CM

## 2022-08-29 DIAGNOSIS — C50.912 MALIGNANT NEOPLASM OF LEFT BREAST IN FEMALE, ESTROGEN RECEPTOR POSITIVE, UNSPECIFIED SITE OF BREAST (HCC): ICD-10-CM

## 2022-08-29 DIAGNOSIS — Z17.0 MALIGNANT NEOPLASM OF LEFT BREAST IN FEMALE, ESTROGEN RECEPTOR POSITIVE, UNSPECIFIED SITE OF BREAST (HCC): ICD-10-CM

## 2022-08-29 DIAGNOSIS — K21.9 GASTROESOPHAGEAL REFLUX DISEASE WITHOUT ESOPHAGITIS: ICD-10-CM

## 2022-08-29 DIAGNOSIS — R30.0 DYSURIA: ICD-10-CM

## 2022-08-29 DIAGNOSIS — K58.9 IRRITABLE BOWEL SYNDROME, UNSPECIFIED TYPE: ICD-10-CM

## 2022-08-29 DIAGNOSIS — I10 ESSENTIAL HYPERTENSION: ICD-10-CM

## 2022-08-29 DIAGNOSIS — E11.65 UNCONTROLLED TYPE 2 DIABETES MELLITUS WITH HYPERGLYCEMIA (HCC): Primary | ICD-10-CM

## 2022-08-29 LAB
AMPHETAMINE SCREEN, URINE: NOT DETECTED
BARBITURATE SCREEN URINE: NOT DETECTED
BENZODIAZEPINE SCREEN, URINE: NOT DETECTED
BILIRUBIN, POC: NORMAL
BLOOD URINE, POC: NORMAL
CANNABINOID SCREEN URINE: NOT DETECTED
CHP ED QC CHECK: NORMAL
CLARITY, POC: CLEAR
COCAINE METABOLITE SCREEN URINE: NOT DETECTED
COLOR, POC: YELLOW
FENTANYL SCREEN, URINE: NOT DETECTED
GLUCOSE BLD-MCNC: 112 MG/DL
GLUCOSE URINE, POC: >=1000
HBA1C MFR BLD: 8.1 %
KETONES, POC: NORMAL
LEUKOCYTE EST, POC: NORMAL
Lab: NORMAL
METHADONE SCREEN, URINE: NOT DETECTED
NITRITE, POC: POSITIVE
OPIATE SCREEN URINE: NOT DETECTED
OXYCODONE URINE: NOT DETECTED
PH, POC: 5.5
PHENCYCLIDINE SCREEN URINE: NOT DETECTED
PROTEIN, POC: NORMAL
SPECIFIC GRAVITY, POC: <=1.005
UROBILINOGEN, POC: 0.2

## 2022-08-29 PROCEDURE — 2022F DILAT RTA XM EVC RTNOPTHY: CPT | Performed by: FAMILY MEDICINE

## 2022-08-29 PROCEDURE — 83036 HEMOGLOBIN GLYCOSYLATED A1C: CPT | Performed by: FAMILY MEDICINE

## 2022-08-29 PROCEDURE — 3017F COLORECTAL CA SCREEN DOC REV: CPT | Performed by: FAMILY MEDICINE

## 2022-08-29 PROCEDURE — 1036F TOBACCO NON-USER: CPT | Performed by: FAMILY MEDICINE

## 2022-08-29 PROCEDURE — 3052F HG A1C>EQUAL 8.0%<EQUAL 9.0%: CPT | Performed by: FAMILY MEDICINE

## 2022-08-29 PROCEDURE — 81002 URINALYSIS NONAUTO W/O SCOPE: CPT | Performed by: FAMILY MEDICINE

## 2022-08-29 PROCEDURE — 99214 OFFICE O/P EST MOD 30 MIN: CPT | Performed by: FAMILY MEDICINE

## 2022-08-29 PROCEDURE — 82962 GLUCOSE BLOOD TEST: CPT | Performed by: FAMILY MEDICINE

## 2022-08-29 PROCEDURE — G8427 DOCREV CUR MEDS BY ELIG CLIN: HCPCS | Performed by: FAMILY MEDICINE

## 2022-08-29 PROCEDURE — G8417 CALC BMI ABV UP PARAM F/U: HCPCS | Performed by: FAMILY MEDICINE

## 2022-08-29 RX ORDER — GABAPENTIN 300 MG/1
300 CAPSULE ORAL EVERY EVENING
Qty: 30 CAPSULE | Refills: 0 | Status: SHIPPED
Start: 2022-08-29 | End: 2022-10-14 | Stop reason: SDUPTHER

## 2022-08-29 RX ORDER — METOPROLOL SUCCINATE 25 MG/1
12.5 TABLET, EXTENDED RELEASE ORAL DAILY
Qty: 45 TABLET | Refills: 0 | Status: SHIPPED
Start: 2022-08-29 | End: 2022-09-14

## 2022-08-29 RX ORDER — LACTOBACILLUS RHAMNOSUS GG 10B CELL
CAPSULE ORAL
Qty: 90 CAPSULE | Refills: 0 | Status: SHIPPED | OUTPATIENT
Start: 2022-08-29

## 2022-08-29 RX ORDER — DULOXETIN HYDROCHLORIDE 30 MG/1
CAPSULE, DELAYED RELEASE ORAL
Qty: 30 CAPSULE | Status: CANCELLED | OUTPATIENT
Start: 2022-08-29

## 2022-08-29 RX ORDER — PHENAZOPYRIDINE HYDROCHLORIDE 100 MG/1
100 TABLET, FILM COATED ORAL 3 TIMES DAILY PRN
Qty: 30 TABLET | Refills: 0 | Status: SHIPPED | OUTPATIENT
Start: 2022-08-29 | End: 2022-09-08

## 2022-08-29 RX ORDER — CEFDINIR 300 MG/1
300 CAPSULE ORAL 2 TIMES DAILY
Qty: 14 CAPSULE | Refills: 0 | Status: SHIPPED | OUTPATIENT
Start: 2022-08-29 | End: 2022-09-05

## 2022-08-29 SDOH — ECONOMIC STABILITY: FOOD INSECURITY: WITHIN THE PAST 12 MONTHS, THE FOOD YOU BOUGHT JUST DIDN'T LAST AND YOU DIDN'T HAVE MONEY TO GET MORE.: NEVER TRUE

## 2022-08-29 SDOH — ECONOMIC STABILITY: FOOD INSECURITY: WITHIN THE PAST 12 MONTHS, YOU WORRIED THAT YOUR FOOD WOULD RUN OUT BEFORE YOU GOT MONEY TO BUY MORE.: NEVER TRUE

## 2022-08-29 ASSESSMENT — SOCIAL DETERMINANTS OF HEALTH (SDOH): HOW HARD IS IT FOR YOU TO PAY FOR THE VERY BASICS LIKE FOOD, HOUSING, MEDICAL CARE, AND HEATING?: NOT HARD AT ALL

## 2022-09-01 PROBLEM — N30.00 ACUTE CYSTITIS WITHOUT HEMATURIA: Status: ACTIVE | Noted: 2022-09-01

## 2022-09-01 PROBLEM — R30.0 DYSURIA: Status: ACTIVE | Noted: 2022-09-01

## 2022-09-01 ASSESSMENT — ENCOUNTER SYMPTOMS
RHINORRHEA: 0
RESPIRATORY NEGATIVE: 1
NAUSEA: 0
TROUBLE SWALLOWING: 0
ABDOMINAL PAIN: 0
EYE REDNESS: 0
STRIDOR: 0
CHEST TIGHTNESS: 0
ALLERGIC/IMMUNOLOGIC NEGATIVE: 1
BACK PAIN: 0
EYE DISCHARGE: 0
COUGH: 0
SHORTNESS OF BREATH: 0
EYE PAIN: 0
ABDOMINAL DISTENTION: 0
DIARRHEA: 0
FACIAL SWELLING: 0
SORE THROAT: 0
EYE ITCHING: 0
WHEEZING: 0
COLOR CHANGE: 0
VOMITING: 0
VOICE CHANGE: 0
ANAL BLEEDING: 0
BLOOD IN STOOL: 0
PHOTOPHOBIA: 0
CONSTIPATION: 0
SINUS PRESSURE: 0
CHOKING: 0
RECTAL PAIN: 0
SINUS PAIN: 0

## 2022-09-02 NOTE — PROGRESS NOTES
SUBJECTIVE  Jenny Ortiz is a 45 y.o. female. HPI/Chief C/O:  Chief Complaint   Patient presents with    Medication Refill     Pharmacy confirmed. Meds pended. No Known Allergies    This 45year old female presents for physical exam. Pt is on zoloft and pt has not been taking cymbalta for 1 month. A1C is 8. 1. pt has cataracts removes and has driving privleges reinstated. Pt follows with dentist, oncology, and cardiology. ROS:  Review of Systems   Constitutional:  Positive for fatigue. Negative for activity change, appetite change, chills, diaphoresis, fever and unexpected weight change. HENT: Negative. Negative for congestion, dental problem, drooling, ear discharge, ear pain, facial swelling, hearing loss, mouth sores, nosebleeds, postnasal drip, rhinorrhea, sinus pressure, sinus pain, sneezing, sore throat, tinnitus, trouble swallowing and voice change. Eyes:  Positive for visual disturbance. Negative for photophobia, pain, discharge, redness and itching. Respiratory: Negative. Negative for cough, choking, chest tightness, shortness of breath, wheezing and stridor. Cardiovascular: Negative. Negative for chest pain, palpitations and leg swelling. Gastrointestinal:  Negative for abdominal distention, abdominal pain, anal bleeding, blood in stool, constipation, diarrhea, nausea, rectal pain and vomiting. Endocrine: Negative. Negative for cold intolerance, heat intolerance, polydipsia, polyphagia and polyuria. Genitourinary:  Positive for dysuria, frequency and urgency. Negative for decreased urine volume, difficulty urinating, flank pain, genital sores, hematuria, menstrual problem and pelvic pain. Musculoskeletal:  Positive for arthralgias and myalgias. Negative for back pain, gait problem, joint swelling, neck pain and neck stiffness. Skin:  Negative for color change, pallor, rash and wound. Allergic/Immunologic: Negative. Neurological:  Positive for numbness.  Negative for ARTHROSCOPY Left 01/21/2016    subacromin decompression and debridement    SHOULDER ARTHROSCOPY Right 11/16/2020    RIGHT SHOULDER ARTHROSCOPY, SUBACROMIAL DECOMPRESSION, LABRIAL DEBRIDEMENT, CHONDROPLASTY, RAÚL performed by Sherita Mcdermott DO at [facility] N/A 06/20/2019    EGD BIOPSY performed by Sharmin Ferrera MD at [facility]. LEFT  12/23/2020    US BREAST NEEDLE BIOPSY LEFT 12/23/2020 [facility]       Past Family Hx:  Reviewed with patient      Problem Relation Age of Onset    Heart Disease Mother     Diabetes Mother     Heart Disease Father     Diabetes Father     Cancer Father         prostate, colon, skin cance behind ear    Breast Cancer Maternal Aunt 54        breast    Cancer Maternal Uncle 62        colon    Cancer Paternal Aunt 58        breast    Cancer Other 62        maternal great aunt - breast       Social Hx:  Reviewed with patient  Social History     Tobacco Use    Smoking status: Never    Smokeless tobacco: Never   Substance Use Topics    Alcohol use: Not Currently     Alcohol/week: 1.0 standard drink     Types: 1 Glasses of wine per week     Comment: weekly       OBJECTIVE  /82   Pulse 90   Temp 97.8 °F (36.6 °C) (Temporal)   Resp 18   Ht 4' 11\" (1.499 m)   Wt 163 lb (73.9 kg)   LMP  (LMP Unknown)   SpO2 96%   Breastfeeding No   BMI 32.92 kg/m²     Problem List:  Karin Gongora does not have any pertinent problems on file. PHYS EX:  Physical Exam  Vitals and nursing note reviewed. Constitutional:       General: She is not in acute distress. Appearance: Normal appearance. She is well-developed. She is obese. She is not ill-appearing, toxic-appearing or diaphoretic. Comments: Patient has morbid obesity. Patient instructed on low calorie, healthy ADA diet. HENT:      Head: Normocephalic and atraumatic. Nose: Nose normal. No congestion or rhinorrhea. Eyes:      General: No scleral icterus. Right eye: No discharge. Left eye: No discharge. Conjunctiva/sclera: Conjunctivae normal.      Pupils: Pupils are equal, round, and reactive to light. Neck:      Thyroid: No thyromegaly. Vascular: No carotid bruit or JVD. Trachea: No tracheal deviation. Cardiovascular:      Rate and Rhythm: Normal rate and regular rhythm. Pulses: Normal pulses. Heart sounds: Normal heart sounds. No murmur heard. No friction rub. No gallop. Pulmonary:      Effort: Pulmonary effort is normal. No respiratory distress. Breath sounds: Normal breath sounds. No stridor. No wheezing, rhonchi or rales. Chest:      Chest wall: No tenderness. Abdominal:      General: Bowel sounds are normal. There is no distension. Palpations: Abdomen is soft. There is no mass. Tenderness: There is no abdominal tenderness. There is no right CVA tenderness, left CVA tenderness, guarding or rebound. Hernia: No hernia is present. Musculoskeletal:         General: Tenderness present. No swelling, deformity or signs of injury. Cervical back: Normal range of motion and neck supple. No rigidity or tenderness. No muscular tenderness. Right lower leg: No edema. Left lower leg: No edema. Comments: Pain and decreased ROM multiple joints. Lymphadenopathy:      Cervical: No cervical adenopathy. Skin:     General: Skin is warm. Coloration: Skin is not jaundiced or pale. Findings: No bruising, erythema, lesion or rash. Neurological:      General: No focal deficit present. Mental Status: She is alert and oriented to person, place, and time. Cranial Nerves: No cranial nerve deficit. Sensory: No sensory deficit. Motor: No weakness or abnormal muscle tone. Coordination: Coordination normal.      Gait: Gait normal.      Deep Tendon Reflexes: Reflexes are normal and symmetric.  Reflexes normal.   Psychiatric:         Mood and Affect: Mood normal. Behavior: Behavior normal.         Thought Content: Thought content normal.         Judgment: Judgment normal.       ASSESSMENT/PLAN  Jenny was seen today for medication refill. Diagnoses and all orders for this visit:    Uncontrolled type 2 diabetes mellitus with hyperglycemia ([address]  -     POCT glycosylated hemoglobin (Hb A1C)  -     POCT Glucose  -     POCT Urinalysis no Micro  Controlled. Metformin, admelog, basaglar, trulicity, ace, statin, ADA diet. Essential hypertension  -     metoprolol succinate (TOPROL XL) 25 MG extended release tablet; Take 0.5 tablets by mouth daily  Controlled. BB, ace, bumex. Gastroesophageal reflux disease without esophagitis  -     lactobacillus (CULTURELLE) CAPS capsule; TAKE 1 CAPSULE BY MOUTH DAILY    Irritable bowel syndrome, unspecified type  -     lactobacillus (CULTURELLE) CAPS capsule; TAKE 1 CAPSULE BY MOUTH DAILY    Neuropathy  -     gabapentin (NEURONTIN) 300 MG capsule; Take 1 capsule by mouth every evening for 30 days. Nightly  -     URINE DRUG SCREEN; Future    Malignant neoplasm of left breast in female, estrogen receptor positive, unspecified site of breast [facility])  Continue oncology. Dysuria  Not controlled. Omnicef, pyridium, Patient instructed on treatment and prevention UTI. Acute cystitis without hematuria  -     cefdinir (OMNICEF) 300 MG capsule; Take 1 capsule by mouth 2 times daily for 7 days  -     phenazopyridine (PYRIDIUM) 100 MG tablet; Take 1 tablet by mouth 3 times daily as needed for Pain    Pt instructed if any worse go ED ASAP. Outpatient Encounter Medications as of 8/29/2022   Medication Sig Dispense Refill    metoprolol succinate (TOPROL XL) 25 MG extended release tablet Take 0.5 tablets by mouth daily 45 tablet 0    gabapentin (NEURONTIN) 300 MG capsule Take 1 capsule by mouth every evening for 30 days.  Nightly 30 capsule 0    lactobacillus (CULTURELLE) CAPS capsule TAKE 1 CAPSULE BY MOUTH DAILY 90 capsule 0    cefdinir (OMNICEF) 300 MG capsule Take 1 capsule by mouth 2 times daily for 7 days 14 capsule 0    phenazopyridine (PYRIDIUM) 100 MG tablet Take 1 tablet by mouth 3 times daily as needed for Pain 30 tablet 0    Lancets (ONETOUCH DELICA PLUS [id number]) MISC USE TO TEST BLOOD SUGAR 4 TIMES DAILY 100 each 10    anastrozole (ARIMIDEX) 1 MG tablet Take 1 tablet by mouth daily 30 tablet 5    DULoxetine (CYMBALTA) 30 MG extended release capsule TAKE 1 CAPSULE BY MOUTH EVERY MORNING *EMERGENCY REFILL*      omeprazole (PRILOSEC) 40 MG delayed release capsule TAKE 1 CAPSULE BY MOUTH DAILY 30 capsule 10    JARDIANCE 10 MG tablet TAKE 1 TABLET BY MOUTH DAILY 30 tablet 10    insulin glargine (BASAGLAR KWIKPEN) 100 UNIT/ML injection pen Inject 50 Units into the skin nightly 45 mL 1    Alcohol Swabs (EASY TOUCH ALCOHOL PREP MEDIUM) 70 % PADS USE AS DIRECTED PRIOR TO GLUCOSE TESTING AND INSULIN INJECTION FOUR TIMES A  each 5    Dulaglutide (TRULICITY) 1.5 YN/2.6VW SOPN INJECT THE CONTENTS OF 1 SYRINGE SUBCUTANEOUSLY EVERY WEEK *PATIENT NEEDS APPOINTMENT* 6 mL 10    metFORMIN (GLUCOPHAGE) 500 MG tablet Take 2 tablets by mouth 2 times daily (with meals) TAKE TWO (2) TABLETS BY MOUTH TWICE DAILY WITH MEALS *EMERGENCY REFILL* 360 tablet 1    hydrocortisone 2.5 % cream APPLY TOPICALLY TO AFFECTED AREA TWICE DAILY 30 g 10    sertraline (ZOLOFT) 100 MG tablet TAKE 1 TABLET BY MOUTH ONCE DAILY AS NEEDED FOR DEPRESSION 30 tablet 10    pramipexole (MIRAPEX) 0.125 MG tablet TAKE 1 TABLET BY MOUTH NIGHTLY 30 tablet 1    blood glucose test strips (ONETOUCH ULTRA) strip USE AS DIRECTED FOUR TIMES A  each 2    rosuvastatin (CRESTOR) 5 MG tablet Take 1 pill by mouth daily 90 tablet 1    diclofenac sodium (VOLTAREN) 1 % GEL Apply 2 g topically 4 times daily 60 g 2    sucralfate (CARAFATE) 1 GM tablet Take 1 tablet by mouth 4 times daily 120 tablet 3    Blood Glucose Monitoring Suppl (ONE TOUCH ULTRA 2) w/Device KIT Test 3 times a day & as exam     Hepatitis B vaccine (1 of 3 - Risk 3-dose series)    DTaP/Tdap/Td vaccine (1 - Tdap)    Shingles vaccine (1 of 2)    Pneumococcal 0-64 years Vaccine (2 - PCV)    COVID-19 Vaccine (4 - Booster for Red-rabbit series)    Diabetic retinal exam     Flu vaccine (1)    Diabetic microalbuminuria test     Breast cancer screen     Lipids     Depression Screen     A1C test (Diabetic or Prediabetic)     Colorectal Cancer Screen     Hepatitis C screen     HIV screen     Hepatitis A vaccine     Hib vaccine     Meningococcal (ACWY) vaccine

## 2022-09-04 DIAGNOSIS — E11.65 UNCONTROLLED TYPE 2 DIABETES MELLITUS WITH HYPERGLYCEMIA (HCC): ICD-10-CM

## 2022-09-07 RX ORDER — LANCETS 30 GAUGE
EACH MISCELLANEOUS
Qty: 100 EACH | Refills: 3 | Status: SHIPPED
Start: 2022-09-07 | End: 2022-09-21

## 2022-09-13 DIAGNOSIS — I10 ESSENTIAL HYPERTENSION: ICD-10-CM

## 2022-09-14 ENCOUNTER — OFFICE VISIT (OUTPATIENT)
Dept: FAMILY MEDICINE CLINIC | Age: 52
End: 2022-09-14
Payer: MEDICAID

## 2022-09-14 ENCOUNTER — TELEPHONE (OUTPATIENT)
Dept: FAMILY MEDICINE CLINIC | Age: 52
End: 2022-09-14

## 2022-09-14 VITALS
DIASTOLIC BLOOD PRESSURE: 74 MMHG | TEMPERATURE: 98.1 F | BODY MASS INDEX: 33.47 KG/M2 | OXYGEN SATURATION: 98 % | HEART RATE: 68 BPM | SYSTOLIC BLOOD PRESSURE: 128 MMHG | HEIGHT: 59 IN | WEIGHT: 166 LBS | RESPIRATION RATE: 16 BRPM

## 2022-09-14 DIAGNOSIS — E11.3553 STABLE PROLIFERATIVE DIABETIC RETINOPATHY OF BOTH EYES ASSOCIATED WITH TYPE 2 DIABETES MELLITUS (HCC): ICD-10-CM

## 2022-09-14 DIAGNOSIS — C50.912 MALIGNANT NEOPLASM OF LEFT BREAST IN FEMALE, ESTROGEN RECEPTOR POSITIVE, UNSPECIFIED SITE OF BREAST (HCC): ICD-10-CM

## 2022-09-14 DIAGNOSIS — E11.42 DIABETIC POLYNEUROPATHY ASSOCIATED WITH TYPE 2 DIABETES MELLITUS (HCC): Primary | ICD-10-CM

## 2022-09-14 DIAGNOSIS — R53.83 FATIGUE, UNSPECIFIED TYPE: ICD-10-CM

## 2022-09-14 DIAGNOSIS — C50.912 INVASIVE DUCTAL CARCINOMA OF LEFT BREAST (HCC): ICD-10-CM

## 2022-09-14 DIAGNOSIS — E78.2 MIXED HYPERLIPIDEMIA: ICD-10-CM

## 2022-09-14 DIAGNOSIS — G25.81 RLS (RESTLESS LEGS SYNDROME): Primary | ICD-10-CM

## 2022-09-14 DIAGNOSIS — Z17.0 MALIGNANT NEOPLASM OF LEFT BREAST IN FEMALE, ESTROGEN RECEPTOR POSITIVE, UNSPECIFIED SITE OF BREAST (HCC): ICD-10-CM

## 2022-09-14 PROBLEM — E11.65 UNCONTROLLED TYPE 2 DIABETES MELLITUS WITH HYPERGLYCEMIA (HCC): Status: RESOLVED | Noted: 2021-02-09 | Resolved: 2022-09-14

## 2022-09-14 PROBLEM — E11.43 TYPE II DIABETES MELLITUS WITH PERIPHERAL AUTONOMIC NEUROPATHY (HCC): Status: ACTIVE | Noted: 2018-09-14

## 2022-09-14 PROBLEM — E11.3559 STABLE PROLIFERATIVE DIABETIC RETINOPATHY ASSOCIATED WITH TYPE 2 DIABETES MELLITUS (HCC): Status: ACTIVE | Noted: 2022-09-14

## 2022-09-14 PROCEDURE — 2022F DILAT RTA XM EVC RTNOPTHY: CPT | Performed by: FAMILY MEDICINE

## 2022-09-14 PROCEDURE — 1036F TOBACCO NON-USER: CPT | Performed by: FAMILY MEDICINE

## 2022-09-14 PROCEDURE — 3052F HG A1C>EQUAL 8.0%<EQUAL 9.0%: CPT | Performed by: FAMILY MEDICINE

## 2022-09-14 PROCEDURE — G8417 CALC BMI ABV UP PARAM F/U: HCPCS | Performed by: FAMILY MEDICINE

## 2022-09-14 PROCEDURE — G8427 DOCREV CUR MEDS BY ELIG CLIN: HCPCS | Performed by: FAMILY MEDICINE

## 2022-09-14 PROCEDURE — 3017F COLORECTAL CA SCREEN DOC REV: CPT | Performed by: FAMILY MEDICINE

## 2022-09-14 PROCEDURE — 99214 OFFICE O/P EST MOD 30 MIN: CPT | Performed by: FAMILY MEDICINE

## 2022-09-14 RX ORDER — METOPROLOL SUCCINATE 25 MG/1
TABLET, EXTENDED RELEASE ORAL
Qty: 15 TABLET | Refills: 10 | Status: SHIPPED | OUTPATIENT
Start: 2022-09-14

## 2022-09-14 RX ORDER — ROSUVASTATIN CALCIUM 20 MG/1
20 TABLET, COATED ORAL NIGHTLY
Qty: 30 TABLET | Refills: 3
Start: 2022-09-14

## 2022-09-14 ASSESSMENT — ENCOUNTER SYMPTOMS
PHOTOPHOBIA: 0
ORTHOPNEA: 0
BACK PAIN: 0
VISUAL CHANGE: 0
ALLERGIC/IMMUNOLOGIC NEGATIVE: 1
COLOR CHANGE: 0
SHORTNESS OF BREATH: 0
EYE ITCHING: 0
BLOOD IN STOOL: 0
ANAL BLEEDING: 0
CONSTIPATION: 0
RECTAL PAIN: 0
EYE REDNESS: 0
BLURRED VISION: 0
GASTROINTESTINAL NEGATIVE: 1
EYE DISCHARGE: 0
DIARRHEA: 0
ABDOMINAL DISTENTION: 0
EYE PAIN: 0

## 2022-09-14 NOTE — TELEPHONE ENCOUNTER
Lorena Worthington was in the office today she is requesting a order for a rollator with seat and seat for her toilet faxed to [id number] [facility] .

## 2022-09-14 NOTE — PROGRESS NOTES
coronary artery disease include obesity, post-menopausal state, diabetes mellitus and dyslipidemia. Past treatments include beta blockers, lifestyle changes and diuretics. Hypertensive end-organ damage includes retinopathy. There is no history of angina, kidney disease, CAD/MI, CVA, heart failure, left ventricular hypertrophy or PVD. There is no history of chronic renal disease, coarctation of the aorta, hyperaldosteronism, hypercortisolism, hyperparathyroidism, a hypertension causing med, pheochromocytoma, renovascular disease, sleep apnea or a thyroid problem. ROS:  Review of Systems   Constitutional:  Positive for fatigue and malaise/fatigue. Negative for activity change, appetite change, unexpected weight change and weight loss. Eyes:  Positive for visual disturbance. Negative for blurred vision, photophobia, pain, discharge, redness and itching. Respiratory:  Negative for shortness of breath. Cardiovascular: Negative. Negative for chest pain, palpitations, orthopnea, leg swelling and PND. Gastrointestinal: Negative. Negative for abdominal distention, anal bleeding, blood in stool, constipation, diarrhea and rectal pain. Endocrine: Negative. Negative for cold intolerance, heat intolerance, polydipsia, polyphagia and polyuria. Genitourinary:  Negative for decreased urine volume, difficulty urinating, dysuria, enuresis, flank pain, frequency, genital sores, hematuria, menstrual problem, pelvic pain and urgency. Musculoskeletal:  Positive for gait problem. Negative for back pain, neck pain and neck stiffness. Skin: Negative. Negative for color change, pallor and wound. Allergic/Immunologic: Negative. Negative for environmental allergies, food allergies and immunocompromised state. Neurological:  Negative for dizziness, tremors, seizures, syncope, facial asymmetry, speech difficulty, weakness, light-headedness and headaches. Hematological: Negative. Negative for adenopathy.  Does not bruise/bleed easily. Psychiatric/Behavioral:  Negative for agitation, behavioral problems, confusion, decreased concentration, dysphoric mood, hallucinations, self-injury, sleep disturbance and suicidal ideas. The patient is nervous/anxious. The patient is not hyperactive.        Past Medical/Surgical Hx;  Reviewed with patient      Diagnosis Date    Anesthesia complication     problem with blood pressure up & down    Arthritis     lower back    Back pain     Blind     right eye    Cancer ([address]     breast    Cardiac valve prolapse     micro    Diabetes mellitus ([address]     Head injury 2018    History of therapeutic radiation     Hyperlipidemia     Hypertension     Neuropathy due to secondary diabetes ([address]     in marcial feet    Nontraumatic tear of right rotator cuff 2020    Prolonged emergence from general anesthesia     Psoriasis      Past Surgical History:   Procedure Laterality Date    ANKLE SURGERY      bilateral tarsal tunnels    BREAST BIOPSY Left 2021    LEFT BREAST NEEDLE LOCALIZED LUMPECTOMY, BLUE DYE INJECTION, LEFT AXILLARY SENTINEL LYMPHNODE EXCISION, POSSIBLE LEFT AXILLARY DISSECTION performed by Tosha Adan MD at [address]      cardiac catheterization    CARPAL TUNNEL RELEASE      left    CARPAL TUNNEL RELEASE  2012    right    CATARACT REMOVAL Left 2022     SECTION      CHOLECYSTECTOMY, LAPAROSCOPIC N/A 2019    LAPAROSCOPIC CHOLECYSTECTOMY WITH IOC performed by Markos Beck MD at [address]  had extremely high blood pressure and hard to wake up    COLONOSCOPY N/A 2019    COLORECTAL CANCER SCREENING, NOT HIGH RISK performed by Markos Beck MD at [facility], [address] (CERVIX REMOVED)      SHOULDER ARTHROSCOPY Left 2016    subacromin decompression and debridement    SHOULDER ARTHROSCOPY Right 2020    RIGHT SHOULDER ARTHROSCOPY, SUBACROMIAL DECOMPRESSION, LABRIAL DEBRIDEMENT, CHONDROPLASTY, RAÚL performed by Nima Campos DO at [facility] N/A 06/20/2019    EGD BIOPSY performed by King Yovany MD at [facility]. 2. LEFT  12/23/2020     BREAST NEEDLE BIOPSY LEFT 12/23/2020 [facility]       Past Family Hx:  Reviewed with patient      Problem Relation Age of Onset    Heart Disease Mother     Diabetes Mother     Heart Disease Father     Diabetes Father     Cancer Father         prostate, colon, skin cance behind ear    Breast Cancer Maternal Aunt 54        breast    Cancer Maternal Uncle 62        colon    Cancer Paternal Aunt 58        breast    Cancer Other 62        maternal great aunt - breast       Social Hx:  Reviewed with patient  Social History     Tobacco Use    Smoking status: Never    Smokeless tobacco: Never   Substance Use Topics    Alcohol use: Not Currently     Alcohol/week: 1.0 standard drink     Types: 1 Glasses of wine per week     Comment: weekly       OBJECTIVE  /74   Pulse 68   Temp 98.1 °F (36.7 °C)   Resp 16   Ht 4' 11\" (1.499 m)   Wt 166 lb (75.3 kg)   LMP  (LMP Unknown)   SpO2 98%   BMI 33.53 kg/m²     Problem List:  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 PHYS EX:  Physical Exam  Vitals and nursing note reviewed. Constitutional:       General: She is not in acute distress. Appearance: Normal appearance. She is well-developed. She is obese. She is not ill-appearing, toxic-appearing or diaphoretic. Comments: Patient has morbid obesity. Patient instructed on low calorie, healthy ADA diet. HENT:      Head: Normocephalic and atraumatic. Right Ear: External ear normal.      Left Ear: External ear normal.      Nose: Nose normal. No congestion or rhinorrhea. Eyes:      General: No scleral icterus. Right eye: No discharge. Left eye: No discharge.       Conjunctiva/sclera: Conjunctivae normal.      Pupils: Pupils are equal, round, and reactive to light. Neck:      Thyroid: No thyromegaly. Vascular: No carotid bruit or JVD. Trachea: No tracheal deviation. Cardiovascular:      Rate and Rhythm: Normal rate and regular rhythm. Pulses: Normal pulses. Heart sounds: Normal heart sounds. No murmur heard. No friction rub. No gallop. Pulmonary:      Effort: Pulmonary effort is normal. No respiratory distress. Breath sounds: Normal breath sounds. No stridor. No wheezing, rhonchi or rales. Chest:      Chest wall: No tenderness. Abdominal:      General: Bowel sounds are normal. There is no distension. Palpations: Abdomen is soft. There is no mass. Tenderness: There is no abdominal tenderness. There is no right CVA tenderness, left CVA tenderness, guarding or rebound. Hernia: No hernia is present. Musculoskeletal:         General: Tenderness present. No swelling, deformity or signs of injury. Cervical back: Normal range of motion and neck supple. No rigidity or tenderness. No muscular tenderness. Right lower leg: No edema. Left lower leg: No edema. Comments: Pain and decreased ROM multiple joints. Lymphadenopathy:      Cervical: No cervical adenopathy. Skin:     General: Skin is warm. Coloration: Skin is not jaundiced or pale. Findings: No bruising, erythema, lesion or rash. Neurological:      General: No focal deficit present. Mental Status: She is alert and oriented to person, place, and time. Cranial Nerves: No cranial nerve deficit. Sensory: Sensory deficit (diabetic neuropathy) present. Motor: Weakness present. No abnormal muscle tone. Coordination: Coordination abnormal.      Gait: Gait abnormal.      Deep Tendon Reflexes: Reflexes abnormal.      Comments: Proprioceptive imbalance        ASSESSMENT/PLAN  Jenny was seen today for chest congestion and diabetes.     Diagnoses and all orders for this visit:    Diabetic polyneuropathy associated with type 2 diabetes mellitus (HCC)  -     empagliflozin (JARDIANCE) 25 MG tablet; Take 1 tablet by mouth daily  -     Basic Metabolic Panel; Future  -     CBC with Auto Differential; Future  -     Hemoglobin A1C; Future  -     Microalbumin, Ur; Future    ---VASCULAR PANEL  A) asa, plavix, aggrenox  B) coumadin, pletal, tzd, STATIN  C) ace, BUMEX, folic, ccb  D) cannikinumab, fish oils     ---CARDIAC---asa, ace, BETA, STATIN,  BUMEX, ( ccb )    A) ace or arb  B) lipitor ( 40-80 ) or CRESTOR 20  C) GLP-1 or  SGLT-2       Malignant neoplasm of left breast in female, estrogen receptor positive, unspecified site of breast [facility]  -     Basic Metabolic Panel; Future  -     CBC with Auto Differential; Future  --stable on current care planning  -- continue treatment as we are meeting goals       Invasive ductal carcinoma of left breast ([facility]  -     Basic Metabolic Panel; Future  -     CBC with Auto Differential; Future  --stable on current care planning  -- continue treatment as we are meeting goals       Mixed hyperlipidemia  -     Basic Metabolic Panel; Future  -     Lipid Panel; Future  -     CBC with Auto Differential; Future  --Mediterranean diet, exercise, weight loss, vitamins    We have a long talk on cholesterol and importance of lowering it       Fatigue, unspecified type  -     TSH; Future  -     Basic Metabolic Panel; Future  -     CBC with Auto Differential; Future  Long talk on treatment and prevention  Literature is given       Other orders  -     rosuvastatin (CRESTOR) 20 MG tablet;  Take 1 tablet by mouth nightly      Outpatient Encounter Medications as of 9/14/2022   Medication Sig Dispense Refill    metoprolol succinate (TOPROL XL) 25 MG extended release tablet TAKE 1/2 TABLET BY MOUTH DAILY 15 tablet 10    empagliflozin (JARDIANCE) 25 MG tablet Take 1 tablet by mouth daily 30 tablet 3    rosuvastatin (CRESTOR) 20 MG tablet Take 1 tablet by mouth nightly 30 tablet 3    Lancets (ONETOUCH DELICA PLUS [id number]) MISC USE TO TEST BLOOD SUGAR 4 TIMES DAILY 100 each 3    gabapentin (NEURONTIN) 300 MG capsule Take 1 capsule by mouth every evening for 30 days. Nightly 30 capsule 0    lactobacillus (CULTURELLE) CAPS capsule TAKE 1 CAPSULE BY MOUTH DAILY 90 capsule 0    anastrozole (ARIMIDEX) 1 MG tablet Take 1 tablet by mouth daily 30 tablet 5    omeprazole (PRILOSEC) 40 MG delayed release capsule TAKE 1 CAPSULE BY MOUTH DAILY 30 capsule 10    insulin glargine (BASAGLAR KWIKPEN) 100 UNIT/ML injection pen Inject 50 Units into the skin nightly 45 mL 1    Alcohol Swabs (EASY TOUCH ALCOHOL PREP MEDIUM) 70 % PADS USE AS DIRECTED PRIOR TO GLUCOSE TESTING AND INSULIN INJECTION FOUR TIMES A  each 5    Dulaglutide (TRULICITY) 1.5 CL/9.2DG SOPN INJECT THE CONTENTS OF 1 SYRINGE SUBCUTANEOUSLY EVERY WEEK *PATIENT NEEDS APPOINTMENT* 6 mL 10    metFORMIN (GLUCOPHAGE) 500 MG tablet Take 2 tablets by mouth 2 times daily (with meals) TAKE TWO (2) TABLETS BY MOUTH TWICE DAILY WITH MEALS *EMERGENCY REFILL* 360 tablet 1    hydrocortisone 2.5 % cream APPLY TOPICALLY TO AFFECTED AREA TWICE DAILY 30 g 10    sertraline (ZOLOFT) 100 MG tablet TAKE 1 TABLET BY MOUTH ONCE DAILY AS NEEDED FOR DEPRESSION 30 tablet 10    pramipexole (MIRAPEX) 0.125 MG tablet TAKE 1 TABLET BY MOUTH NIGHTLY 30 tablet 1    blood glucose test strips (ONETOUCH ULTRA) strip USE AS DIRECTED FOUR TIMES A  each 2    diclofenac sodium (VOLTAREN) 1 % GEL Apply 2 g topically 4 times daily 60 g 2    sucralfate (CARAFATE) 1 GM tablet Take 1 tablet by mouth 4 times daily 120 tablet 3    Blood Glucose Monitoring Suppl (ONE TOUCH ULTRA 2) w/Device KIT Test 3 times a day & as needed for symptoms of irregular blood glucose. Dispense sufficient amount for indicated testing frequency plus additional to accommodate PRN testing needs.  1 kit 0    Cholecalciferol (VITAMIN D) 50 MCG (2000 UT) CAPS capsule Take by mouth      acetaminophen (TYLENOL) 500 MG tablet Take 1 tablet by mouth 4 times daily as needed for Pain 120 tablet 0    B-D ULTRAFINE III SHORT PEN 31G X 8 MM MISC USE WITH INSULIN PENS AS DIRECTED 100 each 10    fluticasone (FLONASE) 50 MCG/ACT nasal spray 1 spray by Nasal route daily 1 Bottle 3    nitroGLYCERIN (NITROSTAT) 0.4 MG SL tablet Place 1 tablet under the tongue every 5 minutes as needed for Chest pain 25 tablet 1    Insulin Syringe-Needle U-100 ([employer] INSULIN SYR 1CC/30G) 30G X 5/16\" 1 ML MISC 1 each by Does not apply route 4 times daily 120 each 5    bumetanide (BUMEX) 1 MG tablet Take 1 tablet by mouth every other day 90 tablet 1    insulin lispro (ADMELOG SOLOSTAR) 100 UNIT/ML pen As directed per sliding scale up to 13 units 4 times per day. 5 pen 3    Blood Pressure KIT 1 kit by Does not apply route daily 1 kit 0    Misc. Devices (QUAD CANE) MISC 1 Quad cane 1 each 0    Multiple Vitamins-Minerals (THERAPEUTIC MULTIVITAMIN-MINERALS) tablet Take 1 tablet by mouth daily      B Complex Vitamins (B COMPLEX 1 PO) Take 1 tablet by mouth daily. [DISCONTINUED] JARDIANCE 10 MG tablet TAKE 1 TABLET BY MOUTH DAILY 30 tablet 10    [DISCONTINUED] rosuvastatin (CRESTOR) 5 MG tablet Take 1 pill by mouth daily 90 tablet 1     No facility-administered encounter medications on file as of 9/14/2022. No follow-ups on file.         Reviewed recent labs related to Jenny's current problems      Discussed importance of regular Health Maintenance follow up  Health Maintenance   Topic    Diabetic foot exam     Hepatitis B vaccine (1 of 3 - Risk 3-dose series)    DTaP/Tdap/Td vaccine (1 - Tdap)    Shingles vaccine (1 of 2)    Pneumococcal 0-64 years Vaccine (2 - PCV)    COVID-19 Vaccine (4 - Booster for Pfizer series)    Diabetic retinal exam     Diabetic microalbuminuria test     Flu vaccine (1)    Breast cancer screen     Lipids     Depression Screen     A1C test (Diabetic or Prediabetic)     Colorectal Cancer Screen     Hepatitis C screen     HIV screen     Hepatitis A vaccine     Hib vaccine     Meningococcal (ACWY) vaccine

## 2022-09-20 DIAGNOSIS — E11.65 UNCONTROLLED TYPE 2 DIABETES MELLITUS WITH HYPERGLYCEMIA (HCC): ICD-10-CM

## 2022-09-21 RX ORDER — LACTOBACILLUS RHAMNOSUS GG 10B CELL
CAPSULE ORAL
Qty: 30 CAPSULE | Refills: 10 | Status: SHIPPED | OUTPATIENT
Start: 2022-09-21

## 2022-09-21 RX ORDER — LANCETS 30 GAUGE
EACH MISCELLANEOUS
Qty: 100 EACH | Refills: 5 | Status: SHIPPED | OUTPATIENT
Start: 2022-09-21

## 2022-09-22 DIAGNOSIS — E11.65 UNCONTROLLED TYPE 2 DIABETES MELLITUS WITH HYPERGLYCEMIA (HCC): ICD-10-CM

## 2022-09-23 RX ORDER — BLOOD SUGAR DIAGNOSTIC
STRIP MISCELLANEOUS
Refills: 10 | OUTPATIENT
Start: 2022-09-23

## 2022-09-28 RX ORDER — PHENAZOPYRIDINE HYDROCHLORIDE 100 MG/1
TABLET, FILM COATED ORAL
Qty: 18 TABLET | Refills: 10 | Status: SHIPPED | OUTPATIENT
Start: 2022-09-28

## 2022-10-03 DIAGNOSIS — G62.9 NEUROPATHY: ICD-10-CM

## 2022-10-03 RX ORDER — GABAPENTIN 300 MG/1
300 CAPSULE ORAL EVERY EVENING
Qty: 30 CAPSULE | Refills: 0 | OUTPATIENT
Start: 2022-10-03 | End: 2022-11-02

## 2022-10-05 DIAGNOSIS — E11.65 UNCONTROLLED TYPE 2 DIABETES MELLITUS WITH HYPERGLYCEMIA (HCC): ICD-10-CM

## 2022-10-05 RX ORDER — BLOOD SUGAR DIAGNOSTIC
STRIP MISCELLANEOUS
Qty: 100 EACH | Refills: 2 | Status: SHIPPED | OUTPATIENT
Start: 2022-10-05

## 2022-10-05 NOTE — TELEPHONE ENCOUNTER
Called and scheduled pt for 10/10/2022.     Electronically signed by Britney Reardon MA on 10/5/22 at 10:26 AM EDT

## 2022-10-14 ENCOUNTER — OFFICE VISIT (OUTPATIENT)
Dept: FAMILY MEDICINE CLINIC | Age: 52
End: 2022-10-14
Payer: MEDICAID

## 2022-10-14 VITALS
DIASTOLIC BLOOD PRESSURE: 64 MMHG | WEIGHT: 163 LBS | HEIGHT: 59 IN | SYSTOLIC BLOOD PRESSURE: 100 MMHG | HEART RATE: 91 BPM | TEMPERATURE: 97.3 F | RESPIRATION RATE: 18 BRPM | BODY MASS INDEX: 32.86 KG/M2 | OXYGEN SATURATION: 98 %

## 2022-10-14 DIAGNOSIS — Z23 IMMUNIZATION DUE: ICD-10-CM

## 2022-10-14 DIAGNOSIS — E11.43 TYPE II DIABETES MELLITUS WITH PERIPHERAL AUTONOMIC NEUROPATHY (HCC): ICD-10-CM

## 2022-10-14 DIAGNOSIS — G25.81 RLS (RESTLESS LEGS SYNDROME): ICD-10-CM

## 2022-10-14 DIAGNOSIS — G62.9 NEUROPATHY: ICD-10-CM

## 2022-10-14 DIAGNOSIS — I10 ESSENTIAL HYPERTENSION: Primary | ICD-10-CM

## 2022-10-14 PROCEDURE — 3017F COLORECTAL CA SCREEN DOC REV: CPT | Performed by: FAMILY MEDICINE

## 2022-10-14 PROCEDURE — G8417 CALC BMI ABV UP PARAM F/U: HCPCS | Performed by: FAMILY MEDICINE

## 2022-10-14 PROCEDURE — G8427 DOCREV CUR MEDS BY ELIG CLIN: HCPCS | Performed by: FAMILY MEDICINE

## 2022-10-14 PROCEDURE — 90471 IMMUNIZATION ADMIN: CPT | Performed by: FAMILY MEDICINE

## 2022-10-14 PROCEDURE — G8482 FLU IMMUNIZE ORDER/ADMIN: HCPCS | Performed by: FAMILY MEDICINE

## 2022-10-14 PROCEDURE — 90674 CCIIV4 VAC NO PRSV 0.5 ML IM: CPT | Performed by: FAMILY MEDICINE

## 2022-10-14 PROCEDURE — 3052F HG A1C>EQUAL 8.0%<EQUAL 9.0%: CPT | Performed by: FAMILY MEDICINE

## 2022-10-14 PROCEDURE — 99214 OFFICE O/P EST MOD 30 MIN: CPT | Performed by: FAMILY MEDICINE

## 2022-10-14 PROCEDURE — 1036F TOBACCO NON-USER: CPT | Performed by: FAMILY MEDICINE

## 2022-10-14 PROCEDURE — 2022F DILAT RTA XM EVC RTNOPTHY: CPT | Performed by: FAMILY MEDICINE

## 2022-10-14 RX ORDER — GABAPENTIN 300 MG/1
300 CAPSULE ORAL EVERY EVENING
Qty: 30 CAPSULE | Refills: 0 | Status: SHIPPED | OUTPATIENT
Start: 2022-10-14 | End: 2022-11-13

## 2022-10-17 LAB
6-MONOACETYLMORPHINE, URINE: NOT DETECTED
ALCOHOL URINE: NOT DETECTED
AMPHETAMINE SCREEN, URINE: NOT DETECTED
BARBITURATE SCREEN URINE: NOT DETECTED
BENZODIAZEPINE SCREEN, URINE: NOT DETECTED
BUPRENORPHINE URINE: NOT DETECTED
CANNABINOID SCREEN URINE: NOT DETECTED
COCAINE METABOLITE SCREEN URINE: NOT DETECTED
FENTANYL SCREEN, URINE: NOT DETECTED
INTEGRITY CHECK, CREATININE, URINE: 34.2
INTEGRITY CHECK, OXIDANT, URINE: <40
INTEGRITY CHECK, PH, URINE: 7.1 (ref 4.5–9)
INTEGRITY CHECK, SPECIFIC GRAVITY, URINE: 1.01 (ref 1–1.03)
INTEGRITY CHECK, SPECIMEN INTEGRITY, URINE: NORMAL
Lab: NORMAL
METHADONE SCREEN, URINE: NOT DETECTED
OPIATE SCREEN URINE: NOT DETECTED
OXYCODONE URINE: NOT DETECTED
PHENCYCLIDINE SCREEN URINE: NOT DETECTED
TRAMADOL SCREEN URINE: NOT DETECTED

## 2022-10-17 ASSESSMENT — ENCOUNTER SYMPTOMS
EYE PAIN: 0
GASTROINTESTINAL NEGATIVE: 1
BLOOD IN STOOL: 0
EYE REDNESS: 0
BLURRED VISION: 0
SHORTNESS OF BREATH: 0
PHOTOPHOBIA: 0
ALLERGIC/IMMUNOLOGIC NEGATIVE: 1
ORTHOPNEA: 0
ANAL BLEEDING: 0
COLOR CHANGE: 0
CONSTIPATION: 0
VISUAL CHANGE: 0
DIARRHEA: 0
ABDOMINAL DISTENTION: 0
EYE DISCHARGE: 0
EYE ITCHING: 0
BACK PAIN: 0
RECTAL PAIN: 0

## 2022-10-17 NOTE — PROGRESS NOTES
SUBJECTIVE  Jenny Sandoval is a 45 y.o. female. HPI/Chief C/O:  Chief Complaint   Patient presents with    Medication Refill     Pharmacy confirmed, med pended. No Known Allergies  The patient is here for a medication list and treatment planning review  We will go over our care planning goals as well as take care of all refills  We will set up labs as well         Diabetes  She presents for her follow-up diabetic visit. She has type 2 diabetes mellitus. Hypoglycemia symptoms include nervousness/anxiousness. Pertinent negatives for hypoglycemia include no confusion, dizziness, headaches, pallor, seizures, speech difficulty, sweats or tremors. Associated symptoms include fatigue. Pertinent negatives for diabetes include no blurred vision, no chest pain, no foot paresthesias, no foot ulcerations, no polydipsia, no polyphagia, no polyuria, no visual change, no weakness and no weight loss. There are no hypoglycemic complications. Diabetic complications include peripheral neuropathy and retinopathy. Pertinent negatives for diabetic complications include no autonomic neuropathy, CVA, heart disease, nephropathy or PVD. Risk factors for coronary artery disease include obesity, diabetes mellitus, hypertension and post-menopausal. Current diabetic treatment includes diet, insulin injections and oral agent (dual therapy) (Trulicity). She is compliant with treatment some of the time. She is following a generally unhealthy diet. An ACE inhibitor/angiotensin II receptor blocker is not being taken. Hypertension  This is a chronic problem. The current episode started more than 1 year ago. Associated symptoms include malaise/fatigue. Pertinent negatives include no anxiety, blurred vision, chest pain, headaches, neck pain, orthopnea, palpitations, peripheral edema, PND, shortness of breath or sweats. Risk factors for coronary artery disease include obesity, post-menopausal state, diabetes mellitus and dyslipidemia.  Past treatments include beta blockers, lifestyle changes and diuretics. Hypertensive end-organ damage includes retinopathy. There is no history of angina, kidney disease, CAD/MI, CVA, heart failure, left ventricular hypertrophy or PVD. There is no history of chronic renal disease, coarctation of the aorta, hyperaldosteronism, hypercortisolism, hyperparathyroidism, a hypertension causing med, pheochromocytoma, renovascular disease, sleep apnea or a thyroid problem. Medication Refill  Associated symptoms include fatigue. Pertinent negatives include no chest pain, headaches, neck pain, visual change or weakness. ROS:  Review of Systems   Constitutional:  Positive for fatigue and malaise/fatigue. Negative for activity change, appetite change, unexpected weight change and weight loss. Eyes:  Positive for visual disturbance. Negative for blurred vision, photophobia, pain, discharge, redness and itching. Respiratory:  Negative for shortness of breath. Cardiovascular: Negative. Negative for chest pain, palpitations, orthopnea, leg swelling and PND. Gastrointestinal: Negative. Negative for abdominal distention, anal bleeding, blood in stool, constipation, diarrhea and rectal pain. Endocrine: Negative. Negative for cold intolerance, heat intolerance, polydipsia, polyphagia and polyuria. Genitourinary:  Negative for decreased urine volume, difficulty urinating, dysuria, enuresis, flank pain, frequency, genital sores, hematuria, menstrual problem, pelvic pain and urgency. Musculoskeletal:  Positive for gait problem. Negative for back pain, neck pain and neck stiffness. Skin: Negative. Negative for color change, pallor and wound. Allergic/Immunologic: Negative. Negative for environmental allergies, food allergies and immunocompromised state. Neurological:  Negative for dizziness, tremors, seizures, syncope, facial asymmetry, speech difficulty, weakness, light-headedness and headaches.    Hematological: Negative. Negative for adenopathy. Does not bruise/bleed easily. Psychiatric/Behavioral:  Negative for agitation, behavioral problems, confusion, decreased concentration, dysphoric mood, hallucinations, self-injury, sleep disturbance and suicidal ideas. The patient is nervous/anxious. The patient is not hyperactive.        Past Medical/Surgical Hx;  Reviewed with patient      Diagnosis Date    Anesthesia complication     problem with blood pressure up & down    Arthritis     lower back    Back pain     Blind     right eye    Cancer ([address]     breast    Cardiac valve prolapse     micro    Diabetes mellitus ([address]     Head injury 2018    History of therapeutic radiation     Hyperlipidemia     Hypertension     Neuropathy due to secondary diabetes ([address]     in marcial feet    Nontraumatic tear of right rotator cuff 2020    Prolonged emergence from general anesthesia     Psoriasis      Past Surgical History:   Procedure Laterality Date    ANKLE SURGERY      bilateral tarsal tunnels    BREAST BIOPSY Left 2021    LEFT BREAST NEEDLE LOCALIZED LUMPECTOMY, BLUE DYE INJECTION, LEFT AXILLARY SENTINEL LYMPHNODE EXCISION, POSSIBLE LEFT AXILLARY DISSECTION performed by Marbin Akers MD at [address]      cardiac catheterization    CARPAL TUNNEL RELEASE      left    CARPAL TUNNEL RELEASE  2012    right    CATARACT REMOVAL Left 2022     SECTION      CHOLECYSTECTOMY, LAPAROSCOPIC N/A 2019    LAPAROSCOPIC CHOLECYSTECTOMY WITH IOC performed by Nicolas Adames MD at [address]  had extremely high blood pressure and hard to wake up    COLONOSCOPY N/A 2019    COLORECTAL CANCER SCREENING, NOT HIGH RISK performed by Nicolas Adames MD at [facility], [address] (CERVIX REMOVED)      SHOULDER ARTHROSCOPY Left 2016    subacromin decompression and debridement    SHOULDER ARTHROSCOPY Right 2020    RIGHT SHOULDER ARTHROSCOPY, SUBACROMIAL DECOMPRESSION, LABRIAL DEBRIDEMENT, CHONDROPLASTY, RAÚL performed by Bridgette Christy DO at [address] N/A 06/20/2019    EGD BIOPSY performed by Akilah Muro MD at [facility] LEFT  12/23/2020    US BREAST NEEDLE BIOPSY LEFT 12/23/2020 [facility]       Past Family Hx:  Reviewed with patient      Problem Relation Age of Onset    Heart Disease Mother     Diabetes Mother     Heart Disease Father     Diabetes Father     Cancer Father         prostate, colon, skin cance behind ear    Breast Cancer Maternal Aunt 54        breast    Cancer Maternal Uncle 62        colon    Cancer Paternal Aunt 58        breast    Cancer Other 62        maternal great aunt - breast       Social Hx:  Reviewed with patient  Social History     Tobacco Use    Smoking status: Never    Smokeless tobacco: Never   Substance Use Topics    Alcohol use: Not Currently     Alcohol/week: 1.0 standard drink     Types: 1 Glasses of wine per week     Comment: weekly       OBJECTIVE  /64   Pulse 91   Temp 97.3 °F (36.3 °C) (Temporal)   Resp 18   Ht 4' 11\" (1.499 m)   Wt 163 lb (73.9 kg)   LMP  (LMP Unknown)   SpO2 98%   Breastfeeding No   BMI 32.92 kg/m²     Problem List:  Leopold Gallery does not have any pertinent problems on file. PHYS EX:  Physical Exam  Vitals and nursing note reviewed. Constitutional:       General: She is not in acute distress. Appearance: Normal appearance. She is well-developed. She is obese. She is not ill-appearing, toxic-appearing or diaphoretic. Comments: Patient has morbid obesity. Patient instructed on low calorie, healthy ADA diet. HENT:      Head: Normocephalic and atraumatic. Right Ear: External ear normal.      Left Ear: External ear normal.      Nose: Nose normal. No congestion or rhinorrhea. Eyes:      General: No scleral icterus. Right eye: No discharge. Left eye: No discharge. Conjunctiva/sclera: Conjunctivae normal.      Pupils: Pupils are equal, round, and reactive to light. Neck:      Thyroid: No thyromegaly. Vascular: No carotid bruit or JVD. Trachea: No tracheal deviation. Cardiovascular:      Rate and Rhythm: Normal rate and regular rhythm. Pulses: Normal pulses. Heart sounds: Normal heart sounds. No murmur heard. No friction rub. No gallop. Pulmonary:      Effort: Pulmonary effort is normal. No respiratory distress. Breath sounds: Normal breath sounds. No stridor. No wheezing, rhonchi or rales. Chest:      Chest wall: No tenderness. Abdominal:      General: Bowel sounds are normal. There is no distension. Palpations: Abdomen is soft. There is no mass. Tenderness: There is no abdominal tenderness. There is no right CVA tenderness, left CVA tenderness, guarding or rebound. Hernia: No hernia is present. Musculoskeletal:         General: Tenderness present. No swelling, deformity or signs of injury. Cervical back: Normal range of motion and neck supple. No rigidity or tenderness. No muscular tenderness. Right lower leg: No edema. Left lower leg: No edema. Comments: Pain and decreased ROM multiple joints. Lymphadenopathy:      Cervical: No cervical adenopathy. Skin:     General: Skin is warm. Coloration: Skin is not jaundiced or pale. Findings: No bruising, erythema, lesion or rash. Neurological:      General: No focal deficit present. Mental Status: She is alert and oriented to person, place, and time. Cranial Nerves: No cranial nerve deficit. Sensory: Sensory deficit (diabetic neuropathy) present. Motor: Weakness present. No abnormal muscle tone.       Coordination: Coordination abnormal.      Gait: Gait abnormal.      Deep Tendon Reflexes: Reflexes abnormal.      Comments: Proprioceptive imbalance        ASSESSMENT/PLAN  Jenny was seen today for chest congestion and diabetes. Diagnoses and all orders for this visit:    Diabetic polyneuropathy associated with type 2 diabetes mellitus ([facility]  -     empagliflozin (JARDIANCE) 25 MG tablet; Take 1 tablet by mouth daily  -     Basic Metabolic Panel; Future  -     CBC with Auto Differential; Future  -     Hemoglobin A1C; Future  -     Microalbumin, Ur; Future    ---VASCULAR PANEL  A) asa, plavix, aggrenox  B) coumadin, pletal, tzd, STATIN  C) ace, BUMEX, folic, ccb  D) cannikinumab, fish oils     ---CARDIAC---asa, ace, BETA, STATIN,  BUMEX, ( ccb )    A) ace or arb  B) lipitor ( 40-80 ) or CRESTOR 20  C) GLP-1 or  SGLT-2     Neurontin and UDS   Malignant neoplasm of left breast in female, estrogen receptor positive, unspecified site of breast ([facility]  -     Basic Metabolic Panel; Future  -     CBC with Auto Differential; Future  --stable on current care planning  -- continue treatment as we are meeting goals       Invasive ductal carcinoma of left breast ([facility]  -     Basic Metabolic Panel; Future  -     CBC with Auto Differential; Future  --stable on current care planning  -- continue treatment as we are meeting goals       Mixed hyperlipidemia  -     Basic Metabolic Panel; Future  -     Lipid Panel; Future  -     CBC with Auto Differential; Future  --Mediterranean diet, exercise, weight loss, vitamins    We have a long talk on cholesterol and importance of lowering it       Fatigue, unspecified type  -     TSH; Future  -     Basic Metabolic Panel; Future  -     CBC with Auto Differential; Future  Long talk on treatment and prevention  Literature is given       Other orders  -     rosuvastatin (CRESTOR) 20 MG tablet; Take 1 tablet by mouth nightly    Pt instructed if any worse go ED ASAP. Outpatient Encounter Medications as of 10/14/2022   Medication Sig Dispense Refill    gabapentin (NEURONTIN) 300 MG capsule Take 1 capsule by mouth every evening for 30 days.  Nightly 30 capsule 0    blood glucose test strips (ONETOUCH ULTRA) strip USE AS DIRECTED TO TEST BLOOD SUGAR FOUR TIMES A  each 2    phenazopyridine (PYRIDIUM) 100 MG tablet TAKE 1 TABLET BY MOUTH 3 TIMES DAILY AS NEEDED FOR PAIN 18 tablet 10    metFORMIN (GLUCOPHAGE) 500 MG tablet Take 2 tablets by mouth 2 times daily (with meals) TAKE TWO (2) TABLETS BY MOUTH TWICE DAILY WITH MEALS *EMERGENCY REFILL* 360 tablet 1    lactobacillus (CULTURELLE) capsule TAKE 1 CAPSULE BY MOUTH DAILY 30 capsule 10    Lancets (ONETOUCH DELICA PLUS MDTYAC07D) MISC USE TO TEST BLOOD SUGAR 4 TIMES DAILY 100 each 5    metoprolol succinate (TOPROL XL) 25 MG extended release tablet TAKE 1/2 TABLET BY MOUTH DAILY 15 tablet 10    empagliflozin (JARDIANCE) 25 MG tablet Take 1 tablet by mouth daily 30 tablet 3    rosuvastatin (CRESTOR) 20 MG tablet Take 1 tablet by mouth nightly 30 tablet 3    lactobacillus (CULTURELLE) CAPS capsule TAKE 1 CAPSULE BY MOUTH DAILY 90 capsule 0    anastrozole (ARIMIDEX) 1 MG tablet Take 1 tablet by mouth daily 30 tablet 5    omeprazole (PRILOSEC) 40 MG delayed release capsule TAKE 1 CAPSULE BY MOUTH DAILY 30 capsule 10    insulin glargine (BASAGLAR KWIKPEN) 100 UNIT/ML injection pen Inject 50 Units into the skin nightly 45 mL 1    Alcohol Swabs (EASY TOUCH ALCOHOL PREP MEDIUM) 70 % PADS USE AS DIRECTED PRIOR TO GLUCOSE TESTING AND INSULIN INJECTION FOUR TIMES A  each 5    Dulaglutide (TRULICITY) 1.5 RA/5.4AG SOPN INJECT THE CONTENTS OF 1 SYRINGE SUBCUTANEOUSLY EVERY WEEK *PATIENT NEEDS APPOINTMENT* 6 mL 10    hydrocortisone 2.5 % cream APPLY TOPICALLY TO AFFECTED AREA TWICE DAILY 30 g 10    sertraline (ZOLOFT) 100 MG tablet TAKE 1 TABLET BY MOUTH ONCE DAILY AS NEEDED FOR DEPRESSION 30 tablet 10    pramipexole (MIRAPEX) 0.125 MG tablet TAKE 1 TABLET BY MOUTH NIGHTLY 30 tablet 1    diclofenac sodium (VOLTAREN) 1 % GEL Apply 2 g topically 4 times daily 60 g 2    sucralfate (CARAFATE) 1 GM tablet Take 1 tablet by mouth 4 times daily 120 tablet 3    Blood Glucose Monitoring Suppl (ONE TOUCH ULTRA 2) w/Device KIT Test 3 times a day & as needed for symptoms of irregular blood glucose. Dispense sufficient amount for indicated testing frequency plus additional to accommodate PRN testing needs. 1 kit 0    Cholecalciferol (VITAMIN D) 50 MCG (2000 UT) CAPS capsule Take by mouth      acetaminophen (TYLENOL) 500 MG tablet Take 1 tablet by mouth 4 times daily as needed for Pain 120 tablet 0    B-D ULTRAFINE III SHORT PEN 31G X 8 MM MISC USE WITH INSULIN PENS AS DIRECTED 100 each 10    fluticasone (FLONASE) 50 MCG/ACT nasal spray 1 spray by Nasal route daily 1 Bottle 3    nitroGLYCERIN (NITROSTAT) 0.4 MG SL tablet Place 1 tablet under the tongue every 5 minutes as needed for Chest pain 25 tablet 1    Insulin Syringe-Needle U-100 ([employer] INSULIN SYR 1CC/30G) 30G X 5/16\" 1 ML MISC 1 each by Does not apply route 4 times daily 120 each 5    bumetanide (BUMEX) 1 MG tablet Take 1 tablet by mouth every other day 90 tablet 1    insulin lispro (ADMELOG SOLOSTAR) 100 UNIT/ML pen As directed per sliding scale up to 13 units 4 times per day. 5 pen 3    Blood Pressure KIT 1 kit by Does not apply route daily 1 kit 0    Misc. Devices (QUAD CANE) MISC 1 Quad cane 1 each 0    Multiple Vitamins-Minerals (THERAPEUTIC MULTIVITAMIN-MINERALS) tablet Take 1 tablet by mouth daily      B Complex Vitamins (B COMPLEX 1 PO) Take 1 tablet by mouth daily. [DISCONTINUED] gabapentin (NEURONTIN) 300 MG capsule Take 1 capsule by mouth every evening for 30 days. Nightly 30 capsule 0     No facility-administered encounter medications on file as of 10/14/2022. Return in about 4 weeks (around 11/11/2022).         Reviewed recent labs related to Jenny's current problems      Discussed importance of regular Health Maintenance follow up  Health Maintenance   Topic    Diabetic foot exam     Hepatitis B vaccine (1 of 3 - Risk 3-dose series)    DTaP/Tdap/Td vaccine (1 - Tdap)    Shingles vaccine (1 of 2)    COVID-19 Vaccine (4 - Booster for play140 series)    Diabetic retinal exam     Diabetic microalbuminuria test     Breast cancer screen     Lipids     Depression Screen     A1C test (Diabetic or Prediabetic)     Colorectal Cancer Screen     Flu vaccine     Pneumococcal 0-64 years Vaccine     Hepatitis C screen     HIV screen     Hepatitis A vaccine     Hib vaccine     Meningococcal (ACWY) vaccine

## 2022-10-21 ENCOUNTER — TELEPHONE (OUTPATIENT)
Dept: FAMILY MEDICINE CLINIC | Age: 52
End: 2022-10-21

## 2022-10-21 DIAGNOSIS — M25.531 WRIST PAIN, RIGHT: Primary | ICD-10-CM

## 2022-10-21 NOTE — TELEPHONE ENCOUNTER
----- Message from [place] sent at 10/21/2022  9:23 AM EDT -----  Subject: Referral Request    Reason for referral request? Patient is calling this morning to see if her   pcp can send over Dr. Lashae Sánchez a referral. This is for pt right wrist. Pt   hasn't seen him in over a year so she needs a new referral. Pt doesn't   have a fax number. Pt said she has been there before. Provider patient wants to be referred to(if known):     Provider Phone Number(if known):     Additional Information for Provider?   ---------------------------------------------------------------------------  --------------  [id number] [employer]    [phone]; OK to leave message on voicemail  ---------------------------------------------------------------------------  --------------

## 2022-10-28 DIAGNOSIS — G25.81 RLS (RESTLESS LEGS SYNDROME): ICD-10-CM

## 2022-10-28 RX ORDER — PRAMIPEXOLE DIHYDROCHLORIDE 0.12 MG/1
TABLET ORAL
Qty: 180 TABLET | Refills: 1 | Status: SHIPPED | OUTPATIENT
Start: 2022-10-28

## 2022-11-07 DIAGNOSIS — M25.531 RIGHT WRIST PAIN: Primary | ICD-10-CM

## 2022-11-09 ENCOUNTER — OFFICE VISIT (OUTPATIENT)
Dept: ORTHOPEDIC SURGERY | Age: 52
End: 2022-11-09
Payer: MEDICAID

## 2022-11-09 VITALS — BODY MASS INDEX: 32.86 KG/M2 | WEIGHT: 163 LBS | HEIGHT: 59 IN | TEMPERATURE: 98 F

## 2022-11-09 DIAGNOSIS — M25.331 SCAPHOLUNATE INSTABILITY, RIGHT: ICD-10-CM

## 2022-11-09 DIAGNOSIS — S60.211A CONTUSION OF RIGHT WRIST, INITIAL ENCOUNTER: Primary | ICD-10-CM

## 2022-11-09 DIAGNOSIS — S69.81XA INJURY OF TRIANGULAR FIBROCARTILAGE COMPLEX (TFCC) OF RIGHT WRIST, INITIAL ENCOUNTER: ICD-10-CM

## 2022-11-09 PROCEDURE — 99214 OFFICE O/P EST MOD 30 MIN: CPT | Performed by: ORTHOPAEDIC SURGERY

## 2022-11-09 PROCEDURE — G8427 DOCREV CUR MEDS BY ELIG CLIN: HCPCS | Performed by: ORTHOPAEDIC SURGERY

## 2022-11-09 PROCEDURE — G8482 FLU IMMUNIZE ORDER/ADMIN: HCPCS | Performed by: ORTHOPAEDIC SURGERY

## 2022-11-09 PROCEDURE — G8417 CALC BMI ABV UP PARAM F/U: HCPCS | Performed by: ORTHOPAEDIC SURGERY

## 2022-11-09 PROCEDURE — 1036F TOBACCO NON-USER: CPT | Performed by: ORTHOPAEDIC SURGERY

## 2022-11-09 PROCEDURE — 3017F COLORECTAL CA SCREEN DOC REV: CPT | Performed by: ORTHOPAEDIC SURGERY

## 2022-11-09 NOTE — PROGRESS NOTES
Chief Complaint   Patient presents with    Wrist Pain     Right Wrist states of pain when doing anything. States of extreme pain when doing anything. HPI:    Patient is 46 y.o. female complaining of Right wrist pain for the past week. No history of injury. Complains thumb pain into forearm, also index finger numbness and swelling. Patient treatments include rest, ice, heat, NSAIDs, HEP without much relief. Left wrist follow up. SHe has seen some relief from topical pain cream. She had EMG completed. ROS:    Skin: (-) rash,(-) psoriasis,(-) eczema, (-)skin cancer. Neurologic: (-)numbness, (-)tingling, (-)headaches, (-) LOC. Cardiovascular: (-) Chest pain, (-) swelling in legs/feet, (-) SOB, (-) cramping in legs/feet with walking.     All other review of systems negative except stated above or in HPI      Past Medical History:   Diagnosis Date    Anesthesia complication     problem with blood pressure up & down    Arthritis     lower back    Back pain     Blind     right eye    Cancer ([address]     breast    Cardiac valve prolapse     micro    Diabetes mellitus ([address]     Head injury 2018    History of therapeutic radiation     Hyperlipidemia     Hypertension     Neuropathy due to secondary diabetes ([address]     in marcial feet    Nontraumatic tear of right rotator cuff 2020    Prolonged emergence from general anesthesia     Psoriasis      Past Surgical History:   Procedure Laterality Date    ANKLE SURGERY      bilateral tarsal tunnels    BREAST BIOPSY Left 2021    LEFT BREAST NEEDLE LOCALIZED LUMPECTOMY, BLUE DYE INJECTION, LEFT AXILLARY SENTINEL LYMPHNODE EXCISION, POSSIBLE LEFT AXILLARY DISSECTION performed by Jairo Bajwa MD at [address]      cardiac catheterization    CARPAL TUNNEL RELEASE      left    CARPAL TUNNEL RELEASE  2012    right    CATARACT REMOVAL Left 2022     SECTION      CHOLECYSTECTOMY, LAPAROSCOPIC N/A 02/28/2019    LAPAROSCOPIC CHOLECYSTECTOMY WITH IOC performed by Kaye Barrios MD at [address]  had extremely high blood pressure and hard to wake up    COLONOSCOPY N/A 06/20/2019    COLORECTAL CANCER SCREENING, NOT HIGH RISK performed by Kaye Barrios MD at [address], [address] (CERVIX REMOVED)      SHOULDER ARTHROSCOPY Left 01/21/2016    subacromin decompression and debridement    SHOULDER ARTHROSCOPY Right 11/16/2020    RIGHT SHOULDER ARTHROSCOPY, SUBACROMIAL DECOMPRESSION, LABRIAL DEBRIDEMENT, CHONDROPLASTY, RAÚL performed by Jarrett Montalvo DO at [address] 06/20/2019    EGD BIOPSY performed by Kaye Barrios MD at [address]  12/23/2020     BREAST NEEDLE BIOPSY LEFT 12/23/2020 [facility]       Current Outpatient Medications:     diclofenac sodium (VOLTAREN) 1 % GEL, Apply 2 g topically 2 times daily, Disp: 240 g, Rfl: 1    pramipexole (MIRAPEX) 0.125 MG tablet, TAKE 2 TABLETS BY MOUTH EVERY NIGHT, Disp: 180 tablet, Rfl: 1    gabapentin (NEURONTIN) 300 MG capsule, Take 1 capsule by mouth every evening for 30 days.  Nightly, Disp: 30 capsule, Rfl: 0    blood glucose test strips (ONETOUCH ULTRA) strip, USE AS DIRECTED TO TEST BLOOD SUGAR FOUR TIMES A DAY, Disp: 100 each, Rfl: 2    phenazopyridine (PYRIDIUM) 100 MG tablet, TAKE 1 TABLET BY MOUTH 3 TIMES DAILY AS NEEDED FOR PAIN, Disp: 18 tablet, Rfl: 10    metFORMIN (GLUCOPHAGE) 500 MG tablet, Take 2 tablets by mouth 2 times daily (with meals) TAKE TWO (2) TABLETS BY MOUTH TWICE DAILY WITH MEALS *EMERGENCY REFILL*, Disp: 360 tablet, Rfl: 1    lactobacillus (CULTURELLE) capsule, TAKE 1 CAPSULE BY MOUTH DAILY, Disp: 30 capsule, Rfl: 10    Lancets (ONETOUCH DELICA PLUS [id number]) MISC, USE TO TEST BLOOD SUGAR 4 TIMES DAILY, Disp: 100 each, Rfl: 5    metoprolol succinate (TOPROL XL) 25 MG extended release tablet, TAKE 1/2 TABLET BY MOUTH DAILY, Disp: 15 tablet, Rfl: 10    empagliflozin (JARDIANCE) 25 MG tablet, Take 1 tablet by mouth daily, Disp: 30 tablet, Rfl: 3    rosuvastatin (CRESTOR) 20 MG tablet, Take 1 tablet by mouth nightly, Disp: 30 tablet, Rfl: 3    lactobacillus (CULTURELLE) CAPS capsule, TAKE 1 CAPSULE BY MOUTH DAILY, Disp: 90 capsule, Rfl: 0    anastrozole (ARIMIDEX) 1 MG tablet, Take 1 tablet by mouth daily, Disp: 30 tablet, Rfl: 5    insulin glargine (BASAGLAR KWIKPEN) 100 UNIT/ML injection pen, Inject 50 Units into the skin nightly, Disp: 45 mL, Rfl: 1    Dulaglutide (TRULICITY) 1.5 WP/7.5QZ SOPN, INJECT THE CONTENTS OF 1 SYRINGE SUBCUTANEOUSLY EVERY WEEK *PATIENT NEEDS APPOINTMENT*, Disp: 6 mL, Rfl: 10    hydrocortisone 2.5 % cream, APPLY TOPICALLY TO AFFECTED AREA TWICE DAILY, Disp: 30 g, Rfl: 10    sertraline (ZOLOFT) 100 MG tablet, TAKE 1 TABLET BY MOUTH ONCE DAILY AS NEEDED FOR DEPRESSION, Disp: 30 tablet, Rfl: 10    diclofenac sodium (VOLTAREN) 1 % GEL, Apply 2 g topically 4 times daily, Disp: 60 g, Rfl: 2    sucralfate (CARAFATE) 1 GM tablet, Take 1 tablet by mouth 4 times daily, Disp: 120 tablet, Rfl: 3    Blood Glucose Monitoring Suppl (ONE TOUCH ULTRA 2) w/Device KIT, Test 3 times a day & as needed for symptoms of irregular blood glucose. Dispense sufficient amount for indicated testing frequency plus additional to accommodate PRN testing needs. , Disp: 1 kit, Rfl: 0    Cholecalciferol (VITAMIN D) 50 MCG (2000 UT) CAPS capsule, Take by mouth, Disp: , Rfl:     acetaminophen (TYLENOL) 500 MG tablet, Take 1 tablet by mouth 4 times daily as needed for Pain, Disp: 120 tablet, Rfl: 0    B-D ULTRAFINE III SHORT PEN 31G X 8 MM MISC, USE WITH INSULIN PENS AS DIRECTED, Disp: 100 each, Rfl: 10    fluticasone (FLONASE) 50 MCG/ACT nasal spray, 1 spray by Nasal route daily, Disp: 1 Bottle, Rfl: 3    nitroGLYCERIN (NITROSTAT) 0.4 MG SL tablet, Place 1 tablet under the tongue every 5 minutes as needed for Chest pain, Disp: 25 tablet, Rfl: 1    Insulin Syringe-Needle U-100 ([employer] INSULIN SYR 1CC/30G) 30G X 5/16\" 1 ML MISC, 1 each by Does not apply route 4 times daily, Disp: 120 each, Rfl: 5    bumetanide (BUMEX) 1 MG tablet, Take 1 tablet by mouth every other day, Disp: 90 tablet, Rfl: 1    insulin lispro (ADMELOG SOLOSTAR) 100 UNIT/ML pen, As directed per sliding scale up to 13 units 4 times per day., Disp: 5 pen, Rfl: 3    Blood Pressure KIT, 1 kit by Does not apply route daily, Disp: 1 kit, Rfl: 0    Misc. Devices (QUAD CANE) MISC, 1 Quad cane, Disp: 1 each, Rfl: 0    Multiple Vitamins-Minerals (THERAPEUTIC MULTIVITAMIN-MINERALS) tablet, Take 1 tablet by mouth daily, Disp: , Rfl:     B Complex Vitamins (B COMPLEX 1 PO), Take 1 tablet by mouth daily.   , Disp: , Rfl:     Alcohol Swabs (EASY TOUCH ALCOHOL PREP MEDIUM) 70 % PADS, USE AS DIRECTED PRIOR TO GLUCOSE TESTING AND INSULIN INJECTION FOUR TIMES A DAY, Disp: 200 each, Rfl: 1    omeprazole (PRILOSEC) 40 MG delayed release capsule, TAKE 1 CAPSULE BY MOUTH DAILY, Disp: 30 capsule, Rfl: 10  No Known Allergies  Social History     Socioeconomic History    Marital status:      Spouse name: Not on file    Number of children: 2    Years of education: Not on file    Highest education level: Not on file   Occupational History    Not on file   Tobacco Use    Smoking status: Never    Smokeless tobacco: Never   Vaping Use    Vaping Use: Never used   Substance and Sexual Activity    Alcohol use: Not Currently     Alcohol/week: 1.0 standard drink     Types: 1 Glasses of wine per week     Comment: weekly    Drug use: No    Sexual activity: Not on file   Other Topics Concern    Not on file   Social History Narrative    Not on file     Social Determinants of Health     Financial Resource Strain: Low Risk     Difficulty of Paying Living Expenses: Not hard at all   Food Insecurity: No Food Insecurity    Worried About Running Out of Food in the Last Year: Never true    Ran Out of Food in the Last Year: Never true Transportation Needs: Not on file   Physical Activity: Not on file   Stress: Not on file   Social Connections: Not on file   Intimate Partner Violence: Not on file   Housing Stability: Not on file     Family History   Problem Relation Age of Onset    Heart Disease Mother     Diabetes Mother     Heart Disease Father     Diabetes Father     Cancer Father         prostate, colon, skin cance behind ear    Breast Cancer Maternal Aunt 54        breast    Cancer Maternal Uncle 62        colon    Cancer Paternal Aunt 58        breast    Cancer Other 62        maternal great aunt - breast           Physical Exam:    Temp 98 °F (36.7 °C)   Ht 4' 11\" (1.499 m)   Wt 163 lb (73.9 kg)   LMP  (LMP Unknown)   BMI 32.92 kg/m²     GENERAL: alert, appears stated age, cooperative, no acute distress    HEENT: Head is normocephalic, atraumatic. PERRLA. SKIN: Clean, dry, intact. There is not any cellulitis or cutaneous lesions noted in the lower extremities     PULMONARY: breathing is regular and unlabored, no acute distress     CV: The bilateral lower extremities are warm and well-perfused with brisk capillary refill. 2+ pulses LE bilateral.     ABDOMINAL: Nontender, nondistended     PSYCHIATRY: Pleasant mood, appropriate behavior, follows commands     NEURO: Sensation is intact distally with light touch with no alteration. Motor exam of the lower extremities show quadriceps, hamstrings, foot dorsiflexion and plantarflexion grossly intact 5/5. LYMPH: No lymphedema present distally in upper or lower extremity. MUSCULOSKELETAL:  Cervical Exam:    On physical exam, patient is well-developed, well-nourished, oriented to person, place and time. her gait is normal.  On evaluation of her cervical spine, she has full range of motion of the cervical spine without pain. There is no cervical tenderness to palpation.      Shoulder Exam:    On evaluation of her bilaterally upper extremities, her bilateral shoulder has no deformity. There is not evidence of scapular dyskinesis. There is not muscle atrophy in shoulder girdle. The range of motion for the Right Shoulder is 160/160/60 and for the Left shoulder is 160/160/60. Right shoulder Motor strength is 5/5 in the supraspinatus, 5/5 internal rotation and 5/5 in external rotation, and Left shoulder motor strength 5/5 in supraspinatus, 5/5 in internal rotation, 5/5 in external rotation. Elbow exam:    Evaluation of the elbow, reveals no signs of swelling or deformity. ROM is 0-130. There is not instability with varus/valgus stresses. Motor strength is 5/5 with flexion/extension. Wrist exam:       Inspection of the bilateral upper extremities, there is no evidence of deformity of the wrist.  ROM Wrist ROM R wrist DF 90, VF 80, L wrist DF 80, VF 90, R pronation 40/ supination 40, L pronation 40/supination 40. Motor strength is 5/5 with Dorsiflexion/Volarflexion/Supination/Pronation.  strength 5/5. Thenar eminence appears bilaterally symmetrical.   Motor and sensation is intact and symmetric throughout the bilateral upper extremities in the ulnar and radial , musclcutaneous, and axillary nerve distributions. There is paresthesia in the median nerve distribution of the left hand. Hand exam:    The skin overlying the hand is  intact. There is not evidence of scar, lesion, laceration, or abrasion. The motion in the small joints of the hand are intact with no stiffness or deformity. The ROM in the MCP flexion full/ extension full , PIP flexion full/ extension full, DIP flexion full/ extension full. There is not rotational deformity. There is no masses or adenopathy in bilateral upper extremities. Radial pulses are 2+ and symmetric bilaterally. Capillary refill is intact and < 2 seconds. Motor strength is 5/5 with flexion and extension of the small finger joints.      Right:  Phallens sign(-), Tinnells sign (+), Median nerve compression test (+), Finklesteins (-), [employer] Grind test (-), [employer](-). Left:  Phallens sign(+), Tinnells sign (+), Median nerve compression test (+),  Finklesteins (-), [employer] Grind test (-), [employer](-). Imaging:  XR WRIST RIGHT (MIN 3 VIEWS)    Result Date: 11/9/2022  EXAMINATION: 4 XRAY VIEWS OF THE RIGHT WRIST 11/9/2022 7:54 am COMPARISON: None. HISTORY: ORDERING SYSTEM PROVIDED HISTORY: Right wrist pain FINDINGS: No fracture or dislocation. No significant arthropathy. There is calcific atherosclerotic plaque at the radial and ulnar arteries as well as in several digital arteries. Atherosclerotic peripheral vascular disease. Shakir Srivastava was seen today for wrist pain. Diagnoses and all orders for this visit:    Contusion of right wrist, initial encounter  -     MRI WRIST RIGHT WO CONTRAST; Future    Injury of triangular fibrocartilage complex (TFCC) of right wrist, initial encounter    Scapholunate instability, right    Other orders  -     diclofenac sodium (VOLTAREN) 1 % GEL; Apply 2 g topically 2 times daily        Patient seen and examined. X-rays reviewed. Natural history and course discussed with patient. Treatment options discussed with patient in detail including risks and benefits. Patient does have vague forearm pain with paresthesias down into the hand and wrist specifically around thumb. Patient is a brittle diabetic and may be experiencing carpal tunnel syndrome with neuropathic elements. Patient also shows calcification over radial artery and possible vascular complications to this as well. MRI recommended for further evaluation management as patient was educated with this in a long discussion. In a 15 minute assessment and discussion, patient was counseled on continued weight loss, diet, and physical activity relating to this condition.  She was educated with options in detail including nutrition, joining a health club/ weight loss program, and use of cardio equipment such as the [employer] and the importance of use as well as range of motion and HEP exercises for weight loss. Jarrett Montalvo,           25 minutes was spent with patient. 50% or greater was spent counseling the patient.

## 2022-11-21 RX ORDER — ISOPROPYL ALCOHOL 70 ML/100ML
SWAB TOPICAL
Qty: 200 EACH | Refills: 1 | Status: SHIPPED | OUTPATIENT
Start: 2022-11-21

## 2022-12-01 ENCOUNTER — OFFICE VISIT (OUTPATIENT)
Dept: ORTHOPEDIC SURGERY | Age: 52
End: 2022-12-01
Payer: MEDICAID

## 2022-12-01 VITALS — HEIGHT: 59 IN | WEIGHT: 167 LBS | TEMPERATURE: 98 F | BODY MASS INDEX: 33.67 KG/M2

## 2022-12-01 DIAGNOSIS — M19.131 SCAPHOLUNATE ADVANCED COLLAPSE OF RIGHT WRIST: ICD-10-CM

## 2022-12-01 DIAGNOSIS — S63.399A RUPTURE OF SCAPHOLUNATE LIGAMENT: Primary | ICD-10-CM

## 2022-12-01 PROCEDURE — G8427 DOCREV CUR MEDS BY ELIG CLIN: HCPCS | Performed by: ORTHOPAEDIC SURGERY

## 2022-12-01 PROCEDURE — G8417 CALC BMI ABV UP PARAM F/U: HCPCS | Performed by: ORTHOPAEDIC SURGERY

## 2022-12-01 PROCEDURE — 3017F COLORECTAL CA SCREEN DOC REV: CPT | Performed by: ORTHOPAEDIC SURGERY

## 2022-12-01 PROCEDURE — G8482 FLU IMMUNIZE ORDER/ADMIN: HCPCS | Performed by: ORTHOPAEDIC SURGERY

## 2022-12-01 PROCEDURE — 1036F TOBACCO NON-USER: CPT | Performed by: ORTHOPAEDIC SURGERY

## 2022-12-01 PROCEDURE — 99214 OFFICE O/P EST MOD 30 MIN: CPT | Performed by: ORTHOPAEDIC SURGERY

## 2022-12-01 NOTE — PROGRESS NOTES
Chief Complaint   Patient presents with    Wrist Pain     Right wrist MRI results no better         HPI:    Patient is 46 y.o. female complaining of Right wrist pain for the past week. No history of injury. Complains thumb pain into forearm, also index finger numbness and swelling. Patient treatments include rest, ice, heat, NSAIDs, HEP without much relief. Left wrist follow up. SHe has seen some relief from topical pain cream. She had EMG completed. Follows up after MRI. ROS:    Skin: (-) rash,(-) psoriasis,(-) eczema, (-)skin cancer. Neurologic: (-)numbness, (-)tingling, (-)headaches, (-) LOC. Cardiovascular: (-) Chest pain, (-) swelling in legs/feet, (-) SOB, (-) cramping in legs/feet with walking.     All other review of systems negative except stated above or in HPI      Past Medical History:   Diagnosis Date    Anesthesia complication     problem with blood pressure up & down    Arthritis     lower back    Back pain     Blind     right eye    Cancer ([address]     breast    Cardiac valve prolapse     micro    Diabetes mellitus ([address]     Head injury 2018    History of therapeutic radiation     Hyperlipidemia     Hypertension     Neuropathy due to secondary diabetes ([address]     in marcial feet    Nontraumatic tear of right rotator cuff 2020    Prolonged emergence from general anesthesia     Psoriasis      Past Surgical History:   Procedure Laterality Date    ANKLE SURGERY      bilateral tarsal tunnels    BREAST BIOPSY Left 2021    LEFT BREAST NEEDLE LOCALIZED LUMPECTOMY, BLUE DYE INJECTION, LEFT AXILLARY SENTINEL LYMPHNODE EXCISION, POSSIBLE LEFT AXILLARY DISSECTION performed by Parul Mascorro MD at [address]      cardiac catheterization    CARPAL TUNNEL RELEASE      left    CARPAL TUNNEL RELEASE  2012    right    CATARACT REMOVAL Left 2022     SECTION      CHOLECYSTECTOMY, LAPAROSCOPIC N/A 2019    LAPAROSCOPIC CHOLECYSTECTOMY WITH IOC performed by Akilah Muro MD at [address]  had extremely high blood pressure and hard to wake up    COLONOSCOPY N/A 06/20/2019    COLORECTAL CANCER SCREENING, NOT HIGH RISK performed by Akilah Muro MD at [facility], TOTAL ABDOMINAL (CERVIX REMOVED)      SHOULDER ARTHROSCOPY Left 01/21/2016    subacromin decompression and debridement    SHOULDER ARTHROSCOPY Right 11/16/2020    RIGHT SHOULDER ARTHROSCOPY, SUBACROMIAL DECOMPRESSION, LABRIAL DEBRIDEMENT, CHONDROPLASTY, RAÚL performed by Bridgette Christy DO at [address] 06/20/2019    EGD BIOPSY performed by Akilah Muro MD at [address]  12/23/2020    US BREAST NEEDLE BIOPSY LEFT 12/23/2020 [facility]       Current Outpatient Medications:     diclofenac sodium (VOLTAREN) 1 % GEL, Apply 2 g topically 2 times daily, Disp: 240 g, Rfl: 1    Alcohol Swabs (EASY TOUCH ALCOHOL PREP MEDIUM) 70 % PADS, USE AS DIRECTED PRIOR TO GLUCOSE TESTING AND INSULIN INJECTION FOUR TIMES A DAY, Disp: 200 each, Rfl: 1    pramipexole (MIRAPEX) 0.125 MG tablet, TAKE 2 TABLETS BY MOUTH EVERY NIGHT, Disp: 180 tablet, Rfl: 1    blood glucose test strips (ONETOUCH ULTRA) strip, USE AS DIRECTED TO TEST BLOOD SUGAR FOUR TIMES A DAY, Disp: 100 each, Rfl: 2    phenazopyridine (PYRIDIUM) 100 MG tablet, TAKE 1 TABLET BY MOUTH 3 TIMES DAILY AS NEEDED FOR PAIN, Disp: 18 tablet, Rfl: 10    metFORMIN (GLUCOPHAGE) 500 MG tablet, Take 2 tablets by mouth 2 times daily (with meals) TAKE TWO (2) TABLETS BY MOUTH TWICE DAILY WITH MEALS *EMERGENCY REFILL*, Disp: 360 tablet, Rfl: 1    lactobacillus (CULTURELLE) capsule, TAKE 1 CAPSULE BY MOUTH DAILY, Disp: 30 capsule, Rfl: 10    Lancets (ONETOUCH DELICA PLUS [id number]) MISC, USE TO TEST BLOOD SUGAR 4 TIMES DAILY, Disp: 100 each, Rfl: 5    empagliflozin (JARDIANCE) 25 MG tablet, Take 1 tablet by mouth daily, Disp: 30 tablet, Rfl: 3    rosuvastatin (CRESTOR) 20 MG tablet, Take 1 tablet by mouth nightly, Disp: 30 tablet, Rfl: 3    lactobacillus (CULTURELLE) CAPS capsule, TAKE 1 CAPSULE BY MOUTH DAILY, Disp: 90 capsule, Rfl: 0    anastrozole (ARIMIDEX) 1 MG tablet, Take 1 tablet by mouth daily, Disp: 30 tablet, Rfl: 5    insulin glargine (BASAGLAR KWIKPEN) 100 UNIT/ML injection pen, Inject 50 Units into the skin nightly, Disp: 45 mL, Rfl: 1    Dulaglutide (TRULICITY) 1.5 ZX/9.5UO SOPN, INJECT THE CONTENTS OF 1 SYRINGE SUBCUTANEOUSLY EVERY WEEK *PATIENT NEEDS APPOINTMENT*, Disp: 6 mL, Rfl: 10    hydrocortisone 2.5 % cream, APPLY TOPICALLY TO AFFECTED AREA TWICE DAILY, Disp: 30 g, Rfl: 10    sertraline (ZOLOFT) 100 MG tablet, TAKE 1 TABLET BY MOUTH ONCE DAILY AS NEEDED FOR DEPRESSION, Disp: 30 tablet, Rfl: 10    diclofenac sodium (VOLTAREN) 1 % GEL, Apply 2 g topically 4 times daily, Disp: 60 g, Rfl: 2    sucralfate (CARAFATE) 1 GM tablet, Take 1 tablet by mouth 4 times daily, Disp: 120 tablet, Rfl: 3    Blood Glucose Monitoring Suppl (ONE TOUCH ULTRA 2) w/Device KIT, Test 3 times a day & as needed for symptoms of irregular blood glucose. Dispense sufficient amount for indicated testing frequency plus additional to accommodate PRN testing needs. , Disp: 1 kit, Rfl: 0    Cholecalciferol (VITAMIN D) 50 MCG (2000 UT) CAPS capsule, Take by mouth, Disp: , Rfl:     acetaminophen (TYLENOL) 500 MG tablet, Take 1 tablet by mouth 4 times daily as needed for Pain, Disp: 120 tablet, Rfl: 0    B-D ULTRAFINE III SHORT PEN 31G X 8 MM MISC, USE WITH INSULIN PENS AS DIRECTED, Disp: 100 each, Rfl: 10    fluticasone (FLONASE) 50 MCG/ACT nasal spray, 1 spray by Nasal route daily, Disp: 1 Bottle, Rfl: 3    nitroGLYCERIN (NITROSTAT) 0.4 MG SL tablet, Place 1 tablet under the tongue every 5 minutes as needed for Chest pain, Disp: 25 tablet, Rfl: 1    Insulin Syringe-Needle U-100 ([employer] INSULIN SYR 1CC/30G) 30G X 5/16\" 1 ML MISC, 1 each by Does not apply route 4 times daily, Disp: 120 each, Rfl: 5    bumetanide (BUMEX) 1 MG tablet, Take 1 tablet by mouth every other day, Disp: 90 tablet, Rfl: 1    insulin lispro (ADMELOG SOLOSTAR) 100 UNIT/ML pen, As directed per sliding scale up to 13 units 4 times per day., Disp: 5 pen, Rfl: 3    Blood Pressure KIT, 1 kit by Does not apply route daily, Disp: 1 kit, Rfl: 0    Misc. Devices (QUAD CANE) MISC, 1 Quad cane, Disp: 1 each, Rfl: 0    Multiple Vitamins-Minerals (THERAPEUTIC MULTIVITAMIN-MINERALS) tablet, Take 1 tablet by mouth daily, Disp: , Rfl:     B Complex Vitamins (B COMPLEX 1 PO), Take 1 tablet by mouth daily. , Disp: , Rfl:     gabapentin (NEURONTIN) 300 MG capsule, Take 1 capsule by mouth every evening for 60 days.  Nightly, Disp: 30 capsule, Rfl: 1    atenolol (TENORMIN) 25 MG tablet, Take 0.5 tablets by mouth every morning Takes 1/2 daily, Disp: 45 tablet, Rfl: 1    azithromycin (ZITHROMAX) 250 MG tablet, Take 1 tablet by mouth See Admin Instructions for 5 days 500mg on day 1 followed by 250mg on days 2 - 5, Disp: 6 tablet, Rfl: 0    omeprazole (PRILOSEC) 40 MG delayed release capsule, TAKE 1 CAPSULE BY MOUTH DAILY, Disp: 30 capsule, Rfl: 10  No Known Allergies  Social History     Socioeconomic History    Marital status:      Spouse name: Not on file    Number of children: 2    Years of education: Not on file    Highest education level: Not on file   Occupational History    Not on file   Tobacco Use    Smoking status: Never    Smokeless tobacco: Never   Vaping Use    Vaping Use: Never used   Substance and Sexual Activity    Alcohol use: Not Currently     Alcohol/week: 1.0 standard drink     Types: 1 Glasses of wine per week     Comment: weekly    Drug use: No    Sexual activity: Not on file   Other Topics Concern    Not on file   Social History Narrative    Not on file     Social Determinants of Health     Financial Resource Strain: Low Risk     Difficulty of Paying Living Expenses: Not hard at all   Food Insecurity: No Food Insecurity Worried About Running Out of Food in the Last Year: Never true    [address] in the Last Year: Never true   Transportation Needs: Not on file   Physical Activity: Not on file   Stress: Not on file   Social Connections: Not on file   Intimate Partner Violence: Not on file   Housing Stability: Not on file     Family History   Problem Relation Age of Onset    Heart Disease Mother     Diabetes Mother     Heart Disease Father     Diabetes Father     Cancer Father         prostate, colon, skin cance behind ear    Breast Cancer Maternal Aunt 54        breast    Cancer Maternal Uncle 62        colon    Cancer Paternal Aunt 58        breast    Cancer Other 62        maternal great aunt - breast           Physical Exam:    Temp 98 °F (36.7 °C)   Ht 4' 11\" (1.499 m)   Wt 167 lb (75.8 kg)   LMP  (LMP Unknown)   BMI 33.73 kg/m²     GENERAL: alert, appears stated age, cooperative, no acute distress    HEENT: Head is normocephalic, atraumatic. PERRLA. SKIN: Clean, dry, intact. There is not any cellulitis or cutaneous lesions noted in the lower extremities     PULMONARY: breathing is regular and unlabored, no acute distress     CV: The bilateral lower extremities are warm and well-perfused with brisk capillary refill. 2+ pulses LE bilateral.     ABDOMINAL: Nontender, nondistended     PSYCHIATRY: Pleasant mood, appropriate behavior, follows commands     NEURO: Sensation is intact distally with light touch with no alteration. Motor exam of the lower extremities show quadriceps, hamstrings, foot dorsiflexion and plantarflexion grossly intact 5/5. LYMPH: No lymphedema present distally in upper or lower extremity. MUSCULOSKELETAL:  Cervical Exam:    On physical exam, patient is well-developed, well-nourished, oriented to person, place and time. her gait is normal.  On evaluation of her cervical spine, she has full range of motion of the cervical spine without pain.   There is no cervical tenderness to palpation. Shoulder Exam:    On evaluation of her bilaterally upper extremities, her bilateral shoulder has no deformity. There is not evidence of scapular dyskinesis. There is not muscle atrophy in shoulder girdle. The range of motion for the Right Shoulder is 160/160/60 and for the Left shoulder is 160/160/60. Right shoulder Motor strength is 5/5 in the supraspinatus, 5/5 internal rotation and 5/5 in external rotation, and Left shoulder motor strength 5/5 in supraspinatus, 5/5 in internal rotation, 5/5 in external rotation. Elbow exam:    Evaluation of the elbow, reveals no signs of swelling or deformity. ROM is 0-130. There is not instability with varus/valgus stresses. Motor strength is 5/5 with flexion/extension. Wrist exam:       Inspection of the bilateral upper extremities, there is no evidence of deformity of the wrist.  ROM Wrist ROM R wrist DF 90, VF 80, L wrist DF 80, VF 90, R pronation 40/ supination 40, L pronation 40/supination 40. Motor strength is 5/5 with Dorsiflexion/Volarflexion/Supination/Pronation.  strength 5/5. Thenar eminence appears bilaterally symmetrical.   Motor and sensation is intact and symmetric throughout the bilateral upper extremities in the ulnar and radial , musclcutaneous, and axillary nerve distributions. There is paresthesia in the median nerve distribution of the left hand. Hand exam:    The skin overlying the hand is  intact. There is not evidence of scar, lesion, laceration, or abrasion. The motion in the small joints of the hand are intact with no stiffness or deformity. The ROM in the MCP flexion full/ extension full , PIP flexion full/ extension full, DIP flexion full/ extension full. There is not rotational deformity. There is no masses or adenopathy in bilateral upper extremities. Radial pulses are 2+ and symmetric bilaterally. Capillary refill is intact and < 2 seconds.   Motor strength is 5/5 with flexion and extension of the small finger joints. Right:  Phallens sign(-), Tinnells sign (+), Median nerve compression test (+),  Finklesteins (-), [employer] Grind test (-), [employer](-). Left:  Phallens sign(+), Tinnells sign (+), Median nerve compression test (+),  Finklesteins (-), [employer] Grind test (-), [employer](-).      no change since last visit. Imaging:  XR WRIST RIGHT (MIN 3 VIEWS)    Result Date: 11/9/2022  EXAMINATION: 4 XRAY VIEWS OF THE RIGHT WRIST 11/9/2022 7:54 am COMPARISON: None. HISTORY: ORDERING SYSTEM PROVIDED HISTORY: Right wrist pain FINDINGS: No fracture or dislocation. No significant arthropathy. There is calcific atherosclerotic plaque at the radial and ulnar arteries as well as in several digital arteries. Atherosclerotic peripheral vascular disease. Tj Johnson was seen today for wrist pain. Diagnoses and all orders for this visit:    Rupture of scapholunate ligament    Scapholunate advanced collapse of right wrist          Patient seen and examined. X-rays reviewed. Natural history and course discussed with patient. Treatment options discussed with patient in detail including risks and benefits. Patient does have vague forearm pain with paresthesias down into the hand and wrist specifically around thumb. Patient is a brittle diabetic and may be experiencing carpal tunnel syndrome with neuropathic elements. Patient also shows calcification over radial artery and possible vascular complications to this as well. MRI recommended for further evaluation management as patient was educated with this in a long discussion. MRI reviewed with patient in detail. Natural history and course discussed with patient in long discussion  Treatment options discussed with patient in detail including risks and benefits. Patient should do well with conservative management as patient would like to avoid surgery at this time.   However to the extent and severity of her symptoms, patient will be referred to hand orthopedic surgery for further evaluation management. In a 15 minute assessment and discussion, patient was counseled on weight loss, healthy diet, and physical activity relating to this condition. She was educated with options in detail including nutrition, joining a health club/ weight loss program, and use of cardio equipment such as the Arc Trainer and the importance of use as well as range of motion and HEP exercises for weight loss and general health. Dorys Rao DO          25 minutes was spent with patient. 50% or greater was spent counseling the patient.

## 2022-12-05 ENCOUNTER — OFFICE VISIT (OUTPATIENT)
Dept: FAMILY MEDICINE CLINIC | Age: 52
End: 2022-12-05
Payer: MEDICAID

## 2022-12-05 VITALS
HEIGHT: 59 IN | DIASTOLIC BLOOD PRESSURE: 84 MMHG | SYSTOLIC BLOOD PRESSURE: 120 MMHG | RESPIRATION RATE: 18 BRPM | HEART RATE: 92 BPM | OXYGEN SATURATION: 99 % | WEIGHT: 166 LBS | BODY MASS INDEX: 33.47 KG/M2 | TEMPERATURE: 97.8 F

## 2022-12-05 DIAGNOSIS — B96.89 ACUTE BACTERIAL SINUSITIS: ICD-10-CM

## 2022-12-05 DIAGNOSIS — G62.9 NEUROPATHY: ICD-10-CM

## 2022-12-05 DIAGNOSIS — E11.65 UNCONTROLLED TYPE 2 DIABETES MELLITUS WITH HYPERGLYCEMIA (HCC): ICD-10-CM

## 2022-12-05 DIAGNOSIS — C50.912 MALIGNANT NEOPLASM OF LEFT BREAST IN FEMALE, ESTROGEN RECEPTOR POSITIVE, UNSPECIFIED SITE OF BREAST (HCC): ICD-10-CM

## 2022-12-05 DIAGNOSIS — I10 ESSENTIAL HYPERTENSION: ICD-10-CM

## 2022-12-05 DIAGNOSIS — J01.90 ACUTE BACTERIAL SINUSITIS: ICD-10-CM

## 2022-12-05 DIAGNOSIS — Z17.0 MALIGNANT NEOPLASM OF LEFT BREAST IN FEMALE, ESTROGEN RECEPTOR POSITIVE, UNSPECIFIED SITE OF BREAST (HCC): ICD-10-CM

## 2022-12-05 DIAGNOSIS — E11.65 UNCONTROLLED TYPE 2 DIABETES MELLITUS WITH HYPERGLYCEMIA (HCC): Primary | ICD-10-CM

## 2022-12-05 LAB
CHP ED QC CHECK: NORMAL
GLUCOSE BLD-MCNC: 157 MG/DL
HBA1C MFR BLD: 8 %
Lab: NORMAL
PERFORMING INSTRUMENT: NORMAL
QC PASS/FAIL: NORMAL
SARS-COV-2, POC: NORMAL

## 2022-12-05 PROCEDURE — 3017F COLORECTAL CA SCREEN DOC REV: CPT | Performed by: FAMILY MEDICINE

## 2022-12-05 PROCEDURE — G8482 FLU IMMUNIZE ORDER/ADMIN: HCPCS | Performed by: FAMILY MEDICINE

## 2022-12-05 PROCEDURE — 83036 HEMOGLOBIN GLYCOSYLATED A1C: CPT | Performed by: FAMILY MEDICINE

## 2022-12-05 PROCEDURE — 87426 SARSCOV CORONAVIRUS AG IA: CPT | Performed by: FAMILY MEDICINE

## 2022-12-05 PROCEDURE — 2022F DILAT RTA XM EVC RTNOPTHY: CPT | Performed by: FAMILY MEDICINE

## 2022-12-05 PROCEDURE — 82962 GLUCOSE BLOOD TEST: CPT | Performed by: FAMILY MEDICINE

## 2022-12-05 PROCEDURE — G8417 CALC BMI ABV UP PARAM F/U: HCPCS | Performed by: FAMILY MEDICINE

## 2022-12-05 PROCEDURE — 3074F SYST BP LT 130 MM HG: CPT | Performed by: FAMILY MEDICINE

## 2022-12-05 PROCEDURE — 3052F HG A1C>EQUAL 8.0%<EQUAL 9.0%: CPT | Performed by: FAMILY MEDICINE

## 2022-12-05 PROCEDURE — 99214 OFFICE O/P EST MOD 30 MIN: CPT | Performed by: FAMILY MEDICINE

## 2022-12-05 PROCEDURE — 1036F TOBACCO NON-USER: CPT | Performed by: FAMILY MEDICINE

## 2022-12-05 PROCEDURE — 3078F DIAST BP <80 MM HG: CPT | Performed by: FAMILY MEDICINE

## 2022-12-05 PROCEDURE — G8427 DOCREV CUR MEDS BY ELIG CLIN: HCPCS | Performed by: FAMILY MEDICINE

## 2022-12-05 RX ORDER — GABAPENTIN 300 MG/1
300 CAPSULE ORAL EVERY EVENING
Qty: 30 CAPSULE | Refills: 1 | Status: SHIPPED | OUTPATIENT
Start: 2022-12-05 | End: 2023-02-03

## 2022-12-05 RX ORDER — AZITHROMYCIN 250 MG/1
250 TABLET, FILM COATED ORAL SEE ADMIN INSTRUCTIONS
Qty: 6 TABLET | Refills: 0 | Status: SHIPPED | OUTPATIENT
Start: 2022-12-05 | End: 2022-12-10

## 2022-12-05 RX ORDER — ATENOLOL 25 MG/1
25 TABLET ORAL DAILY
COMMUNITY
End: 2022-12-05

## 2022-12-05 RX ORDER — ATENOLOL 25 MG/1
12.5 TABLET ORAL EVERY MORNING
Qty: 45 TABLET | Refills: 1 | Status: SHIPPED | OUTPATIENT
Start: 2022-12-05

## 2022-12-06 LAB
6-MONOACETYLMORPHINE, URINE: NOT DETECTED
ALCOHOL URINE: NOT DETECTED
AMPHETAMINE SCREEN, URINE: NOT DETECTED
BARBITURATE SCREEN URINE: NOT DETECTED
BENZODIAZEPINE SCREEN, URINE: NOT DETECTED
BUPRENORPHINE URINE: NOT DETECTED
CANNABINOID SCREEN URINE: NOT DETECTED
COCAINE METABOLITE SCREEN URINE: NOT DETECTED
FENTANYL SCREEN, URINE: NOT DETECTED
INTEGRITY CHECK, CREATININE, URINE: 36.8
INTEGRITY CHECK, OXIDANT, URINE: <40
INTEGRITY CHECK, PH, URINE: 6.1 (ref 4.5–9)
INTEGRITY CHECK, SPECIFIC GRAVITY, URINE: 1.02 (ref 1–1.03)
INTEGRITY CHECK, SPECIMEN INTEGRITY, URINE: NORMAL
Lab: NORMAL
METHADONE SCREEN, URINE: NOT DETECTED
OPIATE SCREEN URINE: NOT DETECTED
OXYCODONE URINE: NOT DETECTED
PHENCYCLIDINE SCREEN URINE: NOT DETECTED
TRAMADOL SCREEN URINE: NOT DETECTED

## 2022-12-08 PROBLEM — E11.65 UNCONTROLLED TYPE 2 DIABETES MELLITUS WITH HYPERGLYCEMIA (HCC): Status: ACTIVE | Noted: 2022-12-08

## 2022-12-08 ASSESSMENT — ENCOUNTER SYMPTOMS
EYE PAIN: 0
CHOKING: 0
COUGH: 0
ABDOMINAL DISTENTION: 0
WHEEZING: 0
VOMITING: 0
EYE REDNESS: 0
ANAL BLEEDING: 0
SINUS PRESSURE: 0
NAUSEA: 0
STRIDOR: 0
EYE ITCHING: 0
SHORTNESS OF BREATH: 0
CHEST TIGHTNESS: 0
EYE DISCHARGE: 0
ABDOMINAL PAIN: 0
SORE THROAT: 0
BLOOD IN STOOL: 0
COLOR CHANGE: 0
SINUS PAIN: 0
VOICE CHANGE: 0
RESPIRATORY NEGATIVE: 1
ALLERGIC/IMMUNOLOGIC NEGATIVE: 1
DIARRHEA: 0
FACIAL SWELLING: 0
BACK PAIN: 0
CONSTIPATION: 0
RECTAL PAIN: 0
PHOTOPHOBIA: 0
GASTROINTESTINAL NEGATIVE: 1
TROUBLE SWALLOWING: 0
RHINORRHEA: 0

## 2022-12-09 NOTE — PROGRESS NOTES
for back pain, joint swelling, neck pain and neck stiffness. Skin: Negative. Negative for color change, pallor, rash and wound. Allergic/Immunologic: Negative. Negative for environmental allergies, food allergies and immunocompromised state. Neurological:  Positive for weakness and numbness. Negative for dizziness, tremors, seizures, syncope, facial asymmetry, speech difficulty, light-headedness and headaches. Hematological: Negative. Negative for adenopathy. Does not bruise/bleed easily. Psychiatric/Behavioral:  Positive for sleep disturbance. Negative for agitation, behavioral problems, confusion, decreased concentration, dysphoric mood, hallucinations, self-injury and suicidal ideas. The patient is nervous/anxious. The patient is not hyperactive.        Past Medical/Surgical Hx;  Reviewed with patient      Diagnosis Date    Anesthesia complication     problem with blood pressure up & down    Arthritis     lower back    Back pain     Blind     right eye    Cancer ([address]     breast    Cardiac valve prolapse     micro    Diabetes mellitus ([address]     Head injury 2018    History of therapeutic radiation     Hyperlipidemia     Hypertension     Neuropathy due to secondary diabetes ([address]     in marcial feet    Nontraumatic tear of right rotator cuff 2020    Prolonged emergence from general anesthesia     Psoriasis      Past Surgical History:   Procedure Laterality Date    ANKLE SURGERY      bilateral tarsal tunnels    BREAST BIOPSY Left 2021    LEFT BREAST NEEDLE LOCALIZED LUMPECTOMY, BLUE DYE INJECTION, LEFT AXILLARY SENTINEL LYMPHNODE EXCISION, POSSIBLE LEFT AXILLARY DISSECTION performed by Jairo Bajwa MD at [address]      cardiac catheterization    CARPAL TUNNEL RELEASE      left    CARPAL TUNNEL RELEASE  2012    right    CATARACT REMOVAL Left 2022     SECTION      CHOLECYSTECTOMY, LAPAROSCOPIC N/A 2019    LAPAROSCOPIC CHOLECYSTECTOMY WITH IOC performed by Elizabeth Mae MD at [address]  had extremely high blood pressure and hard to wake up    COLONOSCOPY N/A 06/20/2019    COLORECTAL CANCER SCREENING, NOT HIGH RISK performed by Elizabeth Mae MD at [facility], TOTAL ABDOMINAL ([address])      SHOULDER ARTHROSCOPY Left 01/21/2016    subacromin decompression and debridement    SHOULDER ARTHROSCOPY Right 11/16/2020    RIGHT SHOULDER ARTHROSCOPY, SUBACROMIAL DECOMPRESSION, LABRIAL DEBRIDEMENT, CHONDROPLASTY, RAÚL performed by Moe Montano DO at [facility] N/A 06/20/2019    EGD BIOPSY performed by Elizabeth Mae MD at [address]  12/23/2020    US BREAST NEEDLE BIOPSY LEFT 12/23/2020 [facility]       Past Family Hx:  Reviewed with patient      Problem Relation Age of Onset    Heart Disease Mother     Diabetes Mother     Heart Disease Father     Diabetes Father     Cancer Father         prostate, colon, skin cance behind ear    Breast Cancer Maternal Aunt 54        breast    Cancer Maternal Uncle 62        colon    Cancer Paternal Aunt 58        breast    Cancer Other 62        maternal great aunt - breast       Social Hx:  Reviewed with patient  Social History     Tobacco Use    Smoking status: Never    Smokeless tobacco: Never   Substance Use Topics    Alcohol use: Not Currently     Alcohol/week: 1.0 standard drink     Types: 1 Glasses of wine per week     Comment: weekly       OBJECTIVE  /84   Pulse 92   Temp 97.8 °F (36.6 °C) (Temporal)   Resp 18   Ht 4' 11\" (1.499 m)   Wt 166 lb (75.3 kg)   LMP  (LMP Unknown)   SpO2 99%   Breastfeeding No   BMI 33.53 kg/m²     Problem List:  Maciel Gore does not have any pertinent problems on file. PHYS EX:  Physical Exam  Vitals and nursing note reviewed. Constitutional:       General: She is not in acute distress. Appearance: Normal appearance. She is well-developed.  She is obese. She is not ill-appearing, toxic-appearing or diaphoretic. Comments: Patient has morbid obesity. Patient instructed on low calorie, healthy ADA diet. HENT:      Head: Normocephalic and atraumatic. Nose: Nose normal. No congestion or rhinorrhea. Eyes:      General: No scleral icterus. Right eye: No discharge. Left eye: No discharge. Conjunctiva/sclera: Conjunctivae normal.      Pupils: Pupils are equal, round, and reactive to light. Neck:      Thyroid: No thyromegaly. Vascular: No carotid bruit or JVD. Trachea: No tracheal deviation. Cardiovascular:      Rate and Rhythm: Normal rate and regular rhythm. Pulses: Normal pulses. Heart sounds: Normal heart sounds. No murmur heard. No friction rub. No gallop. Pulmonary:      Effort: Pulmonary effort is normal. No respiratory distress. Breath sounds: Normal breath sounds. No stridor. No wheezing, rhonchi or rales. Chest:      Chest wall: No tenderness. Abdominal:      General: Bowel sounds are normal. There is no distension. Palpations: Abdomen is soft. There is no mass. Tenderness: There is no abdominal tenderness. There is no right CVA tenderness, left CVA tenderness, guarding or rebound. Hernia: No hernia is present. Musculoskeletal:         General: Tenderness present. No swelling, deformity or signs of injury. Cervical back: Normal range of motion and neck supple. No rigidity or tenderness. No muscular tenderness. Right lower leg: No edema. Left lower leg: No edema. Comments: Pain and decreased ROM multiple joints. Lymphadenopathy:      Cervical: No cervical adenopathy. Skin:     General: Skin is warm. Coloration: Skin is not jaundiced or pale. Findings: No bruising, erythema, lesion or rash. Neurological:      General: No focal deficit present. Mental Status: She is alert and oriented to person, place, and time.       Cranial Nerves: No cranial nerve deficit. Sensory: Sensory deficit (diabetic neuropathy) present. Motor: Weakness present. No abnormal muscle tone. Coordination: Coordination abnormal.      Gait: Gait abnormal.      Deep Tendon Reflexes: Reflexes abnormal.      Comments: Proprioceptive imbalance    Psychiatric:         Mood and Affect: Mood normal.         Behavior: Behavior normal.         Thought Content: Thought content normal.         Judgment: Judgment normal.       ASSESSMENT/PLAN  Jenny was seen today for medication refill, cough and diabetes. Diagnoses and all orders for this visit:    Uncontrolled type 2 diabetes mellitus with hyperglycemia ([facility]  -     POCT glycosylated hemoglobin (Hb A1C)  -     POCT Glucose  -     PAIN MANAGEMENT PROFILE 1 W/ CONFIRMATION, URINE; Future    Neuropathy  -     gabapentin (NEURONTIN) 300 MG capsule; Take 1 capsule by mouth every evening for 60 days. Nightly  -     PAIN MANAGEMENT PROFILE 1 W/ CONFIRMATION, URINE; Future    Essential hypertension  -     atenolol (TENORMIN) 25 MG tablet; Take 0.5 tablets by mouth every morning Takes 1/2 daily    Acute bacterial sinusitis  -     azithromycin (ZITHROMAX) 250 MG tablet; Take 1 tablet by mouth See Admin Instructions for 5 days 500mg on day 1 followed by 250mg on days 2 - 5  -     POCT COVID-19, Antigen    Malignant neoplasm of left breast in female, estrogen receptor positive, unspecified site of breast ([facility]  Stable. Specialist.     Pt instructed if any worse go ED ASAP. Outpatient Encounter Medications as of 12/5/2022   Medication Sig Dispense Refill    gabapentin (NEURONTIN) 300 MG capsule Take 1 capsule by mouth every evening for 60 days.  Nightly 30 capsule 1    atenolol (TENORMIN) 25 MG tablet Take 0.5 tablets by mouth every morning Takes 1/2 daily 45 tablet 1    azithromycin (ZITHROMAX) 250 MG tablet Take 1 tablet by mouth See Admin Instructions for 5 days 500mg on day 1 followed by 250mg on days 2 - 5 6 tablet 0    diclofenac sodium (VOLTAREN) 1 % GEL Apply 2 g topically 2 times daily 240 g 1    Alcohol Swabs (EASY TOUCH ALCOHOL PREP MEDIUM) 70 % PADS USE AS DIRECTED PRIOR TO GLUCOSE TESTING AND INSULIN INJECTION FOUR TIMES A  each 1    pramipexole (MIRAPEX) 0.125 MG tablet TAKE 2 TABLETS BY MOUTH EVERY NIGHT 180 tablet 1    blood glucose test strips (ONETOUCH ULTRA) strip USE AS DIRECTED TO TEST BLOOD SUGAR FOUR TIMES A  each 2    phenazopyridine (PYRIDIUM) 100 MG tablet TAKE 1 TABLET BY MOUTH 3 TIMES DAILY AS NEEDED FOR PAIN 18 tablet 10    metFORMIN (GLUCOPHAGE) 500 MG tablet Take 2 tablets by mouth 2 times daily (with meals) TAKE TWO (2) TABLETS BY MOUTH TWICE DAILY WITH MEALS *EMERGENCY REFILL* 360 tablet 1    lactobacillus (CULTURELLE) capsule TAKE 1 CAPSULE BY MOUTH DAILY 30 capsule 10    Lancets (ONETOUCH DELICA PLUS JSYDXG50P) MISC USE TO TEST BLOOD SUGAR 4 TIMES DAILY 100 each 5    empagliflozin (JARDIANCE) 25 MG tablet Take 1 tablet by mouth daily 30 tablet 3    rosuvastatin (CRESTOR) 20 MG tablet Take 1 tablet by mouth nightly 30 tablet 3    lactobacillus (CULTURELLE) CAPS capsule TAKE 1 CAPSULE BY MOUTH DAILY 90 capsule 0    anastrozole (ARIMIDEX) 1 MG tablet Take 1 tablet by mouth daily 30 tablet 5    omeprazole (PRILOSEC) 40 MG delayed release capsule TAKE 1 CAPSULE BY MOUTH DAILY 30 capsule 10    insulin glargine (BASAGLAR KWIKPEN) 100 UNIT/ML injection pen Inject 50 Units into the skin nightly 45 mL 1    Dulaglutide (TRULICITY) 1.5 ID/6.4UQ SOPN INJECT THE CONTENTS OF 1 SYRINGE SUBCUTANEOUSLY EVERY WEEK *PATIENT NEEDS APPOINTMENT* 6 mL 10    hydrocortisone 2.5 % cream APPLY TOPICALLY TO AFFECTED AREA TWICE DAILY 30 g 10    sertraline (ZOLOFT) 100 MG tablet TAKE 1 TABLET BY MOUTH ONCE DAILY AS NEEDED FOR DEPRESSION 30 tablet 10    diclofenac sodium (VOLTAREN) 1 % GEL Apply 2 g topically 4 times daily 60 g 2    sucralfate (CARAFATE) 1 GM tablet Take 1 tablet by mouth 4 times daily 120 tablet 3    Blood Glucose Monitoring Suppl (ONE TOUCH ULTRA 2) w/Device KIT Test 3 times a day & as needed for symptoms of irregular blood glucose. Dispense sufficient amount for indicated testing frequency plus additional to accommodate PRN testing needs. 1 kit 0    Cholecalciferol (VITAMIN D) 50 MCG (2000 UT) CAPS capsule Take by mouth      acetaminophen (TYLENOL) 500 MG tablet Take 1 tablet by mouth 4 times daily as needed for Pain 120 tablet 0    B-D ULTRAFINE III SHORT PEN 31G X 8 MM MISC USE WITH INSULIN PENS AS DIRECTED 100 each 10    fluticasone (FLONASE) 50 MCG/ACT nasal spray 1 spray by Nasal route daily 1 Bottle 3    nitroGLYCERIN (NITROSTAT) 0.4 MG SL tablet Place 1 tablet under the tongue every 5 minutes as needed for Chest pain 25 tablet 1    Insulin Syringe-Needle U-100 ([employer] INSULIN SYR 1CC/30G) 30G X 5/16\" 1 ML MISC 1 each by Does not apply route 4 times daily 120 each 5    bumetanide (BUMEX) 1 MG tablet Take 1 tablet by mouth every other day 90 tablet 1    insulin lispro (ADMELOG SOLOSTAR) 100 UNIT/ML pen As directed per sliding scale up to 13 units 4 times per day. 5 pen 3    Blood Pressure KIT 1 kit by Does not apply route daily 1 kit 0    Misc. Devices (QUAD CANE) MISC 1 Quad cane 1 each 0    Multiple Vitamins-Minerals (THERAPEUTIC MULTIVITAMIN-MINERALS) tablet Take 1 tablet by mouth daily      B Complex Vitamins (B COMPLEX 1 PO) Take 1 tablet by mouth daily. [DISCONTINUED] atenolol (TENORMIN) 25 MG tablet Take 25 mg by mouth daily      [DISCONTINUED] gabapentin (NEURONTIN) 300 MG capsule Take 1 capsule by mouth every evening for 30 days. Nightly 30 capsule 0    [DISCONTINUED] metoprolol succinate (TOPROL XL) 25 MG extended release tablet TAKE 1/2 TABLET BY MOUTH DAILY 15 tablet 10     No facility-administered encounter medications on file as of 12/5/2022. No follow-ups on file.         Reviewed recent labs related to Jenny's current problems      Discussed importance of regular Health Maintenance follow up  Health Maintenance   Topic    Diabetic foot exam     Hepatitis B vaccine (1 of 3 - Risk 3-dose series)    DTaP/Tdap/Td vaccine (1 - Tdap)    Shingles vaccine (1 of 2)    COVID-19 Vaccine (4 - Booster for Media Chaperone series)    Diabetic retinal exam     Diabetic microalbuminuria test     Breast cancer screen     Lipids     Depression Screen     A1C test (Diabetic or Prediabetic)     Colorectal Cancer Screen     Flu vaccine     Pneumococcal 0-64 years Vaccine     Hepatitis C screen     HIV screen     Hepatitis A vaccine     Hib vaccine     Meningococcal (ACWY) vaccine

## 2022-12-11 DIAGNOSIS — I10 ESSENTIAL HYPERTENSION: ICD-10-CM

## 2022-12-12 RX ORDER — METOPROLOL SUCCINATE 25 MG/1
TABLET, EXTENDED RELEASE ORAL
Qty: 45 TABLET | Refills: 0 | Status: SHIPPED | OUTPATIENT
Start: 2022-12-12

## 2022-12-12 ASSESSMENT — ENCOUNTER SYMPTOMS
SHORTNESS OF BREATH: 0
BACK PAIN: 0
COUGH: 0

## 2022-12-12 NOTE — PROGRESS NOTES
Summary: This note was copied forward from the last encounter. Essential components for this patient record were reviewed and verified on this visit including:  recent hospitalizations, recent imaging, PMH, PSH, FH, SOC HX, Allergies, and Medications were reviewed and updated as appropriate. In addition, the assessment and plan were copied from prior office note and updated accordingly. Patient ID: Concepción Ivy is a 45 y.o. female. HPI   Follow up for left breast cancer    Ultrasound guided core biopsy on 12/23/2020  Left breast, core needle biopsy: Invasive, moderately differentiatedductal carcinoma (grade 2)  Comment:    Intradepartmental consultation is obtained. Breast Cancer Marker Studies:  Estrogen Receptors (ER): 90%  Progesterone Receptors (UT): 90%  HER-2/salvador: Indeterminate/2+     FISH analysis HER/2: Negative  Average HER-2 signals/nucleus: 3.4   Average MARGARITO 17 signals/nucleus: 2.9   HER-2/MARGARITO 17 signal ratio: 1.2      2/17/2021 Left localized lumpectomy with left axillary sentinal lymph node excision   A. Breast, left, lumpectomy: Invasive ductal carcinoma   Ductal carcinoma in situ   Invasive carcinoma present less than 1 mm from yellow/medial margin   Margins negative for ductal carcinoma in situ     B. New medial anterior margin, excision: Negative for carcinoma   C. [place] lymph node, excision: One (1) lymph node, negative for metastatic carcinoma     CANCER CASE SUMMARY   Procedure: Excision (less than total mastectomy)   Specimen laterality: Left   Tumor size: 1.3 cm in greatest dimension   Histologic type:  Invasive carcinoma of no special type (ductal)   Histologic grade ([place] Histologic Score):        Glandular/tubular differentiation: Score 2        Nuclear pleomorphism: Score 2        Mitotic rate: Score 3        Overall grade: Grade 2, score 7   Ductal carcinoma in situ: Present        Nuclear grade: Grade II (intermediate)   Margins:        Invasive carcinoma margins: Uninvolved by invasive carcinoma             Distance from closest margin: 3 mm-blue/inferior        Ductal carcinoma in situ margins: Uninvolved by DCIS             Distance from closest margin: 2 mm from blue/inferior and red/superior   Regional lymph nodes: Uninvolved by tumor cells        Total number of lymph nodes examined: 1        Number of sentinel nodes examined: 1   Treatment effect in the breast: No known presurgical therapy   Pathologic stage classification (pTNM, AJCC 8th Edition):   pT1c (sn)pN0     Oncotype DX Breast Cancer Report RS Result-16 Absolute chemotherapy benefit < 1%  05/04/2021 completed adjuvant RT.  05/06/2021 endocrine therapy with Arimidex 1 mg by mouth daily started per med-onc, Regina Chaudhry MD.    Review of Systems   Constitutional:  Negative for activity change, appetite change, chills, fatigue, fever and unexpected weight change. Lu Gibson is doing well. Preparing for the Holidays with her 10 grandchildren. She is tolerating endocrine therapy well. Respiratory:  Negative for cough and shortness of breath. Cardiovascular:  Negative for chest pain and palpitations. Musculoskeletal:  Negative for arthralgias, back pain, gait problem, joint swelling, myalgias, neck pain and neck stiffness. Has scattered arthralgias, most notable of the bilateral wrists. She is scheduled to see Dr. Laura Swartz (orthopedic hand specialist) in January for a reported tear in a ligament in her right wrist + Carpal tunnel repair. Neurological:  Negative for headaches. Psychiatric/Behavioral:  The patient is not nervous/anxious. Objective:   Physical Exam  Vitals and nursing note reviewed. Constitutional:       General: She is not in acute distress. Appearance: She is well-developed. She is not diaphoretic. Comments: ECOG remains stable at 0; pleasant and conversant. HENT:      Head: Normocephalic and atraumatic.       Mouth/Throat:      Pharynx: No oropharyngeal exudate. Eyes:      General: No scleral icterus. Right eye: No discharge. Left eye: No discharge. Conjunctiva/sclera: Conjunctivae normal.   Neck:      Thyroid: No thyromegaly. Vascular: No JVD. Trachea: No tracheal deviation. Cardiovascular:      Rate and Rhythm: Normal rate and regular rhythm. Heart sounds: No murmur heard. No friction rub. No gallop. Pulmonary:      Effort: Pulmonary effort is normal. No respiratory distress or retractions. Breath sounds: Normal breath sounds. No stridor. No wheezing or rales. Chest:      Chest wall: No mass, lacerations, deformity, swelling, tenderness or edema. Breasts:     Breasts are symmetrical.      Right: No inverted nipple, mass, nipple discharge, skin change or tenderness. Left: No inverted nipple, mass, nipple discharge, skin change or tenderness. Comments: Right breast exam is stable and without evidence of malignancy. Abdominal:      General: There is no distension. Palpations: Abdomen is soft. Tenderness: There is no abdominal tenderness. There is no guarding or rebound. Musculoskeletal:         General: No tenderness or deformity. Normal range of motion. Right shoulder: Normal.      Left shoulder: Normal.      Cervical back: Normal range of motion and neck supple. Comments: She has evidence of bites from reported history of bedbugs in her apartment complex. Has Topicort cream prescribed by her primary care physician. Lymphadenopathy:      Cervical: No cervical adenopathy. Right cervical: No superficial, deep or posterior cervical adenopathy. Left cervical: No superficial, deep or posterior cervical adenopathy. Upper Body:      Right upper body: No pectoral adenopathy. Left upper body: No pectoral adenopathy. Skin:     General: Skin is warm and dry. Coloration: Skin is not pale. Findings: No erythema or rash.    Neurological:      Mental Status: She is alert and oriented to person, place, and time. Coordination: Coordination normal.   Psychiatric:         Behavior: Behavior normal.         Thought Content: Thought content normal.         Judgment: Judgment normal.       Assessment:     Yaneth Ba is a 45 y.o. extremely pleasant female with a history of Left breast cancer     Ultrasound guided core biopsy on 12/23/2020  Left breast, core needle biopsy: Invasive, moderately differentiatedductal carcinoma (grade 2)  Comment:    Intradepartmental consultation is obtained. Breast Cancer Marker Studies:  Estrogen Receptors (ER): 90%  Progesterone Receptors (IN): 90%  HER-2/salvador: Indeterminate/2+  FISH analysis HER/2: Negative     02/17/2021 Left localized lumpectomy with left axillary sentinal lymph node excision   A. Breast, left, lumpectomy: Invasive ductal carcinoma   Ductal carcinoma in situ   Invasive carcinoma present less than 1 mm from yellow/medial margin   Margins negative for ductal carcinoma in situ     B. New medial anterior margin, excision: Negative for carcinoma   C. [place] lymph node, excision: One (1) lymph node, negative for metastatic carcinoma     Cancer case summary: tumor size 1.3 cm's. Overall grade 2, score 7. DCIS present, grade 2. Margins negative. Lymph nodes negative. Pathologic stage classification (pTNM, AJCC 8th Edition): pT1c (sn)pN0     Oncotype DX Recurrence Score Result-16. Absolute chemotherapy benefit- <1%   05/04/2021 completed adjuvant RT .  05/06/2021 ET with Arimidex 1 mg by mouth daily started per med-oncVani MD.  11/15/2021 bilateral screening mammogram:  Negative, BI-RADS 1.    01/31/2022 right diagnostic mammogram and right breast US for palpable lump: Benign with no evidence of malignancy. BI-RADS Category 2. She has a 5 x 2 x 6 mm intradermal lesion consistent with epidermal inclusion cyst.  Clinical follow up: Is without evidence of recurrent disease.   She has a non-inflamed, nontender epidermal inclusion cyst of the right axilla. We discussed management of epidermal inclusion cysts and discussed symptom management. We reviewed that sometimes epidermal inclusion cysts will resolve on their own. She will try warm compress 3-4 times daily. She will call us in the event the cyst becomes inflamed. We discussed that for recurrent cysts, definitive treatment is the surgical excision of the cyst along with the cystic sac. Will plan to see her in 6 months. 12/13/2022 Bilateral screening mammogram:  Negative, BI-RADS 1. Clinically, she is without evidence of recurrent disease. The prior right epidermal inclusion cyst has resolved. Inframammary yeast infections are controlled with improved glucose control as well as nystatin powder as needed. We will plan to see in 6 months for clinical exam and as needed. Plan:     Continue monthly breast/chest wall self examination; detailed instructions reviewed today. Bring any changes to your physician's attention. Continue healthy diet and exercise routinely as tolerated. Maintaining ideal body weight (20-25 BMI) may lead to optimal breast cancer outcomes. Avoid alcohol. Repeat B/L mammogram December 2023  Continue Arimidex 1 mg daily at the discretion of medical oncology team.  Ca+/VitD while on Arimidex. Continue follow up with Medical Oncology, Primary Care, and all specialties as directed. RTC 6 months.     I spent a total of 19 minutes on the date of the service which included preparing to see the patient, face-to-face patient care, completing clinical documentation, obtaining and/or reviewing separately obtained history, performing a medically appropriate examination, counseling and educating the patient/family/caregiver, ordering medications, tests, or procedures, communicating with other HCPs (not separately reported), independently interpreting results (not separately reported), communicating results to the patient/family/caregiver and care coordination (not separately reported). This document is generated, in part, by voice recognition software and thus syntax and grammatical errors are possible. Lanna Seidel Belton Closs) Bryon Blizzard, RN, MSN, APRN-CNP, [address] [place]  Advanced Oncology Certified Nurse Practitioner  Department of Breast Surgery  [facility]/  [facility] in collaboration with Dr. Mireya Chau.  Roberta/Dr. Aj Avalos/Dr. Oscar Porter APRN-CNP

## 2022-12-13 ENCOUNTER — OFFICE VISIT (OUTPATIENT)
Dept: BREAST CENTER | Age: 52
End: 2022-12-13
Payer: MEDICAID

## 2022-12-13 ENCOUNTER — HOSPITAL ENCOUNTER (OUTPATIENT)
Dept: GENERAL RADIOLOGY | Age: 52
Discharge: HOME | End: 2022-12-15
Payer: MEDICAID

## 2022-12-13 VITALS
SYSTOLIC BLOOD PRESSURE: 102 MMHG | BODY MASS INDEX: 33.26 KG/M2 | WEIGHT: 165 LBS | OXYGEN SATURATION: 100 % | DIASTOLIC BLOOD PRESSURE: 60 MMHG | HEART RATE: 94 BPM | RESPIRATION RATE: 20 BRPM | HEIGHT: 59 IN | TEMPERATURE: 97.7 F

## 2022-12-13 VITALS — BODY MASS INDEX: 32.94 KG/M2 | WEIGHT: 163.4 LBS | HEIGHT: 59 IN

## 2022-12-13 DIAGNOSIS — Z12.31 VISIT FOR SCREENING MAMMOGRAM: ICD-10-CM

## 2022-12-13 DIAGNOSIS — Z85.3 HISTORY OF BREAST CANCER: Primary | ICD-10-CM

## 2022-12-13 DIAGNOSIS — Z85.3 PERSONAL HISTORY OF BREAST CANCER: ICD-10-CM

## 2022-12-13 PROCEDURE — 3017F COLORECTAL CA SCREEN DOC REV: CPT | Performed by: NURSE PRACTITIONER

## 2022-12-13 PROCEDURE — G8417 CALC BMI ABV UP PARAM F/U: HCPCS | Performed by: NURSE PRACTITIONER

## 2022-12-13 PROCEDURE — G8427 DOCREV CUR MEDS BY ELIG CLIN: HCPCS | Performed by: NURSE PRACTITIONER

## 2022-12-13 PROCEDURE — 99212 OFFICE O/P EST SF 10 MIN: CPT | Performed by: NURSE PRACTITIONER

## 2022-12-13 PROCEDURE — 3078F DIAST BP <80 MM HG: CPT | Performed by: NURSE PRACTITIONER

## 2022-12-13 PROCEDURE — 77067 SCR MAMMO BI INCL CAD: CPT

## 2022-12-13 PROCEDURE — 1036F TOBACCO NON-USER: CPT | Performed by: NURSE PRACTITIONER

## 2022-12-13 PROCEDURE — 3074F SYST BP LT 130 MM HG: CPT | Performed by: NURSE PRACTITIONER

## 2022-12-13 PROCEDURE — G8482 FLU IMMUNIZE ORDER/ADMIN: HCPCS | Performed by: NURSE PRACTITIONER

## 2022-12-13 PROCEDURE — 99213 OFFICE O/P EST LOW 20 MIN: CPT | Performed by: NURSE PRACTITIONER

## 2022-12-13 RX ORDER — PREDNISOLONE ACETATE 10 MG/ML
SUSPENSION/ DROPS OPHTHALMIC
COMMUNITY
Start: 2021-12-06

## 2022-12-13 RX ORDER — DULOXETIN HYDROCHLORIDE 30 MG/1
1 CAPSULE, DELAYED RELEASE ORAL
COMMUNITY
Start: 2021-11-13 | End: 2022-12-13

## 2022-12-19 ENCOUNTER — HOSPITAL ENCOUNTER (OUTPATIENT)
Dept: INFUSION THERAPY | Age: 52
Discharge: HOME | End: 2022-12-19
Payer: MEDICAID

## 2022-12-19 ENCOUNTER — OFFICE VISIT (OUTPATIENT)
Dept: ONCOLOGY | Age: 52
End: 2022-12-19
Payer: MEDICAID

## 2022-12-19 VITALS
RESPIRATION RATE: 16 BRPM | DIASTOLIC BLOOD PRESSURE: 78 MMHG | BODY MASS INDEX: 33.67 KG/M2 | HEART RATE: 85 BPM | HEIGHT: 59 IN | TEMPERATURE: 97.3 F | WEIGHT: 167 LBS | OXYGEN SATURATION: 97 % | SYSTOLIC BLOOD PRESSURE: 138 MMHG

## 2022-12-19 DIAGNOSIS — C50.912 MALIGNANT NEOPLASM OF LEFT FEMALE BREAST, UNSPECIFIED ESTROGEN RECEPTOR STATUS, UNSPECIFIED SITE OF BREAST (HCC): ICD-10-CM

## 2022-12-19 DIAGNOSIS — Z79.811 USE OF AROMATASE INHIBITORS: Primary | ICD-10-CM

## 2022-12-19 LAB
ALBUMIN SERPL-MCNC: 3.9 G/DL (ref 3.5–5.2)
ALP BLD-CCNC: 124 U/L (ref 35–104)
ALT SERPL-CCNC: 21 U/L (ref 0–32)
ANION GAP SERPL CALCULATED.3IONS-SCNC: 13 MMOL/L (ref 7–16)
AST SERPL-CCNC: 31 U/L (ref 0–31)
BASOPHILS ABSOLUTE: 0.01 E9/L (ref 0–0.2)
BASOPHILS RELATIVE PERCENT: 0.2 % (ref 0–2)
BILIRUB SERPL-MCNC: <0.2 MG/DL (ref 0–1.2)
BUN BLDV-MCNC: 12 MG/DL (ref 6–20)
CALCIUM SERPL-MCNC: 10.8 MG/DL (ref 8.6–10.2)
CHLORIDE BLD-SCNC: 103 MMOL/L (ref 98–107)
CO2: 26 MMOL/L (ref 22–29)
CREAT SERPL-MCNC: 0.7 MG/DL (ref 0.5–1)
EOSINOPHILS ABSOLUTE: 0.15 E9/L (ref 0.05–0.5)
EOSINOPHILS RELATIVE PERCENT: 2.5 % (ref 0–6)
GFR SERPL CREATININE-BSD FRML MDRD: >60 ML/MIN/1.73
GLUCOSE BLD-MCNC: 108 MG/DL (ref 74–99)
HCT VFR BLD CALC: 36.9 % (ref 34–48)
HEMOGLOBIN: 11.8 G/DL (ref 11.5–15.5)
IMMATURE GRANULOCYTES #: 0.01 E9/L
IMMATURE GRANULOCYTES %: 0.2 % (ref 0–5)
LYMPHOCYTES ABSOLUTE: 2.11 E9/L (ref 1.5–4)
LYMPHOCYTES RELATIVE PERCENT: 34.6 % (ref 20–42)
MCH RBC QN AUTO: 28.4 PG (ref 26–35)
MCHC RBC AUTO-ENTMCNC: 32 % (ref 32–34.5)
MCV RBC AUTO: 88.9 FL (ref 80–99.9)
MONOCYTES ABSOLUTE: 0.5 E9/L (ref 0.1–0.95)
MONOCYTES RELATIVE PERCENT: 8.2 % (ref 2–12)
NEUTROPHILS ABSOLUTE: 3.32 E9/L (ref 1.8–7.3)
NEUTROPHILS RELATIVE PERCENT: 54.3 % (ref 43–80)
PDW BLD-RTO: 14.6 FL (ref 11.5–15)
PLATELET # BLD: 272 E9/L (ref 130–450)
PMV BLD AUTO: 10.6 FL (ref 7–12)
POTASSIUM SERPL-SCNC: 5.1 MMOL/L (ref 3.5–5)
RBC # BLD: 4.15 E12/L (ref 3.5–5.5)
SODIUM BLD-SCNC: 142 MMOL/L (ref 132–146)
TOTAL PROTEIN: 7.4 G/DL (ref 6.4–8.3)
WBC # BLD: 6.1 E9/L (ref 4.5–11.5)

## 2022-12-19 PROCEDURE — 1036F TOBACCO NON-USER: CPT | Performed by: INTERNAL MEDICINE

## 2022-12-19 PROCEDURE — G8417 CALC BMI ABV UP PARAM F/U: HCPCS | Performed by: INTERNAL MEDICINE

## 2022-12-19 PROCEDURE — G8427 DOCREV CUR MEDS BY ELIG CLIN: HCPCS | Performed by: INTERNAL MEDICINE

## 2022-12-19 PROCEDURE — 3017F COLORECTAL CA SCREEN DOC REV: CPT | Performed by: INTERNAL MEDICINE

## 2022-12-19 PROCEDURE — G8482 FLU IMMUNIZE ORDER/ADMIN: HCPCS | Performed by: INTERNAL MEDICINE

## 2022-12-19 PROCEDURE — 99214 OFFICE O/P EST MOD 30 MIN: CPT | Performed by: INTERNAL MEDICINE

## 2022-12-19 PROCEDURE — 99212 OFFICE O/P EST SF 10 MIN: CPT

## 2022-12-19 PROCEDURE — 3078F DIAST BP <80 MM HG: CPT | Performed by: INTERNAL MEDICINE

## 2022-12-19 PROCEDURE — 85025 COMPLETE CBC W/AUTO DIFF WBC: CPT

## 2022-12-19 PROCEDURE — 36415 COLL VENOUS BLD VENIPUNCTURE: CPT

## 2022-12-19 PROCEDURE — 3074F SYST BP LT 130 MM HG: CPT | Performed by: INTERNAL MEDICINE

## 2022-12-19 PROCEDURE — 80053 COMPREHEN METABOLIC PANEL: CPT

## 2022-12-19 NOTE — PROGRESS NOTES
Department of [facility]  Attending Clinic Note    Reason for Visit: Follow-up on a patient with Left Breast Cancer     PCP:  Vitaly Nguyen DO    History of Present Illness:  45year old female with Left Breast Cancer. Lesion located in the 7 o'clock position found on mammogram.    Breast cancer risk factors include age, gender, family history of cancer    Bilateral Screening Mammogram 11/12/2020 with 1.5 cm irregular focal asymmetry seen on the CC view, not well seen on MLO view. Stable mammogram of right breast    Left Diagnostic Mammogram 12/15/2021 with spiculated mass measuring 1.5 cm in the lower inner quadrant of the left breast. ON dedicated targeted ultrasound examination of left breast there is a solid lesion with posterior shadowing at 7 o'clock position. Highly suggestive of malignancy    Ultrasound guided core biopsy on 12/23/2020  Left breast, core needle biopsy: Invasive, moderately differentiatedductal carcinoma (grade 2)  Comment:    Intradepartmental consultation is obtained. Breast Cancer Marker Studies:  Estrogen Receptors (ER): 90%  Progesterone Receptors (FL): 90%  HER-2/salvador: Indeterminate/2+    FISH analysis HER/2: Negative  Average HER-2 signals/nucleus: 3.4   Average MARGARITO 17 signals/nucleus: 2.9   HER-2/MARGARITO 17 signal ratio: 1.2     2/17/2021 Left localized lumpectomy with left axillary sentinal lymph node excision   A. Breast, left, lumpectomy: Invasive ductal carcinoma   Ductal carcinoma in situ   Invasive carcinoma present less than 1 mm from yellow/medial margin   Margins negative for ductal carcinoma in situ     B. New medial anterior margin, excision: Negative for carcinoma   C. [place] lymph node, excision: One (1) lymph node, negative for metastatic carcinoma     CANCER CASE SUMMARY   Procedure: Excision (less than total mastectomy)   Specimen laterality: Left   Tumor size: 1.3 cm in greatest dimension   Histologic type:  Invasive carcinoma of no special type (ductal)   Histologic grade (Nika Histologic Score):        Glandular/tubular differentiation: Score 2        Nuclear pleomorphism: Score 2        Mitotic rate: Score 3        Overall grade: Grade 2, score 7   Ductal carcinoma in situ: Present        Nuclear grade: Grade II (intermediate)   Margins:        Invasive carcinoma margins: Uninvolved by invasive carcinoma             Distance from closest margin: 3 mm-blue/inferior        Ductal carcinoma in situ margins: Uninvolved by DCIS             Distance from closest margin: 2 mm from blue/inferior and red/superior   Regional lymph nodes: Uninvolved by tumor cells        Total number of lymph nodes examined: 1        Number of sentinel nodes examined: 1   Treatment effect in the breast: No known presurgical therapy   Pathologic stage classification (pTNM, AJCC 8th Edition):   pT1c (sn)pN0     Oncotype DX Breast Cancer Report   Recurrence Score Result-16   Distant recurrence risk at 9 years-4%   Absolute chemotherapy benefit- <1%     No indication for adjuvant chemotherapy  Recommended adjuvant RT followed by adjuvant endocrine therapy  RT was completed on 05/04/2021. Arimidex 1 mg po daily was started on 05/06/2021 with fair tolerance so far. Today 12/19/2022. No fever chills. Fair appetite and energy level. She continues to tolerate Arimidex well. Review of Systems;  CONSTITUTIONAL: No fever, chills. Fair appetite and energy level. ENMT: Eyes: No diplopia; Nose: No epistaxis. Mouth: No sore throat. RESPIRATORY: No hemoptysis, shortness of breath, cough. CARDIOVASCULAR: No chest pain, palpitations. GASTROINTESTINAL: No nausea/vomiting, abdominal pain  GENITOURINARY: No dysuria, urinary frequency, hematuria. NEURO: No syncope, presyncope, headache.   Remainder:ROS NEGATIVE    Past Medical History:      Diagnosis Date    Anesthesia complication     problem with blood pressure up & down    Arthritis     lower back    Back pain     Blind right eye    Cancer ([facility] [address] 2021    breast    Cardiac valve prolapse     micro    Diabetes mellitus ([facility] [address]     Head injury 11/17/2018    History of therapeutic radiation     Hyperlipidemia     Hypertension     Neuropathy due to secondary diabetes ([facility] [address]     in siddharth feet    Nontraumatic tear of right rotator cuff 11/30/2020    Prolonged emergence from general anesthesia     Psoriasis      Medications:  Reviewed and reconciled. Allergies:  No Known Allergies    Physical Exam:  /78   Pulse 85   Temp 97.3 °F (36.3 °C) (Infrared)   Resp 16   Ht 4' 11\" (1.499 m)   Wt 167 lb (75.8 kg)   LMP  (LMP Unknown)   SpO2 97%   BMI 33.73 kg/m²   GENERAL: Alert, oriented x 3, not in acute distress. Lungs: CTA Siddharth  CVS: RRR  EXTREMITIES: Without clubbing, cyanosis, or edema. NEUROLOGIC: No focal deficits. ECOG PS 1    Lab Results   Component Value Date    WBC 6.1 12/19/2022    HGB 11.8 12/19/2022    HCT 36.9 12/19/2022    MCV 88.9 12/19/2022     12/19/2022     Lab Results   Component Value Date     12/19/2022    K 5.1 (H) 12/19/2022     12/19/2022    CO2 26 12/19/2022    BUN 12 12/19/2022    CREATININE 0.7 12/19/2022    GLUCOSE 108 (H) 12/19/2022    CALCIUM 10.8 (H) 12/19/2022    PROT 7.4 12/19/2022    LABALBU 3.9 12/19/2022    BILITOT <0.2 12/19/2022    ALKPHOS 124 (H) 12/19/2022    AST 31 12/19/2022    ALT 21 12/19/2022    LABGLOM >60 12/19/2022    GFRAA >60 08/24/2022     Impression/Plan:  46 y/o female who on 02/17/2021 underwent Left localized lumpectomy with left axillary sentinal lymph node excision   A. Breast, left, lumpectomy: Invasive ductal carcinoma   Ductal carcinoma in situ   Invasive carcinoma present less than 1 mm from yellow/medial margin   Margins negative for ductal carcinoma in situ     B. New medial anterior margin, excision: Negative for carcinoma   C.   [place] lymph node, excision: One (1) lymph node, negative for metastatic carcinoma     CANCER CASE SUMMARY   Procedure: Excision (less than total mastectomy)   Specimen laterality: Left   Tumor size: 1.3 cm in greatest dimension   Histologic type: Invasive carcinoma of no special type (ductal)   Histologic grade (Temple Histologic Score):        Glandular/tubular differentiation: Score 2        Nuclear pleomorphism: Score 2        Mitotic rate: Score 3        Overall grade: Grade 2, score 7   Ductal carcinoma in situ: Present        Nuclear grade: Grade II (intermediate)   Margins:        Invasive carcinoma margins: Uninvolved by invasive carcinoma             Distance from closest margin: 3 mm-blue/inferior        Ductal carcinoma in situ margins: Uninvolved by DCIS             Distance from closest margin: 2 mm from blue/inferior and red/superior   Regional lymph nodes: Uninvolved by tumor cells        Total number of lymph nodes examined: 1        Number of sentinel nodes examined: 1   Treatment effect in the breast: No known presurgical therapy   Pathologic stage classification (pTNM, AJCC 8th Edition):   pT1c (sn)pN0     Breast Cancer Marker Studies:  Estrogen Receptors (ER): 90%  Progesterone Receptors (DC): 90%  HER-2/salvador: Indeterminate/2+    FISH analysis HER/2: Negative  Average HER-2 signals/nucleus: 3.4   Average MARGARITO 17 signals/nucleus: 2.9   HER-2/MARGARITO 17 signal ratio: 1.2     Oncotype DX Breast Cancer Report   Recurrence Score Result-16   Distant recurrence risk at 9 years-4%   Absolute chemotherapy benefit- <1%     No indication for adjuvant chemotherapy  Recommended adjuvant RT followed by adjuvant endocrine therapy  RT was completed on 05/04/2021. DEXA scan 04/06/2021 normal in LS and bilateral hips. On Ca. VitD    Arimidex 1 mg po daily was started on 05/06/2021 with fair tolerance so far. Bilateral Screening Mammogram 12/09/2021: No evidence of malignancy. Right Diagnostic Mammogram/US 01/31/2022 Benign finding with no sonographic evidence of malignancy in the right axilla.  No mammographic evidence of malignancy in the right breast.    Bilateral Screening Mammogram 12/13/2022: No evidence of malignancy. Imaging reviewed. Labs reviewed    JESSICA  Continue Arimidex, Ca. VitD.  DEXA ordered for June 2023    RTC 6 months with DEXA scan same day    12/19/2022  Gerson Presley MD

## 2022-12-20 DIAGNOSIS — E11.65 UNCONTROLLED TYPE 2 DIABETES MELLITUS WITH HYPERGLYCEMIA (HCC): ICD-10-CM

## 2022-12-21 RX ORDER — INSULIN GLARGINE 100 [IU]/ML
INJECTION, SOLUTION SUBCUTANEOUS
Qty: 15 ML | Refills: 10 | Status: SHIPPED | OUTPATIENT
Start: 2022-12-21

## 2022-12-21 RX ORDER — BLOOD SUGAR DIAGNOSTIC
STRIP MISCELLANEOUS
Qty: 100 EACH | Refills: 1 | Status: SHIPPED | OUTPATIENT
Start: 2022-12-21

## 2022-12-26 DIAGNOSIS — E11.65 UNCONTROLLED TYPE 2 DIABETES MELLITUS WITH HYPERGLYCEMIA (HCC): ICD-10-CM

## 2022-12-27 RX ORDER — INSULIN GLARGINE 100 [IU]/ML
INJECTION, SOLUTION SUBCUTANEOUS
Qty: 15 ML | Refills: 10 | Status: SHIPPED
Start: 2022-12-27 | End: 2022-12-28

## 2022-12-28 DIAGNOSIS — E11.65 UNCONTROLLED TYPE 2 DIABETES MELLITUS WITH HYPERGLYCEMIA (HCC): ICD-10-CM

## 2022-12-28 RX ORDER — INSULIN GLARGINE 100 [IU]/ML
INJECTION, SOLUTION SUBCUTANEOUS
Qty: 15 ML | Refills: 10 | Status: SHIPPED | OUTPATIENT
Start: 2022-12-28

## 2023-01-08 ENCOUNTER — APPOINTMENT (OUTPATIENT)
Dept: GENERAL RADIOLOGY | Age: 53
End: 2023-01-08
Payer: MEDICAID

## 2023-01-08 ENCOUNTER — HOSPITAL ENCOUNTER (EMERGENCY)
Age: 53
Discharge: HOME | End: 2023-01-08
Attending: EMERGENCY MEDICINE
Payer: MEDICAID

## 2023-01-08 VITALS
DIASTOLIC BLOOD PRESSURE: 83 MMHG | HEART RATE: 94 BPM | SYSTOLIC BLOOD PRESSURE: 156 MMHG | RESPIRATION RATE: 13 BRPM | OXYGEN SATURATION: 99 % | WEIGHT: 170 LBS | TEMPERATURE: 98 F | BODY MASS INDEX: 34.34 KG/M2

## 2023-01-08 DIAGNOSIS — J06.9 VIRAL URI: Primary | ICD-10-CM

## 2023-01-08 LAB
ANION GAP SERPL CALCULATED.3IONS-SCNC: 12 MMOL/L (ref 7–16)
BASOPHILS ABSOLUTE: 0.01 E9/L (ref 0–0.2)
BASOPHILS RELATIVE PERCENT: 0.1 % (ref 0–2)
BUN BLDV-MCNC: 16 MG/DL (ref 6–20)
CALCIUM SERPL-MCNC: 10.7 MG/DL (ref 8.6–10.2)
CHLORIDE BLD-SCNC: 101 MMOL/L (ref 98–107)
CO2: 26 MMOL/L (ref 22–29)
CREAT SERPL-MCNC: 0.7 MG/DL (ref 0.5–1)
EOSINOPHILS ABSOLUTE: 0.18 E9/L (ref 0.05–0.5)
EOSINOPHILS RELATIVE PERCENT: 2.1 % (ref 0–6)
GFR SERPL CREATININE-BSD FRML MDRD: >60 ML/MIN/1.73
GLUCOSE BLD-MCNC: 118 MG/DL (ref 74–99)
HCT VFR BLD CALC: 42.7 % (ref 34–48)
HEMOGLOBIN: 13.1 G/DL (ref 11.5–15.5)
IMMATURE GRANULOCYTES #: 0.02 E9/L
IMMATURE GRANULOCYTES %: 0.2 % (ref 0–5)
INFLUENZA A BY PCR: NOT DETECTED
INFLUENZA B BY PCR: NOT DETECTED
LIPASE: 13 U/L (ref 13–60)
LYMPHOCYTES ABSOLUTE: 2.59 E9/L (ref 1.5–4)
LYMPHOCYTES RELATIVE PERCENT: 30.9 % (ref 20–42)
MCH RBC QN AUTO: 28 PG (ref 26–35)
MCHC RBC AUTO-ENTMCNC: 30.7 % (ref 32–34.5)
MCV RBC AUTO: 91.2 FL (ref 80–99.9)
MONOCYTES ABSOLUTE: 0.6 E9/L (ref 0.1–0.95)
MONOCYTES RELATIVE PERCENT: 7.2 % (ref 2–12)
NEUTROPHILS ABSOLUTE: 4.98 E9/L (ref 1.8–7.3)
NEUTROPHILS RELATIVE PERCENT: 59.5 % (ref 43–80)
PDW BLD-RTO: 14.7 FL (ref 11.5–15)
PLATELET # BLD: 314 E9/L (ref 130–450)
PMV BLD AUTO: 10.9 FL (ref 7–12)
POTASSIUM SERPL-SCNC: 5.8 MMOL/L (ref 3.5–5)
RBC # BLD: 4.68 E12/L (ref 3.5–5.5)
REASON FOR REJECTION: NORMAL
REJECTED TEST: NORMAL
SARS-COV-2, NAAT: NOT DETECTED
SODIUM BLD-SCNC: 139 MMOL/L (ref 132–146)
TROPONIN, HIGH SENSITIVITY: 20 NG/L (ref 0–9)
WBC # BLD: 8.4 E9/L (ref 4.5–11.5)

## 2023-01-08 PROCEDURE — 84484 ASSAY OF TROPONIN QUANT: CPT

## 2023-01-08 PROCEDURE — 87502 INFLUENZA DNA AMP PROBE: CPT

## 2023-01-08 PROCEDURE — 36415 COLL VENOUS BLD VENIPUNCTURE: CPT

## 2023-01-08 PROCEDURE — 85025 COMPLETE CBC W/AUTO DIFF WBC: CPT

## 2023-01-08 PROCEDURE — 83690 ASSAY OF LIPASE: CPT

## 2023-01-08 PROCEDURE — 96374 THER/PROPH/DIAG INJ IV PUSH: CPT

## 2023-01-08 PROCEDURE — 93005 ELECTROCARDIOGRAM TRACING: CPT

## 2023-01-08 PROCEDURE — 6360000002 HC RX W HCPCS

## 2023-01-08 PROCEDURE — 87635 SARS-COV-2 COVID-19 AMP PRB: CPT

## 2023-01-08 PROCEDURE — 71045 X-RAY EXAM CHEST 1 VIEW: CPT

## 2023-01-08 PROCEDURE — 80048 BASIC METABOLIC PNL TOTAL CA: CPT

## 2023-01-08 PROCEDURE — 2580000003 HC RX 258

## 2023-01-08 PROCEDURE — 99285 EMERGENCY DEPT VISIT HI MDM: CPT

## 2023-01-08 RX ORDER — 0.9 % SODIUM CHLORIDE 0.9 %
1000 INTRAVENOUS SOLUTION INTRAVENOUS ONCE
Status: COMPLETED | OUTPATIENT
Start: 2023-01-08 | End: 2023-01-08

## 2023-01-08 RX ORDER — ONDANSETRON 2 MG/ML
4 INJECTION INTRAMUSCULAR; INTRAVENOUS ONCE
Status: COMPLETED | OUTPATIENT
Start: 2023-01-08 | End: 2023-01-08

## 2023-01-08 RX ADMIN — ONDANSETRON 4 MG: 2 INJECTION INTRAMUSCULAR; INTRAVENOUS at 18:54

## 2023-01-08 RX ADMIN — SODIUM CHLORIDE 1000 ML: 9 INJECTION, SOLUTION INTRAVENOUS at 18:51

## 2023-01-08 ASSESSMENT — ENCOUNTER SYMPTOMS
NAUSEA: 1
EYE PAIN: 0
SORE THROAT: 0
BACK PAIN: 0
PHOTOPHOBIA: 0
COUGH: 1
RHINORRHEA: 1
VOMITING: 1
DIARRHEA: 1
ABDOMINAL PAIN: 1
FACIAL SWELLING: 0
CONSTIPATION: 0
CHOKING: 0
SHORTNESS OF BREATH: 0

## 2023-01-08 NOTE — ED PROVIDER NOTES
[facility]  Department of Emergency Medicine     Written by: Martinez Davis MD  Patient Name: Tiffani Howell  Attending Provider: No att. providers found  Admit Date: 2023  6:02 PM  MRN: [id number]                   : 1970        Chief Complaint   Patient presents with    Chest Pain     Sudden onset today    - Chief complaint    52-year-old female with history of hyperlipidemia, diabetes, hypertension, arthritis, mitral valve prolapse, breast cancer last year presents to the ED with complaints of chest pain, nausea, vomiting, diarrhea, chills. She states that a few days ago she developed diarrhea, however early this morning she also developed nausea and vomiting. She says that in the afternoon she developed midline chest pain which is described as \"heavy\". She states that she is also had chills since last night. The patient states that she had a cough, congestion, rhinorrhea for the past 2 days. She denies any recorded fevers or diaphoresis. Review of Systems   Constitutional:  Positive for chills. Negative for appetite change, diaphoresis and fever. HENT:  Positive for congestion and rhinorrhea. Negative for ear discharge, ear pain, facial swelling, sneezing and sore throat. Eyes:  Negative for photophobia and pain. Respiratory:  Positive for cough. Negative for choking and shortness of breath. Cardiovascular:  Positive for chest pain. Negative for palpitations. Gastrointestinal:  Positive for abdominal pain, diarrhea, nausea and vomiting. Negative for constipation. Genitourinary:  Negative for dysuria, enuresis, flank pain, frequency and hematuria. Musculoskeletal:  Negative for arthralgias, back pain, joint swelling, myalgias and neck pain. Skin:  Negative for pallor and rash. Neurological:  Negative for weakness, light-headedness and headaches. Physical Exam  Constitutional:       General: She is not in acute distress.      Appearance: Normal appearance. She is not ill-appearing. HENT:      Head: Normocephalic and atraumatic. Nose: Nose normal. No congestion. Mouth/Throat:      Mouth: Mucous membranes are moist.      Pharynx: No posterior oropharyngeal erythema. Eyes:      General: No scleral icterus. Extraocular Movements: Extraocular movements intact. Pupils: Pupils are equal, round, and reactive to light. Cardiovascular:      Rate and Rhythm: Normal rate and regular rhythm. Pulses: Normal pulses. Heart sounds: Normal heart sounds. Pulmonary:      Effort: Pulmonary effort is normal. No respiratory distress. Breath sounds: Normal breath sounds. No stridor. No wheezing or rhonchi. Chest:      Chest wall: No tenderness. Abdominal:      General: Bowel sounds are normal. There is no distension. Palpations: Abdomen is soft. There is no mass. Tenderness: There is abdominal tenderness. Musculoskeletal:         General: No swelling, tenderness or deformity. Normal range of motion. Cervical back: Normal range of motion. No rigidity or tenderness. Skin:     Capillary Refill: Capillary refill takes less than 2 seconds. Coloration: Skin is not jaundiced or pale. Findings: No erythema. Neurological:      General: No focal deficit present. Mental Status: She is alert and oriented to person, place, and time. Mental status is at baseline. Cranial Nerves: No cranial nerve deficit. Sensory: No sensory deficit. Motor: No weakness. Procedures       MDM  Number of Diagnoses or Management Options  Viral URI  Diagnosis management comments: This is a 78-year-old female with history of hyperlipidemia, diabetes, hypertension, arthritis, mitral valve prolapse, breast cancer last year presents to the ED with complaints of chest pain, nausea, vomiting, diarrhea, chills.   She states that a few days ago she developed diarrhea, however early this morning she also developed nausea and vomiting. She says that in the afternoon she developed midline chest pain which is described as \"heavy\". She states that she is also had chills since last night. The patient states that she had a cough, congestion, rhinorrhea for the past 2 days. She denies any recorded fevers or diaphoresis. The patient here in the ED is not hypoxic and hemodynamically stable, and do not appear to be in acute distress. They are calm, alert, oriented, and pleasant to speak with. On auscultation, the patient's lungs sound clear, no abnormal heart murmurs are heard. The patient has no abdominal pain or tenderness. The patient has good pulses and capillary refill bilaterally in both upper and lower extremities. No evidence of extremity or perioral cyanosis. No signs of peripheral clubbing. No signs of extremity swellings. No evidence of neck crepitus. The patient has no significant chest wall tenderness. EKG is ordered to have documentation of patient's current rhythm, and to rule out any obvious acute cardiac illnesses such as ACS. Additionally, QT interval may be of use in decision making regarding any medications administered here in the ED. Patient is placed on cardiac monitor and continuous pulse ox for monitoring. CBC is ordered to evaluate for any signs of infection or inflammation by obtaining a WBC count, or any signs of acute anemia by interpreting hemoglobin. A metabolic panel with electrolytes was ordered to evaluate for any electrolyte imbalances, kidney function, or any elevations in anion gap. Viral swabs are ordered to evaluate possible viral etiology of symptoms. Troponin ordered to evaluate for possible cardiac etiology of symptoms including but not limited to STEMI or NSTEMI. Chest x-ray was ordered to evaluate for any signs of pneumonia, pneumothorax, pulmonary edema, rib fracture or other pathological process. Edi Doshiing of JUAN will be in the ED course below.        ED Course as of 01/09/23 0017   Sun Jan 08, 2023   1905 ATTENDING PROVIDER ATTESTATION:     I have personally performed and/or participated in the history, exam, medical decision making, and procedures and agree with all pertinent clinical information unless otherwise noted. I have also reviewed and agree with the past medical, family and social history unless otherwise noted. I have discussed this patient in detail with the resident, and provided the instruction and education regarding patient here complaining several days of URI symptoms with congestion and coughing, dry cough nonproductive with runny nose and postnasal drainage with some mild nausea and vomiting and diarrhea. Developed some general anterior chest wall pain mostly in the left anterior chest wall. She is concerned because she does have a cardiac history or family history of cardiac disease apparently. No leg pain or swelling. No lightheadedness or syncope. .  My findings/plan: Patient laying the bed resting comfortably no distress. Heart rate regular, lungs are clear and equal.  Abdomen soft and nontender. Reproducible left anterior chest wall musculoskeletal tenderness with no gross rashes or erythema or signs of infection. She has no pretibial edema or calf pain. No acute distress. [NC]   1906 EKG, normal sinus rhythm rate 95, normal axis, normal conduction, no ischemic ST-T wave changes, otherwise agree with resident. [NC]   1908 EKG: This EKG is signed and interpreted by me. Rate: 95  Rhythm: Sinus  Interpretation: no acute changes, normal axis, normal AK interval, normal QRS duration, no QT prolongation, no acute ST changes  Comparison: stable as compared to patient's most recent EKG []   2054 Patient troponin is 20 however previous results were 26 and 27. Patient's lipase negative, no electrolyte abnormalities. Potassium was 5.8, however it was moderately hemolyzed. No elevation white count, no anemia.   Patient's chest x-ray was clear.  Return precautions are given, patient is feeling fine to follow-up outpatient with cardiology. Patient is ready for discharge. [AH]      ED Course User Index  [] Shwetha Gupta MD  [NC] Bethany Yoon, DO       --------------------------------------------- PAST HISTORY ---------------------------------------------  Past Medical History:  has a past medical history of Anesthesia complication, Arthritis, Back pain, Blind, Cancer [facility]), Cardiac valve prolapse, Diabetes mellitus ([facility] [address] Head injury, History of therapeutic radiation, Hyperlipidemia, Hypertension, Neuropathy due to secondary diabetes [facility]), Nontraumatic tear of right rotator cuff, Prolonged emergence from general anesthesia, and Psoriasis. Past Surgical History:  has a past surgical history that includes Carpal tunnel release;  section; Ankle surgery; Carpal tunnel release (2012); Shoulder arthroscopy (Left, 2016); Cardiac surgery; Cholecystectomy, laparoscopic (N/A, 2019); Upper gastrointestinal endoscopy (N/A, 2019); Colonoscopy (N/A, 2019); Hysterectomy, total abdominal; Shoulder arthroscopy (Right, 2020); US BREAST BIOPSY W LOC DEVICE 1ST LESION LEFT (2020); Breast biopsy (Left, 2021); Breast lumpectomy; and Cataract removal (Left, 2022). Social History:  reports that she has never smoked. She has never used smokeless tobacco. She reports that she does not currently use alcohol after a past usage of about 1.0 standard drink per week. She reports that she does not use drugs. Family History: family history includes Breast Cancer (age of onset: 54) in her maternal aunt; Cancer in her father; Cancer (age of onset: 62) in her maternal uncle and another family member; Cancer (age of onset: 58) in her paternal aunt; Diabetes in her father and mother; Heart Disease in her father and mother. The patients home medications have been reviewed.     Allergies: Patient has no known allergies.     -------------------------------------------------- RESULTS -------------------------------------------------  Labs:  Results for orders placed or performed during the hospital encounter of 01/08/23   RAPID INFLUENZA A/B ANTIGENS    Specimen: Nasopharyngeal   Result Value Ref Range    Influenza A by PCR Not Detected Not Detected    Influenza B by PCR Not Detected Not Detected   COVID-19, Rapid    Specimen: Nasopharyngeal Swab   Result Value Ref Range    SARS-CoV-2, NAAT Not Detected Not Detected   CBC with Auto Differential   Result Value Ref Range    WBC 8.4 4.5 - 11.5 E9/L    RBC 4.68 3.50 - 5.50 E12/L    Hemoglobin 13.1 11.5 - 15.5 g/dL    Hematocrit 42.7 34.0 - 48.0 %    MCV 91.2 80.0 - 99.9 fL    MCH 28.0 26.0 - 35.0 pg    MCHC 30.7 (L) 32.0 - 34.5 %    RDW 14.7 11.5 - 15.0 fL    Platelets 278 912 - 753 E9/L    MPV 10.9 7.0 - 12.0 fL    Neutrophils % 59.5 43.0 - 80.0 %    Immature Granulocytes % 0.2 0.0 - 5.0 %    Lymphocytes % 30.9 20.0 - 42.0 %    Monocytes % 7.2 2.0 - 12.0 %    Eosinophils % 2.1 0.0 - 6.0 %    Basophils % 0.1 0.0 - 2.0 %    Neutrophils Absolute 4.98 1.80 - 7.30 E9/L    Immature Granulocytes # 0.02 E9/L    Lymphocytes Absolute 2.59 1.50 - 4.00 E9/L    Monocytes Absolute 0.60 0.10 - 0.95 E9/L    Eosinophils Absolute 0.18 0.05 - 0.50 E9/L    Basophils Absolute 0.01 0.00 - 0.20 E9/L   BMP   Result Value Ref Range    Sodium 139 132 - 146 mmol/L    Potassium 5.8 (H) 3.5 - 5.0 mmol/L    Chloride 101 98 - 107 mmol/L    CO2 26 22 - 29 mmol/L    Anion Gap 12 7 - 16 mmol/L    Glucose 118 (H) 74 - 99 mg/dL    BUN 16 6 - 20 mg/dL    Creatinine 0.7 0.5 - 1.0 mg/dL    Est, Glom Filt Rate >60 >=60 mL/min/1.73    Calcium 10.7 (H) 8.6 - 10.2 mg/dL   Lipase   Result Value Ref Range    Lipase 13 13 - 60 U/L   SPECIMEN REJECTION   Result Value Ref Range    Rejected Test TROP     Reason for Rejection see below    Troponin   Result Value Ref Range    Troponin, High Sensitivity 20 (H) 0 - 9 ng/L Radiology:  XR CHEST PORTABLE   Final Result        Medications   0.9 % sodium chloride bolus (0 mLs IntraVENous Stopped 1/8/23 2100)   ondansetron (ZOFRAN) injection 4 mg (4 mg IntraVENous Given 1/8/23 1854)       ------------------------- NURSING NOTES AND VITALS REVIEWED ---------------------------  Date / Time Roomed:  1/8/2023  6:02 PM  ED Bed Assignment:  03/03    The nursing notes within the ED encounter and vital signs as below have been reviewed. BP (!) 156/83   Pulse 94   Temp 98 °F (36.7 °C) (Oral)   Resp 13   Wt 170 lb (77.1 kg)   LMP  (LMP Unknown)   SpO2 99%   BMI 34.34 kg/m²   Oxygen Saturation Interpretation: Normal      ------------------------------------------ PROGRESS NOTES ------------------------------------------  I have spoken with the patient and discussed todays results, in addition to providing specific details for the plan of care and counseling regarding the diagnosis and prognosis. Their questions are answered at this time and they are agreeable with the plan. I discussed at length with them reasons for immediate return here for re evaluation. They will followup with primary care by calling their office tomorrow. --------------------------------- ADDITIONAL PROVIDER NOTES ---------------------------------  At this time the patient is without objective evidence of an acute process requiring hospitalization or inpatient management. They have remained hemodynamically stable throughout their entire ED visit and are stable for discharge with outpatient follow-up. The plan has been discussed in detail and they are aware of the specific conditions for emergent return, as well as the importance of follow-up. Discharge Medication List as of 1/8/2023  9:03 PM          Diagnosis:  1. Viral URI        Disposition:  Patient's disposition: Discharge to home  Patient's condition is stable.       Patient was seen and evaluated by myself and my attending No att. providers found. Assessment and Plan discussed with attending provider, please see attestation for final plan of care.      MD Yariel Huizar MD  Resident  01/09/23 4522

## 2023-01-09 LAB
EKG ATRIAL RATE: 95 BPM
EKG P AXIS: 52 DEGREES
EKG P-R INTERVAL: 128 MS
EKG Q-T INTERVAL: 354 MS
EKG QRS DURATION: 88 MS
EKG QTC CALCULATION (BAZETT): 444 MS
EKG R AXIS: 26 DEGREES
EKG T AXIS: 67 DEGREES
EKG VENTRICULAR RATE: 95 BPM

## 2023-01-09 NOTE — DISCHARGE INSTRUCTIONS
Please follow-up with your cardiology as discussed. Please return to the ED symptoms worsen, persist, recur. Please take your medications as prescribed.

## 2023-01-13 DIAGNOSIS — E11.65 UNCONTROLLED TYPE 2 DIABETES MELLITUS WITH HYPERGLYCEMIA (HCC): ICD-10-CM

## 2023-01-13 RX ORDER — INSULIN GLARGINE 100 [IU]/ML
INJECTION, SOLUTION SUBCUTANEOUS
Qty: 15 ML | Refills: 10 | Status: SHIPPED | OUTPATIENT
Start: 2023-01-13

## 2023-01-20 RX ORDER — ISOPROPYL ALCOHOL 70 ML/100ML
SWAB TOPICAL
Qty: 200 EACH | Refills: 3 | Status: SHIPPED | OUTPATIENT
Start: 2023-01-20

## 2023-01-25 ENCOUNTER — TELEMEDICINE (OUTPATIENT)
Dept: FAMILY MEDICINE CLINIC | Age: 53
End: 2023-01-25

## 2023-01-25 DIAGNOSIS — E78.2 MIXED HYPERLIPIDEMIA: ICD-10-CM

## 2023-01-25 DIAGNOSIS — B96.89 ACUTE BACTERIAL SINUSITIS: ICD-10-CM

## 2023-01-25 DIAGNOSIS — R73.01 IFG (IMPAIRED FASTING GLUCOSE): ICD-10-CM

## 2023-01-25 DIAGNOSIS — J01.90 ACUTE BACTERIAL SINUSITIS: ICD-10-CM

## 2023-01-25 DIAGNOSIS — R07.89 ATYPICAL CHEST PAIN: ICD-10-CM

## 2023-01-25 DIAGNOSIS — R53.83 FATIGUE, UNSPECIFIED TYPE: ICD-10-CM

## 2023-01-25 DIAGNOSIS — M79.641 PAIN OF RIGHT HAND: ICD-10-CM

## 2023-01-25 DIAGNOSIS — E11.43 TYPE II DIABETES MELLITUS WITH PERIPHERAL AUTONOMIC NEUROPATHY (HCC): ICD-10-CM

## 2023-01-25 DIAGNOSIS — C50.912 INVASIVE DUCTAL CARCINOMA OF LEFT BREAST (HCC): ICD-10-CM

## 2023-01-25 DIAGNOSIS — Z01.818 PRE-OP EXAM: ICD-10-CM

## 2023-01-25 DIAGNOSIS — I25.10 CORONARY ARTERY DISEASE INVOLVING NATIVE HEART WITHOUT ANGINA PECTORIS, UNSPECIFIED VESSEL OR LESION TYPE: ICD-10-CM

## 2023-01-25 DIAGNOSIS — I10 ESSENTIAL HYPERTENSION: Primary | ICD-10-CM

## 2023-01-25 RX ORDER — NITROGLYCERIN 0.4 MG/1
0.4 TABLET SUBLINGUAL EVERY 5 MIN PRN
Qty: 25 TABLET | Refills: 1 | Status: SHIPPED | OUTPATIENT
Start: 2023-01-25

## 2023-01-25 RX ORDER — AZITHROMYCIN 250 MG/1
250 TABLET, FILM COATED ORAL SEE ADMIN INSTRUCTIONS
Qty: 6 TABLET | Refills: 0 | Status: SHIPPED | OUTPATIENT
Start: 2023-01-25 | End: 2023-01-30

## 2023-01-25 ASSESSMENT — PATIENT HEALTH QUESTIONNAIRE - PHQ9
SUM OF ALL RESPONSES TO PHQ9 QUESTIONS 1 & 2: 0
SUM OF ALL RESPONSES TO PHQ QUESTIONS 1-9: 0
1. LITTLE INTEREST OR PLEASURE IN DOING THINGS: 0
2. FEELING DOWN, DEPRESSED OR HOPELESS: 0

## 2023-01-26 ENCOUNTER — TELEPHONE (OUTPATIENT)
Dept: ADMINISTRATIVE | Age: 53
End: 2023-01-26

## 2023-01-26 NOTE — TELEPHONE ENCOUNTER
Patient Appointment Form:      PCP: Dr. Sylvia Jones  Referring: Dr. Sylvia Jones    Has the Patient:    Seen a Cardiologist? yes    date:2022  Physician:Dr. Shana Linda    Had a heart catheterization? yes   date: years ago  Hospital:  [facility]    Had heart surgery? no    Had a stress test or nuclear stress test? yes   date: 2022   [facility]    Had an echocardiogram? yes   date: 2022   [facility]    Had a vascular ultrasound? no    Had a 24/48 heart monitor or extended cardiac event monitor?  24hr   date: 2022      Who ordered: Claudia    Had recent blood work in the last 6 months? yes    date: 01/27/2023    ordering physician: Dr. Sylvia Jones    Had a pacemaker/ICD/ILR implant? no    Seen an Electrophysiologist? no        Will send records via: in [address] [place] [address]      Date & time of appointment:  02/09/2023 @ 8am

## 2023-01-27 ENCOUNTER — HOSPITAL ENCOUNTER (OUTPATIENT)
Age: 53
Discharge: HOME | End: 2023-01-27
Payer: MEDICAID

## 2023-01-27 DIAGNOSIS — B96.89 ACUTE BACTERIAL SINUSITIS: ICD-10-CM

## 2023-01-27 DIAGNOSIS — R53.83 FATIGUE, UNSPECIFIED TYPE: ICD-10-CM

## 2023-01-27 DIAGNOSIS — R73.01 IFG (IMPAIRED FASTING GLUCOSE): ICD-10-CM

## 2023-01-27 DIAGNOSIS — C50.912 INVASIVE DUCTAL CARCINOMA OF LEFT BREAST (HCC): ICD-10-CM

## 2023-01-27 DIAGNOSIS — J01.90 ACUTE BACTERIAL SINUSITIS: ICD-10-CM

## 2023-01-27 DIAGNOSIS — I10 ESSENTIAL HYPERTENSION: ICD-10-CM

## 2023-01-27 DIAGNOSIS — Z01.818 PRE-OP EXAM: ICD-10-CM

## 2023-01-27 DIAGNOSIS — R07.89 ATYPICAL CHEST PAIN: ICD-10-CM

## 2023-01-27 DIAGNOSIS — E78.2 MIXED HYPERLIPIDEMIA: ICD-10-CM

## 2023-01-27 DIAGNOSIS — E11.43 TYPE II DIABETES MELLITUS WITH PERIPHERAL AUTONOMIC NEUROPATHY (HCC): ICD-10-CM

## 2023-01-27 DIAGNOSIS — I25.10 CORONARY ARTERY DISEASE INVOLVING NATIVE HEART WITHOUT ANGINA PECTORIS, UNSPECIFIED VESSEL OR LESION TYPE: ICD-10-CM

## 2023-01-27 DIAGNOSIS — M79.641 PAIN OF RIGHT HAND: ICD-10-CM

## 2023-01-27 LAB
ALBUMIN SERPL-MCNC: 4.1 G/DL (ref 3.5–5.2)
ALP BLD-CCNC: 122 U/L (ref 35–104)
ALT SERPL-CCNC: 22 U/L (ref 0–32)
ANION GAP SERPL CALCULATED.3IONS-SCNC: 13 MMOL/L (ref 7–16)
AST SERPL-CCNC: 25 U/L (ref 0–31)
BILIRUB SERPL-MCNC: <0.2 MG/DL (ref 0–1.2)
BUN BLDV-MCNC: 8 MG/DL (ref 6–20)
CALCIUM SERPL-MCNC: 10.1 MG/DL (ref 8.6–10.2)
CHLORIDE BLD-SCNC: 104 MMOL/L (ref 98–107)
CHOLESTEROL, TOTAL: 134 MG/DL (ref 0–199)
CO2: 26 MMOL/L (ref 22–29)
CREAT SERPL-MCNC: 0.7 MG/DL (ref 0.5–1)
GFR SERPL CREATININE-BSD FRML MDRD: >60 ML/MIN/1.73
GLUCOSE BLD-MCNC: 126 MG/DL (ref 74–99)
HBA1C MFR BLD: 8 % (ref 4–5.6)
HCT VFR BLD CALC: 41 % (ref 34–48)
HDLC SERPL-MCNC: 55 MG/DL
HEMOGLOBIN: 12.5 G/DL (ref 11.5–15.5)
LDL CHOLESTEROL CALCULATED: 60 MG/DL (ref 0–99)
MCH RBC QN AUTO: 28.2 PG (ref 26–35)
MCHC RBC AUTO-ENTMCNC: 30.5 % (ref 32–34.5)
MCV RBC AUTO: 92.3 FL (ref 80–99.9)
PDW BLD-RTO: 14.6 FL (ref 11.5–15)
PLATELET # BLD: 292 E9/L (ref 130–450)
PMV BLD AUTO: 10 FL (ref 7–12)
POTASSIUM SERPL-SCNC: 4.8 MMOL/L (ref 3.5–5)
RBC # BLD: 4.44 E12/L (ref 3.5–5.5)
SODIUM BLD-SCNC: 143 MMOL/L (ref 132–146)
TOTAL PROTEIN: 7.3 G/DL (ref 6.4–8.3)
TRIGL SERPL-MCNC: 94 MG/DL (ref 0–149)
TSH SERPL DL<=0.05 MIU/L-ACNC: 1.39 UIU/ML (ref 0.27–4.2)
VLDLC SERPL CALC-MCNC: 19 MG/DL
WBC # BLD: 8.2 E9/L (ref 4.5–11.5)

## 2023-01-27 PROCEDURE — 80061 LIPID PANEL: CPT

## 2023-01-27 PROCEDURE — 83036 HEMOGLOBIN GLYCOSYLATED A1C: CPT

## 2023-01-27 PROCEDURE — 36415 COLL VENOUS BLD VENIPUNCTURE: CPT

## 2023-01-27 PROCEDURE — 80053 COMPREHEN METABOLIC PANEL: CPT

## 2023-01-27 PROCEDURE — 85027 COMPLETE CBC AUTOMATED: CPT

## 2023-01-27 PROCEDURE — 84443 ASSAY THYROID STIM HORMONE: CPT

## 2023-01-27 NOTE — PROGRESS NOTES
TELEPHONE VISIT     Adriana Rao is a 52 y.o. female evaluated via telephone on 1/25/2023. Consent:  She and/or health care decision maker is aware that that she may receive a bill for this telephone service,  which includes applicable co-pays. This virtual visit was conducted with the patient's  (and/or legal guardian's) consent. The visit was conducted pursuant to the emergency declaration under the [address] [place] [address], [facility] authority and the [employer] Act. Patient identification was verified, and a caregiver was present when appropriate. The patient was located in a state where the provider was licensed to provide care. depending on her insurance coverage, and has provided verbal consent to proceed  Yes    Documentation:  Patient identification was verified at the start of the visit: Yes  I communicated with the patient and/or health care decision maker about this 52year old female presents with nasal congestion, sore throat, and cough. Pt denies chest pain and denies shortness of breath. Pt is pre-op for right hand surgery. Pt needs cardiac clearance. Hand surgeon is Dr. Noam Pendleton. Details of this discussion including any medical advice provided: pt instructed medication will be sent to pharmacy, lab will be put in chart, and cardiology will be set up. Pt instructed if any worse go ED ASAP. I affirm this is a Patient Initiated Episode with a Patient who has not had a related appointment within my department in the past 7 days or scheduled within the next 24 hours.         Patient's location: home address in [address] [place] [address]   Physician  location other address in [facility]   Other people involved in call Dr. Tj Hampton           Total Time: minutes: 11-20 minutes        The visit was conducted pursuant to the emergency declaration under the [facility] [address] waiver authority and the Coronavirus Preparedness and Response Supplemental Appropriations Act. Patient identification was verified, and a caregiver was present when appropriate. The patient was located in a state where the provider was credentialed to provide care.     Note: not billable if this call serves to triage the patient into an appointment for the relevant concern          Shay Bass,

## 2023-02-09 ENCOUNTER — OFFICE VISIT (OUTPATIENT)
Dept: CARDIOLOGY CLINIC | Age: 53
End: 2023-02-09
Payer: MEDICAID

## 2023-02-09 VITALS
DIASTOLIC BLOOD PRESSURE: 68 MMHG | BODY MASS INDEX: 33.67 KG/M2 | SYSTOLIC BLOOD PRESSURE: 124 MMHG | HEART RATE: 84 BPM | HEIGHT: 59 IN | WEIGHT: 167 LBS | RESPIRATION RATE: 16 BRPM

## 2023-02-09 DIAGNOSIS — I25.10 CORONARY ARTERY DISEASE INVOLVING NATIVE CORONARY ARTERY OF NATIVE HEART WITHOUT ANGINA PECTORIS: ICD-10-CM

## 2023-02-09 DIAGNOSIS — Z01.818 PRE-OPERATIVE CLEARANCE: Primary | ICD-10-CM

## 2023-02-09 DIAGNOSIS — E66.09 NON MORBID OBESITY DUE TO EXCESS CALORIES: ICD-10-CM

## 2023-02-09 DIAGNOSIS — Z86.79 H/O: HTN (HYPERTENSION): ICD-10-CM

## 2023-02-09 DIAGNOSIS — E11.9 INSULIN-REQUIRING OR DEPENDENT TYPE II DIABETES MELLITUS (HCC): ICD-10-CM

## 2023-02-09 DIAGNOSIS — Z79.899 ON STATIN THERAPY: ICD-10-CM

## 2023-02-09 DIAGNOSIS — Z90.49 HISTORY OF LAPAROSCOPIC CHOLECYSTECTOMY: ICD-10-CM

## 2023-02-09 DIAGNOSIS — Z86.39 H/O HYPERLIPIDEMIA: ICD-10-CM

## 2023-02-09 DIAGNOSIS — Z79.4 INSULIN-REQUIRING OR DEPENDENT TYPE II DIABETES MELLITUS (HCC): ICD-10-CM

## 2023-02-09 DIAGNOSIS — Z85.3 H/O MALIGNANT NEOPLASM OF FEMALE BREAST: ICD-10-CM

## 2023-02-09 DIAGNOSIS — Z87.19 H/O GASTROESOPHAGEAL REFLUX (GERD): ICD-10-CM

## 2023-02-09 DIAGNOSIS — I34.1 MITRAL VALVE PROLAPSE: ICD-10-CM

## 2023-02-09 DIAGNOSIS — Z98.890 HISTORY OF BILATERAL CARPAL TUNNEL RELEASE: ICD-10-CM

## 2023-02-09 DIAGNOSIS — Z98.890 H/O SHOULDER SURGERY: ICD-10-CM

## 2023-02-09 DIAGNOSIS — M65.341 TRIGGER FINGER, RIGHT RING FINGER: ICD-10-CM

## 2023-02-09 DIAGNOSIS — Z90.710 H/O TOTAL HYSTERECTOMY: ICD-10-CM

## 2023-02-09 DIAGNOSIS — Z86.59 H/O: DEPRESSION: ICD-10-CM

## 2023-02-09 PROCEDURE — 3052F HG A1C>EQUAL 8.0%<EQUAL 9.0%: CPT | Performed by: INTERNAL MEDICINE

## 2023-02-09 PROCEDURE — G8417 CALC BMI ABV UP PARAM F/U: HCPCS | Performed by: INTERNAL MEDICINE

## 2023-02-09 PROCEDURE — 3078F DIAST BP <80 MM HG: CPT | Performed by: INTERNAL MEDICINE

## 2023-02-09 PROCEDURE — 93000 ELECTROCARDIOGRAM COMPLETE: CPT | Performed by: INTERNAL MEDICINE

## 2023-02-09 PROCEDURE — 2022F DILAT RTA XM EVC RTNOPTHY: CPT | Performed by: INTERNAL MEDICINE

## 2023-02-09 PROCEDURE — G8427 DOCREV CUR MEDS BY ELIG CLIN: HCPCS | Performed by: INTERNAL MEDICINE

## 2023-02-09 PROCEDURE — 3074F SYST BP LT 130 MM HG: CPT | Performed by: INTERNAL MEDICINE

## 2023-02-09 PROCEDURE — 3017F COLORECTAL CA SCREEN DOC REV: CPT | Performed by: INTERNAL MEDICINE

## 2023-02-09 PROCEDURE — G8482 FLU IMMUNIZE ORDER/ADMIN: HCPCS | Performed by: INTERNAL MEDICINE

## 2023-02-09 PROCEDURE — 99205 OFFICE O/P NEW HI 60 MIN: CPT | Performed by: INTERNAL MEDICINE

## 2023-02-09 PROCEDURE — 1036F TOBACCO NON-USER: CPT | Performed by: INTERNAL MEDICINE

## 2023-02-09 RX ORDER — LATANOPROST 50 UG/ML
SOLUTION/ DROPS OPHTHALMIC
COMMUNITY
Start: 2023-01-18

## 2023-02-09 RX ORDER — EMPAGLIFLOZIN 10 MG/1
TABLET, FILM COATED ORAL
COMMUNITY
Start: 2023-01-18 | End: 2023-02-09

## 2023-02-09 RX ORDER — ROSUVASTATIN CALCIUM 5 MG/1
5 TABLET, COATED ORAL DAILY
COMMUNITY
Start: 2023-01-18

## 2023-02-10 NOTE — PROGRESS NOTES
OFFICE VISIT        PRIMARY CARE PHYSICIAN:    Eriberto Velez DO     ALLERGIES / SENSITIVITIES:    No Known Allergies     REVIEWED MEDICATIONS:      Current Outpatient Medications:     rosuvastatin (CRESTOR) 5 MG tablet, Take 5 mg by mouth daily, Disp: , Rfl:     latanoprost (XALATAN) 0.005 % ophthalmic solution, INSTILL 1 DROP IN EACH EYE EVERY NIGHT AT BEDTIME, Disp: , Rfl:     nitroGLYCERIN (NITROSTAT) 0.4 MG SL tablet, Place 1 tablet under the tongue every 5 minutes as needed for Chest pain, Disp: 25 tablet, Rfl: 1    Alcohol Swabs (EASY TOUCH ALCOHOL PREP MEDIUM) 70 % PADS, USE AS DIRECTED PRIOR TO GLUCOSE TESTING AND INSULIN INJECTION FOUR TIMES A DAY, Disp: 200 each, Rfl: 3    BASAGLAR KWIKPEN 100 UNIT/ML injection pen, INJECT 50 UNITS SUBCUTANEOUSLY NIGHTLY, Disp: 15 mL, Rfl: 10    ONETOUCH ULTRA strip, USE AS DIRECTED TO TEST BLOOD SUGAR FOUR TIMES A DAY, Disp: 100 each, Rfl: 1    prednisoLONE acetate (PRED FORTE) 1 % ophthalmic suspension, instill 1 by Ophthalmic route 4 times a day for 1 week, Operated eye, Disp: , Rfl:     metoprolol succinate (TOPROL XL) 25 MG extended release tablet, TAKE 1/2 TABLET BY MOUTH DAILY, Disp: 45 tablet, Rfl: 0    gabapentin (NEURONTIN) 300 MG capsule, Take 1 capsule by mouth every evening for 60 days.  Nightly, Disp: 30 capsule, Rfl: 1    atenolol (TENORMIN) 25 MG tablet, Take 0.5 tablets by mouth every morning Takes 1/2 daily, Disp: 45 tablet, Rfl: 1    pramipexole (MIRAPEX) 0.125 MG tablet, TAKE 2 TABLETS BY MOUTH EVERY NIGHT, Disp: 180 tablet, Rfl: 1    phenazopyridine (PYRIDIUM) 100 MG tablet, TAKE 1 TABLET BY MOUTH 3 TIMES DAILY AS NEEDED FOR PAIN, Disp: 18 tablet, Rfl: 10    metFORMIN (GLUCOPHAGE) 500 MG tablet, Take 2 tablets by mouth 2 times daily (with meals) TAKE TWO (2) TABLETS BY MOUTH TWICE DAILY WITH MEALS *EMERGENCY REFILL*, Disp: 360 tablet, Rfl: 1    Lancets (ONETOUCH DELICA PLUS [id number]) MISC, USE TO TEST BLOOD SUGAR 4 TIMES DAILY, Disp: 100 each, Rfl: 5    empagliflozin (JARDIANCE) 25 MG tablet, Take 1 tablet by mouth daily, Disp: 30 tablet, Rfl: 3    lactobacillus (CULTURELLE) CAPS capsule, TAKE 1 CAPSULE BY MOUTH DAILY, Disp: 90 capsule, Rfl: 0    anastrozole (ARIMIDEX) 1 MG tablet, Take 1 tablet by mouth daily, Disp: 30 tablet, Rfl: 5    omeprazole (PRILOSEC) 40 MG delayed release capsule, TAKE 1 CAPSULE BY MOUTH DAILY, Disp: 30 capsule, Rfl: 10    Dulaglutide (TRULICITY) 1.5 KU/8.8RM SOPN, INJECT THE CONTENTS OF 1 SYRINGE SUBCUTANEOUSLY EVERY WEEK *PATIENT NEEDS APPOINTMENT*, Disp: 6 mL, Rfl: 10    hydrocortisone 2.5 % cream, APPLY TOPICALLY TO AFFECTED AREA TWICE DAILY, Disp: 30 g, Rfl: 10    sertraline (ZOLOFT) 100 MG tablet, TAKE 1 TABLET BY MOUTH ONCE DAILY AS NEEDED FOR DEPRESSION, Disp: 30 tablet, Rfl: 10    diclofenac sodium (VOLTAREN) 1 % GEL, Apply 2 g topically 4 times daily, Disp: 60 g, Rfl: 2    sucralfate (CARAFATE) 1 GM tablet, Take 1 tablet by mouth 4 times daily, Disp: 120 tablet, Rfl: 3    Blood Glucose Monitoring Suppl (ONE TOUCH ULTRA 2) w/Device KIT, Test 3 times a day & as needed for symptoms of irregular blood glucose. Dispense sufficient amount for indicated testing frequency plus additional to accommodate PRN testing needs. , Disp: 1 kit, Rfl: 0    Cholecalciferol (VITAMIN D) 50 MCG (2000 UT) CAPS capsule, Take by mouth, Disp: , Rfl:     acetaminophen (TYLENOL) 500 MG tablet, Take 1 tablet by mouth 4 times daily as needed for Pain, Disp: 120 tablet, Rfl: 0    B-D ULTRAFINE III SHORT PEN 31G X 8 MM MISC, USE WITH INSULIN PENS AS DIRECTED, Disp: 100 each, Rfl: 10    Insulin Syringe-Needle U-100 ([employer] INSULIN SYR 1CC/30G) 30G X 5/16\" 1 ML MISC, 1 each by Does not apply route 4 times daily, Disp: 120 each, Rfl: 5    bumetanide (BUMEX) 1 MG tablet, Take 1 tablet by mouth every other day, Disp: 90 tablet, Rfl: 1    insulin lispro (ADMELOG SOLOSTAR) 100 UNIT/ML pen, As directed per sliding scale up to 13 units 4 times per day., Disp: 5 pen, Rfl: 3    Blood Pressure KIT, 1 kit by Does not apply route daily, Disp: 1 kit, Rfl: 0    Misc. Devices (QUAD CANE) MISC, 1 Quad cane, Disp: 1 each, Rfl: 0    Multiple Vitamins-Minerals (THERAPEUTIC MULTIVITAMIN-MINERALS) tablet, Take 1 tablet by mouth daily, Disp: , Rfl:     B Complex Vitamins (B COMPLEX 1 PO), Take 1 tablet by mouth daily. , Disp: , Rfl:     S: REASON FOR VISIT:    New patient referred by her PCP for cardiac risk stratification prior to right hand surgery (per patient, carpal tunnel release and right ring trigger finger release). Manisha Moore is a pleasant 70-year-old lady who normally follows with Dr. Lilliana Barraza (cardiologist in the [facility] area who is out sick). She was supposed to have her right hand surgery but that was canceled by her surgeon. She tells me after she was recently in the ER for an upper respiratory infection, at which time a troponin level was borderline elevated. Review of prior troponins from before were at the same level. Manisha Moore tells me that she routinely follows up with Dr. Halie Driver and that she saw him around 11/2022 and had an echocardiogram and stress test at that time. She tells me that she has echoes routinely because of a diagnosis of mitral valve prolapse (of note, no mitral valve murmur heard on physical exam). She also tells me that her stress test was negative. Manisha Moore also states that she remains active without any exertional chest pain or any significant exertional dyspnea. She denies orthopnea, PNDs or significant lower extremity swelling. She denies palpitations, dizziness, presyncope or syncope. Her EKG done today was within normal limits. REVIEW OF SYSTEMS:    CONSTITUTIONAL:  Denies fevers, chills, night sweats or fatigue. HEENT:  Denies any unusual headaches. Denies recent changes in hearing or vision. Denies dysphagia, hoarseness, hemoptysis, hematemesis or epistaxis. ENDOCRINE:  Denies polyphagia, polydipsia or polyuria.   Denies heat or cold intolerance. MUSCULOSKELETAL:  She has right hand discomfort and numbness. SKIN:  Denies rashes, ulcers or itching. HEME/LYMPH:  Denies any palpable lymph nodes, bleeding or easy bruisability. HEART:  As above. LUNGS:  Denies cough or sputum production. GI: Denies abdominal pain, nausea, vomiting,diarrhea, constipation, rectal bleeding or tarry stools. :  Denies hematuria or dysuria. PSYCHIATRIC:  She has history of depression but denies any recent mood changes. NEUROLOGIC:  Denies memory loss, motor weakness, numbness, tingling or tremors. PAST MEDICAL/SURGICAL HISTORY:    Has a past medical history of Anesthesia complication, Arthritis, Back pain, Blind, Cancer ([facility] [address] Cardiac valve prolapse, Diabetes mellitus ([facility] [address] Head injury, History of therapeutic radiation, Hyperlipidemia, Hypertension, Neuropathy due to secondary diabetes [facility]), Nontraumatic tear of right rotator cuff, Prolonged emergence from general anesthesia, and Psoriasis. Has a past surgical history that includes Carpal tunnel release;  section; Ankle surgery; Carpal tunnel release (2012); Shoulder arthroscopy (Left, 2016); Cardiac surgery; Cholecystectomy, laparoscopic (N/A, 2019); Upper gastrointestinal endoscopy (N/A, 2019); Colonoscopy (N/A, 2019); Hysterectomy, total abdominal; Shoulder arthroscopy (Right, 2020); US BREAST BIOPSY W LOC DEVICE 1ST LESION LEFT (2020); Breast biopsy (Left, 2021); Breast lumpectomy; and Cataract removal (Left, 2022). FAMILY HISTORY:  Family history includes Breast Cancer (age of onset: 54) in her maternal aunt; Cancer in her father; Cancer (age of onset: 62) in her maternal uncle and another family member; Cancer (age of onset: 58) in her paternal aunt; Diabetes in her father and mother; Heart Disease in her father and mother. SOCIAL HISTORY:  Reports that she has never smoked.  She has never used smokeless tobacco. She reports that she does not currently use alcohol after a past usage of about 1.0 standard drink per week. She reports that she does not use drugs. O:  COMPLETE PHYSICAL EXAM:      /68   Pulse 84   Resp 16   Ht 4' 11\" (1.499 m)   Wt 167 lb (75.8 kg)   LMP  (LMP Unknown)   BMI 33.73 kg/m²      General:  Well-developed, well-nourished obese lady in no acute distress. HEENT: Normocephalic and atraumatic head. No JVD. No carotid bruits. Carotid upstrokes normal bilaterally. No thyromegaly. Sclerae not icteric. No xanthelasmas. Mucous membranes moist.  Chest:  Symmetrical and nontender. No deformities. Lungs:  Clear to auscultation bilaterally. Heart:  Normal S1 and S2. No S3 or S4. No murmurs or rubs. Abdomen: Soft, nontender without organomegaly or masses. No bruits. Normal bowel sounds. Extremities: Trace edema. Pulses palpable  Skin:  Normal turgor. No rashes or ulcers noted. Neurologic: Oriented x3. No motor or sensory deficits detected. REVIEW OF DIAGNOSTIC TESTS:    -Blood tests from 01/08/2023 reviewed in Epic:  CBC unremarkable. BUN 16, creatinine 0.7, GFR >60, calcium 10.7.  -Blood tests from 01/27/2023 reviewed:  HGB A1c 8.0. Total cholesterol 134, triglycerides 94, HDL 55, LDL 60.  -EKG done today showed sinus rhythm with low QRS voltages in the precordial leads but was otherwise within normal limits. ASSESSMENT / DIAGNOSIS:    Patient's level of activity exceeds 4 METS. She denies chest pain to suggest angina. She has no signs or symptoms of CHF. She has no significant valvular disease on exam.  Her EKG is normal with no evidence of previous MI. She does not have any history to suggest significant arrhythmias. As such, she should be a low risk from the cardiac standpoint for right hand surgery and may proceed without the need for advanced cardiac testing. History of hypertension:  Adequately controlled.   History of hyperlipidemia with good recent lipid profile on statin therapy. Insulin-requiring diabetes mellitus:  Not adequately controlled. History of diabetic peripheral neuropathy involving lower extremities. History of breast cancer:  Status post lumpectomy and radiation therapy. Blindness in right eye. History of bilateral carpal tunnel release. History of left eye cataract removal.  History of left shoulder arthroscopy. History of right shoulder rotator cuff surgery. History of laparoscopic cholecystectomy. History of total abdominal hysterectomy. History of colonoscopy. Acid reflux with history of EGD. Psoriasis. Reported history of CAD (with no appropriate documentation). Reported history of mitral valve prolapse (no findings for MVP on auscultation and physical exam). Obesity. TREATMENT PLAN:  Reassure. May proceed with surgery without the need for advanced cardiac testing as already mentioned. Upon review of medications, patient is on atenolol and Toprol:  Doubt need for both and one should be discontinued and the other increased to achieve needed response: Will defer to her PCP or Dr. Esperanza Diamond. Follow up with [facility] on prn basis (if her prior cardiologist cannot see her in the future. Will obtain records from her cardiologist to put into our records for future reference and review. [address]  [place] [phone]

## 2023-02-13 ENCOUNTER — OFFICE VISIT (OUTPATIENT)
Dept: FAMILY MEDICINE CLINIC | Age: 53
End: 2023-02-13
Payer: MEDICAID

## 2023-02-13 VITALS
WEIGHT: 168 LBS | OXYGEN SATURATION: 100 % | RESPIRATION RATE: 18 BRPM | HEIGHT: 59 IN | DIASTOLIC BLOOD PRESSURE: 76 MMHG | SYSTOLIC BLOOD PRESSURE: 130 MMHG | BODY MASS INDEX: 33.87 KG/M2 | TEMPERATURE: 97.4 F | HEART RATE: 92 BPM

## 2023-02-13 DIAGNOSIS — I10 ESSENTIAL HYPERTENSION: ICD-10-CM

## 2023-02-13 DIAGNOSIS — Z79.899 DRUG THERAPY: Primary | ICD-10-CM

## 2023-02-13 DIAGNOSIS — I25.10 CORONARY ARTERY DISEASE INVOLVING NATIVE HEART WITHOUT ANGINA PECTORIS, UNSPECIFIED VESSEL OR LESION TYPE: ICD-10-CM

## 2023-02-13 DIAGNOSIS — G62.9 NEUROPATHY: ICD-10-CM

## 2023-02-13 DIAGNOSIS — C50.912 MALIGNANT NEOPLASM OF LEFT BREAST IN FEMALE, ESTROGEN RECEPTOR POSITIVE, UNSPECIFIED SITE OF BREAST (HCC): ICD-10-CM

## 2023-02-13 DIAGNOSIS — Z17.0 MALIGNANT NEOPLASM OF LEFT BREAST IN FEMALE, ESTROGEN RECEPTOR POSITIVE, UNSPECIFIED SITE OF BREAST (HCC): ICD-10-CM

## 2023-02-13 DIAGNOSIS — E11.65 UNCONTROLLED TYPE 2 DIABETES MELLITUS WITH HYPERGLYCEMIA (HCC): ICD-10-CM

## 2023-02-13 DIAGNOSIS — Z20.2 POSSIBLE EXPOSURE TO STD: ICD-10-CM

## 2023-02-13 PROCEDURE — 3052F HG A1C>EQUAL 8.0%<EQUAL 9.0%: CPT | Performed by: FAMILY MEDICINE

## 2023-02-13 PROCEDURE — G8427 DOCREV CUR MEDS BY ELIG CLIN: HCPCS | Performed by: FAMILY MEDICINE

## 2023-02-13 PROCEDURE — 1036F TOBACCO NON-USER: CPT | Performed by: FAMILY MEDICINE

## 2023-02-13 PROCEDURE — G8482 FLU IMMUNIZE ORDER/ADMIN: HCPCS | Performed by: FAMILY MEDICINE

## 2023-02-13 PROCEDURE — 3017F COLORECTAL CA SCREEN DOC REV: CPT | Performed by: FAMILY MEDICINE

## 2023-02-13 PROCEDURE — 99214 OFFICE O/P EST MOD 30 MIN: CPT | Performed by: FAMILY MEDICINE

## 2023-02-13 PROCEDURE — 2022F DILAT RTA XM EVC RTNOPTHY: CPT | Performed by: FAMILY MEDICINE

## 2023-02-13 PROCEDURE — 3078F DIAST BP <80 MM HG: CPT | Performed by: FAMILY MEDICINE

## 2023-02-13 PROCEDURE — 3074F SYST BP LT 130 MM HG: CPT | Performed by: FAMILY MEDICINE

## 2023-02-13 PROCEDURE — G8417 CALC BMI ABV UP PARAM F/U: HCPCS | Performed by: FAMILY MEDICINE

## 2023-02-13 RX ORDER — GABAPENTIN 300 MG/1
300 CAPSULE ORAL DAILY
Qty: 60 CAPSULE | Refills: 0 | Status: SHIPPED | OUTPATIENT
Start: 2023-02-13 | End: 2023-04-14

## 2023-02-13 RX ORDER — GABAPENTIN 300 MG/1
300 CAPSULE ORAL EVERY EVENING
Qty: 30 CAPSULE | Refills: 1 | Status: CANCELLED | OUTPATIENT
Start: 2023-02-13 | End: 2023-04-14

## 2023-02-13 SDOH — ECONOMIC STABILITY: FOOD INSECURITY: WITHIN THE PAST 12 MONTHS, THE FOOD YOU BOUGHT JUST DIDN'T LAST AND YOU DIDN'T HAVE MONEY TO GET MORE.: NEVER TRUE

## 2023-02-13 SDOH — ECONOMIC STABILITY: INCOME INSECURITY: HOW HARD IS IT FOR YOU TO PAY FOR THE VERY BASICS LIKE FOOD, HOUSING, MEDICAL CARE, AND HEATING?: NOT HARD AT ALL

## 2023-02-13 SDOH — ECONOMIC STABILITY: FOOD INSECURITY: WITHIN THE PAST 12 MONTHS, YOU WORRIED THAT YOUR FOOD WOULD RUN OUT BEFORE YOU GOT MONEY TO BUY MORE.: NEVER TRUE

## 2023-02-13 SDOH — ECONOMIC STABILITY: HOUSING INSECURITY
IN THE LAST 12 MONTHS, WAS THERE A TIME WHEN YOU DID NOT HAVE A STEADY PLACE TO SLEEP OR SLEPT IN A SHELTER (INCLUDING NOW)?: NO

## 2023-02-13 ASSESSMENT — LIFESTYLE VARIABLES
HOW OFTEN DO YOU HAVE A DRINK CONTAINING ALCOHOL: 2-4 TIMES A MONTH
HOW MANY STANDARD DRINKS CONTAINING ALCOHOL DO YOU HAVE ON A TYPICAL DAY: 1 OR 2

## 2023-02-16 DIAGNOSIS — E11.65 UNCONTROLLED TYPE 2 DIABETES MELLITUS WITH HYPERGLYCEMIA (HCC): ICD-10-CM

## 2023-02-16 PROBLEM — Z20.2 POSSIBLE EXPOSURE TO STD: Status: ACTIVE | Noted: 2023-02-16

## 2023-02-16 PROBLEM — Z79.899 DRUG THERAPY: Status: ACTIVE | Noted: 2023-02-16

## 2023-02-16 ASSESSMENT — ENCOUNTER SYMPTOMS
EYE ITCHING: 0
RECTAL PAIN: 0
ANAL BLEEDING: 0
EYE REDNESS: 0
SORE THROAT: 0
CONSTIPATION: 0
SHORTNESS OF BREATH: 0
VOICE CHANGE: 0
CHEST TIGHTNESS: 0
FACIAL SWELLING: 0
BLOOD IN STOOL: 0
COLOR CHANGE: 0
GASTROINTESTINAL NEGATIVE: 1
EYE DISCHARGE: 0
RESPIRATORY NEGATIVE: 1
COUGH: 0
DIARRHEA: 0
ABDOMINAL PAIN: 0
STRIDOR: 0
BACK PAIN: 0
VOMITING: 0
ABDOMINAL DISTENTION: 0
RHINORRHEA: 0
CHOKING: 0
ALLERGIC/IMMUNOLOGIC NEGATIVE: 1
PHOTOPHOBIA: 0
SINUS PRESSURE: 0
TROUBLE SWALLOWING: 0
WHEEZING: 0
NAUSEA: 0
EYE PAIN: 0
SINUS PAIN: 0

## 2023-02-17 RX ORDER — BLOOD SUGAR DIAGNOSTIC
STRIP MISCELLANEOUS
Qty: 100 EACH | Refills: 1 | Status: SHIPPED | OUTPATIENT
Start: 2023-02-17

## 2023-02-17 RX ORDER — SERTRALINE HYDROCHLORIDE 100 MG/1
TABLET, FILM COATED ORAL
Qty: 30 TABLET | Refills: 10 | Status: SHIPPED | OUTPATIENT
Start: 2023-02-17

## 2023-02-17 NOTE — TELEPHONE ENCOUNTER
Last seen 2/13/2023  Next appt 4/14/2023      Electronically signed by Rosaura Ayon LPN on 7/36/64 at 4:35 AM EST

## 2023-02-17 NOTE — PROGRESS NOTES
SUBJECTIVE  Jenny Carvalho is a 52 y.o. female. HPI/Chief C/O:  Chief Complaint   Patient presents with    Diabetes    Medication Refill     Pharmacy verified and meds pended    Hypertension     Seen Dr. Colby Lopez and meds needed clarified. No Known Allergies    This 52year old female presents with nerve damage and CTS and plan on surgery. Pt has appointment with cardiology and is cleared for surgery. Pt has hypertension, hyperlipidemia type 2 DM with neuropathy, CAD ([address] and breast cancer ([facility] [address] A1C is 8.0. LDL on 1/27/2023 is 60. Pt states possible exposure to STD. ROS:  Review of Systems   Constitutional:  Positive for fatigue. Negative for activity change, appetite change, chills, diaphoresis, fever and unexpected weight change. HENT: Negative. Negative for congestion, dental problem, drooling, ear discharge, ear pain, facial swelling, hearing loss, mouth sores, nosebleeds, postnasal drip, rhinorrhea, sinus pressure, sinus pain, sneezing, sore throat, tinnitus, trouble swallowing and voice change. Eyes:  Positive for visual disturbance. Negative for photophobia, pain, discharge, redness and itching. Respiratory: Negative. Negative for cough, choking, chest tightness, shortness of breath, wheezing and stridor. Cardiovascular: Negative. Negative for chest pain, palpitations and leg swelling. Gastrointestinal: Negative. Negative for abdominal distention, abdominal pain, anal bleeding, blood in stool, constipation, diarrhea, nausea, rectal pain and vomiting. Endocrine: Negative. Negative for cold intolerance, heat intolerance, polydipsia, polyphagia and polyuria. Genitourinary: Negative. Negative for decreased urine volume, difficulty urinating, dysuria, enuresis, flank pain, frequency, genital sores, hematuria, menstrual problem, pelvic pain and urgency. Musculoskeletal:  Positive for arthralgias, gait problem and myalgias.  Negative for back pain, joint swelling, neck pain and neck stiffness. Skin: Negative. Negative for color change, pallor, rash and wound. Allergic/Immunologic: Negative. Negative for environmental allergies, food allergies and immunocompromised state. Neurological:  Positive for weakness and numbness. Negative for dizziness, tremors, seizures, syncope, facial asymmetry, speech difficulty, light-headedness and headaches. Hematological: Negative. Negative for adenopathy. Does not bruise/bleed easily. Psychiatric/Behavioral:  Positive for sleep disturbance. Negative for agitation, behavioral problems, confusion, decreased concentration, dysphoric mood, hallucinations, self-injury and suicidal ideas. The patient is nervous/anxious. The patient is not hyperactive.        Past Medical/Surgical Hx;  Reviewed with patient      Diagnosis Date    Anesthesia complication     problem with blood pressure up & down    Arthritis     lower back    Back pain     Blind     right eye    Cancer ([address]     breast    Cardiac valve prolapse     micro    Diabetes mellitus ([address]     Head injury 2018    History of therapeutic radiation     Hyperlipidemia     Hypertension     Neuropathy due to secondary diabetes ([address]     in marcial feet    Nontraumatic tear of right rotator cuff 2020    Prolonged emergence from general anesthesia     Psoriasis      Past Surgical History:   Procedure Laterality Date    ANKLE SURGERY      bilateral tarsal tunnels    BREAST BIOPSY Left 2021    LEFT BREAST NEEDLE LOCALIZED LUMPECTOMY, BLUE DYE INJECTION, LEFT AXILLARY SENTINEL LYMPHNODE EXCISION, POSSIBLE LEFT AXILLARY DISSECTION performed by El Aguilar MD at [address]      cardiac catheterization    CARPAL TUNNEL RELEASE      left    CARPAL TUNNEL RELEASE  2012    right    CATARACT REMOVAL Left 2022     SECTION      CHOLECYSTECTOMY, LAPAROSCOPIC N/A 2019    LAPAROSCOPIC CHOLECYSTECTOMY WITH IOC performed by Miguel Cruz MD at [address]  had extremely high blood pressure and hard to wake up    COLONOSCOPY N/A 06/20/2019    COLORECTAL CANCER SCREENING, NOT HIGH RISK performed by Nettie Marin MD at [facility], TOTAL ABDOMINAL (CERVIX REMOVED)      SHOULDER ARTHROSCOPY Left 01/21/2016    subacromin decompression and debridement    SHOULDER ARTHROSCOPY Right 11/16/2020    RIGHT SHOULDER ARTHROSCOPY, SUBACROMIAL DECOMPRESSION, LABRIAL DEBRIDEMENT, CHONDROPLASTY, RAÚL performed by Brad Winter DO at [facility] N/A 06/20/2019    EGD BIOPSY performed by Nettie Marin MD at [place] [address] LEFT  12/23/2020     BREAST NEEDLE BIOPSY LEFT 12/23/2020 [facility]       Past Family Hx:  Reviewed with patient      Problem Relation Age of Onset    Heart Disease Mother     Diabetes Mother     Heart Disease Father     Diabetes Father     Cancer Father         prostate, colon, skin cance behind ear    Breast Cancer Maternal Aunt 54        breast    Cancer Maternal Uncle 62        colon    Cancer Paternal Aunt 58        breast    Cancer Other 62        maternal great aunt - breast       Social Hx:  Reviewed with patient  Social History     Tobacco Use    Smoking status: Never    Smokeless tobacco: Never   Substance Use Topics    Alcohol use: Not Currently     Alcohol/week: 1.0 standard drink     Types: 1 Glasses of wine per week     Comment: weekly       OBJECTIVE  /76   Pulse 92   Temp 97.4 °F (36.3 °C)   Resp 18   Ht 4' 11\" (1.499 m)   Wt 168 lb (76.2 kg)   LMP  (LMP Unknown)   SpO2 100%   BMI 33.93 kg/m²     Problem List:  Rosemary Le does not have any pertinent problems on file. PHYS EX:  Physical Exam  Vitals and nursing note reviewed. Constitutional:       General: She is not in acute distress. Appearance: Normal appearance. She is well-developed. She is obese. She is not ill-appearing, toxic-appearing or diaphoretic. Comments: Patient has morbid obesity. Patient instructed on low calorie, healthy ADA diet. HENT:      Head: Normocephalic and atraumatic. Nose: Nose normal. No congestion or rhinorrhea. Eyes:      General: No scleral icterus. Right eye: No discharge. Left eye: No discharge. Conjunctiva/sclera: Conjunctivae normal.      Pupils: Pupils are equal, round, and reactive to light. Neck:      Thyroid: No thyromegaly. Vascular: No carotid bruit or JVD. Trachea: No tracheal deviation. Cardiovascular:      Rate and Rhythm: Normal rate and regular rhythm. Pulses: Normal pulses. Heart sounds: Normal heart sounds. No murmur heard. No friction rub. No gallop. Pulmonary:      Effort: Pulmonary effort is normal. No respiratory distress. Breath sounds: Normal breath sounds. No stridor. No wheezing, rhonchi or rales. Chest:      Chest wall: No tenderness. Abdominal:      General: Bowel sounds are normal. There is no distension. Palpations: Abdomen is soft. There is no mass. Tenderness: There is no abdominal tenderness. There is no right CVA tenderness, left CVA tenderness, guarding or rebound. Hernia: No hernia is present. Musculoskeletal:         General: Tenderness present. No swelling, deformity or signs of injury. Cervical back: Normal range of motion and neck supple. No rigidity or tenderness. No muscular tenderness. Right lower leg: No edema. Left lower leg: No edema. Comments: Pain and decreased ROM multiple joints. Lymphadenopathy:      Cervical: No cervical adenopathy. Skin:     General: Skin is warm. Coloration: Skin is not jaundiced or pale. Findings: No bruising, erythema, lesion or rash. Neurological:      General: No focal deficit present. Mental Status: She is alert and oriented to person, place, and time. Cranial Nerves: No cranial nerve deficit.       Sensory: Sensory deficit (diabetic neuropathy) present. Motor: Weakness present. No abnormal muscle tone. Coordination: Coordination abnormal.      Gait: Gait abnormal.      Deep Tendon Reflexes: Reflexes abnormal.      Comments: Proprioceptive imbalance    Psychiatric:         Mood and Affect: Mood normal.         Behavior: Behavior normal.         Thought Content: Thought content normal.         Judgment: Judgment normal.       ASSESSMENT/PLAN  Jenny was seen today for diabetes, medication refill and hypertension. Diagnoses and all orders for this visit:    Drug therapy  -     PAIN MANAGEMENT PROFILE 1 W/ CONFIRMATION, URINE; Future    Neuropathy  -     gabapentin (NEURONTIN) 300 MG capsule; Take 1 capsule by mouth daily for 60 days. Essential hypertension  Controlled. BB, statin, low salt diet. Coronary artery disease involving native heart without angina pectoris, unspecified vessel or lesion type  Cardiology. Uncontrolled type 2 diabetes mellitus with hyperglycemia (HCC)  Controlled. Metformin, trulicity, admelog, basaglar, ace, statin, ADA diet. Malignant neoplasm of left breast in female, estrogen receptor positive, unspecified site of breast [facility])  Oncology, arimidex. Possible exposure to STD  -     Cancel: Chlamydia trachomatis DNA, Urine; Future  -     Cancel: Neisseria gonorrhoeae DNA, Urine; Future  -     Hepatitis Panel, Acute; Future  -     HIV Screen; Future  -     RPR; Future  -     Herpes simplex virus (HSV) I/II antibodies IgG & IgM w/ reflex; Future    Pt instructed if any worse go ED ASAP. Outpatient Encounter Medications as of 2/13/2023   Medication Sig Dispense Refill    gabapentin (NEURONTIN) 300 MG capsule Take 1 capsule by mouth daily for 60 days.  60 capsule 0    latanoprost (XALATAN) 0.005 % ophthalmic solution INSTILL 1 DROP IN EACH EYE EVERY NIGHT AT BEDTIME      nitroGLYCERIN (NITROSTAT) 0.4 MG SL tablet Place 1 tablet under the tongue every 5 minutes as needed for Chest pain 25 tablet 1    Alcohol Swabs (EASY TOUCH ALCOHOL PREP MEDIUM) 70 % PADS USE AS DIRECTED PRIOR TO GLUCOSE TESTING AND INSULIN INJECTION FOUR TIMES A  each 3    BASAGLAR KWIKPEN 100 UNIT/ML injection pen INJECT 50 UNITS SUBCUTANEOUSLY NIGHTLY 15 mL 10    ONETOUCH ULTRA strip USE AS DIRECTED TO TEST BLOOD SUGAR FOUR TIMES A  each 1    metoprolol succinate (TOPROL XL) 25 MG extended release tablet TAKE 1/2 TABLET BY MOUTH DAILY 45 tablet 0    pramipexole (MIRAPEX) 0.125 MG tablet TAKE 2 TABLETS BY MOUTH EVERY NIGHT 180 tablet 1    phenazopyridine (PYRIDIUM) 100 MG tablet TAKE 1 TABLET BY MOUTH 3 TIMES DAILY AS NEEDED FOR PAIN 18 tablet 10    metFORMIN (GLUCOPHAGE) 500 MG tablet Take 2 tablets by mouth 2 times daily (with meals) TAKE TWO (2) TABLETS BY MOUTH TWICE DAILY WITH MEALS *EMERGENCY REFILL* 360 tablet 1    Lancets (ONETOUCH DELICA PLUS [id number]) MISC USE TO TEST BLOOD SUGAR 4 TIMES DAILY 100 each 5    empagliflozin (JARDIANCE) 25 MG tablet Take 1 tablet by mouth daily 30 tablet 3    anastrozole (ARIMIDEX) 1 MG tablet Take 1 tablet by mouth daily 30 tablet 5    omeprazole (PRILOSEC) 40 MG delayed release capsule TAKE 1 CAPSULE BY MOUTH DAILY 30 capsule 10    Dulaglutide (TRULICITY) 1.5 DH/0.6IX SOPN INJECT THE CONTENTS OF 1 SYRINGE SUBCUTANEOUSLY EVERY WEEK *PATIENT NEEDS APPOINTMENT* 6 mL 10    hydrocortisone 2.5 % cream APPLY TOPICALLY TO AFFECTED AREA TWICE DAILY 30 g 10    sertraline (ZOLOFT) 100 MG tablet TAKE 1 TABLET BY MOUTH ONCE DAILY AS NEEDED FOR DEPRESSION 30 tablet 10    diclofenac sodium (VOLTAREN) 1 % GEL Apply 2 g topically 4 times daily 60 g 2    sucralfate (CARAFATE) 1 GM tablet Take 1 tablet by mouth 4 times daily 120 tablet 3    Blood Glucose Monitoring Suppl (ONE TOUCH ULTRA 2) w/Device KIT Test 3 times a day & as needed for symptoms of irregular blood glucose. Dispense sufficient amount for indicated testing frequency plus additional to accommodate PRN testing needs.  1 kit 0 Cholecalciferol (VITAMIN D) 50 MCG (2000 UT) CAPS capsule Take by mouth      acetaminophen (TYLENOL) 500 MG tablet Take 1 tablet by mouth 4 times daily as needed for Pain 120 tablet 0    B-D ULTRAFINE III SHORT PEN 31G X 8 MM MISC USE WITH INSULIN PENS AS DIRECTED 100 each 10    Insulin Syringe-Needle U-100 ([employer] INSULIN SYR 1CC/30G) 30G X 5/16\" 1 ML MISC 1 each by Does not apply route 4 times daily 120 each 5    bumetanide (BUMEX) 1 MG tablet Take 1 tablet by mouth every other day 90 tablet 1    insulin lispro (ADMELOG SOLOSTAR) 100 UNIT/ML pen As directed per sliding scale up to 13 units 4 times per day. 5 pen 3    Blood Pressure KIT 1 kit by Does not apply route daily 1 kit 0    Multiple Vitamins-Minerals (THERAPEUTIC MULTIVITAMIN-MINERALS) tablet Take 1 tablet by mouth daily      B Complex Vitamins (B COMPLEX 1 PO) Take 1 tablet by mouth daily. rosuvastatin (CRESTOR) 5 MG tablet Take 5 mg by mouth daily      prednisoLONE acetate (PRED FORTE) 1 % ophthalmic suspension instill 1 by Ophthalmic route 4 times a day for 1 week, Operated eye (Patient not taking: Reported on 2/13/2023)      [DISCONTINUED] gabapentin (NEURONTIN) 300 MG capsule Take 1 capsule by mouth every evening for 60 days. Nightly 30 capsule 1    [DISCONTINUED] atenolol (TENORMIN) 25 MG tablet Take 0.5 tablets by mouth every morning Takes 1/2 daily 45 tablet 1    lactobacillus (CULTURELLE) CAPS capsule TAKE 1 CAPSULE BY MOUTH DAILY (Patient not taking: Reported on 2/13/2023) 90 capsule 0    Misc. Devices (QUAD CANE) MISC 1 Quad cane 1 each 0     No facility-administered encounter medications on file as of 2/13/2023. Return in about 2 months (around 4/13/2023).         Reviewed recent labs related to Jenny's current problems      Discussed importance of regular Health Maintenance follow up  Health Maintenance   Topic    Diabetic foot exam     Hepatitis B vaccine (1 of 3 - Risk 3-dose series)    DTaP/Tdap/Td vaccine (1 - Tdap) Shingles vaccine (1 of 2)    COVID-19 Vaccine (4 - Booster for [place] series)    Diabetic retinal exam     Diabetic Alb to Cr ratio (uACR) test     Breast cancer screen     Depression Screen     A1C test (Diabetic or Prediabetic)     Lipids     GFR test (Diabetes, CKD 3-4, OR last GFR 15-59)     Colorectal Cancer Screen     Flu vaccine     Pneumococcal 0-64 years Vaccine     Hepatitis C screen     HIV screen     Hepatitis A vaccine     Hib vaccine     Meningococcal (ACWY) vaccine

## 2023-02-26 PROBLEM — Z01.818 PRE-OP EXAM: Status: RESOLVED | Noted: 2023-01-27 | Resolved: 2023-02-26

## 2023-03-14 ENCOUNTER — ANESTHESIA EVENT (OUTPATIENT)
Dept: OPERATING ROOM | Age: 53
End: 2023-03-14
Payer: MEDICAID

## 2023-03-14 NOTE — ANESTHESIA PRE PROCEDURE
Department of Anesthesiology  Preprocedure Note       Name:  Jean Pierre Newton   Age:  52 y.o.  :  1970                                          MRN:  [id number]         Date:  3/14/2023      Surgeon: Arti Beal):  Knox Ahumada, MD    Procedure: Procedure(s):  RIGHT HAND CARPAL TUNNEL RELEASE,  RIGHT ELBOW CUBITAL TUNNEL RELEASE,RIGHT RING FINGER A, PULLEY RELEASE, RIGHT WRIST FIRST DORSAL COMPARTMENT RELEASE ([id number])  WRIST DEQUERVAINS DUPUYTRENS REPAIR    Medications prior to admission:   Prior to Admission medications    Medication Sig Start Date End Date Taking? Authorizing Provider   sertraline (ZOLOFT) 100 MG tablet TAKE 1 TABLET BY MOUTH DAILY AS NEEDED FOR DEPRESSION 23   Chyrl Ebro P Catterlin, DO   blood glucose test strips (ONETOUCH ULTRA) strip USE TO TEST BLOOD SUGAR 4 TIMES DAILY AS DIRECTED 23   Charlene P Catterlin, DO   gabapentin (NEURONTIN) 300 MG capsule Take 1 capsule by mouth daily for 60 days. Patient taking differently: Take 300 mg by mouth at bedtime. 23  Charlene P Catterlin, DO   rosuvastatin (CRESTOR) 5 MG tablet Take 5 mg by mouth daily 23   Historical Provider, MD   latanoprost (XALATAN) 0.005 % ophthalmic solution INSTILL 1 DROP IN [facility] EYE EVERY NIGHT AT BEDTIME 23   Historical Provider, MD   nitroGLYCERIN (NITROSTAT) 0.4 MG SL tablet Place 1 tablet under the tongue every 5 minutes as needed for Chest pain  Patient taking differently: Place 0.4 mg under the tongue every 5 minutes as needed for Chest pain - Dr. Tahir Ramsay notified at the time and no testing down.  23   Charlene P Catterlin, DO   Alcohol Swabs (EASY TOUCH ALCOHOL PREP MEDIUM) 70 % PADS USE AS DIRECTED PRIOR TO GLUCOSE TESTING AND INSULIN INJECTION FOUR TIMES A DAY 23   Charlene P Catterlin, DO   BASAGLAR KWIKPEN 100 UNIT/ML injection pen INJECT 50 UNITS SUBCUTANEOUSLY NIGHTLY  Patient taking differently: 1/2 dose evening pre-op only 23   Donel Randi Catterlin, DO prednisoLONE acetate (PRED FORTE) 1 % ophthalmic suspension instill 1 by Ophthalmic route 4 times a day for 1 week, Operated eye  Patient not taking: Reported on 2/13/2023 12/6/21   Historical Provider, MD   metoprolol succinate (TOPROL XL) 25 MG extended release tablet TAKE 1/2 TABLET BY MOUTH DAILY  Patient taking differently: am 12/12/22   Jenice Olszewski P Catterlin, DO   pramipexole (MIRAPEX) 0.125 MG tablet TAKE 2 TABLETS BY MOUTH EVERY NIGHT 10/28/22   Charlene Velez DO   phenazopyridine (PYRIDIUM) 100 MG tablet TAKE 1 TABLET BY MOUTH 3 TIMES DAILY AS NEEDED FOR PAIN 9/28/22   Jenice Olszewski P Catterlin, DO   metFORMIN (GLUCOPHAGE) 500 MG tablet Take 2 tablets by mouth 2 times daily (with meals) TAKE TWO (2) TABLETS BY MOUTH TWICE DAILY WITH MEALS *EMERGENCY REFILL* 9/26/22 3/9/23  Charlene Velez DO   Lancets (ONETOUCH DELICA PLUS [id number]) MISC USE TO TEST BLOOD SUGAR 4 TIMES DAILY 9/21/22   Charlene Velez DO   empagliflozin (JARDIANCE) 25 MG tablet Take 1 tablet by mouth daily  Patient taking differently: Take 25 mg by mouth daily am 9/14/22   Milton Velez DO   lactobacillus (CULTURELLE) CAPS capsule TAKE 1 CAPSULE BY MOUTH DAILY  Patient not taking: No sig reported 8/29/22   Rosa Velez DO   anastrozole (ARIMIDEX) 1 MG tablet Take 1 tablet by mouth daily 8/24/22   Bharati Davidson MD   omeprazole (PRILOSEC) 40 MG delayed release capsule TAKE 1 CAPSULE BY MOUTH DAILY  Patient taking differently: Take 40 mg by mouth daily am 8/5/22 3/9/23  Charlene Velez DO   Dulaglutide (TRULICITY) 1.5 TS/0.7ND SOPN INJECT THE CONTENTS OF 1 SYRINGE SUBCUTANEOUSLY EVERY WEEK *PATIENT NEEDS APPOINTMENT*  Patient taking differently: once a week Every Sunday 4/25/22   Charlene Velez DO   hydrocortisone 2.5 % cream APPLY TOPICALLY TO AFFECTED AREA TWICE DAILY 4/18/22   Rosa Velez DO   diclofenac sodium (VOLTAREN) 1 % GEL Apply 2 g topically 4 times daily 10/29/21   Cleveland Hodges DO sucralfate (CARAFATE) 1 GM tablet Take 1 tablet by mouth 4 times daily  Patient not taking: Reported on 3/9/2023 8/4/21   Harvinder Velez, DO   Blood Glucose Monitoring Suppl (ONE TOUCH ULTRA 2) w/Device KIT Test 3 times a day & as needed for symptoms of irregular blood glucose. Dispense sufficient amount for indicated testing frequency plus additional to accommodate PRN testing needs. 6/17/21   Charlene P Catterlin, DO   Cholecalciferol (VITAMIN D) 50 MCG (2000 UT) CAPS capsule Take by mouth daily    Historical Provider, MD   acetaminophen (TYLENOL) 500 MG tablet Take 1 tablet by mouth 4 times daily as needed for Pain 2/17/21   Job Alcantar, DO   B-D ULTRAFINE III SHORT PEN 31G X 8 MM MISC USE WITH INSULIN PENS AS DIRECTED 11/17/20   Atul Velez, DO   Insulin Syringe-Needle U-100 ([employer] INSULIN SYR 1CC/30G) 30G X 5/16\" 1 ML MISC 1 each by Does not apply route 4 times daily 8/19/20   Charlene P Catterlin, DO   bumetanide (BUMEX) 1 MG tablet Take 1 tablet by mouth every other day 8/19/20   Harvinder Velez, DO   insulin lispro (ADMELOG SOLOSTAR) 100 UNIT/ML pen As directed per sliding scale up to 13 units 4 times per day. 4/15/19   Charlene P Catterlin, DO   Blood Pressure KIT 1 kit by Does not apply route daily 10/10/18   Radhika Coil Agustin, DO   Misc. Devices (QUAD CANE) MISC 1 Quad cane 10/10/18   Charlene P Catterlin, DO   Multiple Vitamins-Minerals (THERAPEUTIC MULTIVITAMIN-MINERALS) tablet Take 1 tablet by mouth daily    Historical Provider, MD   B Complex Vitamins (B COMPLEX 1 PO) Take 1 tablet by mouth daily.       Historical Provider, MD       Current medications:    Current Outpatient Medications   Medication Sig Dispense Refill    sertraline (ZOLOFT) 100 MG tablet TAKE 1 TABLET BY MOUTH DAILY AS NEEDED FOR DEPRESSION 30 tablet 10    blood glucose test strips (ONETOUCH ULTRA) strip USE TO TEST BLOOD SUGAR 4 TIMES DAILY AS DIRECTED 100 each 1    gabapentin (NEURONTIN) 300 MG capsule Take 1 capsule by mouth daily for 60 days.  (Patient taking differently: Take 300 mg by mouth at bedtime.) 60 capsule 0    rosuvastatin (CRESTOR) 5 MG tablet Take 5 mg by mouth daily      latanoprost (XALATAN) 0.005 % ophthalmic solution INSTILL 1 DROP IN EACH EYE EVERY NIGHT AT BEDTIME      nitroGLYCERIN (NITROSTAT) 0.4 MG SL tablet Place 1 tablet under the tongue every 5 minutes as needed for Chest pain (Patient taking differently: Place 0.4 mg under the tongue every 5 minutes as needed for Chest pain 11/22- Dr. Sofia Ayon notified at the time and no testing down.) 25 tablet 1    Alcohol Swabs (EASY TOUCH ALCOHOL PREP MEDIUM) 70 % PADS USE AS DIRECTED PRIOR TO GLUCOSE TESTING AND INSULIN INJECTION FOUR TIMES A  each 3    BASAGLAR KWIKPEN 100 UNIT/ML injection pen INJECT 50 UNITS SUBCUTANEOUSLY NIGHTLY (Patient taking differently: 1/2 dose evening pre-op only) 15 mL 10    prednisoLONE acetate (PRED FORTE) 1 % ophthalmic suspension instill 1 by Ophthalmic route 4 times a day for 1 week, Operated eye (Patient not taking: Reported on 2/13/2023)      metoprolol succinate (TOPROL XL) 25 MG extended release tablet TAKE 1/2 TABLET BY MOUTH DAILY (Patient taking differently: am) 45 tablet 0    pramipexole (MIRAPEX) 0.125 MG tablet TAKE 2 TABLETS BY MOUTH EVERY NIGHT 180 tablet 1    phenazopyridine (PYRIDIUM) 100 MG tablet TAKE 1 TABLET BY MOUTH 3 TIMES DAILY AS NEEDED FOR PAIN 18 tablet 10    metFORMIN (GLUCOPHAGE) 500 MG tablet Take 2 tablets by mouth 2 times daily (with meals) TAKE TWO (2) TABLETS BY MOUTH TWICE DAILY WITH MEALS *EMERGENCY REFILL* 360 tablet 1    Lancets (ONETOUCH DELICA PLUS [id number]) MISC USE TO TEST BLOOD SUGAR 4 TIMES DAILY 100 each 5    empagliflozin (JARDIANCE) 25 MG tablet Take 1 tablet by mouth daily (Patient taking differently: Take 25 mg by mouth daily am) 30 tablet 3    lactobacillus (CULTURELLE) CAPS capsule TAKE 1 CAPSULE BY MOUTH DAILY (Patient not taking: No sig reported) 90 capsule 0    anastrozole (ARIMIDEX) 1 MG tablet Take 1 tablet by mouth daily 30 tablet 5    omeprazole (PRILOSEC) 40 MG delayed release capsule TAKE 1 CAPSULE BY MOUTH DAILY (Patient taking differently: Take 40 mg by mouth daily am) 30 capsule 10    Dulaglutide (TRULICITY) 1.5 NG/8.1CA SOPN INJECT THE CONTENTS OF 1 SYRINGE SUBCUTANEOUSLY EVERY WEEK *PATIENT NEEDS APPOINTMENT* (Patient taking differently: once a week Every Sunday) 6 mL 10    hydrocortisone 2.5 % cream APPLY TOPICALLY TO AFFECTED AREA TWICE DAILY 30 g 10    diclofenac sodium (VOLTAREN) 1 % GEL Apply 2 g topically 4 times daily 60 g 2    sucralfate (CARAFATE) 1 GM tablet Take 1 tablet by mouth 4 times daily (Patient not taking: Reported on 3/9/2023) 120 tablet 3    Blood Glucose Monitoring Suppl (ONE TOUCH ULTRA 2) w/Device KIT Test 3 times a day & as needed for symptoms of irregular blood glucose. Dispense sufficient amount for indicated testing frequency plus additional to accommodate PRN testing needs. 1 kit 0    Cholecalciferol (VITAMIN D) 50 MCG (2000 UT) CAPS capsule Take by mouth daily      acetaminophen (TYLENOL) 500 MG tablet Take 1 tablet by mouth 4 times daily as needed for Pain 120 tablet 0    B-D ULTRAFINE III SHORT PEN 31G X 8 MM MISC USE WITH INSULIN PENS AS DIRECTED 100 each 10    Insulin Syringe-Needle U-100 ([employer] INSULIN SYR 1CC/30G) 30G X 5/16\" 1 ML MISC 1 each by Does not apply route 4 times daily 120 each 5    bumetanide (BUMEX) 1 MG tablet Take 1 tablet by mouth every other day 90 tablet 1    insulin lispro (ADMELOG SOLOSTAR) 100 UNIT/ML pen As directed per sliding scale up to 13 units 4 times per day. 5 pen 3    Blood Pressure KIT 1 kit by Does not apply route daily 1 kit 0    Misc. Devices (QUAD CANE) MISC 1 Quad cane 1 each 0    Multiple Vitamins-Minerals (THERAPEUTIC MULTIVITAMIN-MINERALS) tablet Take 1 tablet by mouth daily      B Complex Vitamins (B COMPLEX 1 PO) Take 1 tablet by mouth daily.          No current facility-administered medications for this visit. Allergies:  No Known Allergies    Problem List:    Patient Active Problem List   Diagnosis Code    Type II diabetes mellitus with peripheral autonomic neuropathy (HCC) E11.43    Neuropathy G62.9    Gastroesophageal reflux disease without esophagitis K21.9    Retinopathy H35.00    Anxiety F41.9    Irritable bowel syndrome K58.9    Mixed hyperlipidemia E78.2    Fatigue R53.83    Lumbar pain M54.50    Dermatitis L30.9    Melena K92.1    RLS (restless legs syndrome) G25.81    Essential hypertension I10    Encounter for assessment of STD exposure Z76.89    Malignant neoplasm of left female breast ([facility] [address] C50.912    Invasive ductal carcinoma of left breast (HCC) C50.912    Immunization due Z23    Age-related cataract of both eyes H25.9    Vitamin D deficiency E55.9    Dysuria R30.0    Acute cystitis without hematuria N30.00    Stable proliferative diabetic retinopathy associated with type 2 diabetes mellitus ([address] R87.3309    Uncontrolled type 2 diabetes mellitus with hyperglycemia (HCC) E11.65    Pain of right hand M79.641    Acute bacterial sinusitis J01.90, B96.89    Atypical chest pain R07.89    Coronary artery disease involving native heart without angina pectoris I25.10    Drug therapy Z79.899    Possible exposure to STD Z20.2       Past Medical History:        Diagnosis Date    Anesthesia complication     problem with blood pressure up & down    Arthritis     lower back    Back pain     Blind     right eye    Cancer ([address] 2021    left breast    Cardiac valve prolapse     valve ?     Diabetes mellitus ([address]     IDDM    History of therapeutic radiation     Hyperlipidemia     Hypertension     Neuropathy due to secondary diabetes ([address]     in marcial feet    Nontraumatic tear of right rotator cuff 11/30/2020    Prolonged emergence from general anesthesia     Psoriasis        Past Surgical History:        Procedure Laterality Date    ANKLE SURGERY      bilateral tarsal tunnels    BREAST BIOPSY Left 2021    LEFT BREAST NEEDLE LOCALIZED LUMPECTOMY, BLUE DYE INJECTION, LEFT AXILLARY SENTINEL LYMPHNODE EXCISION, POSSIBLE LEFT AXILLARY DISSECTION performed by Leonidas Sanchez MD at . [address] LUMPECTOMY Left     2021    CARDIAC SURGERY      cardiac catheterization-  \" years ago - over 10 years and negative\"; to see Dr. Kevin Prado 3/13/23    CARPAL [place]      left    CARPAL TUNNEL RELEASE  2012    right    CATARACT REMOVAL Bilateral 2022     SECTION      CHOLECYSTECTOMY, LAPAROSCOPIC N/A 2019    LAPAROSCOPIC CHOLECYSTECTOMY WITH IOC performed by Estrada Bates MD at [address]  had extremely high blood pressure and hard to wake up    COLONOSCOPY N/A 2019    COLORECTAL CANCER SCREENING, NOT HIGH RISK performed by Estrada Bates MD at [address] [facility] [address], [address] ([address])      SHOULDER ARTHROSCOPY Left 2016    subacromin decompression and debridement    SHOULDER ARTHROSCOPY Right 2020    RIGHT SHOULDER ARTHROSCOPY, SUBACROMIAL DECOMPRESSION, LABRIAL DEBRIDEMENT, CHONDROPLASTY, RAÚL performed by López James DO at [address] 2019    EGD BIOPSY performed by Estrada Bates MD at [address] [facility] LEFT  2020     BREAST NEEDLE BIOPSY LEFT 2020 [facility] [address]       Social History:    Social History     Tobacco Use    Smoking status: Never    Smokeless tobacco: Never   Substance Use Topics    Alcohol use: Yes     Alcohol/week: 1.0 standard drink     Types: 1 Glasses of wine per week     Comment: 1 drink per week                                Counseling given: Not Answered      Vital Signs (Current): There were no vitals filed for this visit.                                            BP Readings from Last 3 Encounters:   23 130/76   02/09/23 124/68   01/08/23 (!) 156/83       NPO Status:                                                                                 BMI:   Wt Readings from Last 3 Encounters:   02/13/23 168 lb (76.2 kg)   02/09/23 167 lb (75.8 kg)   01/08/23 170 lb (77.1 kg)     There is no height or weight on file to calculate BMI.    CBC:   Lab Results   Component Value Date/Time    WBC 8.2 01/27/2023 02:00 PM    RBC 4.44 01/27/2023 02:00 PM    HGB 12.5 01/27/2023 02:00 PM    HCT 41.0 01/27/2023 02:00 PM    MCV 92.3 01/27/2023 02:00 PM    RDW 14.6 01/27/2023 02:00 PM     01/27/2023 02:00 PM       CMP:   Lab Results   Component Value Date/Time     01/27/2023 02:00 PM    K 4.8 01/27/2023 02:00 PM    K 4.3 11/12/2020 11:01 AM     01/27/2023 02:00 PM    CO2 26 01/27/2023 02:00 PM    BUN 8 01/27/2023 02:00 PM    CREATININE 0.7 01/27/2023 02:00 PM    GFRAA >60 08/24/2022 11:11 AM    LABGLOM >60 01/27/2023 02:00 PM    GLUCOSE 126 01/27/2023 02:00 PM    GLUCOSE 194 03/30/2012 08:52 AM    PROT 7.3 01/27/2023 02:00 PM    CALCIUM 10.1 01/27/2023 02:00 PM    BILITOT <0.2 01/27/2023 02:00 PM    ALKPHOS 122 01/27/2023 02:00 PM    AST 25 01/27/2023 02:00 PM    ALT 22 01/27/2023 02:00 PM       POC Tests: No results for input(s): POCGLU, POCNA, POCK, POCCL, POCBUN, POCHEMO, POCHCT in the last 72 hours.     Coags:   Lab Results   Component Value Date/Time    PROTIME 10.7 03/03/2017 08:50 AM    INR 0.9 03/03/2017 08:50 AM    APTT 20.0 07/29/2014 06:55 AM       HCG (If Applicable): No results found for: PREGTESTUR, PREGSERUM, HCG, HCGQUANT     ABGs: No results found for: PHART, PO2ART, UBT9LIY, CTT1UOZ, BEART, X4BVYIRK     Type & Screen (If Applicable):  No results found for: LABABO, LABRH    Drug/Infectious Status (If Applicable):  No results found for: HIV, HEPCAB    COVID-19 Screening (If Applicable):   Lab Results   Component Value Date/Time    COVID19 Not Detected 01/08/2023 06:48 PM    COVID19 Not Detected 02/12/2021 12:23 PM     CXR 1/12/21  FINDINGS:   The lungs are without acute focal process.  There is no effusion or   pneumothorax. The cardiomediastinal silhouette is without acute process. The   osseous structures are without acute process.       Impression   No acute process. Anesthesia Evaluation  Patient summary reviewed and Nursing notes reviewed no history of anesthetic complications:   Airway: Mallampati: II  TM distance: >3 FB   Neck ROM: full  Mouth opening: > = 3 FB   Dental:      Comment: Extremely poor dentition    Pulmonary:Negative Pulmonary ROS breath sounds clear to auscultation                             Cardiovascular:    (+) hypertension:, valvular problems/murmurs (Cardiac valve prolapse):, CAD:, hyperlipidemia        Rhythm: regular                      Neuro/Psych:   (+) neuromuscular disease (diabetic neuropathy and retinopathy):, depression/anxiety              ROS comment: RLS (restless legs syndrome)  glaucoma GI/Hepatic/Renal:   (+) GERD: well controlled,           Endo/Other:    (+) DiabetesType II DM, , : arthritis: OA., malignancy/cancer (breast ca). ROS comment: Obese  Psoriasis Abdominal:             Vascular: negative vascular ROS. Other Findings:             Anesthesia Plan      MAC     ASA 3     (20g L hand)  Induction: intravenous. continuous noninvasive hemodynamic monitor    Anesthetic plan and risks discussed with patient. Plan discussed with CRNA. Roman Reyez MD   3/14/2023    Chart Review:  Chart reviewed per routine on March 14, 2023 at 6:15 AM by Roman Reyez MD.  (Final assessment and plan per day of surgery team.)    DOS STAFF ADDENDUM:    Pt seen and examined, chart reviewed (including anesthesia, drug and allergy history). Anesthetic plan, risks, benefits, alternatives, and personnel involved discussed with patient. Patient verbalized an understanding and agrees to proceed.   Plan discussed with care team members and agreed upon.     Diana Leal MD  Staff Anesthesiologist  8:00 AM

## 2023-03-14 NOTE — H&P
History and Physical    Patient's Name/Date of Birth: Jenny Lilly / 1970 (53 y.o.)    Date: March 14, 2023     Chief Complaint: right thumb and hand pain .    HPI:    This is a 53 years of age, right-hand-dominant, white female, who has had problems with pain and paresthesias of the right upper extremity for a couple of years.  I did provide the initial evaluation on Cristina, at our [place] office on 01/17/2023.  Patient was originally seen by Dr. Delgado, orthopedic surgeon, and then sent to Dr. Herron, new hand surgeon in the area for possible basal joint arthritis.  I did think that the patient had basal joint arthritis but this was not her #1 priority for her symptoms and signs.  I did feel like her nerve compressions both the median nerve at the wrist and the ulnar nerve at the elbow or her priorities.  She is also noted to have a severe right ring finger trigger, in combination with a right wrist first dorsal compartment syndrome.  I have gone through the different treatment options, including non-surgical ones.  We did obtain verbal/written informed consent for the outpatient combined hand wrist and elbow surgeries, while the patient was in the office.  All questions were answered at that time.    Past Medical History:   Diagnosis Date    Anesthesia complication     problem with blood pressure up & down    Arthritis     lower back    Back pain     Blind     right eye    Cancer (HCC) 2021    left breast    Cardiac valve prolapse     valve ?    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 marcial feet    Nontraumatic tear of right rotator cuff 11/30/2020    Prolonged emergence from general anesthesia     Psoriasis        Past Surgical History:   Procedure Laterality Date    ANKLE SURGERY      bilateral tarsal tunnels    BREAST BIOPSY Left 02/17/2021    LEFT BREAST NEEDLE LOCALIZED LUMPECTOMY, BLUE  DYE INJECTION, LEFT AXILLARY SENTINEL LYMPHNODE EXCISION, POSSIBLE LEFT AXILLARY DISSECTION performed by Ninfa Iverson MD at [facility] LUMPECTOMY Left     2021    CARDIAC SURGERY      cardiac catheterization-  \" years ago - over 10 years and negative\"; to see Dr. Gretta Patrick 3/13/23    CARPAL TUNNEL RELEASE      left    CARPAL TUNNEL RELEASE  2012    right    CATARACT REMOVAL Bilateral 2022     SECTION      CHOLECYSTECTOMY, LAPAROSCOPIC N/A 2019    LAPAROSCOPIC CHOLECYSTECTOMY WITH IOC performed by Mikie Tran MD at [address]  had extremely high blood pressure and hard to wake up    COLONOSCOPY N/A 2019    COLORECTAL CANCER SCREENING, NOT HIGH RISK performed by Mikie Tran MD at [facility], [address] ([address] [facility]      SHOULDER ARTHROSCOPY Left 2016    subacromin decompression and debridement    SHOULDER ARTHROSCOPY Right 2020    RIGHT SHOULDER ARTHROSCOPY, SUBACROMIAL DECOMPRESSION, LABRIAL DEBRIDEMENT, CHONDROPLASTY, RAÚL performed by Rupali Maguire DO at [address] 2019    EGD BIOPSY performed by Mikie Tran MD at [address] LEFT  2020     BREAST NEEDLE BIOPSY LEFT 2020 [facility] [address]       Prior to Admission medications    Medication Sig Start Date End Date Taking? Authorizing Provider   sertraline (ZOLOFT) 100 MG tablet TAKE 1 TABLET BY MOUTH DAILY AS NEEDED FOR DEPRESSION 23   Veldon Que P Catterlin, DO   blood glucose test strips (ONETOUCH ULTRA) strip USE TO TEST BLOOD SUGAR 4 TIMES DAILY AS DIRECTED 23   Charlene P Catterlin, DO   gabapentin (NEURONTIN) 300 MG capsule Take 1 capsule by mouth daily for 60 days. Patient taking differently: Take 300 mg by mouth at bedtime.  23  Charlene P Catterlin, DO   rosuvastatin (CRESTOR) 5 MG tablet Take 5 mg by mouth daily 23   Historical Provider, MD latanoprost (XALATAN) 0.005 % ophthalmic solution INSTILL 1 DROP IN [facility] EYE EVERY NIGHT AT BEDTIME 1/18/23   Historical Provider, MD   nitroGLYCERIN (NITROSTAT) 0.4 MG SL tablet Place 1 tablet under the tongue every 5 minutes as needed for Chest pain  Patient taking differently: Place 0.4 mg under the tongue every 5 minutes as needed for Chest pain 11/22- Dr. Gretta Patrick notified at the time and no testing down.  1/25/23   Charlene P Froilanlin, DO   Alcohol Swabs (EASY TOUCH ALCOHOL PREP MEDIUM) 70 % PADS USE AS DIRECTED PRIOR TO GLUCOSE TESTING AND INSULIN INJECTION FOUR TIMES A DAY 1/20/23   Charleneyumiko Velez, DO   BASAGLAR KWIKPEN 100 UNIT/ML injection pen INJECT 50 UNITS SUBCUTANEOUSLY NIGHTLY  Patient taking differently: 1/2 dose evening pre-op only 1/13/23   Charlene P Agustin, DO   prednisoLONE acetate (PRED FORTE) 1 % ophthalmic suspension instill 1 by Ophthalmic route 4 times a day for 1 week, Operated eye  Patient not taking: Reported on 2/13/2023 12/6/21   Historical Provider, MD   metoprolol succinate (TOPROL XL) 25 MG extended release tablet TAKE 1/2 TABLET BY MOUTH DAILY  Patient taking differently: am 12/12/22   Genaro Velez, DO   pramipexole (MIRAPEX) 0.125 MG tablet TAKE 2 TABLETS BY MOUTH EVERY NIGHT 10/28/22   Charlene P Agustin, DO   phenazopyridine (PYRIDIUM) 100 MG tablet TAKE 1 TABLET BY MOUTH 3 TIMES DAILY AS NEEDED FOR PAIN 9/28/22   Genaro Velez, DO   metFORMIN (GLUCOPHAGE) 500 MG tablet Take 2 tablets by mouth 2 times daily (with meals) TAKE TWO (2) TABLETS BY MOUTH TWICE DAILY WITH MEALS *EMERGENCY REFILL* 9/26/22 3/9/23  Charleneyumiko Velez DO   Lancets (ONETOUCH DELICA PLUS [id number]) MISC USE TO TEST BLOOD SUGAR 4 TIMES DAILY 9/21/22   Charlene P Catterlin, DO   empagliflozin (JARDIANCE) 25 MG tablet Take 1 tablet by mouth daily  Patient taking differently: Take 25 mg by mouth daily am 9/14/22   Claire Buggy Catterlin, DO   lactobacillus (CULTURELLE) CAPS capsule TAKE 1 CAPSULE BY MOUTH DAILY  Patient not taking: No sig reported 8/29/22   Altamese Pila Catterlin, DO   anastrozole (ARIMIDEX) 1 MG tablet Take 1 tablet by mouth daily 8/24/22   Marcia Garland MD   omeprazole (PRILOSEC) 40 MG delayed release capsule TAKE 1 CAPSULE BY MOUTH DAILY  Patient taking differently: Take 40 mg by mouth daily am 8/5/22 3/9/23  Prudy Dopp P Catterlin, DO   Dulaglutide (TRULICITY) 1.5 JY/2.6FJ SOPN INJECT THE CONTENTS OF 1 SYRINGE SUBCUTANEOUSLY EVERY WEEK *PATIENT NEEDS APPOINTMENT*  Patient taking differently: once a week Every Sunday 4/25/22   Charlene P Catterlin, DO   hydrocortisone 2.5 % cream APPLY TOPICALLY TO AFFECTED AREA TWICE DAILY 4/18/22   Altamese Pila Catterlin, DO   diclofenac sodium (VOLTAREN) 1 % GEL Apply 2 g topically 4 times daily 10/29/21   Caren Drew, DO   sucralfate (CARAFATE) 1 GM tablet Take 1 tablet by mouth 4 times daily  Patient not taking: Reported on 3/9/2023 8/4/21   Prudy Dopp P Catterlin, DO   Blood Glucose Monitoring Suppl (ONE TOUCH ULTRA 2) w/Device KIT Test 3 times a day & as needed for symptoms of irregular blood glucose. Dispense sufficient amount for indicated testing frequency plus additional to accommodate PRN testing needs.  6/17/21   Charlene P Catterlin, DO   Cholecalciferol (VITAMIN D) 50 MCG (2000 UT) CAPS capsule Take by mouth daily    Historical Provider, MD   acetaminophen (TYLENOL) 500 MG tablet Take 1 tablet by mouth 4 times daily as needed for Pain 2/17/21   Job COLLINS Gialexiafrcristopher, DO   B-D ULTRAFINE III SHORT PEN 31G X 8 MM MISC USE WITH INSULIN PENS AS DIRECTED 11/17/20   Atul Velez, DO   Insulin Syringe-Needle U-100 ([employer] INSULIN SYR 1CC/30G) 30G X 5/16\" 1 ML MISC 1 each by Does not apply route 4 times daily 8/19/20   Prudy Dopp P Catterlin, DO   bumetanide (BUMEX) 1 MG tablet Take 1 tablet by mouth every other day 8/19/20   Prudy Dopp P Catterlin, DO   insulin lispro (ADMELOG SOLOSTAR) 100 UNIT/ML pen As directed per sliding scale up to 13 units 4 times per day. 4/15/19   Moris Situ Catterlin, DO   Blood Pressure KIT 1 kit by Does not apply route daily 10/10/18   Moris Situ Catterlin, DO   Misc. Devices (QUAD CANE) MISC 1 Quad cane 10/10/18   Charlene P Catterlin, DO   Multiple Vitamins-Minerals (THERAPEUTIC MULTIVITAMIN-MINERALS) tablet Take 1 tablet by mouth daily    Historical Provider, MD   B Complex Vitamins (B COMPLEX 1 PO) Take 1 tablet by mouth daily. Historical Provider, MD       Patient has no known allergies. Family History   Problem Relation Age of Onset    Heart Disease Mother     Diabetes Mother     Heart Disease Father     Diabetes Father     Cancer Father         prostate, colon, skin cance behind ear    Breast Cancer Maternal Aunt 54        breast    Cancer Maternal Uncle 62        colon    Cancer Paternal Aunt 58        breast    Cancer Other 62        maternal great aunt - breast       Social History     Socioeconomic History    Marital status:      Spouse name: Not on file    Number of children: 2    Years of education: Not on file    Highest education level: Not on file   Occupational History    Not on file   Tobacco Use    Smoking status: Never    Smokeless tobacco: Never   Vaping Use    Vaping Use: Never used   Substance and Sexual Activity    Alcohol use: Yes     Alcohol/week: 1.0 standard drink     Types: 1 Glasses of wine per week     Comment: 1 drink per week    Drug use: No    Sexual activity: Not on file   Other Topics Concern    Not on file   Social History Narrative    Not on file     Social Determinants of Health     Financial Resource Strain: Low Risk     Difficulty of Paying Living Expenses: Not hard at all   Food Insecurity: No Food Insecurity    Worried About [address] in the Last Year: Never true    [address] in the Last Year: Never true   Transportation Needs: Unknown    Lack of Transportation (Medical): Not on file    Lack of Transportation (Non-Medical):  No   Physical Activity: Not on file   Stress: Not on file   Social Connections: Not on file   Intimate Partner Violence: Not on file   Housing Stability: Unknown    Unable to Pay for Housing in the Last Year: Not on file    Number of [place] in the Last Year: Not on file    Unstable Housing in the Last Year: No       Review of Systems:   CONSTITUTIONAL:  negative  EYES:  negative  HEENT:  negative  RESPIRATORY:  negative  CARDIOVASCULAR:  negative  GASTROINTESTINAL:  negative  GENITOURINARY:  negative  INTEGUMENT/BREAST:  negative  HEMATOLOGIC/LYMPHATIC:  negative  ALLERGIC/IMMUNOLOGIC:  negative  ENDOCRINE:  negative  MUSCULOSKELETAL:  negative  NEUROLOGICAL:  negative  BEHAVIOR/PSYCH:  negative    Physical Exam:  Vitals:    03/09/23 1206   Weight: 168 lb (76.2 kg)   Height: 4' 11\" (1.499 m)       CONSTITUTIONAL:  awake, alert, cooperative, no apparent distress, and appears stated age  EYES:  Lids and lashes normal, pupils equal, round and reactive to light, extra ocular muscles intact, sclera clear, conjunctiva normal  ENT:  Normocephalic, without obvious abnormality, atraumatic, sinuses nontender on palpation, external ears without lesions, oral pharynx with moist mucus membranes, tonsils without erythema or exudates, gums normal and good dentition. NECK:  Supple, symmetrical, trachea midline, no adenopathy, thyroid symmetric, not enlarged and no tenderness, skin normal  HEMATOLOGIC/LYMPHATICS:  no cervical lymphadenopathy  BACK:  Symmetric, no curvature, spinous processes are non-tender on palpation, paraspinous muscles are non-tender on palpation, no costal vertebral tenderness  LUNGS:  No increased work of breathing, good air exchange, clear to auscultation bilaterally, no crackles or wheezing  CARDIOVASCULAR:  normal S1 and S2  ABDOMEN:  deferred  CHEST/BREASTS:  deferred  GENITAL/URINARY:  deferred  MUSCULOSKELETAL:  + right thumb grind test, moderate  + right ring finger- trigger  NEUROLOGIC:  Awake, alert, oriented to name, place and time.   Cranial nerves II-XII are grossly intact. Motor is 5 out of 5 bilaterally. Cerebellar finger to nose, heel to shin intact. Sensory deficit.  gait is normal.  + right Median nerve compression test, severe    SKIN:  no bruising or bleeding    Assessment:  Active Problems:    * No active hospital problems. *  Resolved Problems:    * No resolved hospital problems.  *  Right Hand- Carpal Tunnel Syndrome  Right Elbow- Cubital Tunnel Syndrome  Right ring finger- trigger  Right Wrist- First Dorsal Compartment Syndrome    Plan:  Right Hand- Carpal Tunnel Release  Right Elbow- Cubital Tunnel Release  Right ring finger- A1 pulley release  Right Wrist- First Dorsal Compartment Release    Electronically signed by Derek Lei MD on 3/14/23 at 7:29 AM EDT

## 2023-03-15 ENCOUNTER — HOSPITAL ENCOUNTER (OUTPATIENT)
Age: 53
Setting detail: OUTPATIENT SURGERY
Discharge: HOME | End: 2023-03-15
Attending: SURGERY | Admitting: SURGERY
Payer: MEDICAID

## 2023-03-15 ENCOUNTER — ANESTHESIA (OUTPATIENT)
Dept: OPERATING ROOM | Age: 53
End: 2023-03-15
Payer: MEDICAID

## 2023-03-15 VITALS
BODY MASS INDEX: 33.87 KG/M2 | SYSTOLIC BLOOD PRESSURE: 123 MMHG | HEIGHT: 59 IN | DIASTOLIC BLOOD PRESSURE: 70 MMHG | OXYGEN SATURATION: 94 % | TEMPERATURE: 96.8 F | HEART RATE: 99 BPM | RESPIRATION RATE: 16 BRPM | WEIGHT: 168 LBS

## 2023-03-15 DIAGNOSIS — G56.21 CUBITAL TUNNEL SYNDROME ON RIGHT: ICD-10-CM

## 2023-03-15 DIAGNOSIS — G56.01 CARPAL TUNNEL SYNDROME ON RIGHT: Primary | ICD-10-CM

## 2023-03-15 LAB — METER GLUCOSE: 102 MG/DL (ref 74–99)

## 2023-03-15 PROCEDURE — 2500000003 HC RX 250 WO HCPCS: Performed by: SURGERY

## 2023-03-15 PROCEDURE — 2580000003 HC RX 258: Performed by: ANESTHESIOLOGY

## 2023-03-15 PROCEDURE — 7100000011 HC PHASE II RECOVERY - ADDTL 15 MIN: Performed by: SURGERY

## 2023-03-15 PROCEDURE — 2720000010 HC SURG SUPPLY STERILE: Performed by: SURGERY

## 2023-03-15 PROCEDURE — 3700000000 HC ANESTHESIA ATTENDED CARE: Performed by: SURGERY

## 2023-03-15 PROCEDURE — 2709999900 HC NON-CHARGEABLE SUPPLY: Performed by: SURGERY

## 2023-03-15 PROCEDURE — 6360000002 HC RX W HCPCS: Performed by: NURSE ANESTHETIST, CERTIFIED REGISTERED

## 2023-03-15 PROCEDURE — 6370000000 HC RX 637 (ALT 250 FOR IP): Performed by: SURGERY

## 2023-03-15 PROCEDURE — 7100000010 HC PHASE II RECOVERY - FIRST 15 MIN: Performed by: SURGERY

## 2023-03-15 PROCEDURE — 3600000003 HC SURGERY LEVEL 3 BASE: Performed by: SURGERY

## 2023-03-15 PROCEDURE — 82962 GLUCOSE BLOOD TEST: CPT

## 2023-03-15 PROCEDURE — 3700000001 HC ADD 15 MINUTES (ANESTHESIA): Performed by: SURGERY

## 2023-03-15 PROCEDURE — 3600000013 HC SURGERY LEVEL 3 ADDTL 15MIN: Performed by: SURGERY

## 2023-03-15 RX ORDER — SODIUM CHLORIDE 0.9 % (FLUSH) 0.9 %
5-40 SYRINGE (ML) INJECTION PRN
Status: DISCONTINUED | OUTPATIENT
Start: 2023-03-15 | End: 2023-03-15 | Stop reason: HOSPADM

## 2023-03-15 RX ORDER — SODIUM CHLORIDE, SODIUM LACTATE, POTASSIUM CHLORIDE, CALCIUM CHLORIDE 600; 310; 30; 20 MG/100ML; MG/100ML; MG/100ML; MG/100ML
INJECTION, SOLUTION INTRAVENOUS CONTINUOUS
Status: DISCONTINUED | OUTPATIENT
Start: 2023-03-15 | End: 2023-03-15 | Stop reason: HOSPADM

## 2023-03-15 RX ORDER — PROPOFOL 10 MG/ML
INJECTION, EMULSION INTRAVENOUS PRN
Status: DISCONTINUED | OUTPATIENT
Start: 2023-03-15 | End: 2023-03-15 | Stop reason: SDUPTHER

## 2023-03-15 RX ORDER — ONDANSETRON 2 MG/ML
4 INJECTION INTRAMUSCULAR; INTRAVENOUS
Status: DISCONTINUED | OUTPATIENT
Start: 2023-03-15 | End: 2023-03-15 | Stop reason: HOSPADM

## 2023-03-15 RX ORDER — SODIUM CHLORIDE 9 MG/ML
INJECTION, SOLUTION INTRAVENOUS PRN
Status: DISCONTINUED | OUTPATIENT
Start: 2023-03-15 | End: 2023-03-15 | Stop reason: HOSPADM

## 2023-03-15 RX ORDER — FENTANYL CITRATE 0.05 MG/ML
25 INJECTION, SOLUTION INTRAMUSCULAR; INTRAVENOUS EVERY 5 MIN PRN
Status: DISCONTINUED | OUTPATIENT
Start: 2023-03-15 | End: 2023-03-15 | Stop reason: HOSPADM

## 2023-03-15 RX ORDER — SODIUM CHLORIDE 0.9 % (FLUSH) 0.9 %
5-40 SYRINGE (ML) INJECTION EVERY 12 HOURS SCHEDULED
Status: DISCONTINUED | OUTPATIENT
Start: 2023-03-15 | End: 2023-03-15 | Stop reason: HOSPADM

## 2023-03-15 RX ORDER — FENTANYL CITRATE 50 UG/ML
INJECTION, SOLUTION INTRAMUSCULAR; INTRAVENOUS PRN
Status: DISCONTINUED | OUTPATIENT
Start: 2023-03-15 | End: 2023-03-15 | Stop reason: SDUPTHER

## 2023-03-15 RX ORDER — MIDAZOLAM HYDROCHLORIDE 1 MG/ML
INJECTION INTRAMUSCULAR; INTRAVENOUS PRN
Status: DISCONTINUED | OUTPATIENT
Start: 2023-03-15 | End: 2023-03-15 | Stop reason: SDUPTHER

## 2023-03-15 RX ORDER — GINSENG 100 MG
CAPSULE ORAL PRN
Status: DISCONTINUED | OUTPATIENT
Start: 2023-03-15 | End: 2023-03-15 | Stop reason: ALTCHOICE

## 2023-03-15 RX ORDER — HYDROCODONE BITARTRATE AND ACETAMINOPHEN 7.5; 325 MG/1; MG/1
1 TABLET ORAL EVERY 6 HOURS PRN
Qty: 12 TABLET | Refills: 0 | Status: SHIPPED | OUTPATIENT
Start: 2023-03-15 | End: 2023-03-18

## 2023-03-15 RX ADMIN — FENTANYL CITRATE 50 MCG: 50 INJECTION INTRAMUSCULAR; INTRAVENOUS at 09:55

## 2023-03-15 RX ADMIN — MIDAZOLAM 2 MG: 1 INJECTION INTRAMUSCULAR; INTRAVENOUS at 09:15

## 2023-03-15 RX ADMIN — FENTANYL CITRATE 50 MCG: 50 INJECTION INTRAMUSCULAR; INTRAVENOUS at 09:22

## 2023-03-15 RX ADMIN — PROPOFOL INJECTABLE EMULSION 50 MG: 10 INJECTION, EMULSION INTRAVENOUS at 10:36

## 2023-03-15 RX ADMIN — FENTANYL CITRATE 50 MCG: 50 INJECTION INTRAMUSCULAR; INTRAVENOUS at 10:32

## 2023-03-15 RX ADMIN — SODIUM CHLORIDE, POTASSIUM CHLORIDE, SODIUM LACTATE AND CALCIUM CHLORIDE: 600; 310; 30; 20 INJECTION, SOLUTION INTRAVENOUS at 07:34

## 2023-03-15 RX ADMIN — FENTANYL CITRATE 50 MCG: 50 INJECTION INTRAMUSCULAR; INTRAVENOUS at 09:30

## 2023-03-15 RX ADMIN — PROPOFOL INJECTABLE EMULSION 50 MG: 10 INJECTION, EMULSION INTRAVENOUS at 09:23

## 2023-03-15 RX ADMIN — PROPOFOL INJECTABLE EMULSION 50 MG: 10 INJECTION, EMULSION INTRAVENOUS at 09:22

## 2023-03-15 RX ADMIN — PROPOFOL INJECTABLE EMULSION 80 MCG/KG/MIN: 10 INJECTION, EMULSION INTRAVENOUS at 09:24

## 2023-03-15 ASSESSMENT — PAIN SCALES - GENERAL
PAINLEVEL_OUTOF10: 4
PAINLEVEL_OUTOF10: 0

## 2023-03-15 ASSESSMENT — PAIN DESCRIPTION - ORIENTATION: ORIENTATION: RIGHT

## 2023-03-15 ASSESSMENT — PAIN DESCRIPTION - DESCRIPTORS
DESCRIPTORS: ACHING
DESCRIPTORS: ACHING

## 2023-03-15 ASSESSMENT — PAIN - FUNCTIONAL ASSESSMENT: PAIN_FUNCTIONAL_ASSESSMENT: 0-10

## 2023-03-15 ASSESSMENT — PAIN DESCRIPTION - LOCATION: LOCATION: ELBOW

## 2023-03-15 NOTE — ANESTHESIA POSTPROCEDURE EVALUATION
Department of Anesthesiology  Postprocedure Note    Patient: Jenny Lilly  MRN: [id number]  YOB: 1970  Date of evaluation: 3/15/2023      Procedure Summary     Date: 03/15/23 Room / Location: [address]    Anesthesia Start: 0915 Anesthesia Stop: 1125    Procedures:       RIGHT HAND CARPAL TUNNEL RELEASE,  RIGHT ELBOW CUBITAL TUNNEL RELEASE,RIGHT RING FINGER A, PULLEY RELEASE, RIGHT WRIST FIRST DORSAL COMPARTMENT RELEASE ([id number]) (Right: Hand)      RIGHT WRIST FIRST DORSAL COMPARTMENT RELEASE (Right: Wrist) Diagnosis:       Carpal tunnel syndrome on right      Cubital canal compression, right      Trigger finger, right ring finger      De Quervain's tenosynovitis      (Carpal tunnel syndrome on right [G56.01])      (Cubital canal compression, right [G56.21])      (Trigger finger, right ring finger [M65.341])      (])    Surgeons: Ivan Martin MD Responsible Provider: Vida Grubbs MD    Anesthesia Type: MAC ASA Status: 3          Anesthesia Type: MAC    Radha Phase I: Radha Score: 10    Radha Phase II:        Anesthesia Post Evaluation    Patient location during evaluation: PACU  Patient participation: complete - patient participated  Level of consciousness: awake  Pain score: 0  Airway patency: patent  Nausea & Vomiting: no nausea  Complications: no  Cardiovascular status: hemodynamically stable  Respiratory status: acceptable  Hydration status: stable  Multimodal analgesia pain management approach

## 2023-03-15 NOTE — DISCHARGE INSTRUCTIONS
Jose Maria Banuelos M.D. Reconstructive Microsurgery      Elevate your operative hand above your heart level for one week. 2. Do NOT change your dressing. 3. Okay to shower or bathe with plastic bag over \"boxing glove\" dressing. 4. Follow up with Dr. Cora Monterroso at [facility],  on date 03/21/23.  5. Tuesday at 11:15 am.  6. Patient will be excused from work or school until after first post operative re-check. 7. No driving unless off pain medication for 24 hrs., and only allowed to drive with the unoperated hand. 8. Dr. Cora Monterroso does not have the Medical/Dental bureau service. If you need to reach Dr. Cora Monterroso after hours,        please call one of the [facility] hospitals:    **  Please tell the , You recently had surgery w/ Dr. Cora Monterroso, and have him paged via his cell phone. Please make sure they do not connect to Office, since no one is there after hours. [facility]   [phone]     [place] [address] [phone]                   [facility]: [phone] or [facility] at [phone] and they will page him. Ladan Humphries M.D. Abhi Veladre M.D.  Christopher [place]. [address] [facility], [facility]                          [phone]                    Infection After Surgery: Care Instructions  Overview  After surgery, an infection is always possible. It doesn't mean that the surgery didn't go well. Because an infection can be serious, your doctor has taken steps to manage it. Your doctor checked the infection and cleaned it if necessary. Your doctor may have made an opening in the area so that the pus can drain out.  You may have gauze in the cut so that the area will stay open and keep draining. You may need antibiotics. You will need to follow up with your doctor to make sure the infection has gone away. Follow-up care is a key part of your treatment and safety. Be sure to make and go to all appointments, and call your doctor if you are having problems. It's also a good idea to know your test results and keep a list of the medicines you take. How can you care for yourself at home? Make sure your surgeon knows about the infection, especially if you saw another doctor about your symptoms. If your doctor prescribed antibiotics, take them as directed. Do not stop taking them just because you feel better. You need to take the full course of antibiotics. Ask your doctor if you can take an over-the-counter pain medicine, such as acetaminophen (Tylenol), ibuprofen (Advil, Motrin), or naproxen (Aleve). Be safe with medicines. Read and follow all instructions on the label. Do not take two or more pain medicines at the same time unless the doctor told you to. Many pain medicines have acetaminophen, which is Tylenol. Too much acetaminophen (Tylenol) can be harmful. Prop up the area on a pillow anytime you sit or lie down during the next 3 days. Try to keep it above the level of your heart. This will help reduce swelling. Keep the skin clean and dry. You may have a bandage over the cut (incision). A bandage helps the incision heal and protects it. Your doctor will tell you how to take care of this. Keep it clean and dry. You may have drainage from the wound. If your doctor told you how to care for your incision, follow your doctor's instructions. If you did not get instructions, follow this general advice:  Wash around the incision with clean water 2 times a day. Don't use hydrogen peroxide or alcohol, which can slow healing. When should you call for help?    Call your doctor now or seek immediate medical care if:    You have signs that your infection is getting worse, such as: Increased pain, swelling, warmth, or redness in the area. Red streaks leading from the area. Pus draining from the wound. A new or higher fever. Watch closely for changes in your health, and be sure to contact your doctor if you have any problems. Where can you learn more? Go to http://www.woods.com/ and enter C340 to learn more about \"Infection After Surgery: Care Instructions. \"  Current as of: January 20, 2022               Content Version: 13.5  © 4063-3271 [employer]. Care instructions adapted under license by [facility] ([facility]). If you have questions about a medical condition or this instruction, always ask your healthcare professional. [place] [address] any warranty or liability for your use of this information. Nausea and Vomiting After Surgery: Care Instructions  Your Care Instructions     After you've had surgery, you may feel sick to your stomach (nauseated) or you may vomit. Sometimes anesthesia can make you feel sick. It's a common side effect and often doesn't last long. Pain also can make you feel sick or vomit. After the anesthesia wears off, you may feel pain from the incision (cut). That pain could then upset your stomach. Taking pain medicine can also make you feel sick to your stomach. Whatever the cause, you may get medicine that can help. There are also some things you can do at home to prevent nausea and feel better. The doctor has checked you carefully, but problems can develop later. If you notice any problems or new symptoms, get medical treatment right away. Follow-up care is a key part of your treatment and safety. Be sure to make and go to all appointments, and call your doctor if you are having problems. It's also a good idea to know your test results and keep a list of the medicines you take. How can you care for yourself at home? Be safe with medicines.  Read and follow all instructions on the label.  If the doctor gave you a prescription medicine for pain, take it as prescribed. If you are not taking a prescription pain medicine, ask your doctor if you can take an over-the-counter medicine. Take your pain medicine as soon as you have pain. It works better if you take it before the pain gets bad. Call your doctor if you have any problems with your medicine. Rest in bed until you feel better. To prevent dehydration, drink plenty of fluids. Choose water and other clear liquids until you feel better. If you have kidney, heart, or liver disease and have to limit fluids, talk with your doctor before you increase the amount of fluids you drink. When you are able to eat, try clear soups, mild foods, and liquids until all symptoms are gone for 12 to 48 hours. Other good choices include dry toast, crackers, cooked cereal, and gelatin dessert, such as Jell-O. Do not smoke. Smoking and being around smoke can make nausea worse. If you need help quitting, talk to your doctor about stop-smoking programs and medicines. These can increase your chances of quitting for good. When should you call for help? Call 911  anytime you think you may need emergency care. For example, call if:    You passed out (lost consciousness). Call your doctor now or seek immediate medical care if:    You have new or worse nausea or vomiting. You are too sick to your stomach to drink any fluids. You cannot keep down fluids. You have symptoms of dehydration, such as:  Dry eyes and a dry mouth. Passing only a little urine. Feeling thirstier than usual.     Your pain medicine is not helping. You are dizzy or lightheaded, or you feel like you may faint. Watch closely for changes in your health, and be sure to contact your doctor if:    You do not get better as expected. Current as of: March 9, 2022               Content Version: 13.5  © 2006-2022 [employer].    Care instructions adapted under license by [facility] ([facility]). If you have questions about a medical condition or this instruction, always ask your healthcare professional. [place] [address] any warranty or liability for your use of this information.

## 2023-03-15 NOTE — OP NOTE
Operative Note      Patient: Kyaw Ochoa  YOB: 1970  MRN: [id number]    Date of Procedure: 3/15/2023    Pre-Op Diagnosis:   Right Hand- Carpal Tunnel Syndrome  [G56.01]  2. Right Elbow- Cubital Tunnel Syndrome [G56.21]  3. Right ring finger, trigger [M65.341]  4. 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 RIGHT WRIST- FIRST DORSAL COMPARTMENT RELEASE (13834, 26745, 91219, 62711)  Regional block x3 [273603 x3 ]    Surgeon(s):  Luisa Arellano M.D.; Hand + Reconstructive Surgery    Assistant:   * No surgical staff found *    Anesthesia: Monitor Anesthesia Care + Regional/ Local    Estimated Blood Loss (mL): Minimal 10 ml    Complications: None  Specimens:   * No specimens in log *  Implants:  * No implants in log *    Drains: * No LDAs found *    Operative Note: Carpal Tunnel Release    Indications : This is a 52 y.o., right hand dominant female, who has had symptoms dating back several years. They describe discomfort of their upper extremities extending from the hands to the forearms to the arms. The paresthesias do involve severe numbness and tingling at times. The paraesthesias are constant. I did provide the Initial Evaluation on the patient, at our [place] office on 01/17/2023. The patient is also noted to have significant ulnar nerve distribution paresthesias. She has on physical examination a severe right ring trigger finger, as well as, right wrist first dorsal compartment syndrome. They now return because it is more convenient time for outpatient hand surgery, so it does not interfere with their career and weekly schedule. I did obtain verbal informed consent for the outpatient hand surgery, while the patient was in the office.  I did meet the patient and their family in the preoperative holding room, to again answer questions and outline the operation. I did kaci the surgery site x4, with a purple marker and my initials. The patient did receive a left upper extremity intravenous (iv.) placement per Nursing Team, and was escorted back to the surgery suite. The patient was laid supine on the operating table and then underwent  iv. sedation per the Anesthesia Team. The operating room bed was rotated 90 degrees away from the anesthesia workbench, and the right upper extremity was abducted 70 degrees away from the patient's body and placed onto a padded arm table. Prior to anything invasive, the Surgical Team did perform an official timeout. All members were in accordance. I then provided the right right wrist, volar Median nerve regional block and local incisional blocks, per my standard protocol. I also provided the right distal arm ulnar nerve regional block and local incisional blocks followed by a right radial wrist radial sensory nerve regional block + local incisional block + right ring finger local incisional block, per my standard fashion. We then prepped the right upper extremity with Betadine solution from the fingertips down to the Webril. A sterile field was then created using sterile sheets and sterile towels. I did place a sterile fluff towel underneath the hand and wrist for protection throughout the case. I then identified anatomical landmarks, namely:  thenar and hypothenar eminences, imaginary radial border of the ring finger, Oreilly cardinal line. My standard 4 cm, longitudinal, proximal palmar incision was then planned, in line with the patient's own palmar skin creases. I first tested the patient with a #10 blade scalpel, noting adequate anesthesia. I then incised the skin only with a scalpel. Small punctate bleeders were controlled with Bovie cautery, and bipolar npieg3d was used in the deeper structures closer to the sensory nerves and Median nerve proper.  I then transitioned to double-wide skin hooks for retraction along with blunt dissection with Littler scissors down through the subcutaneous adipose tissue. I specifically searched for the main trunk of the Superficial branch of the Median nerve. This structure was not found in the midportion of the wound, and suspected to be in its more anatomic location, underneath the radial soft tissue flap. I then transitioned to dull Yamileth retractors, in combination with longitudinal, sharp dissection with a # 15 blade scalpel. I dissected down through the Superficial Palmar fascia, continuing through the Palmaris brevis muscle, and finally through the Transverse carpal ligament. Once into the Carpal tunnel, I then dissected from the mid wound distally, with the distal extent of the dissection being identification of the midpalmar adipose tissue, at the level of the Superficial Palmar arch and common digital nerve branches. All points of decompression were done under direct visualization. I then went back to the midportion of the wound, and dissected from the midwound proximally, with the proximal extent of the dissection, being the proximal edge of the flexor retinaculum. The pathologic findings included severe to extreme flattening compression in the proximal, mid and distal portions of the Carpal Tunnel, severe abnormal dark red to medium purple discoloration, mild amount of intrinsic vasculature dilatation, severe capillary injection (signifying chronic inflammatory changes), severe amount of perineural inflammatory hypertrophy, severe amount of inflammatory adhesions of the Median nerve to the ulnar and radial lateral sidewalls. I then encircled the Median nerve bluntly with a right-angle dissector. I then encircled the Median nerve with a saline- soaked, quarter-inch Penrose drain. The drain was used for gentle radial and ulnar retraction of the Median nerve to gain access to the deeper Carpal Tunnel contents.  I then provided a severe amount of underlying finger flexor tenosynovectomies. This maneuver was done sharply with the Metzenbaum scissors. I then copiously irrigated the wound out with refrigerated normal saline. I did remove the drain and passively extend the digits, realigning the Carpal Tunnel anatomy. I then closed the wound in one layer using interrupted vertical mattress 4-0 nylon suture. my cubital tunnel release    OPERATIVE NOTE: Cubital Tunnel Release    Prior to anything invasive, the Surgical Team did perform an official time out. All members were in accordance. I then provided the right distal arm, Ulnar nerve regional block and local incisional blocks, prior to prep. I did use my standard protocol of 1% Lidocaine with Epinephrine + 0.25% Bupivacaine with Epinephrine, for a total of 15ccs (5+5+5 ccs) injection. We did prep the right upper extremity with the Betadine solution, from the fingertips down to, and including the axilla. A sterile field was then created using sterile sheets and sterile towels. I did place a sterile fluff towel underneath the hand and wrist, as well as, the elbow for protection throughout the case. The majority of the case was performed with the extremity supinated, the elbow flexed at approximately 45°, and the dorsum of the hand placed onto an upside down, stainless steel basin for stabilization. I then identified the anatomical landmarks, namely: Olecranon, Medial epicondyle, Ulnar nerve just proximal to the Cubital Tunnel, distal triceps muscle, distal triceps tendon, FCU (flexor carpial ulnaris) fascia/muscle, normal course of the basilic vein, normal course MAC (medial brachial cutaneous) nerve. The standard 2 ½ inch lacunar incision was then planned, posterior to the medial epicondyle, with the central point over the Cubital tunnel. I first tested the patient with the #I 0 blade scalpel, noting adequate anesthesia. I then incised the skin only with the scalpel.   Small punctate bleeders were controlled with Bovie cautery, and bipolar cautery was used on the deeper structures, closer to the sensory nerves and Ulnar nerve proper. I then transitioned to double wide skin hooks on the superior/anterior skin to create a moderate skin soft tissue flap. I specifically identified the major branches of the Basilic vein, and the major branches of the MAC nerve. These structures were mobilized, re-identified throughout the rest of the case, well preserved. Later in the case, I did identify the arbor vitae at the distal aspect of the incision, coursing across the medial elbow. Most of the sensory nerves could be preserved. There was a small branch which needed to be clamped and bipolar cauterized for mobilization of the underneath, Ulnar nerve. I specifically identified the Ulnar nerve proximal to the Cubital Tunnel. I was able to decompress the proximal  1/3 Ulnar nerve from distal to proximal.  I specifically incised over the Ulnar nerve, first by incising the arm fascia. This did allow me to see the intrinsic and extrinsic blood supply to the Ulnar nerve. Both of these structures were well preserved, throughout the rest of the case. I then dissected using Metzenbaum scissors, followed by long Metzenbaum scissors to dissect and decompress the [place]. The structure was known to have abnormal anatomy, 8 cm proximal to the Medial epicondyle. There was a moderate compression underneath the [place]. There was severe rebound edema of the Ulnar nerve with the decompressive maneuvers. I did dissect up to the mid-arm level. All points of decompression were done under direct visualization. The middle third dissection was done from proximal to distal using mostly right angle dissector, followed by sharp dissection with a #15 blade scalpel, on top of the right angle dissector, for protection of the Ulnar nerve underneath.  This was noted to be a fairly stenosed, tight Cubital Tunnel. There was moderate compression of the Ulnar nerve in the Cubital Tunnel itself. I was able to identify the extrinsic blood supply ( =  superior ulnar collateral blood vessels). These structures were mobilized, and could be preserved throughout the rest of the case. There was noted to be a moderate amount of rebound edema. The distal 1/3 dissection was done primarily from proximal to distal. This was initiated with my 4 inch longitudinal fasciotomy with the Metzenbaum scissors. I then transitioned to blunt dissection with the Metzenbaum scissors for the intramuscular dissection of the Ulnar nerve. I dissected down to the first two motor branches of Ulnar nerve. There was also noted to be fascial, moderate compression of the Ulnar nerve. This was intramuscular and was decompressed again sharply with #15 scalpel. Again there was noted to be mild amount of rebound edema with the decompressive maneuvers. I did copiously irrigate the wound out with refrigerated normal saline, with protection of nerve, to promote vasoconstriction. I then mobilized the Ulnar nerve in the mid portion of the wound. I did passively extend and flex the elbow, noting a very smooth transition of the Ulnar nerve over the Medial epicondyle. A Medial epicondylectomy, was deemed unnecessary, in this particular case. I then passively extended the elbow, realigning the Ulnar nerve anatomy. I did close the wound in two layers with a subcutaneous layer closed using a horizontal mattress, 3-0 Monocryl suture. I then closed the skin using a running, subcuticular, 3-0 Monocryl suture. Steri-strips were later placed over the wound. OPERATIVE NOTE:  Trigger finger release    I then identified the anatomical landmarks, namely: The inflamed flexor tendon nodules of the right ring finger, FDS/FDP (flexor digitorum superficialis/flexor digitorum profundus) tendons, and the corresponding distal palmar transverse skin crease. My standard 1.5 cm transverse incision was then planned with a purple marker. I first tested the patient with a #10 blade scalpel, noting adequate anesthesia. I then incised the skin only with a scalpel. Small punctuate bleeders were not controlled initially, because of the potential superficial location of the underlying neurovascular bundles. I then used an Adson forceps along with blunt and sharp dissection with a Littler scissor, to create a proximal skin and soft tissue flap. I was able to dissect below the dermis and superficial to the superficial palmar fascia. There was noted to be mixed acute and chronic inflammatory changes; the predominance being the acute variety type. I then transitioned to a double-wide skin hook, followed by blunt dissection with the Metzenbaum scissors, breaking up 7+ stout inflammatory bands proximally. I then transitioned to create a distal skin and soft tissue flap. Distally, on the right ring finger, I identified the radial proper and ulnar proper neurovascular bundles. These structures were not mobilized, but re-identified, and well protected throughout the case. Proximally, I identified the long/ring and ring/small proper common neurovascular bundles, which were similarly identified, and well protected throughout the rest of the case. I then passively extended and flexed the digit, noting severe grinding/triggering of the inflamed flexor tendon nodules, underneath the distal and leading edges of the [id number]. It was any easy intraoperative decision for complete anterior A1 pulley resection, rather than a simple release. I did use a #15 blade scalpel to incise the radial lateral longitudinal border of the A1 pulley. I did use a proximal and distal transverse back cuts on the leading and distal edges of the A1 pulley, to open the structure in a trapdoor fashion ulnarly.  I then used the same sharp dissection technique for the incision of the ulnar lateral longitudinal border of the A1 pulley, for a complete anterior A1 pulley resection. I then copiously irrigated the wound out with refrigerated normal saline to promote vasoconstriction. The third major step of the procedure was to use a right-angle dissector to bring the flexor tendons out of the flexor tendon tract. There were no posterior adhesions that needed to be taken down, but there were a severe amount of intertendinous adhesions, which were sharply removed with Metzenbaum scissor dissection. I did check the four corners of the flexor tendon tract, noting complete decompression of the A1 and midpalm regions. I then manipulated the MP (metacarpophalangeal) and PIP (proximal interphalangeal) joints, noting mild significant joint contractures. This was just a part of my standard trigger finger release/resection. I then passively extended the digits, realigning the flexor tendon anatomy. I then closed the wound in one layer, using interrupted, vertical mattress 4-0 nylon suture. OPERATIVE NOTE : First Dorsal Compartment Release    I then planned a 3 cm, oblique incision with a purple marker, in line with the patient's own skin Langar lines. I first tested the patient with a #10 blade scalpel noting adequate anesthesia. I then incised the skin only with a scalpel. Small punctate bleeders were controlled Bovie with cautery and bipolar cautery was used in the deeper structures closer to the Radial Sensory nerve, and tendonous structures. The first major step was to identify the Radial Sensory nerve, and its major distal branches. The main trunk of the Radial Sensory nerve was then mobilized, and well protected throughout the rest of the case. The second major step was to Identify the First Dorsal Compartments, and the tendons exiting the distal edge of the First Dorsal Compartment. I   then provided a longitudinal, high dorsal, decompression of the First Dorsal Compartment, sharply with a #15 blade scalpel.   I then identified 3 total tendons of the First Dorasl Compartment. There were 2 tendinous slips, attributable to the APL (abductor polllcis longus) tendon. There was 1 tendinous slip attributable to the EPB (extensor pollicls brevis) tendon. I then used a #15 blade scalpel to provide a longitudinal decompression of the second deep compartment. There was a tremendous rebound edema of the EPB tendon. The tissues showed severe acute inflammatory inflammation. There was noted to be severe stenosis. There was very slight inflammatory erosion radially on the tendons, but no significant loss of the first dorsal compartment tendons, secondary to the rubbing. There was highly friable tissue and easily bleeding edges. I then copiously irrigated the wound out with refrigerated normal saline, to promote vasoconstriction. . Next, the third major step was to provide sharp decompression between the tendons. This maneuver was performed with sharp dissection with the Metzenbautn scissors. I then passively flexed and opposed the thumb into the palm, noting no restriction of the tendons. The volariy-based flap was then placed back over the tendons. I then closed the wound in two layers, with the first layer being closed with interrupted horizontal mattress, 4-0 Monocryl suture. The second layer was closure with the same suture, in a running, subcuticular fashion. Steri-strips would later be placed over the wound. At the conclusion of the case, the sponge, instrument and needle counts were correct x2. The extremity was wiped clean of Betadine and blood and my standard hand and wrist soft dressing was then applied. The patient tolerated the operation well and was sent to the [address] [place] [address] in stable fair condition, escorted by myself, RN and CRNA.        Electronically signed by Beverley Liao MD on 3/15/2023 at 9:00 AM  Nandini Rubi

## 2023-03-19 DIAGNOSIS — E11.65 UNCONTROLLED TYPE 2 DIABETES MELLITUS WITH HYPERGLYCEMIA (HCC): ICD-10-CM

## 2023-03-20 LAB — DIABETIC RETINOPATHY: POSITIVE

## 2023-03-20 NOTE — TELEPHONE ENCOUNTER
Last Appointment:  2/13/2023  Future Appointments   Date Time Provider Sukhdeep Sharma   4/14/2023 10:00 AM DO HENOK [facility]   6/14/2023  9:30 AM LAKESHA Licona - CNP [facility]   6/14/2023 10:15 AM [facility] FAST TRACK 2 [facility] Med Onc [facility]   6/14/2023 10:30 AM Sen Miranda MD MED ONC [facility]

## 2023-04-14 ENCOUNTER — OFFICE VISIT (OUTPATIENT)
Dept: FAMILY MEDICINE CLINIC | Age: 53
End: 2023-04-14
Payer: MEDICAID

## 2023-04-14 VITALS
TEMPERATURE: 97.5 F | OXYGEN SATURATION: 98 % | WEIGHT: 165.6 LBS | RESPIRATION RATE: 18 BRPM | SYSTOLIC BLOOD PRESSURE: 120 MMHG | DIASTOLIC BLOOD PRESSURE: 72 MMHG | BODY MASS INDEX: 33.38 KG/M2 | HEART RATE: 64 BPM | HEIGHT: 59 IN

## 2023-04-14 DIAGNOSIS — E11.43 TYPE II DIABETES MELLITUS WITH PERIPHERAL AUTONOMIC NEUROPATHY (HCC): ICD-10-CM

## 2023-04-14 DIAGNOSIS — E87.6 HYPOKALEMIA: ICD-10-CM

## 2023-04-14 DIAGNOSIS — G62.9 NEUROPATHY: Primary | ICD-10-CM

## 2023-04-14 DIAGNOSIS — Z79.899 DRUG THERAPY: ICD-10-CM

## 2023-04-14 DIAGNOSIS — E11.65 UNCONTROLLED TYPE 2 DIABETES MELLITUS WITH HYPERGLYCEMIA (HCC): ICD-10-CM

## 2023-04-14 DIAGNOSIS — I10 ESSENTIAL HYPERTENSION: ICD-10-CM

## 2023-04-14 DIAGNOSIS — Z17.0 MALIGNANT NEOPLASM OF LEFT BREAST IN FEMALE, ESTROGEN RECEPTOR POSITIVE, UNSPECIFIED SITE OF BREAST (HCC): ICD-10-CM

## 2023-04-14 DIAGNOSIS — C50.912 MALIGNANT NEOPLASM OF LEFT BREAST IN FEMALE, ESTROGEN RECEPTOR POSITIVE, UNSPECIFIED SITE OF BREAST (HCC): ICD-10-CM

## 2023-04-14 DIAGNOSIS — Z20.2 POSSIBLE EXPOSURE TO STD: ICD-10-CM

## 2023-04-14 PROCEDURE — 3052F HG A1C>EQUAL 8.0%<EQUAL 9.0%: CPT | Performed by: FAMILY MEDICINE

## 2023-04-14 PROCEDURE — 3017F COLORECTAL CA SCREEN DOC REV: CPT | Performed by: FAMILY MEDICINE

## 2023-04-14 PROCEDURE — 2022F DILAT RTA XM EVC RTNOPTHY: CPT | Performed by: FAMILY MEDICINE

## 2023-04-14 PROCEDURE — 3078F DIAST BP <80 MM HG: CPT | Performed by: FAMILY MEDICINE

## 2023-04-14 PROCEDURE — 1036F TOBACCO NON-USER: CPT | Performed by: FAMILY MEDICINE

## 2023-04-14 PROCEDURE — G8417 CALC BMI ABV UP PARAM F/U: HCPCS | Performed by: FAMILY MEDICINE

## 2023-04-14 PROCEDURE — 3074F SYST BP LT 130 MM HG: CPT | Performed by: FAMILY MEDICINE

## 2023-04-14 PROCEDURE — G8427 DOCREV CUR MEDS BY ELIG CLIN: HCPCS | Performed by: FAMILY MEDICINE

## 2023-04-14 PROCEDURE — 99214 OFFICE O/P EST MOD 30 MIN: CPT | Performed by: FAMILY MEDICINE

## 2023-04-14 RX ORDER — GABAPENTIN 300 MG/1
300 CAPSULE ORAL NIGHTLY
Qty: 30 CAPSULE | Refills: 1 | Status: SHIPPED | OUTPATIENT
Start: 2023-04-14 | End: 2023-06-13

## 2023-04-17 PROBLEM — E87.6 HYPOKALEMIA: Status: ACTIVE | Noted: 2023-04-17

## 2023-04-17 ASSESSMENT — ENCOUNTER SYMPTOMS
FACIAL SWELLING: 0
COLOR CHANGE: 0
RHINORRHEA: 0
CONSTIPATION: 0
EYE REDNESS: 0
GASTROINTESTINAL NEGATIVE: 1
VOICE CHANGE: 0
ABDOMINAL PAIN: 0
SHORTNESS OF BREATH: 0
NAUSEA: 0
STRIDOR: 0
ABDOMINAL DISTENTION: 0
SORE THROAT: 0
CHOKING: 0
BLOOD IN STOOL: 0
BACK PAIN: 0
COUGH: 0
WHEEZING: 0
TROUBLE SWALLOWING: 0
SINUS PAIN: 0
PHOTOPHOBIA: 0
RECTAL PAIN: 0
DIARRHEA: 0
EYE ITCHING: 0
VOMITING: 0
SINUS PRESSURE: 0
RESPIRATORY NEGATIVE: 1
ANAL BLEEDING: 0
ALLERGIC/IMMUNOLOGIC NEGATIVE: 1
EYE DISCHARGE: 0
EYE PAIN: 0
CHEST TIGHTNESS: 0

## 2023-04-17 NOTE — PROGRESS NOTES
(1.499 m)   Wt 165 lb 9.6 oz (75.1 kg)   LMP  (LMP Unknown)   SpO2 98%   BMI 33.45 kg/m²     Problem List:  Alfred Evans does not have any pertinent problems on file. PHYS EX:  Physical Exam  Vitals and nursing note reviewed. Constitutional:       General: She is not in acute distress. Appearance: Normal appearance. She is well-developed. She is obese. She is not ill-appearing, toxic-appearing or diaphoretic. Comments: Patient has morbid obesity. Patient instructed on low calorie, healthy ADA diet. HENT:      Head: Normocephalic and atraumatic. Nose: Nose normal. No congestion or rhinorrhea. Eyes:      General: No scleral icterus. Right eye: No discharge. Left eye: No discharge. Conjunctiva/sclera: Conjunctivae normal.      Pupils: Pupils are equal, round, and reactive to light. Neck:      Thyroid: No thyromegaly. Vascular: No carotid bruit or JVD. Trachea: No tracheal deviation. Cardiovascular:      Rate and Rhythm: Normal rate and regular rhythm. Pulses: Normal pulses. Heart sounds: Normal heart sounds. No murmur heard. No friction rub. No gallop. Pulmonary:      Effort: Pulmonary effort is normal. No respiratory distress. Breath sounds: Normal breath sounds. No stridor. No wheezing, rhonchi or rales. Chest:      Chest wall: No tenderness. Abdominal:      General: Bowel sounds are normal. There is no distension. Palpations: Abdomen is soft. There is no mass. Tenderness: There is no abdominal tenderness. There is no right CVA tenderness, left CVA tenderness, guarding or rebound. Hernia: No hernia is present. Musculoskeletal:         General: Tenderness present. No swelling, deformity or signs of injury. Cervical back: Normal range of motion and neck supple. No rigidity or tenderness. No muscular tenderness. Right lower leg: No edema. Left lower leg: No edema.       Comments: Pain and decreased ROM

## 2023-04-26 DIAGNOSIS — E11.65 UNCONTROLLED TYPE 2 DIABETES MELLITUS WITH HYPERGLYCEMIA (HCC): ICD-10-CM

## 2023-04-26 RX ORDER — BLOOD SUGAR DIAGNOSTIC
STRIP MISCELLANEOUS
Qty: 100 EACH | Refills: 1 | Status: SHIPPED | OUTPATIENT
Start: 2023-04-26

## 2023-05-02 ENCOUNTER — ANESTHESIA EVENT (OUTPATIENT)
Dept: OPERATING ROOM | Age: 53
End: 2023-05-02
Payer: MEDICAID

## 2023-05-02 NOTE — ANESTHESIA PRE PROCEDURE
RBC 4.10 04/14/2023 11:31 AM    HGB 11.7 04/14/2023 11:31 AM    HCT 38.5 04/14/2023 11:31 AM    MCV 93.9 04/14/2023 11:31 AM    RDW 14.6 04/14/2023 11:31 AM     04/14/2023 11:31 AM       CMP:   Lab Results   Component Value Date/Time     01/27/2023 02:00 PM    K 4.8 01/27/2023 02:00 PM    K 4.3 11/12/2020 11:01 AM     01/27/2023 02:00 PM    CO2 26 01/27/2023 02:00 PM    BUN 8 01/27/2023 02:00 PM    CREATININE 0.7 01/27/2023 02:00 PM    GFRAA >60 08/24/2022 11:11 AM    LABGLOM >60 01/27/2023 02:00 PM    GLUCOSE 126 01/27/2023 02:00 PM    GLUCOSE 194 03/30/2012 08:52 AM    PROT 7.3 01/27/2023 02:00 PM    CALCIUM 10.1 01/27/2023 02:00 PM    BILITOT <0.2 01/27/2023 02:00 PM    ALKPHOS 122 01/27/2023 02:00 PM    AST 25 01/27/2023 02:00 PM    ALT 22 01/27/2023 02:00 PM       POC Tests: No results for input(s): POCGLU, POCNA, POCK, POCCL, POCBUN, POCHEMO, POCHCT in the last 72 hours.     Coags:   Lab Results   Component Value Date/Time    PROTIME 10.7 03/03/2017 08:50 AM    INR 0.9 03/03/2017 08:50 AM    APTT 20.0 07/29/2014 06:55 AM       HCG (If Applicable): No results found for: PREGTESTUR, PREGSERUM, HCG, HCGQUANT     ABGs: No results found for: PHART, PO2ART, GGB8ZFH, FXC0JVV, BEART, Q8FPQBMG     Type & Screen (If Applicable):  No results found for: LABABO, LABRH    Drug/Infectious Status (If Applicable):  No results found for: HIV, HEPCAB    COVID-19 Screening (If Applicable):   Lab Results   Component Value Date/Time    COVID19 Not Detected 01/08/2023 06:48 PM    COVID19 Not Detected 02/12/2021 12:23 PM           Anesthesia Evaluation  Patient summary reviewed  Airway: Mallampati: II  TM distance: >3 FB   Neck ROM: full  Mouth opening: > = 3 FB   Dental: normal exam         Pulmonary:Negative Pulmonary ROS                              Cardiovascular:    (+) hypertension:, valvular problems/murmurs: MVP, CAD:, hyperlipidemia        Rhythm: regular  Rate: normal                    Neuro/Psych:

## 2023-05-03 ENCOUNTER — HOSPITAL ENCOUNTER (OUTPATIENT)
Age: 53
Setting detail: OUTPATIENT SURGERY
Discharge: HOME | End: 2023-05-03
Attending: SURGERY | Admitting: SURGERY
Payer: MEDICAID

## 2023-05-03 ENCOUNTER — ANESTHESIA (OUTPATIENT)
Dept: OPERATING ROOM | Age: 53
End: 2023-05-03
Payer: MEDICAID

## 2023-05-03 VITALS
TEMPERATURE: 98.6 F | BODY MASS INDEX: 33.47 KG/M2 | HEIGHT: 59 IN | WEIGHT: 166 LBS | SYSTOLIC BLOOD PRESSURE: 128 MMHG | RESPIRATION RATE: 14 BRPM | DIASTOLIC BLOOD PRESSURE: 78 MMHG | HEART RATE: 76 BPM | OXYGEN SATURATION: 98 %

## 2023-05-03 DIAGNOSIS — G56.22 CUBITAL TUNNEL SYNDROME ON LEFT: Primary | ICD-10-CM

## 2023-05-03 LAB
METER GLUCOSE: 78 MG/DL (ref 74–99)
METER GLUCOSE: 88 MG/DL (ref 74–99)

## 2023-05-03 PROCEDURE — 2580000003 HC RX 258: Performed by: ANESTHESIOLOGY

## 2023-05-03 PROCEDURE — 6360000002 HC RX W HCPCS: Performed by: NURSE ANESTHETIST, CERTIFIED REGISTERED

## 2023-05-03 PROCEDURE — 2709999900 HC NON-CHARGEABLE SUPPLY: Performed by: SURGERY

## 2023-05-03 PROCEDURE — 3600000012 HC SURGERY LEVEL 2 ADDTL 15MIN: Performed by: SURGERY

## 2023-05-03 PROCEDURE — 6370000000 HC RX 637 (ALT 250 FOR IP): Performed by: SURGERY

## 2023-05-03 PROCEDURE — 82962 GLUCOSE BLOOD TEST: CPT | Performed by: SURGERY

## 2023-05-03 PROCEDURE — 3700000001 HC ADD 15 MINUTES (ANESTHESIA): Performed by: SURGERY

## 2023-05-03 PROCEDURE — 3600000002 HC SURGERY LEVEL 2 BASE: Performed by: SURGERY

## 2023-05-03 PROCEDURE — 3700000000 HC ANESTHESIA ATTENDED CARE: Performed by: SURGERY

## 2023-05-03 PROCEDURE — 2500000003 HC RX 250 WO HCPCS: Performed by: SURGERY

## 2023-05-03 PROCEDURE — 82962 GLUCOSE BLOOD TEST: CPT

## 2023-05-03 PROCEDURE — 7100000010 HC PHASE II RECOVERY - FIRST 15 MIN: Performed by: SURGERY

## 2023-05-03 PROCEDURE — 2720000010 HC SURG SUPPLY STERILE: Performed by: SURGERY

## 2023-05-03 PROCEDURE — 7100000011 HC PHASE II RECOVERY - ADDTL 15 MIN: Performed by: SURGERY

## 2023-05-03 RX ORDER — GINSENG 100 MG
CAPSULE ORAL PRN
Status: DISCONTINUED | OUTPATIENT
Start: 2023-05-03 | End: 2023-05-03 | Stop reason: ALTCHOICE

## 2023-05-03 RX ORDER — BUPIVACAINE HYDROCHLORIDE AND EPINEPHRINE 2.5; 5 MG/ML; UG/ML
INJECTION, SOLUTION EPIDURAL; INFILTRATION; INTRACAUDAL; PERINEURAL PRN
Status: DISCONTINUED | OUTPATIENT
Start: 2023-05-03 | End: 2023-05-03 | Stop reason: ALTCHOICE

## 2023-05-03 RX ORDER — LIDOCAINE HYDROCHLORIDE 20 MG/ML
INJECTION, SOLUTION INTRAVENOUS PRN
Status: DISCONTINUED | OUTPATIENT
Start: 2023-05-03 | End: 2023-05-03 | Stop reason: SDUPTHER

## 2023-05-03 RX ORDER — MIDAZOLAM HYDROCHLORIDE 1 MG/ML
INJECTION INTRAMUSCULAR; INTRAVENOUS PRN
Status: DISCONTINUED | OUTPATIENT
Start: 2023-05-03 | End: 2023-05-03 | Stop reason: SDUPTHER

## 2023-05-03 RX ORDER — LIDOCAINE HYDROCHLORIDE 10 MG/ML
INJECTION, SOLUTION INFILTRATION; PERINEURAL PRN
Status: DISCONTINUED | OUTPATIENT
Start: 2023-05-03 | End: 2023-05-03 | Stop reason: ALTCHOICE

## 2023-05-03 RX ORDER — FENTANYL CITRATE 50 UG/ML
INJECTION, SOLUTION INTRAMUSCULAR; INTRAVENOUS PRN
Status: DISCONTINUED | OUTPATIENT
Start: 2023-05-03 | End: 2023-05-03 | Stop reason: SDUPTHER

## 2023-05-03 RX ORDER — HYDROCODONE BITARTRATE AND ACETAMINOPHEN 5; 325 MG/1; MG/1
1 TABLET ORAL EVERY 4 HOURS PRN
Qty: 21 TABLET | Refills: 0 | Status: SHIPPED | OUTPATIENT
Start: 2023-05-03 | End: 2023-05-07

## 2023-05-03 RX ORDER — DEXTROSE MONOHYDRATE 50 MG/ML
INJECTION, SOLUTION INTRAVENOUS CONTINUOUS
Status: DISCONTINUED | OUTPATIENT
Start: 2023-05-03 | End: 2023-05-03 | Stop reason: HOSPADM

## 2023-05-03 RX ORDER — SODIUM CHLORIDE, SODIUM LACTATE, POTASSIUM CHLORIDE, CALCIUM CHLORIDE 600; 310; 30; 20 MG/100ML; MG/100ML; MG/100ML; MG/100ML
INJECTION, SOLUTION INTRAVENOUS CONTINUOUS
Status: DISCONTINUED | OUTPATIENT
Start: 2023-05-03 | End: 2023-05-03 | Stop reason: HOSPADM

## 2023-05-03 RX ORDER — PROPOFOL 10 MG/ML
INJECTION, EMULSION INTRAVENOUS CONTINUOUS PRN
Status: DISCONTINUED | OUTPATIENT
Start: 2023-05-03 | End: 2023-05-03 | Stop reason: SDUPTHER

## 2023-05-03 RX ADMIN — SODIUM CHLORIDE, POTASSIUM CHLORIDE, SODIUM LACTATE AND CALCIUM CHLORIDE: 600; 310; 30; 20 INJECTION, SOLUTION INTRAVENOUS at 12:48

## 2023-05-03 RX ADMIN — PROPOFOL INJECTABLE EMULSION 75 MCG/KG/MIN: 10 INJECTION, EMULSION INTRAVENOUS at 14:03

## 2023-05-03 RX ADMIN — FENTANYL CITRATE 50 MCG: 50 INJECTION INTRAMUSCULAR; INTRAVENOUS at 14:03

## 2023-05-03 RX ADMIN — MIDAZOLAM 2 MG: 1 INJECTION INTRAMUSCULAR; INTRAVENOUS at 14:00

## 2023-05-03 RX ADMIN — DEXTROSE MONOHYDRATE: 50 INJECTION, SOLUTION INTRAVENOUS at 13:17

## 2023-05-03 RX ADMIN — FENTANYL CITRATE 50 MCG: 50 INJECTION INTRAMUSCULAR; INTRAVENOUS at 14:05

## 2023-05-03 RX ADMIN — LIDOCAINE HYDROCHLORIDE 50 MG: 20 INJECTION, SOLUTION INTRAVENOUS at 14:03

## 2023-05-03 ASSESSMENT — PAIN - FUNCTIONAL ASSESSMENT: PAIN_FUNCTIONAL_ASSESSMENT: NONE - DENIES PAIN

## 2023-05-03 NOTE — ANESTHESIA POSTPROCEDURE EVALUATION
Department of Anesthesiology  Postprocedure Note    Patient: Homar Berry  MRN: [id number]  YOB: 1970  Date of evaluation: 5/3/2023      Procedure Summary     Date: 05/03/23 Room / Location: [address] [place] [address] 01 / [address] [facility], [place], operated by [facility]    Anesthesia Start: 5691 Anesthesia Stop: 1614    Procedure: LEFT HAND CARPAL TUNNEL RELEASE, LEFT ELOBW CUBITAL TUNNEL RELEASE,LEFT INDEX FINGER A1 PULLEY RELEASE,LEFT LONG FINGER A1 PULLEY RELEASE (CPT 91157,69559Y9) (Left: Hand) Diagnosis:       Carpal tunnel syndrome on left      Trigger index finger of left hand      Trigger middle finger of left hand      (Carpal tunnel syndrome on left [G56.02])      (Trigger index finger of left hand [M65.322])      (Trigger middle finger of left hand [M65.332])    Surgeons: Ti Moses MD Responsible Provider: Bria Osei MD    Anesthesia Type: MAC ASA Status: 3          Anesthesia Type: MAC    Radha Phase I: Radha Score: 10    Radha Phase II:        Anesthesia Post Evaluation    Patient location during evaluation: PACU  Patient participation: waiting for patient participation  Level of consciousness: lethargic and awake  Pain score: 2  Airway patency: patent  Nausea & Vomiting: no nausea  Complications: no  Cardiovascular status: blood pressure returned to baseline  Respiratory status: acceptable  Hydration status: euvolemic

## 2023-05-03 NOTE — OP NOTE
blood supply to the Ulnar nerve. Both of these structures were well preserved, throughout the rest of the case. I then dissected using Metzenbaum scissors, followed by long Metzenbaum scissors to dissect and decompress the [place]. The structure was known to have [normal] anatomy, 8 cm proximal to the Medial epicondyle. There was moderate amount of compression underneath the [place]. There was slight rebound edema of the Ulnar nerve with the decompressive maneuvers. I did dissect up to the mid-arm level. All points of decompression were done under direct visualization. The middle third dissection was done from proximal to distal using mostly right angle dissector, followed by sharp dissection with a #15 blade scalpel, on top of the right angle dissector, for protection of the Ulnar nerve underneath. This was noted to be a fairly moderate, tight Cubital Tunnel. There was moderate compression of the Ulnar nerve in the Cubital Tunnel itself. I was able to identify the extrinsic blood supply ( =  superior ulnar collateral blood vessels). These structures were mobilized, and could be preserved throughout the rest of the case. There was noted to be a slight amount of rebound edema. There was also noted to be distal compression underneath the FCU fascia, and in the intramuscular portion of the Ulnar nerve. The distal 1/3 dissection was done primarily from proximal to distal. This was initiated with my 4 inch longitudinal fasciotomy with the Metzenbaum scissors. I then transitioned to blunt dissection with the Metzenbaum scissors for the intramuscular dissection of the Ulnar nerve. I dissected down to the first two motor branches of Ulnar nerve. There was also noted to be fascial, mild compression of the Ulnar nerve. This was intramuscular and was decompressed again sharply with #15 scalpel. Again there was noted to be no amount of rebound edema with the decompressive maneuvers.    I did

## 2023-05-03 NOTE — H&P
History and Physical    Patient's Name/Date of Birth: Susan Mcintosh / 1970 (52 y.o.)    Date: May 2, 2023     Chief Complaint: left hand pain and paraesthesias    HPI:    This is a 52 yrs of age, right hand dominant, white female w/ bilateral hand pain and paraesthesias. Past Medical History:   Diagnosis Date    Acid reflux     Anesthesia complication     problem with blood pressure up & down    Arthritis     lower back    Back pain     Blind     right eye    Cancer ([address]     left breast    Cardiac valve prolapse     valve ?     Diabetes mellitus (HCC)     IDDM    History of therapeutic radiation     Hyperlipidemia     Hypertension     Neuropathy due to secondary diabetes ([address]     in marcial feet    Nontraumatic tear of right rotator cuff 2020    Prolonged emergence from general anesthesia     Psoriasis        Past Surgical History:   Procedure Laterality Date    ANKLE SURGERY      bilateral tarsal tunnels    BREAST BIOPSY Left 2021    LEFT BREAST NEEDLE LOCALIZED LUMPECTOMY, BLUE DYE INJECTION, LEFT AXILLARY SENTINEL LYMPHNODE EXCISION, POSSIBLE LEFT AXILLARY DISSECTION performed by Adrian Jones MD at [place] [address] LUMPECTOMY Left     2021    CARDIAC SURGERY      cardiac catheterization-  \" years ago - over 10 years and negative\"; to see Dr. Tiesha Plummer 3/13/23    CARPAL TUNNEL RELEASE      left    CARPAL TUNNEL RELEASE  2012    right    CARPAL TUNNEL RELEASE Right 3/15/2023    RIGHT HAND CARPAL TUNNEL RELEASE,  RIGHT ELBOW CUBITAL TUNNEL RELEASE,RIGHT RING FINGER A, PULLEY RELEASE, RIGHT WRIST FIRST DORSAL COMPARTMENT RELEASE ([id number]) performed by Randall Ortiz MD at [address] Bilateral 2022     SECTION      CHOLECYSTECTOMY, LAPAROSCOPIC N/A 2019    LAPAROSCOPIC CHOLECYSTECTOMY WITH IOC performed by Jordyn Castillo MD at [address]  had extremely high blood pressure and hard to wake up    COLONOSCOPY N/A 2019    COLORECTAL

## 2023-05-03 NOTE — DISCHARGE INSTRUCTIONS
Kadi Lundberg M.D. Reconstructive Microsurgery      Elevate your operative hand above your heart level for one week. 2. Do NOT change your dressing. 3. Okay to shower or bathe with plastic bag over \"boxing glove\" dressing. 4. Follow up with Dr. Ministerio Morton at [facility],  on date 05/09/23.  5. Tuesday at 2:00 p.  6. Patient will be excused from work or school until after first post operative re-check. 7. No driving unless off pain medication for 24 hrs., and only allowed to drive with the unoperated hand. 8. Dr. Ministerio Morton does not have the Medical/Dental bureau service. If you need to reach Dr. Ministerio Morton after hours,        please call one of the [facility] hospitals:    **  Please tell the , You recently had surgery w/ Dr. Ministerio Morton, and have him paged via his cell phone. Please make sure they do not connect to Office, since no one is there after hours. [facility]   [phone]     [place] [address] [phone]                   [facility]: [phone] or [facility] at [phone] and they will page him. Angie Barksdale M.D. Dash Figueroa M.D.  Hailey Kennedy.  [address] - [facility], [address] [place] [address] [phone]

## 2023-05-08 DIAGNOSIS — G25.81 RLS (RESTLESS LEGS SYNDROME): ICD-10-CM

## 2023-05-08 NOTE — TELEPHONE ENCOUNTER
Last seen 4/14/2023  Next appt Visit date not found    Electronically signed by Rosa M Darden, [address]ugh on 5/8/23 at 10:02 AM EDT

## 2023-05-09 RX ORDER — PRAMIPEXOLE DIHYDROCHLORIDE 0.12 MG/1
TABLET ORAL
Qty: 180 TABLET | Refills: 1 | Status: SHIPPED | OUTPATIENT
Start: 2023-05-09

## 2023-06-16 ASSESSMENT — ENCOUNTER SYMPTOMS
BACK PAIN: 0
SHORTNESS OF BREATH: 0
COUGH: 0

## 2023-06-19 ENCOUNTER — OFFICE VISIT (OUTPATIENT)
Dept: FAMILY MEDICINE CLINIC | Age: 53
End: 2023-06-19
Payer: MEDICAID

## 2023-06-19 VITALS
OXYGEN SATURATION: 97 % | WEIGHT: 165 LBS | DIASTOLIC BLOOD PRESSURE: 82 MMHG | RESPIRATION RATE: 18 BRPM | HEIGHT: 59 IN | TEMPERATURE: 97.6 F | SYSTOLIC BLOOD PRESSURE: 128 MMHG | BODY MASS INDEX: 33.26 KG/M2 | HEART RATE: 88 BPM

## 2023-06-19 DIAGNOSIS — K21.9 GASTROESOPHAGEAL REFLUX DISEASE WITHOUT ESOPHAGITIS: ICD-10-CM

## 2023-06-19 DIAGNOSIS — I10 ESSENTIAL HYPERTENSION: ICD-10-CM

## 2023-06-19 DIAGNOSIS — C50.912 MALIGNANT NEOPLASM OF LEFT FEMALE BREAST, UNSPECIFIED ESTROGEN RECEPTOR STATUS, UNSPECIFIED SITE OF BREAST (HCC): Primary | ICD-10-CM

## 2023-06-19 DIAGNOSIS — C50.912 INVASIVE DUCTAL CARCINOMA OF LEFT BREAST (HCC): ICD-10-CM

## 2023-06-19 DIAGNOSIS — N76.0 ACUTE VAGINITIS: ICD-10-CM

## 2023-06-19 DIAGNOSIS — Z79.899 DRUG THERAPY: Primary | ICD-10-CM

## 2023-06-19 DIAGNOSIS — G62.9 NEUROPATHY: ICD-10-CM

## 2023-06-19 DIAGNOSIS — E11.65 UNCONTROLLED TYPE 2 DIABETES MELLITUS WITH HYPERGLYCEMIA (HCC): ICD-10-CM

## 2023-06-19 DIAGNOSIS — Z79.899 DRUG THERAPY: ICD-10-CM

## 2023-06-19 DIAGNOSIS — G25.81 RLS (RESTLESS LEGS SYNDROME): ICD-10-CM

## 2023-06-19 LAB
CHP ED QC CHECK: NORMAL
CREATININE URINE POCT: 50
GLUCOSE BLD-MCNC: 94 MG/DL
HBA1C MFR BLD: 8.4 %
MICROALBUMIN/CREAT 24H UR: 10 MG/G{CREAT}
MICROALBUMIN/CREAT UR-RTO: NORMAL

## 2023-06-19 PROCEDURE — 83037 HB GLYCOSYLATED A1C HOME DEV: CPT | Performed by: FAMILY MEDICINE

## 2023-06-19 PROCEDURE — G8417 CALC BMI ABV UP PARAM F/U: HCPCS | Performed by: FAMILY MEDICINE

## 2023-06-19 PROCEDURE — 2022F DILAT RTA XM EVC RTNOPTHY: CPT | Performed by: FAMILY MEDICINE

## 2023-06-19 PROCEDURE — 82962 GLUCOSE BLOOD TEST: CPT | Performed by: FAMILY MEDICINE

## 2023-06-19 PROCEDURE — G8427 DOCREV CUR MEDS BY ELIG CLIN: HCPCS | Performed by: FAMILY MEDICINE

## 2023-06-19 PROCEDURE — 3017F COLORECTAL CA SCREEN DOC REV: CPT | Performed by: FAMILY MEDICINE

## 2023-06-19 PROCEDURE — 3074F SYST BP LT 130 MM HG: CPT | Performed by: FAMILY MEDICINE

## 2023-06-19 PROCEDURE — 82044 UR ALBUMIN SEMIQUANTITATIVE: CPT | Performed by: FAMILY MEDICINE

## 2023-06-19 PROCEDURE — 3052F HG A1C>EQUAL 8.0%<EQUAL 9.0%: CPT | Performed by: FAMILY MEDICINE

## 2023-06-19 PROCEDURE — 3078F DIAST BP <80 MM HG: CPT | Performed by: FAMILY MEDICINE

## 2023-06-19 PROCEDURE — 1036F TOBACCO NON-USER: CPT | Performed by: FAMILY MEDICINE

## 2023-06-19 PROCEDURE — 99214 OFFICE O/P EST MOD 30 MIN: CPT | Performed by: FAMILY MEDICINE

## 2023-06-19 RX ORDER — PRAMIPEXOLE DIHYDROCHLORIDE 0.12 MG/1
TABLET ORAL
Qty: 180 TABLET | Refills: 1 | Status: SHIPPED | OUTPATIENT
Start: 2023-06-19

## 2023-06-19 RX ORDER — FLUCONAZOLE 150 MG/1
150 TABLET ORAL ONCE
Qty: 1 TABLET | Refills: 0 | Status: SHIPPED | OUTPATIENT
Start: 2023-06-19 | End: 2023-06-19

## 2023-06-19 RX ORDER — SUCRALFATE 1 G/1
1 TABLET ORAL 4 TIMES DAILY
Qty: 120 TABLET | Refills: 3 | Status: SHIPPED | OUTPATIENT
Start: 2023-06-19

## 2023-06-19 RX ORDER — GABAPENTIN 300 MG/1
300 CAPSULE ORAL NIGHTLY
Qty: 30 CAPSULE | Refills: 2 | Status: SHIPPED | OUTPATIENT
Start: 2023-06-19 | End: 2023-09-17

## 2023-06-20 LAB
6-MONOACETYLMORPHINE, URINE: NOT DETECTED
ALCOHOL URINE: NOT DETECTED
AMPHETAMINE SCREEN, URINE: NOT DETECTED
BARBITURATE SCREEN URINE: NOT DETECTED
BENZODIAZEPINE SCREEN, URINE: NOT DETECTED
BUPRENORPHINE URINE: NOT DETECTED
CANNABINOID SCREEN URINE: NOT DETECTED
COCAINE METABOLITE SCREEN URINE: NOT DETECTED
DRUG SCREEN COMMENT UR-IMP: ABNORMAL
FENTANYL SCREEN, URINE: NOT DETECTED
INTEGRITY CHECK, CREATININE, URINE: 12.7
INTEGRITY CHECK, OXIDANT, URINE: <40
INTEGRITY CHECK, PH, URINE: 6.2 (ref 4.5–9)
INTEGRITY CHECK, SPECIFIC GRAVITY, URINE: 1.01 (ref 1–1.03)
INTEGRITY CHECK, SPECIMEN INTEGRITY, URINE: ABNORMAL
METHADONE SCREEN, URINE: NOT DETECTED
OPIATE SCREEN URINE: NOT DETECTED
OXYCODONE URINE: NOT DETECTED
PHENCYCLIDINE SCREEN URINE: NOT DETECTED
TRAMADOL SCREEN URINE: NOT DETECTED

## 2023-06-20 RX ORDER — EMPAGLIFLOZIN 10 MG/1
TABLET, FILM COATED ORAL
Qty: 30 TABLET | Refills: 10 | OUTPATIENT
Start: 2023-06-20

## 2023-06-20 NOTE — TELEPHONE ENCOUNTER
Last Appointment:  6/19/2023  Future Appointments   Date Time Provider Sukhdeep Wagneri   6/21/2023 10:15 AM NETTE OLIVARES [facility] Radiol[facility]   6/21/2023 11:00 AM LAKESHA Mares - CNP [facility]   6/21/2023 11:30 AM NETTE MED ONC FAST TRACK 2 YZ Med Onc [facility]   6/21/2023 11:45 AM Alyse Nix MD MED ONC [facility]    Electronically signed by Deneen Chin LPN on 6/72/44 at 3:15 AM EDT

## 2023-06-21 ENCOUNTER — HOSPITAL ENCOUNTER (OUTPATIENT)
Dept: INFUSION THERAPY | Age: 53
Discharge: HOME | End: 2023-06-21
Payer: MEDICAID

## 2023-06-21 ENCOUNTER — OFFICE VISIT (OUTPATIENT)
Dept: BREAST CENTER | Age: 53
End: 2023-06-21
Payer: MEDICAID

## 2023-06-21 ENCOUNTER — OFFICE VISIT (OUTPATIENT)
Dept: ONCOLOGY | Age: 53
End: 2023-06-21
Payer: MEDICAID

## 2023-06-21 ENCOUNTER — HOSPITAL ENCOUNTER (OUTPATIENT)
Dept: GENERAL RADIOLOGY | Age: 53
Discharge: HOME | End: 2023-06-23
Payer: MEDICAID

## 2023-06-21 VITALS
SYSTOLIC BLOOD PRESSURE: 119 MMHG | WEIGHT: 164.8 LBS | BODY MASS INDEX: 33.22 KG/M2 | OXYGEN SATURATION: 97 % | TEMPERATURE: 97.2 F | HEART RATE: 90 BPM | DIASTOLIC BLOOD PRESSURE: 69 MMHG | HEIGHT: 59 IN

## 2023-06-21 VITALS
HEART RATE: 86 BPM | HEIGHT: 59 IN | OXYGEN SATURATION: 99 % | TEMPERATURE: 97.8 F | WEIGHT: 164 LBS | DIASTOLIC BLOOD PRESSURE: 62 MMHG | SYSTOLIC BLOOD PRESSURE: 102 MMHG | BODY MASS INDEX: 33.06 KG/M2 | RESPIRATION RATE: 18 BRPM

## 2023-06-21 DIAGNOSIS — C50.912 MALIGNANT NEOPLASM OF LEFT FEMALE BREAST, UNSPECIFIED ESTROGEN RECEPTOR STATUS, UNSPECIFIED SITE OF BREAST (HCC): Primary | ICD-10-CM

## 2023-06-21 DIAGNOSIS — R92.2 DENSE BREAST TISSUE ON MAMMOGRAM: ICD-10-CM

## 2023-06-21 DIAGNOSIS — C50.912 MALIGNANT NEOPLASM OF LEFT FEMALE BREAST, UNSPECIFIED ESTROGEN RECEPTOR STATUS, UNSPECIFIED SITE OF BREAST (HCC): ICD-10-CM

## 2023-06-21 DIAGNOSIS — Z85.3 PERSONAL HISTORY OF BREAST CANCER: Primary | ICD-10-CM

## 2023-06-21 DIAGNOSIS — Z79.811 USE OF AROMATASE INHIBITORS: ICD-10-CM

## 2023-06-21 DIAGNOSIS — R92.2 DENSE BREAST TISSUE: ICD-10-CM

## 2023-06-21 PROBLEM — B96.89 ACUTE BACTERIAL SINUSITIS: Status: RESOLVED | Noted: 2023-01-27 | Resolved: 2023-06-21

## 2023-06-21 PROBLEM — L30.9 DERMATITIS: Status: RESOLVED | Noted: 2018-11-04 | Resolved: 2023-06-21

## 2023-06-21 PROBLEM — M79.641 PAIN OF RIGHT HAND: Status: RESOLVED | Noted: 2023-01-27 | Resolved: 2023-06-21

## 2023-06-21 PROBLEM — J01.90 ACUTE BACTERIAL SINUSITIS: Status: RESOLVED | Noted: 2023-01-27 | Resolved: 2023-06-21

## 2023-06-21 PROBLEM — E87.6 HYPOKALEMIA: Status: RESOLVED | Noted: 2023-04-17 | Resolved: 2023-06-21

## 2023-06-21 PROBLEM — N30.00 ACUTE CYSTITIS WITHOUT HEMATURIA: Status: RESOLVED | Noted: 2022-09-01 | Resolved: 2023-06-21

## 2023-06-21 PROBLEM — R30.0 DYSURIA: Status: RESOLVED | Noted: 2022-09-01 | Resolved: 2023-06-21

## 2023-06-21 LAB
ALBUMIN SERPL-MCNC: 3.9 G/DL (ref 3.5–5.2)
ALP SERPL-CCNC: 170 U/L (ref 35–104)
ALT SERPL-CCNC: 25 U/L (ref 0–32)
ANION GAP SERPL CALCULATED.3IONS-SCNC: 13 MMOL/L (ref 7–16)
AST SERPL-CCNC: 24 U/L (ref 0–31)
BASOPHILS # BLD: 0.02 E9/L (ref 0–0.2)
BASOPHILS NFR BLD: 0.2 % (ref 0–2)
BILIRUB SERPL-MCNC: <0.2 MG/DL (ref 0–1.2)
BUN SERPL-MCNC: 20 MG/DL (ref 6–20)
CALCIUM SERPL-MCNC: 9.9 MG/DL (ref 8.6–10.2)
CHLORIDE SERPL-SCNC: 104 MMOL/L (ref 98–107)
CO2 SERPL-SCNC: 24 MMOL/L (ref 22–29)
CREAT SERPL-MCNC: 1.1 MG/DL (ref 0.5–1)
EOSINOPHIL # BLD: 0.09 E9/L (ref 0.05–0.5)
EOSINOPHIL NFR BLD: 1.1 % (ref 0–6)
ERYTHROCYTE [DISTWIDTH] IN BLOOD BY AUTOMATED COUNT: 14.5 FL (ref 11.5–15)
GLUCOSE SERPL-MCNC: 127 MG/DL (ref 74–99)
HCT VFR BLD AUTO: 39.1 % (ref 34–48)
HGB BLD-MCNC: 12.2 G/DL (ref 11.5–15.5)
IMM GRANULOCYTES # BLD: 0.04 E9/L
IMM GRANULOCYTES NFR BLD: 0.5 % (ref 0–5)
LYMPHOCYTES # BLD: 2.47 E9/L (ref 1.5–4)
LYMPHOCYTES NFR BLD: 30.3 % (ref 20–42)
MCH RBC QN AUTO: 28.7 PG (ref 26–35)
MCHC RBC AUTO-ENTMCNC: 31.2 % (ref 32–34.5)
MCV RBC AUTO: 92 FL (ref 80–99.9)
MONOCYTES # BLD: 0.56 E9/L (ref 0.1–0.95)
MONOCYTES NFR BLD: 6.9 % (ref 2–12)
NEUTROPHILS # BLD: 4.98 E9/L (ref 1.8–7.3)
NEUTS SEG NFR BLD: 61 % (ref 43–80)
PLATELET # BLD AUTO: 236 E9/L (ref 130–450)
PMV BLD AUTO: 11.3 FL (ref 7–12)
POTASSIUM SERPL-SCNC: 4.6 MMOL/L (ref 3.5–5)
PROT SERPL-MCNC: 7.6 G/DL (ref 6.4–8.3)
RBC # BLD AUTO: 4.25 E12/L (ref 3.5–5.5)
SODIUM SERPL-SCNC: 141 MMOL/L (ref 132–146)
WBC # BLD: 8.2 E9/L (ref 4.5–11.5)

## 2023-06-21 PROCEDURE — 80053 COMPREHEN METABOLIC PANEL: CPT

## 2023-06-21 PROCEDURE — 1036F TOBACCO NON-USER: CPT | Performed by: NURSE PRACTITIONER

## 2023-06-21 PROCEDURE — 3074F SYST BP LT 130 MM HG: CPT | Performed by: INTERNAL MEDICINE

## 2023-06-21 PROCEDURE — 99214 OFFICE O/P EST MOD 30 MIN: CPT | Performed by: INTERNAL MEDICINE

## 2023-06-21 PROCEDURE — G8427 DOCREV CUR MEDS BY ELIG CLIN: HCPCS | Performed by: INTERNAL MEDICINE

## 2023-06-21 PROCEDURE — 3017F COLORECTAL CA SCREEN DOC REV: CPT | Performed by: NURSE PRACTITIONER

## 2023-06-21 PROCEDURE — 1036F TOBACCO NON-USER: CPT | Performed by: INTERNAL MEDICINE

## 2023-06-21 PROCEDURE — 85025 COMPLETE CBC W/AUTO DIFF WBC: CPT

## 2023-06-21 PROCEDURE — 3078F DIAST BP <80 MM HG: CPT | Performed by: NURSE PRACTITIONER

## 2023-06-21 PROCEDURE — G8417 CALC BMI ABV UP PARAM F/U: HCPCS | Performed by: NURSE PRACTITIONER

## 2023-06-21 PROCEDURE — 3017F COLORECTAL CA SCREEN DOC REV: CPT | Performed by: INTERNAL MEDICINE

## 2023-06-21 PROCEDURE — 36415 COLL VENOUS BLD VENIPUNCTURE: CPT

## 2023-06-21 PROCEDURE — 3074F SYST BP LT 130 MM HG: CPT | Performed by: NURSE PRACTITIONER

## 2023-06-21 PROCEDURE — 99212 OFFICE O/P EST SF 10 MIN: CPT

## 2023-06-21 PROCEDURE — 99213 OFFICE O/P EST LOW 20 MIN: CPT | Performed by: NURSE PRACTITIONER

## 2023-06-21 PROCEDURE — G8417 CALC BMI ABV UP PARAM F/U: HCPCS | Performed by: INTERNAL MEDICINE

## 2023-06-21 PROCEDURE — G8427 DOCREV CUR MEDS BY ELIG CLIN: HCPCS | Performed by: NURSE PRACTITIONER

## 2023-06-21 PROCEDURE — 77080 DXA BONE DENSITY AXIAL: CPT

## 2023-06-21 PROCEDURE — 3078F DIAST BP <80 MM HG: CPT | Performed by: INTERNAL MEDICINE

## 2023-06-21 RX ORDER — BLOOD SUGAR DIAGNOSTIC
STRIP MISCELLANEOUS
Qty: 100 EACH | Refills: 10 | Status: SHIPPED | OUTPATIENT
Start: 2023-06-21

## 2023-06-21 RX ORDER — OMEPRAZOLE 40 MG/1
40 CAPSULE, DELAYED RELEASE ORAL DAILY
Qty: 30 CAPSULE | Refills: 10 | Status: SHIPPED | OUTPATIENT
Start: 2023-06-21 | End: 2023-09-19

## 2023-06-21 NOTE — PROGRESS NOTES
Department of [facility] Oncology  Attending Clinic Note    Reason for Visit: Follow-up on a patient with Left Breast Cancer     PCP:  Akilah Brown DO    History of Present Illness:  52year old female with Left Breast Cancer. Lesion located in the 7 o'clock position found on mammogram.    Breast cancer risk factors include age, gender, family history of cancer    Bilateral Screening Mammogram 11/12/2020 with 1.5 cm irregular focal asymmetry seen on the CC view, not well seen on MLO view. Stable mammogram of right breast    Left Diagnostic Mammogram 12/15/2021 with spiculated mass measuring 1.5 cm in the lower inner quadrant of the left breast. ON dedicated targeted ultrasound examination of left breast there is a solid lesion with posterior shadowing at 7 o'clock position. Highly suggestive of malignancy    Ultrasound guided core biopsy on 12/23/2020  Left breast, core needle biopsy: Invasive, moderately differentiatedductal carcinoma (grade 2)  Comment:    Intradepartmental consultation is obtained. Breast Cancer Marker Studies:  Estrogen Receptors (ER): 90%  Progesterone Receptors (VT): 90%  HER-2/salvador: Indeterminate/2+    FISH analysis HER/2: Negative  Average HER-2 signals/nucleus: 3.4   Average MARGARITO 17 signals/nucleus: 2.9   HER-2/MARGARITO 17 signal ratio: 1.2     2/17/2021 Left localized lumpectomy with left axillary sentinal lymph node excision   A. Breast, left, lumpectomy: Invasive ductal carcinoma   Ductal carcinoma in situ   Invasive carcinoma present less than 1 mm from yellow/medial margin   Margins negative for ductal carcinoma in situ     B. New medial anterior margin, excision: Negative for carcinoma   C. [place] lymph node, excision: One (1) lymph node, negative for metastatic carcinoma     CANCER CASE SUMMARY   Procedure: Excision (less than total mastectomy)   Specimen laterality: Left   Tumor size: 1.3 cm in greatest dimension   Histologic type:  Invasive carcinoma of no special type

## 2023-06-21 NOTE — PATIENT INSTRUCTIONS
After reviewing her mammogram and performing clinical breast exam, we recommend, based on her breast tissue density score (grade C) that she consider  bilateral complete breast screening ultrasounds annually. She was advised to contact her insurance provider prior to having breast ultrasounds to ensure out-of-pocket costs are acceptable. [facility] is a designated [facility] of Excellence for breast care.

## 2023-06-22 PROBLEM — N76.0 ACUTE VAGINITIS: Status: ACTIVE | Noted: 2023-06-22

## 2023-06-22 ASSESSMENT — ENCOUNTER SYMPTOMS
ABDOMINAL PAIN: 0
CHEST TIGHTNESS: 0
RECTAL PAIN: 0
EYE DISCHARGE: 0
GASTROINTESTINAL NEGATIVE: 1
VOICE CHANGE: 0
SHORTNESS OF BREATH: 0
RHINORRHEA: 0
SORE THROAT: 0
CHOKING: 0
DIARRHEA: 0
ALLERGIC/IMMUNOLOGIC NEGATIVE: 1
COLOR CHANGE: 0
COUGH: 0
BACK PAIN: 0
VOMITING: 0
FACIAL SWELLING: 0
SINUS PAIN: 0
TROUBLE SWALLOWING: 0
SINUS PRESSURE: 0
ANAL BLEEDING: 0
EYE REDNESS: 0
ABDOMINAL DISTENTION: 0
PHOTOPHOBIA: 0
STRIDOR: 0
EYE ITCHING: 0
EYE PAIN: 0
WHEEZING: 0
BLOOD IN STOOL: 0
RESPIRATORY NEGATIVE: 1
CONSTIPATION: 0
NAUSEA: 0

## 2023-06-23 ENCOUNTER — TELEPHONE (OUTPATIENT)
Dept: BREAST CENTER | Age: 53
End: 2023-06-23

## 2023-06-23 NOTE — PROGRESS NOTES
RELEASE,LEFT LONG FINGER A1 PULLEY RELEASE (CPT 96191,08045F1) performed by Anyi Becker MD at [facility]  2023    CATARACT REMOVAL Bilateral 2022     SECTION      CHOLECYSTECTOMY, LAPAROSCOPIC N/A 2019    LAPAROSCOPIC CHOLECYSTECTOMY WITH IOC performed by Rhonda Holstein, MD at [address]  had extremely high blood pressure and hard to wake up    COLONOSCOPY N/A 2019    COLORECTAL CANCER SCREENING, NOT HIGH RISK performed by Rhonda Holstein, MD at [place] [address] Bilateral 2023    HYSTERECTOMY, TOTAL ABDOMINAL (CERVIX REMOVED)      SHOULDER ARTHROSCOPY Left 2016    subacromin decompression and debridement    SHOULDER ARTHROSCOPY Right 2020    RIGHT SHOULDER ARTHROSCOPY, SUBACROMIAL DECOMPRESSION, LABRIAL DEBRIDEMENT, CHONDROPLASTY, RAÚL performed by Joann Nolan DO at [address] N/A 2019    EGD BIOPSY performed by Rhonda Holstein, MD at [facility] LEFT  2020     BREAST NEEDLE BIOPSY LEFT 2020 [facility]    WRIST SURGERY Right 03/15/2023    RIGHT WRIST FIRST DORSAL COMPARTMENT RELEASE performed by Anyi Becker MD at [address] [place] [address]       Past Family Hx:  Reviewed with patient      Problem Relation Age of Onset    Heart Disease Mother     Diabetes Mother     Heart Disease Father     Diabetes Father     Cancer Father         prostate, colon, skin cance behind ear    Breast Cancer Maternal Aunt 54        breast    Cancer Maternal Uncle 62        colon    Cancer Paternal Aunt 58        breast    Cancer Other 62        maternal great aunt - breast       Social Hx:  Reviewed with patient  Social History     Tobacco Use    Smoking status: Never    Smokeless tobacco: Never   Substance Use Topics    Alcohol use:  Yes     Alcohol/week: 1.0 standard drink     Types: 1 Glasses of wine per week     Comment: 1 drink per

## 2023-06-23 NOTE — TELEPHONE ENCOUNTER
Received call from representative at [facility] stating a prior authorization form would need completed before deciding if bilateral complete breast ultrasounds are covered.     Electronically signed by Bonifacio Chris RN on 6/23/23 at 11:29 AM EDT

## 2023-06-25 DIAGNOSIS — E11.65 UNCONTROLLED TYPE 2 DIABETES MELLITUS WITH HYPERGLYCEMIA (HCC): ICD-10-CM

## 2023-06-26 RX ORDER — EMPAGLIFLOZIN 10 MG/1
TABLET, FILM COATED ORAL
Qty: 30 TABLET | Refills: 10 | OUTPATIENT
Start: 2023-06-26

## 2023-06-30 DIAGNOSIS — E11.65 UNCONTROLLED TYPE 2 DIABETES MELLITUS WITH HYPERGLYCEMIA (HCC): ICD-10-CM

## 2023-06-30 RX ORDER — EMPAGLIFLOZIN 10 MG/1
TABLET, FILM COATED ORAL
Qty: 30 TABLET | Refills: 10 | OUTPATIENT
Start: 2023-06-30

## 2023-07-03 DIAGNOSIS — Z79.4 INSULIN DEPENDENT TYPE 2 DIABETES MELLITUS (HCC): ICD-10-CM

## 2023-07-03 DIAGNOSIS — E11.9 INSULIN DEPENDENT TYPE 2 DIABETES MELLITUS (HCC): ICD-10-CM

## 2023-07-03 DIAGNOSIS — E11.65 UNCONTROLLED TYPE 2 DIABETES MELLITUS WITH HYPERGLYCEMIA (HCC): ICD-10-CM

## 2023-07-03 NOTE — TELEPHONE ENCOUNTER
Last Appointment:  6/19/2023  Future Appointments   Date Time Provider [address]   8/25/2023  2:00 PM SEYZ ELISE US RM 3 SEYZ ABDU [facility] Radiolo   12/20/2023  9:00 AM SEYZ ELISE [facility] RM 2 SEYZ ABDU [facility] Radiolo   12/20/2023 10:00 AM Wing Nixon, APRN - CNP [facility]   12/20/2023 10:45 AM SEYZ MED ONC FAST TRACK 2 SEYZ Med Onc [facility]   12/20/2023 11:00 AM Ade Botello MD MED ONC [facility]

## 2023-07-05 RX ORDER — INSULIN GLARGINE 100 [IU]/ML
INJECTION, SOLUTION SUBCUTANEOUS
Qty: 15 ML | Refills: 10 | Status: SHIPPED | OUTPATIENT
Start: 2023-07-05

## 2023-07-05 RX ORDER — DULAGLUTIDE 1.5 MG/.5ML
1.5 INJECTION, SOLUTION SUBCUTANEOUS WEEKLY
Qty: 4 ADJUSTABLE DOSE PRE-FILLED PEN SYRINGE | Refills: 3 | Status: SHIPPED | OUTPATIENT
Start: 2023-07-05

## 2023-07-13 DIAGNOSIS — E11.42 DIABETIC POLYNEUROPATHY ASSOCIATED WITH TYPE 2 DIABETES MELLITUS (HCC): ICD-10-CM

## 2023-07-13 DIAGNOSIS — E11.65 UNCONTROLLED TYPE 2 DIABETES MELLITUS WITH HYPERGLYCEMIA (HCC): ICD-10-CM

## 2023-07-13 RX ORDER — EMPAGLIFLOZIN 10 MG/1
TABLET, FILM COATED ORAL
Qty: 30 TABLET | Refills: 10 | OUTPATIENT
Start: 2023-07-13

## 2023-07-13 NOTE — TELEPHONE ENCOUNTER
Pt called in requesting refill on pended medications.  Last seen 6/19/2023  Next appt Visit date not found

## 2023-07-18 DIAGNOSIS — C50.912 MALIGNANT NEOPLASM OF LEFT FEMALE BREAST, UNSPECIFIED ESTROGEN RECEPTOR STATUS, UNSPECIFIED SITE OF BREAST (HCC): ICD-10-CM

## 2023-07-18 DIAGNOSIS — E11.65 UNCONTROLLED TYPE 2 DIABETES MELLITUS WITH HYPERGLYCEMIA (HCC): ICD-10-CM

## 2023-07-18 DIAGNOSIS — Z79.811 USE OF AROMATASE INHIBITORS: ICD-10-CM

## 2023-07-19 RX ORDER — LANCETS 30 GAUGE
EACH MISCELLANEOUS
Qty: 100 EACH | Refills: 5 | Status: SHIPPED | OUTPATIENT
Start: 2023-07-19

## 2023-07-19 RX ORDER — ANASTROZOLE 1 MG/1
1 TABLET ORAL DAILY
Qty: 30 TABLET | Refills: 10 | OUTPATIENT
Start: 2023-07-19

## 2023-07-19 NOTE — TELEPHONE ENCOUNTER
Last Appointment:  6/19/2023  Future Appointments   Date Time Provider [address]   8/25/2023  2:00 PM SEYZ ELISE US RM 3 SEYZ ABDU [facility] Radiolo   12/20/2023  9:00 AM SEYZ ELISE [facility] RM 2 [facility] Radiolo   12/20/2023 10:00 AM Neeta Ramos, APRN - CNP [facility]   12/20/2023 10:45 AM SEYZ MED ONC FAST TRACK 2 SEYZ Med Onc [facility]   12/20/2023 11:00 AM Isabelle Alfred MD MED ONC [facility]

## 2023-07-21 RX ORDER — EMPAGLIFLOZIN 10 MG/1
TABLET, FILM COATED ORAL
Qty: 30 TABLET | Refills: 10 | OUTPATIENT
Start: 2023-07-21

## 2023-07-24 DIAGNOSIS — C50.912 MALIGNANT NEOPLASM OF LEFT FEMALE BREAST, UNSPECIFIED ESTROGEN RECEPTOR STATUS, UNSPECIFIED SITE OF BREAST (HCC): ICD-10-CM

## 2023-07-24 DIAGNOSIS — Z79.811 USE OF AROMATASE INHIBITORS: ICD-10-CM

## 2023-07-26 RX ORDER — ANASTROZOLE 1 MG/1
1 TABLET ORAL DAILY
Qty: 30 TABLET | Refills: 10 | OUTPATIENT
Start: 2023-07-26

## 2023-07-30 DIAGNOSIS — C50.912 MALIGNANT NEOPLASM OF LEFT FEMALE BREAST, UNSPECIFIED ESTROGEN RECEPTOR STATUS, UNSPECIFIED SITE OF BREAST (HCC): ICD-10-CM

## 2023-07-30 DIAGNOSIS — Z79.811 USE OF AROMATASE INHIBITORS: ICD-10-CM

## 2023-07-31 RX ORDER — ANASTROZOLE 1 MG/1
1 TABLET ORAL DAILY
Qty: 30 TABLET | Refills: 10 | OUTPATIENT
Start: 2023-07-31

## 2023-08-02 DIAGNOSIS — C50.912 MALIGNANT NEOPLASM OF LEFT FEMALE BREAST, UNSPECIFIED ESTROGEN RECEPTOR STATUS, UNSPECIFIED SITE OF BREAST (HCC): ICD-10-CM

## 2023-08-02 DIAGNOSIS — Z79.811 USE OF AROMATASE INHIBITORS: ICD-10-CM

## 2023-08-03 RX ORDER — ANASTROZOLE 1 MG/1
1 TABLET ORAL DAILY
Qty: 30 TABLET | Refills: 10 | OUTPATIENT
Start: 2023-08-03

## 2023-08-04 DIAGNOSIS — C50.912 MALIGNANT NEOPLASM OF LEFT FEMALE BREAST, UNSPECIFIED ESTROGEN RECEPTOR STATUS, UNSPECIFIED SITE OF BREAST (HCC): ICD-10-CM

## 2023-08-04 DIAGNOSIS — Z79.811 USE OF AROMATASE INHIBITORS: ICD-10-CM

## 2023-08-04 RX ORDER — ANASTROZOLE 1 MG/1
1 TABLET ORAL DAILY
Qty: 30 TABLET | Refills: 5 | Status: SHIPPED | OUTPATIENT
Start: 2023-08-04

## 2023-08-17 RX ORDER — PHENAZOPYRIDINE HYDROCHLORIDE 100 MG/1
TABLET, FILM COATED ORAL
Qty: 18 TABLET | Refills: 10 | Status: SHIPPED | OUTPATIENT
Start: 2023-08-17

## 2023-08-17 NOTE — TELEPHONE ENCOUNTER
Last Appointment:  6/19/2023  Future Appointments   Date Time Provider [address]   8/25/2023  2:00 PM SEYZ ABDU US RM 3 SEYZ [facility] Radiolo   12/20/2023  9:00 AM SE[facility]Z ABDU [facility] RM 2 [facility] Radiolo   12/20/2023 10:00 AM Brenda Oakes APRN - CNP [facility]   12/20/2023 10:45 AM SEYZ MED ONC FAST TRACK 2 SEYZ Med Onc . [facility]   12/20/2023 11:00 AM Hanas Carrion MD MED ONC [facility]    Electronically signed by Arvin Bravo LPN on 2/79/88 at 5:33 AM EDT

## 2023-08-23 ENCOUNTER — OFFICE VISIT (OUTPATIENT)
Dept: FAMILY MEDICINE CLINIC | Age: 53
End: 2023-08-23

## 2023-08-23 VITALS
RESPIRATION RATE: 18 BRPM | WEIGHT: 168 LBS | DIASTOLIC BLOOD PRESSURE: 76 MMHG | HEIGHT: 59 IN | HEART RATE: 86 BPM | OXYGEN SATURATION: 97 % | TEMPERATURE: 97 F | SYSTOLIC BLOOD PRESSURE: 112 MMHG | BODY MASS INDEX: 33.87 KG/M2

## 2023-08-23 DIAGNOSIS — E11.65 UNCONTROLLED TYPE 2 DIABETES MELLITUS WITH HYPERGLYCEMIA (HCC): ICD-10-CM

## 2023-08-23 DIAGNOSIS — C50.912 INVASIVE DUCTAL CARCINOMA OF LEFT BREAST (HCC): ICD-10-CM

## 2023-08-23 DIAGNOSIS — G62.9 NEUROPATHY: ICD-10-CM

## 2023-08-23 DIAGNOSIS — K21.9 GASTROESOPHAGEAL REFLUX DISEASE WITHOUT ESOPHAGITIS: ICD-10-CM

## 2023-08-23 DIAGNOSIS — Z12.31 ENCOUNTER FOR SCREENING MAMMOGRAM FOR MALIGNANT NEOPLASM OF BREAST: ICD-10-CM

## 2023-08-23 DIAGNOSIS — F41.9 ANXIETY: ICD-10-CM

## 2023-08-23 DIAGNOSIS — I10 ESSENTIAL HYPERTENSION: Primary | ICD-10-CM

## 2023-08-23 DIAGNOSIS — Z12.11 SCREENING FOR COLON CANCER: ICD-10-CM

## 2023-08-23 RX ORDER — GABAPENTIN 300 MG/1
300 CAPSULE ORAL NIGHTLY
Qty: 30 CAPSULE | Refills: 2 | Status: SHIPPED | OUTPATIENT
Start: 2023-08-23 | End: 2023-11-21

## 2023-08-23 RX ORDER — PEN NEEDLE, DIABETIC 31 GX5/16"
NEEDLE, DISPOSABLE MISCELLANEOUS
Qty: 100 EACH | Refills: 3 | Status: SHIPPED | OUTPATIENT
Start: 2023-08-23

## 2023-08-23 RX ORDER — BUSPIRONE HYDROCHLORIDE 5 MG/1
5 TABLET ORAL 2 TIMES DAILY PRN
Qty: 60 TABLET | Refills: 1 | Status: SHIPPED | OUTPATIENT
Start: 2023-08-23 | End: 2023-10-22

## 2023-08-23 RX ORDER — OMEPRAZOLE 40 MG/1
40 CAPSULE, DELAYED RELEASE ORAL DAILY
Qty: 90 CAPSULE | Refills: 1 | Status: SHIPPED | OUTPATIENT
Start: 2023-08-23 | End: 2024-02-19

## 2023-08-24 LAB
6-MONOACETYLMORPHINE, URINE: NEGATIVE
ABNORMAL SPECIMEN VALIDITY TEST: NORMAL
ALCOHOL URINE: NOT DETECTED MG/DL
AMPHETAMINE SCREEN URINE: NEGATIVE
BARBITURATE SCREEN URINE: NEGATIVE
BENZODIAZEPINE SCREEN, URINE: NEGATIVE
BUPRENORPHINE URINE: NEGATIVE
CANNABINOID SCREEN URINE: NEGATIVE
COCAINE METABOLITE, URINE: NEGATIVE
FENTANYL URINE: NEGATIVE
INTEGRITY CHECK, CREATININE, URINE: 25.4
INTEGRITY CHECK, OXIDANT, URINE: <40
INTEGRITY CHECK, PH, URINE: 5.9
INTEGRITY CHECK, SPECIFIC GRAVITY, URINE: 1.01
METHADONE SCREEN, URINE: NEGATIVE
OPIATES, URINE: NEGATIVE
OXYCODONE SCREEN URINE: NEGATIVE
PHENCYCLIDINE, URINE: NEGATIVE
TEST INFORMATION: NORMAL
TRAMADOL, URINE: NEGATIVE

## 2023-08-25 ENCOUNTER — HOSPITAL ENCOUNTER (INPATIENT)
Age: 53
LOS: 2 days | Discharge: HOME | DRG: 194 | End: 2023-08-27
Attending: EMERGENCY MEDICINE | Admitting: INTERNAL MEDICINE
Payer: MEDICAID

## 2023-08-25 ENCOUNTER — APPOINTMENT (OUTPATIENT)
Dept: CT IMAGING | Age: 53
DRG: 194 | End: 2023-08-25
Payer: MEDICAID

## 2023-08-25 ENCOUNTER — APPOINTMENT (OUTPATIENT)
Dept: GENERAL RADIOLOGY | Age: 53
DRG: 194 | End: 2023-08-25
Payer: MEDICAID

## 2023-08-25 DIAGNOSIS — R60.9 PERIPHERAL EDEMA: ICD-10-CM

## 2023-08-25 DIAGNOSIS — I24.9 ACS (ACUTE CORONARY SYNDROME) (HCC): Primary | ICD-10-CM

## 2023-08-25 PROBLEM — Z12.31 ENCOUNTER FOR SCREENING MAMMOGRAM FOR MALIGNANT NEOPLASM OF BREAST: Status: ACTIVE | Noted: 2023-08-25

## 2023-08-25 PROBLEM — Z12.11 SCREENING FOR COLON CANCER: Status: ACTIVE | Noted: 2023-08-25

## 2023-08-25 LAB
ALBUMIN SERPL-MCNC: 3.9 G/DL (ref 3.5–5.2)
ALP SERPL-CCNC: 168 U/L (ref 35–104)
ALT SERPL-CCNC: 26 U/L (ref 0–32)
ANION GAP SERPL CALCULATED.3IONS-SCNC: 13 MMOL/L (ref 7–16)
AST SERPL-CCNC: 34 U/L (ref 0–31)
BASOPHILS # BLD: 0.01 K/UL (ref 0–0.2)
BASOPHILS NFR BLD: 0 % (ref 0–2)
BILIRUB DIRECT SERPL-MCNC: <0.2 MG/DL (ref 0–0.3)
BILIRUB INDIRECT SERPL-MCNC: ABNORMAL MG/DL (ref 0–1)
BILIRUB SERPL-MCNC: <0.2 MG/DL (ref 0–1.2)
BNP SERPL-MCNC: 96 PG/ML (ref 0–450)
BUN SERPL-MCNC: 16 MG/DL (ref 6–20)
CALCIUM SERPL-MCNC: 9.4 MG/DL (ref 8.6–10.2)
CHLORIDE SERPL-SCNC: 102 MMOL/L (ref 98–107)
CO2 SERPL-SCNC: 25 MMOL/L (ref 22–29)
CREAT SERPL-MCNC: 0.9 MG/DL (ref 0.5–1)
EOSINOPHIL # BLD: 0.1 K/UL (ref 0.05–0.5)
EOSINOPHILS RELATIVE PERCENT: 1 % (ref 0–6)
ERYTHROCYTE [DISTWIDTH] IN BLOOD BY AUTOMATED COUNT: 14.8 % (ref 11.5–15)
GFR SERPL CREATININE-BSD FRML MDRD: >60 ML/MIN/1.73M2
GLUCOSE BLD-MCNC: 111 MG/DL (ref 74–99)
GLUCOSE SERPL-MCNC: 272 MG/DL (ref 74–99)
HCT VFR BLD AUTO: 38.4 % (ref 34–48)
HGB BLD-MCNC: 12.2 G/DL (ref 11.5–15.5)
IMM GRANULOCYTES # BLD AUTO: <0.03 K/UL (ref 0–0.58)
IMM GRANULOCYTES NFR BLD: 0 % (ref 0–5)
LYMPHOCYTES NFR BLD: 2.5 K/UL (ref 1.5–4)
LYMPHOCYTES RELATIVE PERCENT: 31 % (ref 20–42)
MAGNESIUM SERPL-MCNC: 2.1 MG/DL (ref 1.6–2.6)
MCH RBC QN AUTO: 28.2 PG (ref 26–35)
MCHC RBC AUTO-ENTMCNC: 31.8 G/DL (ref 32–34.5)
MCV RBC AUTO: 88.9 FL (ref 80–99.9)
MONOCYTES NFR BLD: 0.53 K/UL (ref 0.1–0.95)
MONOCYTES NFR BLD: 7 % (ref 2–12)
NEUTROPHILS NFR BLD: 60 % (ref 43–80)
NEUTS SEG NFR BLD: 4.81 K/UL (ref 1.8–7.3)
PLATELET # BLD AUTO: 274 K/UL (ref 130–450)
PMV BLD AUTO: 10.6 FL (ref 7–12)
POTASSIUM SERPL-SCNC: 3.8 MMOL/L (ref 3.5–5)
PROT SERPL-MCNC: 7.3 G/DL (ref 6.4–8.3)
RBC # BLD AUTO: 4.32 M/UL (ref 3.5–5.5)
SODIUM SERPL-SCNC: 140 MMOL/L (ref 132–146)
TROPONIN I SERPL HS-MCNC: 21 NG/L (ref 0–9)
TROPONIN I SERPL HS-MCNC: 23 NG/L (ref 0–9)
WBC OTHER # BLD: 8 K/UL (ref 4.5–11.5)

## 2023-08-25 PROCEDURE — 80053 COMPREHEN METABOLIC PANEL: CPT

## 2023-08-25 PROCEDURE — 6360000004 HC RX CONTRAST MEDICATION: Performed by: RADIOLOGY

## 2023-08-25 PROCEDURE — 71275 CT ANGIOGRAPHY CHEST: CPT

## 2023-08-25 PROCEDURE — 84484 ASSAY OF TROPONIN QUANT: CPT

## 2023-08-25 PROCEDURE — 83880 ASSAY OF NATRIURETIC PEPTIDE: CPT

## 2023-08-25 PROCEDURE — 71045 X-RAY EXAM CHEST 1 VIEW: CPT

## 2023-08-25 PROCEDURE — 99285 EMERGENCY DEPT VISIT HI MDM: CPT

## 2023-08-25 PROCEDURE — 83735 ASSAY OF MAGNESIUM: CPT

## 2023-08-25 PROCEDURE — 1200000000 HC SEMI PRIVATE

## 2023-08-25 PROCEDURE — 82962 GLUCOSE BLOOD TEST: CPT

## 2023-08-25 PROCEDURE — 93005 ELECTROCARDIOGRAM TRACING: CPT | Performed by: EMERGENCY MEDICINE

## 2023-08-25 PROCEDURE — 82248 BILIRUBIN DIRECT: CPT

## 2023-08-25 PROCEDURE — 85025 COMPLETE CBC W/AUTO DIFF WBC: CPT

## 2023-08-25 PROCEDURE — 6370000000 HC RX 637 (ALT 250 FOR IP): Performed by: EMERGENCY MEDICINE

## 2023-08-25 RX ORDER — POLYETHYLENE GLYCOL 3350 17 G/17G
17 POWDER, FOR SOLUTION ORAL DAILY PRN
Status: DISCONTINUED | OUTPATIENT
Start: 2023-08-25 | End: 2023-08-27 | Stop reason: HOSPADM

## 2023-08-25 RX ORDER — PANTOPRAZOLE SODIUM 40 MG/1
40 TABLET, DELAYED RELEASE ORAL
Status: DISCONTINUED | OUTPATIENT
Start: 2023-08-26 | End: 2023-08-27 | Stop reason: HOSPADM

## 2023-08-25 RX ORDER — SODIUM CHLORIDE 9 MG/ML
INJECTION, SOLUTION INTRAVENOUS PRN
Status: DISCONTINUED | OUTPATIENT
Start: 2023-08-25 | End: 2023-08-27 | Stop reason: HOSPADM

## 2023-08-25 RX ORDER — NITROGLYCERIN 0.4 MG/1
0.4 TABLET SUBLINGUAL ONCE
Status: COMPLETED | OUTPATIENT
Start: 2023-08-25 | End: 2023-08-25

## 2023-08-25 RX ORDER — ENOXAPARIN SODIUM 100 MG/ML
40 INJECTION SUBCUTANEOUS DAILY
Status: DISCONTINUED | OUTPATIENT
Start: 2023-08-25 | End: 2023-08-27 | Stop reason: HOSPADM

## 2023-08-25 RX ORDER — ANASTROZOLE 1 MG/1
1 TABLET ORAL DAILY
Status: DISCONTINUED | OUTPATIENT
Start: 2023-08-26 | End: 2023-08-27 | Stop reason: HOSPADM

## 2023-08-25 RX ORDER — GLUCAGON 1 MG/ML
1 KIT INJECTION PRN
Status: DISCONTINUED | OUTPATIENT
Start: 2023-08-25 | End: 2023-08-27 | Stop reason: HOSPADM

## 2023-08-25 RX ORDER — ACETAMINOPHEN 650 MG/1
650 SUPPOSITORY RECTAL EVERY 6 HOURS PRN
Status: DISCONTINUED | OUTPATIENT
Start: 2023-08-25 | End: 2023-08-27 | Stop reason: HOSPADM

## 2023-08-25 RX ORDER — BUSPIRONE HYDROCHLORIDE 5 MG/1
5 TABLET ORAL 2 TIMES DAILY PRN
Status: DISCONTINUED | OUTPATIENT
Start: 2023-08-25 | End: 2023-08-27 | Stop reason: HOSPADM

## 2023-08-25 RX ORDER — DEXTROSE MONOHYDRATE 100 MG/ML
INJECTION, SOLUTION INTRAVENOUS CONTINUOUS PRN
Status: DISCONTINUED | OUTPATIENT
Start: 2023-08-25 | End: 2023-08-27 | Stop reason: HOSPADM

## 2023-08-25 RX ORDER — SUCRALFATE 1 G/1
1 TABLET ORAL 4 TIMES DAILY
Status: DISCONTINUED | OUTPATIENT
Start: 2023-08-25 | End: 2023-08-27 | Stop reason: HOSPADM

## 2023-08-25 RX ORDER — LACTOBACILLUS RHAMNOSUS GG 10B CELL
1 CAPSULE ORAL DAILY
Status: DISCONTINUED | OUTPATIENT
Start: 2023-08-26 | End: 2023-08-27 | Stop reason: HOSPADM

## 2023-08-25 RX ORDER — ACETAMINOPHEN 325 MG/1
650 TABLET ORAL EVERY 6 HOURS PRN
Status: DISCONTINUED | OUTPATIENT
Start: 2023-08-25 | End: 2023-08-27 | Stop reason: HOSPADM

## 2023-08-25 RX ORDER — LATANOPROST 50 UG/ML
1 SOLUTION/ DROPS OPHTHALMIC NIGHTLY
Status: DISCONTINUED | OUTPATIENT
Start: 2023-08-25 | End: 2023-08-27 | Stop reason: HOSPADM

## 2023-08-25 RX ORDER — OMEPRAZOLE 20 MG/1
40 CAPSULE, DELAYED RELEASE ORAL DAILY
Status: DISCONTINUED | OUTPATIENT
Start: 2023-08-25 | End: 2023-08-25 | Stop reason: CLARIF

## 2023-08-25 RX ORDER — ASPIRIN 81 MG/1
324 TABLET, CHEWABLE ORAL ONCE
Status: COMPLETED | OUTPATIENT
Start: 2023-08-25 | End: 2023-08-25

## 2023-08-25 RX ORDER — BUMETANIDE 1 MG/1
1 TABLET ORAL EVERY OTHER DAY
Status: DISCONTINUED | OUTPATIENT
Start: 2023-08-26 | End: 2023-08-27 | Stop reason: HOSPADM

## 2023-08-25 RX ORDER — PRAMIPEXOLE DIHYDROCHLORIDE 0.25 MG/1
0.25 TABLET ORAL NIGHTLY
Status: DISCONTINUED | OUTPATIENT
Start: 2023-08-25 | End: 2023-08-27 | Stop reason: HOSPADM

## 2023-08-25 RX ORDER — ROSUVASTATIN CALCIUM 10 MG/1
5 TABLET, COATED ORAL DAILY
Status: DISCONTINUED | OUTPATIENT
Start: 2023-08-26 | End: 2023-08-26

## 2023-08-25 RX ORDER — INSULIN GLARGINE 100 [IU]/ML
50 INJECTION, SOLUTION SUBCUTANEOUS NIGHTLY
Status: DISCONTINUED | OUTPATIENT
Start: 2023-08-25 | End: 2023-08-27 | Stop reason: HOSPADM

## 2023-08-25 RX ORDER — GABAPENTIN 300 MG/1
300 CAPSULE ORAL NIGHTLY
Status: DISCONTINUED | OUTPATIENT
Start: 2023-08-25 | End: 2023-08-27 | Stop reason: HOSPADM

## 2023-08-25 RX ORDER — SODIUM CHLORIDE 0.9 % (FLUSH) 0.9 %
5-40 SYRINGE (ML) INJECTION EVERY 12 HOURS SCHEDULED
Status: DISCONTINUED | OUTPATIENT
Start: 2023-08-25 | End: 2023-08-27 | Stop reason: HOSPADM

## 2023-08-25 RX ORDER — SODIUM CHLORIDE 0.9 % (FLUSH) 0.9 %
5-40 SYRINGE (ML) INJECTION PRN
Status: DISCONTINUED | OUTPATIENT
Start: 2023-08-25 | End: 2023-08-27 | Stop reason: HOSPADM

## 2023-08-25 RX ADMIN — IOPAMIDOL 70 ML: 755 INJECTION, SOLUTION INTRAVENOUS at 19:48

## 2023-08-25 RX ADMIN — NITROGLYCERIN 0.4 MG: 0.4 TABLET, ORALLY DISINTEGRATING SUBLINGUAL at 20:05

## 2023-08-25 RX ADMIN — ASPIRIN 81 MG CHEWABLE TABLET 324 MG: 81 TABLET CHEWABLE at 17:18

## 2023-08-25 ASSESSMENT — ENCOUNTER SYMPTOMS
BACK PAIN: 0
NAUSEA: 0
WHEEZING: 0
COLOR CHANGE: 0
GASTROINTESTINAL NEGATIVE: 1
PHOTOPHOBIA: 0
RHINORRHEA: 0
FACIAL SWELLING: 0
ALLERGIC/IMMUNOLOGIC NEGATIVE: 1
VOMITING: 0
SINUS PRESSURE: 0
ANAL BLEEDING: 0
CHOKING: 0
RESPIRATORY NEGATIVE: 1
EYE DISCHARGE: 0
STRIDOR: 0
ABDOMINAL PAIN: 0
BLOOD IN STOOL: 0
EYE ITCHING: 0
ABDOMINAL DISTENTION: 0
EYE PAIN: 0
VOICE CHANGE: 0
CHEST TIGHTNESS: 0
TROUBLE SWALLOWING: 0
SORE THROAT: 0
CONSTIPATION: 0
DIARRHEA: 0
RECTAL PAIN: 0
SINUS PAIN: 0
COUGH: 0
SHORTNESS OF BREATH: 0
EYE REDNESS: 0

## 2023-08-25 ASSESSMENT — LIFESTYLE VARIABLES
HOW OFTEN DO YOU HAVE A DRINK CONTAINING ALCOHOL: MONTHLY OR LESS
HOW MANY STANDARD DRINKS CONTAINING ALCOHOL DO YOU HAVE ON A TYPICAL DAY: 1 OR 2
HOW OFTEN DO YOU HAVE A DRINK CONTAINING ALCOHOL: 2-4 TIMES A MONTH
HOW MANY STANDARD DRINKS CONTAINING ALCOHOL DO YOU HAVE ON A TYPICAL DAY: 1 OR 2

## 2023-08-25 NOTE — ED PROVIDER NOTES
[address]        Pt Name: Nadya Bernabe  MRN: [id number]  [address] [place] 1970  Date of evaluation: 8/25/2023  Provider: Sara Solis DO  PCP: Carlos Garner DO  Note Started: 4:25 PM EDT 8/25/23    CHIEF COMPLAINT       Chief Complaint   Patient presents with    Chest Pain    Shortness of Breath    Leg Swelling     Chest tightness noted for four days, intermittent SOB started 8/23/2023, 1+ pedal edema. Noted Pt states she is currently under a lot of stress. Hx of mitral valve prolapse. HISTORY OF PRESENT ILLNESS: 1 or more Elements        Limitations to history : None    Jenny Vargas is a 52 y.o. female brought in by EMS for evaluation of chest pain or shortness of breath. For the past 6 weeks she has had progressively worsening dyspnea on exertion, peripheral edema and chest pain. She describes the chest pain as a pressure sensation substernally as well as under the left breast that is nonradiating. Denies nausea, vomiting or palpitations. She has dyspnea on exertion even with minimal activity. She does also report that the chest pain is worse with activity and improved with rest.  She is initially taking a diuretic every other day but has since switched to daily. Her family reports that her face and her abdomen also appear swollen. She also reports increased stress as her brother was recently was recently diagnosed with Maryagnes Burke disease and given 2 weeks to 2 months to live    Nursing Notes were all reviewed and agreed with or any disagreements were addressed in the HPI. REVIEW OF EXTERNAL NOTE :       Reviewed office visit from 8/23/2023 for a follow-up office visit with her PCP.       Chart Review/External Note Review    Last Echo reviewed by Me:  No results found for: LVEF, LVEFMODE          Controlled Substance Monitoring:    Acute and Chronic Pain Monitoring:   RX Monitoring

## 2023-08-26 PROBLEM — R07.9 CHEST PAIN: Status: ACTIVE | Noted: 2023-08-26

## 2023-08-26 LAB
ALBUMIN SERPL-MCNC: 3.6 G/DL (ref 3.5–5.2)
ALP SERPL-CCNC: 158 U/L (ref 35–104)
ALT SERPL-CCNC: 27 U/L (ref 0–32)
ANION GAP SERPL CALCULATED.3IONS-SCNC: 12 MMOL/L (ref 7–16)
AST SERPL-CCNC: 25 U/L (ref 0–31)
BASOPHILS # BLD: 0.01 K/UL (ref 0–0.2)
BASOPHILS NFR BLD: 0 % (ref 0–2)
BILIRUB SERPL-MCNC: 0.3 MG/DL (ref 0–1.2)
BUN SERPL-MCNC: 14 MG/DL (ref 6–20)
CALCIUM SERPL-MCNC: 9.4 MG/DL (ref 8.6–10.2)
CHLORIDE SERPL-SCNC: 107 MMOL/L (ref 98–107)
CHOLEST SERPL-MCNC: 146 MG/DL
CO2 SERPL-SCNC: 24 MMOL/L (ref 22–29)
CREAT SERPL-MCNC: 0.8 MG/DL (ref 0.5–1)
EKG ATRIAL RATE: 90 BPM
EKG P AXIS: 55 DEGREES
EKG P-R INTERVAL: 128 MS
EKG Q-T INTERVAL: 388 MS
EKG QRS DURATION: 90 MS
EKG QTC CALCULATION (BAZETT): 474 MS
EKG R AXIS: 26 DEGREES
EKG T AXIS: 67 DEGREES
EKG VENTRICULAR RATE: 90 BPM
EOSINOPHIL # BLD: 0.11 K/UL (ref 0.05–0.5)
EOSINOPHILS RELATIVE PERCENT: 2 % (ref 0–6)
ERYTHROCYTE [DISTWIDTH] IN BLOOD BY AUTOMATED COUNT: 15 % (ref 11.5–15)
GFR SERPL CREATININE-BSD FRML MDRD: >60 ML/MIN/1.73M2
GLUCOSE BLD-MCNC: 139 MG/DL (ref 74–99)
GLUCOSE BLD-MCNC: 221 MG/DL (ref 74–99)
GLUCOSE BLD-MCNC: 225 MG/DL (ref 74–99)
GLUCOSE BLD-MCNC: 67 MG/DL (ref 74–99)
GLUCOSE BLD-MCNC: 88 MG/DL (ref 74–99)
GLUCOSE SERPL-MCNC: 65 MG/DL (ref 74–99)
HBA1C MFR BLD: 7.8 % (ref 4–5.6)
HCG SERPL QL: NEGATIVE
HCT VFR BLD AUTO: 37 % (ref 34–48)
HDLC SERPL-MCNC: 57 MG/DL
HGB BLD-MCNC: 11.8 G/DL (ref 11.5–15.5)
IMM GRANULOCYTES # BLD AUTO: <0.03 K/UL (ref 0–0.58)
IMM GRANULOCYTES NFR BLD: 0 % (ref 0–5)
LDLC SERPL CALC-MCNC: 74 MG/DL
LYMPHOCYTES NFR BLD: 2.97 K/UL (ref 1.5–4)
LYMPHOCYTES RELATIVE PERCENT: 43 % (ref 20–42)
MAGNESIUM SERPL-MCNC: 2.2 MG/DL (ref 1.6–2.6)
MCH RBC QN AUTO: 28.6 PG (ref 26–35)
MCHC RBC AUTO-ENTMCNC: 31.9 G/DL (ref 32–34.5)
MCV RBC AUTO: 89.8 FL (ref 80–99.9)
MONOCYTES NFR BLD: 0.57 K/UL (ref 0.1–0.95)
MONOCYTES NFR BLD: 8 % (ref 2–12)
NEUTROPHILS NFR BLD: 47 % (ref 43–80)
NEUTS SEG NFR BLD: 3.21 K/UL (ref 1.8–7.3)
PHOSPHATE SERPL-MCNC: 3.5 MG/DL (ref 2.5–4.5)
PLATELET # BLD AUTO: 272 K/UL (ref 130–450)
PMV BLD AUTO: 10.6 FL (ref 7–12)
POTASSIUM SERPL-SCNC: 3.9 MMOL/L (ref 3.5–5)
PROCALCITONIN SERPL-MCNC: 0.09 NG/ML (ref 0–0.08)
PROT SERPL-MCNC: 7.3 G/DL (ref 6.4–8.3)
RBC # BLD AUTO: 4.12 M/UL (ref 3.5–5.5)
SODIUM SERPL-SCNC: 143 MMOL/L (ref 132–146)
T4 FREE SERPL-MCNC: 1.1 NG/DL (ref 0.9–1.7)
TRIGL SERPL-MCNC: 76 MG/DL
TSH SERPL DL<=0.05 MIU/L-ACNC: 4.6 UIU/ML (ref 0.76–16.11)
VLDLC SERPL CALC-MCNC: 15 MG/DL
WBC OTHER # BLD: 6.9 K/UL (ref 4.5–11.5)

## 2023-08-26 PROCEDURE — 83735 ASSAY OF MAGNESIUM: CPT

## 2023-08-26 PROCEDURE — 84703 CHORIONIC GONADOTROPIN ASSAY: CPT

## 2023-08-26 PROCEDURE — 36415 COLL VENOUS BLD VENIPUNCTURE: CPT

## 2023-08-26 PROCEDURE — 82962 GLUCOSE BLOOD TEST: CPT

## 2023-08-26 PROCEDURE — 1200000000 HC SEMI PRIVATE

## 2023-08-26 PROCEDURE — 99223 1ST HOSP IP/OBS HIGH 75: CPT | Performed by: INTERNAL MEDICINE

## 2023-08-26 PROCEDURE — 2580000003 HC RX 258: Performed by: INTERNAL MEDICINE

## 2023-08-26 PROCEDURE — 6360000002 HC RX W HCPCS: Performed by: INTERNAL MEDICINE

## 2023-08-26 PROCEDURE — 2500000003 HC RX 250 WO HCPCS: Performed by: NURSE PRACTITIONER

## 2023-08-26 PROCEDURE — 85025 COMPLETE CBC W/AUTO DIFF WBC: CPT

## 2023-08-26 PROCEDURE — 6370000000 HC RX 637 (ALT 250 FOR IP): Performed by: INTERNAL MEDICINE

## 2023-08-26 PROCEDURE — 84145 PROCALCITONIN (PCT): CPT

## 2023-08-26 PROCEDURE — 93010 ELECTROCARDIOGRAM REPORT: CPT | Performed by: INTERNAL MEDICINE

## 2023-08-26 PROCEDURE — 80061 LIPID PANEL: CPT

## 2023-08-26 PROCEDURE — 84100 ASSAY OF PHOSPHORUS: CPT

## 2023-08-26 PROCEDURE — 84439 ASSAY OF FREE THYROXINE: CPT

## 2023-08-26 PROCEDURE — 80053 COMPREHEN METABOLIC PANEL: CPT

## 2023-08-26 PROCEDURE — 83036 HEMOGLOBIN GLYCOSYLATED A1C: CPT

## 2023-08-26 PROCEDURE — 84443 ASSAY THYROID STIM HORMONE: CPT

## 2023-08-26 PROCEDURE — 6370000000 HC RX 637 (ALT 250 FOR IP): Performed by: NURSE PRACTITIONER

## 2023-08-26 RX ORDER — MAGNESIUM SULFATE 1 G/100ML
1000 INJECTION INTRAVENOUS PRN
Status: DISCONTINUED | OUTPATIENT
Start: 2023-08-26 | End: 2023-08-27 | Stop reason: HOSPADM

## 2023-08-26 RX ORDER — ASPIRIN 81 MG/1
81 TABLET, CHEWABLE ORAL DAILY
Status: DISCONTINUED | OUTPATIENT
Start: 2023-08-26 | End: 2023-08-27 | Stop reason: HOSPADM

## 2023-08-26 RX ORDER — INSULIN LISPRO 100 [IU]/ML
0-4 INJECTION, SOLUTION INTRAVENOUS; SUBCUTANEOUS NIGHTLY
Status: DISCONTINUED | OUTPATIENT
Start: 2023-08-26 | End: 2023-08-27 | Stop reason: HOSPADM

## 2023-08-26 RX ORDER — BUMETANIDE 0.25 MG/ML
0.5 INJECTION INTRAMUSCULAR; INTRAVENOUS 2 TIMES DAILY
Status: DISCONTINUED | OUTPATIENT
Start: 2023-08-26 | End: 2023-08-27 | Stop reason: HOSPADM

## 2023-08-26 RX ORDER — POTASSIUM CHLORIDE 7.45 MG/ML
10 INJECTION INTRAVENOUS PRN
Status: DISCONTINUED | OUTPATIENT
Start: 2023-08-26 | End: 2023-08-27 | Stop reason: HOSPADM

## 2023-08-26 RX ORDER — INSULIN LISPRO 100 [IU]/ML
0-4 INJECTION, SOLUTION INTRAVENOUS; SUBCUTANEOUS
Status: DISCONTINUED | OUTPATIENT
Start: 2023-08-26 | End: 2023-08-27 | Stop reason: HOSPADM

## 2023-08-26 RX ORDER — POTASSIUM CHLORIDE 20 MEQ/1
40 TABLET, EXTENDED RELEASE ORAL PRN
Status: DISCONTINUED | OUTPATIENT
Start: 2023-08-26 | End: 2023-08-27 | Stop reason: HOSPADM

## 2023-08-26 RX ORDER — ROSUVASTATIN CALCIUM 10 MG/1
20 TABLET, COATED ORAL DAILY
Status: DISCONTINUED | OUTPATIENT
Start: 2023-08-26 | End: 2023-08-27 | Stop reason: HOSPADM

## 2023-08-26 RX ORDER — REGADENOSON 0.08 MG/ML
0.4 INJECTION, SOLUTION INTRAVENOUS
Status: DISCONTINUED | OUTPATIENT
Start: 2023-08-26 | End: 2023-08-26 | Stop reason: ALTCHOICE

## 2023-08-26 RX ADMIN — SUCRALFATE 1 G: 1 TABLET ORAL at 11:50

## 2023-08-26 RX ADMIN — PRAMIPEXOLE DIHYDROCHLORIDE 0.25 MG: 0.25 TABLET ORAL at 20:21

## 2023-08-26 RX ADMIN — GABAPENTIN 300 MG: 300 CAPSULE ORAL at 20:21

## 2023-08-26 RX ADMIN — SUCRALFATE 1 G: 1 TABLET ORAL at 00:09

## 2023-08-26 RX ADMIN — SUCRALFATE 1 G: 1 TABLET ORAL at 20:20

## 2023-08-26 RX ADMIN — PANTOPRAZOLE SODIUM 40 MG: 40 TABLET, DELAYED RELEASE ORAL at 06:12

## 2023-08-26 RX ADMIN — BUSPIRONE HYDROCHLORIDE 5 MG: 5 TABLET ORAL at 00:09

## 2023-08-26 RX ADMIN — ANASTROZOLE 1 MG: 1 TABLET ORAL at 08:37

## 2023-08-26 RX ADMIN — ENOXAPARIN SODIUM 40 MG: 100 INJECTION SUBCUTANEOUS at 00:10

## 2023-08-26 RX ADMIN — ROSUVASTATIN CALCIUM 20 MG: 10 TABLET, COATED ORAL at 08:34

## 2023-08-26 RX ADMIN — Medication 10 ML: at 22:41

## 2023-08-26 RX ADMIN — SUCRALFATE 1 G: 1 TABLET ORAL at 08:34

## 2023-08-26 RX ADMIN — SUCRALFATE 1 G: 1 TABLET ORAL at 16:39

## 2023-08-26 RX ADMIN — SERTRALINE 100 MG: 50 TABLET, FILM COATED ORAL at 08:35

## 2023-08-26 RX ADMIN — ASPIRIN 81 MG CHEWABLE TABLET 81 MG: 81 TABLET CHEWABLE at 08:35

## 2023-08-26 RX ADMIN — INSULIN LISPRO 1 UNITS: 100 INJECTION, SOLUTION INTRAVENOUS; SUBCUTANEOUS at 11:48

## 2023-08-26 RX ADMIN — BUMETANIDE 0.5 MG: 0.25 INJECTION INTRAMUSCULAR; INTRAVENOUS at 08:35

## 2023-08-26 RX ADMIN — PRAMIPEXOLE DIHYDROCHLORIDE 0.25 MG: 0.25 TABLET ORAL at 00:10

## 2023-08-26 RX ADMIN — Medication 10 ML: at 00:19

## 2023-08-26 RX ADMIN — Medication 1 CAPSULE: at 08:37

## 2023-08-26 RX ADMIN — BUMETANIDE 0.5 MG: 0.25 INJECTION INTRAMUSCULAR; INTRAVENOUS at 20:27

## 2023-08-26 RX ADMIN — ACETAMINOPHEN 650 MG: 325 TABLET ORAL at 16:17

## 2023-08-26 RX ADMIN — GABAPENTIN 300 MG: 300 CAPSULE ORAL at 00:10

## 2023-08-26 RX ADMIN — Medication 5 ML: at 10:12

## 2023-08-26 ASSESSMENT — PAIN DESCRIPTION - DESCRIPTORS
DESCRIPTORS: ACHING
DESCRIPTORS: ACHING;DISCOMFORT
DESCRIPTORS: ACHING;DISCOMFORT

## 2023-08-26 ASSESSMENT — PAIN - FUNCTIONAL ASSESSMENT
PAIN_FUNCTIONAL_ASSESSMENT: ACTIVITIES ARE NOT PREVENTED
PAIN_FUNCTIONAL_ASSESSMENT: ACTIVITIES ARE NOT PREVENTED
PAIN_FUNCTIONAL_ASSESSMENT: PREVENTS OR INTERFERES SOME ACTIVE ACTIVITIES AND ADLS

## 2023-08-26 ASSESSMENT — PAIN DESCRIPTION - ORIENTATION
ORIENTATION: INNER;MID
ORIENTATION: MID;INNER

## 2023-08-26 ASSESSMENT — PAIN SCALES - GENERAL
PAINLEVEL_OUTOF10: 2
PAINLEVEL_OUTOF10: 4
PAINLEVEL_OUTOF10: 3

## 2023-08-26 ASSESSMENT — PAIN DESCRIPTION - LOCATION
LOCATION: HEAD
LOCATION: CHEST
LOCATION: CHEST

## 2023-08-26 NOTE — CONSULTS
08/26/23  0604   ALKPHOS 168* 158*   ALT 26 27   AST 34* 25   PROT 7.3 7.3   BILITOT <0.2 0.3   BILIDIR <0.2  --    LABALBU 3.9 3.6     Recent Labs     08/25/23  1636 08/26/23  0604   WBC 8.0 6.9   RBC 4.32 4.12   HGB 12.2 11.8   HCT 38.4 37.0   MCV 88.9 89.8   MCH 28.2 28.6   MCHC 31.8* 31.9*   RDW 14.8 15.0    272   MPV 10.6 10.6     Lab Results   Component Value Date    CKTOTAL 73 07/29/2014    CKMB 2.3 07/29/2014    TROPONINI <0.01 12/03/2018    TROPONINI <0.01 06/20/2018    TROPONINI <0.01 03/03/2017     Recent Labs     08/25/23  1636 08/25/23  1730   TROPHS 23* 21*     Lab Results   Component Value Date    INR 0.9 03/03/2017    INR 0.9 07/29/2014    PROTIME 10.7 03/03/2017    PROTIME 10.7 07/29/2014     Lab Results   Component Value Date    TSH 4.60 08/26/2023     Lab Results   Component Value Date    LABA1C 8.4 06/19/2023     No results found for: EAG  Lab Results   Component Value Date    CHOL 146 08/26/2023    CHOL 134 01/27/2023    CHOL 138 02/21/2022     Lab Results   Component Value Date    TRIG 76 08/26/2023    TRIG 94 01/27/2023    TRIG 118 02/21/2022     Lab Results   Component Value Date    HDL 57 08/26/2023    HDL 55 01/27/2023    HDL 60 02/21/2022     Lab Results   Component Value Date    LDLCALC 60 01/27/2023    LDLCALC 54 02/21/2022    LDLCALC 61 08/04/2021    LDLCHOLESTEROL 74 08/26/2023     Lab Results   Component Value Date    LABVLDL 19 01/27/2023    LABVLDL 24 02/21/2022    LABVLDL 12 08/04/2021    VLDL 15 08/26/2023     No results found for: CHOLHDLRATIO  Recent Labs     08/25/23  1636   PROBNP 96     No results found for: CRP  Lab Results   Component Value Date    SEDRATE 37 (H) 09/14/2017       Cardiac Tests:  EKG personally reviewed: SR, rate 90, no acute STT changes    Telemetry personally reviewed (date: 8/26/2023): SR, rate 70's, no new arrhythmias    CXR: 8/25/23  The lungs are without acute focal process. There is no effusion or  pneumothorax.  The cardiomediastinal

## 2023-08-26 NOTE — CARE COORDINATION
Case Management Assessment  Initial Evaluation    Date/Time of Evaluation: 8/26/2023 4:44 PM  Assessment Completed by: RENETTA Mercer    If patient is discharged prior to next notation, then this note serves as note for discharge by case management. Patient Name: Armando Mancilla                   YOB: 1970  Diagnosis: Peripheral edema [R60.9]  ACS (acute coronary syndrome) [facility]) [I24.9]                   Date / Time: 8/25/2023  4:11 PM    Patient Admission Status: Inpatient   Readmission Risk (Low < 19, Mod (19-27), High > 27): Readmission Risk Score: 8.7    Current PCP: Ray Springer, DO  PCP verified by CM? Yes    Chart Reviewed: Yes      History Provided by: Patient  Patient Orientation: Alert and Oriented    Patient Cognition: Alert    Hospitalization in the last 30 days (Readmission):  No    If yes, Readmission Assessment in CM Navigator will be completed. Advance Directives:      Code Status: Full Code   Patient's Primary Decision Maker is: Legal Next of Kin    Primary Decision Maker: Chance Moody - Child - [phone]    Discharge Planning:    Patient lives with: Alone Type of Home: Apartment  Primary Care Giver: Self  Patient Support Systems include: Family Members, Children   Current Financial resources:    Current community resources:    Current services prior to admission: None            Current DME:              Type of Home Care services:  None    ADLS  Prior functional level: Independent in ADLs/IADLs  Current functional level: Independent in ADLs/IADLs    PT AM-PAC:   /24  OT AM-PAC:   /24    Family can provide assistance at DC: Yes  Would you like Case Management to discuss the discharge plan with any other family members/significant others, and if so, who?  Yes (dtr)  Plans to Return to Present Housing: Yes  Other Identified Issues/Barriers to RETURNING to current housing: none noted  Potential Assistance needed at discharge: N/A            Potential DME:    Patient

## 2023-08-26 NOTE — PROGRESS NOTES
4 Eyes Skin Assessment     NAME:  Valentín Azevedo OF BIRTH:  1970  MEDICAL RECORD NUMBER:  [id number]    The patient is being assessed for  Admission    I agree that at least one RN has performed a thorough Head to Toe Skin Assessment on the patient. ALL assessment sites listed below have been assessed. Areas assessed by both nurses:    Head, Face, Ears        Does the Patient have a Wound?  No noted wound(s)       Benigno Prevention initiated by RN: No  Wound Care Orders initiated by RN: No    Pressure Injury (Stage 3,4, Unstageable, DTI, NWPT, and Complex wounds) if present, place Wound referral order by RN under : No    New Ostomies, if present place, Ostomy referral order under : No     Nurse 1 eSignature: Electronically signed by Sara Schwab RN on 8/26/23 at 6:33 AM EDT    **SHARE this note so that the co-signing nurse can place an eSignature**    Nurse 2 eSignature: Electronically signed by Garland Munoz RN  on 8/26/23 at 6:39 AM EDT
06:04 AM    K 3.9 08/26/2023 06:04 AM    K 4.3 11/12/2020 11:01 AM     08/26/2023 06:04 AM    CO2 24 08/26/2023 06:04 AM    BUN 14 08/26/2023 06:04 AM    LABALBU 3.6 08/26/2023 06:04 AM    LABALBU 4.3 03/22/2012 08:56 AM    CREATININE 0.8 08/26/2023 06:04 AM    CALCIUM 9.4 08/26/2023 06:04 AM    GFRAA >60 08/24/2022 11:11 AM    LABGLOM >60 08/26/2023 06:04 AM    GLUCOSE 65 08/26/2023 06:04 AM    GLUCOSE 194 03/30/2012 08:52 AM     Recent Labs     08/25/23  1730   TROPHS 21*     Assessment:  Chest pain with moderate risk for cardiac etiology  Acutely decompensated diastolic congestive heart failure with preserved LVEF  Insulin-dependent diabetes mellitus type 2 with diabetic neuropathy  Hyperlipidemia  Essential hypertension  Obesity with BMI 33.53 kg meter squared with obstructive sleep apnea and noncompliance with NIPPV    Plan:   Eveline Liang is a 12-year-old female who presented to [facility] for evaluation of chest pain. She has had relatively recent echo and stress cardiologist office. Given her risk factors and strong family history as well as relatively recent negative work-up reportedly, the cardiovascular team will provide consultation and we will ask further input regarding potential need for cardiac catheterization. Presumptive diastolic failure based upon reported edema with negative BNP and lack of pulmonary edema. Low-dose IV diuresis today and assess for response. Chronic comorbidities, labs and vital signs are being monitored and addressed accordingly. Continue current therapy. See orders for further plan of care. More than 50% of my  time was spent at the bedside counseling/coordinating care with the patient and/or family with face to face contact. This time was spent reviewing notes and laboratory data as well as instructing and counseling the patient.  Time I spent with the family or surrogate(s) is included only if the patient was incapable of providing the necessary information or

## 2023-08-26 NOTE — H&P
CARDIOVASCULAR:    Normal apical impulse, regular rate and rhythm, normal S1 and S2, no S3 or S4, and no murmur noted    ABDOMEN:    soft, non-distended, non-tender,     MUSCULOSKELETAL:    There is no redness, warmth, or swelling of the joints. NEUROLOGIC:    Awake, alert, oriented to name, place and time. SKIN:    No bruising or bleeding. No redness, warmth, or swelling    EXTREMITIES:    Peripheral pulses present. No edema, cyanosis, or swelling.     LINES/CATHETERS     LABORATORY DATA:  CBC with Differential:    Lab Results   Component Value Date/Time    WBC 8.0 08/25/2023 04:36 PM    RBC 4.32 08/25/2023 04:36 PM    HGB 12.2 08/25/2023 04:36 PM    HCT 38.4 08/25/2023 04:36 PM     08/25/2023 04:36 PM    MCV 88.9 08/25/2023 04:36 PM    MCH 28.2 08/25/2023 04:36 PM    MCHC 31.8 08/25/2023 04:36 PM    RDW 14.8 08/25/2023 04:36 PM    SEGSPCT 69 01/23/2014 03:15 PM    LYMPHOPCT 31 08/25/2023 04:36 PM    MONOPCT 7 08/25/2023 04:36 PM    BASOPCT 0 08/25/2023 04:36 PM    MONOSABS 0.53 08/25/2023 04:36 PM    LYMPHSABS 2.50 08/25/2023 04:36 PM    EOSABS 0.10 08/25/2023 04:36 PM    BASOSABS 0.01 08/25/2023 04:36 PM     CMP:    Lab Results   Component Value Date/Time     08/25/2023 04:36 PM    K 3.8 08/25/2023 04:36 PM    K 4.3 11/12/2020 11:01 AM     08/25/2023 04:36 PM    CO2 25 08/25/2023 04:36 PM    BUN 16 08/25/2023 04:36 PM    CREATININE 0.9 08/25/2023 04:36 PM    GFRAA >60 08/24/2022 11:11 AM    LABGLOM >60 08/25/2023 04:36 PM    GLUCOSE 272 08/25/2023 04:36 PM    GLUCOSE 194 03/30/2012 08:52 AM    PROT 7.3 08/25/2023 04:36 PM    LABALBU 3.9 08/25/2023 04:36 PM    LABALBU 4.3 03/22/2012 08:56 AM    CALCIUM 9.4 08/25/2023 04:36 PM    BILITOT <0.2 08/25/2023 04:36 PM    ALKPHOS 168 08/25/2023 04:36 PM    AST 34 08/25/2023 04:36 PM    ALT 26 08/25/2023 04:36 PM       ASSESSMENT/PLAN:  Chest pain  History of left-sided breast cancer  Diabetes

## 2023-08-26 NOTE — PLAN OF CARE
Problem: Safety - Adult  Goal: Free from fall injury  8/26/2023 1259 by Tiarra Gill RN  Outcome: Progressing     Problem: ABCDS Injury Assessment  Goal: Absence of physical injury  8/26/2023 1259 by Tiarra Gill RN  Outcome: Progressing     Problem: Pain  Goal: Verbalizes/displays adequate comfort level or baseline comfort level  8/26/2023 1259 by Tiarra Gill RN  Outcome: Progressing

## 2023-08-27 VITALS
OXYGEN SATURATION: 98 % | WEIGHT: 166 LBS | HEART RATE: 81 BPM | TEMPERATURE: 97.5 F | SYSTOLIC BLOOD PRESSURE: 122 MMHG | DIASTOLIC BLOOD PRESSURE: 78 MMHG | BODY MASS INDEX: 33.47 KG/M2 | HEIGHT: 59 IN | RESPIRATION RATE: 18 BRPM

## 2023-08-27 LAB
ALBUMIN SERPL-MCNC: 3.8 G/DL (ref 3.5–5.2)
ALP SERPL-CCNC: 161 U/L (ref 35–104)
ALT SERPL-CCNC: 26 U/L (ref 0–32)
ANION GAP SERPL CALCULATED.3IONS-SCNC: 14 MMOL/L (ref 7–16)
AST SERPL-CCNC: 25 U/L (ref 0–31)
BASOPHILS # BLD: 0.02 K/UL (ref 0–0.2)
BASOPHILS NFR BLD: 0 % (ref 0–2)
BILIRUB SERPL-MCNC: 0.3 MG/DL (ref 0–1.2)
BUN SERPL-MCNC: 19 MG/DL (ref 6–20)
CALCIUM SERPL-MCNC: 9.6 MG/DL (ref 8.6–10.2)
CHLORIDE SERPL-SCNC: 104 MMOL/L (ref 98–107)
CO2 SERPL-SCNC: 23 MMOL/L (ref 22–29)
CREAT SERPL-MCNC: 0.9 MG/DL (ref 0.5–1)
EOSINOPHIL # BLD: 0.1 K/UL (ref 0.05–0.5)
EOSINOPHILS RELATIVE PERCENT: 1 % (ref 0–6)
ERYTHROCYTE [DISTWIDTH] IN BLOOD BY AUTOMATED COUNT: 15.2 % (ref 11.5–15)
GFR SERPL CREATININE-BSD FRML MDRD: >60 ML/MIN/1.73M2
GLUCOSE BLD-MCNC: 144 MG/DL (ref 74–99)
GLUCOSE SERPL-MCNC: 170 MG/DL (ref 74–99)
HCT VFR BLD AUTO: 41.2 % (ref 34–48)
HGB BLD-MCNC: 12.7 G/DL (ref 11.5–15.5)
IMM GRANULOCYTES # BLD AUTO: <0.03 K/UL (ref 0–0.58)
IMM GRANULOCYTES NFR BLD: 0 % (ref 0–5)
LYMPHOCYTES NFR BLD: 2.27 K/UL (ref 1.5–4)
LYMPHOCYTES RELATIVE PERCENT: 28 % (ref 20–42)
MAGNESIUM SERPL-MCNC: 2.2 MG/DL (ref 1.6–2.6)
MCH RBC QN AUTO: 28 PG (ref 26–35)
MCHC RBC AUTO-ENTMCNC: 30.8 G/DL (ref 32–34.5)
MCV RBC AUTO: 90.9 FL (ref 80–99.9)
MONOCYTES NFR BLD: 0.56 K/UL (ref 0.1–0.95)
MONOCYTES NFR BLD: 7 % (ref 2–12)
NEUTROPHILS NFR BLD: 64 % (ref 43–80)
NEUTS SEG NFR BLD: 5.29 K/UL (ref 1.8–7.3)
PHOSPHATE SERPL-MCNC: 3.6 MG/DL (ref 2.5–4.5)
PLATELET # BLD AUTO: 254 K/UL (ref 130–450)
PMV BLD AUTO: 10.4 FL (ref 7–12)
POTASSIUM SERPL-SCNC: 4.3 MMOL/L (ref 3.5–5)
PROT SERPL-MCNC: 7.5 G/DL (ref 6.4–8.3)
RBC # BLD AUTO: 4.53 M/UL (ref 3.5–5.5)
SODIUM SERPL-SCNC: 141 MMOL/L (ref 132–146)
WBC OTHER # BLD: 8.3 K/UL (ref 4.5–11.5)

## 2023-08-27 PROCEDURE — 6360000002 HC RX W HCPCS: Performed by: INTERNAL MEDICINE

## 2023-08-27 PROCEDURE — 2500000003 HC RX 250 WO HCPCS: Performed by: NURSE PRACTITIONER

## 2023-08-27 PROCEDURE — 84100 ASSAY OF PHOSPHORUS: CPT

## 2023-08-27 PROCEDURE — 80053 COMPREHEN METABOLIC PANEL: CPT

## 2023-08-27 PROCEDURE — 85025 COMPLETE CBC W/AUTO DIFF WBC: CPT

## 2023-08-27 PROCEDURE — 2580000003 HC RX 258: Performed by: INTERNAL MEDICINE

## 2023-08-27 PROCEDURE — 83735 ASSAY OF MAGNESIUM: CPT

## 2023-08-27 PROCEDURE — 36415 COLL VENOUS BLD VENIPUNCTURE: CPT

## 2023-08-27 PROCEDURE — 6370000000 HC RX 637 (ALT 250 FOR IP): Performed by: INTERNAL MEDICINE

## 2023-08-27 PROCEDURE — 6370000000 HC RX 637 (ALT 250 FOR IP): Performed by: NURSE PRACTITIONER

## 2023-08-27 PROCEDURE — 82962 GLUCOSE BLOOD TEST: CPT

## 2023-08-27 RX ORDER — ASPIRIN 81 MG/1
81 TABLET, CHEWABLE ORAL DAILY
Qty: 30 TABLET | Refills: 3 | COMMUNITY
Start: 2023-08-28

## 2023-08-27 RX ADMIN — ANASTROZOLE 1 MG: 1 TABLET ORAL at 08:10

## 2023-08-27 RX ADMIN — ROSUVASTATIN CALCIUM 20 MG: 10 TABLET, COATED ORAL at 08:05

## 2023-08-27 RX ADMIN — SUCRALFATE 1 G: 1 TABLET ORAL at 08:05

## 2023-08-27 RX ADMIN — Medication 1 CAPSULE: at 08:05

## 2023-08-27 RX ADMIN — Medication 10 ML: at 08:06

## 2023-08-27 RX ADMIN — PANTOPRAZOLE SODIUM 40 MG: 40 TABLET, DELAYED RELEASE ORAL at 06:56

## 2023-08-27 RX ADMIN — ENOXAPARIN SODIUM 40 MG: 100 INJECTION SUBCUTANEOUS at 08:05

## 2023-08-27 RX ADMIN — ASPIRIN 81 MG CHEWABLE TABLET 81 MG: 81 TABLET CHEWABLE at 08:05

## 2023-08-27 RX ADMIN — BUMETANIDE 0.5 MG: 0.25 INJECTION INTRAMUSCULAR; INTRAVENOUS at 08:05

## 2023-08-27 RX ADMIN — SERTRALINE 100 MG: 50 TABLET, FILM COATED ORAL at 08:05

## 2023-08-27 NOTE — PLAN OF CARE
Problem: ABCDS Injury Assessment  Goal: Absence of physical injury  8/27/2023 0646 by Francisco Nieves RN  Outcome: Progressing  8/27/2023 0644 by Francisco Nieves RN  Outcome: Progressing     Problem: Cardiovascular - Adult  Goal: Maintains optimal cardiac output and hemodynamic stability  8/27/2023 0646 by Francisco Nieevs RN  Outcome: Progressing  8/27/2023 0644 by Francisco Nieves RN  Outcome: Progressing  Goal: Absence of cardiac dysrhythmias or at baseline  8/27/2023 0646 by Francisco Nieves RN  Outcome: Progressing  8/27/2023 0644 by Francisco Nieves RN  Outcome: Progressing     Problem: Skin/Tissue Integrity - Adult  Goal: Skin integrity remains intact  8/27/2023 0646 by Francisco Nieves RN  Outcome: Progressing  8/27/2023 0644 by Francisco Nieves RN  Outcome: Progressing     Problem: Metabolic/Fluid and Electrolytes - Adult  Goal: Glucose maintained within prescribed range  8/27/2023 0646 by Francisco Nieves RN  Outcome: Progressing  8/27/2023 0644 by Francisco Nieves RN  Outcome: Progressing

## 2023-08-27 NOTE — DISCHARGE INSTRUCTIONS
Your information:  Name: Julia Montoya  : 1970    Your instructions:    YOU ARE BEING DISCHARGED HOME. PLEASE MAKE AND KEEP YOUR FOLLOW UP APPOINTMENTS. What to do after you leave the hospital:    Recommended diet: regular diet and diabetic diet    Recommended activity: activity as tolerated    IF YOU EXPERIENCE ANY OF THE FOLLOWING SYMPTOMS, CHEST PAIN, SHORTNESS OF BREATH, COUGHING UP BLOOD OR BLOODY SPUTUM, STOMACH PAIN OR CRAMPING, DARK, TARRY STOOLS, LOSS OF APPETITE, GENERAL NOT FEELING WELL, SIGNS AND SYMPTOMS OF INFECTION LIKE FEVER AND OR CHILLS, PLEASE CALL DR Luis Alberto Velez,  OR RETURN TO THE EMERGENCY ROOM. The following personal items were collected during your admission and were returned to you:    Belongings  Dental Appliances: None  Vision - Corrective Lenses: Eyeglasses  Hearing Aid: None  Clothing: Shirt, Shorts, Socks, Footwear, Undergarments, At bedside  Jewelry: None  Body Piercings Removed: N/A  Electronic Devices: Cell Phone  Weapons (Notify Protective Services/Security): None  Other Valuables: Kary, [address] [place], [place] [address]tonya Gomezas, At home  Home Medications: Kept at bedside  Valuables Given To: Patient  Provide Name(s) of Who Valuable(s) Were Given To: patient    Information obtained by:  By signing below, I understand that if any problems occur once I leave the hospital I am to contact [facility],  or go to emergency room . I understand and acknowledge receipt of the instructions indicated above.

## 2023-08-27 NOTE — PLAN OF CARE
Problem: Safety - Adult  Goal: Free from fall injury  Outcome: Progressing     Problem: ABCDS Injury Assessment  Goal: Absence of physical injury  Outcome: Progressing     Problem: Pain  Goal: Verbalizes/displays adequate comfort level or baseline comfort level  Outcome: Progressing     Problem: Cardiovascular - Adult  Goal: Maintains optimal cardiac output and hemodynamic stability  Outcome: Progressing  Goal: Absence of cardiac dysrhythmias or at baseline  Outcome: Progressing     Problem: Skin/Tissue Integrity - Adult  Goal: Skin integrity remains intact  Outcome: Progressing     Problem: Metabolic/Fluid and Electrolytes - Adult  Goal: Glucose maintained within prescribed range  Outcome: Progressing

## 2023-08-28 NOTE — DISCHARGE SUMMARY
[address]                    [address] [place], [address] [facility]                               DISCHARGE SUMMARY    PATIENT NAME: Nimisha Ness                    :        1970  MED REC NO:   [id number]                            ROOM:       0422  ACCOUNT NO:   [de-identified]                           ADMIT DATE: 2023  PROVIDER:     Nancy Curiel,                   DISCHARGE DATE:  2023      ADMITTING DIAGNOSIS:  Chest pain. FINAL DISCHARGE DIAGNOSIS:  Chest pain with moderate risk for cardiac  etiology. The patient declining inpatient evaluation. SECONDARY DISCHARGE DIAGNOSES:  Acutely decompensated diastolic  congestive heart failure, preserved left ventricular ejection fraction,  insulin-dependent diabetes mellitus type 2 with diabetic neuropathy,  hyperlipidemia, essential hypertension, obesity with elevated BMI of  95.33.    COMPLICATIONS:  No complication. OPERATION:  No operation. CONSULTATION:  Obtained with [address] [place] [address] Cardiology. No OMT was given. ADMITTING PHYSICIANS:  Dr. Paolo Francis and Dr. Makenzie Colunga. CHIEF COMPLAINT AND HISTORY OF CHIEF COMPLAINT:  This is a 78-year-old  white female who was admitted to [address] [place] [address]. The patient  presented to the hospital here on 2023. The patient was  admitted to the hospital here with complaint of chest pain. The patient  described what appeared to be a pressure-like sensation or pinching  sensation. This has been recurrent for the past several days, got  worse. The patient does admit to having lower extremity edema for the  past 2 months. The patient does admit to having associated shortness of  breath. She has a history of sleep apnea without use of CPAP. The  patient does follow up with Dr. Shlomo Javier and had a stress test done about  6 months ago.     PAST MEDICAL HISTORY:  Positive for usual childhood diseases, history  gastroesophageal reflux disease, history of

## 2023-09-05 DIAGNOSIS — I10 ESSENTIAL HYPERTENSION: ICD-10-CM

## 2023-09-05 NOTE — TELEPHONE ENCOUNTER
Last Appointment:  8/23/2023  Future Appointments   Date Time Provider [address]   11/6/2023 10:00 AM Evelin Velez DO [facility]   12/20/2023  9:00 AM YZ ELISE ETHAN RM 2 SEYZ ABDU [facility]   12/20/2023 10:00 AM Jamil Lo[facility]N - CNP [facility]   12/20/2023 10:45 AM SEYZ MED ONC FAST TRACK 2 SEYZ Med Onc [facility]   12/20/2023 11:00 AM Herminia Guardado MD MED ONC [facility]

## 2023-09-06 RX ORDER — METOPROLOL SUCCINATE 25 MG/1
TABLET, EXTENDED RELEASE ORAL
Qty: 15 TABLET | Refills: 3 | Status: SHIPPED | OUTPATIENT
Start: 2023-09-06

## 2023-09-12 ENCOUNTER — TELEPHONE (OUTPATIENT)
Dept: FAMILY MEDICINE CLINIC | Age: 53
End: 2023-09-12

## 2023-09-12 NOTE — TELEPHONE ENCOUNTER
Patient called and requested Dr. Kelin Mace send in medication for a yeast infection. Patient states she's beginning to develop one and is also experiencing sore/raw skin between her legs. 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 Please advise.      Last seen 8/23/2023  Next appt 11/6/2023    [place]

## 2023-09-13 DIAGNOSIS — N76.0 ACUTE VAGINITIS: Primary | ICD-10-CM

## 2023-09-13 RX ORDER — FLUCONAZOLE 150 MG/1
150 TABLET ORAL ONCE
Qty: 1 TABLET | Refills: 0 | Status: SHIPPED | OUTPATIENT
Start: 2023-09-13 | End: 2023-09-13

## 2023-09-18 RX ORDER — ISOPROPYL ALCOHOL 70 ML/100ML
SWAB TOPICAL
Qty: 100 EACH | Refills: 3 | Status: SHIPPED | OUTPATIENT
Start: 2023-09-18

## 2023-09-18 NOTE — TELEPHONE ENCOUNTER
Last Appointment:  8/23/2023  Future Appointments   Date Time Provider [address]   11/6/2023 10:00 AM DO HENOK Selby PC [facility]   12/20/2023  9:00 AM NETTE LONG RM 2 SEYZ ELISE [facility] Rad/Car   12/20/2023 10:00 AM Mehreen Goldman, APRN - CNP [facility]   12/20/2023 10:45 AM SEYZ MED ONC FAST TRACK 2 SEYZ Med Onc [facility]   12/20/2023 11:00 AM Sree Rao MD MED ONC [facility]    Electronically signed by Mely Mcguire LPN on 1/19/12 at 5:79 AM EDT

## 2023-09-19 DIAGNOSIS — E11.9 INSULIN DEPENDENT TYPE 2 DIABETES MELLITUS (HCC): ICD-10-CM

## 2023-09-19 DIAGNOSIS — Z79.4 INSULIN DEPENDENT TYPE 2 DIABETES MELLITUS (HCC): ICD-10-CM

## 2023-09-20 RX ORDER — DULAGLUTIDE 1.5 MG/.5ML
INJECTION, SOLUTION SUBCUTANEOUS
Qty: 2 ML | Refills: 10 | Status: SHIPPED | OUTPATIENT
Start: 2023-09-20

## 2023-09-20 NOTE — TELEPHONE ENCOUNTER
Last Appointment:  8/23/2023  Future Appointments   Date Time Provider [address]   11/6/2023 10:00 AM DO HENOK Hickey PC [facility]   12/20/2023  9:00 AM SEYZ ELISE LONG [facility]   12/20/2023 10:00 AM LAKESHA Montero - CNP [facility]   12/20/2023 10:45 AM SEYZ MED ONC FAST TRACK 2 SEYZ Med Onc . [facility]   12/20/2023 11:00 AM Anival Caldera MD MED ONC [facility]    Electronically signed by Marilu High LPN on 2/26/72 at 7:06 AM EDT

## 2023-09-24 PROBLEM — Z12.31 ENCOUNTER FOR SCREENING MAMMOGRAM FOR MALIGNANT NEOPLASM OF BREAST: Status: RESOLVED | Noted: 2023-08-25 | Resolved: 2023-09-24

## 2023-09-24 PROBLEM — Z12.11 SCREENING FOR COLON CANCER: Status: RESOLVED | Noted: 2023-08-25 | Resolved: 2023-09-24

## 2023-10-09 ENCOUNTER — TELEPHONE (OUTPATIENT)
Dept: FAMILY MEDICINE CLINIC | Age: 53
End: 2023-10-09

## 2023-10-09 NOTE — TELEPHONE ENCOUNTER
Pt called asked about tsh from 08/2023 lab contacted her about having this lab re drawn because the lab used a range for different age group.read note from lab of results to pt that result was correct and no need to redraw at this time.

## 2023-10-16 DIAGNOSIS — E11.42 DIABETIC POLYNEUROPATHY ASSOCIATED WITH TYPE 2 DIABETES MELLITUS (HCC): ICD-10-CM

## 2023-10-17 NOTE — TELEPHONE ENCOUNTER
Last Appointment:  8/23/2023  Future Appointments   Date Time Provider [address]   11/6/2023 10:00 AM DO HENOK Keith PC [facility]   12/20/2023  9:00 AM NETTE LONG RM 2 NETTE [facility] Rad/Car   12/20/2023 10:00 AM Emy Lo APRN - CNP [facility]   12/20/2023 10:45 AM NETTE MED ONC FAST TRACK 2 NETTE Med Onc [facility]   12/20/2023 11:00 AM LAKESHA Hamilton MED ONC [facility]

## 2023-10-18 RX ORDER — EMPAGLIFLOZIN 25 MG/1
25 TABLET, FILM COATED ORAL DAILY
Qty: 30 TABLET | Refills: 5 | Status: SHIPPED | OUTPATIENT
Start: 2023-10-18

## 2023-11-17 ENCOUNTER — OFFICE VISIT (OUTPATIENT)
Dept: FAMILY MEDICINE CLINIC | Age: 53
End: 2023-11-17
Payer: COMMERCIAL

## 2023-11-17 VITALS
BODY MASS INDEX: 33.14 KG/M2 | RESPIRATION RATE: 18 BRPM | WEIGHT: 164.4 LBS | OXYGEN SATURATION: 96 % | HEART RATE: 81 BPM | HEIGHT: 59 IN | DIASTOLIC BLOOD PRESSURE: 66 MMHG | TEMPERATURE: 97.3 F | SYSTOLIC BLOOD PRESSURE: 118 MMHG

## 2023-11-17 DIAGNOSIS — G62.9 NEUROPATHY: ICD-10-CM

## 2023-11-17 DIAGNOSIS — R10.84 GENERALIZED ABDOMINAL PAIN: ICD-10-CM

## 2023-11-17 DIAGNOSIS — E11.3553 STABLE PROLIFERATIVE DIABETIC RETINOPATHY OF BOTH EYES ASSOCIATED WITH TYPE 2 DIABETES MELLITUS (HCC): ICD-10-CM

## 2023-11-17 DIAGNOSIS — I10 ESSENTIAL HYPERTENSION: ICD-10-CM

## 2023-11-17 DIAGNOSIS — C50.912 INVASIVE DUCTAL CARCINOMA OF LEFT BREAST (HCC): ICD-10-CM

## 2023-11-17 DIAGNOSIS — Z79.899 DRUG THERAPY: ICD-10-CM

## 2023-11-17 DIAGNOSIS — E11.65 UNCONTROLLED TYPE 2 DIABETES MELLITUS WITH HYPERGLYCEMIA (HCC): Primary | ICD-10-CM

## 2023-11-17 DIAGNOSIS — M25.551 RIGHT HIP PAIN: ICD-10-CM

## 2023-11-17 DIAGNOSIS — J40 BRONCHITIS: ICD-10-CM

## 2023-11-17 LAB
BILIRUBIN, POC: NORMAL
BLOOD URINE, POC: NORMAL
CHP ED QC CHECK: NORMAL
CLARITY, POC: CLEAR
COLOR, POC: YELLOW
GLUCOSE BLD-MCNC: 131 MG/DL
GLUCOSE URINE, POC: >=1000
HBA1C MFR BLD: 7.8 %
KETONES, POC: NORMAL
LEUKOCYTE EST, POC: NORMAL
NITRITE, POC: NORMAL
PH, POC: 6
PROTEIN, POC: NORMAL
SPECIFIC GRAVITY, POC: <=1.005
UROBILINOGEN, POC: 0.2

## 2023-11-17 PROCEDURE — G8427 DOCREV CUR MEDS BY ELIG CLIN: HCPCS | Performed by: FAMILY MEDICINE

## 2023-11-17 PROCEDURE — 82962 GLUCOSE BLOOD TEST: CPT | Performed by: FAMILY MEDICINE

## 2023-11-17 PROCEDURE — 3078F DIAST BP <80 MM HG: CPT | Performed by: FAMILY MEDICINE

## 2023-11-17 PROCEDURE — 81002 URINALYSIS NONAUTO W/O SCOPE: CPT | Performed by: FAMILY MEDICINE

## 2023-11-17 PROCEDURE — G8484 FLU IMMUNIZE NO ADMIN: HCPCS | Performed by: FAMILY MEDICINE

## 2023-11-17 PROCEDURE — 3051F HG A1C>EQUAL 7.0%<8.0%: CPT | Performed by: FAMILY MEDICINE

## 2023-11-17 PROCEDURE — 3017F COLORECTAL CA SCREEN DOC REV: CPT | Performed by: FAMILY MEDICINE

## 2023-11-17 PROCEDURE — 99214 OFFICE O/P EST MOD 30 MIN: CPT | Performed by: FAMILY MEDICINE

## 2023-11-17 PROCEDURE — 3074F SYST BP LT 130 MM HG: CPT | Performed by: FAMILY MEDICINE

## 2023-11-17 PROCEDURE — 2022F DILAT RTA XM EVC RTNOPTHY: CPT | Performed by: FAMILY MEDICINE

## 2023-11-17 PROCEDURE — 1036F TOBACCO NON-USER: CPT | Performed by: FAMILY MEDICINE

## 2023-11-17 PROCEDURE — G8417 CALC BMI ABV UP PARAM F/U: HCPCS | Performed by: FAMILY MEDICINE

## 2023-11-17 PROCEDURE — 83036 HEMOGLOBIN GLYCOSYLATED A1C: CPT | Performed by: FAMILY MEDICINE

## 2023-11-17 RX ORDER — DOXYCYCLINE HYCLATE 100 MG
100 TABLET ORAL 2 TIMES DAILY
Qty: 14 TABLET | Refills: 0 | Status: SHIPPED | OUTPATIENT
Start: 2023-11-17 | End: 2023-11-24

## 2023-11-17 RX ORDER — GABAPENTIN 300 MG/1
300 CAPSULE ORAL NIGHTLY
Qty: 30 CAPSULE | Refills: 2 | Status: SHIPPED | OUTPATIENT
Start: 2023-11-17 | End: 2024-02-15

## 2023-11-20 PROBLEM — J40 BRONCHITIS: Status: ACTIVE | Noted: 2023-11-20

## 2023-11-20 PROBLEM — R10.84 GENERALIZED ABDOMINAL PAIN: Status: ACTIVE | Noted: 2023-11-20

## 2023-11-20 PROBLEM — M25.551 RIGHT HIP PAIN: Status: ACTIVE | Noted: 2023-11-20

## 2023-11-20 LAB
6-MONOACETYLMORPHINE, URINE: NEGATIVE
ABNORMAL SPECIMEN VALIDITY TEST: ABNORMAL
ALCOHOL URINE: NOT DETECTED MG/DL
AMPHETAMINE SCREEN URINE: NEGATIVE
BARBITURATE SCREEN URINE: NEGATIVE
BENZODIAZEPINE SCREEN, URINE: NEGATIVE
BUPRENORPHINE URINE: NEGATIVE
CANNABINOID SCREEN URINE: NEGATIVE
COCAINE METABOLITE, URINE: NEGATIVE
FENTANYL URINE: NEGATIVE
INTEGRITY CHECK, CREATININE, URINE: 8.3 MG/DL (ref 22–250)
INTEGRITY CHECK, OXIDANT, URINE: <40 MG/L
INTEGRITY CHECK, PH, URINE: 6.6 (ref 4.5–9)
INTEGRITY CHECK, SPECIFIC GRAVITY, URINE: 1.01 (ref 1–1.03)
METHADONE SCREEN, URINE: NEGATIVE
OPIATES, URINE: NEGATIVE
OXYCODONE SCREEN URINE: NEGATIVE
PHENCYCLIDINE, URINE: NEGATIVE
TEST INFORMATION: ABNORMAL
TRAMADOL, URINE: NEGATIVE

## 2023-11-20 ASSESSMENT — ENCOUNTER SYMPTOMS
VOMITING: 0
GASTROINTESTINAL NEGATIVE: 1
SORE THROAT: 0
COLOR CHANGE: 0
EYE DISCHARGE: 0
DIARRHEA: 0
COUGH: 1
EYE ITCHING: 0
ABDOMINAL PAIN: 0
WHEEZING: 0
BLOOD IN STOOL: 0
SINUS PAIN: 1
TROUBLE SWALLOWING: 0
SHORTNESS OF BREATH: 0
ALLERGIC/IMMUNOLOGIC NEGATIVE: 1
STRIDOR: 0
ANAL BLEEDING: 0
RECTAL PAIN: 0
CHOKING: 0
EYE REDNESS: 0
RHINORRHEA: 0
ABDOMINAL DISTENTION: 0
SINUS PRESSURE: 1
VOICE CHANGE: 0
NAUSEA: 0
CONSTIPATION: 0
EYE PAIN: 0
FACIAL SWELLING: 0
BACK PAIN: 0
CHEST TIGHTNESS: 0
PHOTOPHOBIA: 0

## 2023-11-20 NOTE — PROGRESS NOTES
guarding or rebound. Hernia: No hernia is present. Musculoskeletal:         General: Tenderness present. No swelling, deformity or signs of injury. Cervical back: Normal range of motion and neck supple. No rigidity or tenderness. No muscular tenderness. Right lower leg: No edema. Left lower leg: No edema. Comments: Pain and decreased ROM multiple joints. Lymphadenopathy:      Cervical: No cervical adenopathy. Skin:     General: Skin is warm. Coloration: Skin is not jaundiced or pale. Findings: No bruising, erythema, lesion or rash. Neurological:      General: No focal deficit present. Mental Status: She is alert and oriented to person, place, and time. Cranial Nerves: No cranial nerve deficit. Sensory: Sensory deficit (diabetic neuropathy) present. Motor: Weakness present. No abnormal muscle tone. Coordination: Coordination abnormal.      Gait: Gait abnormal.      Deep Tendon Reflexes: Reflexes normal.      Comments: Proprioceptive imbalance    Psychiatric:         Mood and Affect: Mood normal.         Behavior: Behavior normal.         Thought Content: Thought content normal.         Judgment: Judgment normal.         ASSESSMENT/PLAN  Jenny was seen today for diabetes and congestion. Diagnoses and all orders for this visit:    Uncontrolled type 2 diabetes mellitus with hyperglycemia ([address])  -     POCT Glucose  -     POCT glycosylated hemoglobin (Hb A1C)  Controlled. Metformin, jardiance, trulicity, admelog, basaglar, statin, ADA diet. Neuropathy  -     gabapentin (NEURONTIN) 300 MG capsule; Take 1 capsule by mouth at bedtime for 90 days. Not controlled. Drug therapy  -     PAIN MANAGEMENT PROFILE 1 W/ CONFIRMATION, URINE; Future  Stable. Essential hypertension  Controlled. BB, statin, low salt diet. Stable proliferative diabetic retinopathy of both eyes associated with type 2 diabetes mellitus (HCC)  Stable. Ophthalmology.

## 2023-11-22 ENCOUNTER — TELEPHONE (OUTPATIENT)
Dept: FAMILY MEDICINE CLINIC | Age: 53
End: 2023-11-22

## 2023-11-22 DIAGNOSIS — I10 ESSENTIAL HYPERTENSION: ICD-10-CM

## 2023-11-22 DIAGNOSIS — R53.83 FATIGUE, UNSPECIFIED TYPE: Primary | ICD-10-CM

## 2023-11-22 NOTE — TELEPHONE ENCOUNTER
[facility] called and states they lost the referral and need the referral resent to them as the pt is scheduled next week to see them. New referral placed and information was faxed.      Electronically signed by Sparkle Espinoza, [facility] on 11/22/23 at 2:57 PM EST

## 2023-11-23 RX ORDER — BUMETANIDE 1 MG/1
1 TABLET ORAL DAILY
Qty: 90 TABLET | Refills: 1 | Status: SHIPPED | OUTPATIENT
Start: 2023-11-23

## 2023-12-14 DIAGNOSIS — I10 ESSENTIAL HYPERTENSION: ICD-10-CM

## 2023-12-15 RX ORDER — METOPROLOL SUCCINATE 25 MG/1
TABLET, EXTENDED RELEASE ORAL
Qty: 15 TABLET | Refills: 10 | Status: SHIPPED | OUTPATIENT
Start: 2023-12-15

## 2023-12-15 NOTE — TELEPHONE ENCOUNTER
Last Appointment:  11/17/2023  Future Appointments   Date Time Provider [address]   12/15/2023 12:00 PM [facility] CT RM 3 SJWZ CT [facility] Radiolo   12/20/2023  9:00 AM NETTE [facility]IV[facility]ES [facility]AN [facility]   12/20/2023 10:00 AM Alvin Lo APRN - CNP [facility]   12/20/2023 10:45 AM NETTE MED ONC FAST TRACK 2 SEYZ [facility] Onc [facility]   12/20/2023 11:00 AM LAKESHA Lyles MED ONC [facility]   2/16/2024 10:15 AM Glenn Velez, DO [facility]

## 2023-12-20 ENCOUNTER — HOSPITAL ENCOUNTER (OUTPATIENT)
Dept: INFUSION THERAPY | Age: 53
Discharge: HOME | End: 2023-12-20
Payer: MEDICAID

## 2023-12-20 DIAGNOSIS — C50.912 MALIGNANT NEOPLASM OF LEFT FEMALE BREAST, UNSPECIFIED ESTROGEN RECEPTOR STATUS, UNSPECIFIED SITE OF BREAST (HCC): ICD-10-CM

## 2023-12-20 LAB
ALBUMIN SERPL-MCNC: 3.7 G/DL (ref 3.5–5.2)
ALP SERPL-CCNC: 134 U/L (ref 35–104)
ALT SERPL-CCNC: 23 U/L (ref 0–32)
ANION GAP SERPL CALCULATED.3IONS-SCNC: 13 MMOL/L (ref 7–16)
AST SERPL-CCNC: 25 U/L (ref 0–31)
BASOPHILS # BLD: 0.02 K/UL (ref 0–0.2)
BASOPHILS NFR BLD: 0 % (ref 0–2)
BILIRUB SERPL-MCNC: 0.3 MG/DL (ref 0–1.2)
BUN SERPL-MCNC: 7 MG/DL (ref 6–20)
CALCIUM SERPL-MCNC: 9.4 MG/DL (ref 8.6–10.2)
CHLORIDE SERPL-SCNC: 107 MMOL/L (ref 98–107)
CO2 SERPL-SCNC: 24 MMOL/L (ref 22–29)
CREAT SERPL-MCNC: 0.8 MG/DL (ref 0.5–1)
EOSINOPHIL # BLD: 0.09 K/UL (ref 0.05–0.5)
EOSINOPHILS RELATIVE PERCENT: 1 % (ref 0–6)
ERYTHROCYTE [DISTWIDTH] IN BLOOD BY AUTOMATED COUNT: 15.3 % (ref 11.5–15)
GFR SERPL CREATININE-BSD FRML MDRD: >60 ML/MIN/1.73M2
GLUCOSE SERPL-MCNC: 130 MG/DL (ref 74–99)
HCT VFR BLD AUTO: 37.3 % (ref 34–48)
HGB BLD-MCNC: 12 G/DL (ref 11.5–15.5)
IMM GRANULOCYTES # BLD AUTO: 0.03 K/UL (ref 0–0.58)
IMM GRANULOCYTES NFR BLD: 0 % (ref 0–5)
LYMPHOCYTES NFR BLD: 2.74 K/UL (ref 1.5–4)
LYMPHOCYTES RELATIVE PERCENT: 31 % (ref 20–42)
MCH RBC QN AUTO: 28.5 PG (ref 26–35)
MCHC RBC AUTO-ENTMCNC: 32.2 G/DL (ref 32–34.5)
MCV RBC AUTO: 88.6 FL (ref 80–99.9)
MONOCYTES NFR BLD: 0.64 K/UL (ref 0.1–0.95)
MONOCYTES NFR BLD: 7 % (ref 2–12)
NEUTROPHILS NFR BLD: 61 % (ref 43–80)
NEUTS SEG NFR BLD: 5.41 K/UL (ref 1.8–7.3)
PLATELET # BLD AUTO: 314 K/UL (ref 130–450)
PMV BLD AUTO: 10.2 FL (ref 7–12)
POTASSIUM SERPL-SCNC: 4.1 MMOL/L (ref 3.5–5)
PROT SERPL-MCNC: 7.2 G/DL (ref 6.4–8.3)
RBC # BLD AUTO: 4.21 M/UL (ref 3.5–5.5)
SODIUM SERPL-SCNC: 144 MMOL/L (ref 132–146)
WBC OTHER # BLD: 8.9 K/UL (ref 4.5–11.5)

## 2023-12-20 PROCEDURE — 80053 COMPREHEN METABOLIC PANEL: CPT

## 2023-12-20 PROCEDURE — 85025 COMPLETE CBC W/AUTO DIFF WBC: CPT

## 2023-12-20 PROCEDURE — 36415 COLL VENOUS BLD VENIPUNCTURE: CPT

## 2023-12-26 NOTE — PLAN OF CARE
Problem: Safety - Adult  Goal: Free from fall injury  8/26/2023 0218 by Marzena Clayton RN  Outcome: Progressing  8/26/2023 0211 by Marzena Clayton RN  Outcome: Progressing     Problem: ABCDS Injury Assessment  Goal: Absence of physical injury  8/26/2023 0218 by Marzena Clayton RN  Outcome: Progressing  8/26/2023 0211 by Marzena Clayton RN  Outcome: Progressing     Problem: Pain  Goal: Verbalizes/displays adequate comfort level or baseline comfort level  8/26/2023 0218 by Marzena Clayton RN  Outcome: Progressing  8/26/2023 0211 by Marzena Clayton RN  Outcome: Progressing     Problem: Cardiovascular - Adult  Goal: Maintains optimal cardiac output and hemodynamic stability  Outcome: Progressing  Goal: Absence of cardiac dysrhythmias or at baseline  Outcome: Progressing     Problem: Skin/Tissue Integrity - Adult  Goal: Skin integrity remains intact  Outcome: Progressing     Problem: Metabolic/Fluid and Electrolytes - Adult  Goal: Glucose maintained within prescribed range  Outcome: Progressing None

## 2024-01-13 ENCOUNTER — APPOINTMENT (OUTPATIENT)
Dept: GENERAL RADIOLOGY | Age: 54
End: 2024-01-13
Payer: MEDICAID

## 2024-01-13 ENCOUNTER — HOSPITAL ENCOUNTER (EMERGENCY)
Age: 54
Discharge: HOME | End: 2024-01-13
Payer: MEDICAID

## 2024-01-13 VITALS
SYSTOLIC BLOOD PRESSURE: 148 MMHG | HEART RATE: 68 BPM | DIASTOLIC BLOOD PRESSURE: 61 MMHG | OXYGEN SATURATION: 98 % | RESPIRATION RATE: 16 BRPM | TEMPERATURE: 98.2 F

## 2024-01-13 DIAGNOSIS — J06.9 UPPER RESPIRATORY TRACT INFECTION, UNSPECIFIED TYPE: ICD-10-CM

## 2024-01-13 DIAGNOSIS — J40 BRONCHITIS: Primary | ICD-10-CM

## 2024-01-13 LAB
INFLUENZA A BY PCR: NOT DETECTED
INFLUENZA B BY PCR: NOT DETECTED
SARS-COV-2 RDRP RESP QL NAA+PROBE: NOT DETECTED
SPECIMEN DESCRIPTION: NORMAL

## 2024-01-13 PROCEDURE — 87635 SARS-COV-2 COVID-19 AMP PRB: CPT

## 2024-01-13 PROCEDURE — 99284 EMERGENCY DEPT VISIT MOD MDM: CPT

## 2024-01-13 PROCEDURE — 6370000000 HC RX 637 (ALT 250 FOR IP): Performed by: PHYSICIAN ASSISTANT

## 2024-01-13 PROCEDURE — 71046 X-RAY EXAM CHEST 2 VIEWS: CPT

## 2024-01-13 PROCEDURE — 87502 INFLUENZA DNA AMP PROBE: CPT

## 2024-01-13 PROCEDURE — 94640 AIRWAY INHALATION TREATMENT: CPT

## 2024-01-13 RX ORDER — PREDNISONE 20 MG/1
60 TABLET ORAL ONCE
Status: COMPLETED | OUTPATIENT
Start: 2024-01-13 | End: 2024-01-13

## 2024-01-13 RX ORDER — PREDNISONE 10 MG/1
TABLET ORAL
Qty: 20 TABLET | Refills: 0 | Status: SHIPPED | OUTPATIENT
Start: 2024-01-13 | End: 2024-01-23

## 2024-01-13 RX ORDER — AZITHROMYCIN 250 MG/1
TABLET, FILM COATED ORAL
Qty: 1 PACKET | Refills: 0 | Status: SHIPPED | OUTPATIENT
Start: 2024-01-13 | End: 2024-01-23

## 2024-01-13 RX ORDER — IPRATROPIUM BROMIDE AND ALBUTEROL SULFATE 2.5; .5 MG/3ML; MG/3ML
3 SOLUTION RESPIRATORY (INHALATION) ONCE
Status: COMPLETED | OUTPATIENT
Start: 2024-01-13 | End: 2024-01-13

## 2024-01-13 RX ORDER — ALBUTEROL SULFATE 90 UG/1
2 AEROSOL, METERED RESPIRATORY (INHALATION) 4 TIMES DAILY PRN
Qty: 18 G | Refills: 0 | Status: SHIPPED | OUTPATIENT
Start: 2024-01-13

## 2024-01-13 RX ADMIN — IPRATROPIUM BROMIDE AND ALBUTEROL SULFATE 3 DOSE: 2.5; .5 SOLUTION RESPIRATORY (INHALATION) at 17:02

## 2024-01-13 RX ADMIN — PREDNISONE 60 MG: 20 TABLET ORAL at 17:56

## 2024-01-13 NOTE — ED PROVIDER NOTES
Independent ANNETTE Visit.             [facility] EMERGENCY DEPARTMENT  ED  Encounter Note  Admit Date/RoomTime: 2024  4:24 PM  ED Room:   NAME: Jenny Lilly  : 1970  MRN: [id number]  PCP: Charlene Velez DO    CHIEF COMPLAINT     URI (Cold symptoms)    HISTORY OF PRESENT ILLNESS        Jenny Lilly is a 54 y.o. female who presents to the ED by ambulance for cough and URI symptoms, beginning 3 day(s) ago. The complaint has been persistent and are moderate in severity.  The arrived via EMS with report of cough shortness of breath and other upper respiratory symptoms.  She states that she has been symptomatic now for several days.  She was talking to her her son on the phone before arriving and states that he was able to encourage her to come and be seen.  States that she came by EMS because she does not drive.  The patient reports that the cough is nonproductive.  She denies history of asthma or COPD.  She is not a smoker.  Reports no known contact with anyone that is ill at home.      REVIEW OF SYSTEMS     Pertinent positives and negatives are stated within HPI, all other systems reviewed and are negative.    Past Medical History:  has a past medical history of Acid reflux, Anesthesia complication, Arthritis, Back pain, Blind, Cancer (HCC), Cardiac valve prolapse, Diabetes mellitus (HCC), History of therapeutic radiation, Hyperlipidemia, Hypertension, Neuropathy due to secondary diabetes (HCC), Nontraumatic tear of right rotator cuff, Prolonged emergence from general anesthesia, Psoriasis, and Sleep apnea.  Surgical History:  has a past surgical history that includes Carpal tunnel release;  section; Ankle surgery; Carpal tunnel release (2012); Shoulder arthroscopy (Left, 2016); Cardiac surgery; Cholecystectomy, laparoscopic (N/A, 2019); Upper gastrointestinal endoscopy (N/A, 2019); Colonoscopy (N/A, 2019); Hysterectomy, total

## 2024-01-15 DIAGNOSIS — C50.912 MALIGNANT NEOPLASM OF LEFT FEMALE BREAST, UNSPECIFIED ESTROGEN RECEPTOR STATUS, UNSPECIFIED SITE OF BREAST (HCC): ICD-10-CM

## 2024-01-15 DIAGNOSIS — Z79.811 USE OF AROMATASE INHIBITORS: ICD-10-CM

## 2024-01-15 DIAGNOSIS — E11.65 UNCONTROLLED TYPE 2 DIABETES MELLITUS WITH HYPERGLYCEMIA (HCC): ICD-10-CM

## 2024-01-16 RX ORDER — ANASTROZOLE 1 MG/1
1 TABLET ORAL DAILY
Qty: 30 TABLET | Refills: 10 | Status: SHIPPED | OUTPATIENT
Start: 2024-01-16

## 2024-01-16 NOTE — TELEPHONE ENCOUNTER
Last Appointment:  11/17/2023  Future Appointments   Date Time Provider Department Center   2/16/2024 10:15 AM Charlene Velez, DO [facility] PC [facility]   6/18/2024  2:00 PM [facility] LABS ROOM MEDICAL ONCOLOGY [facility] MED ONC [place]   6/20/2024  2:15 PM Keyonna Rinaldi MD [facility]   1/7/2025  9:00 AM NETTE OLIVARES [facility] RM 1 NETTE [facility] Rad/Car   1/7/2025 10:00 AM Malena Lo, APRN - CNP [facility]

## 2024-01-17 RX ORDER — LANCETS 30 GAUGE
EACH MISCELLANEOUS
Qty: 100 EACH | Refills: 10 | Status: SHIPPED | OUTPATIENT
Start: 2024-01-17

## 2024-01-17 RX ORDER — SERTRALINE HYDROCHLORIDE 100 MG/1
TABLET, FILM COATED ORAL
Qty: 30 TABLET | Refills: 10 | Status: SHIPPED | OUTPATIENT
Start: 2024-01-17

## 2024-02-14 ENCOUNTER — OFFICE VISIT (OUTPATIENT)
Dept: FAMILY MEDICINE CLINIC | Age: 54
End: 2024-02-14
Payer: MEDICAID

## 2024-02-14 VITALS
HEART RATE: 91 BPM | BODY MASS INDEX: 32.3 KG/M2 | RESPIRATION RATE: 18 BRPM | WEIGHT: 160.2 LBS | SYSTOLIC BLOOD PRESSURE: 124 MMHG | DIASTOLIC BLOOD PRESSURE: 78 MMHG | OXYGEN SATURATION: 97 % | TEMPERATURE: 97.4 F | HEIGHT: 59 IN

## 2024-02-14 DIAGNOSIS — N76.0 ACUTE VAGINITIS: ICD-10-CM

## 2024-02-14 DIAGNOSIS — G62.9 NEUROPATHY: ICD-10-CM

## 2024-02-14 DIAGNOSIS — E11.65 UNCONTROLLED TYPE 2 DIABETES MELLITUS WITH HYPERGLYCEMIA (HCC): ICD-10-CM

## 2024-02-14 DIAGNOSIS — C50.912 MALIGNANT NEOPLASM OF LEFT BREAST IN FEMALE, ESTROGEN RECEPTOR POSITIVE, UNSPECIFIED SITE OF BREAST (HCC): ICD-10-CM

## 2024-02-14 DIAGNOSIS — Z17.0 MALIGNANT NEOPLASM OF LEFT BREAST IN FEMALE, ESTROGEN RECEPTOR POSITIVE, UNSPECIFIED SITE OF BREAST (HCC): ICD-10-CM

## 2024-02-14 DIAGNOSIS — I10 ESSENTIAL HYPERTENSION: Primary | ICD-10-CM

## 2024-02-14 DIAGNOSIS — J40 BRONCHITIS: ICD-10-CM

## 2024-02-14 PROCEDURE — G8417 CALC BMI ABV UP PARAM F/U: HCPCS | Performed by: FAMILY MEDICINE

## 2024-02-14 PROCEDURE — 3046F HEMOGLOBIN A1C LEVEL >9.0%: CPT | Performed by: FAMILY MEDICINE

## 2024-02-14 PROCEDURE — 3074F SYST BP LT 130 MM HG: CPT | Performed by: FAMILY MEDICINE

## 2024-02-14 PROCEDURE — G8427 DOCREV CUR MEDS BY ELIG CLIN: HCPCS | Performed by: FAMILY MEDICINE

## 2024-02-14 PROCEDURE — G8484 FLU IMMUNIZE NO ADMIN: HCPCS | Performed by: FAMILY MEDICINE

## 2024-02-14 PROCEDURE — 3078F DIAST BP <80 MM HG: CPT | Performed by: FAMILY MEDICINE

## 2024-02-14 PROCEDURE — 2022F DILAT RTA XM EVC RTNOPTHY: CPT | Performed by: FAMILY MEDICINE

## 2024-02-14 PROCEDURE — 99214 OFFICE O/P EST MOD 30 MIN: CPT | Performed by: FAMILY MEDICINE

## 2024-02-14 PROCEDURE — 3017F COLORECTAL CA SCREEN DOC REV: CPT | Performed by: FAMILY MEDICINE

## 2024-02-14 PROCEDURE — 1036F TOBACCO NON-USER: CPT | Performed by: FAMILY MEDICINE

## 2024-02-14 RX ORDER — CEFDINIR 300 MG/1
300 CAPSULE ORAL 2 TIMES DAILY
Qty: 14 CAPSULE | Refills: 0 | Status: SHIPPED | OUTPATIENT
Start: 2024-02-14 | End: 2024-02-21

## 2024-02-14 RX ORDER — FLUCONAZOLE 150 MG/1
150 TABLET ORAL ONCE
Qty: 1 TABLET | Refills: 0 | Status: SHIPPED | OUTPATIENT
Start: 2024-02-14 | End: 2024-02-14

## 2024-02-15 LAB
6-MONOACETYLMORPHINE, URINE: NEGATIVE
ABNORMAL SPECIMEN VALIDITY TEST: ABNORMAL
ALCOHOL URINE: NOT DETECTED MG/DL
AMPHETAMINE SCREEN URINE: NEGATIVE
BARBITURATE SCREEN URINE: NEGATIVE
BENZODIAZEPINE SCREEN, URINE: NEGATIVE
BUPRENORPHINE URINE: NEGATIVE
CANNABINOID SCREEN URINE: NEGATIVE
COCAINE METABOLITE, URINE: NEGATIVE
FENTANYL URINE: NEGATIVE
INTEGRITY CHECK, CREATININE, URINE: 6.1 MG/DL (ref 22–250)
INTEGRITY CHECK, OXIDANT, URINE: <40 MG/L
INTEGRITY CHECK, PH, URINE: 6.7 (ref 4.5–9)
INTEGRITY CHECK, SPECIFIC GRAVITY, URINE: 1.01 (ref 1–1.03)
METHADONE SCREEN, URINE: NEGATIVE
OPIATES, URINE: NEGATIVE
OXYCODONE SCREEN URINE: NEGATIVE
PHENCYCLIDINE, URINE: NEGATIVE
TEST INFORMATION: ABNORMAL
TRAMADOL, URINE: NEGATIVE

## 2024-02-19 ENCOUNTER — TELEPHONE (OUTPATIENT)
Dept: FAMILY MEDICINE CLINIC | Age: 54
End: 2024-02-19

## 2024-02-19 DIAGNOSIS — G25.81 RLS (RESTLESS LEGS SYNDROME): ICD-10-CM

## 2024-02-19 RX ORDER — PRAMIPEXOLE DIHYDROCHLORIDE 0.12 MG/1
TABLET ORAL
Qty: 180 TABLET | Refills: 1 | Status: SHIPPED | OUTPATIENT
Start: 2024-02-19

## 2024-02-19 NOTE — TELEPHONE ENCOUNTER
Spoke with patient and she states that her yeast infection has cleared up but she will call back if it happens again.     She also needs a refill of her Pramiprexole

## 2024-02-19 NOTE — TELEPHONE ENCOUNTER
----- Message from Charlene Velez DO sent at 2/18/2024  7:49 PM EST -----  Call pt and tell her to stop jardiance due to yeast vaginitis.

## 2024-03-10 ENCOUNTER — APPOINTMENT (OUTPATIENT)
Dept: GENERAL RADIOLOGY | Age: 54
End: 2024-03-10
Payer: MEDICAID

## 2024-03-10 ENCOUNTER — HOSPITAL ENCOUNTER (EMERGENCY)
Age: 54
Discharge: HOME | End: 2024-03-10
Payer: MEDICAID

## 2024-03-10 VITALS
DIASTOLIC BLOOD PRESSURE: 82 MMHG | RESPIRATION RATE: 18 BRPM | TEMPERATURE: 98 F | OXYGEN SATURATION: 100 % | HEART RATE: 90 BPM | SYSTOLIC BLOOD PRESSURE: 152 MMHG

## 2024-03-10 DIAGNOSIS — S46.812A TRAPEZIUS MUSCLE STRAIN, LEFT, INITIAL ENCOUNTER: ICD-10-CM

## 2024-03-10 DIAGNOSIS — S46.912A STRAIN OF LEFT SHOULDER, INITIAL ENCOUNTER: Primary | ICD-10-CM

## 2024-03-10 PROCEDURE — 99284 EMERGENCY DEPT VISIT MOD MDM: CPT

## 2024-03-10 PROCEDURE — 96372 THER/PROPH/DIAG INJ SC/IM: CPT

## 2024-03-10 PROCEDURE — 73030 X-RAY EXAM OF SHOULDER: CPT

## 2024-03-10 PROCEDURE — 6360000002 HC RX W HCPCS: Performed by: PHYSICIAN ASSISTANT

## 2024-03-10 RX ORDER — ORPHENADRINE CITRATE 30 MG/ML
60 INJECTION INTRAMUSCULAR; INTRAVENOUS ONCE
Status: COMPLETED | OUTPATIENT
Start: 2024-03-10 | End: 2024-03-10

## 2024-03-10 RX ORDER — KETOROLAC TROMETHAMINE 30 MG/ML
30 INJECTION, SOLUTION INTRAMUSCULAR; INTRAVENOUS ONCE
Status: COMPLETED | OUTPATIENT
Start: 2024-03-10 | End: 2024-03-10

## 2024-03-10 RX ORDER — NAPROXEN 500 MG/1
500 TABLET ORAL 2 TIMES DAILY
Qty: 14 TABLET | Refills: 0 | Status: SHIPPED | OUTPATIENT
Start: 2024-03-10 | End: 2024-03-17

## 2024-03-10 RX ADMIN — ORPHENADRINE CITRATE 60 MG: 60 INJECTION INTRAMUSCULAR; INTRAVENOUS at 14:47

## 2024-03-10 RX ADMIN — KETOROLAC TROMETHAMINE 30 MG: 30 INJECTION, SOLUTION INTRAMUSCULAR; INTRAVENOUS at 14:47

## 2024-03-10 ASSESSMENT — PAIN - FUNCTIONAL ASSESSMENT: PAIN_FUNCTIONAL_ASSESSMENT: 0-10

## 2024-03-10 ASSESSMENT — PAIN SCALES - GENERAL
PAINLEVEL_OUTOF10: 8
PAINLEVEL_OUTOF10: 8

## 2024-03-10 ASSESSMENT — PAIN DESCRIPTION - ORIENTATION: ORIENTATION: LEFT

## 2024-03-10 ASSESSMENT — PAIN DESCRIPTION - LOCATION: LOCATION: SHOULDER;NECK

## 2024-03-10 NOTE — ED PROVIDER NOTES
Independent ANNETTE Visit.     [facility]  Department of Emergency Medicine   ED  Encounter Note  Admit Date/RoomTime: 3/10/2024  2:17 PM  ED Room:   NAME: Jenny Lilly  : 1970  MRN: [id number]     Chief Complaint:  Neck Pain and Shoulder Pain (Tripped on step Friday and c/o pain in neck and shoulder area)    HISTORY OF PRESENT ILLNESS        Jenny Lilly is a 54 y.o. female who presents to the ED with a complaint of left shoulder injury.  Patient states 5 days ago she tripped over a lip and fell onto her left shoulder.  Patient states she has been having pain now left shoulder all the way to her left upper back to the left side of her neck.  Hurts to move her left arm.  She denies striking her head.  Denies any direct injury to her neck.  Denies chest pain.  She rates the pain an 8 out of 10.  At home she has been using heating pad and taking Tylenol.      ROS   Pertinent positives and negatives are stated within HPI, all other systems reviewed and are negative.    Past Medical History:  has a past medical history of Acid reflux, Anesthesia complication, Arthritis, Back pain, Blind, Cancer (HCC), Cardiac valve prolapse, Diabetes mellitus (HCC), History of therapeutic radiation, Hyperlipidemia, Hypertension, Neuropathy due to secondary diabetes (HCC), Nontraumatic tear of right rotator cuff, Prolonged emergence from general anesthesia, Psoriasis, and Sleep apnea.    Surgical History:  has a past surgical history that includes Carpal tunnel release;  section; Ankle surgery; Carpal tunnel release (2012); Shoulder arthroscopy (Left, 2016); Cardiac surgery; Cholecystectomy, laparoscopic (N/A, 2019); Upper gastrointestinal endoscopy (N/A, 2019); Colonoscopy (N/A, 2019); Hysterectomy, total abdominal; Shoulder arthroscopy (Right, 2020); US BREAST BIOPSY W LOC DEVICE 1ST LESION LEFT (2020); Breast biopsy (Left, 2021); Breast lumpectomy

## 2024-03-11 DIAGNOSIS — G62.9 NEUROPATHY: ICD-10-CM

## 2024-03-11 RX ORDER — GABAPENTIN 300 MG/1
300 CAPSULE ORAL NIGHTLY
Qty: 30 CAPSULE | Refills: 2 | Status: SHIPPED | OUTPATIENT
Start: 2024-03-11 | End: 2024-06-09

## 2024-03-11 RX ORDER — PRAMIPEXOLE DIHYDROCHLORIDE 0.12 MG/1
TABLET ORAL
Qty: 90 TABLET | Refills: 3 | Status: SHIPPED | OUTPATIENT
Start: 2024-03-11

## 2024-03-11 NOTE — TELEPHONE ENCOUNTER
Pt called and states she was supposed to have her medication sent in on 2/14/2024 and states the gabapentin was never sent in and she did her drug test and wants to know why it was not sent in.     Electronically signed by CAPO MUKHERJEE MA on 3/11/24 at 9:17 AM EDT

## 2024-03-15 ENCOUNTER — HOSPITAL ENCOUNTER (OUTPATIENT)
Dept: CT IMAGING | Age: 54
Discharge: HOME | End: 2024-03-15
Attending: FAMILY MEDICINE
Payer: MEDICAID

## 2024-03-15 ENCOUNTER — HOSPITAL ENCOUNTER (OUTPATIENT)
Dept: GENERAL RADIOLOGY | Age: 54
End: 2024-03-15
Attending: FAMILY MEDICINE
Payer: MEDICAID

## 2024-03-15 DIAGNOSIS — M25.551 RIGHT HIP PAIN: ICD-10-CM

## 2024-03-15 DIAGNOSIS — R10.84 GENERALIZED ABDOMINAL PAIN: ICD-10-CM

## 2024-03-15 PROCEDURE — 74176 CT ABD & PELVIS W/O CONTRAST: CPT

## 2024-03-15 PROCEDURE — 73502 X-RAY EXAM HIP UNI 2-3 VIEWS: CPT

## 2024-03-18 DIAGNOSIS — M25.551 RIGHT HIP PAIN: Primary | ICD-10-CM

## 2024-03-21 ENCOUNTER — OFFICE VISIT (OUTPATIENT)
Dept: ORTHOPEDIC SURGERY | Age: 54
End: 2024-03-21

## 2024-03-21 VITALS — TEMPERATURE: 98 F | WEIGHT: 160 LBS | BODY MASS INDEX: 32.25 KG/M2 | HEIGHT: 59 IN

## 2024-03-21 DIAGNOSIS — M25.551 RIGHT HIP PAIN: ICD-10-CM

## 2024-03-21 DIAGNOSIS — M16.11 PRIMARY LOCALIZED OSTEOARTHRITIS OF RIGHT HIP: Primary | ICD-10-CM

## 2024-03-21 DIAGNOSIS — M16.11 OSTEOARTHRITIS OF RIGHT HIP, UNSPECIFIED OSTEOARTHRITIS TYPE: Primary | ICD-10-CM

## 2024-03-21 ASSESSMENT — ENCOUNTER SYMPTOMS
ABDOMINAL DISTENTION: 0
EYE DISCHARGE: 0
ALLERGIC/IMMUNOLOGIC NEGATIVE: 1
SHORTNESS OF BREATH: 0

## 2024-03-21 NOTE — PROGRESS NOTES
ENDOSCOPY N/A 06/20/2019    EGD BIOPSY performed by Darinel Schwarz MD at [facility] ENDOSCOPY    US BREAST BIOPSY W LOC DEVICE 1ST LESION LEFT  12/23/2020    US BREAST NEEDLE BIOPSY LEFT 12/23/2020 SEYZ ABDU [facility]    WRIST SURGERY Right 03/15/2023    RIGHT WRIST FIRST DORSAL COMPARTMENT RELEASE performed by Ivan Martin MD at [facility] ELENA OR      Family History   Problem Relation Age of Onset    Heart Disease Mother     Diabetes Mother     Heart Disease Father     Diabetes Father     Cancer Father         prostate, colon, skin cance behind ear    Breast Cancer Maternal Aunt 55        breast    Cancer Maternal Uncle 58        colon    Cancer Paternal Aunt 62        breast    Cancer Other 58        maternal great aunt - breast      Social History     Tobacco Use    Smoking status: Never    Smokeless tobacco: Never   Vaping Use    Vaping Use: Never used   Substance Use Topics    Alcohol use: Yes     Alcohol/week: 1.0 standard drink of alcohol     Types: 1 Glasses of wine per week     Comment: 1 drink per week    Drug use: No        Review of Systems   Constitutional:  Positive for activity change.   HENT:  Negative for congestion.    Eyes:  Negative for discharge.   Respiratory:  Negative for shortness of breath.    Cardiovascular:  Negative for chest pain.   Gastrointestinal:  Negative for abdominal distention.   Endocrine: Negative.    Genitourinary: Negative.    Musculoskeletal:  Positive for arthralgias and joint swelling.   Skin:  Negative for wound.   Allergic/Immunologic: Negative.    Neurological: Negative.    Hematological: Negative.    Psychiatric/Behavioral: Negative.     All other systems reviewed and are negative.         Objective   Physical Exam  Constitutional:       Appearance: Normal appearance.   HENT:      Head: Normocephalic and atraumatic.      Nose: Nose normal.   Eyes:      Extraocular Movements: Extraocular movements intact.   Cardiovascular:      Rate and Rhythm: Normal rate and regular rhythm.

## 2024-03-25 ENCOUNTER — EVALUATION (OUTPATIENT)
Dept: PHYSICAL THERAPY | Age: 54
End: 2024-03-25
Payer: MEDICAID

## 2024-03-25 DIAGNOSIS — M16.11 OSTEOARTHRITIS OF RIGHT HIP, UNSPECIFIED OSTEOARTHRITIS TYPE: Primary | ICD-10-CM

## 2024-03-25 DIAGNOSIS — N20.0 NEPHROLITHIASIS: Primary | ICD-10-CM

## 2024-03-25 PROCEDURE — 97163 PT EVAL HIGH COMPLEX 45 MIN: CPT | Performed by: PHYSICAL THERAPIST

## 2024-03-25 NOTE — PROGRESS NOTES
Physical Therapy Daily Treatment Note    Date: 3/25/2024  Patient Name: Jenny Lilly  : 1970   MRN: [id number]  DOInjury: ~2 months ago   DOSx: --  Referring Provider[place] [address]     Medical Diagnosis:      Diagnosis Orders   1. Osteoarthritis of right hip, unspecified osteoarthritis type          Jenny has hip pain with limited mobility, weakness, and pain with loading.  Treatment will consist of a progressive resistance program starting with AROM and isometrics and progressing into resistance and weightbearing as tolerated.       Outcome Measure:   Lower Extremity Functional Scale (LEFS) 79% impairment      Access Code: [email or web address]  URL: [email or web address]  Date: 2024  Prepared by: William Tuttle    Exercises  - Supine Gluteal Sets  - 2 x daily - 2-3 sets - 10 reps  - Supine Heel Slide  - 2 x daily - 2-3 sets - 10 reps  - Supine Quad Set  - 2 x daily - 2-3 sets - 10-15 reps - 5 sec hold    X = TO BE PERFORMED NEXT VISIT  > = PROGRESS TO THIS    S: See eval  O:    Time 0596-5206       Visit - Repeat outcome measure at mid point and end.     Pain Pain 6-10/10       ROM        Modalities         Ice     MO   Stretch             TE   Exercise         NuStep X     Glute sets 3 x 10  NR   Quad sets 3 x 10   NR   Heel slides 3 x 10     Bridging 2-leg X     Clamshell X   NR   S-lying hip ABD X   NR      NR         Marching in place   TE   Alternating side kicks     TE    Step up -- FWD      TE   Step up -- LAT     TE   Leg Press     TE            Hallway marching for balance and proprioception       NR   Hallway side stepping for balance and proprioception       NR                                                 A:  Tolerated well.  Discussed anatomy, physiology, body mechanics, principles of loading, and progressive loading/activity.  Feedback and cues necessary for developing neuromuscular control.  Movement education and guided 
reasonably achievable. COMPARISON: CT abdomen and pelvis dated 10/17/2019 HISTORY: ORDERING SYSTEM PROVIDED HISTORY: Generalized abdominal pain TECHNOLOGIST PROVIDED HISTORY: Additional Contrast?->None Reason for exam:->pain FINDINGS: Unless otherwise indicated or stated incidental findings do not require dedicated follow-up imaging. Lower Chest: Lung bases are clear. Organs: Liver without focal lesion.  Gallbladder surgically absent.  Pancreas and spleen unremarkable.  Adrenals without nodule.  Kidneys demonstrate nonobstructing bilateral nephrolithiasis.  Right extrarenal pelvis with mild prominence of the proximal right renal collecting system and proximal right ureter without obstructing uropathy or urolithiasis clearly evident.  No inflammatory findings however minimal residual of passed stone would be difficult to exclude given overall appearance. GI/Bowel: No focal thickening or disproportion dilatation of bowel.  No inflammatory findings.  Appendix retrocecal and without inflammation. Pelvis: No suspicious pelvic lesion or bulky pelvic adenopathy/free fluid. Peritoneum/Retroperitoneum: No bulky retroperitoneal adenopathy. No suspicious peritoneal or mesenteric process Vasculature: Grossly normal caliber of abdominal aorta and vasculature Bones/Soft Tissues: No acute osseous or soft tissue findings.     1. Bilateral nonobstructing nephrolithiasis. 2. Right extrarenal pelvis with mild prominence of the proximal right renal collecting system and proximal right ureter without obstructing uropathy or urolithiasis clearly evident. No inflammatory findings however minimal residual of passed stone would be difficult to exclude given overall appearance.  Correlate with urinalysis.     XR HIP RIGHT (2-3 VIEWS)    Result Date: 3/15/2024  EXAMINATION: TWO XRAY VIEWS OF THE RIGHT HIP 3/15/2024 10:26 am COMPARISON: 7/15 HISTORY: ORDERING SYSTEM PROVIDED HISTORY: Right hip pain TECHNOLOGIST PROVIDED HISTORY: Reason for

## 2024-03-26 ENCOUNTER — TELEPHONE (OUTPATIENT)
Dept: FAMILY MEDICINE CLINIC | Age: 54
End: 2024-03-26

## 2024-03-26 DIAGNOSIS — N76.0 ACUTE VAGINITIS: Primary | ICD-10-CM

## 2024-03-26 NOTE — TELEPHONE ENCOUNTER
Pt called and states she is having yeast infection symptoms again and would like to know if we can send something in. Also would like to know if we can change her off of the medication that is causing this. Please advise.    Electronically signed by CAPO MUKHERJEE MA on 3/26/24 at 2:19 PM EDT

## 2024-03-27 ENCOUNTER — TREATMENT (OUTPATIENT)
Dept: PHYSICAL THERAPY | Age: 54
End: 2024-03-27
Payer: MEDICAID

## 2024-03-27 DIAGNOSIS — M16.11 OSTEOARTHRITIS OF RIGHT HIP, UNSPECIFIED OSTEOARTHRITIS TYPE: Primary | ICD-10-CM

## 2024-03-27 PROCEDURE — 97112 NEUROMUSCULAR REEDUCATION: CPT

## 2024-03-27 RX ORDER — FLUCONAZOLE 150 MG/1
150 TABLET ORAL ONCE
Qty: 1 TABLET | Refills: 0 | Status: SHIPPED | OUTPATIENT
Start: 2024-03-27 | End: 2024-03-27

## 2024-03-27 NOTE — PROGRESS NOTES
Physical Therapy Daily Treatment Note    Date: 3/27/2024  Patient Name: Jenny Lilly  : 1970   MRN: [id number]  DOInjury: ~2 months ago   DOSx: --  Referring Provider: [place] [address]     Medical Diagnosis:      Diagnosis Orders   1. Osteoarthritis of right hip, unspecified osteoarthritis type            Jenny has hip pain with limited mobility, weakness, and pain with loading.  Treatment will consist of a progressive resistance program starting with AROM and isometrics and progressing into resistance and weightbearing as tolerated.       Outcome Measure:   Lower Extremity Functional Scale (LEFS) 79% impairment      Access Code: [email or web address]  URL: [email or web address]  Date: 2024  Prepared by: William Tuttle    Exercises  - Supine Gluteal Sets  - 2 x daily - 2-3 sets - 10 reps  - Supine Heel Slide  - 2 x daily - 2-3 sets - 10 reps  - Supine Quad Set  - 2 x daily - 2-3 sets - 10-15 reps - 5 sec hold    X = TO BE PERFORMED NEXT VISIT  > = PROGRESS TO THIS    S: Pt reports 5-6/10 right hip pain. States has been performing HEP  O:    Time 2703-1609       Visit - Repeat outcome measure at mid point and end.     Pain Pain 5-6/10       ROM        Modalities         Ice     MO   Stretch             TE   Exercise         NuStep L4 x 6 min     Glute sets 3 x 10  NR   Quad sets 3 x 10   NR   Heel slides 3 x 10     Bridging 2-leg 2 x 10 new    Clamshell 2 x 6 new NR   S-lying hip ABD 2 x 7 new NR      NR   Marching in place   TE   Alternating side kicks     TE    Step up -- FWD      TE   Step up -- LAT     TE   Leg Press     TE            Hallway marching for balance and proprioception       NR   Hallway side stepping for balance and proprioception       NR                                                 A:  Tolerated well.  Discussed anatomy, physiology, body mechanics, principles of loading, and progressive loading/activity.  Feedback and cues

## 2024-03-28 DIAGNOSIS — N20.0 STONE, KIDNEY: Primary | ICD-10-CM

## 2024-04-01 LAB
CALCIUM SERPL-MCNC: NORMAL MG/DL
PHOSPHORUS: NORMAL
PTH INTACT: NORMAL
URIC ACID: NORMAL

## 2024-04-02 ENCOUNTER — TREATMENT (OUTPATIENT)
Dept: PHYSICAL THERAPY | Age: 54
End: 2024-04-02
Payer: MEDICAID

## 2024-04-02 DIAGNOSIS — M16.11 OSTEOARTHRITIS OF RIGHT HIP, UNSPECIFIED OSTEOARTHRITIS TYPE: Primary | ICD-10-CM

## 2024-04-02 PROCEDURE — 97112 NEUROMUSCULAR REEDUCATION: CPT

## 2024-04-02 NOTE — PROGRESS NOTES
Physical Therapy Daily Treatment Note    Date: 2024  Patient Name: Jenny Lilly  : 1970   MRN: [id number]  DOInjury: ~2 months ago   DOSx: --  Referring Provider: [place] [address]     Medical Diagnosis:      Diagnosis Orders   1. Osteoarthritis of right hip, unspecified osteoarthritis type              Jenny has hip pain with limited mobility, weakness, and pain with loading.  Treatment will consist of a progressive resistance program starting with AROM and isometrics and progressing into resistance and weightbearing as tolerated.       Outcome Measure:   Lower Extremity Functional Scale (LEFS) 79% impairment      Access Code: [email or web address]  URL: [email or web address]  Date: 2024  Prepared by: William Tuttle    Exercises  - Supine Gluteal Sets  - 2 x daily - 2-3 sets - 10 reps  - Supine Heel Slide  - 2 x daily - 2-3 sets - 10 reps  - Supine Quad Set  - 2 x daily - 2-3 sets - 10-15 reps - 5 sec hold    X = TO BE PERFORMED NEXT VISIT  > = PROGRESS TO THIS    S: Pt reports 7/10 right hip pain today. States not sure if dancing this weekend or rainy weather is causing increased hip pain.  O:    Time 6495-8357       Visit 3/4-8 Repeat outcome measure at mid point and end.     Pain Pain 5-6/10       ROM        Modalities         Ice     MO   Stretch             TE   Exercise         NuStep L4 x 6 min     Glute sets  NR   Quad sets 3 x 10   NR   Heel slides 3 x 10     Bridging 2-leg 2 x 10     Clamshell 2 x 8  NR   S-lying hip ABD 2 x 7-9  NR      NR   Marching in place 2 x 10 ea LE new TE   Alternating side kicks     TE    Step up -- FWD      TE   Step up -- LAT     TE   Total gym squats NEXT?     Leg Press     TE            Hallway marching for balance and proprioception       NR   Hallway side stepping for balance and proprioception       NR                                                 A:  Tolerated well.  Discussed anatomy, physiology,

## 2024-04-05 ENCOUNTER — TREATMENT (OUTPATIENT)
Dept: PHYSICAL THERAPY | Age: 54
End: 2024-04-05

## 2024-04-05 DIAGNOSIS — M16.11 OSTEOARTHRITIS OF RIGHT HIP, UNSPECIFIED OSTEOARTHRITIS TYPE: Primary | ICD-10-CM

## 2024-04-09 ENCOUNTER — TREATMENT (OUTPATIENT)
Dept: PHYSICAL THERAPY | Age: 54
End: 2024-04-09
Payer: MEDICAID

## 2024-04-09 DIAGNOSIS — M16.11 OSTEOARTHRITIS OF RIGHT HIP, UNSPECIFIED OSTEOARTHRITIS TYPE: Primary | ICD-10-CM

## 2024-04-09 PROCEDURE — 97112 NEUROMUSCULAR REEDUCATION: CPT

## 2024-04-09 NOTE — PROGRESS NOTES
Physical Therapy Daily Treatment Note    Date: 2024  Patient Name: Jenny Lilly  : 1970   MRN: [id number]  DOInjury: ~2 months ago   DOSx: --  Referring Provider: [place] [address]     Medical Diagnosis:      Diagnosis Orders   1. Osteoarthritis of right hip, unspecified osteoarthritis type                  Jenny has hip pain with limited mobility, weakness, and pain with loading.  Treatment will consist of a progressive resistance program starting with AROM and isometrics and progressing into resistance and weightbearing as tolerated.       Outcome Measure:   Lower Extremity Functional Scale (LEFS) 79% impairment    Access Code: [email or web address]  URL: [email or web address]  Date: 2024  Prepared by: Massiel Huerta    Exercises  - Supine Gluteal Sets  - 2 x daily - 2-3 sets - 10 reps  - Supine Heel Slide  - 2 x daily - 2-3 sets - 10 reps  - Supine Quad Set  - 2 x daily - 2-3 sets - 10-15 reps - 5 sec hold  - Supine Posterior Pelvic Tilt  - 2-3 x daily - 3 sets - 10 reps  - Seated Flexion Stretch  - 2-3 x daily - 1 sets - 5 reps - 5-10sec hold hold  - Supine Bridge  - 3-4 x weekly - 2-3 sets - 10 reps  - Clamshell  - 3-4 x weekly - 2-3 sets - 6-10 reps  - Sidelying Hip Abduction  - 3-4 x weekly - 2-3 sets - 6-10 reps    X = TO BE PERFORMED NEXT VISIT  > = PROGRESS TO THIS    S: Pt reports 5/10 right hip pain today. Reports groin pain that is really bad at times; today okay.  O:    Time 8699-4972       Visit -8 Repeat outcome measure at mid point and end.     Pain Pain 5/10       ROM        Modalities         Ice     MO   Stretch         Trunk flexion stretch    TE   Exercise         NuStep     Glute sets  NR   Quad sets   NR   Heel slides     PPT          Bridging 2-leg 2 x 10     Clamshell  NR   S-lying hip ABD  NR         LAQ 2# 3 x 10  NR   Marching in place X 20 ea LE  TE   Alternating side kicks X 20 ea LE    TE    Step up -- FWD

## 2024-04-11 ENCOUNTER — TREATMENT (OUTPATIENT)
Dept: PHYSICAL THERAPY | Age: 54
End: 2024-04-11
Payer: MEDICAID

## 2024-04-11 DIAGNOSIS — M16.11 OSTEOARTHRITIS OF RIGHT HIP, UNSPECIFIED OSTEOARTHRITIS TYPE: Primary | ICD-10-CM

## 2024-04-11 PROCEDURE — 97112 NEUROMUSCULAR REEDUCATION: CPT

## 2024-04-11 NOTE — PROGRESS NOTES
Physical Therapy Daily Treatment Note    Date: 2024  Patient Name: Jenny Lilly  : 1970   MRN: [id number]  DOInjury: ~2 months ago   DOSx: --  Referring Provider: [place] [address]     Medical Diagnosis:      Diagnosis Orders   1. Osteoarthritis of right hip, unspecified osteoarthritis type                    Jenny has hip pain with limited mobility, weakness, and pain with loading.  Treatment will consist of a progressive resistance program starting with AROM and isometrics and progressing into resistance and weightbearing as tolerated.       Outcome Measure:   Lower Extremity Functional Scale (LEFS) 79% impairment    Access Code: [email or web address]  URL: [email or web address]  Date: 2024  Prepared by: Massiel Huerta    Exercises  - Supine Gluteal Sets  - 2 x daily - 2-3 sets - 10 reps  - Supine Heel Slide  - 2 x daily - 2-3 sets - 10 reps  - Supine Quad Set  - 2 x daily - 2-3 sets - 10-15 reps - 5 sec hold  - Supine Posterior Pelvic Tilt  - 2-3 x daily - 3 sets - 10 reps  - Seated Flexion Stretch  - 2-3 x daily - 1 sets - 5 reps - 5-10sec hold hold  - Supine Bridge  - 3-4 x weekly - 2-3 sets - 10 reps  - Clamshell  - 3-4 x weekly - 2-3 sets - 6-10 reps  - Sidelying Hip Abduction  - 3-4 x weekly - 2-3 sets - 6-10 reps    X = TO BE PERFORMED NEXT VISIT  > = PROGRESS TO THIS    S: States exercises in standing were more tolerable than lying down. Pt reports 3-4/10 right hip pain today. Did increase with leg press to 7-8/10.   O:    Time 9694-6987       Visit -8 Repeat outcome measure at mid point and end.     Pain Pain 5/10       ROM        Modalities         Ice     MO   Stretch         Trunk flexion stretch    TE   Exercise         NuStep     Glute sets  NR   Quad sets   NR   Heel slides     PPT          Bridging 2-leg 2 x 10     Clamshell  NR   S-lying hip ABD  NR         LAQ 2# 3 x 10  NR   Marching in place 2 X 20 ea LE  TE   Alternating

## 2024-04-12 DIAGNOSIS — E11.42 DIABETIC POLYNEUROPATHY ASSOCIATED WITH TYPE 2 DIABETES MELLITUS (HCC): ICD-10-CM

## 2024-04-12 DIAGNOSIS — E11.65 UNCONTROLLED TYPE 2 DIABETES MELLITUS WITH HYPERGLYCEMIA (HCC): ICD-10-CM

## 2024-04-15 RX ORDER — EMPAGLIFLOZIN 25 MG/1
25 TABLET, FILM COATED ORAL DAILY
Qty: 30 TABLET | Refills: 10 | Status: SHIPPED | OUTPATIENT
Start: 2024-04-15

## 2024-04-15 NOTE — TELEPHONE ENCOUNTER
Last seen 2/14/2024  Next appt 5/15/2024    Electronically signed by CAPO MUKHERJEE MA on 4/15/24 at 9:12 AM EDT

## 2024-04-16 ENCOUNTER — TREATMENT (OUTPATIENT)
Dept: PHYSICAL THERAPY | Age: 54
End: 2024-04-16
Payer: MEDICAID

## 2024-04-16 DIAGNOSIS — M16.11 OSTEOARTHRITIS OF RIGHT HIP, UNSPECIFIED OSTEOARTHRITIS TYPE: Primary | ICD-10-CM

## 2024-04-16 PROCEDURE — 97110 THERAPEUTIC EXERCISES: CPT

## 2024-04-16 NOTE — PROGRESS NOTES
Physical Therapy Daily Treatment Note    Date: 2024  Patient Name: Jenny Lilly  : 1970   MRN: [id number]  DOInjury: ~2 months ago   DOSx: --  Referring Provider: [place] [address]     Medical Diagnosis:      Diagnosis Orders   1. Osteoarthritis of right hip, unspecified osteoarthritis type          Jenny has hip pain with limited mobility, weakness, and pain with loading.  Treatment will consist of a progressive resistance program starting with AROM and isometrics and progressing into resistance and weightbearing as tolerated.       Outcome Measure:   Lower Extremity Functional Scale (LEFS) 79% impairment    Access Code: [email or web address]  URL: [email or web address]  Date: 2024  Prepared by: Massiel Huerta    Exercises  - Supine Gluteal Sets  - 2 x daily - 2-3 sets - 10 reps  - Supine Heel Slide  - 2 x daily - 2-3 sets - 10 reps  - Supine Quad Set  - 2 x daily - 2-3 sets - 10-15 reps - 5 sec hold  - Supine Posterior Pelvic Tilt  - 2-3 x daily - 3 sets - 10 reps  - Seated Flexion Stretch  - 2-3 x daily - 1 sets - 5 reps - 5-10sec hold hold  - Supine Bridge  - 3-4 x weekly - 2-3 sets - 10 reps  - Clamshell  - 3-4 x weekly - 2-3 sets - 6-10 reps  - Sidelying Hip Abduction  - 3-4 x weekly - 2-3 sets - 6-10 reps    X = TO BE PERFORMED NEXT VISIT  > = PROGRESS TO THIS    S: States exercises in standing were more tolerable than lying down. Reports minimal improvement in pain. Pain continues to fluctuate between 3-810.  Pt reports 6/10 right hip pain at this time. She states no change in pain since starting PT  O:    Time 7374-1712       Visit - Repeat outcome measure at mid point and end.     Pain Pain 5/10       ROM        Modalities         Ice     MO   Stretch         Trunk flexion stretch    TE   Exercise         NuStep     Glute sets  NR   Quad sets   NR   Heel slides     PPT          Bridging 2-leg 2 x 10     Clamshell  NR   S-lying hip

## 2024-04-17 NOTE — TELEPHONE ENCOUNTER
Pt scheduled on 5/15/2024 at 10am.    Electronically signed by CAPO MUKHERJEE MA on 4/17/24 at 3:25 PM EDT

## 2024-04-18 ENCOUNTER — TREATMENT (OUTPATIENT)
Dept: PHYSICAL THERAPY | Age: 54
End: 2024-04-18
Payer: MEDICAID

## 2024-04-18 DIAGNOSIS — M16.11 OSTEOARTHRITIS OF RIGHT HIP, UNSPECIFIED OSTEOARTHRITIS TYPE: Primary | ICD-10-CM

## 2024-04-18 PROCEDURE — 97110 THERAPEUTIC EXERCISES: CPT

## 2024-04-18 NOTE — PROGRESS NOTES
hip ABD  NR         LAQ 4# 3 x 10  NR   Marching in place 2 X 20 ea LE  TE   Alternating side kicks 2 X 20 ea LE    TE    CR 2 x 10     Step up -- FWD      TE   Step up -- LAT     TE   Total gym squats L17 3 x 10     Leg Press  bothers hip joint will d/c  TE            Hallway marching for balance and proprioception       NR   Hallway side stepping for balance and proprioception       NR                                                 A:  Tolerated well. Exercises perform in pain free ROM. Discussed anatomy, physiology, body mechanics, principles of loading, and progressive loading/activity.   Reviewed home exercise program; added new exercises; updated written copy provided.   P: Last visit. Sees Dr Sofia 4/25/24  Massiel Huerta, JOSE ANGEL    Treatment Charges: Mins Units   Initial Evaluation     Re-Evaluation     Ther Exercise         TE 30 2   Manual Therapy     MT     Ther Activities        TA     Gait Training          GT     Neuro Re-education NR     Modalities     Non-Billable Service Time     Other     Total Time/Units 30 2

## 2024-04-25 ENCOUNTER — OFFICE VISIT (OUTPATIENT)
Dept: ORTHOPEDIC SURGERY | Age: 54
End: 2024-04-25
Payer: MEDICAID

## 2024-04-25 ENCOUNTER — TELEPHONE (OUTPATIENT)
Dept: ORTHOPEDIC SURGERY | Age: 54
End: 2024-04-25

## 2024-04-25 ENCOUNTER — PREP FOR PROCEDURE (OUTPATIENT)
Dept: ORTHOPEDIC SURGERY | Age: 54
End: 2024-04-25

## 2024-04-25 VITALS — HEIGHT: 59 IN | BODY MASS INDEX: 32.25 KG/M2 | TEMPERATURE: 98 F | WEIGHT: 160 LBS

## 2024-04-25 DIAGNOSIS — M16.11 PRIMARY LOCALIZED OSTEOARTHRITIS OF RIGHT HIP: Primary | ICD-10-CM

## 2024-04-25 DIAGNOSIS — M25.551 RIGHT HIP PAIN: ICD-10-CM

## 2024-04-25 DIAGNOSIS — M16.11 PRIMARY OSTEOARTHRITIS OF RIGHT HIP: ICD-10-CM

## 2024-04-25 PROCEDURE — 99214 OFFICE O/P EST MOD 30 MIN: CPT | Performed by: ORTHOPAEDIC SURGERY

## 2024-04-25 ASSESSMENT — ENCOUNTER SYMPTOMS
ABDOMINAL DISTENTION: 0
ALLERGIC/IMMUNOLOGIC NEGATIVE: 1
EYE DISCHARGE: 0
SHORTNESS OF BREATH: 0

## 2024-04-25 NOTE — PROGRESS NOTES
Cardiovascular:      Rate and Rhythm: Normal rate and regular rhythm.   Pulmonary:      Effort: Pulmonary effort is normal.   Abdominal:      Palpations: Abdomen is soft.   Musculoskeletal:      Cervical back: Normal range of motion.      Comments: Right Hip:  Gait: Antalgic  Inspection: No ecchymosis, scars, masses or deformities.  ROM: 10IR, 10ER. Pain with PROM  Tenderness: Anterior groin, trochanteric bursa  Strength Testing: Weak flexion and abduction  Limb Alignment: Leg length discrepancy present  Provocative Test: Stinchfield positive  Neurovascular exam: 2+DP, normal sensation     Skin:     General: Skin is warm and dry.      Capillary Refill: Capillary refill takes less than 2 seconds.   Neurological:      General: No focal deficit present.      Mental Status: She is alert.   Psychiatric:         Mood and Affect: Mood normal.         Behavior: Behavior normal.            Past records reviewed including ortho office notes    XRAYS:  Previous xrays reviewed and interpreted -   2 views including AP Pelvis, AP and lateral right hip demonstrate endstage hip arthritis with  hypertrophic osteophyte formation, subchondral sclerosis, degenerative cysts. No evidence of fracture, tumor or other pathologic process.      An electronic signature was used to authenticate this note.    --Chance Sofia MD

## 2024-04-25 NOTE — TELEPHONE ENCOUNTER
Prior Authorization Form:      DEMOGRAPHICS:                     Patient Name:  Jenny Lilly  Patient :  1970            Insurance:  Payor: [place] / Plan: [place] / Product Type: *No Product type* /   Insurance ID Number:    Payer/Plan Subscr  Sex Relation Sub. Ins. ID Effective Group Num   1. [facility]* JENNY LILLY 1970 Female Self [id number]                                    [address]         DIAGNOSIS & PROCEDURE:                       Procedure/Operation: Right Total Hip Arthroplasty Robotic           CPT Code: 54318    Diagnosis:  Primary Osteoarthritis of right hip    ICD10 Code: M16.11    Location:  [facility]    Surgeon:  Dr. Chance Sofia    SCHEDULING INFORMATION:                          Date: 06/10/2024    Time: TBD              Anesthesia:  Spinal                                                       Status:  Outpatient        Special Comments:         Electronically signed by Li Up MA on 2024 at 10:45 AM

## 2024-05-08 ENCOUNTER — TELEPHONE (OUTPATIENT)
Dept: ORTHOPEDIC SURGERY | Age: 54
End: 2024-05-08

## 2024-05-08 NOTE — TELEPHONE ENCOUNTER
CPT 02164, approved at nursing peer to peer level for patients CT R HIP WO contrast. Approval 5/8/24-6/22/24: [id number]

## 2024-05-13 DIAGNOSIS — K21.9 GASTROESOPHAGEAL REFLUX DISEASE WITHOUT ESOPHAGITIS: ICD-10-CM

## 2024-05-14 NOTE — TELEPHONE ENCOUNTER
Last Appointment:  2/14/2024  Future Appointments   Date Time Provider Department Center   5/15/2024 10:00 AM Charlnee Velez DO [facility]   5/17/2024  9:30 AM [facility] CT [place] [facility]   5/28/2024 10:00 AM Chance Sofia MD [facility]   6/26/2024 10:30 AM [facility] LABS ROOM MEDICAL ONCOLOGY [facility] MED ONC [place]   6/27/2024  2:15 PM [facility]   1/7/2025  9:00 AM SE[facility] ABDU ETHAN RM 1 [facility] A[facility] Rad/Car   1/7/2025 10:00 AM Malena Lo, APRN - CNP [facility]

## 2024-05-15 ENCOUNTER — OFFICE VISIT (OUTPATIENT)
Dept: FAMILY MEDICINE CLINIC | Age: 54
End: 2024-05-15
Payer: MEDICAID

## 2024-05-15 VITALS
HEART RATE: 81 BPM | TEMPERATURE: 97.3 F | OXYGEN SATURATION: 99 % | WEIGHT: 162.6 LBS | DIASTOLIC BLOOD PRESSURE: 76 MMHG | RESPIRATION RATE: 18 BRPM | SYSTOLIC BLOOD PRESSURE: 124 MMHG | BODY MASS INDEX: 32.78 KG/M2 | HEIGHT: 59 IN

## 2024-05-15 DIAGNOSIS — E11.3553 STABLE PROLIFERATIVE DIABETIC RETINOPATHY OF BOTH EYES ASSOCIATED WITH TYPE 2 DIABETES MELLITUS (HCC): ICD-10-CM

## 2024-05-15 DIAGNOSIS — K21.9 GASTROESOPHAGEAL REFLUX DISEASE WITHOUT ESOPHAGITIS: ICD-10-CM

## 2024-05-15 DIAGNOSIS — I25.10 CORONARY ARTERY DISEASE INVOLVING NATIVE HEART WITHOUT ANGINA PECTORIS, UNSPECIFIED VESSEL OR LESION TYPE: ICD-10-CM

## 2024-05-15 DIAGNOSIS — C50.912 INVASIVE DUCTAL CARCINOMA OF LEFT BREAST (HCC): ICD-10-CM

## 2024-05-15 DIAGNOSIS — E11.65 UNCONTROLLED TYPE 2 DIABETES MELLITUS WITH HYPERGLYCEMIA (HCC): Primary | ICD-10-CM

## 2024-05-15 DIAGNOSIS — Z01.818 PRE-OP EXAM: ICD-10-CM

## 2024-05-15 DIAGNOSIS — C50.912 MALIGNANT NEOPLASM OF LEFT BREAST IN FEMALE, ESTROGEN RECEPTOR POSITIVE, UNSPECIFIED SITE OF BREAST (HCC): ICD-10-CM

## 2024-05-15 DIAGNOSIS — Z79.899 DRUG THERAPY: ICD-10-CM

## 2024-05-15 DIAGNOSIS — Z17.0 MALIGNANT NEOPLASM OF LEFT BREAST IN FEMALE, ESTROGEN RECEPTOR POSITIVE, UNSPECIFIED SITE OF BREAST (HCC): ICD-10-CM

## 2024-05-15 DIAGNOSIS — G62.9 NEUROPATHY: ICD-10-CM

## 2024-05-15 DIAGNOSIS — E11.43 TYPE II DIABETES MELLITUS WITH PERIPHERAL AUTONOMIC NEUROPATHY (HCC): ICD-10-CM

## 2024-05-15 DIAGNOSIS — F41.9 ANXIETY: ICD-10-CM

## 2024-05-15 DIAGNOSIS — I24.9 ACS (ACUTE CORONARY SYNDROME) (HCC): ICD-10-CM

## 2024-05-15 DIAGNOSIS — M16.11 PRIMARY OSTEOARTHRITIS OF RIGHT HIP: ICD-10-CM

## 2024-05-15 LAB
ALBUMIN: 4.1 G/DL (ref 3.5–5.2)
ALP BLD-CCNC: 150 U/L (ref 35–104)
ALT SERPL-CCNC: 26 U/L (ref 0–32)
ANION GAP SERPL CALCULATED.3IONS-SCNC: 17 MMOL/L (ref 7–16)
AST SERPL-CCNC: 28 U/L (ref 0–31)
BASOPHILS ABSOLUTE: 0.02 K/UL (ref 0–0.2)
BASOPHILS RELATIVE PERCENT: 0 % (ref 0–2)
BILIRUB SERPL-MCNC: 0.2 MG/DL (ref 0–1.2)
BUN BLDV-MCNC: 15 MG/DL (ref 6–20)
CALCIUM SERPL-MCNC: 10 MG/DL (ref 8.6–10.2)
CHLORIDE BLD-SCNC: 105 MMOL/L (ref 98–107)
CHOLESTEROL, TOTAL: 150 MG/DL
CHP ED QC CHECK: NORMAL
CO2: 20 MMOL/L (ref 22–29)
CREAT SERPL-MCNC: 0.8 MG/DL (ref 0.5–1)
CREATININE URINE POCT: 10
EOSINOPHILS ABSOLUTE: 0.1 K/UL (ref 0.05–0.5)
EOSINOPHILS RELATIVE PERCENT: 2 % (ref 0–6)
GFR, ESTIMATED: >90 ML/MIN/1.73M2
GLUCOSE BLD-MCNC: 125 MG/DL (ref 74–99)
GLUCOSE BLD-MCNC: 144 MG/DL
HBA1C MFR BLD: 7.5 %
HCT VFR BLD CALC: 38.7 % (ref 34–48)
HDLC SERPL-MCNC: 65 MG/DL
HEMOGLOBIN: 11.7 G/DL (ref 11.5–15.5)
IMMATURE GRANULOCYTES %: 0 % (ref 0–5)
IMMATURE GRANULOCYTES ABSOLUTE: <0.03 K/UL (ref 0–0.58)
LDL CHOLESTEROL: 71 MG/DL
LYMPHOCYTES ABSOLUTE: 2.17 K/UL (ref 1.5–4)
LYMPHOCYTES RELATIVE PERCENT: 32 % (ref 20–42)
MCH RBC QN AUTO: 27.9 PG (ref 26–35)
MCHC RBC AUTO-ENTMCNC: 30.2 G/DL (ref 32–34.5)
MCV RBC AUTO: 92.1 FL (ref 80–99.9)
MICROALBUMIN/CREAT 24H UR: 10 MG/G{CREAT}
MICROALBUMIN/CREAT UR-RTO: <30
MONOCYTES ABSOLUTE: 0.51 K/UL (ref 0.1–0.95)
MONOCYTES RELATIVE PERCENT: 8 % (ref 2–12)
NEUTROPHILS ABSOLUTE: 3.96 K/UL (ref 1.8–7.3)
NEUTROPHILS RELATIVE PERCENT: 58 % (ref 43–80)
PDW BLD-RTO: 15.1 % (ref 11.5–15)
PLATELET # BLD: 273 K/UL (ref 130–450)
PMV BLD AUTO: 11 FL (ref 7–12)
POTASSIUM SERPL-SCNC: 5.8 MMOL/L (ref 3.5–5)
RBC # BLD: 4.2 M/UL (ref 3.5–5.5)
SODIUM BLD-SCNC: 142 MMOL/L (ref 132–146)
TOTAL PROTEIN: 7.5 G/DL (ref 6.4–8.3)
TRIGL SERPL-MCNC: 71 MG/DL
TSH SERPL DL<=0.05 MIU/L-ACNC: 2.51 UIU/ML (ref 0.27–4.2)
VLDLC SERPL CALC-MCNC: 14 MG/DL
WBC # BLD: 6.8 K/UL (ref 4.5–11.5)

## 2024-05-15 PROCEDURE — G8417 CALC BMI ABV UP PARAM F/U: HCPCS | Performed by: FAMILY MEDICINE

## 2024-05-15 PROCEDURE — 3078F DIAST BP <80 MM HG: CPT | Performed by: FAMILY MEDICINE

## 2024-05-15 PROCEDURE — 3051F HG A1C>EQUAL 7.0%<8.0%: CPT | Performed by: FAMILY MEDICINE

## 2024-05-15 PROCEDURE — G8427 DOCREV CUR MEDS BY ELIG CLIN: HCPCS | Performed by: FAMILY MEDICINE

## 2024-05-15 PROCEDURE — 99214 OFFICE O/P EST MOD 30 MIN: CPT | Performed by: FAMILY MEDICINE

## 2024-05-15 PROCEDURE — 36415 COLL VENOUS BLD VENIPUNCTURE: CPT | Performed by: FAMILY MEDICINE

## 2024-05-15 PROCEDURE — 82962 GLUCOSE BLOOD TEST: CPT | Performed by: FAMILY MEDICINE

## 2024-05-15 PROCEDURE — 3074F SYST BP LT 130 MM HG: CPT | Performed by: FAMILY MEDICINE

## 2024-05-15 PROCEDURE — 2022F DILAT RTA XM EVC RTNOPTHY: CPT | Performed by: FAMILY MEDICINE

## 2024-05-15 PROCEDURE — 83036 HEMOGLOBIN GLYCOSYLATED A1C: CPT | Performed by: FAMILY MEDICINE

## 2024-05-15 PROCEDURE — 3017F COLORECTAL CA SCREEN DOC REV: CPT | Performed by: FAMILY MEDICINE

## 2024-05-15 PROCEDURE — 82044 UR ALBUMIN SEMIQUANTITATIVE: CPT | Performed by: FAMILY MEDICINE

## 2024-05-15 PROCEDURE — 1036F TOBACCO NON-USER: CPT | Performed by: FAMILY MEDICINE

## 2024-05-15 RX ORDER — OMEPRAZOLE 40 MG/1
40 CAPSULE, DELAYED RELEASE ORAL DAILY
Qty: 30 CAPSULE | Refills: 5 | Status: SHIPPED
Start: 2024-05-15 | End: 2024-05-15 | Stop reason: SDUPTHER

## 2024-05-15 RX ORDER — BUSPIRONE HYDROCHLORIDE 5 MG/1
TABLET ORAL 2 TIMES DAILY
COMMUNITY
Start: 2023-10-16 | End: 2024-05-15 | Stop reason: SDUPTHER

## 2024-05-15 RX ORDER — OMEPRAZOLE 40 MG/1
40 CAPSULE, DELAYED RELEASE ORAL DAILY
Qty: 30 CAPSULE | Refills: 5 | Status: SHIPPED | OUTPATIENT
Start: 2024-05-15

## 2024-05-15 RX ORDER — GABAPENTIN 300 MG/1
300 CAPSULE ORAL NIGHTLY
Qty: 30 CAPSULE | Refills: 2 | Status: SHIPPED | OUTPATIENT
Start: 2024-05-15 | End: 2024-08-13

## 2024-05-15 RX ORDER — BUSPIRONE HYDROCHLORIDE 5 MG/1
5 TABLET ORAL 2 TIMES DAILY
Qty: 60 TABLET | Refills: 2 | Status: SHIPPED | OUTPATIENT
Start: 2024-05-15

## 2024-05-16 LAB
6-MONOACETYLMORPHINE, URINE: NEGATIVE
ABNORMAL SPECIMEN VALIDITY TEST: ABNORMAL
ALCOHOL URINE: NOT DETECTED MG/DL
AMPHETAMINE SCREEN URINE: NEGATIVE
BARBITURATE SCREEN URINE: NEGATIVE
BENZODIAZEPINE SCREEN, URINE: NEGATIVE
BUPRENORPHINE URINE: NEGATIVE
CANNABINOID SCREEN URINE: NEGATIVE
COCAINE METABOLITE, URINE: NEGATIVE
FENTANYL URINE: NEGATIVE
INTEGRITY CHECK, CREATININE, URINE: 13.5 MG/DL (ref 22–250)
INTEGRITY CHECK, OXIDANT, URINE: <40 MG/L
INTEGRITY CHECK, PH, URINE: 6.1 (ref 4.5–9)
INTEGRITY CHECK, SPECIFIC GRAVITY, URINE: 1.01 (ref 1–1.03)
METHADONE SCREEN, URINE: NEGATIVE
OPIATES, URINE: NEGATIVE
OXYCODONE SCREEN URINE: NEGATIVE
PCP,URINE: NEGATIVE
TEST INFORMATION: ABNORMAL
TRAMADOL, URINE: NEGATIVE

## 2024-05-20 PROBLEM — Z01.818 PRE-OP EXAM: Status: ACTIVE | Noted: 2024-05-20

## 2024-05-20 ASSESSMENT — ENCOUNTER SYMPTOMS
SINUS PAIN: 0
EYE PAIN: 0
COUGH: 0
SHORTNESS OF BREATH: 0
CONSTIPATION: 0
VOICE CHANGE: 0
STRIDOR: 0
GASTROINTESTINAL NEGATIVE: 1
EYE DISCHARGE: 0
SINUS PRESSURE: 0
SORE THROAT: 0
WHEEZING: 0
NAUSEA: 0
EYE ITCHING: 0
PHOTOPHOBIA: 0
ALLERGIC/IMMUNOLOGIC NEGATIVE: 1
CHOKING: 0
BLOOD IN STOOL: 0
EYE REDNESS: 0
CHEST TIGHTNESS: 0
ANAL BLEEDING: 0
BACK PAIN: 0
FACIAL SWELLING: 0
COLOR CHANGE: 0
RECTAL PAIN: 0
TROUBLE SWALLOWING: 0
ABDOMINAL DISTENTION: 0
DIARRHEA: 0
VOMITING: 0
RHINORRHEA: 0
ABDOMINAL PAIN: 0

## 2024-05-20 NOTE — PROGRESS NOTES
Messaged patient advising   
Venipuncture was obtained from right arm, with 2 attempts. Patient tolerated the procedure without complications or complaints.  Electronically signed by GAVIN MULLER LPN on 5/15/24 at 10:53 AM EDT   Last Appointment:  2/14/2024  Future Appointments   Date Time Provider Department Center   5/17/2024  9:30 AM [facility] CT [place] [facility]   5/28/2024 10:00 AM Chance Sofia MD [facility]   6/26/2024 10:30 AM [facility] LABS ROOM MEDICAL ONCOLOGY [facility] MED ONC [place]   6/27/2024  2:15 PM [facility] Rinaldi [facility]   8/14/2024 10:45 AM Charlene Velez,  [facility]   1/7/2025  9:00 AM YZ ELISE ETHAN RM 1 SEYZ ELISE [facility] Rad/Car   1/7/2025 10:00 AM Malena Lo, APRN - CNP [facility]        
DAILY WITH MEALS (Patient taking differently: Take 1 tablet by mouth daily (with breakfast)) 60 tablet 10    pramipexole (MIRAPEX) 0.125 MG tablet One in AM and take TWO at night 90 tablet 3    naproxen (NAPROSYN) 500 MG tablet Take 1 tablet by mouth 2 times daily for 7 days 14 tablet 0    sertraline (ZOLOFT) 100 MG tablet TAKE 1 TABLET BY MOUTH DAILY AS NEEDED FOR DEPRESSION (Patient taking differently: PRN) 30 tablet 10    Lancets (ONETOUCH DELICA PLUS [id number]) MISC USE TO TEST BLOOD SUGAR FOUR TIMES A  each 10    anastrozole (ARIMIDEX) 1 MG tablet TAKE 1 TABLET BY MOUTH DAILY 30 tablet 10    albuterol sulfate HFA (VENTOLIN HFA) 108 (90 Base) MCG/ACT inhaler Inhale 2 puffs into the lungs 4 times daily as needed for Wheezing (Patient taking differently: Inhale 2 puffs into the lungs 4 times daily as needed for Wheezing PRN) 18 g 0    nystatin (MYCOSTATIN) 447104 UNIT/GM cream Apply topically 2 times daily. 30 g 1    metoprolol succinate (TOPROL XL) 25 MG extended release tablet TAKE 1/2 TABLET BY MOUTH DAILY 15 tablet 10    bumetanide (BUMEX) 1 MG tablet Take 1 tablet by mouth daily Takes daily 90 tablet 1    TRULICITY 1.5 MG/0.5ML SC injection INJECT 0.5 MLS SUBCUTANEOUSLY ONCE A WEEK EVERY SUNDAY 2 mL 10    Alcohol Swabs (EASY TOUCH ALCOHOL PREP MEDIUM) 70 % PADS USE AS DIRECTED PRIOR TO GLUCOSE TESTING AND INSULIN INJECTION FOUR TIMES A  each 3    aspirin 81 MG chewable tablet Take 1 tablet by mouth daily 30 tablet 3    Insulin Pen Needle (B-D ULTRAFINE III SHORT PEN) 31G X 8 MM MISC AS DIRECTED 100 each 3    insulin glargine (BASAGLAR KWIKPEN) 100 UNIT/ML injection pen INJECT 50 UNITS SUBCUTANEOUSLY NIGHTLY  Strength: 100 UNIT/ML (Patient taking differently: INJECT 50 UNITS SUBCUTANEOUSLY NIGHTLY  Strength: 100 UNIT/ML  (Pt has johanna adjusting the dose (38-40U QHS)) 15 mL 10    ONETOUCH ULTRA strip USE TO TEST BLOOD SUGAR 4 TIMES DAILY AS DIRECTED 100 each 10    sucralfate (CARAFATE) 1 GM tablet

## 2024-05-28 ENCOUNTER — OFFICE VISIT (OUTPATIENT)
Dept: ORTHOPEDIC SURGERY | Age: 54
End: 2024-05-28
Payer: MEDICAID

## 2024-05-28 ENCOUNTER — PREP FOR PROCEDURE (OUTPATIENT)
Dept: ORTHOPEDIC SURGERY | Age: 54
End: 2024-05-28

## 2024-05-28 VITALS — WEIGHT: 162 LBS | BODY MASS INDEX: 32.66 KG/M2 | HEIGHT: 59 IN

## 2024-05-28 DIAGNOSIS — M25.551 RIGHT HIP PAIN: ICD-10-CM

## 2024-05-28 DIAGNOSIS — M16.11 PRIMARY OSTEOARTHRITIS OF RIGHT HIP: Primary | ICD-10-CM

## 2024-05-28 PROCEDURE — 99214 OFFICE O/P EST MOD 30 MIN: CPT | Performed by: ORTHOPAEDIC SURGERY

## 2024-05-28 RX ORDER — SODIUM CHLORIDE 9 MG/ML
INJECTION, SOLUTION INTRAVENOUS PRN
Status: CANCELLED | OUTPATIENT
Start: 2024-05-28

## 2024-05-28 RX ORDER — ACETAMINOPHEN 325 MG/1
1000 TABLET ORAL ONCE
Status: CANCELLED | OUTPATIENT
Start: 2024-05-28 | End: 2024-05-28

## 2024-05-28 RX ORDER — SODIUM CHLORIDE 0.9 % (FLUSH) 0.9 %
5-40 SYRINGE (ML) INJECTION EVERY 12 HOURS SCHEDULED
Status: CANCELLED | OUTPATIENT
Start: 2024-05-28

## 2024-05-28 RX ORDER — CELECOXIB 200 MG/1
200 CAPSULE ORAL ONCE
Status: CANCELLED | OUTPATIENT
Start: 2024-05-28 | End: 2024-05-28

## 2024-05-28 RX ORDER — SODIUM CHLORIDE 0.9 % (FLUSH) 0.9 %
5-40 SYRINGE (ML) INJECTION PRN
Status: CANCELLED | OUTPATIENT
Start: 2024-05-28

## 2024-05-28 ASSESSMENT — ENCOUNTER SYMPTOMS
ABDOMINAL DISTENTION: 0
SHORTNESS OF BREATH: 0
ALLERGIC/IMMUNOLOGIC NEGATIVE: 1
EYE DISCHARGE: 0

## 2024-05-28 NOTE — H&P
MD Chong at [facility] ENDOSCOPY    US BREAST BIOPSY W LOC DEVICE 1ST LESION LEFT  12/23/2020    US BREAST NEEDLE BIOPSY LEFT 12/23/2020 [facility]    WRIST SURGERY Right 03/15/2023    RIGHT WRIST FIRST DORSAL COMPARTMENT RELEASE [facility]      Family History   Problem Relation Age of Onset    Heart Disease Mother     Diabetes Mother     Heart Disease Father     Diabetes Father     Cancer Father         prostate, colon, skin cance behind ear    Breast Cancer Maternal Aunt 55        breast    Cancer Maternal Uncle 58        colon    Cancer Paternal Aunt 62        breast    Cancer Other 58        maternal great aunt - breast      Social History     Tobacco Use    Smoking status: Never    Smokeless tobacco: Never   Vaping Use    Vaping Use: Never used   Substance Use Topics    Alcohol use: Yes     Alcohol/week: 1.0 standard drink of alcohol     Types: 1 Glasses of wine per week     Comment: 1 drink per week    Drug use: No        Review of Systems   Constitutional:  Positive for activity change.   HENT:  Negative for congestion.    Eyes:  Negative for discharge.   Respiratory:  Negative for shortness of breath.    Cardiovascular:  Negative for chest pain.   Gastrointestinal:  Negative for abdominal distention.   Endocrine: Negative.    Genitourinary: Negative.    Musculoskeletal:  Positive for arthralgias and joint swelling.   Skin:  Negative for wound.   Allergic/Immunologic: Negative.    Neurological: Negative.    Hematological: Negative.    Psychiatric/Behavioral: Negative.     All other systems reviewed and are negative.         Objective   Physical Exam  Constitutional:       Appearance: Normal appearance.   HENT:      Head: Normocephalic and atraumatic.      Nose: Nose normal.   Eyes:      Extraocular Movements: Extraocular movements intact.   Cardiovascular:      Rate and Rhythm: Normal rate and regular rhythm.   Pulmonary:      Effort: Pulmonary effort is normal.

## 2024-05-28 NOTE — PROGRESS NOTES
Procedure Laterality Date    ANKLE SURGERY      bilateral tarsal tunnels    BREAST BIOPSY Left 2021    LEFT BREAST NEEDLE LOCALIZED LUMPECTOMY, BLUE DYE INJECTION, LEFT AXILLARY SENTINEL LYMPHNODE EXCISION, POSSIBLE LEFT AXILLARY DISSECTION performed by Tana Granados MD at [facility] OR    BREAST LUMPECTOMY Left     2021    CARDIAC SURGERY      cardiac catheterization-  \" years ago - over 10 years and negative\"; to see Dr. Gonzalez 3/13/23    CARPAL TUNNEL RELEASE      left    CARPAL TUNNEL RELEASE  2012    right    CARPAL TUNNEL RELEASE Right 03/15/2023    RIGHT HAND CARPAL TUNNEL RELEASE,  RIGHT ELBOW CUBITAL TUNNEL RELEASE,RIGHT RING FINGER A, PULLEY RELEASE, RIGHT WRIST FIRST DORSAL COMPARTMENT RELEASE (29936,43057,94383) performed by Ivan Martin MD at [facility] OR    CARPAL TUNNEL RELEASE Left 2023    LEFT HAND CARPAL TUNNEL RELEASE, LEFT ELOBW CUBITAL TUNNEL RELEASE,LEFT INDEX FINGER A1 PULLEY RELEASE,LEFT LONG FINGER A1 PULLEY RELEASE (CPT 81992,26055X2) performed by Ivan Martin MD at [facility] OR    CARPAL TUNNEL RELEASE  2023    CATARACT REMOVAL Bilateral 2022     SECTION      CHOLECYSTECTOMY, LAPAROSCOPIC N/A 2019    LAPAROSCOPIC CHOLECYSTECTOMY WITH IOC performed by Darinel Schwarz MD at [facility] OR.  had extremely high blood pressure and hard to wake up    COLONOSCOPY N/A 2019    COLORECTAL CANCER SCREENING, NOT HIGH RISK performed by Darinel Schwarz MD at [facility] ENDOSCOPY    FINGER TRIGGER RELEASE Bilateral 2023    HYSTERECTOMY, TOTAL ABDOMINAL (CERVIX REMOVED)      SHOULDER ARTHROSCOPY Left 2016    subacromin decompression and debridement    SHOULDER ARTHROSCOPY Right 2020    RIGHT SHOULDER ARTHROSCOPY, SUBACROMIAL DECOMPRESSION, LABRIAL DEBRIDEMENT, CHONDROPLASTY, RAÚL performed by Ken Delgado DO at [facility] OR    UPPER GASTROINTESTINAL ENDOSCOPY N/A 2019    EGD BIOPSY performed by Darinel Schwarz MD at [facility] ENDOSCOPY

## 2024-06-03 ENCOUNTER — HOSPITAL ENCOUNTER (OUTPATIENT)
Dept: GENERAL RADIOLOGY | Age: 54
Discharge: HOME | End: 2024-06-05
Payer: MEDICAID

## 2024-06-03 ENCOUNTER — HOSPITAL ENCOUNTER (OUTPATIENT)
Dept: PREADMISSION TESTING | Age: 54
Discharge: HOME | End: 2024-06-03
Payer: MEDICAID

## 2024-06-03 ENCOUNTER — TELEPHONE (OUTPATIENT)
Dept: FAMILY MEDICINE CLINIC | Age: 54
End: 2024-06-03

## 2024-06-03 VITALS
WEIGHT: 166.5 LBS | DIASTOLIC BLOOD PRESSURE: 87 MMHG | SYSTOLIC BLOOD PRESSURE: 138 MMHG | OXYGEN SATURATION: 100 % | TEMPERATURE: 97.4 F | RESPIRATION RATE: 18 BRPM | HEART RATE: 87 BPM | HEIGHT: 59 IN | BODY MASS INDEX: 33.56 KG/M2

## 2024-06-03 DIAGNOSIS — Z01.818 PRE-OP EXAM: Primary | ICD-10-CM

## 2024-06-03 DIAGNOSIS — R74.8 ELEVATED LIVER ENZYMES: Primary | ICD-10-CM

## 2024-06-03 DIAGNOSIS — M16.11 PRIMARY OSTEOARTHRITIS OF RIGHT HIP: ICD-10-CM

## 2024-06-03 LAB
ALBUMIN SERPL-MCNC: 4.2 G/DL (ref 3.5–5.2)
ALP SERPL-CCNC: 321 U/L (ref 35–104)
ALT SERPL-CCNC: 257 U/L (ref 0–32)
ANION GAP SERPL CALCULATED.3IONS-SCNC: 12 MMOL/L (ref 7–16)
AST SERPL-CCNC: 118 U/L (ref 0–31)
BASOPHILS # BLD: 0.01 K/UL (ref 0–0.2)
BASOPHILS NFR BLD: 0 % (ref 0–2)
BILIRUB SERPL-MCNC: 0.2 MG/DL (ref 0–1.2)
BILIRUB UR QL STRIP: NEGATIVE
BUN SERPL-MCNC: 14 MG/DL (ref 6–20)
CALCIUM SERPL-MCNC: 9.7 MG/DL (ref 8.6–10.2)
CHLORIDE SERPL-SCNC: 104 MMOL/L (ref 98–107)
CLARITY UR: CLEAR
CO2 SERPL-SCNC: 26 MMOL/L (ref 22–29)
COLOR UR: YELLOW
COMMENT: ABNORMAL
CREAT SERPL-MCNC: 0.9 MG/DL (ref 0.5–1)
EOSINOPHIL # BLD: 0.17 K/UL (ref 0.05–0.5)
EOSINOPHILS RELATIVE PERCENT: 3 % (ref 0–6)
ERYTHROCYTE [DISTWIDTH] IN BLOOD BY AUTOMATED COUNT: 15.4 % (ref 11.5–15)
GFR, ESTIMATED: 71 ML/MIN/1.73M2
GLUCOSE SERPL-MCNC: 71 MG/DL (ref 74–99)
GLUCOSE UR STRIP-MCNC: NEGATIVE MG/DL
HBA1C MFR BLD: 8.4 % (ref 4–5.6)
HCT VFR BLD AUTO: 39.1 % (ref 34–48)
HGB BLD-MCNC: 12 G/DL (ref 11.5–15.5)
HGB UR QL STRIP.AUTO: NEGATIVE
IMM GRANULOCYTES # BLD AUTO: <0.03 K/UL (ref 0–0.58)
IMM GRANULOCYTES NFR BLD: 0 % (ref 0–5)
INR PPP: 1
KETONES UR STRIP-MCNC: NEGATIVE MG/DL
LEUKOCYTE ESTERASE UR QL STRIP: NEGATIVE
LYMPHOCYTES NFR BLD: 1.75 K/UL (ref 1.5–4)
LYMPHOCYTES RELATIVE PERCENT: 29 % (ref 20–42)
MCH RBC QN AUTO: 28 PG (ref 26–35)
MCHC RBC AUTO-ENTMCNC: 30.7 G/DL (ref 32–34.5)
MCV RBC AUTO: 91.1 FL (ref 80–99.9)
MONOCYTES NFR BLD: 0.62 K/UL (ref 0.1–0.95)
MONOCYTES NFR BLD: 10 % (ref 2–12)
NEUTROPHILS NFR BLD: 58 % (ref 43–80)
NEUTS SEG NFR BLD: 3.47 K/UL (ref 1.8–7.3)
NITRITE UR QL STRIP: NEGATIVE
PARTIAL THROMBOPLASTIN TIME: 27.6 SEC (ref 24.5–35.1)
PH UR STRIP: 6.5 [PH] (ref 5–9)
PLATELET # BLD AUTO: 273 K/UL (ref 130–450)
PMV BLD AUTO: 10.6 FL (ref 7–12)
POTASSIUM SERPL-SCNC: 4.8 MMOL/L (ref 3.5–5)
PREALB SERPL-MCNC: 22 MG/DL (ref 20–40)
PROT SERPL-MCNC: 8.2 G/DL (ref 6.4–8.3)
PROT UR STRIP-MCNC: NEGATIVE MG/DL
PROTHROMBIN TIME: 11 SEC (ref 9.3–12.4)
RBC # BLD AUTO: 4.29 M/UL (ref 3.5–5.5)
SODIUM SERPL-SCNC: 142 MMOL/L (ref 132–146)
SP GR UR STRIP: <1.005 (ref 1–1.03)
UROBILINOGEN UR STRIP-ACNC: 0.2 EU/DL (ref 0–1)
WBC OTHER # BLD: 6 K/UL (ref 4.5–11.5)

## 2024-06-03 PROCEDURE — 85610 PROTHROMBIN TIME: CPT

## 2024-06-03 PROCEDURE — 80053 COMPREHEN METABOLIC PANEL: CPT

## 2024-06-03 PROCEDURE — 83036 HEMOGLOBIN GLYCOSYLATED A1C: CPT

## 2024-06-03 PROCEDURE — 36415 COLL VENOUS BLD VENIPUNCTURE: CPT

## 2024-06-03 PROCEDURE — 84134 ASSAY OF PREALBUMIN: CPT

## 2024-06-03 PROCEDURE — 71046 X-RAY EXAM CHEST 2 VIEWS: CPT

## 2024-06-03 PROCEDURE — 81003 URINALYSIS AUTO W/O SCOPE: CPT

## 2024-06-03 PROCEDURE — 87086 URINE CULTURE/COLONY COUNT: CPT

## 2024-06-03 PROCEDURE — 85025 COMPLETE CBC W/AUTO DIFF WBC: CPT

## 2024-06-03 PROCEDURE — 87081 CULTURE SCREEN ONLY: CPT

## 2024-06-03 PROCEDURE — 85730 THROMBOPLASTIN TIME PARTIAL: CPT

## 2024-06-03 RX ORDER — AZITHROMYCIN 250 MG/1
TABLET, FILM COATED ORAL
Qty: 6 TABLET | Refills: 0 | Status: SHIPPED | OUTPATIENT
Start: 2024-06-03 | End: 2024-06-13

## 2024-06-03 ASSESSMENT — PAIN DESCRIPTION - PAIN TYPE: TYPE: CHRONIC PAIN

## 2024-06-03 ASSESSMENT — PAIN DESCRIPTION - ONSET: ONSET: ON-GOING

## 2024-06-03 ASSESSMENT — PAIN DESCRIPTION - DESCRIPTORS: DESCRIPTORS: ACHING

## 2024-06-03 ASSESSMENT — PAIN SCALES - GENERAL: PAINLEVEL_OUTOF10: 2

## 2024-06-03 ASSESSMENT — PAIN DESCRIPTION - ORIENTATION: ORIENTATION: RIGHT

## 2024-06-03 ASSESSMENT — PAIN - FUNCTIONAL ASSESSMENT: PAIN_FUNCTIONAL_ASSESSMENT: PREVENTS OR INTERFERES WITH MANY ACTIVE NOT PASSIVE ACTIVITIES

## 2024-06-03 ASSESSMENT — PAIN DESCRIPTION - FREQUENCY: FREQUENCY: CONTINUOUS

## 2024-06-03 ASSESSMENT — PAIN DESCRIPTION - LOCATION: LOCATION: HIP

## 2024-06-03 ASSESSMENT — PAIN DESCRIPTION - DIRECTION: RADIATING_TOWARDS: INTO GROIN

## 2024-06-03 NOTE — CARE COORDINATION
6/3/2024: SS NOTE:  Met with pt in PAT, pt having surgery for a elective total hip arthroplasty on 6/10 , explained sw role for transition of care and reviewed rehab needs and providers for [facility] insurance, pt plans to return home day of surgery from [facility] , pt will have help from her family who will transport pt home, pt prefers [facility]. Referrals made to Dania Intake liaison for [facility] who will follow post-op for home PT orders and to Gala at [facility] for a ttb to be delivered to [facility] for pt to take home prior to discharge, Nursing informed.Electronically signed by RENETTA Worthington on 6/3/2024 at 3:28 PM

## 2024-06-03 NOTE — PROGRESS NOTES
Patient attended preoperative Total Joint Camp on 6/3/24.  Patient is scheduled to have an elective hip replacement.  Patient was educated regarding Disease Process, Medications, Smoking Cessation, Oxygenation, Incentive Spirometry and Deep Breath and Cough, signs and symptoms of postoperative joint infection that include: Fever, Chills, Pain Control, Drainage and Redness, post-op follow up with orthopaedic surgeon, dressing removal, staple removal, ambulatory devices which include a wheeled walker and cane, bed mobility, correct anatomical alignment, active range of motion, proper transferring technique, incision care, infection prevention measures, non-pharmacologic comfort measures, notification of inadequate pain control measures, pain scale for assessing level of pain, pharmacologic pain management, relaxation techniques.

## 2024-06-03 NOTE — PROGRESS NOTES
[facility]                                                                                                                    PRE OP INSTRUCTIONS FOR  Jenny Lilly        Date: 6/3/2024    Date of surgery: 6/10/24   Arrival Time: Hospital will call you between 5pm and 7pm with your final arrival time for surgery    You may drink clear liquids up until 2 hours before your procedure. Clear liquids include water, black coffee, and apple juice. No solid foods for 8 hours pre procedure. Follow sheet regarding gatorade and clear liquids pre-op    Take the following medications with a small sip of water on the morning of Surgery: buspar, metoprolol, arimidex, omeprazole     Diabetics may take 1/2 evening dose of insulin but none after midnight.  If you feel symptomatic or low blood sugar morning of surgery drink 1-2 ounces of apple juice only.    Diabetic patients- SGLT2 inhibitors (Farxiga, Jardiance) must be discontinued for 3-4 days before surgery. GLP-1 agonists (Trulicity, Ozempic, Victoza) weekly injectables must be held for one week prior to surgery.      Aspirin, Ibuprofen, Advil, Naproxen, Vitamin E and other Anti-inflammatory products should be stopped  before surgery  as directed by your physician.  Take Tylenol only unless instructed otherwise by your surgeon. Stop all herbal supplements 5 days pre op.     Check with your Doctor regarding stopping Plavix, Coumadin, Lovenox, Eliquis, Effient, Xarelto, Pradaxa, Savaysa, Lixiana, or other blood thinners.    Do not smoke,use illicit drugs and do not drink any alcoholic beverages 24 hours prior to surgery.    You may brush your teeth the morning of surgery.  DO NOT SWALLOW WATER    You MUST make arrangements for a responsible adult to take you home after your surgery. You will not be allowed to leave alone or drive yourself home.  It is strongly suggested someone stay with you the first 24 hrs. Your surgery will be cancelled if you do

## 2024-06-03 NOTE — TELEPHONE ENCOUNTER
Set up hepatitis profile and US liver due to elevated liver enzymes    let pt know   Orders placed pt notified and amendable.    Electronically signed by CAPO MUKHERJEE MA on 6/3/24 at 2:17 PM EDT

## 2024-06-03 NOTE — TELEPHONE ENCOUNTER
Patient called and states she's having surgery in one week and they want her to have her sinus infection cleared up before this. Would like a medication sent so she does not have to R/s surgery

## 2024-06-04 ENCOUNTER — TELEPHONE (OUTPATIENT)
Dept: ORTHOPEDIC SURGERY | Age: 54
End: 2024-06-04

## 2024-06-04 LAB
MICROORGANISM SPEC CULT: ABNORMAL
MICROORGANISM SPEC CULT: ABNORMAL
SERVICE CMNT-IMP: ABNORMAL
SPECIMEN DESCRIPTION: ABNORMAL

## 2024-06-04 NOTE — TELEPHONE ENCOUNTER
Patient scheduled for R CARTER 6/10/24. A1C 8.4. Surgery needs postponed until patient is under 7/7.5- pcp made aware.   CMA placed outgoing call to patient to advise of surgery scheduling adjustment and patient education regarding A1C and wound healing. Patient verbalized understanding. Per Dr. Sofia, postpone 3 months.   Called Surgery Scheduling and advised them to reschedule the patient for Monday 9/9/24.

## 2024-06-04 NOTE — PROGRESS NOTES
Last Appointment:  5/15/2024  Future Appointments   Date Time Provider Department Center   6/21/2024  8:30 AM [facility] US RM 1 [facility] Radiolo   6/26/2024 10:30 AM [facility] LABS ROOM MEDICAL ONCOLOGY [facility] MED ONC [place]   6/27/2024  2:15 PM [facility]   8/14/2024 10:45 AM Charlene Velez DO [facility]   1/7/2025  9:00 AM [facility] RM 1 [facility] Rad/Car   1/7/2025 10:00 AM Malena Lo, APRN - CNP [facility]      Spoke with Patient to advise her that Dr Charlene Velez wants Labs (Hepatitis Profile) and also an Ultrasound of the liver. Pt states she is aware of the new orders and will have the labs done tomorrow, but could not get scheduled for the U/S until 6/21/24 which was the first available at [facility]. Will notify Dr Velez of this.  Electronically signed by GAVIN MULLER LPN on 6/4/24 at 10:39 AM EDT

## 2024-06-05 DIAGNOSIS — R74.8 ELEVATED LIVER ENZYMES: ICD-10-CM

## 2024-06-05 LAB
ALBUMIN: 3.9 G/DL (ref 3.5–5.2)
ALP BLD-CCNC: 256 U/L (ref 35–104)
ALT SERPL-CCNC: 126 U/L (ref 0–32)
AST SERPL-CCNC: 44 U/L (ref 0–31)
BILIRUB SERPL-MCNC: 0.2 MG/DL (ref 0–1.2)
BILIRUBIN DIRECT: <0.2 MG/DL (ref 0–0.3)
BILIRUBIN, INDIRECT: ABNORMAL MG/DL (ref 0–1)
MICROORGANISM SPEC CULT: NORMAL
SERVICE CMNT-IMP: NORMAL
SPECIMEN DESCRIPTION: NORMAL
TOTAL PROTEIN: 7.5 G/DL (ref 6.4–8.3)

## 2024-06-07 DIAGNOSIS — R74.8 ELEVATED LIVER ENZYMES: Primary | ICD-10-CM

## 2024-06-07 DIAGNOSIS — R74.8 ELEVATED LIVER ENZYMES: ICD-10-CM

## 2024-06-07 NOTE — RESULT ENCOUNTER NOTE
Set up hepatitis profile and US liver due to elevated liver enzymes    tell pt NO ALCOHOL    set up Dr. Gilbert     let pt know

## 2024-06-10 ENCOUNTER — TELEPHONE (OUTPATIENT)
Dept: HEMATOLOGY | Age: 54
End: 2024-06-10

## 2024-06-10 DIAGNOSIS — G62.9 NEUROPATHY: ICD-10-CM

## 2024-06-10 LAB
HAV IGM SER IA-ACNC: NONREACTIVE
HEPATITIS B CORE IGM ANTIBODY: NONREACTIVE
HEPATITIS B SURF AG,XHBAGS: NONREACTIVE
HEPATITIS C ANTIBODY: NONREACTIVE

## 2024-06-10 RX ORDER — GABAPENTIN 300 MG/1
300 CAPSULE ORAL NIGHTLY
Qty: 30 CAPSULE | Refills: 2 | OUTPATIENT
Start: 2024-06-10 | End: 2024-09-08

## 2024-06-10 NOTE — TELEPHONE ENCOUNTER
The patient is scheduled for her new patient referral from Dr Velez on 06/25/24 at 1:30 pm in [place]. Directions to the office were given to the patient at the time of our call. The patient was instructed to bring her photo id and insurance card. The patient confirmed all of the above and at this time all questions were answered  Electronically signed by Ella Perez MA on 6/10/2024 at 1:15 PM

## 2024-06-10 NOTE — TELEPHONE ENCOUNTER
[employer] message sent to pt to schedule next appt.     Electronically signed by CAPO MUKHERJEE MA on 6/10/24 at 9:51 AM EDT

## 2024-06-11 NOTE — TELEPHONE ENCOUNTER
Pt called and states she was just seen and did the drug test and the gabapentin was never sent for her refill for the last time she was here. Pt states she can come in again. You are scheduling out till mid July, can pt bring in urine earlier than appt to get med refill?    Electronically signed by CAPO MUKHERJEE MA on 6/11/24 at 2:32 PM EDT

## 2024-06-19 PROBLEM — Z01.818 PRE-OP EXAM: Status: RESOLVED | Noted: 2024-05-20 | Resolved: 2024-06-19

## 2024-06-26 ENCOUNTER — HOSPITAL ENCOUNTER (OUTPATIENT)
Dept: INFUSION THERAPY | Age: 54
Discharge: HOME | End: 2024-06-26
Payer: MEDICAID

## 2024-06-26 DIAGNOSIS — C50.912 INVASIVE DUCTAL CARCINOMA OF LEFT BREAST (HCC): Primary | ICD-10-CM

## 2024-06-26 LAB
ALBUMIN SERPL-MCNC: 3.7 G/DL (ref 3.5–5.2)
ALP SERPL-CCNC: 186 U/L (ref 35–104)
ALT SERPL-CCNC: 43 U/L (ref 0–32)
ANION GAP SERPL CALCULATED.3IONS-SCNC: 9 MMOL/L (ref 7–16)
AST SERPL-CCNC: 57 U/L (ref 0–31)
BASOPHILS # BLD: 0.02 K/UL (ref 0–0.2)
BASOPHILS NFR BLD: 0 % (ref 0–2)
BILIRUB SERPL-MCNC: 0.2 MG/DL (ref 0–1.2)
BUN SERPL-MCNC: 12 MG/DL (ref 6–20)
CALCIUM SERPL-MCNC: 9.5 MG/DL (ref 8.6–10.2)
CHLORIDE SERPL-SCNC: 107 MMOL/L (ref 98–107)
CO2 SERPL-SCNC: 27 MMOL/L (ref 22–29)
CREAT SERPL-MCNC: 0.8 MG/DL (ref 0.5–1)
EOSINOPHIL # BLD: 0.13 K/UL (ref 0.05–0.5)
EOSINOPHILS RELATIVE PERCENT: 2 % (ref 0–6)
ERYTHROCYTE [DISTWIDTH] IN BLOOD BY AUTOMATED COUNT: 15 % (ref 11.5–15)
GFR, ESTIMATED: >90 ML/MIN/1.73M2
GLUCOSE SERPL-MCNC: 64 MG/DL (ref 74–99)
HCT VFR BLD AUTO: 36 % (ref 34–48)
HGB BLD-MCNC: 11.4 G/DL (ref 11.5–15.5)
IMM GRANULOCYTES # BLD AUTO: <0.03 K/UL (ref 0–0.58)
IMM GRANULOCYTES NFR BLD: 0 % (ref 0–5)
LYMPHOCYTES NFR BLD: 2.55 K/UL (ref 1.5–4)
LYMPHOCYTES RELATIVE PERCENT: 39 % (ref 20–42)
MCH RBC QN AUTO: 28.5 PG (ref 26–35)
MCHC RBC AUTO-ENTMCNC: 31.7 G/DL (ref 32–34.5)
MCV RBC AUTO: 90 FL (ref 80–99.9)
MONOCYTES NFR BLD: 0.58 K/UL (ref 0.1–0.95)
MONOCYTES NFR BLD: 9 % (ref 2–12)
NEUTROPHILS NFR BLD: 50 % (ref 43–80)
NEUTS SEG NFR BLD: 3.26 K/UL (ref 1.8–7.3)
PLATELET # BLD AUTO: 225 K/UL (ref 130–450)
PMV BLD AUTO: 10.8 FL (ref 7–12)
POTASSIUM SERPL-SCNC: 4.6 MMOL/L (ref 3.5–5)
PROT SERPL-MCNC: 7.1 G/DL (ref 6.4–8.3)
RBC # BLD AUTO: 4 M/UL (ref 3.5–5.5)
SODIUM SERPL-SCNC: 143 MMOL/L (ref 132–146)
WBC OTHER # BLD: 6.6 K/UL (ref 4.5–11.5)

## 2024-06-26 PROCEDURE — 85025 COMPLETE CBC W/AUTO DIFF WBC: CPT

## 2024-06-26 PROCEDURE — 80053 COMPREHEN METABOLIC PANEL: CPT

## 2024-06-26 PROCEDURE — 36415 COLL VENOUS BLD VENIPUNCTURE: CPT

## 2024-06-26 NOTE — ANESTHESIA PRE PROCEDURE
Department of Anesthesiology  Preprocedure Note       Name:  Antoinette Hamm   Age:  46 y.o.  :  1970                                          MRN:  [id number]         Date:  2021      Surgeon: Amber Cloud):  Gifty Brown MD    Procedure: Procedure(s):  LEFT BREAST NEEDLE LOCALIZED LUMPECTOMY, BLUE DYE INJECTION, LEFT AXILLARY SENTINEL LYMPHNODE EXCISION, POSSIBLE LEFT AXILLARY DISSECTION -- TRIDENT, NEOPROBE    Medications prior to admission:   Prior to Admission medications    Medication Sig Start Date End Date Taking?  Authorizing Provider   sertraline (ZOLOFT) 100 MG tablet TAKE 1 TABLET BY MOUTH ONCE DAILY AS NEEDED FOR DEPRESSION 21  Yes Charlene P Catterlin, DO   rosuvastatin (CRESTOR) 5 MG tablet  21  Yes Historical Provider, MD   lactobacillus (CULTURELLE) CAPS capsule TAKE (1) CAPSULE BY MOUTH DAILY 2/3/21  Yes Charlene P Catterlin, DO   metFORMIN (GLUCOPHAGE) 500 MG tablet Take 2 tablets by mouth 2 times daily (with meals) 2/3/21  Yes Charlene P Catterlin, DO   empagliflozin (JARDIANCE) 10 MG tablet Take 1 tablet by mouth daily 2/3/21  Yes Charlene P Catterlin, DO   insulin glargine (BASAGLAR KWIKPEN) 100 UNIT/ML injection pen INJECT 50 UNITS SUBCUTANEOUSLY NIGHTLY 2/3/21  Yes Charlene P Catterlin, DO   blood glucose test strips (ONETOUCH ULTRA) strip USE AS DIRECTED FOUR TIMES DAILY 1/15/21  Yes Charlene P Catterlin, DO   GABAPENTIN PO Take 300 mg by mouth Nightly    Yes Historical Provider, MD   Lancets ([facility]) [facility]  20  Yes Historical Provider, MD   BNimeshD ULTRAFINE III SHORT PEN 31G X 8 MM MISC USE WITH INSULIN PENS AS DIRECTED 20  Yes Ludwig Conner Catterlin, DO   omeprazole (PRILOSEC) 40 MG delayed release capsule TAKE 1 CAPSULE BY MOUTH DAILY 20  Yes Charlene P Catterlin, DO   fluticasone (FLONASE) 50 MCG/ACT nasal spray 1 spray by Nasal route daily 10/19/20  Yes Atul Carney, DO Left message for Meagan to call back.    Need to clarify if the patients appointment on 08/09/2024 is OK for her surgery on 09/12/2024. The appointment is 34 days and pre-op are usually supposed to be 30 or less days.    Route to [place] [facility] when Meagan calls back.    Mikhail BALDERAS is aware of this.   Dulaglutide (TRULICITY) 1.5 PA/7.6MA SOPN INJECT THE CONTENTS OF 1 SYRINGE SUBCUTANEOUSLY EVERY WEEK *PATIENT NEEDS APPOINTMENT*  Patient taking differently: INJECT THE CONTENTS OF 1 SYRINGE SUBCUTANEOUSLY EVERY WEEK *PATIENT NEEDS APPOINTMENT*    Takes on saturdays 9/16/20  Yes Charlene P Catterlin, DO   metoprolol succinate (TOPROL XL) 25 MG extended release tablet TAKE 1/2 TABLET BY MOUTH DAILY 9/16/20  Yes Charlene P Catterlin, DO   pramipexole (MIRAPEX) 0.125 MG tablet TAKE 1 TABLET BY MOUTH EVERY NIGHT 9/16/20  Yes Charlene P Catterlin, DO   Insulin Syringe-Needle U-100 ([employer] INSULIN SYR 1CC/30G) 30G X 5/16\" 1 ML MISC 1 each by Does not apply route 4 times daily 8/19/20  Yes Charlene P Catterlin, DO   bumetanide (BUMEX) 1 MG tablet Take 1 tablet by mouth every other day 8/19/20  Yes Charlene P Catterlin, DO   famotidine (PEPCID) 20 MG tablet Take 1 tablet by mouth 2 times daily 8/19/20  Yes Charlene P Catterlin, DO   Humidifier MISC 1 Device by Does not apply route daily 3/2/20  Yes Charlene P Catterlin, DO   insulin lispro (ADMELOG SOLOSTAR) 100 UNIT/ML pen As directed per sliding scale up to 13 units 4 times per day. 4/15/19  Yes Charlene P Catterlin, DO   lidocaine-prilocaine (EMLA) 2.5-2.5 % cream Apply topically as needed. 11/17/18  Yes Melina Nice, APRN - CNP   glucose monitoring kit (FREESTYLE) monitoring kit 1 kit by Does not apply route daily 10/29/18  Yes Charlene P Catterlin, DO   Blood Pressure KIT 1 kit by Does not apply route daily 10/10/18  Yes Charlene P Catterlin, DO   Misc. Devices (QUAD CANE) MISC 1 Quad cane 10/10/18  Yes Charlene P Catterlin, DO   Multiple Vitamins-Minerals (THERAPEUTIC MULTIVITAMIN-MINERALS) tablet Take 1 tablet by mouth daily   Yes Historical Provider, MD   B Complex Vitamins (B COMPLEX 1 PO) Take 1 tablet by mouth daily.      Yes Historical Provider, MD   ondansetron (ZOFRAN) 4 MG tablet Take 1 tablet by mouth every 8 hours as needed for Nausea or Vomiting 11/16/20   Rebecca Church,  nitroGLYCERIN (NITROSTAT) 0.4 MG SL tablet Place 1 tablet under the tongue every 5 minutes as needed for Chest pain 9/25/20   Charlene P Catterlin, DO   hydrocortisone 2.5 % cream APPLY TO AFFECTED AREA TOPICALLY TWICE DAILY 5/20/20   Thersa Home Cattertess, DO       Current medications:    Current Facility-Administered Medications   Medication Dose Route Frequency Provider Last Rate Last Admin    ceFAZolin (ANCEF) 2000 mg in sterile water 20 mL IV syringe  2,000 mg Intravenous On Call to [facility], MD        lactated ringers infusion   Intravenous Continuous Arianna Velasquez MD        sodium chloride flush 0.9 % injection 10 mL  10 mL Intravenous 2 times per day Arianna Velasquez MD        sodium chloride flush 0.9 % injection 10 mL  10 mL Intravenous PRN Arianna Velasquez MD         Facility-Administered Medications Ordered in Other Encounters   Medication Dose Route Frequency Provider Last Rate Last Admin    technetium Tc 99m tilmanocept (LYMPHOSEEK) injection 0.5 millicurie  631 micro curie Intradermal ONCE PRN Lexie Nascimento MD   0.5 millicurie at 38/26/24 1302       Allergies:  No Known Allergies    Problem List:    Patient Active Problem List   Diagnosis Code    IDDM (insulin dependent diabetes mellitus) [id number]    Neuropathy G62.9    Gastroesophageal reflux disease without esophagitis K21.9    Retinopathy H35.00    Anxiety F41.9    Irritable bowel syndrome K58.9    Mixed hyperlipidemia E78.2    Fatigue R53.83    Lumbar pain M54.5    Epigastric pain R10.13    Abdominal pain, right lower quadrant R10.31    Dermatitis L30.9    Head injury S09.90XA    Contusion of left hip S70. 02XA    Generalized abdominal pain R10.84    Melena K92.1    RLS (restless legs syndrome) G25.81    Essential hypertension I10    Acute bacterial sinusitis J01.90, B96.89    Atypical chest pain R07.89    Encounter for assessment of STD exposure Z76.89    Nontraumatic tear of right rotator cuff M75.101    Dysuria R30.0  Uncontrolled type 2 diabetes mellitus with hyperglycemia (HCC) E11.65    Urinary tract infection without hematuria N39.0    Malignant neoplasm of left female breast ([address] C50.912       Past Medical History:        Diagnosis Date    Anesthesia complication     problem with blood pressure up & down    Arthritis     lower back    Back pain     Blind     right eye    Cancer ([address]     breast    Cardiac valve prolapse     micro    Diabetes mellitus ([address]     Hyperlipidemia     Hypertension     Neuropathy due to secondary diabetes ([address]     in marcial feet    Prolonged emergence from general anesthesia     Psoriasis        Past Surgical History:        Procedure Laterality Date    ANKLE SURGERY      bilateral tarsal tunnels    CARDIAC SURGERY      cardiac catheterization    CARPAL TUNNEL RELEASE      left    CARPAL TUNNEL RELEASE  2012    right     SECTION      CHOLECYSTECTOMY, LAPAROSCOPIC N/A 2019    LAPAROSCOPIC CHOLECYSTECTOMY WITH IOC performed by Sadia Lynch MD at [address]  had extremely high blood pressure and hard to wake up    COLONOSCOPY N/A 2019    COLORECTAL CANCER SCREENING, NOT HIGH RISK performed by Sadia Lynch MD at [address] [facility], [address] [place] [address] ARTHROSCOPY Left 2016    subacromin decompression and debridement    SHOULDER ARTHROSCOPY Right 2020    RIGHT SHOULDER ARTHROSCOPY, SUBACROMIAL DECOMPRESSION, LABRIAL DEBRIDEMENT, CHONDROPLASTY, RAÚL performed by Demarco Foy DO at [address] [facility] 2019    EGD BIOPSY performed by Sadia Lynch MD at [address] [facility]  2020     BREAST NEEDLE BIOPSY LEFT 2020 [facility] A[facility] AD[facility]AN[facility]       Social History:    Social History     Tobacco Use    Smoking status: Never Smoker    Smokeless tobacco: Never Used   Substance Use Topics    Alcohol use: Not Currently Alcohol/week: 1.0 standard drinks     Types: 1 Glasses of wine per week     Comment: weekly                                Counseling given: Not Answered      Vital Signs (Current):   Vitals:    02/17/21 0637   BP: (!) 159/91   Pulse: 107   Resp: 18   Temp: 36.1 °C (97 °F)   SpO2: 95%   Weight: 153 lb (69.4 kg)   Height: 4' 11\" (1.499 m)                                              BP Readings from Last 3 Encounters:   02/17/21 (!) 159/91   02/03/21 104/62   01/18/21 102/60       NPO Status: Time of last liquid consumption: 2230                        Time of last solid consumption: 2130                        Date of last liquid consumption: 02/17/21                        Date of last solid food consumption: 02/17/21    BMI:   Wt Readings from Last 3 Encounters:   02/17/21 153 lb (69.4 kg)   02/03/21 153 lb (69.4 kg)   01/18/21 153 lb 1.6 oz (69.4 kg)     Body mass index is 30.9 kg/m². CBC:   Lab Results   Component Value Date    WBC 8.4 02/17/2021    RBC 4.03 02/17/2021    HGB 11.7 02/17/2021    HCT 36.5 02/17/2021    MCV 90.6 02/17/2021    RDW 16.6 02/17/2021     02/17/2021       CMP:   Lab Results   Component Value Date     02/17/2021    K 4.7 02/17/2021    K 4.3 11/12/2020     02/17/2021    CO2 30 02/17/2021    BUN 8 02/17/2021    CREATININE 0.6 02/17/2021    GFRAA >60 02/17/2021    LABGLOM >60 02/17/2021    GLUCOSE 172 02/17/2021    GLUCOSE 194 03/30/2012    PROT 7.4 01/12/2021    CALCIUM 9.9 02/17/2021    BILITOT <0.2 01/12/2021    ALKPHOS 144 01/12/2021    AST 61 01/12/2021    ALT 43 01/12/2021       POC Tests: No results for input(s): POCGLU, POCNA, POCK, POCCL, POCBUN, POCHEMO, POCHCT in the last 72 hours.     Coags:   Lab Results   Component Value Date    PROTIME 10.7 03/03/2017    INR 0.9 03/03/2017    APTT 20.0 07/29/2014       HCG (If Applicable): No results found for: PREGTESTUR, PREGSERUM, HCG, HCGQUANT ABGs: No results found for: PHART, PO2ART, IKR0SBO, KZM9WEI, BEART, J0KOKECF     Type & Screen (If Applicable):  No results found for: LABABO, LABRH    Drug/Infectious Status (If Applicable):  No results found for: HIV, HEPCAB    COVID-19 Screening (If Applicable):   Lab Results   Component Value Date    COVID19 Not Detected 02/12/2021     CXR 1/12/21  FINDINGS:   The lungs are without acute focal process.  There is no effusion or   pneumothorax. The cardiomediastinal silhouette is without acute process. The   osseous structures are without acute process.       Impression   No acute process. Anesthesia Evaluation  Patient summary reviewed and Nursing notes reviewed no history of anesthetic complications:   Airway: Mallampati: II  TM distance: >3 FB   Neck ROM: full  Mouth opening: > = 3 FB Dental:      Comment: Extremely poor dentition    Pulmonary:Negative Pulmonary ROS   (+) decreased breath sounds,      (-) not a current smoker                           Cardiovascular:    (+) hypertension:, valvular problems/murmurs (Cardiac valve prolapse):, hyperlipidemia        Rhythm: regular  Rate: normal           Beta Blocker:  Dose within 24 Hrs         Neuro/Psych:   (+) neuromuscular disease (diabetic neuropathy and retinopathy):, depression/anxiety              ROS comment: RLS (restless legs syndrome) GI/Hepatic/Renal:   (+) GERD:,           Endo/Other:    (+) DiabetesType II DM, , : arthritis:., malignancy/cancer (breast ca). Abdominal:           Vascular: negative vascular ROS. Anesthesia Plan      general     ASA 3     (20g L hand)  Induction: intravenous. MIPS: Postoperative opioids intended, Prophylactic antiemetics administered and Postoperative trial extubation. Anesthetic plan and risks discussed with patient. Use of blood products discussed with patient whom consented to blood products. Plan discussed with CRNA and attending.

## 2024-06-27 ENCOUNTER — OFFICE VISIT (OUTPATIENT)
Dept: ONCOLOGY | Age: 54
End: 2024-06-27
Payer: MEDICAID

## 2024-06-27 VITALS
HEART RATE: 83 BPM | HEIGHT: 59 IN | SYSTOLIC BLOOD PRESSURE: 125 MMHG | TEMPERATURE: 97.3 F | WEIGHT: 170.2 LBS | BODY MASS INDEX: 34.31 KG/M2 | OXYGEN SATURATION: 100 % | DIASTOLIC BLOOD PRESSURE: 72 MMHG

## 2024-06-27 DIAGNOSIS — D64.9 ANEMIA, UNSPECIFIED TYPE: ICD-10-CM

## 2024-06-27 DIAGNOSIS — C50.912 MALIGNANT NEOPLASM OF LEFT FEMALE BREAST, UNSPECIFIED ESTROGEN RECEPTOR STATUS, UNSPECIFIED SITE OF BREAST (HCC): Primary | ICD-10-CM

## 2024-06-27 DIAGNOSIS — Z79.811 USE OF AROMATASE INHIBITORS: ICD-10-CM

## 2024-06-27 PROCEDURE — 3078F DIAST BP <80 MM HG: CPT | Performed by: INTERNAL MEDICINE

## 2024-06-27 PROCEDURE — 1036F TOBACCO NON-USER: CPT | Performed by: INTERNAL MEDICINE

## 2024-06-27 PROCEDURE — G8427 DOCREV CUR MEDS BY ELIG CLIN: HCPCS | Performed by: INTERNAL MEDICINE

## 2024-06-27 PROCEDURE — G8417 CALC BMI ABV UP PARAM F/U: HCPCS | Performed by: INTERNAL MEDICINE

## 2024-06-27 PROCEDURE — 3017F COLORECTAL CA SCREEN DOC REV: CPT | Performed by: INTERNAL MEDICINE

## 2024-06-27 PROCEDURE — 99214 OFFICE O/P EST MOD 30 MIN: CPT | Performed by: INTERNAL MEDICINE

## 2024-06-27 PROCEDURE — 3074F SYST BP LT 130 MM HG: CPT | Performed by: INTERNAL MEDICINE

## 2024-06-27 NOTE — PROGRESS NOTES
Department of [facility] Med Oncology  Attending Clinic Note    Reason for Visit: Follow-up on a patient with Left Breast Cancer     PCP:  Charlene Velez DO    History of Present Illness:  53 year old female with Left Breast Cancer.   Lesion located in the 7 o'clock position found on mammogram.    Breast cancer risk factors include age, gender, family history of cancer    Bilateral Screening Mammogram 11/12/2020 with 1.5 cm irregular focal asymmetry seen on the CC view, not well seen on MLO view. Stable mammogram of right breast    Left Diagnostic Mammogram 12/15/2021 with spiculated mass measuring 1.5 cm in the lower inner quadrant of the left breast. ON dedicated targeted ultrasound examination of left breast there is a solid lesion with posterior shadowing at 7 o'clock position. Highly suggestive of malignancy    Ultrasound guided core biopsy on 12/23/2020  Left breast, core needle biopsy: Invasive, moderately differentiatedductal carcinoma (grade 2)  Comment:    Intradepartmental consultation is obtained.  Breast Cancer Marker Studies:  Estrogen Receptors (ER): 90%  Progesterone Receptors ([place]): 90%  HER-2/salvador: Indeterminate/2+    FISH analysis HER/2: Negative  Average HER-2 signals/nucleus: 3.4   Average MARGARITO 17 signals/nucleus: 2.9   HER-2/MARGARITO 17 signal ratio: 1.2     2/17/2021 Left localized lumpectomy with left axillary sentinal lymph node excision   A.  Breast, left, lumpectomy: Invasive ductal carcinoma   Ductal carcinoma in situ   Invasive carcinoma present less than 1 mm from yellow/medial margin   Margins negative for ductal carcinoma in situ     B.  New medial anterior margin, excision: Negative for carcinoma   C.  [place] lymph node, excision: One (1) lymph node, negative for metastatic carcinoma     CANCER CASE SUMMARY   Procedure: Excision (less than total mastectomy)   Specimen laterality: Left   Tumor size: 1.3 cm in greatest dimension   Histologic type: Invasive carcinoma of no special type

## 2024-07-04 ENCOUNTER — HOSPITAL ENCOUNTER (EMERGENCY)
Age: 54
Discharge: HOME | End: 2024-07-04
Attending: EMERGENCY MEDICINE
Payer: MEDICAID

## 2024-07-04 ENCOUNTER — APPOINTMENT (OUTPATIENT)
Dept: CT IMAGING | Age: 54
End: 2024-07-04
Payer: MEDICAID

## 2024-07-04 ENCOUNTER — APPOINTMENT (OUTPATIENT)
Dept: GENERAL RADIOLOGY | Age: 54
End: 2024-07-04
Payer: MEDICAID

## 2024-07-04 VITALS
SYSTOLIC BLOOD PRESSURE: 152 MMHG | DIASTOLIC BLOOD PRESSURE: 81 MMHG | OXYGEN SATURATION: 100 % | HEART RATE: 79 BPM | TEMPERATURE: 98.1 F | WEIGHT: 170 LBS | BODY MASS INDEX: 34.34 KG/M2 | RESPIRATION RATE: 18 BRPM

## 2024-07-04 DIAGNOSIS — N30.01 ACUTE CYSTITIS WITH HEMATURIA: Primary | ICD-10-CM

## 2024-07-04 LAB
ALBUMIN SERPL-MCNC: 3.9 G/DL (ref 3.5–5.2)
ALP SERPL-CCNC: 215 U/L (ref 35–104)
ALT SERPL-CCNC: 63 U/L (ref 0–32)
ANION GAP SERPL CALCULATED.3IONS-SCNC: 10 MMOL/L (ref 7–16)
AST SERPL-CCNC: 55 U/L (ref 0–31)
BACTERIA URNS QL MICRO: ABNORMAL
BASOPHILS # BLD: 0.01 K/UL (ref 0–0.2)
BASOPHILS NFR BLD: 0 % (ref 0–2)
BILIRUB SERPL-MCNC: 0.2 MG/DL (ref 0–1.2)
BILIRUB UR QL STRIP: NEGATIVE
BUN SERPL-MCNC: 16 MG/DL (ref 6–20)
CALCIUM SERPL-MCNC: 9.6 MG/DL (ref 8.6–10.2)
CHLORIDE SERPL-SCNC: 101 MMOL/L (ref 98–107)
CLARITY UR: ABNORMAL
CO2 SERPL-SCNC: 26 MMOL/L (ref 22–29)
COLOR UR: YELLOW
CREAT SERPL-MCNC: 0.9 MG/DL (ref 0.5–1)
EOSINOPHIL # BLD: 0.12 K/UL (ref 0.05–0.5)
EOSINOPHILS RELATIVE PERCENT: 2 % (ref 0–6)
EPI CELLS #/AREA URNS HPF: ABNORMAL /HPF
ERYTHROCYTE [DISTWIDTH] IN BLOOD BY AUTOMATED COUNT: 15.2 % (ref 11.5–15)
GFR, ESTIMATED: 79 ML/MIN/1.73M2
GLUCOSE SERPL-MCNC: 372 MG/DL (ref 74–99)
GLUCOSE UR STRIP-MCNC: >=1000 MG/DL
HCT VFR BLD AUTO: 37.2 % (ref 34–48)
HGB BLD-MCNC: 12 G/DL (ref 11.5–15.5)
HGB UR QL STRIP.AUTO: ABNORMAL
IMM GRANULOCYTES # BLD AUTO: <0.03 K/UL (ref 0–0.58)
IMM GRANULOCYTES NFR BLD: 0 % (ref 0–5)
KETONES UR STRIP-MCNC: NEGATIVE MG/DL
LACTATE BLDV-SCNC: 2.1 MMOL/L (ref 0.5–2.2)
LEUKOCYTE ESTERASE UR QL STRIP: ABNORMAL
LIPASE SERPL-CCNC: 15 U/L (ref 13–60)
LYMPHOCYTES NFR BLD: 2.23 K/UL (ref 1.5–4)
LYMPHOCYTES RELATIVE PERCENT: 28 % (ref 20–42)
MCH RBC QN AUTO: 29 PG (ref 26–35)
MCHC RBC AUTO-ENTMCNC: 32.3 G/DL (ref 32–34.5)
MCV RBC AUTO: 89.9 FL (ref 80–99.9)
MONOCYTES NFR BLD: 0.61 K/UL (ref 0.1–0.95)
MONOCYTES NFR BLD: 8 % (ref 2–12)
NEUTROPHILS NFR BLD: 62 % (ref 43–80)
NEUTS SEG NFR BLD: 4.87 K/UL (ref 1.8–7.3)
NITRITE UR QL STRIP: NEGATIVE
PH UR STRIP: 7 [PH] (ref 5–9)
PLATELET, FLUORESCENCE: 226 K/UL (ref 130–450)
PMV BLD AUTO: 10.7 FL (ref 7–12)
POTASSIUM SERPL-SCNC: 5 MMOL/L (ref 3.5–5)
PROT SERPL-MCNC: 7.7 G/DL (ref 6.4–8.3)
PROT UR STRIP-MCNC: NEGATIVE MG/DL
RBC # BLD AUTO: 4.14 M/UL (ref 3.5–5.5)
RBC #/AREA URNS HPF: ABNORMAL /HPF
SODIUM SERPL-SCNC: 137 MMOL/L (ref 132–146)
SP GR UR STRIP: 1.01 (ref 1–1.03)
TROPONIN I SERPL HS-MCNC: 14 NG/L (ref 0–9)
TROPONIN I SERPL HS-MCNC: 17 NG/L (ref 0–9)
UROBILINOGEN UR STRIP-ACNC: 0.2 EU/DL (ref 0–1)
WBC #/AREA URNS HPF: ABNORMAL /HPF
WBC OTHER # BLD: 7.9 K/UL (ref 4.5–11.5)

## 2024-07-04 PROCEDURE — 6360000002 HC RX W HCPCS

## 2024-07-04 PROCEDURE — 96374 THER/PROPH/DIAG INJ IV PUSH: CPT

## 2024-07-04 PROCEDURE — 2580000003 HC RX 258

## 2024-07-04 PROCEDURE — 74177 CT ABD & PELVIS W/CONTRAST: CPT

## 2024-07-04 PROCEDURE — 85025 COMPLETE CBC W/AUTO DIFF WBC: CPT

## 2024-07-04 PROCEDURE — 87086 URINE CULTURE/COLONY COUNT: CPT

## 2024-07-04 PROCEDURE — 84484 ASSAY OF TROPONIN QUANT: CPT

## 2024-07-04 PROCEDURE — 93005 ELECTROCARDIOGRAM TRACING: CPT

## 2024-07-04 PROCEDURE — 81001 URINALYSIS AUTO W/SCOPE: CPT

## 2024-07-04 PROCEDURE — 83690 ASSAY OF LIPASE: CPT

## 2024-07-04 PROCEDURE — 80053 COMPREHEN METABOLIC PANEL: CPT

## 2024-07-04 PROCEDURE — 96375 TX/PRO/DX INJ NEW DRUG ADDON: CPT

## 2024-07-04 PROCEDURE — 6360000004 HC RX CONTRAST MEDICATION: Performed by: RADIOLOGY

## 2024-07-04 PROCEDURE — 83605 ASSAY OF LACTIC ACID: CPT

## 2024-07-04 PROCEDURE — 99285 EMERGENCY DEPT VISIT HI MDM: CPT

## 2024-07-04 PROCEDURE — 71045 X-RAY EXAM CHEST 1 VIEW: CPT

## 2024-07-04 RX ORDER — MORPHINE SULFATE 4 MG/ML
4 INJECTION, SOLUTION INTRAMUSCULAR; INTRAVENOUS
Status: COMPLETED | OUTPATIENT
Start: 2024-07-04 | End: 2024-07-04

## 2024-07-04 RX ORDER — 0.9 % SODIUM CHLORIDE 0.9 %
1000 INTRAVENOUS SOLUTION INTRAVENOUS ONCE
Status: COMPLETED | OUTPATIENT
Start: 2024-07-04 | End: 2024-07-04

## 2024-07-04 RX ADMIN — IOPAMIDOL 75 ML: 755 INJECTION, SOLUTION INTRAVENOUS at 20:31

## 2024-07-04 RX ADMIN — MORPHINE SULFATE 4 MG: 4 INJECTION, SOLUTION INTRAMUSCULAR; INTRAVENOUS at 19:38

## 2024-07-04 RX ADMIN — SODIUM CHLORIDE 1000 ML: 9 INJECTION, SOLUTION INTRAVENOUS at 19:37

## 2024-07-04 RX ADMIN — WATER 2000 MG: 1 INJECTION INTRAMUSCULAR; INTRAVENOUS; SUBCUTANEOUS at 22:22

## 2024-07-04 ASSESSMENT — PAIN DESCRIPTION - LOCATION
LOCATION: BACK
LOCATION: BACK

## 2024-07-04 ASSESSMENT — PAIN - FUNCTIONAL ASSESSMENT: PAIN_FUNCTIONAL_ASSESSMENT: 0-10

## 2024-07-04 ASSESSMENT — PAIN SCALES - GENERAL
PAINLEVEL_OUTOF10: 8
PAINLEVEL_OUTOF10: 8

## 2024-07-04 NOTE — ED PROVIDER NOTES
[facility] EMERGENCY DEPARTMENT  EMERGENCY DEPARTMENT ENCOUNTER        Pt Name: Jenny Lilly  MRN: [id number]  Birthdate 1970  Date of evaluation: 7/4/2024  Provider: Fracnis Lockett MD  Attending Provider: No att. providers found  PCP: Charlene Velez DO  Note Started: 7:03 PM EDT 7/4/24    CHIEF COMPLAINT       Chief Complaint   Patient presents with    Back Pain     Came in from home for chronic back pain and recent diarrhea    Diarrhea       HISTORY OF PRESENT ILLNESS: 1 or more Elements   History From: The patient        Jenny Lilly is a 54 y.o. female with a past medical history of chronic back pain, type 2 diabetes, hyperlipidemia, coronary artery disease presenting with back pain and diarrhea.  Patient's symptoms started earlier today.  Patient states that she was at rest when she started experiencing pain in her back.  She also reports that she had a loose but not watery stool earlier today.  She states that her stool is not black or bloody.  She denies any headache, fever, cough, sore throat, chest pain, shortness of breath, nausea, vomiting, abdominal pain, hematuria dysuria, bowel or bladder incontinence, difficulty ambulating, lower extremity paresthesias or weakness.,  Or constipation.    Nursing Notes were all reviewed and agreed with or any disagreements were addressed in the HPI.      REVIEW OF EXTERNAL NOTE :           PDMP Monitoring:    Last PDMP Rodo as Reviewed:  Review User Review Instant Review Result          Last Controlled Substance Monitoring Documentation      Flowsheet Row ED from 11/17/2018 in [facility] Emergency Department   Attestation The Prescription Monitoring Report for this patient was reviewed today. filed at 11/17/2018 1642          Urine Drug Screenings (1 yr)       URINE DRUG SCREEN  Collected: 8/29/2022 10:42 AM (Final result)   Narrative: CALL  doctor   tel. [phone],             URINE

## 2024-07-05 LAB
EKG ATRIAL RATE: 103 BPM
EKG P AXIS: 57 DEGREES
EKG P-R INTERVAL: 140 MS
EKG Q-T INTERVAL: 356 MS
EKG QRS DURATION: 84 MS
EKG QTC CALCULATION (BAZETT): 466 MS
EKG R AXIS: 32 DEGREES
EKG T AXIS: 32 DEGREES
EKG VENTRICULAR RATE: 103 BPM

## 2024-07-05 PROCEDURE — 93010 ELECTROCARDIOGRAM REPORT: CPT | Performed by: INTERNAL MEDICINE

## 2024-07-09 ENCOUNTER — OFFICE VISIT (OUTPATIENT)
Dept: SURGERY | Age: 54
End: 2024-07-09
Payer: MEDICAID

## 2024-07-09 VITALS
SYSTOLIC BLOOD PRESSURE: 127 MMHG | HEART RATE: 91 BPM | DIASTOLIC BLOOD PRESSURE: 72 MMHG | OXYGEN SATURATION: 94 % | TEMPERATURE: 97.9 F | HEIGHT: 59 IN | RESPIRATION RATE: 15 BRPM | WEIGHT: 169 LBS | BODY MASS INDEX: 34.07 KG/M2

## 2024-07-09 DIAGNOSIS — K76.0 FATTY LIVER: ICD-10-CM

## 2024-07-09 DIAGNOSIS — R74.01 TRANSAMINITIS: ICD-10-CM

## 2024-07-09 DIAGNOSIS — K76.0 HEPATIC STEATOSIS: Primary | ICD-10-CM

## 2024-07-09 DIAGNOSIS — R79.89 ELEVATED LFTS: ICD-10-CM

## 2024-07-09 PROCEDURE — 3074F SYST BP LT 130 MM HG: CPT | Performed by: CLINICAL NURSE SPECIALIST

## 2024-07-09 PROCEDURE — 3017F COLORECTAL CA SCREEN DOC REV: CPT | Performed by: CLINICAL NURSE SPECIALIST

## 2024-07-09 PROCEDURE — G8427 DOCREV CUR MEDS BY ELIG CLIN: HCPCS | Performed by: CLINICAL NURSE SPECIALIST

## 2024-07-09 PROCEDURE — 1036F TOBACCO NON-USER: CPT | Performed by: CLINICAL NURSE SPECIALIST

## 2024-07-09 PROCEDURE — 99204 OFFICE O/P NEW MOD 45 MIN: CPT | Performed by: CLINICAL NURSE SPECIALIST

## 2024-07-09 PROCEDURE — 3078F DIAST BP <80 MM HG: CPT | Performed by: CLINICAL NURSE SPECIALIST

## 2024-07-09 PROCEDURE — G8417 CALC BMI ABV UP PARAM F/U: HCPCS | Performed by: CLINICAL NURSE SPECIALIST

## 2024-07-09 NOTE — PROGRESS NOTES
[facility] Surgery    Please see Gurjit Art's note.  Discussed with her and will plan for fibroscan    Electronically signed by William Mahoney MD on 7/9/2024 at 2:55 PM

## 2024-07-09 NOTE — PROGRESS NOTES
Hepatobiliary and Pancreatic Surgery Progress Note     Patient's Name/Date of Birth: Jenny Lilly /1970 (54 y.o.)     Date: July 9, 2024      CC:elevated liver enzymes     HPI:  Patient is a very pleasant 54-year-old female with a BMI of 34.  She was noted to have an elevated alkaline phosphatase at 215, ALT 63, AST 55.  She does have a history of type 2 diabetes mellitus and is on insulin.  She also has a history of breast cancer with resection and radiation where she is currently on Arimidex.  Her last hemoglobin A1c in June 2024 was 8.4.  The lowest her hemoglobin A1C has been was 7.5.  Last glucose on 7/4/24 was 372, states she was at a picnic, ate a hot dog, piece of cake, and pasta salad. She went to ED after eating so feels that is why her sugar was so high, states her glucose is normally  in the morning and can be up to 240's at night. States it has been better controlled when she was on Jardiance which was reportedly stopped due to vaginal yeast infections. States she is taking Trulicity, Metformin, and Basaglar Kwikpen insulin. States since her sugars are higher and she has an appt tomorrow at 8:30 AM with her PCP so she was planning to talk to her PCP about starting a new medication. States when she was in ED she was diagnosed with a UTI, she given a dose of Rocephin and was instructed to follow up with her PCP. Pt states her usual weight is in the 150's, states she is at her highest weight now than she has ever been, current wt is 169#. Pt states with her DM she has neuropathy, retinopathy, and is unable to exercise like she used to. States she has an appt with Dr Tim the 18th of this month for IBS and esophagitis on CT, she states she has dysphagia and has hx of esophageal dilatation. States her father had rectal CA, she has not had a colonoscopy for about 8-10 yrs. States she also has an appt with Dr. Hardwick urology the 18th.  States she see's an oncologist Dr. Varun Bellamy, she used to follow

## 2024-07-10 ENCOUNTER — OFFICE VISIT (OUTPATIENT)
Dept: FAMILY MEDICINE CLINIC | Age: 54
End: 2024-07-10
Payer: MEDICAID

## 2024-07-10 VITALS
OXYGEN SATURATION: 98 % | TEMPERATURE: 97.2 F | SYSTOLIC BLOOD PRESSURE: 114 MMHG | DIASTOLIC BLOOD PRESSURE: 72 MMHG | HEIGHT: 59 IN | BODY MASS INDEX: 34.43 KG/M2 | WEIGHT: 170.8 LBS | HEART RATE: 88 BPM | RESPIRATION RATE: 18 BRPM

## 2024-07-10 DIAGNOSIS — K22.89 THICKENING OF ESOPHAGUS: ICD-10-CM

## 2024-07-10 DIAGNOSIS — E11.3553 STABLE PROLIFERATIVE DIABETIC RETINOPATHY OF BOTH EYES ASSOCIATED WITH TYPE 2 DIABETES MELLITUS (HCC): ICD-10-CM

## 2024-07-10 DIAGNOSIS — Z17.0 MALIGNANT NEOPLASM OF LEFT BREAST IN FEMALE, ESTROGEN RECEPTOR POSITIVE, UNSPECIFIED SITE OF BREAST (HCC): ICD-10-CM

## 2024-07-10 DIAGNOSIS — N30.01 ACUTE CYSTITIS WITH HEMATURIA: ICD-10-CM

## 2024-07-10 DIAGNOSIS — C50.912 MALIGNANT NEOPLASM OF LEFT BREAST IN FEMALE, ESTROGEN RECEPTOR POSITIVE, UNSPECIFIED SITE OF BREAST (HCC): ICD-10-CM

## 2024-07-10 DIAGNOSIS — C50.912 INVASIVE DUCTAL CARCINOMA OF LEFT BREAST (HCC): ICD-10-CM

## 2024-07-10 DIAGNOSIS — E11.65 UNCONTROLLED TYPE 2 DIABETES MELLITUS WITH HYPERGLYCEMIA (HCC): ICD-10-CM

## 2024-07-10 DIAGNOSIS — N30.01 ACUTE CYSTITIS WITH HEMATURIA: Primary | ICD-10-CM

## 2024-07-10 DIAGNOSIS — I10 ESSENTIAL HYPERTENSION: ICD-10-CM

## 2024-07-10 DIAGNOSIS — I24.9 ACS (ACUTE CORONARY SYNDROME) (HCC): ICD-10-CM

## 2024-07-10 DIAGNOSIS — E11.43 TYPE II DIABETES MELLITUS WITH PERIPHERAL AUTONOMIC NEUROPATHY (HCC): ICD-10-CM

## 2024-07-10 LAB
BILIRUBIN, POC: NORMAL
BLOOD URINE, POC: NORMAL
CLARITY, POC: CLEAR
COLOR, POC: YELLOW
GLUCOSE URINE, POC: NORMAL
KETONES, POC: NORMAL
LEUKOCYTE EST, POC: NORMAL
NITRITE, POC: NORMAL
PH, POC: 6
PROTEIN, POC: NORMAL
SPECIFIC GRAVITY, POC: 1.01
UROBILINOGEN, POC: 0.2

## 2024-07-10 PROCEDURE — 81002 URINALYSIS NONAUTO W/O SCOPE: CPT | Performed by: FAMILY MEDICINE

## 2024-07-10 PROCEDURE — 1036F TOBACCO NON-USER: CPT | Performed by: FAMILY MEDICINE

## 2024-07-10 PROCEDURE — G8417 CALC BMI ABV UP PARAM F/U: HCPCS | Performed by: FAMILY MEDICINE

## 2024-07-10 PROCEDURE — 3078F DIAST BP <80 MM HG: CPT | Performed by: FAMILY MEDICINE

## 2024-07-10 PROCEDURE — 3074F SYST BP LT 130 MM HG: CPT | Performed by: FAMILY MEDICINE

## 2024-07-10 PROCEDURE — 3052F HG A1C>EQUAL 8.0%<EQUAL 9.0%: CPT | Performed by: FAMILY MEDICINE

## 2024-07-10 PROCEDURE — 99214 OFFICE O/P EST MOD 30 MIN: CPT | Performed by: FAMILY MEDICINE

## 2024-07-10 PROCEDURE — 2022F DILAT RTA XM EVC RTNOPTHY: CPT | Performed by: FAMILY MEDICINE

## 2024-07-10 PROCEDURE — 3017F COLORECTAL CA SCREEN DOC REV: CPT | Performed by: FAMILY MEDICINE

## 2024-07-10 PROCEDURE — G8427 DOCREV CUR MEDS BY ELIG CLIN: HCPCS | Performed by: FAMILY MEDICINE

## 2024-07-10 RX ORDER — PIOGLITAZONEHYDROCHLORIDE 15 MG/1
15 TABLET ORAL DAILY
Qty: 30 TABLET | Refills: 3 | Status: SHIPPED | OUTPATIENT
Start: 2024-07-10

## 2024-07-10 RX ORDER — CEFDINIR 300 MG/1
300 CAPSULE ORAL 2 TIMES DAILY
Qty: 14 CAPSULE | Refills: 0 | Status: SHIPPED | OUTPATIENT
Start: 2024-07-10 | End: 2024-07-17

## 2024-07-12 PROBLEM — K22.89 THICKENING OF ESOPHAGUS: Status: ACTIVE | Noted: 2024-07-12

## 2024-07-12 PROBLEM — N30.01 ACUTE CYSTITIS WITH HEMATURIA: Status: ACTIVE | Noted: 2024-07-12

## 2024-07-12 LAB
CULTURE: ABNORMAL
SPECIMEN DESCRIPTION: ABNORMAL

## 2024-07-12 RX ORDER — NYSTATIN 100000 U/G
CREAM TOPICAL
Qty: 30 G | Refills: 1 | Status: SHIPPED | OUTPATIENT
Start: 2024-07-12

## 2024-07-12 ASSESSMENT — ENCOUNTER SYMPTOMS
ANAL BLEEDING: 0
CHEST TIGHTNESS: 0
BLOOD IN STOOL: 0
SINUS PRESSURE: 0
BACK PAIN: 0
VOMITING: 0
EYE DISCHARGE: 0
ABDOMINAL PAIN: 0
EYE PAIN: 0
STRIDOR: 0
COUGH: 0
NAUSEA: 0
ABDOMINAL DISTENTION: 0
TROUBLE SWALLOWING: 0
RECTAL PAIN: 0
EYE REDNESS: 0
EYE ITCHING: 0
SINUS PAIN: 0
GASTROINTESTINAL NEGATIVE: 1
SORE THROAT: 0
SHORTNESS OF BREATH: 0
WHEEZING: 0
CONSTIPATION: 0
PHOTOPHOBIA: 0
DIARRHEA: 0
CHOKING: 0
VOICE CHANGE: 0
ALLERGIC/IMMUNOLOGIC NEGATIVE: 1
COLOR CHANGE: 0
RHINORRHEA: 0
FACIAL SWELLING: 0

## 2024-07-12 NOTE — PROGRESS NOTES
SUBJECTIVE  Jenny Lilly is a 54 y.o. female.    HPI/Chief C/O:  Chief Complaint   Patient presents with    Other     Saw Dr Mahoney's NP yesterday and office note in the the chart already.    Urinary Tract Infection     Diarrhea, LBP, chest discomfort, UTI - Pt was seen at [facility] ER on 7/4/24. Pt was given an injection of Rocephin and told to follow up with her PCP. Pt was not put on any oral ATB.    ER follow up     See Above note     No Known Allergies    This 54 year old female presents for ED follow up from 7/4/2024 for acute cystitis with hematuria, given rocephen. Pt follows with Dr. Gilbert. Pt has thickening of distal esophagus, has GI appointment on 7/18/2024. Pt follows with nephrology.   ROS:  Review of Systems   Constitutional:  Positive for activity change and fatigue. Negative for appetite change, chills, diaphoresis, fever and unexpected weight change.   HENT:  Negative for congestion, dental problem, drooling, ear discharge, ear pain, facial swelling, hearing loss, mouth sores, nosebleeds, postnasal drip, rhinorrhea, sinus pressure, sinus pain, sneezing, sore throat, tinnitus, trouble swallowing and voice change.    Eyes:  Positive for visual disturbance. Negative for photophobia, pain, discharge, redness and itching.   Respiratory:  Negative for cough, choking, chest tightness, shortness of breath, wheezing and stridor.    Cardiovascular: Negative.  Negative for chest pain, palpitations and leg swelling.   Gastrointestinal: Negative.  Negative for abdominal distention, abdominal pain, anal bleeding, blood in stool, constipation, diarrhea, nausea, rectal pain and vomiting.   Endocrine: Negative.  Negative for cold intolerance, heat intolerance, polydipsia, polyphagia and polyuria.   Genitourinary:  Positive for dysuria, frequency and urgency. Negative for decreased urine volume, difficulty urinating, enuresis, flank pain, genital sores, hematuria, menstrual problem, pelvic pain and

## 2024-08-05 DIAGNOSIS — N39.0 URINARY TRACT INFECTION WITHOUT HEMATURIA, SITE UNSPECIFIED: Primary | ICD-10-CM

## 2024-08-05 RX ORDER — NITROFURANTOIN 25; 75 MG/1; MG/1
100 CAPSULE ORAL 2 TIMES DAILY
Qty: 20 CAPSULE | Refills: 0 | Status: SHIPPED | OUTPATIENT
Start: 2024-08-05 | End: 2024-08-15

## 2024-08-26 ENCOUNTER — HOSPITAL ENCOUNTER (EMERGENCY)
Age: 54
Discharge: HOME | End: 2024-08-26
Attending: EMERGENCY MEDICINE
Payer: MEDICAID

## 2024-08-26 ENCOUNTER — APPOINTMENT (OUTPATIENT)
Dept: ULTRASOUND IMAGING | Age: 54
End: 2024-08-26
Payer: MEDICAID

## 2024-08-26 ENCOUNTER — APPOINTMENT (OUTPATIENT)
Dept: GENERAL RADIOLOGY | Age: 54
End: 2024-08-26
Payer: MEDICAID

## 2024-08-26 VITALS
OXYGEN SATURATION: 100 % | HEART RATE: 85 BPM | TEMPERATURE: 98.4 F | RESPIRATION RATE: 20 BRPM | DIASTOLIC BLOOD PRESSURE: 90 MMHG | SYSTOLIC BLOOD PRESSURE: 195 MMHG

## 2024-08-26 DIAGNOSIS — E87.5 HYPERKALEMIA: ICD-10-CM

## 2024-08-26 DIAGNOSIS — R07.9 CHEST PAIN, UNSPECIFIED TYPE: Primary | ICD-10-CM

## 2024-08-26 DIAGNOSIS — I10 HYPERTENSION, UNSPECIFIED TYPE: ICD-10-CM

## 2024-08-26 DIAGNOSIS — E11.65 HYPERGLYCEMIA DUE TO DIABETES MELLITUS (HCC): ICD-10-CM

## 2024-08-26 DIAGNOSIS — R60.0 BILATERAL LOWER EXTREMITY EDEMA: ICD-10-CM

## 2024-08-26 LAB
ALBUMIN SERPL-MCNC: 3.7 G/DL (ref 3.5–5.2)
ALP SERPL-CCNC: 201 U/L (ref 35–104)
ALT SERPL-CCNC: 65 U/L (ref 0–32)
ANION GAP SERPL CALCULATED.3IONS-SCNC: 8 MMOL/L (ref 7–16)
AST SERPL-CCNC: 63 U/L (ref 0–31)
BASOPHILS # BLD: 0.02 K/UL (ref 0–0.2)
BASOPHILS NFR BLD: 0 % (ref 0–2)
BILIRUB SERPL-MCNC: 0.2 MG/DL (ref 0–1.2)
BNP SERPL-MCNC: 399 PG/ML (ref 0–450)
BUN SERPL-MCNC: 15 MG/DL (ref 6–20)
CALCIUM SERPL-MCNC: 9.5 MG/DL (ref 8.6–10.2)
CHLORIDE SERPL-SCNC: 105 MMOL/L (ref 98–107)
CO2 SERPL-SCNC: 29 MMOL/L (ref 22–29)
CREAT SERPL-MCNC: 0.9 MG/DL (ref 0.5–1)
EOSINOPHIL # BLD: 0.09 K/UL (ref 0.05–0.5)
EOSINOPHILS RELATIVE PERCENT: 1 % (ref 0–6)
ERYTHROCYTE [DISTWIDTH] IN BLOOD BY AUTOMATED COUNT: 15.4 % (ref 11.5–15)
GFR, ESTIMATED: 78 ML/MIN/1.73M2
GLUCOSE SERPL-MCNC: 238 MG/DL (ref 74–99)
HCT VFR BLD AUTO: 35.3 % (ref 34–48)
HGB BLD-MCNC: 11.4 G/DL (ref 11.5–15.5)
IMM GRANULOCYTES # BLD AUTO: <0.03 K/UL (ref 0–0.58)
IMM GRANULOCYTES NFR BLD: 0 % (ref 0–5)
LYMPHOCYTES NFR BLD: 1.99 K/UL (ref 1.5–4)
LYMPHOCYTES RELATIVE PERCENT: 26 % (ref 20–42)
MCH RBC QN AUTO: 29.4 PG (ref 26–35)
MCHC RBC AUTO-ENTMCNC: 32.3 G/DL (ref 32–34.5)
MCV RBC AUTO: 91 FL (ref 80–99.9)
MONOCYTES NFR BLD: 0.56 K/UL (ref 0.1–0.95)
MONOCYTES NFR BLD: 7 % (ref 2–12)
NEUTROPHILS NFR BLD: 65 % (ref 43–80)
NEUTS SEG NFR BLD: 5.02 K/UL (ref 1.8–7.3)
PLATELET # BLD AUTO: 233 K/UL (ref 130–450)
PMV BLD AUTO: 11.9 FL (ref 7–12)
POTASSIUM SERPL-SCNC: 5.4 MMOL/L (ref 3.5–5)
PROT SERPL-MCNC: 7.1 G/DL (ref 6.4–8.3)
RBC # BLD AUTO: 3.88 M/UL (ref 3.5–5.5)
SODIUM SERPL-SCNC: 142 MMOL/L (ref 132–146)
TROPONIN I SERPL HS-MCNC: 13 NG/L (ref 0–9)
TROPONIN I SERPL HS-MCNC: 14 NG/L (ref 0–9)
WBC OTHER # BLD: 7.7 K/UL (ref 4.5–11.5)

## 2024-08-26 PROCEDURE — 84484 ASSAY OF TROPONIN QUANT: CPT

## 2024-08-26 PROCEDURE — 80053 COMPREHEN METABOLIC PANEL: CPT

## 2024-08-26 PROCEDURE — 93005 ELECTROCARDIOGRAM TRACING: CPT | Performed by: STUDENT IN AN ORGANIZED HEALTH CARE EDUCATION/TRAINING PROGRAM

## 2024-08-26 PROCEDURE — 2580000003 HC RX 258

## 2024-08-26 PROCEDURE — 85025 COMPLETE CBC W/AUTO DIFF WBC: CPT

## 2024-08-26 PROCEDURE — 83880 ASSAY OF NATRIURETIC PEPTIDE: CPT

## 2024-08-26 PROCEDURE — 99285 EMERGENCY DEPT VISIT HI MDM: CPT

## 2024-08-26 PROCEDURE — 71045 X-RAY EXAM CHEST 1 VIEW: CPT

## 2024-08-26 PROCEDURE — 93970 EXTREMITY STUDY: CPT

## 2024-08-26 RX ORDER — 0.9 % SODIUM CHLORIDE 0.9 %
500 INTRAVENOUS SOLUTION INTRAVENOUS ONCE
Status: COMPLETED | OUTPATIENT
Start: 2024-08-26 | End: 2024-08-26

## 2024-08-26 RX ORDER — SODIUM CHLORIDE 9 MG/ML
INJECTION, SOLUTION INTRAVENOUS
Status: COMPLETED
Start: 2024-08-26 | End: 2024-08-26

## 2024-08-26 RX ADMIN — Medication 500 ML: at 22:00

## 2024-08-26 RX ADMIN — SODIUM CHLORIDE 500 ML: 9 INJECTION, SOLUTION INTRAVENOUS at 22:00

## 2024-08-26 NOTE — ED PROVIDER NOTES
Name: Jenny Lilly    MRN: [id number]     Date / Time Roomed:  8/26/2024  3:29 PM  ED Bed Assignment:  [id number]    ------------------ History of Present Illness --------------------  8/26/24, Time: 3:40 PM EDT   No chief complaint on file.     HPI    Jenny Lilly is a 54 y.o. female, with hx of GERD, diabetes, hypertension, hyperlipidemia, sleep apnea, cardiac valve prolapse, who presents to the ED today for lower extremity edema over the past several weeks as well as some chest pain and shortness of breath over the past several days.  She relates increased shortness of breath on ambulation and exertion.  She relates some increased shortness of breath lying flat at night.  She states she has had lower extremity edema over the past several weeks and is on water pills she states.  She denies any history of blood clots.  Denies any unilateral leg swelling.  She relates midsternal chest discomfort with accompanied mild shortness of breath.  Symptoms been constant, mild to moderate severity, no exacerbating factors.  Denies any smoking history. The pt denies other ROS at this time.     PCP: Charlene Velez DO.    -------------------- PM --------------------    Past Medical History:   has a past medical history of Acid reflux, Anesthesia complication, Arthritis, Back pain, Cancer (HCC) (2021), Cardiac valve prolapse, Diabetes mellitus (HCC), History of therapeutic radiation, Hyperlipidemia, Hypertension, Neuropathy due to secondary diabetes (HCC), Nontraumatic tear of right rotator cuff (11/30/2020), Prolonged emergence from general anesthesia, Psoriasis, and Sleep apnea.     Surgical History:  Past Surgical History:   Procedure Laterality Date    ANKLE SURGERY      bilateral tarsal tunnels    BREAST BIOPSY Left 02/17/2021    LEFT BREAST NEEDLE LOCALIZED LUMPECTOMY, BLUE DYE INJECTION, LEFT AXILLARY SENTINEL LYMPHNODE EXCISION, POSSIBLE LEFT AXILLARY DISSECTION performed by Tana Granados MD at [facility] OR    BREAST

## 2024-08-27 LAB
EKG ATRIAL RATE: 88 BPM
EKG P AXIS: 56 DEGREES
EKG P-R INTERVAL: 130 MS
EKG Q-T INTERVAL: 372 MS
EKG QRS DURATION: 88 MS
EKG QTC CALCULATION (BAZETT): 450 MS
EKG R AXIS: 31 DEGREES
EKG T AXIS: 38 DEGREES
EKG VENTRICULAR RATE: 88 BPM

## 2024-08-27 PROCEDURE — 93010 ELECTROCARDIOGRAM REPORT: CPT | Performed by: INTERNAL MEDICINE

## 2024-08-28 ENCOUNTER — HOSPITAL ENCOUNTER (OUTPATIENT)
Dept: INFUSION THERAPY | Age: 54
Discharge: HOME | End: 2024-08-28
Payer: MEDICAID

## 2024-08-28 DIAGNOSIS — D64.9 ANEMIA, UNSPECIFIED TYPE: ICD-10-CM

## 2024-08-28 LAB
FERRITIN SERPL-MCNC: 41 NG/ML
FOLATE SERPL-MCNC: >20 NG/ML (ref 4.8–24.2)
IRON SATN MFR SERPL: 13 % (ref 15–50)
IRON SERPL-MCNC: 56 UG/DL (ref 37–145)
TIBC SERPL-MCNC: 419 UG/DL (ref 250–450)
VIT B12 SERPL-MCNC: 1186 PG/ML (ref 211–946)

## 2024-08-28 PROCEDURE — 83550 IRON BINDING TEST: CPT

## 2024-08-28 PROCEDURE — 84155 ASSAY OF PROTEIN SERUM: CPT

## 2024-08-28 PROCEDURE — 82746 ASSAY OF FOLIC ACID SERUM: CPT

## 2024-08-28 PROCEDURE — 36415 COLL VENOUS BLD VENIPUNCTURE: CPT

## 2024-08-28 PROCEDURE — 82525 ASSAY OF COPPER: CPT

## 2024-08-28 PROCEDURE — 82728 ASSAY OF FERRITIN: CPT

## 2024-08-28 PROCEDURE — 84630 ASSAY OF ZINC: CPT

## 2024-08-28 PROCEDURE — 83540 ASSAY OF IRON: CPT

## 2024-08-28 PROCEDURE — 82607 VITAMIN B-12: CPT

## 2024-08-28 PROCEDURE — 84165 PROTEIN E-PHORESIS SERUM: CPT

## 2024-08-29 ENCOUNTER — OFFICE VISIT (OUTPATIENT)
Dept: ONCOLOGY | Age: 54
End: 2024-08-29
Payer: MEDICAID

## 2024-08-29 VITALS
TEMPERATURE: 97.2 F | WEIGHT: 176 LBS | DIASTOLIC BLOOD PRESSURE: 79 MMHG | BODY MASS INDEX: 35.48 KG/M2 | HEART RATE: 85 BPM | OXYGEN SATURATION: 96 % | SYSTOLIC BLOOD PRESSURE: 130 MMHG | HEIGHT: 59 IN

## 2024-08-29 DIAGNOSIS — C50.912 MALIGNANT NEOPLASM OF LEFT FEMALE BREAST, UNSPECIFIED ESTROGEN RECEPTOR STATUS, UNSPECIFIED SITE OF BREAST (HCC): ICD-10-CM

## 2024-08-29 DIAGNOSIS — Z12.39 BREAST CANCER SCREENING, HIGH RISK PATIENT: Primary | ICD-10-CM

## 2024-08-29 DIAGNOSIS — Z79.811 USE OF AROMATASE INHIBITORS: ICD-10-CM

## 2024-08-29 DIAGNOSIS — D64.9 ANEMIA, UNSPECIFIED TYPE: ICD-10-CM

## 2024-08-29 LAB
ALBUMIN SERPL-MCNC: 4 G/DL (ref 3.5–4.7)
ALPHA1 GLOB SERPL ELPH-MCNC: 0.3 G/DL (ref 0.2–0.4)
ALPHA2 GLOB SERPL ELPH-MCNC: 1 G/DL (ref 0.5–1)
B-GLOBULIN SERPL ELPH-MCNC: 1.2 G/DL (ref 0.8–1.3)
GAMMA GLOB SERPL ELPH-MCNC: 0.9 G/DL (ref 0.7–1.6)
PATHOLOGIST: NORMAL
PROT PATTERN SERPL ELPH-IMP: NORMAL
PROT SERPL-MCNC: 7.5 G/DL (ref 6.4–8.3)

## 2024-08-29 PROCEDURE — 3017F COLORECTAL CA SCREEN DOC REV: CPT | Performed by: INTERNAL MEDICINE

## 2024-08-29 PROCEDURE — G8417 CALC BMI ABV UP PARAM F/U: HCPCS | Performed by: INTERNAL MEDICINE

## 2024-08-29 PROCEDURE — 3078F DIAST BP <80 MM HG: CPT | Performed by: INTERNAL MEDICINE

## 2024-08-29 PROCEDURE — 3075F SYST BP GE 130 - 139MM HG: CPT | Performed by: INTERNAL MEDICINE

## 2024-08-29 PROCEDURE — 99213 OFFICE O/P EST LOW 20 MIN: CPT

## 2024-08-29 PROCEDURE — 1036F TOBACCO NON-USER: CPT | Performed by: INTERNAL MEDICINE

## 2024-08-29 PROCEDURE — 99214 OFFICE O/P EST MOD 30 MIN: CPT | Performed by: INTERNAL MEDICINE

## 2024-08-29 PROCEDURE — G8427 DOCREV CUR MEDS BY ELIG CLIN: HCPCS | Performed by: INTERNAL MEDICINE

## 2024-08-29 RX ORDER — FERROUS SULFATE 325(65) MG
325 TABLET ORAL
Qty: 180 TABLET | Refills: 1 | Status: SHIPPED | OUTPATIENT
Start: 2024-08-29

## 2024-08-29 RX ORDER — ANASTROZOLE 1 MG/1
1 TABLET ORAL DAILY
Qty: 30 TABLET | Refills: 10 | Status: SHIPPED | OUTPATIENT
Start: 2024-08-29

## 2024-08-29 NOTE — PROGRESS NOTES
SECTION      CHOLECYSTECTOMY, LAPAROSCOPIC N/A 2019    LAPAROSCOPIC CHOLECYSTECTOMY WITH IOC performed by Darinel Schwarz MD at [facility] OR.  had extremely high blood pressure and hard to wake up    COLONOSCOPY N/A 2019    COLORECTAL CANCER SCREENING, NOT HIGH RISK performed by Darinel Schwarz MD at [facility] ENDOSCOPY    FINGER TRIGGER RELEASE Bilateral 2023    HYSTERECTOMY, TOTAL ABDOMINAL (CERVIX REMOVED)      SHOULDER ARTHROSCOPY Left 2016    subacromin decompression and debridement    SHOULDER ARTHROSCOPY Right 2020    RIGHT SHOULDER ARTHROSCOPY, SUBACROMIAL DECOMPRESSION, LABRIAL DEBRIDEMENT, CHONDROPLASTY, RAÚL performed by Ken Delgado DO at [facility] OR    UPPER GASTROINTESTINAL ENDOSCOPY N/A 2019    EGD BIOPSY performed by Darinel Schwarz MD at [facility] ENDOSCOPY    US BREAST BIOPSY W LOC DEVICE 1ST LESION LEFT  2020    US BREAST NEEDLE BIOPSY LEFT 2020 [facility]    WRIST SURGERY Right 03/15/2023    RIGHT WRIST FIRST DORSAL COMPARTMENT RELEASE performed by Ivan Martin MD at [facility]     Family History   Problem Relation Age of Onset    Heart Disease Mother     Diabetes Mother     Heart Disease Father     Diabetes Father     Cancer Father         prostate, colon, skin cance behind ear    Breast Cancer Maternal Aunt 55        breast    Cancer Maternal Uncle 58        colon    Cancer Paternal Aunt 62        breast    Cancer Other 58        maternal great aunt - breast     Social History     Socioeconomic History    Marital status:      Spouse name: Not on file    Number of children: 2    Years of education: Not on file    Highest education level: Not on file   Occupational History    Not on file   Tobacco Use    Smoking status: Never    Smokeless tobacco: Never   Vaping Use    Vaping status: Never Used   Substance and Sexual Activity    Alcohol use: Yes     Alcohol/week: 1.0 standard drink of alcohol     Types: 1 Glasses of wine per week     colonoscopy after 5 years.  We will monitor her counts.    RTC in 3 months.      LAKESHA Lopez,ACNS-BC,AGACNP-BC  HEMATOLOGY/MEDICAL ONCOLOGY  [facility] CARE [facility] MED ONCOLOGY  [address] [place] [id number]  Dept: [phone]  Loc: [phone]

## 2024-08-31 LAB
COPPER SERPL-MCNC: 156.1 UG/DL (ref 80–155)
ZINC SERPL-MCNC: 76.9 UG/DL (ref 60–120)

## 2024-09-11 ENCOUNTER — OFFICE VISIT (OUTPATIENT)
Dept: FAMILY MEDICINE CLINIC | Age: 54
End: 2024-09-11
Payer: MEDICAID

## 2024-09-11 VITALS
HEIGHT: 59 IN | HEART RATE: 80 BPM | OXYGEN SATURATION: 94 % | BODY MASS INDEX: 35.92 KG/M2 | RESPIRATION RATE: 18 BRPM | TEMPERATURE: 97.3 F | WEIGHT: 178.2 LBS | SYSTOLIC BLOOD PRESSURE: 116 MMHG | DIASTOLIC BLOOD PRESSURE: 78 MMHG

## 2024-09-11 DIAGNOSIS — G25.81 RLS (RESTLESS LEGS SYNDROME): ICD-10-CM

## 2024-09-11 DIAGNOSIS — M25.551 RIGHT HIP PAIN: ICD-10-CM

## 2024-09-11 DIAGNOSIS — C50.912 INVASIVE DUCTAL CARCINOMA OF LEFT BREAST (HCC): ICD-10-CM

## 2024-09-11 DIAGNOSIS — G62.9 NEUROPATHY: ICD-10-CM

## 2024-09-11 DIAGNOSIS — I10 ESSENTIAL HYPERTENSION: ICD-10-CM

## 2024-09-11 DIAGNOSIS — C50.912 MALIGNANT NEOPLASM OF LEFT BREAST IN FEMALE, ESTROGEN RECEPTOR POSITIVE, UNSPECIFIED SITE OF BREAST (HCC): ICD-10-CM

## 2024-09-11 DIAGNOSIS — E11.43 TYPE II DIABETES MELLITUS WITH PERIPHERAL AUTONOMIC NEUROPATHY (HCC): Primary | ICD-10-CM

## 2024-09-11 DIAGNOSIS — Z17.0 MALIGNANT NEOPLASM OF LEFT BREAST IN FEMALE, ESTROGEN RECEPTOR POSITIVE, UNSPECIFIED SITE OF BREAST (HCC): ICD-10-CM

## 2024-09-11 DIAGNOSIS — I24.9 ACS (ACUTE CORONARY SYNDROME) (HCC): ICD-10-CM

## 2024-09-11 DIAGNOSIS — E11.3553 STABLE PROLIFERATIVE DIABETIC RETINOPATHY OF BOTH EYES ASSOCIATED WITH TYPE 2 DIABETES MELLITUS (HCC): ICD-10-CM

## 2024-09-11 DIAGNOSIS — E11.65 UNCONTROLLED TYPE 2 DIABETES MELLITUS WITH HYPERGLYCEMIA (HCC): ICD-10-CM

## 2024-09-11 LAB
CHP ED QC CHECK: NORMAL
GLUCOSE BLD-MCNC: 121 MG/DL
HBA1C MFR BLD: 8 %

## 2024-09-11 PROCEDURE — 3074F SYST BP LT 130 MM HG: CPT | Performed by: FAMILY MEDICINE

## 2024-09-11 PROCEDURE — G8417 CALC BMI ABV UP PARAM F/U: HCPCS | Performed by: FAMILY MEDICINE

## 2024-09-11 PROCEDURE — 3052F HG A1C>EQUAL 8.0%<EQUAL 9.0%: CPT | Performed by: FAMILY MEDICINE

## 2024-09-11 PROCEDURE — G8427 DOCREV CUR MEDS BY ELIG CLIN: HCPCS | Performed by: FAMILY MEDICINE

## 2024-09-11 PROCEDURE — 83036 HEMOGLOBIN GLYCOSYLATED A1C: CPT | Performed by: FAMILY MEDICINE

## 2024-09-11 PROCEDURE — 82962 GLUCOSE BLOOD TEST: CPT | Performed by: FAMILY MEDICINE

## 2024-09-11 PROCEDURE — 99214 OFFICE O/P EST MOD 30 MIN: CPT | Performed by: FAMILY MEDICINE

## 2024-09-11 PROCEDURE — 2022F DILAT RTA XM EVC RTNOPTHY: CPT | Performed by: FAMILY MEDICINE

## 2024-09-11 PROCEDURE — 3078F DIAST BP <80 MM HG: CPT | Performed by: FAMILY MEDICINE

## 2024-09-11 PROCEDURE — 3017F COLORECTAL CA SCREEN DOC REV: CPT | Performed by: FAMILY MEDICINE

## 2024-09-11 PROCEDURE — 1036F TOBACCO NON-USER: CPT | Performed by: FAMILY MEDICINE

## 2024-09-11 RX ORDER — METFORMIN HCL 500 MG
500 TABLET, EXTENDED RELEASE 24 HR ORAL
Qty: 90 TABLET | Refills: 0 | Status: SHIPPED | OUTPATIENT
Start: 2024-09-11

## 2024-09-11 RX ORDER — PEN NEEDLE, DIABETIC 31 GX5/16"
1 NEEDLE, DISPOSABLE MISCELLANEOUS DAILY
COMMUNITY
End: 2024-09-11 | Stop reason: SDUPTHER

## 2024-09-11 RX ORDER — GABAPENTIN 300 MG/1
300 CAPSULE ORAL NIGHTLY
Qty: 30 CAPSULE | Refills: 2 | Status: SHIPPED | OUTPATIENT
Start: 2024-09-11 | End: 2024-12-10

## 2024-09-11 RX ORDER — PEN NEEDLE, DIABETIC 31 GX5/16"
1 NEEDLE, DISPOSABLE MISCELLANEOUS DAILY
Qty: 100 EACH | Refills: 3 | Status: SHIPPED | OUTPATIENT
Start: 2024-09-11

## 2024-09-11 RX ORDER — PIOGLITAZONEHYDROCHLORIDE 30 MG/1
30 TABLET ORAL DAILY
Qty: 90 TABLET | Refills: 1 | Status: SHIPPED | OUTPATIENT
Start: 2024-09-11

## 2024-09-11 RX ORDER — PRAMIPEXOLE DIHYDROCHLORIDE 0.12 MG/1
TABLET ORAL
Qty: 90 TABLET | Refills: 3 | Status: SHIPPED | OUTPATIENT
Start: 2024-09-11

## 2024-09-12 ENCOUNTER — TELEPHONE (OUTPATIENT)
Dept: FAMILY MEDICINE CLINIC | Age: 54
End: 2024-09-12

## 2024-09-12 DIAGNOSIS — E11.43 TYPE II DIABETES MELLITUS WITH PERIPHERAL AUTONOMIC NEUROPATHY (HCC): Primary | ICD-10-CM

## 2024-09-12 LAB
6-MONOACETYLMORPHINE, URINE: NEGATIVE
ABNORMAL SPECIMEN VALIDITY TEST: ABNORMAL
ALCOHOL URINE: NOT DETECTED MG/DL
AMPHETAMINE SCREEN URINE: NEGATIVE
BARBITURATE SCREEN URINE: NEGATIVE
BENZODIAZEPINE SCREEN, URINE: NEGATIVE
BUPRENORPHINE URINE: NEGATIVE
CANNABINOID SCREEN URINE: NEGATIVE
COCAINE METABOLITE, URINE: NEGATIVE
FENTANYL URINE: NEGATIVE
INTEGRITY CHECK, CREATININE, URINE: 6 MG/DL (ref 22–250)
INTEGRITY CHECK, OXIDANT, URINE: <40 MG/L
INTEGRITY CHECK, PH, URINE: 6.5 (ref 4.5–9)
INTEGRITY CHECK, SPECIFIC GRAVITY, URINE: 1.01 (ref 1–1.03)
METHADONE SCREEN, URINE: NEGATIVE
OPIATES, URINE: NEGATIVE
OXYCODONE SCREEN URINE: NEGATIVE
PCP,URINE: NEGATIVE
TEST INFORMATION: ABNORMAL
TRAMADOL, URINE: NEGATIVE

## 2024-09-12 NOTE — TELEPHONE ENCOUNTER
Please sign 9/11/2024 note for referral to be sent.     Electronically signed by CAPO MUKHERJEE MA on 9/12/24 at 8:40 AM EDT

## 2024-09-13 ASSESSMENT — ENCOUNTER SYMPTOMS
CHEST TIGHTNESS: 0
CONSTIPATION: 0
EYE REDNESS: 0
SHORTNESS OF BREATH: 0
VOICE CHANGE: 0
RECTAL PAIN: 0
NAUSEA: 0
EYE PAIN: 0
SINUS PRESSURE: 0
COUGH: 0
RHINORRHEA: 0
PHOTOPHOBIA: 0
ABDOMINAL DISTENTION: 0
STRIDOR: 0
DIARRHEA: 0
CHOKING: 0
COLOR CHANGE: 0
GASTROINTESTINAL NEGATIVE: 1
TROUBLE SWALLOWING: 0
SINUS PAIN: 0
WHEEZING: 0
ABDOMINAL PAIN: 0
FACIAL SWELLING: 0
SORE THROAT: 0
EYE ITCHING: 0
VOMITING: 0
ANAL BLEEDING: 0
ALLERGIC/IMMUNOLOGIC NEGATIVE: 1
BACK PAIN: 0
BLOOD IN STOOL: 0
EYE DISCHARGE: 0

## 2024-09-30 LAB — DIABETIC RETINOPATHY: POSITIVE

## 2024-10-14 ENCOUNTER — NURSE ONLY (OUTPATIENT)
Dept: FAMILY MEDICINE CLINIC | Age: 54
End: 2024-10-14
Payer: MEDICAID

## 2024-10-14 VITALS — TEMPERATURE: 97.4 F

## 2024-10-14 DIAGNOSIS — E11.43 TYPE II DIABETES MELLITUS WITH PERIPHERAL AUTONOMIC NEUROPATHY (HCC): ICD-10-CM

## 2024-10-14 DIAGNOSIS — G25.81 RLS (RESTLESS LEGS SYNDROME): ICD-10-CM

## 2024-10-14 DIAGNOSIS — K21.9 GASTROESOPHAGEAL REFLUX DISEASE WITHOUT ESOPHAGITIS: ICD-10-CM

## 2024-10-14 DIAGNOSIS — E11.65 UNCONTROLLED TYPE 2 DIABETES MELLITUS WITH HYPERGLYCEMIA (HCC): ICD-10-CM

## 2024-10-14 DIAGNOSIS — Z79.4 INSULIN DEPENDENT TYPE 2 DIABETES MELLITUS (HCC): ICD-10-CM

## 2024-10-14 DIAGNOSIS — I10 ESSENTIAL HYPERTENSION: ICD-10-CM

## 2024-10-14 DIAGNOSIS — Z23 FLU VACCINE NEED: Primary | ICD-10-CM

## 2024-10-14 DIAGNOSIS — G62.9 NEUROPATHY: ICD-10-CM

## 2024-10-14 DIAGNOSIS — E11.9 INSULIN DEPENDENT TYPE 2 DIABETES MELLITUS (HCC): ICD-10-CM

## 2024-10-14 DIAGNOSIS — F41.9 ANXIETY: ICD-10-CM

## 2024-10-14 PROCEDURE — 90471 IMMUNIZATION ADMIN: CPT | Performed by: FAMILY MEDICINE

## 2024-10-14 PROCEDURE — 90661 CCIIV3 VAC ABX FR 0.5 ML IM: CPT | Performed by: FAMILY MEDICINE

## 2024-10-14 RX ORDER — PRAMIPEXOLE DIHYDROCHLORIDE 0.12 MG/1
TABLET ORAL
Qty: 90 TABLET | Refills: 0 | Status: SHIPPED | OUTPATIENT
Start: 2024-10-14

## 2024-10-14 RX ORDER — SUCRALFATE 1 G/1
1 TABLET ORAL 4 TIMES DAILY
Qty: 360 TABLET | Refills: 0 | Status: SHIPPED | OUTPATIENT
Start: 2024-10-14 | End: 2025-01-12

## 2024-10-14 RX ORDER — BUMETANIDE 1 MG/1
1 TABLET ORAL DAILY
Qty: 90 TABLET | Refills: 1 | Status: SHIPPED | OUTPATIENT
Start: 2024-10-14

## 2024-10-14 RX ORDER — LANCETS 30 GAUGE
EACH MISCELLANEOUS
Qty: 100 EACH | Refills: 10 | Status: SHIPPED | OUTPATIENT
Start: 2024-10-14

## 2024-10-14 RX ORDER — PEN NEEDLE, DIABETIC 31 GX5/16"
1 NEEDLE, DISPOSABLE MISCELLANEOUS DAILY
Qty: 100 EACH | Refills: 3 | Status: SHIPPED | OUTPATIENT
Start: 2024-10-14

## 2024-10-14 RX ORDER — DULAGLUTIDE 1.5 MG/.5ML
1.5 INJECTION, SOLUTION SUBCUTANEOUS WEEKLY
Qty: 2 ML | Refills: 1 | Status: SHIPPED | OUTPATIENT
Start: 2024-10-14

## 2024-10-14 RX ORDER — METFORMIN HCL 500 MG
500 TABLET, EXTENDED RELEASE 24 HR ORAL
Qty: 90 TABLET | Refills: 0 | Status: SHIPPED | OUTPATIENT
Start: 2024-10-14

## 2024-10-14 RX ORDER — METOPROLOL SUCCINATE 25 MG/1
12.5 TABLET, EXTENDED RELEASE ORAL DAILY
Qty: 45 TABLET | Refills: 0 | Status: SHIPPED | OUTPATIENT
Start: 2024-10-14 | End: 2025-01-12

## 2024-10-14 RX ORDER — OMEPRAZOLE 40 MG/1
40 CAPSULE, DELAYED RELEASE ORAL DAILY
Qty: 90 CAPSULE | Refills: 0 | Status: SHIPPED | OUTPATIENT
Start: 2024-10-14 | End: 2025-01-12

## 2024-10-14 RX ORDER — BLOOD SUGAR DIAGNOSTIC
STRIP MISCELLANEOUS
Qty: 100 EACH | Refills: 10 | Status: SHIPPED | OUTPATIENT
Start: 2024-10-14

## 2024-10-14 RX ORDER — BUSPIRONE HYDROCHLORIDE 5 MG/1
5 TABLET ORAL 2 TIMES DAILY
Qty: 180 TABLET | Refills: 0 | Status: SHIPPED | OUTPATIENT
Start: 2024-10-14 | End: 2025-01-12

## 2024-10-14 RX ORDER — GABAPENTIN 300 MG/1
300 CAPSULE ORAL NIGHTLY
Qty: 30 CAPSULE | Refills: 2 | OUTPATIENT
Start: 2024-10-14 | End: 2025-01-12

## 2024-10-14 RX ORDER — PIOGLITAZONEHYDROCHLORIDE 30 MG/1
30 TABLET ORAL DAILY
Qty: 90 TABLET | Refills: 0 | Status: SHIPPED | OUTPATIENT
Start: 2024-10-14

## 2024-10-14 RX ORDER — INSULIN GLARGINE 100 [IU]/ML
INJECTION, SOLUTION SUBCUTANEOUS
Qty: 15 ML | Refills: 10 | Status: SHIPPED | OUTPATIENT
Start: 2024-10-14

## 2024-10-14 RX ORDER — ROSUVASTATIN CALCIUM 5 MG/1
5 TABLET, COATED ORAL DAILY
Qty: 90 TABLET | Refills: 0 | Status: SHIPPED | OUTPATIENT
Start: 2024-10-14 | End: 2025-01-12

## 2024-10-14 RX ORDER — SERTRALINE HYDROCHLORIDE 100 MG/1
TABLET, FILM COATED ORAL
Qty: 90 TABLET | Refills: 0 | Status: SHIPPED | OUTPATIENT
Start: 2024-10-14

## 2024-10-14 NOTE — TELEPHONE ENCOUNTER
Name of Medication(s) Requested:  Requested Prescriptions     Pending Prescriptions Disp Refills    bumetanide (BUMEX) 1 MG tablet 90 tablet 1     Sig: Take 1 tablet by mouth daily Takes daily    busPIRone (BUSPAR) 5 MG tablet 60 tablet 2     Sig: Take 1 tablet by mouth 2 times daily    gabapentin (NEURONTIN) 300 MG capsule 30 capsule 2     Sig: Take 1 capsule by mouth at bedtime for 90 days.    insulin glargine (BASAGLAR KWIKPEN) 100 UNIT/ML injection pen 15 mL 10     Sig: INJECT 50 UNITS SUBCUTANEOUSLY NIGHTLY  Strength: 100 UNIT/ML    Insulin Pen Needle (EASY TOUCH PEN NEEDLES) 31G X 8 MM MISC 100 each 3     Si each by Does not apply route daily    Lancets (ONETOUCH DELICA PLUS [id number]) MISC 100 each 10     Sig: USE TO TEST BLOOD SUGAR FOUR TIMES A DAY    metFORMIN (GLUCOPHAGE-XR) 500 MG extended release tablet 90 tablet 0     Sig: Take 1 tablet by mouth daily (with breakfast)    metoprolol succinate (TOPROL XL) 25 MG extended release tablet 15 tablet 10     Sig: Take 0.5 tablets by mouth daily    omeprazole (PRILOSEC) 40 MG delayed release capsule 30 capsule 5     Sig: Take 1 capsule by mouth daily    blood glucose test strips (ONETOUCH ULTRA) strip 100 each 10     Sig: USE TO TEST BLOOD SUGAR 4 TIMES DAILY AS DIRECTED    pioglitazone (ACTOS) 30 MG tablet 90 tablet 1     Sig: Take 1 tablet by mouth daily    pramipexole (MIRAPEX) 0.125 MG tablet 90 tablet 3     Sig: One in AM and take TWO at night    sertraline (ZOLOFT) 100 MG tablet 30 tablet 10    sucralfate (CARAFATE) 1 GM tablet 120 tablet 3     Sig: Take 1 tablet by mouth 4 times daily    dulaglutide (TRULICITY) 1.5 MG/0.5ML SC injection 2 mL 10    rosuvastatin (CRESTOR) 5 MG tablet 30 tablet      Sig: Take 1 tablet by mouth daily       Medication is on current medication list Yes    Dosage and directions were verified? Yes    Quantity verified: 90 day supply     Pharmacy Verified?  Yes    Last Appointment:  2024    Future appts:  Future

## 2024-11-05 ENCOUNTER — TELEPHONE (OUTPATIENT)
Dept: HEMATOLOGY | Age: 54
End: 2024-11-05

## 2024-11-05 ENCOUNTER — TELEPHONE (OUTPATIENT)
Dept: FAMILY MEDICINE CLINIC | Age: 54
End: 2024-11-05

## 2024-11-05 NOTE — TELEPHONE ENCOUNTER
Pt called and states she needs a prior auth on her basaglar kwikpen, please advise.     Electronically signed by CAPO MUKHERJEE MA on 11/5/24 at 2:38 PM EST

## 2024-11-05 NOTE — TELEPHONE ENCOUNTER
I scheduled patient for her fibroscan at [facility] on 12/9/24 at 9:00am.  I called patient and gave her the date, time and location for her scan.  She was instructed to arrive by 8:30am, NPO after midnight and that includes no water or meds.  I made her aware that she has labs that need drawn prior to her follow up.  I gave her directions to radiology check in area.  I made her a follow up at the [place] office on 12/10/24 at 1:30pm.   She verbalized understanding and she confirmed these appts.    Electronically signed by Marii Soto RN on 11/5/2024 at 9:37 AM

## 2024-11-06 RX ORDER — PEN NEEDLE, DIABETIC 31 GX5/16"
1 NEEDLE, DISPOSABLE MISCELLANEOUS DAILY
Qty: 100 EACH | Refills: 3 | Status: SHIPPED | OUTPATIENT
Start: 2024-11-06

## 2024-11-15 ENCOUNTER — HOSPITAL ENCOUNTER (OUTPATIENT)
Dept: INFUSION THERAPY | Age: 54
Discharge: HOME | End: 2024-11-15
Payer: MEDICAID

## 2024-11-15 DIAGNOSIS — D64.9 ANEMIA, UNSPECIFIED TYPE: Primary | ICD-10-CM

## 2024-11-15 LAB
ALBUMIN SERPL-MCNC: 3.9 G/DL (ref 3.5–5.2)
ALP SERPL-CCNC: 120 U/L (ref 35–104)
ALT SERPL-CCNC: 25 U/L (ref 0–32)
ANION GAP SERPL CALCULATED.3IONS-SCNC: 10 MMOL/L (ref 7–16)
AST SERPL-CCNC: 29 U/L (ref 0–31)
BASOPHILS # BLD: 0.01 K/UL (ref 0–0.2)
BASOPHILS NFR BLD: 0 % (ref 0–2)
BILIRUB SERPL-MCNC: 0.2 MG/DL (ref 0–1.2)
BUN SERPL-MCNC: 17 MG/DL (ref 6–20)
CALCIUM SERPL-MCNC: 9.4 MG/DL (ref 8.6–10.2)
CHLORIDE SERPL-SCNC: 101 MMOL/L (ref 98–107)
CO2 SERPL-SCNC: 28 MMOL/L (ref 22–29)
CREAT SERPL-MCNC: 1 MG/DL (ref 0.5–1)
EOSINOPHIL # BLD: 0.12 K/UL (ref 0.05–0.5)
EOSINOPHILS RELATIVE PERCENT: 2 % (ref 0–6)
ERYTHROCYTE [DISTWIDTH] IN BLOOD BY AUTOMATED COUNT: 15.9 % (ref 11.5–15)
FERRITIN SERPL-MCNC: 45 NG/ML
GFR, ESTIMATED: 70 ML/MIN/1.73M2
GLUCOSE SERPL-MCNC: 128 MG/DL (ref 74–99)
HCT VFR BLD AUTO: 36.2 % (ref 34–48)
HGB BLD-MCNC: 11.6 G/DL (ref 11.5–15.5)
IMM GRANULOCYTES # BLD AUTO: <0.03 K/UL (ref 0–0.58)
IMM GRANULOCYTES NFR BLD: 0 % (ref 0–5)
IRON SATN MFR SERPL: 22 % (ref 15–50)
IRON SERPL-MCNC: 81 UG/DL (ref 37–145)
LYMPHOCYTES NFR BLD: 1.9 K/UL (ref 1.5–4)
LYMPHOCYTES RELATIVE PERCENT: 34 % (ref 20–42)
MCH RBC QN AUTO: 30.7 PG (ref 26–35)
MCHC RBC AUTO-ENTMCNC: 32 G/DL (ref 32–34.5)
MCV RBC AUTO: 95.8 FL (ref 80–99.9)
MONOCYTES NFR BLD: 0.56 K/UL (ref 0.1–0.95)
MONOCYTES NFR BLD: 10 % (ref 2–12)
NEUTROPHILS NFR BLD: 54 % (ref 43–80)
NEUTS SEG NFR BLD: 3.04 K/UL (ref 1.8–7.3)
PLATELET # BLD AUTO: 229 K/UL (ref 130–450)
PMV BLD AUTO: 10.5 FL (ref 7–12)
POTASSIUM SERPL-SCNC: 4.5 MMOL/L (ref 3.5–5)
PROT SERPL-MCNC: 7 G/DL (ref 6.4–8.3)
RBC # BLD AUTO: 3.78 M/UL (ref 3.5–5.5)
SODIUM SERPL-SCNC: 139 MMOL/L (ref 132–146)
TIBC SERPL-MCNC: 361 UG/DL (ref 250–450)
WBC OTHER # BLD: 5.6 K/UL (ref 4.5–11.5)

## 2024-11-15 PROCEDURE — 36415 COLL VENOUS BLD VENIPUNCTURE: CPT

## 2024-11-15 PROCEDURE — 83540 ASSAY OF IRON: CPT

## 2024-11-15 PROCEDURE — 82728 ASSAY OF FERRITIN: CPT

## 2024-11-15 PROCEDURE — 83550 IRON BINDING TEST: CPT

## 2024-11-15 PROCEDURE — 80053 COMPREHEN METABOLIC PANEL: CPT

## 2024-11-15 PROCEDURE — 85025 COMPLETE CBC W/AUTO DIFF WBC: CPT

## 2024-11-18 ENCOUNTER — OFFICE VISIT (OUTPATIENT)
Dept: ONCOLOGY | Age: 54
End: 2024-11-18
Payer: MEDICAID

## 2024-11-18 VITALS
DIASTOLIC BLOOD PRESSURE: 66 MMHG | BODY MASS INDEX: 37.78 KG/M2 | TEMPERATURE: 97.7 F | HEART RATE: 85 BPM | HEIGHT: 59 IN | SYSTOLIC BLOOD PRESSURE: 112 MMHG | WEIGHT: 187.4 LBS | OXYGEN SATURATION: 100 %

## 2024-11-18 DIAGNOSIS — Z79.811 USE OF AROMATASE INHIBITORS: ICD-10-CM

## 2024-11-18 DIAGNOSIS — C50.912 MALIGNANT NEOPLASM OF LEFT FEMALE BREAST, UNSPECIFIED ESTROGEN RECEPTOR STATUS, UNSPECIFIED SITE OF BREAST (HCC): ICD-10-CM

## 2024-11-18 DIAGNOSIS — D64.9 ANEMIA, UNSPECIFIED TYPE: Primary | ICD-10-CM

## 2024-11-18 PROCEDURE — G8427 DOCREV CUR MEDS BY ELIG CLIN: HCPCS | Performed by: INTERNAL MEDICINE

## 2024-11-18 PROCEDURE — 3074F SYST BP LT 130 MM HG: CPT | Performed by: INTERNAL MEDICINE

## 2024-11-18 PROCEDURE — 99212 OFFICE O/P EST SF 10 MIN: CPT

## 2024-11-18 PROCEDURE — 3017F COLORECTAL CA SCREEN DOC REV: CPT | Performed by: INTERNAL MEDICINE

## 2024-11-18 PROCEDURE — 1036F TOBACCO NON-USER: CPT | Performed by: INTERNAL MEDICINE

## 2024-11-18 PROCEDURE — 99214 OFFICE O/P EST MOD 30 MIN: CPT | Performed by: INTERNAL MEDICINE

## 2024-11-18 PROCEDURE — 3078F DIAST BP <80 MM HG: CPT | Performed by: INTERNAL MEDICINE

## 2024-11-18 PROCEDURE — G8484 FLU IMMUNIZE NO ADMIN: HCPCS | Performed by: INTERNAL MEDICINE

## 2024-11-18 PROCEDURE — G8417 CALC BMI ABV UP PARAM F/U: HCPCS | Performed by: INTERNAL MEDICINE

## 2024-11-18 RX ORDER — ANASTROZOLE 1 MG/1
1 TABLET ORAL DAILY
Qty: 30 TABLET | Refills: 10 | Status: SHIPPED | OUTPATIENT
Start: 2024-11-18

## 2024-11-18 NOTE — PROGRESS NOTES
the patient was referred to GI.  She had evidence of iron deficiency, recommended oral iron supplement, labs reviewed today, the anemia had resolved, hemoglobin is 11.6 G/L, iron studies had normalized, recommended to decrease the oral iron to every other day.  The patient had EGD and colonoscopy done in July of 2024, records reviewed, was found to have gastritis, cecal polyp and diverticulosis.  Was recommended repeat colonoscopy after 5 years.      RTC in 3 months.      LAKESHA Lopez,ACNS-BC,AGACNP-BC  HEMATOLOGY/MEDICAL ONCOLOGY  [facility] [place] [facility] MED ONCOLOGY  [address] [facility] [address]  Dept: [phone]  Loc: [phone]

## 2024-12-09 ENCOUNTER — TELEPHONE (OUTPATIENT)
Dept: HEMATOLOGY | Age: 54
End: 2024-12-09

## 2024-12-09 NOTE — TELEPHONE ENCOUNTER
Patient was called and made aware that I had to move her follow up appt since her scan was rescheduled.  New appt made for 1/21/25 at 12:30pm at the [facility].  She verbalized understanding.    Electronically signed by Marii Soto RN on 12/9/2024 at 10:40 AM

## 2024-12-11 ENCOUNTER — OFFICE VISIT (OUTPATIENT)
Dept: FAMILY MEDICINE CLINIC | Age: 54
End: 2024-12-11
Payer: MEDICAID

## 2024-12-11 VITALS
TEMPERATURE: 97.6 F | OXYGEN SATURATION: 96 % | HEART RATE: 89 BPM | RESPIRATION RATE: 18 BRPM | HEIGHT: 59 IN | BODY MASS INDEX: 37.42 KG/M2 | DIASTOLIC BLOOD PRESSURE: 76 MMHG | WEIGHT: 185.6 LBS | SYSTOLIC BLOOD PRESSURE: 124 MMHG

## 2024-12-11 DIAGNOSIS — C50.912 INVASIVE DUCTAL CARCINOMA OF LEFT BREAST (HCC): ICD-10-CM

## 2024-12-11 DIAGNOSIS — I24.9 ACS (ACUTE CORONARY SYNDROME) (HCC): ICD-10-CM

## 2024-12-11 DIAGNOSIS — M25.551 PAIN OF RIGHT HIP: ICD-10-CM

## 2024-12-11 DIAGNOSIS — Z17.0 MALIGNANT NEOPLASM OF LEFT BREAST IN FEMALE, ESTROGEN RECEPTOR POSITIVE, UNSPECIFIED SITE OF BREAST (HCC): ICD-10-CM

## 2024-12-11 DIAGNOSIS — E11.65 UNCONTROLLED TYPE 2 DIABETES MELLITUS WITH HYPERGLYCEMIA (HCC): Primary | ICD-10-CM

## 2024-12-11 DIAGNOSIS — I10 ESSENTIAL HYPERTENSION: ICD-10-CM

## 2024-12-11 DIAGNOSIS — E11.3553 STABLE PROLIFERATIVE DIABETIC RETINOPATHY OF BOTH EYES ASSOCIATED WITH TYPE 2 DIABETES MELLITUS (HCC): ICD-10-CM

## 2024-12-11 DIAGNOSIS — E11.43 TYPE II DIABETES MELLITUS WITH PERIPHERAL AUTONOMIC NEUROPATHY (HCC): ICD-10-CM

## 2024-12-11 DIAGNOSIS — G62.9 NEUROPATHY: ICD-10-CM

## 2024-12-11 DIAGNOSIS — C50.912 MALIGNANT NEOPLASM OF LEFT BREAST IN FEMALE, ESTROGEN RECEPTOR POSITIVE, UNSPECIFIED SITE OF BREAST (HCC): ICD-10-CM

## 2024-12-11 LAB — HBA1C MFR BLD: 7.3 %

## 2024-12-11 PROCEDURE — 3078F DIAST BP <80 MM HG: CPT | Performed by: FAMILY MEDICINE

## 2024-12-11 PROCEDURE — 1036F TOBACCO NON-USER: CPT | Performed by: FAMILY MEDICINE

## 2024-12-11 PROCEDURE — G8417 CALC BMI ABV UP PARAM F/U: HCPCS | Performed by: FAMILY MEDICINE

## 2024-12-11 PROCEDURE — 2022F DILAT RTA XM EVC RTNOPTHY: CPT | Performed by: FAMILY MEDICINE

## 2024-12-11 PROCEDURE — G8427 DOCREV CUR MEDS BY ELIG CLIN: HCPCS | Performed by: FAMILY MEDICINE

## 2024-12-11 PROCEDURE — 99214 OFFICE O/P EST MOD 30 MIN: CPT | Performed by: FAMILY MEDICINE

## 2024-12-11 PROCEDURE — 83036 HEMOGLOBIN GLYCOSYLATED A1C: CPT | Performed by: FAMILY MEDICINE

## 2024-12-11 PROCEDURE — 3051F HG A1C>EQUAL 7.0%<8.0%: CPT | Performed by: FAMILY MEDICINE

## 2024-12-11 PROCEDURE — 3074F SYST BP LT 130 MM HG: CPT | Performed by: FAMILY MEDICINE

## 2024-12-11 PROCEDURE — 3017F COLORECTAL CA SCREEN DOC REV: CPT | Performed by: FAMILY MEDICINE

## 2024-12-11 PROCEDURE — G8484 FLU IMMUNIZE NO ADMIN: HCPCS | Performed by: FAMILY MEDICINE

## 2024-12-11 RX ORDER — GABAPENTIN 300 MG/1
300 CAPSULE ORAL NIGHTLY
Qty: 30 CAPSULE | Refills: 2 | Status: SHIPPED | OUTPATIENT
Start: 2024-12-11 | End: 2025-03-11

## 2024-12-12 ENCOUNTER — OFFICE VISIT (OUTPATIENT)
Dept: ORTHOPEDIC SURGERY | Age: 54
End: 2024-12-12
Payer: MEDICAID

## 2024-12-12 VITALS
WEIGHT: 185 LBS | BODY MASS INDEX: 37.29 KG/M2 | HEIGHT: 59 IN | SYSTOLIC BLOOD PRESSURE: 158 MMHG | HEART RATE: 91 BPM | DIASTOLIC BLOOD PRESSURE: 96 MMHG | TEMPERATURE: 98 F

## 2024-12-12 DIAGNOSIS — M25.551 RIGHT HIP PAIN: ICD-10-CM

## 2024-12-12 DIAGNOSIS — M16.11 PRIMARY OSTEOARTHRITIS OF RIGHT HIP: Primary | ICD-10-CM

## 2024-12-12 DIAGNOSIS — M16.11 PRIMARY LOCALIZED OSTEOARTHRITIS OF RIGHT HIP: Primary | ICD-10-CM

## 2024-12-12 LAB
6-MONOACETYLMORPHINE, URINE: NEGATIVE
ABNORMAL SPECIMEN VALIDITY TEST: ABNORMAL
ALCOHOL URINE: NOT DETECTED MG/DL
AMPHETAMINE SCREEN URINE: NEGATIVE
BARBITURATE SCREEN URINE: NEGATIVE
BENZODIAZEPINE SCREEN, URINE: NEGATIVE
BUPRENORPHINE URINE: NEGATIVE
CANNABINOID SCREEN URINE: NEGATIVE
COCAINE METABOLITE, URINE: NEGATIVE
FENTANYL URINE: NEGATIVE
INTEGRITY CHECK, CREATININE, URINE: 8 MG/DL (ref 22–250)
INTEGRITY CHECK, OXIDANT, URINE: <40 MG/L
INTEGRITY CHECK, PH, URINE: 6.9 (ref 4.5–9)
INTEGRITY CHECK, SPECIFIC GRAVITY, URINE: 1.01 (ref 1–1.03)
METHADONE SCREEN, URINE: NEGATIVE
OPIATES, URINE: NEGATIVE
OXYCODONE SCREEN URINE: NEGATIVE
PCP,URINE: NEGATIVE
TEST INFORMATION: ABNORMAL
TRAMADOL, URINE: NEGATIVE

## 2024-12-12 PROCEDURE — 1036F TOBACCO NON-USER: CPT | Performed by: ORTHOPAEDIC SURGERY

## 2024-12-12 PROCEDURE — 99214 OFFICE O/P EST MOD 30 MIN: CPT | Performed by: ORTHOPAEDIC SURGERY

## 2024-12-12 PROCEDURE — G8427 DOCREV CUR MEDS BY ELIG CLIN: HCPCS | Performed by: ORTHOPAEDIC SURGERY

## 2024-12-12 PROCEDURE — 3077F SYST BP >= 140 MM HG: CPT | Performed by: ORTHOPAEDIC SURGERY

## 2024-12-12 PROCEDURE — G8417 CALC BMI ABV UP PARAM F/U: HCPCS | Performed by: ORTHOPAEDIC SURGERY

## 2024-12-12 PROCEDURE — 3080F DIAST BP >= 90 MM HG: CPT | Performed by: ORTHOPAEDIC SURGERY

## 2024-12-12 PROCEDURE — 3017F COLORECTAL CA SCREEN DOC REV: CPT | Performed by: ORTHOPAEDIC SURGERY

## 2024-12-12 PROCEDURE — G8484 FLU IMMUNIZE NO ADMIN: HCPCS | Performed by: ORTHOPAEDIC SURGERY

## 2024-12-12 ASSESSMENT — ENCOUNTER SYMPTOMS
ALLERGIC/IMMUNOLOGIC NEGATIVE: 1
ABDOMINAL DISTENTION: 0
SHORTNESS OF BREATH: 0
EYE DISCHARGE: 0

## 2024-12-12 NOTE — PROGRESS NOTES
Jenny Lilly (:  1970) is a 54 y.o. female,Established patient, here for evaluation of the following chief complaint(s):  Follow-up (Follow up right hip. Patient is still having a lot of pain and wishes to reschedule surgery. She states her PCP gave her clearance.)      Assessment & Plan   ASSESSMENT/PLAN:  1. Primary localized osteoarthritis of right hip  -     CT HIP RIGHT WO CONTRAST; Future  2. Right hip pain  -     CT HIP RIGHT WO CONTRAST; Future      This is a 54 y.o. year old female with Primary localized osteoarthritis of right hip [M16.11].  I discussed a variety of treatment options with the patient today including observation, NSAID, low impact exercise, weight loss, physical therapy and injections. I also discussed the risks, benefits, alternatives and subsequent rehab with surgery. The patient would like to proceed with surgery.    The procedure of right total hip arthroplasty was discussed with the patient today. Preoperative and postoperative care was discussed in detail. Alternative nonsurgical treatment options were again reviewed. After discussing all options the patient wishes to proceed with surgical management. The patient voices understand of the procedure and agrees to proceed. All the patient's questions. Verbal consent was obtained. DVT prophylaxis therapy planned based on risks for DVT/PE and bleeding according to the AAOS guidelines. The patient assessed for need of blood conservation program and/or nutritional intervention.  Preoperative skin preparation and nasal antiseptic application was discussed. Discharge planning reviewed.    The risks of right total hip arthroplasty were discussed in detail with the patient including but not limited to: infection (superficial and deep), DVT, PE, arthrofibrosis, persistent pain, chronic limb swelling, neurovascular injury, dislocation or subluxation, excessive bleeding, intraoperative and/or postoperative fracture,

## 2024-12-14 LAB
CREATININE URINE: 8 MG/DL (ref 29–226)
MICROALBUMIN/CREAT 24H UR: <12 MG/L (ref 0–19)
MICROALBUMIN/CREAT UR-RTO: ABNORMAL MCG/MG CREAT (ref 0–30)

## 2024-12-16 ENCOUNTER — TELEPHONE (OUTPATIENT)
Dept: ORTHOPEDIC SURGERY | Age: 54
End: 2024-12-16

## 2024-12-16 ENCOUNTER — PREP FOR PROCEDURE (OUTPATIENT)
Dept: ORTHOPEDIC SURGERY | Age: 54
End: 2024-12-16

## 2024-12-16 PROBLEM — M25.551 PAIN OF RIGHT HIP: Status: ACTIVE | Noted: 2024-12-16

## 2024-12-16 ASSESSMENT — ENCOUNTER SYMPTOMS
DIARRHEA: 0
EYE ITCHING: 0
ABDOMINAL DISTENTION: 0
RHINORRHEA: 0
COUGH: 0
EYE PAIN: 0
FACIAL SWELLING: 0
CHEST TIGHTNESS: 0
GASTROINTESTINAL NEGATIVE: 1
PHOTOPHOBIA: 0
COLOR CHANGE: 0
ABDOMINAL PAIN: 0
SHORTNESS OF BREATH: 0
BACK PAIN: 0
SINUS PAIN: 0
RECTAL PAIN: 0
EYE REDNESS: 0
TROUBLE SWALLOWING: 0
WHEEZING: 0
ANAL BLEEDING: 0
ALLERGIC/IMMUNOLOGIC NEGATIVE: 1
STRIDOR: 0
CHOKING: 0
SINUS PRESSURE: 0
EYE DISCHARGE: 0
BLOOD IN STOOL: 0
VOMITING: 0
NAUSEA: 0
VOICE CHANGE: 0
CONSTIPATION: 0
SORE THROAT: 0

## 2024-12-16 NOTE — PROGRESS NOTES
SUBJECTIVE  Jenny Lilly is a 54 y.o. female.    HPI/Chief C/O:  Chief Complaint   Patient presents with    Diabetes     Pt here for a 3 month check. , 134, 144, 98, 78.    Medication Refill     Needs med refill.     No Known Allergies    This 54 year old female presents with hypertension, hyperlipidemia, type 2 DM, ACS (HCC), and invasive ductal carcinoma of right breast (HCC). Pt denies chest pain and denies shortness of breath.   Pt c/o right hip pain. A1C is 7.3, pt now is able to get right hip replacement. Pt follows with cardiology, ophthalmology, and oncology.     ROS:  Review of Systems   Constitutional:  Positive for activity change and fatigue. Negative for appetite change, chills, diaphoresis, fever and unexpected weight change.   HENT:  Negative for congestion, dental problem, drooling, ear discharge, ear pain, facial swelling, hearing loss, mouth sores, nosebleeds, postnasal drip, rhinorrhea, sinus pressure, sinus pain, sneezing, sore throat, tinnitus, trouble swallowing and voice change.    Eyes:  Positive for visual disturbance. Negative for photophobia, pain, discharge, redness and itching.   Respiratory:  Negative for cough, choking, chest tightness, shortness of breath, wheezing and stridor.    Cardiovascular: Negative.  Negative for chest pain, palpitations and leg swelling.   Gastrointestinal: Negative.  Negative for abdominal distention, abdominal pain, anal bleeding, blood in stool, constipation, diarrhea, nausea, rectal pain and vomiting.   Endocrine: Negative.  Negative for cold intolerance, heat intolerance, polydipsia, polyphagia and polyuria.   Genitourinary:  Negative for decreased urine volume, difficulty urinating, dysuria, enuresis, flank pain, frequency, genital sores, hematuria, menstrual problem, pelvic pain, urgency and vaginal discharge.   Musculoskeletal:  Positive for arthralgias, gait problem and myalgias. Negative for back pain, joint swelling, neck pain and neck

## 2024-12-16 NOTE — TELEPHONE ENCOUNTER
Prior Authorization Form:      DEMOGRAPHICS:                     Patient Name:  Jenny Lilly  Patient :  1970            Insurance:  Payor: [employer] / Plan: [employer] [place]AN [place] / Product Type: *No Product type* /   Insurance ID Number:    Payer/Plan Subscr  Sex Relation Sub. Ins. ID Effective Group Num   1. [facility]* JENNY LILLY 1970 Female Self [id number]                                    [address]         DIAGNOSIS & PROCEDURE:                       Procedure/Operation: RIGHT TOTAL HIP ARTHROPLASTY           CPT Code: 20270    Diagnosis:  Primary Osteoarthritis of right hip, Right hip pain     ICD10 Code: M16.11, M25.551    Location:  [facility]     Surgeon:  Dr. Chance Sofia     SCHEDULING INFORMATION:                          Date: 2025    Time: TBD              Anesthesia:  Spinal                                                       Status:  Outpatient        Special Comments:         Electronically signed by Li Up MA on 2024 at 10:48 AM  3

## 2024-12-27 NOTE — PROGRESS NOTES
Clinical follow up: Is without evidence of recurrent disease.  She has a non-inflamed, nontender epidermal inclusion cyst of the right axilla.  We discussed management of epidermal inclusion cysts and discussed symptom management.  We reviewed that sometimes epidermal inclusion cysts will resolve on their own.  She will try warm compress 3-4 times daily.  She will call us in the event the cyst becomes inflamed.  We discussed that for recurrent cysts, definitive treatment is the surgical excision of the cyst along with the cystic sac.  Will plan to see her in 6 months.      12/13/2022  Clinically, she is without evidence of recurrent disease.  The prior right epidermal inclusion cyst has resolved.  Inframammary yeast infections are controlled with improved glucose control as well as nystatin powder as needed.  We will plan to see in 6 months for clinical exam and as needed.    06/21/2023 clinical follow-up is without evidence of recurrent disease.  Continues to tolerate endocrine therapy well.  According to the American College of Radiology (ACR), consideration should be made for bilateral complete breast screening ultrasounds in women who have been noted to have dense breast tissue on imaging. After reviewing her mammogram and performing clinical breast exam, we recommend, based on her breast tissue density score (grade C) that she consider bilateral complete breast screening ultrasounds annually.   We will see her back in the office in December with bilateral screening mammogram same day and as needed.    12/20/2023 clinical follow up: Is without evidence of recurrent disease.  She has chronic posttreatment changes on the left, most notable of the NAC.  On her right upper extremity she has a fine rash which has the appearance of Tinea imbricata; will give nystatin cream and she will follow-up with primary care in the event there is no improvement.  08/25/2023 admitted to the inpatient unit with acutely

## 2025-01-03 ENCOUNTER — PREP FOR PROCEDURE (OUTPATIENT)
Dept: ORTHOPEDIC SURGERY | Age: 55
End: 2025-01-03

## 2025-01-03 DIAGNOSIS — M16.11 PRIMARY LOCALIZED OSTEOARTHRITIS OF RIGHT HIP: Primary | ICD-10-CM

## 2025-01-03 RX ORDER — ACETAMINOPHEN 325 MG/1
1000 TABLET ORAL ONCE
Status: CANCELLED | OUTPATIENT
Start: 2025-01-03 | End: 2025-01-03

## 2025-01-03 RX ORDER — SODIUM CHLORIDE 0.9 % (FLUSH) 0.9 %
5-40 SYRINGE (ML) INJECTION PRN
Status: CANCELLED | OUTPATIENT
Start: 2025-01-03

## 2025-01-03 RX ORDER — SODIUM CHLORIDE 0.9 % (FLUSH) 0.9 %
5-40 SYRINGE (ML) INJECTION EVERY 12 HOURS SCHEDULED
Status: CANCELLED | OUTPATIENT
Start: 2025-01-03

## 2025-01-03 RX ORDER — CELECOXIB 200 MG/1
200 CAPSULE ORAL ONCE
Status: CANCELLED | OUTPATIENT
Start: 2025-01-03 | End: 2025-01-03

## 2025-01-03 RX ORDER — SODIUM CHLORIDE 9 MG/ML
INJECTION, SOLUTION INTRAVENOUS PRN
Status: CANCELLED | OUTPATIENT
Start: 2025-01-03

## 2025-01-06 DIAGNOSIS — G25.81 RLS (RESTLESS LEGS SYNDROME): ICD-10-CM

## 2025-01-06 RX ORDER — PRAMIPEXOLE DIHYDROCHLORIDE 0.12 MG/1
TABLET ORAL
Qty: 90 TABLET | Refills: 0 | Status: SHIPPED | OUTPATIENT
Start: 2025-01-06

## 2025-01-06 RX ORDER — PEN NEEDLE, DIABETIC 31 GX5/16"
1 NEEDLE, DISPOSABLE MISCELLANEOUS DAILY
Qty: 100 EACH | Refills: 3 | Status: SHIPPED | OUTPATIENT
Start: 2025-01-06

## 2025-01-06 RX ORDER — SODIUM CHLORIDE 9 MG/ML
INJECTION, SOLUTION INTRAVENOUS CONTINUOUS
OUTPATIENT
Start: 2025-01-13

## 2025-01-06 RX ORDER — SCOPOLAMINE 1 MG/3D
1 PATCH, EXTENDED RELEASE TRANSDERMAL ONCE
OUTPATIENT
Start: 2025-01-13

## 2025-01-06 NOTE — TELEPHONE ENCOUNTER
Last Appointment:  12/11/2024  Future Appointments   Date Time Provider Department Center   1/7/2025 12:00 PM [facility] PAT ROOM 2 [facility] PREADM [place]   1/9/2025 10:00 AM [facility] US RM 3 [facility]  SE Rad/Car   1/21/2025 12:30 PM Gurjit Art, APRN - CNP ELENA SURG [facility]   2/17/2025  2:00 PM Kait Willams APRN - CNP BDM ENDO [facility]   2/18/2025  9:30 AM [facility] LABS ROOM MEDICAL ONCOLOGY [facility] MED ONC [place]   2/20/2025  9:15 AM Keyonna Rinaldi MD [facility] MedONC [facility]   3/5/2025  9:00 AM [facility] ABDU ETHAN RM 2 [facility] ABDU BC SE Rad/Car   3/5/2025 10:00 AM Malena Lo, APRN - CNP JACBCC [facility]   3/12/2025  9:30 AM Charlene Velez, DO [facility] BS ECC DEP

## 2025-01-06 NOTE — TELEPHONE ENCOUNTER
Needs refill on Trulicity and pen needles I can't reorder the Trulicity it says you have to put it in again

## 2025-01-07 ENCOUNTER — HOSPITAL ENCOUNTER (OUTPATIENT)
Dept: GENERAL RADIOLOGY | Age: 55
Discharge: HOME | End: 2025-01-09
Payer: MEDICAID

## 2025-01-07 ENCOUNTER — HOSPITAL ENCOUNTER (OUTPATIENT)
Dept: PREADMISSION TESTING | Age: 55
Discharge: HOME | End: 2025-01-07
Payer: MEDICAID

## 2025-01-07 VITALS
OXYGEN SATURATION: 97 % | HEIGHT: 59 IN | TEMPERATURE: 97.8 F | BODY MASS INDEX: 36.89 KG/M2 | DIASTOLIC BLOOD PRESSURE: 94 MMHG | SYSTOLIC BLOOD PRESSURE: 122 MMHG | RESPIRATION RATE: 18 BRPM | WEIGHT: 183 LBS | HEART RATE: 80 BPM

## 2025-01-07 DIAGNOSIS — M16.11 PRIMARY LOCALIZED OSTEOARTHRITIS OF RIGHT HIP: ICD-10-CM

## 2025-01-07 DIAGNOSIS — Z01.818 PRE-OP TESTING: Primary | ICD-10-CM

## 2025-01-07 LAB
ALBUMIN SERPL-MCNC: 4 G/DL (ref 3.5–5.2)
ALP SERPL-CCNC: 136 U/L (ref 35–104)
ALT SERPL-CCNC: 23 U/L (ref 0–32)
ANION GAP SERPL CALCULATED.3IONS-SCNC: 10 MMOL/L (ref 7–16)
AST SERPL-CCNC: 23 U/L (ref 0–31)
BASOPHILS # BLD: 0.01 K/UL (ref 0–0.2)
BASOPHILS NFR BLD: 0 % (ref 0–2)
BILIRUB SERPL-MCNC: 0.2 MG/DL (ref 0–1.2)
BUN SERPL-MCNC: 14 MG/DL (ref 6–20)
CALCIUM SERPL-MCNC: 9.7 MG/DL (ref 8.6–10.2)
CHLORIDE SERPL-SCNC: 99 MMOL/L (ref 98–107)
CO2 SERPL-SCNC: 30 MMOL/L (ref 22–29)
CREAT SERPL-MCNC: 1 MG/DL (ref 0.5–1)
EOSINOPHIL # BLD: 0.07 K/UL (ref 0.05–0.5)
EOSINOPHILS RELATIVE PERCENT: 1 % (ref 0–6)
ERYTHROCYTE [DISTWIDTH] IN BLOOD BY AUTOMATED COUNT: 13.4 % (ref 11.5–15)
GFR, ESTIMATED: 70 ML/MIN/1.73M2
GLUCOSE SERPL-MCNC: 97 MG/DL (ref 74–99)
HBA1C MFR BLD: 7.7 % (ref 4–5.6)
HCT VFR BLD AUTO: 38.1 % (ref 34–48)
HGB BLD-MCNC: 12.6 G/DL (ref 11.5–15.5)
IMM GRANULOCYTES # BLD AUTO: <0.03 K/UL (ref 0–0.58)
IMM GRANULOCYTES NFR BLD: 0 % (ref 0–5)
INR PPP: 1.1
LYMPHOCYTES NFR BLD: 2.15 K/UL (ref 1.5–4)
LYMPHOCYTES RELATIVE PERCENT: 32 % (ref 20–42)
MCH RBC QN AUTO: 31.2 PG (ref 26–35)
MCHC RBC AUTO-ENTMCNC: 33.1 G/DL (ref 32–34.5)
MCV RBC AUTO: 94.3 FL (ref 80–99.9)
MONOCYTES NFR BLD: 0.34 K/UL (ref 0.1–0.95)
MONOCYTES NFR BLD: 5 % (ref 2–12)
NEUTROPHILS NFR BLD: 62 % (ref 43–80)
NEUTS SEG NFR BLD: 4.19 K/UL (ref 1.8–7.3)
PARTIAL THROMBOPLASTIN TIME: 28.2 SEC (ref 24.5–35.1)
PLATELET # BLD AUTO: 255 K/UL (ref 130–450)
PMV BLD AUTO: 10 FL (ref 7–12)
POTASSIUM SERPL-SCNC: 4.2 MMOL/L (ref 3.5–5)
PREALB SERPL-MCNC: 24 MG/DL (ref 20–40)
PROT SERPL-MCNC: 7.8 G/DL (ref 6.4–8.3)
PROTHROMBIN TIME: 11.6 SEC (ref 9.3–12.4)
RBC # BLD AUTO: 4.04 M/UL (ref 3.5–5.5)
SODIUM SERPL-SCNC: 139 MMOL/L (ref 132–146)
WBC OTHER # BLD: 6.8 K/UL (ref 4.5–11.5)

## 2025-01-07 PROCEDURE — 84134 ASSAY OF PREALBUMIN: CPT

## 2025-01-07 PROCEDURE — 83036 HEMOGLOBIN GLYCOSYLATED A1C: CPT

## 2025-01-07 PROCEDURE — 80053 COMPREHEN METABOLIC PANEL: CPT

## 2025-01-07 PROCEDURE — 85025 COMPLETE CBC W/AUTO DIFF WBC: CPT

## 2025-01-07 PROCEDURE — 87081 CULTURE SCREEN ONLY: CPT

## 2025-01-07 PROCEDURE — 85610 PROTHROMBIN TIME: CPT

## 2025-01-07 PROCEDURE — 87086 URINE CULTURE/COLONY COUNT: CPT

## 2025-01-07 PROCEDURE — 71046 X-RAY EXAM CHEST 2 VIEWS: CPT

## 2025-01-07 PROCEDURE — 36415 COLL VENOUS BLD VENIPUNCTURE: CPT

## 2025-01-07 PROCEDURE — 85730 THROMBOPLASTIN TIME PARTIAL: CPT

## 2025-01-07 ASSESSMENT — HOOS JR
RISING FROM SITTING: SEVERE
GOING UP OR DOWN STAIRS: SEVERE
HOOS JR RAW SCORE: 19
LYING IN BED (TURNING OVER, MAINTAINING HIP POSITION): SEVERE
HOOS JR TOTAL INTERVAL SCORE: 29.009
WALKING ON UNEVEN SURFACE: EXTREME
BENDING TO THE FLOOR TO PICK UP OBJECT: SEVERE
HOOS JR RAW SCORE: 19
SITTING: SEVERE

## 2025-01-07 ASSESSMENT — PROMIS GLOBAL HEALTH SCALE
WHO IS THE PERSON COMPLETING THE PROMIS V1.1 SURVEY?: SELF
TO WHAT EXTENT ARE YOU ABLE TO CARRY OUT YOUR EVERYDAY PHYSICAL ACTIVITIES SUCH AS WALKING, CLIMBING STAIRS, CARRYING GROCERIES, OR MOVING A CHAIR [ON A SCALE OF 1 (NOT AT ALL) TO 5 (COMPLETELY)]?: A LITTLE
IN THE PAST 7 DAYS, HOW WOULD YOU RATE YOUR FATIGUE ON AVERAGE [ON A SCALE FROM 1 (NONE) TO 5 (VERY SEVERE)]?: MODERATE
HOW IS THE PROMIS V1.1 BEING ADMINISTERED?: PAPER
IN THE PAST 7 DAYS, HOW OFTEN HAVE YOU BEEN BOTHERED BY EMOTIONAL PROBLEMS, SUCH AS FEELING ANXIOUS, DEPRESSED, OR IRRITABLE [ON A SCALE FROM 1 (NEVER) TO 5 (ALWAYS)]?: RARELY
IN GENERAL, WOULD YOU SAY YOUR QUALITY OF LIFE IS...[ON A SCALE OF 1 (POOR) TO 5 (EXCELLENT)]: VERY GOOD
IN GENERAL, HOW WOULD YOU RATE YOUR SATISFACTION WITH YOUR SOCIAL ACTIVITIES AND RELATIONSHIPS [ON A SCALE OF 1 (POOR) TO 5 (EXCELLENT)]?: GOOD
IN GENERAL, HOW WOULD YOU RATE YOUR MENTAL HEALTH, INCLUDING YOUR MOOD AND YOUR ABILITY TO THINK [ON A SCALE OF 1 (POOR) TO 5 (EXCELLENT)]?: GOOD
IN THE PAST 7 DAYS, HOW WOULD YOU RATE YOUR PAIN ON AVERAGE [ON A SCALE FROM 0 (NO PAIN) TO 10 (WORST IMAGINABLE PAIN)]?: 8
IN GENERAL, HOW WOULD YOU RATE YOUR PHYSICAL HEALTH [ON A SCALE OF 1 (POOR) TO 5 (EXCELLENT)]?: FAIR
SUM OF RESPONSES TO QUESTIONS 2, 4, 5, & 10: 14
IN GENERAL, PLEASE RATE HOW WELL YOU CARRY OUT YOUR USUAL SOCIAL ACTIVITIES (INCLUDES ACTIVITIES AT HOME, AT WORK, AND IN YOUR COMMUNITY, AND RESPONSIBILITIES AS A PARENT, CHILD, SPOUSE, EMPLOYEE, FRIEND, ETC) [ON A SCALE OF 1 (POOR) TO 5 (EXCELLENT)]?: GOOD
IN GENERAL, WOULD YOU SAY YOUR HEALTH IS...[ON A SCALE OF 1 (POOR) TO 5 (EXCELLENT)]: FAIR
SUM OF RESPONSES TO QUESTIONS 3, 6, 7, & 8: 15

## 2025-01-07 ASSESSMENT — PAIN DESCRIPTION - DESCRIPTORS: DESCRIPTORS: DISCOMFORT

## 2025-01-07 ASSESSMENT — PAIN SCALES - GENERAL: PAINLEVEL_OUTOF10: 5

## 2025-01-07 ASSESSMENT — PAIN DESCRIPTION - FREQUENCY: FREQUENCY: CONTINUOUS

## 2025-01-07 ASSESSMENT — PAIN DESCRIPTION - PAIN TYPE: TYPE: CHRONIC PAIN

## 2025-01-07 ASSESSMENT — PAIN DESCRIPTION - LOCATION: LOCATION: HIP

## 2025-01-07 ASSESSMENT — PAIN DESCRIPTION - ONSET: ONSET: ON-GOING

## 2025-01-07 ASSESSMENT — PAIN DESCRIPTION - ORIENTATION: ORIENTATION: RIGHT

## 2025-01-07 NOTE — PROGRESS NOTES
Order placed for iv team,  surgery scheduler notified, message sent to iv team.  Spoke with ladi at dr ruiz office regarding : need clearance notes, she will have enoc look into.

## 2025-01-07 NOTE — PROGRESS NOTES
[facility]                                                                                                                    PRE OP INSTRUCTIONS FOR  Jenny Lilly        Date: 1/7/2025    Date of surgery: 1/13/25   Arrival Time: Hospital will call you Friday, between 5pm and 7pm with your final arrival time for surgery.                       Go to     front of hospital and check in at information desk.    Nothing by mouth (NPO) as instructed. May have clear liquids up to 2 hours prior to surgery. Nothing solid after midnight. Examples: water, apple juice, black coffee, plain tea    Take the following medications with a small sip of water on the morning of Surgery:  metoprolol,buspar, inhaler as needed.    Diabetics may take half the evening dose of insulin but none after midnight.  If you feel symptomatic or have low blood sugar morning of surgery drink 1-2 ounces of apple juice only. If you take a weekly insulin injection _trulicity, stop 7 days prior to surgery. If you take _______________, stop 3-4 days prior to surgery.    Aspirin, Ibuprofen, Advil, Naproxen, other Anti-inflammatory products should be stopped before surgery as directed by your surgeon, cardiologist, or primary care Doctor. Herbal supplements and Vitamin E should be stopped five days prior.  May take Tylenol unless instructed otherwise by your surgeon.    Check with your Doctor regarding stopping Plavix, Coumadin, Lovenox, Eliquis, Effient, or other blood thinners such as, pradaxa, lixiana, xaralto and savaysa.    Do not smoke, chew tobacco, vape, or use illicit drugs and do not drink any alcoholic beverages 24 hours prior to surgery.    You may brush your teeth the morning of surgery.      You MUST make arrangements for a responsible adult, 18 and over, to take you home after your surgery. You will not be allowed to leave alone or drive yourself home.  You will need someone stay with you the first 24 hrs. Your

## 2025-01-07 NOTE — PROGRESS NOTES
Patient attended preoperative Total Joint Camp on 1/7/2025.  Patient is scheduled to have an elective hip replacement.  Patient was educated regarding Disease Process, Medications, Smoking Cessation, Oxygenation, Incentive Spirometry and Deep Breath and Cough, signs and symptoms of postoperative joint infection that include: Fever, Chills, Pain Control, Drainage and Redness, post-op follow up with orthopaedic surgeon, dressing removal, staple removal, ambulatory devices which include a wheeled walker and cane, bed mobility, correct anatomical alignment, active range of motion, proper transferring technique, incision care, infection prevention measures, non-pharmacologic comfort measures, notification of inadequate pain control measures, pain scale for assessing level of pain, pharmacologic pain management, relaxation techniques.

## 2025-01-09 ENCOUNTER — TELEPHONE (OUTPATIENT)
Dept: ORTHOPEDIC SURGERY | Age: 55
End: 2025-01-09

## 2025-01-09 LAB
MICROORGANISM SPEC CULT: NORMAL
SERVICE CMNT-IMP: NORMAL
SPECIMEN DESCRIPTION: NORMAL

## 2025-01-09 NOTE — CARE COORDINATION
01/09/25 1542   Social/Functional History   Lives With Alone   Type of Home Apartment   Home Layout One level   Home Access Level entry   Bathroom Shower/Tub Tub/Shower unit   Bathroom Equipment Shower chair;3-in-1 Commode;Grab bars in shower   Home Equipment Walker - 4-Wheeled;Walker - Rolling   Receives Help From Family   Condition of Participation: Discharge Planning   The Plan for Transition of Care is related to the following treatment goals: HHC   The Patient and/or Patient Representative was provided with a Choice of Provider? Patient   The Patient and/Or Patient Representative agree with the Discharge Plan? Yes   Freedom of Choice list was provided with basic dialogue that supports the patient's individualized plan of care/goals, treatment preferences, and shares the quality data associated with the providers?  Yes     1/9/2025:  NOTE:  Met with pt in [facility], pt having surgery for a elective total hip arthroplasty on 1/13, pt plans to return home day of surgery from [facility], her sister will transport her home, prefers [facility]/DME, referral made through [employer] for agency to follow post-op for home PT orders for SOC on 1/14, referral made to Gala at [facility] for a ttb, she will follow for dme orders to deliver equipment to [facility] day of surgery for pt to take home, Nursing informed. Electronically signed by RENETTA Worthington on 1/9/2025 at 3:27 PM

## 2025-01-09 NOTE — TELEPHONE ENCOUNTER
TAMARA called requesting medical clearance.     Patient pending R CARTER with Dr. Sofia on 1/27/25.  Surgery was previously postponed (6/10/24) due to elevated A1C. A1C as of 1/7/25 ok to proceed per Dr. Sofia.     Is patient medically cleared by pcp office?

## 2025-01-13 ENCOUNTER — ANESTHESIA EVENT (OUTPATIENT)
Dept: OPERATING ROOM | Age: 55
End: 2025-01-13
Payer: MEDICAID

## 2025-01-13 ENCOUNTER — APPOINTMENT (OUTPATIENT)
Dept: GENERAL RADIOLOGY | Age: 55
End: 2025-01-13
Attending: ORTHOPAEDIC SURGERY
Payer: MEDICAID

## 2025-01-13 ENCOUNTER — ANESTHESIA (OUTPATIENT)
Dept: OPERATING ROOM | Age: 55
End: 2025-01-13
Payer: MEDICAID

## 2025-01-13 ENCOUNTER — HOSPITAL ENCOUNTER (OUTPATIENT)
Age: 55
Discharge: HOME | End: 2025-01-13
Attending: ORTHOPAEDIC SURGERY | Admitting: ORTHOPAEDIC SURGERY
Payer: MEDICAID

## 2025-01-13 VITALS
BODY MASS INDEX: 36.89 KG/M2 | TEMPERATURE: 97.8 F | HEART RATE: 72 BPM | WEIGHT: 183 LBS | HEIGHT: 59 IN | SYSTOLIC BLOOD PRESSURE: 95 MMHG | OXYGEN SATURATION: 98 % | DIASTOLIC BLOOD PRESSURE: 62 MMHG | RESPIRATION RATE: 14 BRPM

## 2025-01-13 DIAGNOSIS — M16.11 PRIMARY OSTEOARTHRITIS OF RIGHT HIP: ICD-10-CM

## 2025-01-13 DIAGNOSIS — G89.18 POST-OP PAIN: Primary | ICD-10-CM

## 2025-01-13 DIAGNOSIS — M25.551 RIGHT HIP PAIN: ICD-10-CM

## 2025-01-13 LAB
GLUCOSE BLD-MCNC: 216 MG/DL (ref 74–99)
GLUCOSE BLD-MCNC: 65 MG/DL (ref 74–99)
GLUCOSE BLD-MCNC: 73 MG/DL (ref 74–99)
GLUCOSE BLD-MCNC: 77 MG/DL (ref 74–99)

## 2025-01-13 PROCEDURE — 6360000002 HC RX W HCPCS: Performed by: ORTHOPAEDIC SURGERY

## 2025-01-13 PROCEDURE — 6370000000 HC RX 637 (ALT 250 FOR IP): Performed by: ORTHOPAEDIC SURGERY

## 2025-01-13 PROCEDURE — 88304 TISSUE EXAM BY PATHOLOGIST: CPT

## 2025-01-13 PROCEDURE — 76937 US GUIDE VASCULAR ACCESS: CPT

## 2025-01-13 PROCEDURE — 88311 DECALCIFY TISSUE: CPT

## 2025-01-13 PROCEDURE — C1769 GUIDE WIRE: HCPCS | Performed by: ORTHOPAEDIC SURGERY

## 2025-01-13 PROCEDURE — 2580000003 HC RX 258: Performed by: ANESTHESIOLOGY

## 2025-01-13 PROCEDURE — 2500000003 HC RX 250 WO HCPCS: Performed by: ORTHOPAEDIC SURGERY

## 2025-01-13 PROCEDURE — 97116 GAIT TRAINING THERAPY: CPT | Performed by: PHYSICAL THERAPIST

## 2025-01-13 PROCEDURE — 97161 PT EVAL LOW COMPLEX 20 MIN: CPT | Performed by: PHYSICAL THERAPIST

## 2025-01-13 PROCEDURE — 3600000005 HC SURGERY LEVEL 5 BASE: Performed by: ORTHOPAEDIC SURGERY

## 2025-01-13 PROCEDURE — 2709999900 HC NON-CHARGEABLE SUPPLY: Performed by: ORTHOPAEDIC SURGERY

## 2025-01-13 PROCEDURE — 2720000010 HC SURG SUPPLY STERILE: Performed by: ORTHOPAEDIC SURGERY

## 2025-01-13 PROCEDURE — 6360000002 HC RX W HCPCS

## 2025-01-13 PROCEDURE — 2500000003 HC RX 250 WO HCPCS

## 2025-01-13 PROCEDURE — 3600000015 HC SURGERY LEVEL 5 ADDTL 15MIN: Performed by: ORTHOPAEDIC SURGERY

## 2025-01-13 PROCEDURE — C1776 JOINT DEVICE (IMPLANTABLE): HCPCS | Performed by: ORTHOPAEDIC SURGERY

## 2025-01-13 PROCEDURE — 7100000001 HC PACU RECOVERY - ADDTL 15 MIN: Performed by: ORTHOPAEDIC SURGERY

## 2025-01-13 PROCEDURE — 6370000000 HC RX 637 (ALT 250 FOR IP): Performed by: ANESTHESIOLOGY

## 2025-01-13 PROCEDURE — 6360000002 HC RX W HCPCS: Performed by: ANESTHESIOLOGY

## 2025-01-13 PROCEDURE — 82962 GLUCOSE BLOOD TEST: CPT

## 2025-01-13 PROCEDURE — 7100000010 HC PHASE II RECOVERY - FIRST 15 MIN: Performed by: ORTHOPAEDIC SURGERY

## 2025-01-13 PROCEDURE — 3700000000 HC ANESTHESIA ATTENDED CARE: Performed by: ORTHOPAEDIC SURGERY

## 2025-01-13 PROCEDURE — 97110 THERAPEUTIC EXERCISES: CPT | Performed by: PHYSICAL THERAPIST

## 2025-01-13 PROCEDURE — 72170 X-RAY EXAM OF PELVIS: CPT

## 2025-01-13 PROCEDURE — 7100000011 HC PHASE II RECOVERY - ADDTL 15 MIN: Performed by: ORTHOPAEDIC SURGERY

## 2025-01-13 PROCEDURE — 3700000001 HC ADD 15 MINUTES (ANESTHESIA): Performed by: ORTHOPAEDIC SURGERY

## 2025-01-13 PROCEDURE — 7100000000 HC PACU RECOVERY - FIRST 15 MIN: Performed by: ORTHOPAEDIC SURGERY

## 2025-01-13 PROCEDURE — 2580000003 HC RX 258: Performed by: ORTHOPAEDIC SURGERY

## 2025-01-13 DEVICE — CERAMIC V40 FEMORAL HEAD
Type: IMPLANTABLE DEVICE | Site: HIP | Status: FUNCTIONAL
Brand: BIOLOX

## 2025-01-13 DEVICE — TRIDENT X3 0 DEGREE POLYETHYLENE INSERT
Type: IMPLANTABLE DEVICE | Site: HIP | Status: FUNCTIONAL
Brand: TRIDENT X3 INSERT

## 2025-01-13 DEVICE — 132 DEGREE NECK ANGLE HIP STEM
Type: IMPLANTABLE DEVICE | Site: HIP | Status: FUNCTIONAL
Brand: ACCOLADE

## 2025-01-13 DEVICE — TRIDENT II TRITANIUM CLUSTER 44B
Type: IMPLANTABLE DEVICE | Site: HIP | Status: FUNCTIONAL
Brand: TRIDENT II

## 2025-01-13 DEVICE — 6.5MM LOW PROFILE HEX SCREW 20MM
Type: IMPLANTABLE DEVICE | Site: HIP | Status: FUNCTIONAL
Brand: TRIDENT II

## 2025-01-13 DEVICE — COMPONENT TOT HIP CAPPED LNR POLYETH H2STRYKER] STRYKER CORP]: Type: IMPLANTABLE DEVICE | Site: HIP | Status: FUNCTIONAL

## 2025-01-13 RX ORDER — DEXAMETHASONE SODIUM PHOSPHATE 10 MG/ML
8 INJECTION, SOLUTION INTRAMUSCULAR; INTRAVENOUS ONCE
Status: COMPLETED | OUTPATIENT
Start: 2025-01-13 | End: 2025-01-13

## 2025-01-13 RX ORDER — SODIUM CHLORIDE 9 MG/ML
INJECTION, SOLUTION INTRAVENOUS PRN
Status: DISCONTINUED | OUTPATIENT
Start: 2025-01-13 | End: 2025-01-13 | Stop reason: HOSPADM

## 2025-01-13 RX ORDER — MORPHINE SULFATE 10 MG/ML
4 INJECTION, SOLUTION INTRAMUSCULAR; INTRAVENOUS
Status: DISCONTINUED | OUTPATIENT
Start: 2025-01-13 | End: 2025-01-13 | Stop reason: HOSPADM

## 2025-01-13 RX ORDER — ONDANSETRON 2 MG/ML
INJECTION INTRAMUSCULAR; INTRAVENOUS
Status: DISCONTINUED | OUTPATIENT
Start: 2025-01-13 | End: 2025-01-13 | Stop reason: SDUPTHER

## 2025-01-13 RX ORDER — MIDAZOLAM HYDROCHLORIDE 1 MG/ML
2 INJECTION, SOLUTION INTRAMUSCULAR; INTRAVENOUS
Status: DISCONTINUED | OUTPATIENT
Start: 2025-01-13 | End: 2025-01-13 | Stop reason: HOSPADM

## 2025-01-13 RX ORDER — IPRATROPIUM BROMIDE AND ALBUTEROL SULFATE 2.5; .5 MG/3ML; MG/3ML
1 SOLUTION RESPIRATORY (INHALATION)
Status: DISCONTINUED | OUTPATIENT
Start: 2025-01-13 | End: 2025-01-13 | Stop reason: HOSPADM

## 2025-01-13 RX ORDER — FENTANYL CITRATE 0.05 MG/ML
25 INJECTION, SOLUTION INTRAMUSCULAR; INTRAVENOUS EVERY 5 MIN PRN
Status: DISCONTINUED | OUTPATIENT
Start: 2025-01-13 | End: 2025-01-13 | Stop reason: HOSPADM

## 2025-01-13 RX ORDER — DOXYCYCLINE HYCLATE 100 MG
100 TABLET ORAL 2 TIMES DAILY
Qty: 14 TABLET | Refills: 0 | OUTPATIENT
Start: 2025-01-13 | End: 2025-01-20

## 2025-01-13 RX ORDER — METHOCARBAMOL 100 MG/ML
1000 INJECTION, SOLUTION INTRAMUSCULAR; INTRAVENOUS ONCE
Status: COMPLETED | OUTPATIENT
Start: 2025-01-13 | End: 2025-01-13

## 2025-01-13 RX ORDER — OXYCODONE HYDROCHLORIDE 5 MG/1
5 TABLET ORAL EVERY 4 HOURS PRN
Status: DISCONTINUED | OUTPATIENT
Start: 2025-01-13 | End: 2025-01-13 | Stop reason: HOSPADM

## 2025-01-13 RX ORDER — LABETALOL HYDROCHLORIDE 5 MG/ML
INJECTION, SOLUTION INTRAVENOUS
Status: DISCONTINUED | OUTPATIENT
Start: 2025-01-13 | End: 2025-01-13 | Stop reason: SDUPTHER

## 2025-01-13 RX ORDER — SODIUM CHLORIDE 0.9 % (FLUSH) 0.9 %
5-40 SYRINGE (ML) INJECTION EVERY 12 HOURS SCHEDULED
Status: DISCONTINUED | OUTPATIENT
Start: 2025-01-13 | End: 2025-01-13 | Stop reason: HOSPADM

## 2025-01-13 RX ORDER — MIDAZOLAM HYDROCHLORIDE 1 MG/ML
INJECTION, SOLUTION INTRAMUSCULAR; INTRAVENOUS
Status: DISCONTINUED | OUTPATIENT
Start: 2025-01-13 | End: 2025-01-13 | Stop reason: SDUPTHER

## 2025-01-13 RX ORDER — CELECOXIB 200 MG/1
200 CAPSULE ORAL 2 TIMES DAILY
Qty: 60 CAPSULE | Refills: 0 | Status: SHIPPED | OUTPATIENT
Start: 2025-01-13 | End: 2025-02-12

## 2025-01-13 RX ORDER — PHENYLEPHRINE HCL IN 0.9% NACL 1 MG/10 ML
SYRINGE (ML) INTRAVENOUS
Status: DISCONTINUED | OUTPATIENT
Start: 2025-01-13 | End: 2025-01-13 | Stop reason: SDUPTHER

## 2025-01-13 RX ORDER — OXYCODONE HYDROCHLORIDE 5 MG/1
10 TABLET ORAL EVERY 4 HOURS PRN
Status: DISCONTINUED | OUTPATIENT
Start: 2025-01-13 | End: 2025-01-13 | Stop reason: HOSPADM

## 2025-01-13 RX ORDER — CEFAZOLIN SODIUM 1 G/3ML
INJECTION, POWDER, FOR SOLUTION INTRAMUSCULAR; INTRAVENOUS
Status: DISCONTINUED
Start: 2025-01-13 | End: 2025-01-13 | Stop reason: HOSPADM

## 2025-01-13 RX ORDER — ONDANSETRON 4 MG/1
4 TABLET, ORALLY DISINTEGRATING ORAL EVERY 8 HOURS PRN
Status: DISCONTINUED | OUTPATIENT
Start: 2025-01-13 | End: 2025-01-13 | Stop reason: HOSPADM

## 2025-01-13 RX ORDER — NALOXONE HYDROCHLORIDE 0.4 MG/ML
INJECTION, SOLUTION INTRAMUSCULAR; INTRAVENOUS; SUBCUTANEOUS PRN
Status: DISCONTINUED | OUTPATIENT
Start: 2025-01-13 | End: 2025-01-13 | Stop reason: HOSPADM

## 2025-01-13 RX ORDER — CELECOXIB 100 MG/1
200 CAPSULE ORAL ONCE
Status: COMPLETED | OUTPATIENT
Start: 2025-01-13 | End: 2025-01-13

## 2025-01-13 RX ORDER — TRANEXAMIC ACID 10 MG/ML
1000 INJECTION, SOLUTION INTRAVENOUS
Status: COMPLETED | OUTPATIENT
Start: 2025-01-13 | End: 2025-01-13

## 2025-01-13 RX ORDER — ACETAMINOPHEN 500 MG
1000 TABLET ORAL 3 TIMES DAILY
Qty: 180 TABLET | Refills: 0 | Status: SHIPPED | OUTPATIENT
Start: 2025-01-13

## 2025-01-13 RX ORDER — BUPIVACAINE HYDROCHLORIDE 7.5 MG/ML
INJECTION, SOLUTION INTRASPINAL
Status: COMPLETED | OUTPATIENT
Start: 2025-01-13 | End: 2025-01-13

## 2025-01-13 RX ORDER — LABETALOL HYDROCHLORIDE 5 MG/ML
10 INJECTION, SOLUTION INTRAVENOUS
Status: DISCONTINUED | OUTPATIENT
Start: 2025-01-13 | End: 2025-01-13 | Stop reason: HOSPADM

## 2025-01-13 RX ORDER — WATER 10 ML/10ML
INJECTION INTRAMUSCULAR; INTRAVENOUS; SUBCUTANEOUS
Status: DISCONTINUED
Start: 2025-01-13 | End: 2025-01-13 | Stop reason: HOSPADM

## 2025-01-13 RX ORDER — DEXMEDETOMIDINE HYDROCHLORIDE 100 UG/ML
INJECTION, SOLUTION INTRAVENOUS
Status: DISCONTINUED | OUTPATIENT
Start: 2025-01-13 | End: 2025-01-13 | Stop reason: SDUPTHER

## 2025-01-13 RX ORDER — ASPIRIN 81 MG/1
81 TABLET ORAL 2 TIMES DAILY
Status: DISCONTINUED | OUTPATIENT
Start: 2025-01-13 | End: 2025-01-13 | Stop reason: HOSPADM

## 2025-01-13 RX ORDER — DROPERIDOL 2.5 MG/ML
0.62 INJECTION, SOLUTION INTRAMUSCULAR; INTRAVENOUS
Status: DISCONTINUED | OUTPATIENT
Start: 2025-01-13 | End: 2025-01-13 | Stop reason: HOSPADM

## 2025-01-13 RX ORDER — ASPIRIN 81 MG/1
81 TABLET, CHEWABLE ORAL 2 TIMES DAILY
Qty: 60 TABLET | Refills: 0 | Status: SHIPPED | OUTPATIENT
Start: 2025-01-13 | End: 2025-02-12

## 2025-01-13 RX ORDER — SODIUM CHLORIDE 9 MG/ML
INJECTION, SOLUTION INTRAVENOUS CONTINUOUS
Status: DISCONTINUED | OUTPATIENT
Start: 2025-01-13 | End: 2025-01-13

## 2025-01-13 RX ORDER — OXYCODONE HYDROCHLORIDE 5 MG/1
5 TABLET ORAL EVERY 6 HOURS PRN
Qty: 28 TABLET | Refills: 0 | Status: SHIPPED | OUTPATIENT
Start: 2025-01-13 | End: 2025-01-13

## 2025-01-13 RX ORDER — FENTANYL CITRATE 50 UG/ML
INJECTION, SOLUTION INTRAMUSCULAR; INTRAVENOUS
Status: COMPLETED | OUTPATIENT
Start: 2025-01-13 | End: 2025-01-13

## 2025-01-13 RX ORDER — ACETAMINOPHEN 500 MG
1000 TABLET ORAL ONCE
Status: COMPLETED | OUTPATIENT
Start: 2025-01-13 | End: 2025-01-13

## 2025-01-13 RX ORDER — SODIUM CHLORIDE 0.9 % (FLUSH) 0.9 %
5-40 SYRINGE (ML) INJECTION PRN
Status: DISCONTINUED | OUTPATIENT
Start: 2025-01-13 | End: 2025-01-13 | Stop reason: HOSPADM

## 2025-01-13 RX ORDER — ACETAMINOPHEN 500 MG
1000 TABLET ORAL 3 TIMES DAILY
Qty: 180 TABLET | Refills: 0 | Status: SHIPPED | OUTPATIENT
Start: 2025-01-13 | End: 2025-02-12

## 2025-01-13 RX ORDER — PROPOFOL 10 MG/ML
INJECTION, EMULSION INTRAVENOUS
Status: DISCONTINUED | OUTPATIENT
Start: 2025-01-13 | End: 2025-01-13 | Stop reason: SDUPTHER

## 2025-01-13 RX ORDER — DIPHENHYDRAMINE HYDROCHLORIDE 50 MG/ML
12.5 INJECTION INTRAMUSCULAR; INTRAVENOUS
Status: DISCONTINUED | OUTPATIENT
Start: 2025-01-13 | End: 2025-01-13 | Stop reason: HOSPADM

## 2025-01-13 RX ORDER — OXYCODONE HYDROCHLORIDE 5 MG/1
5 TABLET ORAL EVERY 4 HOURS PRN
Qty: 42 TABLET | Refills: 0 | Status: SHIPPED | OUTPATIENT
Start: 2025-01-13 | End: 2025-01-20

## 2025-01-13 RX ORDER — ONDANSETRON 2 MG/ML
4 INJECTION INTRAMUSCULAR; INTRAVENOUS EVERY 6 HOURS PRN
Status: DISCONTINUED | OUTPATIENT
Start: 2025-01-13 | End: 2025-01-13 | Stop reason: HOSPADM

## 2025-01-13 RX ORDER — MEPERIDINE HYDROCHLORIDE 25 MG/ML
12.5 INJECTION INTRAMUSCULAR; INTRAVENOUS; SUBCUTANEOUS EVERY 5 MIN PRN
Status: DISCONTINUED | OUTPATIENT
Start: 2025-01-13 | End: 2025-01-13 | Stop reason: HOSPADM

## 2025-01-13 RX ORDER — MORPHINE SULFATE 10 MG/ML
2 INJECTION, SOLUTION INTRAMUSCULAR; INTRAVENOUS
Status: DISCONTINUED | OUTPATIENT
Start: 2025-01-13 | End: 2025-01-13 | Stop reason: HOSPADM

## 2025-01-13 RX ORDER — FENTANYL CITRATE 50 UG/ML
INJECTION, SOLUTION INTRAMUSCULAR; INTRAVENOUS
Status: DISCONTINUED | OUTPATIENT
Start: 2025-01-13 | End: 2025-01-13 | Stop reason: SDUPTHER

## 2025-01-13 RX ORDER — KETOROLAC TROMETHAMINE 30 MG/ML
30 INJECTION, SOLUTION INTRAMUSCULAR; INTRAVENOUS ONCE
Status: COMPLETED | OUTPATIENT
Start: 2025-01-13 | End: 2025-01-13

## 2025-01-13 RX ORDER — ACETAMINOPHEN 325 MG/1
650 TABLET ORAL EVERY 6 HOURS
Status: DISCONTINUED | OUTPATIENT
Start: 2025-01-13 | End: 2025-01-13 | Stop reason: HOSPADM

## 2025-01-13 RX ORDER — DEXTROSE MONOHYDRATE AND SODIUM CHLORIDE 5; .45 G/100ML; G/100ML
INJECTION, SOLUTION INTRAVENOUS CONTINUOUS
Status: DISCONTINUED | OUTPATIENT
Start: 2025-01-13 | End: 2025-01-13 | Stop reason: HOSPADM

## 2025-01-13 RX ORDER — SCOPOLAMINE 1 MG/3D
1 PATCH, EXTENDED RELEASE TRANSDERMAL ONCE
Status: DISCONTINUED | OUTPATIENT
Start: 2025-01-13 | End: 2025-01-13 | Stop reason: HOSPADM

## 2025-01-13 RX ORDER — MELOXICAM 7.5 MG/1
7.5 TABLET ORAL DAILY
Status: DISCONTINUED | OUTPATIENT
Start: 2025-01-13 | End: 2025-01-13 | Stop reason: HOSPADM

## 2025-01-13 RX ORDER — PROCHLORPERAZINE EDISYLATE 5 MG/ML
5 INJECTION INTRAMUSCULAR; INTRAVENOUS
Status: DISCONTINUED | OUTPATIENT
Start: 2025-01-13 | End: 2025-01-13 | Stop reason: HOSPADM

## 2025-01-13 RX ORDER — TRANEXAMIC ACID 10 MG/ML
1000 INJECTION, SOLUTION INTRAVENOUS ONCE
Status: COMPLETED | OUTPATIENT
Start: 2025-01-13 | End: 2025-01-13

## 2025-01-13 RX ORDER — HYDRALAZINE HYDROCHLORIDE 20 MG/ML
10 INJECTION INTRAMUSCULAR; INTRAVENOUS
Status: DISCONTINUED | OUTPATIENT
Start: 2025-01-13 | End: 2025-01-13 | Stop reason: HOSPADM

## 2025-01-13 RX ORDER — VANCOMYCIN HYDROCHLORIDE 1 G/20ML
INJECTION, POWDER, LYOPHILIZED, FOR SOLUTION INTRAVENOUS PRN
Status: DISCONTINUED | OUTPATIENT
Start: 2025-01-13 | End: 2025-01-13 | Stop reason: HOSPADM

## 2025-01-13 RX ADMIN — TRANEXAMIC ACID 1000 MG: 10 INJECTION, SOLUTION INTRAVENOUS at 15:01

## 2025-01-13 RX ADMIN — Medication 100 MCG: at 12:41

## 2025-01-13 RX ADMIN — MIDAZOLAM 1 MG: 1 INJECTION INTRAMUSCULAR; INTRAVENOUS at 11:47

## 2025-01-13 RX ADMIN — DEXMEDETOMIDINE HYDROCHLORIDE 6 MCG: 100 INJECTION, SOLUTION INTRAVENOUS at 11:47

## 2025-01-13 RX ADMIN — HYDROMORPHONE HYDROCHLORIDE 0.5 MG: 1 INJECTION, SOLUTION INTRAMUSCULAR; INTRAVENOUS; SUBCUTANEOUS at 15:17

## 2025-01-13 RX ADMIN — WATER 2000 MG: 1 INJECTION INTRAMUSCULAR; INTRAVENOUS; SUBCUTANEOUS at 15:35

## 2025-01-13 RX ADMIN — FENTANYL CITRATE 25 MCG: 50 INJECTION, SOLUTION INTRAMUSCULAR; INTRAVENOUS at 11:55

## 2025-01-13 RX ADMIN — BUPIVACAINE HYDROCHLORIDE IN DEXTROSE 12 MG: 7.5 INJECTION, SOLUTION SUBARACHNOID at 12:00

## 2025-01-13 RX ADMIN — ONDANSETRON 4 MG: 2 INJECTION, SOLUTION INTRAMUSCULAR; INTRAVENOUS at 12:00

## 2025-01-13 RX ADMIN — FENTANYL CITRATE 25 MCG: 50 INJECTION, SOLUTION INTRAMUSCULAR; INTRAVENOUS at 13:25

## 2025-01-13 RX ADMIN — Medication 50 MCG: at 13:00

## 2025-01-13 RX ADMIN — MEPERIDINE HYDROCHLORIDE 12.5 MG: 25 INJECTION INTRAMUSCULAR; INTRAVENOUS; SUBCUTANEOUS at 15:04

## 2025-01-13 RX ADMIN — ACETAMINOPHEN 1000 MG: 500 TABLET ORAL at 09:57

## 2025-01-13 RX ADMIN — METHOCARBAMOL 1000 MG: 100 INJECTION INTRAMUSCULAR; INTRAVENOUS at 16:15

## 2025-01-13 RX ADMIN — Medication 200 MCG: at 12:02

## 2025-01-13 RX ADMIN — KETOROLAC TROMETHAMINE 30 MG: 30 INJECTION, SOLUTION INTRAMUSCULAR at 14:31

## 2025-01-13 RX ADMIN — CELECOXIB 200 MG: 100 CAPSULE ORAL at 09:59

## 2025-01-13 RX ADMIN — PROPOFOL INJECTABLE EMULSION 40 MG: 10 INJECTION, EMULSION INTRAVENOUS at 12:02

## 2025-01-13 RX ADMIN — LABETALOL HYDROCHLORIDE 10 MG: 5 INJECTION INTRAVENOUS at 13:27

## 2025-01-13 RX ADMIN — Medication 100 MCG: at 13:43

## 2025-01-13 RX ADMIN — SODIUM CHLORIDE: 9 INJECTION, SOLUTION INTRAVENOUS at 13:28

## 2025-01-13 RX ADMIN — HYDROMORPHONE HYDROCHLORIDE 0.5 MG: 1 INJECTION, SOLUTION INTRAMUSCULAR; INTRAVENOUS; SUBCUTANEOUS at 14:45

## 2025-01-13 RX ADMIN — PROPOFOL INJECTABLE EMULSION 70 MCG/KG/MIN: 10 INJECTION, EMULSION INTRAVENOUS at 12:01

## 2025-01-13 RX ADMIN — DEXMEDETOMIDINE HYDROCHLORIDE 6 MCG: 100 INJECTION, SOLUTION INTRAVENOUS at 11:55

## 2025-01-13 RX ADMIN — FENTANYL CITRATE 25 MCG: 50 INJECTION, SOLUTION INTRAMUSCULAR; INTRAVENOUS at 12:00

## 2025-01-13 RX ADMIN — DEXMEDETOMIDINE HYDROCHLORIDE 6 MCG: 100 INJECTION, SOLUTION INTRAVENOUS at 13:26

## 2025-01-13 RX ADMIN — DEXAMETHASONE SODIUM PHOSPHATE 8 MG: 10 INJECTION INTRAMUSCULAR; INTRAVENOUS at 10:09

## 2025-01-13 RX ADMIN — SODIUM CHLORIDE: 9 INJECTION, SOLUTION INTRAVENOUS at 10:08

## 2025-01-13 RX ADMIN — CEFAZOLIN 2000 MG: 2 INJECTION, POWDER, FOR SOLUTION INTRAMUSCULAR; INTRAVENOUS at 11:59

## 2025-01-13 RX ADMIN — FENTANYL CITRATE 25 MCG: 50 INJECTION, SOLUTION INTRAMUSCULAR; INTRAVENOUS at 11:50

## 2025-01-13 RX ADMIN — MIDAZOLAM 1 MG: 1 INJECTION INTRAMUSCULAR; INTRAVENOUS at 11:40

## 2025-01-13 RX ADMIN — Medication 50 MCG: at 13:13

## 2025-01-13 RX ADMIN — TRANEXAMIC ACID 1000 MG: 10 INJECTION, SOLUTION INTRAVENOUS at 12:01

## 2025-01-13 ASSESSMENT — PAIN DESCRIPTION - DESCRIPTORS
DESCRIPTORS: DISCOMFORT
DESCRIPTORS: BURNING
DESCRIPTORS: THROBBING

## 2025-01-13 ASSESSMENT — PAIN SCALES - GENERAL
PAINLEVEL_OUTOF10: 7
PAINLEVEL_OUTOF10: 9
PAINLEVEL_OUTOF10: 5
PAINLEVEL_OUTOF10: 4
PAINLEVEL_OUTOF10: 4

## 2025-01-13 ASSESSMENT — PAIN - FUNCTIONAL ASSESSMENT
PAIN_FUNCTIONAL_ASSESSMENT: PREVENTS OR INTERFERES WITH ALL ACTIVE AND SOME PASSIVE ACTIVITIES
PAIN_FUNCTIONAL_ASSESSMENT: 0-10
PAIN_FUNCTIONAL_ASSESSMENT: PREVENTS OR INTERFERES SOME ACTIVE ACTIVITIES AND ADLS
PAIN_FUNCTIONAL_ASSESSMENT: 0-10

## 2025-01-13 ASSESSMENT — PAIN DESCRIPTION - ORIENTATION
ORIENTATION: RIGHT

## 2025-01-13 ASSESSMENT — PAIN DESCRIPTION - PAIN TYPE: TYPE: ACUTE PAIN;SURGICAL PAIN

## 2025-01-13 ASSESSMENT — PAIN DESCRIPTION - LOCATION
LOCATION: HIP

## 2025-01-13 ASSESSMENT — PAIN DESCRIPTION - FREQUENCY: FREQUENCY: CONTINUOUS

## 2025-01-13 NOTE — OP NOTE
trialing starting with a -5mm neck length.  I felt that a -4 mm neck length provided most reproduction of his leg lengths as well as offset.  There was excellent stability throughout hip range of motion.  At that point the hip was dislocated the trial ball was removed.  The trunnion was cleaned and dried and the final femoral head was impacted onto the trunnion.  The hip was reduced one more time and stability was again checked.  At that point the area was copiously irrigated with Irrisept solution.  Local anesthetic block was provided deep.  Capsule was closed with Ethibond suture.  IT band was closed with Ethibond suture.  G-Max fascia was closed with 0 Vicryl.  Skin was closed with 0 Vicryl, 2-0 Vicryl, and Monocryl.  The poke holes for the iliac crest pins were also closed with Monocryl.  The patient was dressed and an Mepilex dressing.  The patient awoke from anesthesia without difficulty and was transferred to PACU in stable condition.  All needle, sponge, and instrument counts were correct at the end of the procedure.     Chance Sofia MD      Electronically signed by Chance Sofia MD on 1/13/2025 at 1:41 PM

## 2025-01-13 NOTE — DISCHARGE INSTRUCTIONS
Weightbearing as tolerated  Posterior hip precautions please avoid flexion and internal rotation  Please take medication as prescribed  Please leave your dressing in place for 3 weeks until your follow-up  You may take shower after 2 days please wrap the Hip in Saran wrap  If you have any questions please contact Dr. Sofia Office

## 2025-01-13 NOTE — INTERVAL H&P NOTE
Update History & Physical    The patient's History and Physical of January 3, 2025 was reviewed with the patient and I examined the patient. There was no change. The surgical site was confirmed by the patient and me.     Plan: The risks, benefits, expected outcome, and alternative to the recommended procedure have been discussed with the patient. Patient understands and wants to proceed with the procedure.     Electronically signed by Chance Sofia MD on 1/13/2025 at 9:10 AM

## 2025-01-13 NOTE — CARE COORDINATION
1/13/2025: SS NOTE:  SS Consult for post-op d/c planning and HHC/DME order noted, pt seen in PAT, d/c plan for pt to return home with [facility], referral made through [facility] and agency accepted pt for start of care tomorrow 1/14 and [facility] delivered a ttb to [facility] for pt to take home, pt's sister will transport her home, [facility] notified. Electronically signed by RENETTA Worthington on 1/13/2025 at 4:03 PM

## 2025-01-13 NOTE — ANESTHESIA PROCEDURE NOTES
Spinal Block    Patient location during procedure: OR  End time: 1/13/2025 12:00 PM  Reason for block: primary anesthetic  Staffing  Performed: resident/CRNA   Anesthesiologist: Garcia Denise MD  Resident/CRNA: Alejandro Bowser APRN - CRNA  Performed by: Alejandro Bowser APRN - CRNA  Authorized by: Garcia Denise MD    Spinal Block  Patient position: sitting  Prep: ChloraPrep  Patient monitoring: continuous pulse ox, frequent blood pressure checks and cardiac monitor  Approach: midline  Location: L3/L4  Provider prep: mask and sterile gloves  Local infiltration: lidocaine  Needle  Needle type: Pencan   Needle gauge: 25 G  Needle length: 4 in  Assessment  Sensory level: T4  Swirl obtained: Yes  CSF: clear  Attempts: 1  Hemodynamics: stable  Preanesthetic Checklist  Completed: patient identified, IV checked, site marked, risks and benefits discussed, surgical/procedural consents, equipment checked, pre-op evaluation, timeout performed, anesthesia consent given, oxygen available, monitors applied/VS acknowledged, fire risk safety assessment completed and verbalized and blood product R/B/A discussed and consented

## 2025-01-13 NOTE — ANESTHESIA PRE PROCEDURE
hypertension:, valvular problems/murmurs: MVP, CAD:, hyperlipidemia        Rhythm: regular  Rate: normal                    Neuro/Psych:   Negative Neuro/Psych ROS  (+) neuromuscular disease (diabetic neuropathy):depression/anxiety             GI/Hepatic/Renal:   (+) GERD:     (-) no morbid obesity       Endo/Other:    (+) DiabetesType II DM, poorly controlled, using insulin, : arthritis: OA., malignancy/cancer.                 Abdominal:       Abdomen: soft.      Vascular:          Other Findings:             Anesthesia Plan      spinal     ASA 3     (General anesthesia back up)  Induction: intravenous.    MIPS: Postoperative opioids intended and Prophylactic antiemetics administered.  Anesthetic plan and risks discussed with patient and sibling.      Plan discussed with CRNA.          Post-op pain plan if not by surgeon: intrathecal narcotics      DOS STAFF ADDENDUM:    Pt seen and examined, chart reviewed (including anesthesia, drug and allergy history).       Anesthetic plan, risks, benefits, alternatives, and personnel involved discussed with patient.  Patient verbalized an understanding and agrees to proceed.  Plan discussed with care team members and agreed upon.    Garcia Denise MD  Staff Anesthesiologist  11:16 AM      Garcia Denise MD   1/13/2025

## 2025-01-13 NOTE — ANESTHESIA POSTPROCEDURE EVALUATION
Department of Anesthesiology  Postprocedure Note    Patient: Jenny Lilly  MRN: [id number]  YOB: 1970  Date of evaluation: 1/13/2025    Procedure Summary       Date: 01/13/25 Room / Location: [address]    Anesthesia Start: 1125 Anesthesia Stop: 1416    Procedure: *IV TEAM* RIGHT HIP TOTAL ARTHROPLASTY ROBOTIC KIRSTIN *TISH* (Right) Diagnosis:       Primary osteoarthritis of right hip      Right hip pain      (Primary osteoarthritis of right hip [M16.11])      (Right hip pain [M25.551])    Surgeons: Chance Sofia MD Responsible Provider: Garcia Denise MD    Anesthesia Type: Spinal ASA Status: 3            Anesthesia Type: Spinal    Radha Phase I: Radha Score: 10    Radha Phase II:      Anesthesia Post Evaluation    Patient location during evaluation: PACU  Patient participation: complete - patient participated  Level of consciousness: awake  Airway patency: patent  Nausea & Vomiting: no nausea and no vomiting  Cardiovascular status: hemodynamically stable  Respiratory status: acceptable  Hydration status: euvolemic  Multimodal analgesia pain management approach  Pain management: adequate    No notable events documented.

## 2025-01-13 NOTE — PROGRESS NOTES
Physical Therapy Initial Evaluation/Plan of Care    Room #:  OR POOL/NONE  Patient Name: Jenny Lilly  YOB: 1970  MRN: [id number]    Date of Service: 1/13/2025     Tentative placement recommendation: Home with Home Health Physical Therapy 5 days/week   Equipment recommendation: Wheeled Walker      Evaluating Physical Therapist: Catarino Ulrich, PT  #[id number]     Specific Provider Orders/Date/Referring Provider :     PT eval and treat  Start:  01/13/25 1515,   End:  01/13/25 1515,   ONE TIME,   Standing Count:  1 Occurrences,   R       Chance Sofia MD    Admitting Diagnosis:   Primary osteoarthritis of right hip [M16.11]  Right hip pain [M25.551]  Primary localized osteoarthritis of right hip [M16.11]   Visit diagnosis:   Visit Diagnoses         Codes    Post-op pain    -  Primary G89.18            Patient Active Problem List   Diagnosis    Type II diabetes mellitus with peripheral autonomic neuropathy (HCC)    Neuropathy    Gastroesophageal reflux disease without esophagitis    Retinopathy    Anxiety    Irritable bowel syndrome    Mixed hyperlipidemia    Fatigue    Lumbar pain    RLS (restless legs syndrome)    Essential hypertension    Encounter for assessment of STD exposure    Malignant neoplasm of left female breast (HCC)    Invasive ductal carcinoma of left breast (HCC)    Immunization due    Age-related cataract of both eyes    Vitamin D deficiency    Stable proliferative diabetic retinopathy associated with type 2 diabetes mellitus (HCC)    Uncontrolled type 2 diabetes mellitus with hyperglycemia (HCC)    Atypical chest pain    Coronary artery disease involving native heart without angina pectoris    Drug therapy    Possible exposure to STD    Acute vaginitis    ACS (acute coronary syndrome) (HCC)    Chest pain    Bronchitis    Right hip pain    Generalized abdominal pain    Osteoarthritis of right hip    Primary osteoarthritis of right hip    Acute cystitis with hematuria    Thickening

## 2025-01-14 NOTE — PROGRESS NOTES
CLINICAL PHARMACY NOTE: MEDS TO BEDS    Total # of Prescriptions Filled: 4   The following medications were delivered to the patient:  Oxycodone 5 mg   Aspirin 81 mg   Tylenol 500 mg   Celebrex 200 mg       Additional Documentation:

## 2025-01-17 LAB — SURGICAL PATHOLOGY REPORT: NORMAL

## 2025-01-20 ENCOUNTER — TELEPHONE (OUTPATIENT)
Dept: HEMATOLOGY | Age: 55
End: 2025-01-20

## 2025-01-20 DIAGNOSIS — Z96.641 S/P TOTAL RIGHT HIP ARTHROPLASTY: Primary | ICD-10-CM

## 2025-01-20 NOTE — TELEPHONE ENCOUNTER
I called patient to cancel her appt for tomorrow since she never completed her fibroscan.  I offered to get it rescheduled but she stated she had recent hip surgery and not going to reschedule at this time.  She stated she will call our office back when she wants to get this done.    Electronically signed by Marii Soto RN on 1/20/2025 at 8:25 AM

## 2025-01-28 ENCOUNTER — OFFICE VISIT (OUTPATIENT)
Dept: ORTHOPEDIC SURGERY | Age: 55
End: 2025-01-28

## 2025-01-28 VITALS — WEIGHT: 183 LBS | BODY MASS INDEX: 36.89 KG/M2 | HEIGHT: 59 IN

## 2025-01-28 DIAGNOSIS — Z47.89 ORTHOPEDIC AFTERCARE: Primary | ICD-10-CM

## 2025-01-28 PROCEDURE — 99024 POSTOP FOLLOW-UP VISIT: CPT | Performed by: ORTHOPAEDIC SURGERY

## 2025-01-28 NOTE — PROGRESS NOTES
3 Weeks Post op Right Total Hip Arthroplasty     Subjective: The patient continues to improve after Right total hip arthroplasty. Activity level has improved and pain level has decreased. Ambulation has improved. The patient continues with home physical therapy. No signs or symptoms of DVT or infection.     Physical Exam: The hip incision is well healed. There is no evidence of infection. ROM is as expected 3 weeks post op. Leg lengths appear equal. The operative extremity is well perfused. Intact function of the tibial and peroneal nerves.     Assessment: 3 Weeks s/p Right Total Hip Arthroplasty     Plan:   The patient was encouraged to continue with the postoperative physical therapy program focusing on range of motion, muscle strengthening and gait mechanics.   Hip precautions reviewed.   Weight bearing as tolerated.   Wean from assistive devices as strength, balance, and pain improves.   Discontinue DVT prophylaxis at 1 month.   Signs/symptoms of DVT/PE reviewed.   Warning signs of infection were discussed along with the need for antibiotic prophylaxis when undergoing dental work or other invasive procedures.   Pain management strategies were reviewed including ice, elevation, and rest. The importance of weaning from narcotic pain medication over time was discussed and emphasized.     Follow up: 9 weeks postoperative with Right hip xrays     The patient's questions were addressed as completely as possible. The patient will contact us if function decreases, pain increases or if they have any other concerns or questions.

## 2025-01-31 ENCOUNTER — TELEPHONE (OUTPATIENT)
Dept: PHYSICAL THERAPY | Age: 55
End: 2025-01-31

## 2025-01-31 PROBLEM — Z96.641 S/P TOTAL RIGHT HIP ARTHROPLASTY: Status: ACTIVE | Noted: 2025-01-31

## 2025-02-02 DIAGNOSIS — I10 ESSENTIAL HYPERTENSION: ICD-10-CM

## 2025-02-03 ENCOUNTER — HOSPITAL ENCOUNTER (INPATIENT)
Age: 55
LOS: 4 days | Discharge: HOME HEALTH | End: 2025-02-07
Attending: EMERGENCY MEDICINE | Admitting: INTERNAL MEDICINE
Payer: MEDICAID

## 2025-02-03 ENCOUNTER — APPOINTMENT (OUTPATIENT)
Dept: ULTRASOUND IMAGING | Age: 55
End: 2025-02-03
Payer: MEDICAID

## 2025-02-03 ENCOUNTER — APPOINTMENT (OUTPATIENT)
Dept: CT IMAGING | Age: 55
End: 2025-02-03
Payer: MEDICAID

## 2025-02-03 ENCOUNTER — TELEPHONE (OUTPATIENT)
Dept: ORTHOPEDIC SURGERY | Age: 55
End: 2025-02-03

## 2025-02-03 ENCOUNTER — ANESTHESIA EVENT (OUTPATIENT)
Dept: OPERATING ROOM | Age: 55
End: 2025-02-03
Payer: MEDICAID

## 2025-02-03 ENCOUNTER — APPOINTMENT (OUTPATIENT)
Dept: GENERAL RADIOLOGY | Age: 55
End: 2025-02-03
Payer: MEDICAID

## 2025-02-03 DIAGNOSIS — G89.18 POST-OP PAIN: ICD-10-CM

## 2025-02-03 DIAGNOSIS — L08.9 WOUND INFECTION: ICD-10-CM

## 2025-02-03 DIAGNOSIS — T14.8XXA WOUND INFECTION: ICD-10-CM

## 2025-02-03 DIAGNOSIS — T81.49XA POSTOPERATIVE WOUND INFECTION OF RIGHT HIP: ICD-10-CM

## 2025-02-03 DIAGNOSIS — L03.115 CELLULITIS OF RIGHT LOWER EXTREMITY: Primary | ICD-10-CM

## 2025-02-03 LAB
ALBUMIN SERPL-MCNC: 3.6 G/DL (ref 3.5–5.2)
ALP SERPL-CCNC: 172 U/L (ref 35–104)
ALT SERPL-CCNC: 28 U/L (ref 0–32)
ANION GAP SERPL CALCULATED.3IONS-SCNC: 14 MMOL/L (ref 7–16)
AST SERPL-CCNC: 43 U/L (ref 0–31)
BASOPHILS # BLD: 0.02 K/UL (ref 0–0.2)
BASOPHILS NFR BLD: 0 % (ref 0–2)
BILIRUB SERPL-MCNC: 0.2 MG/DL (ref 0–1.2)
BUN SERPL-MCNC: 20 MG/DL (ref 6–20)
CALCIUM SERPL-MCNC: 9.8 MG/DL (ref 8.6–10.2)
CHLORIDE SERPL-SCNC: 100 MMOL/L (ref 98–107)
CO2 SERPL-SCNC: 25 MMOL/L (ref 22–29)
CREAT SERPL-MCNC: 1.2 MG/DL (ref 0.5–1)
CRP SERPL HS-MCNC: 7 MG/L (ref 0–5)
EOSINOPHIL # BLD: 0.36 K/UL (ref 0.05–0.5)
EOSINOPHILS RELATIVE PERCENT: 6 % (ref 0–6)
ERYTHROCYTE [DISTWIDTH] IN BLOOD BY AUTOMATED COUNT: 14.3 % (ref 11.5–15)
ERYTHROCYTE [SEDIMENTATION RATE] IN BLOOD BY WESTERGREN METHOD: 74 MM/HR (ref 0–20)
GFR, ESTIMATED: 54 ML/MIN/1.73M2
GLUCOSE BLD-MCNC: 73 MG/DL (ref 74–99)
GLUCOSE SERPL-MCNC: 118 MG/DL (ref 74–99)
HCT VFR BLD AUTO: 29.8 % (ref 34–48)
HGB BLD-MCNC: 9.3 G/DL (ref 11.5–15.5)
IMM GRANULOCYTES # BLD AUTO: 0.03 K/UL (ref 0–0.58)
IMM GRANULOCYTES NFR BLD: 1 % (ref 0–5)
LYMPHOCYTES NFR BLD: 1.87 K/UL (ref 1.5–4)
LYMPHOCYTES RELATIVE PERCENT: 29 % (ref 20–42)
MAGNESIUM SERPL-MCNC: 2 MG/DL (ref 1.6–2.6)
MCH RBC QN AUTO: 30.3 PG (ref 26–35)
MCHC RBC AUTO-ENTMCNC: 31.2 G/DL (ref 32–34.5)
MCV RBC AUTO: 97.1 FL (ref 80–99.9)
MONOCYTES NFR BLD: 0.62 K/UL (ref 0.1–0.95)
MONOCYTES NFR BLD: 10 % (ref 2–12)
NEUTROPHILS NFR BLD: 56 % (ref 43–80)
NEUTS SEG NFR BLD: 3.62 K/UL (ref 1.8–7.3)
PLATELET # BLD AUTO: 297 K/UL (ref 130–450)
PMV BLD AUTO: 10.3 FL (ref 7–12)
POTASSIUM SERPL-SCNC: 5.1 MMOL/L (ref 3.5–5)
PROCALCITONIN SERPL-MCNC: 0.05 NG/ML (ref 0–0.08)
PROT SERPL-MCNC: 7.2 G/DL (ref 6.4–8.3)
RBC # BLD AUTO: 3.07 M/UL (ref 3.5–5.5)
SODIUM SERPL-SCNC: 139 MMOL/L (ref 132–146)
WBC OTHER # BLD: 6.5 K/UL (ref 4.5–11.5)

## 2025-02-03 PROCEDURE — 86923 COMPATIBILITY TEST ELECTRIC: CPT

## 2025-02-03 PROCEDURE — 86901 BLOOD TYPING SEROLOGIC RH(D): CPT

## 2025-02-03 PROCEDURE — 87086 URINE CULTURE/COLONY COUNT: CPT

## 2025-02-03 PROCEDURE — 85025 COMPLETE CBC W/AUTO DIFF WBC: CPT

## 2025-02-03 PROCEDURE — 1200000000 HC SEMI PRIVATE

## 2025-02-03 PROCEDURE — 83735 ASSAY OF MAGNESIUM: CPT

## 2025-02-03 PROCEDURE — 87077 CULTURE AEROBIC IDENTIFY: CPT

## 2025-02-03 PROCEDURE — 6370000000 HC RX 637 (ALT 250 FOR IP)

## 2025-02-03 PROCEDURE — 80053 COMPREHEN METABOLIC PANEL: CPT

## 2025-02-03 PROCEDURE — 81001 URINALYSIS AUTO W/SCOPE: CPT

## 2025-02-03 PROCEDURE — 74022 RADEX COMPL AQT ABD SERIES: CPT

## 2025-02-03 PROCEDURE — 86900 BLOOD TYPING SEROLOGIC ABO: CPT

## 2025-02-03 PROCEDURE — 86850 RBC ANTIBODY SCREEN: CPT

## 2025-02-03 PROCEDURE — 73701 CT LOWER EXTREMITY W/DYE: CPT

## 2025-02-03 PROCEDURE — 82962 GLUCOSE BLOOD TEST: CPT

## 2025-02-03 PROCEDURE — 6360000004 HC RX CONTRAST MEDICATION: Performed by: RADIOLOGY

## 2025-02-03 PROCEDURE — 73502 X-RAY EXAM HIP UNI 2-3 VIEWS: CPT

## 2025-02-03 PROCEDURE — 93971 EXTREMITY STUDY: CPT

## 2025-02-03 PROCEDURE — 85652 RBC SED RATE AUTOMATED: CPT

## 2025-02-03 PROCEDURE — 99285 EMERGENCY DEPT VISIT HI MDM: CPT

## 2025-02-03 PROCEDURE — 86140 C-REACTIVE PROTEIN: CPT

## 2025-02-03 PROCEDURE — 84145 PROCALCITONIN (PCT): CPT

## 2025-02-03 RX ORDER — POTASSIUM CHLORIDE 7.45 MG/ML
10 INJECTION INTRAVENOUS PRN
Status: DISCONTINUED | OUTPATIENT
Start: 2025-02-03 | End: 2025-02-07 | Stop reason: HOSPADM

## 2025-02-03 RX ORDER — PROCHLORPERAZINE EDISYLATE 5 MG/ML
10 INJECTION INTRAMUSCULAR; INTRAVENOUS EVERY 6 HOURS PRN
Status: DISCONTINUED | OUTPATIENT
Start: 2025-02-03 | End: 2025-02-07 | Stop reason: HOSPADM

## 2025-02-03 RX ORDER — ANASTROZOLE 1 MG/1
1 TABLET ORAL DAILY
Status: DISCONTINUED | OUTPATIENT
Start: 2025-02-04 | End: 2025-02-07 | Stop reason: HOSPADM

## 2025-02-03 RX ORDER — PRAMIPEXOLE DIHYDROCHLORIDE 0.12 MG/1
0.12 TABLET ORAL DAILY
Status: DISCONTINUED | OUTPATIENT
Start: 2025-02-04 | End: 2025-02-07 | Stop reason: HOSPADM

## 2025-02-03 RX ORDER — ROSUVASTATIN CALCIUM 10 MG/1
5 TABLET, COATED ORAL DAILY
Status: DISCONTINUED | OUTPATIENT
Start: 2025-02-04 | End: 2025-02-07 | Stop reason: HOSPADM

## 2025-02-03 RX ORDER — GABAPENTIN 300 MG/1
300 CAPSULE ORAL NIGHTLY
Status: DISCONTINUED | OUTPATIENT
Start: 2025-02-03 | End: 2025-02-07 | Stop reason: HOSPADM

## 2025-02-03 RX ORDER — ALBUTEROL SULFATE 0.83 MG/ML
2.5 SOLUTION RESPIRATORY (INHALATION) 4 TIMES DAILY PRN
Status: DISCONTINUED | OUTPATIENT
Start: 2025-02-03 | End: 2025-02-07 | Stop reason: HOSPADM

## 2025-02-03 RX ORDER — POTASSIUM CHLORIDE 1500 MG/1
40 TABLET, EXTENDED RELEASE ORAL PRN
Status: DISCONTINUED | OUTPATIENT
Start: 2025-02-03 | End: 2025-02-07 | Stop reason: HOSPADM

## 2025-02-03 RX ORDER — METFORMIN HYDROCHLORIDE 500 MG/1
500 TABLET, EXTENDED RELEASE ORAL
Status: DISCONTINUED | OUTPATIENT
Start: 2025-02-04 | End: 2025-02-07 | Stop reason: HOSPADM

## 2025-02-03 RX ORDER — GLUCAGON 1 MG/ML
1 KIT INJECTION PRN
Status: DISCONTINUED | OUTPATIENT
Start: 2025-02-03 | End: 2025-02-07 | Stop reason: HOSPADM

## 2025-02-03 RX ORDER — BUSPIRONE HYDROCHLORIDE 5 MG/1
5 TABLET ORAL NIGHTLY
Status: DISCONTINUED | OUTPATIENT
Start: 2025-02-03 | End: 2025-02-07 | Stop reason: HOSPADM

## 2025-02-03 RX ORDER — ACETAMINOPHEN 325 MG/1
650 TABLET ORAL EVERY 6 HOURS PRN
Status: DISCONTINUED | OUTPATIENT
Start: 2025-02-03 | End: 2025-02-07 | Stop reason: HOSPADM

## 2025-02-03 RX ORDER — DEXTROSE MONOHYDRATE 100 MG/ML
INJECTION, SOLUTION INTRAVENOUS CONTINUOUS PRN
Status: DISCONTINUED | OUTPATIENT
Start: 2025-02-03 | End: 2025-02-07 | Stop reason: HOSPADM

## 2025-02-03 RX ORDER — INSULIN GLARGINE 100 [IU]/ML
40 INJECTION, SOLUTION SUBCUTANEOUS NIGHTLY
Status: DISCONTINUED | OUTPATIENT
Start: 2025-02-03 | End: 2025-02-07 | Stop reason: HOSPADM

## 2025-02-03 RX ORDER — MECOBALAMIN 5000 MCG
5 TABLET,DISINTEGRATING ORAL NIGHTLY PRN
Status: DISCONTINUED | OUTPATIENT
Start: 2025-02-03 | End: 2025-02-07 | Stop reason: HOSPADM

## 2025-02-03 RX ORDER — BUMETANIDE 1 MG/1
1 TABLET ORAL DAILY
Status: DISCONTINUED | OUTPATIENT
Start: 2025-02-03 | End: 2025-02-07 | Stop reason: HOSPADM

## 2025-02-03 RX ORDER — MAGNESIUM SULFATE IN WATER 40 MG/ML
2000 INJECTION, SOLUTION INTRAVENOUS PRN
Status: DISCONTINUED | OUTPATIENT
Start: 2025-02-03 | End: 2025-02-07 | Stop reason: HOSPADM

## 2025-02-03 RX ORDER — METOPROLOL SUCCINATE 25 MG/1
12.5 TABLET, EXTENDED RELEASE ORAL DAILY
Qty: 45 TABLET | Refills: 0 | Status: SHIPPED | OUTPATIENT
Start: 2025-02-03

## 2025-02-03 RX ORDER — LATANOPROST 50 UG/ML
1 SOLUTION/ DROPS OPHTHALMIC NIGHTLY
Status: DISCONTINUED | OUTPATIENT
Start: 2025-02-03 | End: 2025-02-07 | Stop reason: HOSPADM

## 2025-02-03 RX ORDER — PANTOPRAZOLE SODIUM 40 MG/1
40 TABLET, DELAYED RELEASE ORAL
Status: DISCONTINUED | OUTPATIENT
Start: 2025-02-04 | End: 2025-02-07 | Stop reason: HOSPADM

## 2025-02-03 RX ORDER — IOPAMIDOL 755 MG/ML
70 INJECTION, SOLUTION INTRAVASCULAR
Status: COMPLETED | OUTPATIENT
Start: 2025-02-03 | End: 2025-02-03

## 2025-02-03 RX ORDER — INSULIN LISPRO 100 [IU]/ML
0-8 INJECTION, SOLUTION INTRAVENOUS; SUBCUTANEOUS
Status: DISCONTINUED | OUTPATIENT
Start: 2025-02-03 | End: 2025-02-06

## 2025-02-03 RX ORDER — METOPROLOL SUCCINATE 25 MG/1
12.5 TABLET, EXTENDED RELEASE ORAL DAILY
Status: DISCONTINUED | OUTPATIENT
Start: 2025-02-04 | End: 2025-02-07 | Stop reason: HOSPADM

## 2025-02-03 RX ORDER — M-VIT,TX,IRON,MINS/CALC/FOLIC 27MG-0.4MG
1 TABLET ORAL DAILY
Status: DISCONTINUED | OUTPATIENT
Start: 2025-02-04 | End: 2025-02-07 | Stop reason: HOSPADM

## 2025-02-03 RX ORDER — FERROUS SULFATE 325(65) MG
325 TABLET ORAL
Status: DISCONTINUED | OUTPATIENT
Start: 2025-02-04 | End: 2025-02-07 | Stop reason: HOSPADM

## 2025-02-03 RX ORDER — PRAMIPEXOLE DIHYDROCHLORIDE 0.25 MG/1
0.25 TABLET ORAL NIGHTLY
Status: DISCONTINUED | OUTPATIENT
Start: 2025-02-03 | End: 2025-02-07 | Stop reason: HOSPADM

## 2025-02-03 RX ORDER — VITAMIN B COMPLEX
2000 TABLET ORAL DAILY
Status: DISCONTINUED | OUTPATIENT
Start: 2025-02-04 | End: 2025-02-07 | Stop reason: HOSPADM

## 2025-02-03 RX ORDER — ALBUTEROL SULFATE 90 UG/1
2 INHALANT RESPIRATORY (INHALATION) 4 TIMES DAILY PRN
Status: DISCONTINUED | OUTPATIENT
Start: 2025-02-03 | End: 2025-02-03 | Stop reason: CLARIF

## 2025-02-03 RX ORDER — CELECOXIB 100 MG/1
200 CAPSULE ORAL 2 TIMES DAILY
Status: DISCONTINUED | OUTPATIENT
Start: 2025-02-03 | End: 2025-02-07 | Stop reason: HOSPADM

## 2025-02-03 RX ORDER — BUSPIRONE HYDROCHLORIDE 5 MG/1
5 TABLET ORAL 2 TIMES DAILY
Status: ON HOLD | COMMUNITY
End: 2025-02-07

## 2025-02-03 RX ADMIN — CELECOXIB 200 MG: 100 CAPSULE ORAL at 22:00

## 2025-02-03 RX ADMIN — LATANOPROST 1 DROP: 50 SOLUTION OPHTHALMIC at 22:55

## 2025-02-03 RX ADMIN — BUSPIRONE HYDROCHLORIDE 5 MG: 5 TABLET ORAL at 22:55

## 2025-02-03 RX ADMIN — IOPAMIDOL 70 ML: 755 INJECTION, SOLUTION INTRAVENOUS at 16:57

## 2025-02-03 RX ADMIN — GABAPENTIN 300 MG: 300 CAPSULE ORAL at 22:00

## 2025-02-03 RX ADMIN — PRAMIPEXOLE DIHYDROCHLORIDE 0.25 MG: 0.12 TABLET ORAL at 22:55

## 2025-02-03 ASSESSMENT — ENCOUNTER SYMPTOMS
SORE THROAT: 0
COUGH: 0
CONSTIPATION: 1
NAUSEA: 0
CHEST TIGHTNESS: 0
WHEEZING: 0
BACK PAIN: 0
ABDOMINAL PAIN: 0
DIARRHEA: 0
SHORTNESS OF BREATH: 0
VOMITING: 0

## 2025-02-03 ASSESSMENT — LIFESTYLE VARIABLES
HOW MANY STANDARD DRINKS CONTAINING ALCOHOL DO YOU HAVE ON A TYPICAL DAY: PATIENT DOES NOT DRINK
HOW OFTEN DO YOU HAVE A DRINK CONTAINING ALCOHOL: NEVER

## 2025-02-03 ASSESSMENT — PAIN DESCRIPTION - ORIENTATION: ORIENTATION: RIGHT

## 2025-02-03 ASSESSMENT — PAIN DESCRIPTION - DESCRIPTORS: DESCRIPTORS: ACHING;SORE;BURNING

## 2025-02-03 ASSESSMENT — PAIN SCALES - GENERAL: PAINLEVEL_OUTOF10: 4

## 2025-02-03 ASSESSMENT — PAIN DESCRIPTION - LOCATION: LOCATION: HIP

## 2025-02-03 NOTE — TELEPHONE ENCOUNTER
S/p R CARTER DOS 1/13/25.    Patient called in with concerns of incision drainage. Reports incision feels wet, and she has a lot of redness. Intermittent nausea. Denies fever/chills, tachycardia, vomiting. Patient with long history of diabetes. Most recent A1C of 7.7 and POCT glucose 216. Due to patients increased risk for poor wound healing secondary to diabetes, patient was offered appointment with Dr. Sofia on Tuesday 2/4/25 or advised to seek evaluation at hospital for infection concern. Patient was going to elect hospital evaluation at this time. Return call to office with any additional questions/ concerns.

## 2025-02-03 NOTE — TELEPHONE ENCOUNTER
Last seen 12/11/2024  Next appt 3/12/2025    Requested Prescriptions     Pending Prescriptions Disp Refills    metoprolol succinate (TOPROL XL) 25 MG extended release tablet [Pharmacy Med Name: Metoprolol Succinate ER Oral Tablet Extended Release 24 Hour 25 MG] 45 tablet 0     Sig: TAKE ONE-HALF TABLET BY MOUTH DAILY    Electronically signed by CAPO MUKHERJEE MA on 2/3/25 at 9:07 AM EST

## 2025-02-03 NOTE — ANESTHESIA PRE PROCEDURE
Drug therapy Z79.899    Possible exposure to STD Z20.2    Acute vaginitis N76.0    ACS (acute coronary syndrome) (HCC) I24.9    Chest pain R07.9    Bronchitis J40    Right hip pain M25.551    Generalized abdominal pain R10.84    Osteoarthritis of right hip M16.11    Primary osteoarthritis of right hip M16.11    Acute cystitis with hematuria N30.01    Thickening of esophagus K22.89    Pain of right hip M25.551    Primary localized osteoarthritis of right hip M16.11    S/P total right hip arthroplasty Z96.641       Past Medical History:        Diagnosis Date    Acid reflux     Anesthesia complication     problem with blood pressure up & down    Arthritis     lower back    Back pain     Cancer (HCC) 2021    left breast    Cardiac valve prolapse     valve ?    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 marcial feet    Nontraumatic tear of right rotator cuff 11/30/2020    Prolonged emergence from general anesthesia     Psoriasis     Sleep apnea     cpap 5       Past Surgical History:        Procedure Laterality Date    ANKLE SURGERY      bilateral tarsal tunnels    BREAST BIOPSY Left 02/17/2021    LEFT BREAST NEEDLE LOCALIZED LUMPECTOMY, BLUE DYE INJECTION, LEFT AXILLARY SENTINEL LYMPHNODE EXCISION, POSSIBLE LEFT AXILLARY DISSECTION performed by Tana Granados MD at [facility] OR    BREAST LUMPECTOMY Left     2/2021    CARDIAC SURGERY      cardiac catheterization-  \" years ago - over 10 years and negative\"; to see Dr. Gonzalez 3/13/23    CARPAL TUNNEL RELEASE      left    CARPAL TUNNEL RELEASE  04/02/2012    right    CARPAL TUNNEL RELEASE Right 03/15/2023    RIGHT HAND CARPAL TUNNEL RELEASE,  RIGHT ELBOW CUBITAL TUNNEL RELEASE,RIGHT RING FINGER A, PULLEY RELEASE, RIGHT WRIST FIRST DORSAL COMPARTMENT RELEASE ([id number]) performed by Ivan Martin MD at [facility] OR    CARPAL TUNNEL RELEASE Left 05/03/2023

## 2025-02-03 NOTE — ED PROVIDER NOTES
Chief complaint:  Right hip pain      HPI history provided by the patient  The patient presents here a few weeks post right hip replacement surgery, complaining of some redness around the hip and pain with some slight drainage from the lower aspect of the incision for the last day.  No actual fevers at home although states she has some chills sometimes also complains of being constipated for a week.  No abdominal pain or distention.  No flank pain, hematuria dysuria.  Has some mild bilateral leg swelling since the surgery although is bilateral not just on the right leg and she has no calf pain.  No chest pain or palpitations or shortness of breath and no lightheadedness or syncope.    Review of Systems   Constitutional:  Negative for chills, diaphoresis, fatigue and fever.   HENT:  Negative for congestion and sore throat.    Respiratory:  Negative for cough, chest tightness, shortness of breath and wheezing.    Cardiovascular:  Positive for leg swelling. Negative for chest pain and palpitations.   Gastrointestinal:  Positive for constipation. Negative for abdominal pain, diarrhea, nausea and vomiting.   Genitourinary:  Negative for dysuria, flank pain, frequency and urgency.   Musculoskeletal:  Positive for arthralgias. Negative for back pain, gait problem, joint swelling, myalgias, neck pain and neck stiffness.   Skin:  Positive for wound. Negative for rash.   Neurological:  Negative for dizziness, seizures, syncope, weakness, light-headedness, numbness and headaches.   Hematological:  Negative for adenopathy.   All other systems reviewed and are negative.       Physical Exam  Vitals and nursing note reviewed.   Constitutional:       General: She is awake. She is not in acute distress.     Appearance: She is well-developed. She is not ill-appearing, toxic-appearing or diaphoretic.   HENT:      Head: Normocephalic and atraumatic.   Eyes:      General: No scleral icterus.     Pupils: Pupils are equal, round, and  reactive to light.   Cardiovascular:      Rate and Rhythm: Normal rate and regular rhythm.      Heart sounds: Normal heart sounds. No murmur heard.  Pulmonary:      Effort: Pulmonary effort is normal. No respiratory distress.      Breath sounds: Normal breath sounds. No stridor, decreased air movement or transmitted upper airway sounds. No decreased breath sounds, wheezing, rhonchi or rales.   Chest:      Chest wall: No tenderness.   Abdominal:      General: Bowel sounds are normal. There is no distension.      Palpations: Abdomen is soft.      Tenderness: There is no abdominal tenderness. There is no right CVA tenderness, left CVA tenderness, guarding or rebound.   Musculoskeletal:         General: Tenderness present. No swelling, deformity or signs of injury.      Cervical back: Full passive range of motion without pain, normal range of motion and neck supple. No signs of trauma or rigidity. No pain with movement, spinous process tenderness or muscular tenderness. Normal range of motion.      Right lower leg: Edema present.      Left lower leg: Edema present.      Comments: Right hip incision with mild surrounding erythema within about a centimeter or 2 of the incision, no current drainage and no swelling or fluctuance although there is mild merry-incisional tenderness with no crepitance.  There is a spot at the distal aspect of the incision where it may have been draining although not currently draining.  Patient does have +1-2 bilateral lower extremity equal edema throughout with no calf tenderness or cords and lower extremities are well-perfused.   Skin:     General: Skin is warm and dry.      Coloration: Skin is not cyanotic, jaundiced, mottled or pale.      Findings: Erythema present. No bruising or rash.      Comments: Mild erythema around right hip incision which is otherwise healing well, currently clean dry and intact although there is mild merry-incisional tenderness with no current purulent drainage.  See

## 2025-02-03 NOTE — CONSULTS
PM    RBC 4.04 01/07/2025 02:23 PM    HGB 12.6 01/07/2025 02:23 PM    HCT 38.1 01/07/2025 02:23 PM    MCV 94.3 01/07/2025 02:23 PM    MCH 31.2 01/07/2025 02:23 PM    MCHC 33.1 01/07/2025 02:23 PM    RDW 13.4 01/07/2025 02:23 PM     01/07/2025 02:23 PM    MPV 10.0 01/07/2025 02:23 PM     PT/INR:    Lab Results   Component Value Date/Time    PROTIME 11.6 01/07/2025 02:23 PM    INR 1.1 01/07/2025 02:23 PM       Radiology Review:  X-ray AP pelvis/right hip demonstrate status post right total hip arthroplasty.  Components appear to be stable with no evidence of loosening or subsidence compared to postoperative imaging.    IMPRESSION:  Status post right total hip arthroplasty on 1/13  Erythema and drainage over postsurgical wound    PLAN:  Physical exam and imaging finding discussed with patient at bedside today.  Patient is status post right total hip arthroplasty on 1/13.  Patient is doing well overall however she states that beginning yesterday she noticed some redness and drainage coming from her surgical site.  Discussed with patient the possibility of an acute postsurgical infection and the need for infectious workup.  We would like to have IR aspirate the hip however IR is unable to aspirate today and will be available until tomorrow.  We discussed the need for medical admission for infectious workup and the potential for surgical intervention in the form of irrigation debridement and component exchange.  Patient was agreeable to this plan.  All questions were answered at this time.  Sutures over the right hip proximal wound were removed.  ESR and CRP pending  Continue weightbearing as tolerated to right lower extremity  Appreciate medical admission for infectious workup.  Please hold all antibiotics until aspiration or OR cultures are obtained.  IR aspiration of right hip ordered, pending  N.p.o. effective midnight tonight 2/3  Patient consented  Discuss with attending      Electronically signed by Watson  NegritoDO on 2/3/2025 at 1:47 PM  Note: This report was completed using [employer] voiced recognition software.  Every effort has been made to ensure accuracy; however, inadvertent computerized transcription errors may be present.        ATTENDING:  I saw and evaluated the patient, performing the key elements of the service.  I discussed the findings, assessment and plan with the resident and agree with the resident's findings and plan as documented in the resident's note.    ESR and CRP elevated which I would expect from this close post op  CT shows subcutaneous fluid collection, question is if this tracts into the joint - will need this explored in OR    Likely to require 6 weeks IV ABX regardless    Hopefully IR can aspirate hip joint prior to surgery to help guide treatment.  Cultures will also be taken in OR.    The procedure of revision right total hip arthroplasty was discussed with the patient today. Preoperative and postoperative care was discussed in detail. Alternative nonsurgical treatment options were again reviewed. After discussing all options the patient wishes to proceed with surgical management. The patient voices understand of the procedure and agrees to proceed. All the patient's questions. Verbal consent was obtained. DVT prophylaxis therapy planned based on risks for DVT/PE and bleeding according to the AAOS guidelines. The patient assessed for need of blood conservation program and/or nutritional intervention.  Preoperative skin preparation and nasal antiseptic application was discussed. Discharge planning reviewed.    The risks of revision right total hip arthroplasty were discussed in detail with the patient including but not limited to: infection (superficial and deep), DVT, PE, arthrofibrosis, persistent pain, chronic limb swelling, neurovascular injury, dislocation or subluxation, excessive bleeding, intraoperative and/or postoperative fracture, loosening/wear/failure of implants,

## 2025-02-03 NOTE — H&P
process.     No acute process.      Media Information      Document Information    Wound Care Image: Wound      02/03/2025 13:28   Attached To:   Hospital Encounter on 2/3/25   Source Information    Watson Mahan DO [facility]   Document History       Media Information      Document Information    Wound Care Image: Wound      02/03/2025 13:26   Attached To:   Hospital Encounter on 2/3/25   Source Information    Watson Mahan DO [facility]   Document History        ASSESSMENT:  Wound infection-possible abscess right hip  S/P right hip replacement on 01/13/25  Hyperkalemia  BOB  Acute on chronic iron deficiency anemia on ferrous sulfate  Constipation  Essential hypertension on metoprolol succinate and bumex  Hyperlipidemia on rosuvastatin  Type II insulin dependent diabetes mellitus on insulin glargine, metformin, actos, trulicity  Neuropathy on gabapentin  Arthritis on celecoxib  Glaucoma on latanoprost  History left breast cancer on arimedex  Vitamin D deficiency on cholecalciferol   OMI on CPAP nightly  Class II obesity BMI 36.96 kg/m2        PLAN:  Admit to general medical floor. Home medications will be reconciled. Lab work ordered for morning. Antiemetics, antipyretics, as needed pain medicine, electrolyte supplementation, hypoglycemia recovery medications, blood sugars AC & HS with sliding scale insulin coverage, nightly CPAP have been ordered. Consult previously made to orthopedic surgery and PT/OT will be consulted once cleared by ortho. SCDs for VTE prophylaxis, pantoprazole for GI prophylaxis. US RLE negative for DVT. No ABX or anticoagulants per orthopedic surgery.       The patient was seen, examined and then discussed with Dr. Frey.      LAKESHA Tovar CNP  5:14 PM  2/3/2025    Electronically signed by LAKESHA Tovar CNP on 2/3/2025 at 7:17 PM      I have personally participated in the history, exam, medical decision making with the nurse practitioner on the date of

## 2025-02-04 ENCOUNTER — ANESTHESIA (OUTPATIENT)
Dept: OPERATING ROOM | Age: 55
End: 2025-02-04
Payer: MEDICAID

## 2025-02-04 ENCOUNTER — APPOINTMENT (OUTPATIENT)
Dept: GENERAL RADIOLOGY | Age: 55
End: 2025-02-04
Payer: MEDICAID

## 2025-02-04 LAB
25(OH)D3 SERPL-MCNC: 51.7 NG/ML (ref 30–100)
ALBUMIN SERPL-MCNC: 3.4 G/DL (ref 3.5–5.2)
ALP SERPL-CCNC: 174 U/L (ref 35–104)
ALT SERPL-CCNC: 22 U/L (ref 0–32)
ANION GAP SERPL CALCULATED.3IONS-SCNC: 9 MMOL/L (ref 7–16)
AST SERPL-CCNC: 26 U/L (ref 0–31)
BACTERIA URNS QL MICRO: ABNORMAL
BASOPHILS # BLD: 0.01 K/UL (ref 0–0.2)
BASOPHILS NFR BLD: 0 % (ref 0–2)
BILIRUB SERPL-MCNC: 0.3 MG/DL (ref 0–1.2)
BILIRUB UR QL STRIP: NEGATIVE
BUN SERPL-MCNC: 17 MG/DL (ref 6–20)
CALCIUM SERPL-MCNC: 9.6 MG/DL (ref 8.6–10.2)
CHLORIDE SERPL-SCNC: 102 MMOL/L (ref 98–107)
CHOLEST SERPL-MCNC: 139 MG/DL
CLARITY UR: ABNORMAL
CO2 SERPL-SCNC: 30 MMOL/L (ref 22–29)
COLOR UR: YELLOW
CREAT SERPL-MCNC: 1.1 MG/DL (ref 0.5–1)
EOSINOPHIL # BLD: 0.43 K/UL (ref 0.05–0.5)
EOSINOPHILS RELATIVE PERCENT: 7 % (ref 0–6)
ERYTHROCYTE [DISTWIDTH] IN BLOOD BY AUTOMATED COUNT: 14.4 % (ref 11.5–15)
FOLATE SERPL-MCNC: >20 NG/ML (ref 4.8–24.2)
GFR, ESTIMATED: 61 ML/MIN/1.73M2
GLUCOSE BLD-MCNC: 110 MG/DL (ref 74–99)
GLUCOSE BLD-MCNC: 134 MG/DL (ref 74–99)
GLUCOSE BLD-MCNC: 161 MG/DL (ref 74–99)
GLUCOSE BLD-MCNC: 195 MG/DL (ref 74–99)
GLUCOSE BLD-MCNC: 66 MG/DL (ref 74–99)
GLUCOSE BLD-MCNC: 70 MG/DL (ref 74–99)
GLUCOSE BLD-MCNC: 99 MG/DL (ref 74–99)
GLUCOSE SERPL-MCNC: 99 MG/DL (ref 74–99)
GLUCOSE UR STRIP-MCNC: NEGATIVE MG/DL
HBA1C MFR BLD: 6.4 % (ref 4–5.6)
HCT VFR BLD AUTO: 29.2 % (ref 34–48)
HDLC SERPL-MCNC: 48 MG/DL
HGB BLD-MCNC: 9.2 G/DL (ref 11.5–15.5)
HGB UR QL STRIP.AUTO: ABNORMAL
IMM GRANULOCYTES # BLD AUTO: <0.03 K/UL (ref 0–0.58)
IMM GRANULOCYTES NFR BLD: 0 % (ref 0–5)
INR PPP: 1.1
IRON SATN MFR SERPL: 11 % (ref 15–50)
IRON SERPL-MCNC: 39 UG/DL (ref 37–145)
KETONES UR STRIP-MCNC: NEGATIVE MG/DL
LDLC SERPL CALC-MCNC: 72 MG/DL
LEUKOCYTE ESTERASE UR QL STRIP: ABNORMAL
LYMPHOCYTES NFR BLD: 1.86 K/UL (ref 1.5–4)
LYMPHOCYTES RELATIVE PERCENT: 29 % (ref 20–42)
MAGNESIUM SERPL-MCNC: 2 MG/DL (ref 1.6–2.6)
MCH RBC QN AUTO: 30.6 PG (ref 26–35)
MCHC RBC AUTO-ENTMCNC: 31.5 G/DL (ref 32–34.5)
MCV RBC AUTO: 97 FL (ref 80–99.9)
MONOCYTES NFR BLD: 0.67 K/UL (ref 0.1–0.95)
MONOCYTES NFR BLD: 10 % (ref 2–12)
NEUTROPHILS NFR BLD: 54 % (ref 43–80)
NEUTS SEG NFR BLD: 3.46 K/UL (ref 1.8–7.3)
NITRITE UR QL STRIP: POSITIVE
PH UR STRIP: 7 [PH] (ref 5–8)
PHOSPHATE SERPL-MCNC: 4.3 MG/DL (ref 2.5–4.5)
PLATELET # BLD AUTO: 301 K/UL (ref 130–450)
PMV BLD AUTO: 10.1 FL (ref 7–12)
POTASSIUM SERPL-SCNC: 4.4 MMOL/L (ref 3.5–5)
PROT SERPL-MCNC: 6.8 G/DL (ref 6.4–8.3)
PROT UR STRIP-MCNC: 100 MG/DL
PROTHROMBIN TIME: 11.9 SEC (ref 9.3–12.4)
RBC # BLD AUTO: 3.01 M/UL (ref 3.5–5.5)
RBC #/AREA URNS HPF: ABNORMAL /HPF
SODIUM SERPL-SCNC: 141 MMOL/L (ref 132–146)
SP GR UR STRIP: 1.01 (ref 1–1.03)
T4 FREE SERPL-MCNC: 1.2 NG/DL (ref 0.9–1.7)
TIBC SERPL-MCNC: 352 UG/DL (ref 250–450)
TRIGL SERPL-MCNC: 96 MG/DL
TROPONIN I SERPL HS-MCNC: 29 NG/L (ref 0–9)
TSH SERPL DL<=0.05 MIU/L-ACNC: 4.06 UIU/ML (ref 0.27–4.2)
UROBILINOGEN UR STRIP-ACNC: 1 EU/DL (ref 0–1)
VIT B12 SERPL-MCNC: 1106 PG/ML (ref 211–946)
VLDLC SERPL CALC-MCNC: 19 MG/DL
WBC #/AREA URNS HPF: ABNORMAL /HPF
WBC OTHER # BLD: 6.5 K/UL (ref 4.5–11.5)
YEAST URNS QL MICRO: PRESENT

## 2025-02-04 PROCEDURE — 82746 ASSAY OF FOLIC ACID SERUM: CPT

## 2025-02-04 PROCEDURE — 87075 CULTR BACTERIA EXCEPT BLOOD: CPT

## 2025-02-04 PROCEDURE — 6360000002 HC RX W HCPCS: Performed by: ANESTHESIOLOGY

## 2025-02-04 PROCEDURE — 72170 X-RAY EXAM OF PELVIS: CPT

## 2025-02-04 PROCEDURE — 80053 COMPREHEN METABOLIC PANEL: CPT

## 2025-02-04 PROCEDURE — 7100000001 HC PACU RECOVERY - ADDTL 15 MIN: Performed by: ORTHOPAEDIC SURGERY

## 2025-02-04 PROCEDURE — 84439 ASSAY OF FREE THYROXINE: CPT

## 2025-02-04 PROCEDURE — 1200000000 HC SEMI PRIVATE

## 2025-02-04 PROCEDURE — 85610 PROTHROMBIN TIME: CPT

## 2025-02-04 PROCEDURE — P9047 ALBUMIN (HUMAN), 25%, 50ML: HCPCS | Performed by: NURSE ANESTHETIST, CERTIFIED REGISTERED

## 2025-02-04 PROCEDURE — 83735 ASSAY OF MAGNESIUM: CPT

## 2025-02-04 PROCEDURE — 80061 LIPID PANEL: CPT

## 2025-02-04 PROCEDURE — 82306 VITAMIN D 25 HYDROXY: CPT

## 2025-02-04 PROCEDURE — 87040 BLOOD CULTURE FOR BACTERIA: CPT

## 2025-02-04 PROCEDURE — 83540 ASSAY OF IRON: CPT

## 2025-02-04 PROCEDURE — 6360000002 HC RX W HCPCS

## 2025-02-04 PROCEDURE — 76937 US GUIDE VASCULAR ACCESS: CPT

## 2025-02-04 PROCEDURE — 2500000003 HC RX 250 WO HCPCS: Performed by: NURSE ANESTHETIST, CERTIFIED REGISTERED

## 2025-02-04 PROCEDURE — 2580000003 HC RX 258

## 2025-02-04 PROCEDURE — 6370000000 HC RX 637 (ALT 250 FOR IP): Performed by: ORTHOPAEDIC SURGERY

## 2025-02-04 PROCEDURE — 3700000000 HC ANESTHESIA ATTENDED CARE: Performed by: ORTHOPAEDIC SURGERY

## 2025-02-04 PROCEDURE — 3600000012 HC SURGERY LEVEL 2 ADDTL 15MIN: Performed by: ORTHOPAEDIC SURGERY

## 2025-02-04 PROCEDURE — 6360000002 HC RX W HCPCS: Performed by: ORTHOPAEDIC SURGERY

## 2025-02-04 PROCEDURE — 87176 TISSUE HOMOGENIZATION CULTR: CPT

## 2025-02-04 PROCEDURE — 83036 HEMOGLOBIN GLYCOSYLATED A1C: CPT

## 2025-02-04 PROCEDURE — 6370000000 HC RX 637 (ALT 250 FOR IP)

## 2025-02-04 PROCEDURE — 84443 ASSAY THYROID STIM HORMONE: CPT

## 2025-02-04 PROCEDURE — 87205 SMEAR GRAM STAIN: CPT

## 2025-02-04 PROCEDURE — C1776 JOINT DEVICE (IMPLANTABLE): HCPCS | Performed by: ORTHOPAEDIC SURGERY

## 2025-02-04 PROCEDURE — 36415 COLL VENOUS BLD VENIPUNCTURE: CPT

## 2025-02-04 PROCEDURE — 83550 IRON BINDING TEST: CPT

## 2025-02-04 PROCEDURE — 84100 ASSAY OF PHOSPHORUS: CPT

## 2025-02-04 PROCEDURE — 2580000003 HC RX 258: Performed by: NURSE ANESTHETIST, CERTIFIED REGISTERED

## 2025-02-04 PROCEDURE — 87070 CULTURE OTHR SPECIMN AEROBIC: CPT

## 2025-02-04 PROCEDURE — 86403 PARTICLE AGGLUT ANTBDY SCRN: CPT

## 2025-02-04 PROCEDURE — 0SR903Z REPLACEMENT OF RIGHT HIP JOINT WITH CERAMIC SYNTHETIC SUBSTITUTE, OPEN APPROACH: ICD-10-PCS | Performed by: ORTHOPAEDIC SURGERY

## 2025-02-04 PROCEDURE — 87116 MYCOBACTERIA CULTURE: CPT

## 2025-02-04 PROCEDURE — 85025 COMPLETE CBC W/AUTO DIFF WBC: CPT

## 2025-02-04 PROCEDURE — 87015 SPECIMEN INFECT AGNT CONCNTJ: CPT

## 2025-02-04 PROCEDURE — 6360000002 HC RX W HCPCS: Performed by: NURSE ANESTHETIST, CERTIFIED REGISTERED

## 2025-02-04 PROCEDURE — 87102 FUNGUS ISOLATION CULTURE: CPT

## 2025-02-04 PROCEDURE — 82962 GLUCOSE BLOOD TEST: CPT

## 2025-02-04 PROCEDURE — 84484 ASSAY OF TROPONIN QUANT: CPT

## 2025-02-04 PROCEDURE — 2709999900 HC NON-CHARGEABLE SUPPLY: Performed by: ORTHOPAEDIC SURGERY

## 2025-02-04 PROCEDURE — 6360000002 HC RX W HCPCS: Performed by: SPECIALIST

## 2025-02-04 PROCEDURE — 2500000003 HC RX 250 WO HCPCS: Performed by: SPECIALIST

## 2025-02-04 PROCEDURE — 3600000002 HC SURGERY LEVEL 2 BASE: Performed by: ORTHOPAEDIC SURGERY

## 2025-02-04 PROCEDURE — 82607 VITAMIN B-12: CPT

## 2025-02-04 PROCEDURE — 87206 SMEAR FLUORESCENT/ACID STAI: CPT

## 2025-02-04 PROCEDURE — 7100000000 HC PACU RECOVERY - FIRST 15 MIN: Performed by: ORTHOPAEDIC SURGERY

## 2025-02-04 PROCEDURE — 3700000001 HC ADD 15 MINUTES (ANESTHESIA): Performed by: ORTHOPAEDIC SURGERY

## 2025-02-04 DEVICE — TRIDENT X3 0 DEGREE POLYETHYLENE INSERT
Type: IMPLANTABLE DEVICE | Site: HIP | Status: FUNCTIONAL
Brand: TRIDENT X3 INSERT

## 2025-02-04 DEVICE — CERAMIC V40 FEMORAL HEAD
Type: IMPLANTABLE DEVICE | Site: HIP | Status: FUNCTIONAL
Brand: BIOLOX

## 2025-02-04 RX ORDER — CEFAZOLIN SODIUM 1 G/3ML
INJECTION, POWDER, FOR SOLUTION INTRAMUSCULAR; INTRAVENOUS
Status: DISCONTINUED | OUTPATIENT
Start: 2025-02-04 | End: 2025-02-04 | Stop reason: SDUPTHER

## 2025-02-04 RX ORDER — WATER 10 ML/10ML
INJECTION INTRAMUSCULAR; INTRAVENOUS; SUBCUTANEOUS
Status: DISPENSED
Start: 2025-02-04 | End: 2025-02-05

## 2025-02-04 RX ORDER — TRANEXAMIC ACID 10 MG/ML
INJECTION, SOLUTION INTRAVENOUS
Status: DISCONTINUED | OUTPATIENT
Start: 2025-02-04 | End: 2025-02-04 | Stop reason: SDUPTHER

## 2025-02-04 RX ORDER — OXYCODONE HYDROCHLORIDE 5 MG/1
5 TABLET ORAL EVERY 4 HOURS PRN
Status: DISCONTINUED | OUTPATIENT
Start: 2025-02-04 | End: 2025-02-07 | Stop reason: HOSPADM

## 2025-02-04 RX ORDER — LIDOCAINE HYDROCHLORIDE 20 MG/ML
INJECTION, SOLUTION INTRAVENOUS
Status: DISCONTINUED | OUTPATIENT
Start: 2025-02-04 | End: 2025-02-04 | Stop reason: SDUPTHER

## 2025-02-04 RX ORDER — SODIUM CHLORIDE 9 MG/ML
INJECTION, SOLUTION INTRAVENOUS PRN
Status: DISCONTINUED | OUTPATIENT
Start: 2025-02-04 | End: 2025-02-04 | Stop reason: HOSPADM

## 2025-02-04 RX ORDER — OXYCODONE HYDROCHLORIDE 5 MG/1
5 TABLET ORAL EVERY 4 HOURS PRN
Qty: 42 TABLET | Refills: 0 | Status: SHIPPED | OUTPATIENT
Start: 2025-02-04 | End: 2025-02-11

## 2025-02-04 RX ORDER — ALBUMIN (HUMAN) 12.5 G/50ML
SOLUTION INTRAVENOUS
Status: DISCONTINUED | OUTPATIENT
Start: 2025-02-04 | End: 2025-02-04 | Stop reason: SDUPTHER

## 2025-02-04 RX ORDER — TOBRAMYCIN 1.2 G/30ML
INJECTION, POWDER, LYOPHILIZED, FOR SOLUTION INTRAVENOUS PRN
Status: DISCONTINUED | OUTPATIENT
Start: 2025-02-04 | End: 2025-02-04 | Stop reason: ALTCHOICE

## 2025-02-04 RX ORDER — SODIUM CHLORIDE 0.9 % (FLUSH) 0.9 %
5-40 SYRINGE (ML) INJECTION PRN
Status: DISCONTINUED | OUTPATIENT
Start: 2025-02-04 | End: 2025-02-04 | Stop reason: HOSPADM

## 2025-02-04 RX ORDER — NALOXONE HYDROCHLORIDE 0.4 MG/ML
INJECTION, SOLUTION INTRAMUSCULAR; INTRAVENOUS; SUBCUTANEOUS PRN
Status: DISCONTINUED | OUTPATIENT
Start: 2025-02-04 | End: 2025-02-04 | Stop reason: HOSPADM

## 2025-02-04 RX ORDER — CELECOXIB 200 MG/1
200 CAPSULE ORAL 2 TIMES DAILY
Qty: 60 CAPSULE | Refills: 0 | Status: SHIPPED | OUTPATIENT
Start: 2025-02-04 | End: 2025-02-07 | Stop reason: HOSPADM

## 2025-02-04 RX ORDER — FENTANYL CITRATE 50 UG/ML
INJECTION, SOLUTION INTRAMUSCULAR; INTRAVENOUS
Status: DISCONTINUED | OUTPATIENT
Start: 2025-02-04 | End: 2025-02-04 | Stop reason: SDUPTHER

## 2025-02-04 RX ORDER — DEXTROSE, SODIUM CHLORIDE, SODIUM LACTATE, POTASSIUM CHLORIDE, AND CALCIUM CHLORIDE 5; .6; .31; .03; .02 G/100ML; G/100ML; G/100ML; G/100ML; G/100ML
INJECTION, SOLUTION INTRAVENOUS CONTINUOUS
Status: DISCONTINUED | OUTPATIENT
Start: 2025-02-04 | End: 2025-02-04

## 2025-02-04 RX ORDER — SODIUM CHLORIDE 9 MG/ML
INJECTION, SOLUTION INTRAVENOUS
Status: DISCONTINUED | OUTPATIENT
Start: 2025-02-04 | End: 2025-02-04 | Stop reason: SDUPTHER

## 2025-02-04 RX ORDER — ACETAMINOPHEN 500 MG
1000 TABLET ORAL 3 TIMES DAILY
Qty: 180 TABLET | Refills: 0 | Status: SHIPPED | OUTPATIENT
Start: 2025-02-04 | End: 2025-03-06

## 2025-02-04 RX ORDER — LABETALOL HYDROCHLORIDE 5 MG/ML
10 INJECTION, SOLUTION INTRAVENOUS
Status: DISCONTINUED | OUTPATIENT
Start: 2025-02-04 | End: 2025-02-04 | Stop reason: HOSPADM

## 2025-02-04 RX ORDER — ROCURONIUM BROMIDE 10 MG/ML
INJECTION, SOLUTION INTRAVENOUS
Status: DISCONTINUED | OUTPATIENT
Start: 2025-02-04 | End: 2025-02-04 | Stop reason: SDUPTHER

## 2025-02-04 RX ORDER — CEFAZOLIN SODIUM 1 G/3ML
INJECTION, POWDER, FOR SOLUTION INTRAMUSCULAR; INTRAVENOUS
Status: COMPLETED
Start: 2025-02-04 | End: 2025-02-04

## 2025-02-04 RX ORDER — TRANEXAMIC ACID 10 MG/ML
1000 INJECTION, SOLUTION INTRAVENOUS ONCE
Status: DISCONTINUED | OUTPATIENT
Start: 2025-02-04 | End: 2025-02-07 | Stop reason: HOSPADM

## 2025-02-04 RX ORDER — OXYCODONE HYDROCHLORIDE 5 MG/1
10 TABLET ORAL EVERY 4 HOURS PRN
Status: DISCONTINUED | OUTPATIENT
Start: 2025-02-04 | End: 2025-02-07 | Stop reason: HOSPADM

## 2025-02-04 RX ORDER — SODIUM CHLORIDE 0.9 % (FLUSH) 0.9 %
5-40 SYRINGE (ML) INJECTION EVERY 12 HOURS SCHEDULED
Status: DISCONTINUED | OUTPATIENT
Start: 2025-02-04 | End: 2025-02-04 | Stop reason: HOSPADM

## 2025-02-04 RX ORDER — ASPIRIN 81 MG/1
81 TABLET, CHEWABLE ORAL 2 TIMES DAILY
Qty: 60 TABLET | Refills: 0 | Status: SHIPPED | OUTPATIENT
Start: 2025-02-04 | End: 2025-03-06

## 2025-02-04 RX ORDER — ONDANSETRON 2 MG/ML
INJECTION INTRAMUSCULAR; INTRAVENOUS
Status: DISCONTINUED | OUTPATIENT
Start: 2025-02-04 | End: 2025-02-04 | Stop reason: SDUPTHER

## 2025-02-04 RX ORDER — PROPOFOL 10 MG/ML
INJECTION, EMULSION INTRAVENOUS
Status: DISCONTINUED | OUTPATIENT
Start: 2025-02-04 | End: 2025-02-04 | Stop reason: SDUPTHER

## 2025-02-04 RX ORDER — MORPHINE SULFATE 2 MG/ML
2 INJECTION, SOLUTION INTRAMUSCULAR; INTRAVENOUS EVERY 4 HOURS PRN
Status: DISCONTINUED | OUTPATIENT
Start: 2025-02-04 | End: 2025-02-07 | Stop reason: HOSPADM

## 2025-02-04 RX ORDER — VASOPRESSIN 20 [USP'U]/ML
INJECTION, SOLUTION INTRAVENOUS
Status: DISCONTINUED | OUTPATIENT
Start: 2025-02-04 | End: 2025-02-04 | Stop reason: SDUPTHER

## 2025-02-04 RX ORDER — MORPHINE SULFATE 4 MG/ML
4 INJECTION, SOLUTION INTRAMUSCULAR; INTRAVENOUS EVERY 4 HOURS PRN
Status: DISCONTINUED | OUTPATIENT
Start: 2025-02-04 | End: 2025-02-07 | Stop reason: HOSPADM

## 2025-02-04 RX ORDER — PROCHLORPERAZINE EDISYLATE 5 MG/ML
5 INJECTION INTRAMUSCULAR; INTRAVENOUS
Status: DISCONTINUED | OUTPATIENT
Start: 2025-02-04 | End: 2025-02-04 | Stop reason: HOSPADM

## 2025-02-04 RX ORDER — MIDAZOLAM HYDROCHLORIDE 1 MG/ML
INJECTION, SOLUTION INTRAMUSCULAR; INTRAVENOUS
Status: DISCONTINUED | OUTPATIENT
Start: 2025-02-04 | End: 2025-02-04 | Stop reason: SDUPTHER

## 2025-02-04 RX ORDER — HALOPERIDOL 5 MG/ML
1 INJECTION INTRAMUSCULAR
Status: DISCONTINUED | OUTPATIENT
Start: 2025-02-04 | End: 2025-02-04 | Stop reason: HOSPADM

## 2025-02-04 RX ORDER — ASPIRIN 81 MG/1
81 TABLET ORAL 2 TIMES DAILY
Status: DISCONTINUED | OUTPATIENT
Start: 2025-02-04 | End: 2025-02-07 | Stop reason: HOSPADM

## 2025-02-04 RX ORDER — DIPHENHYDRAMINE HYDROCHLORIDE 50 MG/ML
12.5 INJECTION INTRAMUSCULAR; INTRAVENOUS
Status: DISCONTINUED | OUTPATIENT
Start: 2025-02-04 | End: 2025-02-04 | Stop reason: HOSPADM

## 2025-02-04 RX ORDER — IPRATROPIUM BROMIDE AND ALBUTEROL SULFATE 2.5; .5 MG/3ML; MG/3ML
1 SOLUTION RESPIRATORY (INHALATION)
Status: DISCONTINUED | OUTPATIENT
Start: 2025-02-04 | End: 2025-02-04 | Stop reason: HOSPADM

## 2025-02-04 RX ORDER — VANCOMYCIN HYDROCHLORIDE 1 G/20ML
INJECTION, POWDER, LYOPHILIZED, FOR SOLUTION INTRAVENOUS PRN
Status: DISCONTINUED | OUTPATIENT
Start: 2025-02-04 | End: 2025-02-04 | Stop reason: ALTCHOICE

## 2025-02-04 RX ORDER — FLUCONAZOLE 2 MG/ML
400 INJECTION, SOLUTION INTRAVENOUS EVERY 24 HOURS
Status: DISCONTINUED | OUTPATIENT
Start: 2025-02-04 | End: 2025-02-07 | Stop reason: HOSPADM

## 2025-02-04 RX ORDER — VANCOMYCIN 1.75 G/350ML
1250 INJECTION, SOLUTION INTRAVENOUS
Status: DISCONTINUED | OUTPATIENT
Start: 2025-02-04 | End: 2025-02-07

## 2025-02-04 RX ORDER — KETOROLAC TROMETHAMINE 15 MG/ML
15 INJECTION, SOLUTION INTRAMUSCULAR; INTRAVENOUS ONCE
Status: DISCONTINUED | OUTPATIENT
Start: 2025-02-04 | End: 2025-02-04 | Stop reason: HOSPADM

## 2025-02-04 RX ORDER — HYDRALAZINE HYDROCHLORIDE 20 MG/ML
10 INJECTION INTRAMUSCULAR; INTRAVENOUS
Status: DISCONTINUED | OUTPATIENT
Start: 2025-02-04 | End: 2025-02-04 | Stop reason: HOSPADM

## 2025-02-04 RX ADMIN — BUMETANIDE 1 MG: 1 TABLET ORAL at 08:22

## 2025-02-04 RX ADMIN — FENTANYL CITRATE 100 MCG: 50 INJECTION, SOLUTION INTRAMUSCULAR; INTRAVENOUS at 16:50

## 2025-02-04 RX ADMIN — CEFAZOLIN 2 G: 1 INJECTION, POWDER, FOR SOLUTION INTRAMUSCULAR; INTRAVENOUS at 17:12

## 2025-02-04 RX ADMIN — PROCHLORPERAZINE EDISYLATE 10 MG: 5 INJECTION INTRAMUSCULAR; INTRAVENOUS at 10:00

## 2025-02-04 RX ADMIN — PHENYLEPHRINE HYDROCHLORIDE 400 MCG: 10 INJECTION INTRAVENOUS at 17:23

## 2025-02-04 RX ADMIN — CELECOXIB 200 MG: 100 CAPSULE ORAL at 21:36

## 2025-02-04 RX ADMIN — SODIUM CHLORIDE, SODIUM LACTATE, POTASSIUM CHLORIDE, CALCIUM CHLORIDE AND DEXTROSE MONOHYDRATE: 5; 600; 310; 30; 20 INJECTION, SOLUTION INTRAVENOUS at 09:58

## 2025-02-04 RX ADMIN — PHENYLEPHRINE HYDROCHLORIDE 200 MCG: 10 INJECTION INTRAVENOUS at 17:00

## 2025-02-04 RX ADMIN — PHENYLEPHRINE HYDROCHLORIDE 200 MCG: 10 INJECTION INTRAVENOUS at 17:15

## 2025-02-04 RX ADMIN — HYDROMORPHONE HYDROCHLORIDE 0.5 MG: 1 INJECTION, SOLUTION INTRAMUSCULAR; INTRAVENOUS; SUBCUTANEOUS at 19:00

## 2025-02-04 RX ADMIN — PRAMIPEXOLE DIHYDROCHLORIDE 0.25 MG: 0.12 TABLET ORAL at 21:36

## 2025-02-04 RX ADMIN — Medication 2000 UNITS: at 08:22

## 2025-02-04 RX ADMIN — SUGAMMADEX 200 MG: 100 INJECTION, SOLUTION INTRAVENOUS at 18:36

## 2025-02-04 RX ADMIN — BUSPIRONE HYDROCHLORIDE 5 MG: 5 TABLET ORAL at 21:36

## 2025-02-04 RX ADMIN — PHENYLEPHRINE HYDROCHLORIDE 200 MCG: 10 INJECTION INTRAVENOUS at 17:27

## 2025-02-04 RX ADMIN — FENTANYL CITRATE 50 MCG: 50 INJECTION, SOLUTION INTRAMUSCULAR; INTRAVENOUS at 18:02

## 2025-02-04 RX ADMIN — PROPOFOL 200 MG: 10 INJECTION, EMULSION INTRAVENOUS at 16:52

## 2025-02-04 RX ADMIN — LIDOCAINE HYDROCHLORIDE 100 MG: 20 INJECTION, SOLUTION INTRAVENOUS at 16:52

## 2025-02-04 RX ADMIN — PRAMIPEXOLE DIHYDROCHLORIDE 0.12 MG: 0.12 TABLET ORAL at 08:21

## 2025-02-04 RX ADMIN — TRANEXAMIC ACID 1 G: 10 INJECTION, SOLUTION INTRAVENOUS at 17:17

## 2025-02-04 RX ADMIN — ROCURONIUM BROMIDE 50 MG: 10 SOLUTION INTRAVENOUS at 16:52

## 2025-02-04 RX ADMIN — CELECOXIB 200 MG: 100 CAPSULE ORAL at 08:22

## 2025-02-04 RX ADMIN — SODIUM CHLORIDE: 9 INJECTION, SOLUTION INTRAVENOUS at 16:36

## 2025-02-04 RX ADMIN — INSULIN GLARGINE 40 UNITS: 100 INJECTION, SOLUTION SUBCUTANEOUS at 21:47

## 2025-02-04 RX ADMIN — PANTOPRAZOLE SODIUM 40 MG: 40 TABLET, DELAYED RELEASE ORAL at 05:38

## 2025-02-04 RX ADMIN — FENTANYL CITRATE 50 MCG: 50 INJECTION, SOLUTION INTRAMUSCULAR; INTRAVENOUS at 17:56

## 2025-02-04 RX ADMIN — VASOPRESSIN 2 UNITS: 20 INJECTION INTRAVENOUS at 17:58

## 2025-02-04 RX ADMIN — VASOPRESSIN 2 UNITS: 20 INJECTION INTRAVENOUS at 17:45

## 2025-02-04 RX ADMIN — FERROUS SULFATE TAB 325 MG (65 MG ELEMENTAL FE) 325 MG: 325 (65 FE) TAB at 08:22

## 2025-02-04 RX ADMIN — Medication 1 TABLET: at 08:22

## 2025-02-04 RX ADMIN — ALBUMIN (HUMAN) 25 G: 25 SOLUTION INTRAVENOUS at 17:34

## 2025-02-04 RX ADMIN — VASOPRESSIN 2 UNITS: 20 INJECTION INTRAVENOUS at 17:41

## 2025-02-04 RX ADMIN — PHENYLEPHRINE HYDROCHLORIDE 200 MCG: 10 INJECTION INTRAVENOUS at 16:56

## 2025-02-04 RX ADMIN — ROSUVASTATIN CALCIUM 5 MG: 10 TABLET, FILM COATED ORAL at 08:22

## 2025-02-04 RX ADMIN — PHENYLEPHRINE HYDROCHLORIDE 200 MCG: 10 INJECTION INTRAVENOUS at 16:59

## 2025-02-04 RX ADMIN — HYDROMORPHONE HYDROCHLORIDE 0.25 MG: 1 INJECTION, SOLUTION INTRAMUSCULAR; INTRAVENOUS; SUBCUTANEOUS at 19:33

## 2025-02-04 RX ADMIN — MIDAZOLAM 2 MG: 1 INJECTION INTRAMUSCULAR; INTRAVENOUS at 16:44

## 2025-02-04 RX ADMIN — PHENYLEPHRINE HYDROCHLORIDE 200 MCG: 10 INJECTION INTRAVENOUS at 17:40

## 2025-02-04 RX ADMIN — PHENYLEPHRINE HYDROCHLORIDE 200 MCG: 10 INJECTION INTRAVENOUS at 17:10

## 2025-02-04 RX ADMIN — DEXTROSE 125 ML: 10 SOLUTION INTRAVENOUS at 06:39

## 2025-02-04 RX ADMIN — HYDROMORPHONE HYDROCHLORIDE 0.5 MG: 1 INJECTION, SOLUTION INTRAMUSCULAR; INTRAVENOUS; SUBCUTANEOUS at 19:09

## 2025-02-04 RX ADMIN — FENTANYL CITRATE 50 MCG: 50 INJECTION, SOLUTION INTRAMUSCULAR; INTRAVENOUS at 18:32

## 2025-02-04 RX ADMIN — SUGAMMADEX 200 MG: 100 INJECTION, SOLUTION INTRAVENOUS at 18:33

## 2025-02-04 RX ADMIN — GABAPENTIN 300 MG: 300 CAPSULE ORAL at 21:36

## 2025-02-04 RX ADMIN — PHENYLEPHRINE HYDROCHLORIDE 200 MCG: 10 INJECTION INTRAVENOUS at 17:28

## 2025-02-04 RX ADMIN — ONDANSETRON 4 MG: 2 INJECTION INTRAMUSCULAR; INTRAVENOUS at 16:52

## 2025-02-04 RX ADMIN — METFORMIN HYDROCHLORIDE 500 MG: 500 TABLET, EXTENDED RELEASE ORAL at 08:22

## 2025-02-04 RX ADMIN — WATER 2000 MG: 1 INJECTION INTRAMUSCULAR; INTRAVENOUS; SUBCUTANEOUS at 15:49

## 2025-02-04 RX ADMIN — METOPROLOL SUCCINATE 12.5 MG: 25 TABLET, EXTENDED RELEASE ORAL at 08:23

## 2025-02-04 RX ADMIN — ANASTROZOLE 1 MG: 1 TABLET ORAL at 08:22

## 2025-02-04 RX ADMIN — ASPIRIN 81 MG: 81 TABLET, COATED ORAL at 21:36

## 2025-02-04 RX ADMIN — FLUCONAZOLE 400 MG: 2 INJECTION, SOLUTION INTRAVENOUS at 10:49

## 2025-02-04 RX ADMIN — ALBUMIN (HUMAN) 25 G: 25 SOLUTION INTRAVENOUS at 18:15

## 2025-02-04 RX ADMIN — LATANOPROST 1 DROP: 50 SOLUTION OPHTHALMIC at 21:37

## 2025-02-04 RX ADMIN — VANCOMYCIN 1250 MG: 1.75 INJECTION, SOLUTION INTRAVENOUS at 13:59

## 2025-02-04 ASSESSMENT — PAIN DESCRIPTION - LOCATION
LOCATION: HIP

## 2025-02-04 ASSESSMENT — PAIN DESCRIPTION - ORIENTATION
ORIENTATION: RIGHT

## 2025-02-04 ASSESSMENT — PAIN SCALES - GENERAL
PAINLEVEL_OUTOF10: 0
PAINLEVEL_OUTOF10: 9
PAINLEVEL_OUTOF10: 7
PAINLEVEL_OUTOF10: 6

## 2025-02-04 ASSESSMENT — PAIN DESCRIPTION - DESCRIPTORS
DESCRIPTORS: ACHING
DESCRIPTORS: ACHING

## 2025-02-04 ASSESSMENT — LIFESTYLE VARIABLES: SMOKING_STATUS: 0

## 2025-02-04 NOTE — CONSULTS
1 VW    Result Date: 2/3/2025  Diffuse colonic fecal retention with significant stool burden.     XR HIP RIGHT (2-3 VIEWS)    Result Date: 2/3/2025  Total right hip arthroplasty with no gross hardware.       Micro:    Results       Procedure Component Value Units Date/Time    Culture, Urine [[id number]] Collected: 02/03/25 2225    Order Status: Sent Specimen: Urine, clean catch Updated: 02/04/25 0012    Gram Stain [[id number]] Collected: 02/03/25 1515    Order Status: Canceled Specimen: Body Fluid     Culture, Body Fluid (with Gram Stain) [[id number]]     Order Status: Sent Specimen: Body Fluid from Synovial Fluid             Problem list of patient      Patient Active Problem List   Diagnosis Code    Type II diabetes mellitus with peripheral autonomic neuropathy (HCC) E11.43    Neuropathy G62.9    Gastroesophageal reflux disease without esophagitis K21.9    Retinopathy H35.00    Anxiety F41.9    Irritable bowel syndrome K58.9    Mixed hyperlipidemia E78.2    Fatigue R53.83    Lumbar pain M54.50    RLS (restless legs syndrome) G25.81    Essential hypertension I10    Encounter for assessment of STD exposure Z20.2    Malignant neoplasm of left female breast (HCC) C50.912    Invasive ductal carcinoma of left breast (HCC) C50.912    Immunization due Z23    Age-related cataract of both eyes H25.9    Vitamin D deficiency E55.9    Stable proliferative diabetic retinopathy associated with type 2 diabetes mellitus (HCC) E11.3559    Uncontrolled type 2 diabetes mellitus with hyperglycemia (HCC) E11.65    Atypical chest pain R07.89    Coronary artery disease involving native heart without angina pectoris I25.10    Drug therapy Z79.899    Possible exposure to STD Z20.2    Acute vaginitis N76.0    ACS (acute coronary syndrome) ([facility]) I24.9    Chest pain R07.9    Bronchitis J40    Right hip pain M25.551    Generalized abdominal pain R10.84    Osteoarthritis of right hip M16.11    Primary osteoarthritis of right hip M16.11    Acute  cystitis with hematuria N30.01    Thickening of esophagus K22.89    Pain of right hip M25.551    Primary localized osteoarthritis of right hip M16.11    S/P total right hip arthroplasty Z96.641    Wound infection T14.8XXA, L08.9         ASSESSMENT AND PLAN   Pt with h/p right hip  arthoplasty on 01/13/25.   Came in with right hip drainage  Ct  postoperative seroma or abscess.    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  This included education to patient/representative and/for hospital staff in regards to disease transmission risk assessment and mitigation, public health investigation, analysis and testing, and complex antimicrobial therapy counseling and treatment.    Thank you Glenn Frey DO for allowing me to participate in this patient's care.  Please call [phone]  for any questions or concerns.    Electronically signed by Kary Gupta MD on 2/4/25 at 9:43 AM EST

## 2025-02-04 NOTE — DISCHARGE INSTRUCTIONS
Your information:  Name: Jenny Lilly  : 1970    Your instructions:  Discharge Home with Home Care  Weightbearing as tolerated  Posterior hip precautions please avoid flexion internal rotation  Please take medication as prescribed  Please leave your dressing in place for 7 days then transition to soft dressing   You may take shower after 2 days please wrap the Hip and Saran wrap  If you have any questions please contact Dr. Sofia Office      What to do after you leave the hospital:    Recommended diet: diabetic diet    When should you call for help?   Call your doctor now or seek immediate medical care if:    You have pain that does not get better after you take pain medicine.     You have signs of infection, such as:  Increased pain, swelling, warmth, or redness.  Red streaks leading from the wound.  Pus draining from the wound.  A fever.     The wound starts to bleed, and blood soaks through the bandage. Oozing small amounts of blood is normal.     You have loose stitches, or your skin graft comes loose.   Watch closely for any changes in your health, and be sure to contact your doctor if:    The wound is not getting better as expected.     The following personal items were collected during your admission and were returned to you:    Belongings  Dental Appliances: None  Vision - Corrective Lenses: Eyeglasses  Hearing Aid: None  Clothing: Footwear, Jacket/Coat, Pants, Shirt, Socks, Undergarments  Jewelry: None  Electronic Devices: Cell Phone,   Weapons (Notify Protective Services/Security): None  Home Medications: None  Valuables Given To: Patient  Provide Name(s) of Who Valuable(s) Were Given To: self    Information obtained by:  By signing below, I understand that if any problems occur once I leave the hospital I am to contact PCP.  I understand and acknowledge receipt of the instructions indicated above.     [facility]  ([facility])  [address] [place] [address]  you, and place it over the site.  Tape the PICC tubing to your skin.   Make sure it doesn't dangle or pull.  When should you call for help?  Call 911 anytime you think you may need emergency care. For example, call if:    You passed out (lost consciousness).     You have severe trouble breathing.     You have sudden chest pain and shortness of breath, or you cough up blood.     You have a fast or uneven pulse.   Call your doctor now or seek immediate medical care if:    You have signs of infection, such as:  Increased pain, swelling, warmth, or redness.  Red streaks leading from the area.  Pus or blood draining from the area.  A fever.     You have swelling in your face, chest, neck, or arm on the side where the catheter is.     You have signs of a blood clot, such as bulging veins near the catheter.     Your catheter is leaking, cracked, or clogged.     You feel resistance when you inject medicine or fluids into your catheter.     Your catheter is out of place. This may happen after severe coughing or vomiting, or if you pull on the catheter.   Watch closely for changes in your health, and be sure to contact your doctor if:    You have any concerns about your catheter.   Where can you learn more?  Go to https://www.[employer].net/patientEd and enter L935 to learn more about \"Peripherally Inserted Central Catheter (PICC): Care Instructions.\"  Current as of: July 31, 2024  Content Version: 14.3  © 2024 [employer].   Care instructions adapted under license by [employer]. If you have questions about a medical condition or this instruction, always ask your healthcare professional. [employer] disclaims any warranty or liability for your use of this information.

## 2025-02-04 NOTE — CARE COORDINATION
This tech spoke with the RN, she stated pt was going to surgery for the hip aspiration and irrigation  Electronically signed by ALYSSA NGUYEN on 2/4/2025 at 2:52 PM

## 2025-02-04 NOTE — ANESTHESIA PRE PROCEDURE
without esophagitis K21.9   • Retinopathy H35.00   • Anxiety F41.9   • Irritable bowel syndrome K58.9   • Mixed hyperlipidemia E78.2   • Fatigue R53.83   • Lumbar pain M54.50   • RLS (restless legs syndrome) G25.81   • Essential hypertension I10   • Encounter for assessment of STD exposure Z20.2   • Malignant neoplasm of left female breast (HCC) C50.912   • Invasive ductal carcinoma of left breast (HCC) C50.912   • Immunization due Z23   • Age-related cataract of both eyes H25.9   • Vitamin D deficiency E55.9   • Stable proliferative diabetic retinopathy associated with type 2 diabetes mellitus ([facility]) E11.3559   • Uncontrolled type 2 diabetes mellitus with hyperglycemia ([facility]) E11.65   • Atypical chest pain R07.89   • Coronary artery disease involving native heart without angina pectoris I25.10   • Drug therapy Z79.899   • Possible exposure to STD Z20.2   • Acute vaginitis N76.0   • ACS (acute coronary syndrome) ([facility]) I24.9   • Chest pain R07.9   • Bronchitis J40   • Right hip pain M25.551   • Generalized abdominal pain R10.84   • Osteoarthritis of right hip M16.11   • Primary osteoarthritis of right hip M16.11   • Acute cystitis with hematuria N30.01   • Thickening of esophagus K22.89   • Pain of right hip M25.551   • Primary localized osteoarthritis of right hip M16.11   • S/P total right hip arthroplasty Z96.641   • Wound infection T14.8XXA, L08.9       Past Medical History:        Diagnosis Date   • Acid reflux    • Anesthesia complication     problem with blood pressure up & down   • Arthritis     lower back   • Back pain    • Cancer (HCC) 2021    left breast   • Cardiac valve prolapse     valve ?   • Diabetes mellitus (HCC)     IDDM   • History of therapeutic radiation    • Hyperlipidemia    • Hypertension    • Neuropathy due to secondary diabetes (HCC)     in marcial feet   • Nontraumatic tear of right rotator cuff 11/30/2020   • Prolonged emergence from general anesthesia    • Psoriasis    • Sleep apnea     cpap 5

## 2025-02-04 NOTE — PROGRESS NOTES
Patient seen and examined this morning.      Please see consult note for full details.      Plan:  IR hip aspiration  OR today for I&D possible head/liner or component exchange  Unsure if this represents deep infection or just seroma as CT show subcutaneous fluid only but difficult to know if tracts into joint  Will obtain intra-op cultures      Hold ABX until cultures obtained in OR  TXA on hold    Chance Sofia MD

## 2025-02-04 NOTE — OP NOTE
Operative Note      Patient: Jenny Lilly  YOB: 1970  MRN: [id number]    Date of Procedure: 2/4/2025    Pre-Op Diagnosis Codes:      * Possible periprosthetic hip infection    Post-Op Diagnosis: Same       Procedure(s):  RIGHT HIP IRRIGATION AND DEBRIDEMENT POSSIBLE HEAD AND LINER EXCHANGE    Surgeon(s):  Chance Sofia MD    Assistant:   Resident: Damian Joshua DO; Andres Rodrigues DO; Watson Mahan DO    Anesthesia: General    Estimated Blood Loss (mL): 200     Complications: Hypovolemia, Hypotension    Specimens:   ID Type Source Tests Collected by Time Destination   1 : RIGHT HIP SEROMA Tissue Tissue CULTURE, ANAEROBIC, CULTURE, FUNGUS (Canceled), GRAM STAIN (Canceled), CULTURE, SURGICAL, CULTURE WITH SMEAR, ACID FAST BACILLIUS (Canceled) Chance Sofia MD 2/4/2025 1718    2 : RIGHT HIP SWAB Tissue Tissue CULTURE, ANAEROBIC, CULTURE, FUNGUS (Canceled), GRAM STAIN (Canceled), CULTURE, SURGICAL, CULTURE WITH SMEAR, ACID FAST BACILLIUS (Canceled) Chance Sofia MD 2/4/2025 1719    3 : RIGHT HIP JOINT TISSUE Tissue Tissue CULTURE, ANAEROBIC, CULTURE, FUNGUS, GRAM STAIN (Canceled), CULTURE, SURGICAL, CULTURE WITH SMEAR, ACID FAST BACILLIUS Chance Sofia MD 2/4/2025 1726        Implants:  Implant Name Type Inv. Item Serial No.  Lot No. LRB No. Used Action   HEAD FEM [id number] -4MM OFFSET HIP BIOLOX DELT CERAMIC TAPR - [id number]  HEAD FEM [id number] -4MM OFFSET HIP BIOLOX DELT CERAMIC TAPR  [employer] [id number] Right 1 Implanted   INSERT ACET B 0 DEG 32 MM HIP X3 TRIDENT [id number] - [id number]  INSERT ACET B 0 DEG 32 MM HIP X3 TRIDENT [id number]  [employer] [id number] Right 1 Implanted         Drains: * No LDAs found *    Findings:  Infection Present At Time Of Surgery (PATOS) (choose all levels that have infection present):  - Superficial Infection (skin/subcutaneous) present as evidenced by fluid consistent with infection  - Deep Infection

## 2025-02-04 NOTE — PROGRESS NOTES
Spiritual Health History and Assessment/Progress Note  [facility]    (P) Initial Encounter,  ,  ,      Name: Jenny Lilly MRN: [id number]    Age: 55 y.o.     Sex: female   Language: English   Anabaptist: Religion   Wound infection     Date: 2/4/2025                           Spiritual Assessment began in [facility] SUR[facility]        Referral/Consult From: (P) Rounding   Encounter Overview/Reason: (P) Initial Encounter  Service Provided For: (P) Patient    Nereyda, Belief, Meaning:   Patient is connected with a nereyda tradition or spiritual practice  Family/Friends No family/friends present      Importance and Influence:  Patient has spiritual/personal beliefs that influence decisions regarding their health  Family/Friends No family/friends present    Community:  Patient feels well-supported. Support system includes: Parent/s, Children, and Extended family  Family/Friends No family/friends present    Assessment and Plan of Care:     Patient Interventions include: Facilitated expression of thoughts and feelings and Affirmed coping skills/support systems  Family/Friends Interventions include: No family/friends present    Patient Plan of Care: Spiritual Care available upon further referral  Family/Friends Plan of Care: No family/friends present    Electronically signed by Chaplain Lucila on 2/4/2025 at 3:49 PM

## 2025-02-04 NOTE — CARE COORDINATION
CM note: attempted to meet with patient twice for transition of care planning but patient was sleeping both times.  She is scheduled for surgical procedure later today.  CM will follow up with patient for her post op needs tomorrow.  Hx of [facility]. Chart reviewed and appears that patient may live alone in an apartment and had DME of walker, shower chair and BSC.  Needs unknown at this time pending surgical procedure later today.

## 2025-02-04 NOTE — PROGRESS NOTES
4 Eyes Skin Assessment     NAME:  Jenny Lilly  YOB: 1970  MEDICAL RECORD NUMBER:  [id number]    The patient is being assessed for  Admission    I agree that at least one RN has performed a thorough Head to Toe Skin Assessment on the patient. ALL assessment sites listed below have been assessed.      Areas assessed by both nurses:    Head, Face, Ears, Shoulders, Back, Chest, Arms, Elbows, Hands, Sacrum. Buttock, Coccyx, Ischium, Legs. Feet and Heels, and Under Medical Devices         Does the Patient have a Wound? Yes wound(s) were present on assessment. LDA wound assessment was Initiated and completed by RN     X2 incision on right hip  Benigno Prevention initiated by RN: No  Wound Care Orders initiated by RN: No    Pressure Injury (Stage 3,4, Unstageable, DTI, NWPT, and Complex wounds) if present, place Wound referral order by RN under : No    New Ostomies, if present place, Ostomy referral order under : No     Nurse 1 eSignature: Electronically signed by Orin Thurston RN on 2/4/25 at 1:15 AM EST    **SHARE this note so that the co-signing nurse can place an eSignature**    Nurse 2 eSignature: Electronically signed by Nuzhat Lawrence RN on 2/4/25 at 1:16 AM EST

## 2025-02-04 NOTE — PLAN OF CARE
Problem: Chronic Conditions and Co-morbidities  Goal: Patient's chronic conditions and co-morbidity symptoms are monitored and maintained or improved  2/4/2025 1057 by Jennifer Diallo RN  Outcome: Progressing  2/4/2025 0116 by Orin Thurston RN  Outcome: Progressing     Problem: Discharge Planning  Goal: Discharge to home or other facility with appropriate resources  2/4/2025 1057 by Jennifer Diallo RN  Outcome: Progressing  2/4/2025 0116 by Orin Thurston RN  Outcome: Progressing     Problem: Safety - Adult  Goal: Free from fall injury  2/4/2025 1057 by Jennifer Diallo RN  Outcome: Progressing  2/4/2025 0116 by Orin Thurston RN  Outcome: Progressing     Problem: ABCDS Injury Assessment  Goal: Absence of physical injury  2/4/2025 1057 by Jennifer Diallo RN  Outcome: Progressing  2/4/2025 0116 by Orin Thurston RN  Outcome: Progressing

## 2025-02-04 NOTE — PROGRESS NOTES
Internal Medicine Consult Note    [facility]    Glenn Frey D.O., CHRIS Valdez D.O., CHRIS Kern D.O.     Christa Christine, MSN, APRN, NP-C  Alex Don, MSN, APRN-CNP  Carlo Lopes, MSN, APRN-CNP  Marii Pacheco, MSN APRN-CNP  Jessie Scott, MSN. APRN-NP-C     Primary Care Physician: Charlene Velez DO   Admitting Physician:  Glenn Frey DO  Admission date and time: 2/3/2025 12:40 PM    Room:  [address] [place], [place] [address]  Admitting diagnosis: Wound infection [T14.8XXA, L08.9]  Cellulitis of right lower extremity [L03.115]    Patient Name: Jenny Lilly  MRN: [id number]    Date of Service: 2/4/2025     Subjective:  Jenny is a 55 y.o. female who was seen and examined today,2/4/2025, at the bedside.  Patient has pain and discomfort in the right hip area.  Patient has surgery on January 13 same day.  Subsequently  incision is red and swollen patient is scheduled for left hip irrigation and debridement today by Dr. Sofia.    No family present during my examination.    Review of System:   Constitutional:   Denies fever or chills, weight loss or gain, fatigue or malaise.  HEENT:   Denies ear pain, sore throat, sinus or eye problems.  Cardiovascular:   Denies any chest pain, irregular heartbeats, or palpitations.   Respiratory:   Denies shortness of breath, coughing, sputum production, hemoptysis, or wheezing.  Gastrointestinal:   Denies nausea, vomiting, diarrhea, or constipation.  Denies any abdominal pain.  Genitourinary:    Denies any urgency, frequency, hematuria. Voiding  without difficulty.  Extremities:   Denies lower extremity swelling, edema or cyanosis.  Pain redness swelling of right hip  Neurology:    Denies any headache or focal neurological deficits, Denies generalized weakness or memory difficulty.   Psch:   Denies being anxious or depressed.  Musculoskeletal:    Denies  myalgias, joint complaints or

## 2025-02-05 PROBLEM — Z96.649 PROSTHETIC HIP INFECTION: Status: ACTIVE | Noted: 2025-02-05

## 2025-02-05 PROBLEM — T84.59XA PROSTHETIC HIP INFECTION: Status: ACTIVE | Noted: 2025-02-05

## 2025-02-05 LAB
GLUCOSE BLD-MCNC: 178 MG/DL (ref 74–99)
GLUCOSE BLD-MCNC: 233 MG/DL (ref 74–99)
GLUCOSE BLD-MCNC: 293 MG/DL (ref 74–99)
GLUCOSE BLD-MCNC: 333 MG/DL (ref 74–99)

## 2025-02-05 PROCEDURE — 83735 ASSAY OF MAGNESIUM: CPT

## 2025-02-05 PROCEDURE — 82962 GLUCOSE BLOOD TEST: CPT

## 2025-02-05 PROCEDURE — 97530 THERAPEUTIC ACTIVITIES: CPT

## 2025-02-05 PROCEDURE — 6360000002 HC RX W HCPCS: Performed by: CLINICAL NURSE SPECIALIST

## 2025-02-05 PROCEDURE — C1751 CATH, INF, PER/CENT/MIDLINE: HCPCS

## 2025-02-05 PROCEDURE — 36569 INSJ PICC 5 YR+ W/O IMAGING: CPT

## 2025-02-05 PROCEDURE — 6360000002 HC RX W HCPCS: Performed by: SPECIALIST

## 2025-02-05 PROCEDURE — 6370000000 HC RX 637 (ALT 250 FOR IP): Performed by: ORTHOPAEDIC SURGERY

## 2025-02-05 PROCEDURE — 76937 US GUIDE VASCULAR ACCESS: CPT

## 2025-02-05 PROCEDURE — 1200000000 HC SEMI PRIVATE

## 2025-02-05 PROCEDURE — 02HV33Z INSERTION OF INFUSION DEVICE INTO SUPERIOR VENA CAVA, PERCUTANEOUS APPROACH: ICD-10-PCS | Performed by: INTERNAL MEDICINE

## 2025-02-05 PROCEDURE — 97530 THERAPEUTIC ACTIVITIES: CPT | Performed by: PHYSICAL THERAPIST

## 2025-02-05 PROCEDURE — 2720000010 HC SURG SUPPLY STERILE

## 2025-02-05 PROCEDURE — 2580000003 HC RX 258: Performed by: ORTHOPAEDIC SURGERY

## 2025-02-05 PROCEDURE — 6370000000 HC RX 637 (ALT 250 FOR IP)

## 2025-02-05 PROCEDURE — 97161 PT EVAL LOW COMPLEX 20 MIN: CPT | Performed by: PHYSICAL THERAPIST

## 2025-02-05 PROCEDURE — 97165 OT EVAL LOW COMPLEX 30 MIN: CPT

## 2025-02-05 PROCEDURE — 6360000002 HC RX W HCPCS: Performed by: ORTHOPAEDIC SURGERY

## 2025-02-05 PROCEDURE — 84100 ASSAY OF PHOSPHORUS: CPT

## 2025-02-05 PROCEDURE — 97110 THERAPEUTIC EXERCISES: CPT | Performed by: PHYSICAL THERAPIST

## 2025-02-05 PROCEDURE — 97535 SELF CARE MNGMENT TRAINING: CPT

## 2025-02-05 PROCEDURE — 2500000003 HC RX 250 WO HCPCS: Performed by: CLINICAL NURSE SPECIALIST

## 2025-02-05 RX ORDER — BRIMONIDINE TARTRATE 1 MG/ML
1 SOLUTION/ DROPS OPHTHALMIC 2 TIMES DAILY
COMMUNITY
Start: 2024-11-12

## 2025-02-05 RX ORDER — SODIUM CHLORIDE 0.9 % (FLUSH) 0.9 %
5-40 SYRINGE (ML) INJECTION PRN
Status: DISCONTINUED | OUTPATIENT
Start: 2025-02-05 | End: 2025-02-07 | Stop reason: HOSPADM

## 2025-02-05 RX ORDER — LIDOCAINE HYDROCHLORIDE 10 MG/ML
50 INJECTION, SOLUTION INFILTRATION; PERINEURAL ONCE
Status: COMPLETED | OUTPATIENT
Start: 2025-02-05 | End: 2025-02-05

## 2025-02-05 RX ORDER — SODIUM CHLORIDE 9 MG/ML
INJECTION, SOLUTION INTRAVENOUS PRN
Status: DISCONTINUED | OUTPATIENT
Start: 2025-02-05 | End: 2025-02-07 | Stop reason: HOSPADM

## 2025-02-05 RX ORDER — VANCOMYCIN 1.5 G/300ML
1500 INJECTION, SOLUTION INTRAVENOUS EVERY 24 HOURS
Status: DISCONTINUED | OUTPATIENT
Start: 2025-02-05 | End: 2025-02-06

## 2025-02-05 RX ORDER — SODIUM CHLORIDE 0.9 % (FLUSH) 0.9 %
5-40 SYRINGE (ML) INJECTION EVERY 12 HOURS SCHEDULED
Status: DISCONTINUED | OUTPATIENT
Start: 2025-02-05 | End: 2025-02-07 | Stop reason: HOSPADM

## 2025-02-05 RX ORDER — TIMOLOL MALEATE 5 MG/ML
1 SOLUTION/ DROPS OPHTHALMIC 2 TIMES DAILY
COMMUNITY

## 2025-02-05 RX ADMIN — GABAPENTIN 300 MG: 300 CAPSULE ORAL at 21:45

## 2025-02-05 RX ADMIN — VANCOMYCIN 1500 MG: 1.5 INJECTION, SOLUTION INTRAVENOUS at 17:15

## 2025-02-05 RX ADMIN — INSULIN GLARGINE 40 UNITS: 100 INJECTION, SOLUTION SUBCUTANEOUS at 21:46

## 2025-02-05 RX ADMIN — INSULIN LISPRO 2 UNITS: 100 INJECTION, SOLUTION INTRAVENOUS; SUBCUTANEOUS at 12:37

## 2025-02-05 RX ADMIN — FLUCONAZOLE 400 MG: 2 INJECTION, SOLUTION INTRAVENOUS at 10:21

## 2025-02-05 RX ADMIN — INSULIN LISPRO 4 UNITS: 100 INJECTION, SOLUTION INTRAVENOUS; SUBCUTANEOUS at 17:13

## 2025-02-05 RX ADMIN — Medication 2000 UNITS: at 10:03

## 2025-02-05 RX ADMIN — ANASTROZOLE 1 MG: 1 TABLET ORAL at 10:04

## 2025-02-05 RX ADMIN — PRAMIPEXOLE DIHYDROCHLORIDE 0.25 MG: 0.12 TABLET ORAL at 21:45

## 2025-02-05 RX ADMIN — LIDOCAINE HYDROCHLORIDE 50 MG: 10 INJECTION, SOLUTION INFILTRATION; PERINEURAL at 15:07

## 2025-02-05 RX ADMIN — Medication 1 TABLET: at 10:03

## 2025-02-05 RX ADMIN — OXYCODONE HYDROCHLORIDE 5 MG: 5 TABLET ORAL at 01:32

## 2025-02-05 RX ADMIN — ACETAMINOPHEN 650 MG: 325 TABLET ORAL at 18:10

## 2025-02-05 RX ADMIN — PRAMIPEXOLE DIHYDROCHLORIDE 0.12 MG: 0.12 TABLET ORAL at 10:03

## 2025-02-05 RX ADMIN — CELECOXIB 200 MG: 100 CAPSULE ORAL at 10:04

## 2025-02-05 RX ADMIN — ROSUVASTATIN CALCIUM 5 MG: 10 TABLET, FILM COATED ORAL at 10:03

## 2025-02-05 RX ADMIN — ASPIRIN 81 MG: 81 TABLET, COATED ORAL at 10:03

## 2025-02-05 RX ADMIN — SODIUM CHLORIDE, PRESERVATIVE FREE 10 ML: 5 INJECTION INTRAVENOUS at 21:46

## 2025-02-05 RX ADMIN — BUSPIRONE HYDROCHLORIDE 5 MG: 5 TABLET ORAL at 21:45

## 2025-02-05 RX ADMIN — METFORMIN HYDROCHLORIDE 500 MG: 500 TABLET, EXTENDED RELEASE ORAL at 10:04

## 2025-02-05 RX ADMIN — ASPIRIN 81 MG: 81 TABLET, COATED ORAL at 21:45

## 2025-02-05 RX ADMIN — CELECOXIB 200 MG: 100 CAPSULE ORAL at 21:45

## 2025-02-05 RX ADMIN — BUMETANIDE 1 MG: 1 TABLET ORAL at 10:04

## 2025-02-05 RX ADMIN — INSULIN LISPRO 6 UNITS: 100 INJECTION, SOLUTION INTRAVENOUS; SUBCUTANEOUS at 21:45

## 2025-02-05 RX ADMIN — LATANOPROST 1 DROP: 50 SOLUTION OPHTHALMIC at 21:46

## 2025-02-05 RX ADMIN — PANTOPRAZOLE SODIUM 40 MG: 40 TABLET, DELAYED RELEASE ORAL at 05:31

## 2025-02-05 ASSESSMENT — PAIN SCALES - GENERAL
PAINLEVEL_OUTOF10: 6
PAINLEVEL_OUTOF10: 3

## 2025-02-05 ASSESSMENT — PAIN DESCRIPTION - ORIENTATION: ORIENTATION: RIGHT

## 2025-02-05 ASSESSMENT — PAIN DESCRIPTION - LOCATION: LOCATION: HIP

## 2025-02-05 ASSESSMENT — PAIN DESCRIPTION - DESCRIPTORS: DESCRIPTORS: ACHING;DISCOMFORT;THROBBING

## 2025-02-05 NOTE — ANESTHESIA POSTPROCEDURE EVALUATION
Department of Anesthesiology  Postprocedure Note    Patient: Jenny Lilly  MRN: [id number]  YOB: 1970  Date of evaluation: 2/4/2025    Procedure Summary       Date: 02/04/25 Room / Location: [place] [address] / [facility]    Anesthesia Start: 1635 Anesthesia Stop: 1847    Procedure: RIGHT HIP IRRIGATION AND DEBRIDEMENT POSSIBLE HEAD AND LINER EXCHANGE (Right: Hip) Diagnosis:       Postoperative wound infection of right hip      (Postoperative wound infection of right hip [T81.49XA])    Surgeons: Chance Sofia MD Responsible Provider: Jose Elias Quiroz MD    Anesthesia Type: general ASA Status: 3            Anesthesia Type: No value filed.    Radha Phase I: Radha Score: 9    Radha Phase II:      Anesthesia Post Evaluation    Patient location during evaluation: PACU  Patient participation: complete - patient participated  Level of consciousness: awake  Pain score: 4  Airway patency: patent  Nausea & Vomiting: no vomiting and no nausea  Cardiovascular status: hemodynamically stable  Respiratory status: acceptable and nasal cannula  Hydration status: stable  Pain management: adequate        No notable events documented.

## 2025-02-05 NOTE — PROGRESS NOTES
OCCUPATIONAL THERAPY INITIAL EVALUATION    [facility]  [address] [place]        Date:2025                                                  Patient Name: Jenny Lilly    MRN: [id number]    : 1970    Room: [address] [place] [address]      Evaluating OT: Gina Donovan OTR/L; [id number]     Referring Provider and Specific Provider Orders/Date:      25  OT eval and treat  Start:  25,   End:  25,   ONE TIME,   Standing Count:  1 Occurrences,   R         Chance Sofia MD      Placement Recommendation: HHOT if needed        Diagnosis:   1. Cellulitis of right lower extremity    2. Wound infection    3. Postoperative wound infection of right hip    4. Post-op pain         Surgery: RIGHT HIP IRRIGATION AND DEBRIDEMENT POSSIBLE HEAD AND LINER EXCHANGE      Pertinent Medical History:       Past Medical History:   Diagnosis Date    Acid reflux     Anesthesia complication     problem with blood pressure up & down    Arthritis     lower back    Back pain     Cancer (HCC)     left breast    Cardiac valve prolapse     valve ?    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 marcial feet    Nontraumatic tear of right rotator cuff 2020    Prolonged emergence from general anesthesia     Psoriasis     Sleep apnea     cpap 5         Past Surgical History:   Procedure Laterality Date    ANKLE SURGERY      bilateral tarsal tunnels    BREAST BIOPSY Left 2021    LEFT BREAST NEEDLE LOCALIZED LUMPECTOMY, BLUE DYE INJECTION, LEFT AXILLARY SENTINEL LYMPHNODE EXCISION, POSSIBLE LEFT AXILLARY DISSECTION performed by Tana Granados [place] [facility]    BREAST LUMPECTOMY Left     2021    CARDIAC SURGERY      cardiac catheterization-  \" years ago - over 10 years and negative\"; to see Dr. Gonzalez 3/13/23    CARPAL TUNNEL RELEASE

## 2025-02-05 NOTE — PROGRESS NOTES
Component Value Date    ALT 22 02/04/2025    AST 26 02/04/2025    GGT 29 03/24/2017    ALKPHOS 174 (H) 02/04/2025    BILITOT 0.3 02/04/2025     Lab Results   Component Value Date/Time     02/04/2025 07:13 AM    K 4.4 02/04/2025 07:13 AM    K 4.3 11/12/2020 11:01 AM     02/04/2025 07:13 AM    CO2 30 02/04/2025 07:13 AM    BUN 17 02/04/2025 07:13 AM    CREATININE 1.1 02/04/2025 07:13 AM    GFRAA >60 08/24/2022 11:11 AM    LABGLOM 61 02/04/2025 07:13 AM    LABGLOM >60 12/20/2023 10:55 AM    GLUCOSE 99 02/04/2025 07:13 AM    GLUCOSE 194 03/30/2012 08:52 AM    CALCIUM 9.6 02/04/2025 07:13 AM    BILITOT 0.3 02/04/2025 07:13 AM    ALKPHOS 174 02/04/2025 07:13 AM    AST 26 02/04/2025 07:13 AM    ALT 22 02/04/2025 07:13 AM     Radiology:  2/3/2025- CT  Impression:        1. 4.1 x 3.3 x 12.3 cm triangular fluid collection in the deep subcutaneous  fat lateral to the prosthetic right hip, consistent with a postoperative  seroma or abscess.  Smaller fluid collection extending inferiorly and  laterally.  2. No sign of fracture or loosening of the components of a right total hip  prosthesis placed during the interval.     Microbiology:   2/3/2025- urine culture- E.coli and diphtheroids  2/4/2025- surgical culture- no growth to date   2/4/2025- blood cx- no growth to date     ASSESSMENT:  Right hip arthroplasty on 1/13/2025- seroma or abscess  S/p right hip irrigation and debridement with headliner exchange on 2/4/2025  Bacteruria / candiduria     PLAN:  Discussed with Dr. Gupta   Continue Rocephin IV   Continue vancomycin - pharmacy to dose   On diflucan   Check cultures  Monitor labs  Sed rate- 73 procal- 0.05   crp- 7.0     Please note that 30 minutes were spent on this case in regards to the intensity and complexity inherent to hospital inpatient or observation care associated with a confirmed or suspected infectious disease.  This included education to patient/representative and/for hospital staff in regards to  disease transmission risk assessment and mitigation, public health investigation, analysis and testing, and complex antimicrobial therapy counseling and treatment.    LAKESHA Owens - CNS  1:30 PM  2/5/2025

## 2025-02-05 NOTE — PLAN OF CARE
Problem: Chronic Conditions and Co-morbidities  Goal: Patient's chronic conditions and co-morbidity symptoms are monitored and maintained or improved  2/5/2025 1205 by Jana Brandon RN  Outcome: Progressing  2/4/2025 2252 by Elaine Millard RN  Outcome: Progressing  2/4/2025 2252 by Elaine Millard RN  Outcome: Progressing     Problem: Discharge Planning  Goal: Discharge to home or other facility with appropriate resources  2/5/2025 1205 by Jana Brandon RN  Outcome: Progressing  2/4/2025 2252 by Elaine Millard RN  Outcome: Progressing  2/4/2025 2252 by Elaine Millard RN  Outcome: Progressing     Problem: Safety - Adult  Goal: Free from fall injury  2/5/2025 1205 by Jana Brandon RN  Outcome: Progressing  2/4/2025 2252 by Elaine Millard RN  Outcome: Progressing  2/4/2025 2252 by Elaine Millard RN  Outcome: Progressing     Problem: ABCDS Injury Assessment  Goal: Absence of physical injury  2/5/2025 1205 by Jana Brandon RN  Outcome: Progressing  2/4/2025 2252 by Elaine Millard RN  Outcome: Progressing  2/4/2025 2252 by Elaine Millard RN  Outcome: Progressing     Problem: Skin/Tissue Integrity - Adult  Goal: Skin integrity remains intact  2/5/2025 1205 by Jana Brandon RN  Outcome: Progressing  2/4/2025 2252 by Elaine Millard RN  Outcome: Progressing  2/4/2025 2252 by Elaine Millard RN  Reactivated  Goal: Incisions, wounds, or drain sites healing without S/S of infection  2/5/2025 1205 by Jana Brandon RN  Outcome: Progressing  2/4/2025 2252 by Elaine Millard RN  Outcome: Progressing  2/4/2025 2252 by Elaine Millard RN  Reactivated  Goal: Oral mucous membranes remain intact  2/5/2025 1205 by Jana Brandon RN  Outcome: Progressing  2/4/2025 2252 by Elaine Millard RN  Outcome: Progressing  2/4/2025 2252 by Elaine Millard RN  Reactivated     Problem: Musculoskeletal - Adult  Goal: Return mobility to safest level of function  2/5/2025 1205 by Aleksandr,

## 2025-02-05 NOTE — PROCEDURES
PROCEDURE NOTE  Date: 2/5/2025   Name: Jenny Lilly  YOB: 1970    Procedures    DL PICC Placement 2/5/2025    Product number: [id number]   Lot Number: [id number]      Ultrasound: yes  \"R Brachial\",      Upper Arm Circumference: 35cm    Size: 5.5FR    Exposed Length: 0CM    Internal Length: 32CM   Cut: 23cm   Vein Measurement: 0.60cm    Consent signed  Brisk blood return  Flushed well  Dressing applied  Bullseye obtained via VPS- tip placed at lower 1/3 of the SVC or at the CAJ  Proceduralist Leandro Guzman, assisted by Lissa Steen RN  2/5/2025  3:08 PM

## 2025-02-05 NOTE — PROGRESS NOTES
Pharmacy Consultation Note  (Antibiotic Dosing and Monitoring)    Initial consult date: 2/5/25  Consulting physician/provider: Zoila  Drug: Vancomycin  Indication: Bone and Joint infection    Age/  Gender Height Weight IBW  Allergy Information   55 y.o./female 149.9 cm (4' 11\") 83 kg (183 lb)     Ideal body weight: 48.8 kg (107 lb 8.4 oz)  Adjusted ideal body weight: 65.8 kg (144 lb 15.3 oz)   Patient has no known allergies.      Renal Function:  Recent Labs     02/03/25  1318 02/04/25  0713   BUN 20 17   CREATININE 1.2* 1.1*       Intake/Output Summary (Last 24 hours) at 2/5/2025 1405  Last data filed at 2/5/2025 0800  Gross per 24 hour   Intake 2400 ml   Output 680 ml   Net 1720 ml       Vancomycin Monitoring:  Trough:  No results for input(s): \"VANCOTROUGH\" in the last 72 hours.  Random:  No results for input(s): \"VANCORANDOM\" in the last 72 hours.    Vancomycin Administration Times:  Recent vancomycin administrations                     vancomycin (VANCOCIN) injection (mg) 3,000 mg Given 02/04/25 1744    vancomycin (VANCOCIN) 1250 mg in 250 mL IVPB (mg) 1,250 mg New Bag 02/04/25 1359                    Assessment:  Patient is a 55 y.o. female who has been initiated on vancomycin  Estimated Creatinine Clearance: 59 mL/min (A) (based on SCr of 1.1 mg/dL (H)).  To dose vancomycin, pharmacy will be utilizing [employer] calculation software for goal AUC/BLANKA 400-600 mg/L-hr (predicted AUC/BLANKA = 558, Tr =12.3 mcg/mL)    Plan:  Will continue vancomycin 1500 mg IV every 24 hours  Will check vancomycin levels when appropriate  Will continue to monitor renal function   Pharmacy to follow      Nancy Ch PharmD 2/5/2025 2:05 PM    SJW: [phone]

## 2025-02-05 NOTE — PROGRESS NOTES
Department of Orthopedic Surgery  Resident Progress Note    Patient seen and examined at bedside this morning.  Pain is well-controlled.  No new complaints at this time.  All questions pertaining to patient's surgery were answered this morning.  Patient is doing well overall.  Wound VAC is in place with no drainage within canister.    VITALS:  /60   Pulse 99   Temp 97.9 °F (36.6 °C) (Oral)   Resp 17   Ht 1.499 m (4' 11\")   Wt 91.2 kg (201 lb 1.6 oz)   LMP  (LMP Unknown)   SpO2 100%   BMI 40.62 kg/m²     General: alert and oriented to person, place and time, well-developed and well-nourished, in no acute distress    MUSCULOSKELETAL:   right lower extremity:  Dressing C/D/I  Compartments soft and compressible  +PF/DF/EHL  +2/4 DP & PT pulses, Brisk Cap refill, Toes warm and perfused  Distal sensation grossly intact to Peroneals, Sural, Saphenous, and tibial nrs    CBC:   Lab Results   Component Value Date/Time    WBC 6.5 02/04/2025 07:13 AM    HGB 9.2 02/04/2025 07:13 AM    HCT 29.2 02/04/2025 07:13 AM     02/04/2025 07:13 AM     PT/INR:    Lab Results   Component Value Date/Time    PROTIME 11.9 02/04/2025 07:13 AM    INR 1.1 02/04/2025 07:13 AM       Intraop Cultures: sent    ASSESSMENT  S/P irrigation and debridement with headliner exchange on 2/4    PLAN      Continue physical therapy and protocol: WBAT -right LE  24 hour abx coverage, to be completed today.  Appreciate infectious disease input on definitive antibiotics once cultures have resulted.  Deep venous thrombosis prophylaxis -aspirin 81 mg twice daily postop day 1, early mobilization  PT/OT  Pain Control: IV and PO  Monitor H&H: Pending morning labs  D/C Plan: Appreciate physical therapy and social work recommendations.  Orthopedics will continue to follow.      Electronically signed by Watson Mahan DO on 2/5/2025 at 6:58 AM  Note: This report was completed using [email or web address] voiced recognition software.  Every effort has been made to  ensure accuracy; however, inadvertent computerized transcription errors may be present.      ATTENDING:  Agree with above.  Reports feeling well.  Appreciate ID ABX management.  F/u cultures.  Patient likely to benefit greatly from 6 weeks IV ABX.  Likely to need PICC line    Maintain incisional vac.    PT/OT  WBAT  Posterior hip precautions    Chance Sofia MD

## 2025-02-05 NOTE — CARE COORDINATION
Case Management Assessment  Initial Evaluation    Date/Time of Evaluation: 2/5/2025 11:53 AM  Assessment Completed by: Mona Pierson RN    If patient is discharged prior to next notation, then this note serves as note for discharge by case management.    Patient Name: Jenny Lilly                   YOB: 1970  Diagnosis: Wound infection [T14.8XXA, L08.9]  Cellulitis of right lower extremity [L03.115]                   Date / Time: 2/3/2025 12:40 PM    Patient Admission Status: Inpatient   Readmission Risk (Low < 19, Mod (19-27), High > 27): Readmission Risk Score: 14.1    Current PCP: Charlene Velez, DO  PCP verified by CM? Yes    Chart Reviewed: Yes      History Provided by: Patient  Patient Orientation: Alert and Oriented    Patient Cognition: Alert    Hospitalization in the last 30 days (Readmission):  No    If yes, Readmission Assessment in CM Navigator will be completed.    Advance Directives:      Code Status: Full Code   Patient's Primary Decision Maker is: Legal Next of Kin    Primary Decision Maker: Chance Moody - Child - [phone]    Secondary Decision Maker: Simón Donothy - Parent - [phone]    Supplemental (Other) Decision Maker: Sherin Funez - Friend - [phone]    Discharge Planning:    Patient lives with: Alone Type of Home: Apartment  Primary Care Giver: Self  Patient Support Systems include: Family Members   Current Financial resources:    Current community resources:    Current services prior to admission: C-pap            Current DME:              Type of Home Care services:  None    ADLS  Prior functional level: Independent in ADLs/IADLs  Current functional level: Assistance with the following:, Mobility    PT AM-PAC:   /24  OT AM-PAC:   /24    Family can provide assistance at DC: No  Would you like Case Management to discuss the discharge plan with any other family members/significant others, and if so, who? No  Plans to Return to Present Housing:

## 2025-02-05 NOTE — PLAN OF CARE
Problem: Chronic Conditions and Co-morbidities  Goal: Patient's chronic conditions and co-morbidity symptoms are monitored and maintained or improved  2/4/2025 2252 by Elaine Millard RN  Outcome: Progressing  2/4/2025 2252 by Elaine Millard RN  Outcome: Progressing  2/4/2025 1057 by Jennifer Diallo RN  Outcome: Progressing     Problem: Discharge Planning  Goal: Discharge to home or other facility with appropriate resources  2/4/2025 2252 by Elaine Millard RN  Outcome: Progressing  2/4/2025 2252 by Elaine Millard RN  Outcome: Progressing  2/4/2025 1057 by Jennifer Diallo RN  Outcome: Progressing     Problem: Safety - Adult  Goal: Free from fall injury  2/4/2025 2252 by Elaine Millard RN  Outcome: Progressing  2/4/2025 2252 by Elaine Millard RN  Outcome: Progressing  2/4/2025 1057 by Jennifer Diallo RN  Outcome: Progressing     Problem: ABCDS Injury Assessment  Goal: Absence of physical injury  2/4/2025 2252 by Elaine Millard RN  Outcome: Progressing  2/4/2025 2252 by Elaine Millard RN  Outcome: Progressing  2/4/2025 1057 by Jennifer Diallo RN  Outcome: Progressing     Problem: Skin/Tissue Integrity - Adult  Goal: Skin integrity remains intact  2/4/2025 2252 by Elaine Millard RN  Outcome: Progressing  2/4/2025 2252 by Elaine Millard RN  Reactivated  Goal: Incisions, wounds, or drain sites healing without S/S of infection  2/4/2025 2252 by Elaine Millard RN  Outcome: Progressing  2/4/2025 2252 by Elaine Millard RN  Reactivated  Goal: Oral mucous membranes remain intact  2/4/2025 2252 by Elaine Millard RN  Outcome: Progressing  2/4/2025 2252 by Elaine Millard RN  Reactivated     Problem: Musculoskeletal - Adult  Goal: Return mobility to safest level of function  2/4/2025 2252 by Elaine Millard RN  Outcome: Progressing  2/4/2025 2252 by Elaine Millard RN  Reactivated  Goal: Maintain proper alignment of affected body part  2/4/2025 2252 by Elaine Millard RN  Outcome:

## 2025-02-05 NOTE — PLAN OF CARE
Problem: Chronic Conditions and Co-morbidities  Goal: Patient's chronic conditions and co-morbidity symptoms are monitored and maintained or improved  2/4/2025 2252 by Elaine Millard RN  Outcome: Progressing  2/4/2025 1057 by Jennifer Diallo RN  Outcome: Progressing     Problem: Discharge Planning  Goal: Discharge to home or other facility with appropriate resources  2/4/2025 2252 by Elaine Millard RN  Outcome: Progressing  2/4/2025 1057 by Jennifer Diallo RN  Outcome: Progressing     Problem: Safety - Adult  Goal: Free from fall injury  2/4/2025 2252 by Elaine Millard RN  Outcome: Progressing  2/4/2025 1057 by Jennifer Diallo RN  Outcome: Progressing     Problem: ABCDS Injury Assessment  Goal: Absence of physical injury  2/4/2025 2252 by Elaine Millard RN  Outcome: Progressing  2/4/2025 1057 by Jennifer Diallo RN  Outcome: Progressing     Problem: Skin/Tissue Integrity - Adult  Goal: Skin integrity remains intact  Reactivated  Goal: Incisions, wounds, or drain sites healing without S/S of infection  Reactivated  Goal: Oral mucous membranes remain intact  Reactivated     Problem: Musculoskeletal - Adult  Goal: Return mobility to safest level of function  Reactivated  Goal: Maintain proper alignment of affected body part  Reactivated  Goal: Return ADL status to a safe level of function  Reactivated

## 2025-02-06 LAB
ALBUMIN SERPL-MCNC: 3.3 G/DL (ref 3.5–5.2)
ALBUMIN SERPL-MCNC: 3.3 G/DL (ref 3.5–5.2)
ALP SERPL-CCNC: 110 U/L (ref 35–104)
ALP SERPL-CCNC: 115 U/L (ref 35–104)
ALT SERPL-CCNC: 13 U/L (ref 0–32)
ALT SERPL-CCNC: 13 U/L (ref 0–32)
ANION GAP SERPL CALCULATED.3IONS-SCNC: 10 MMOL/L (ref 7–16)
ANION GAP SERPL CALCULATED.3IONS-SCNC: 11 MMOL/L (ref 7–16)
AST SERPL-CCNC: 23 U/L (ref 0–31)
AST SERPL-CCNC: 27 U/L (ref 0–31)
BASOPHILS # BLD: 0.01 K/UL (ref 0–0.2)
BASOPHILS # BLD: 0.01 K/UL (ref 0–0.2)
BASOPHILS NFR BLD: 0 % (ref 0–2)
BASOPHILS NFR BLD: 0 % (ref 0–2)
BILIRUB SERPL-MCNC: 0.2 MG/DL (ref 0–1.2)
BILIRUB SERPL-MCNC: 0.5 MG/DL (ref 0–1.2)
BUN SERPL-MCNC: 26 MG/DL (ref 6–20)
BUN SERPL-MCNC: 27 MG/DL (ref 6–20)
CALCIUM SERPL-MCNC: 8.6 MG/DL (ref 8.6–10.2)
CALCIUM SERPL-MCNC: 8.9 MG/DL (ref 8.6–10.2)
CHLORIDE SERPL-SCNC: 104 MMOL/L (ref 98–107)
CHLORIDE SERPL-SCNC: 106 MMOL/L (ref 98–107)
CO2 SERPL-SCNC: 25 MMOL/L (ref 22–29)
CO2 SERPL-SCNC: 25 MMOL/L (ref 22–29)
CREAT SERPL-MCNC: 1.5 MG/DL (ref 0.5–1)
CREAT SERPL-MCNC: 1.5 MG/DL (ref 0.5–1)
DATE LAST DOSE: NORMAL
EOSINOPHIL # BLD: 0.42 K/UL (ref 0.05–0.5)
EOSINOPHIL # BLD: 0.66 K/UL (ref 0.05–0.5)
EOSINOPHILS RELATIVE PERCENT: 6 % (ref 0–6)
EOSINOPHILS RELATIVE PERCENT: 8 % (ref 0–6)
ERYTHROCYTE [DISTWIDTH] IN BLOOD BY AUTOMATED COUNT: 14.6 % (ref 11.5–15)
ERYTHROCYTE [DISTWIDTH] IN BLOOD BY AUTOMATED COUNT: 15.3 % (ref 11.5–15)
FERRITIN SERPL-MCNC: 101 NG/ML
FOLATE SERPL-MCNC: >20 NG/ML (ref 4.8–24.2)
GFR, ESTIMATED: 39 ML/MIN/1.73M2
GFR, ESTIMATED: 41 ML/MIN/1.73M2
GLUCOSE BLD-MCNC: 145 MG/DL (ref 74–99)
GLUCOSE BLD-MCNC: 232 MG/DL (ref 74–99)
GLUCOSE BLD-MCNC: 241 MG/DL (ref 74–99)
GLUCOSE BLD-MCNC: 274 MG/DL (ref 74–99)
GLUCOSE SERPL-MCNC: 218 MG/DL (ref 74–99)
GLUCOSE SERPL-MCNC: 278 MG/DL (ref 74–99)
HCT VFR BLD AUTO: 17.6 % (ref 34–48)
HCT VFR BLD AUTO: 17.7 % (ref 34–48)
HCT VFR BLD AUTO: 24.9 % (ref 34–48)
HCT VFR BLD AUTO: 25 % (ref 34–48)
HCT VFR BLD AUTO: 25.4 % (ref 34–48)
HGB BLD-MCNC: 5.4 G/DL (ref 11.5–15.5)
HGB BLD-MCNC: 5.6 G/DL (ref 11.5–15.5)
HGB BLD-MCNC: 7.9 G/DL (ref 11.5–15.5)
HGB BLD-MCNC: 8.1 G/DL (ref 11.5–15.5)
HGB BLD-MCNC: 8.1 G/DL (ref 11.5–15.5)
IMM GRANULOCYTES # BLD AUTO: 0.04 K/UL (ref 0–0.58)
IMM GRANULOCYTES # BLD AUTO: 0.05 K/UL (ref 0–0.58)
IMM GRANULOCYTES NFR BLD: 1 % (ref 0–5)
IMM GRANULOCYTES NFR BLD: 1 % (ref 0–5)
IMM RETICS NFR: 18.7 % (ref 3–15.9)
IRON SATN MFR SERPL: 15 % (ref 15–50)
IRON SERPL-MCNC: 38 UG/DL (ref 37–145)
LYMPHOCYTES NFR BLD: 1.41 K/UL (ref 1.5–4)
LYMPHOCYTES NFR BLD: 1.47 K/UL (ref 1.5–4)
LYMPHOCYTES RELATIVE PERCENT: 19 % (ref 20–42)
LYMPHOCYTES RELATIVE PERCENT: 21 % (ref 20–42)
MAGNESIUM SERPL-MCNC: 1.7 MG/DL (ref 1.6–2.6)
MAGNESIUM SERPL-MCNC: 1.8 MG/DL (ref 1.6–2.6)
MCH RBC QN AUTO: 29.4 PG (ref 26–35)
MCH RBC QN AUTO: 29.5 PG (ref 26–35)
MCHC RBC AUTO-ENTMCNC: 30.5 G/DL (ref 32–34.5)
MCHC RBC AUTO-ENTMCNC: 31.6 G/DL (ref 32–34.5)
MCV RBC AUTO: 92.9 FL (ref 80–99.9)
MCV RBC AUTO: 96.7 FL (ref 80–99.9)
MICROORGANISM SPEC CULT: ABNORMAL
MICROORGANISM SPEC CULT: ABNORMAL
MONOCYTES NFR BLD: 0.89 K/UL (ref 0.1–0.95)
MONOCYTES NFR BLD: 1.13 K/UL (ref 0.1–0.95)
MONOCYTES NFR BLD: 14 % (ref 2–12)
MONOCYTES NFR BLD: 15 % (ref 2–12)
NEUTROPHILS NFR BLD: 58 % (ref 43–80)
NEUTROPHILS NFR BLD: 58 % (ref 43–80)
NEUTS SEG NFR BLD: 3.82 K/UL (ref 1.8–7.3)
NEUTS SEG NFR BLD: 4.5 K/UL (ref 1.8–7.3)
PHOSPHATE SERPL-MCNC: 2.6 MG/DL (ref 2.5–4.5)
PHOSPHATE SERPL-MCNC: 3.1 MG/DL (ref 2.5–4.5)
PLATELET # BLD AUTO: 159 K/UL (ref 130–450)
PLATELET # BLD AUTO: 168 K/UL (ref 130–450)
PMV BLD AUTO: 10.5 FL (ref 7–12)
PMV BLD AUTO: 10.7 FL (ref 7–12)
POTASSIUM SERPL-SCNC: 4 MMOL/L (ref 3.5–5)
POTASSIUM SERPL-SCNC: 4.1 MMOL/L (ref 3.5–5)
PROT SERPL-MCNC: 5.6 G/DL (ref 6.4–8.3)
PROT SERPL-MCNC: 5.6 G/DL (ref 6.4–8.3)
RBC # BLD AUTO: 1.83 M/UL (ref 3.5–5.5)
RBC # BLD AUTO: 2.69 M/UL (ref 3.5–5.5)
RETIC HEMOGLOBIN: 28.2 PG (ref 28.2–36.6)
RETICS # AUTO: 0.06 M/UL
RETICS/RBC NFR AUTO: 2.1 % (ref 0.4–1.9)
SERVICE CMNT-IMP: ABNORMAL
SODIUM SERPL-SCNC: 140 MMOL/L (ref 132–146)
SODIUM SERPL-SCNC: 141 MMOL/L (ref 132–146)
SPECIMEN DESCRIPTION: ABNORMAL
TIBC SERPL-MCNC: 249 UG/DL (ref 250–450)
TME LAST DOSE: NORMAL H
VANCOMYCIN DOSE: NORMAL MG
VANCOMYCIN SERPL-MCNC: 25.3 UG/ML (ref 5–40)
VIT B12 SERPL-MCNC: 1020 PG/ML (ref 211–946)
WBC OTHER # BLD: 6.6 K/UL (ref 4.5–11.5)
WBC OTHER # BLD: 7.8 K/UL (ref 4.5–11.5)

## 2025-02-06 PROCEDURE — 83540 ASSAY OF IRON: CPT

## 2025-02-06 PROCEDURE — 6370000000 HC RX 637 (ALT 250 FOR IP): Performed by: NURSE PRACTITIONER

## 2025-02-06 PROCEDURE — 85025 COMPLETE CBC W/AUTO DIFF WBC: CPT

## 2025-02-06 PROCEDURE — 83550 IRON BINDING TEST: CPT

## 2025-02-06 PROCEDURE — 97110 THERAPEUTIC EXERCISES: CPT

## 2025-02-06 PROCEDURE — 84100 ASSAY OF PHOSPHORUS: CPT

## 2025-02-06 PROCEDURE — 82962 GLUCOSE BLOOD TEST: CPT

## 2025-02-06 PROCEDURE — 83735 ASSAY OF MAGNESIUM: CPT

## 2025-02-06 PROCEDURE — 82746 ASSAY OF FOLIC ACID SERUM: CPT

## 2025-02-06 PROCEDURE — 2580000003 HC RX 258: Performed by: NURSE PRACTITIONER

## 2025-02-06 PROCEDURE — 80202 ASSAY OF VANCOMYCIN: CPT

## 2025-02-06 PROCEDURE — 36430 TRANSFUSION BLD/BLD COMPNT: CPT

## 2025-02-06 PROCEDURE — 85045 AUTOMATED RETICULOCYTE COUNT: CPT

## 2025-02-06 PROCEDURE — 82607 VITAMIN B-12: CPT

## 2025-02-06 PROCEDURE — 30233N1 TRANSFUSION OF NONAUTOLOGOUS RED BLOOD CELLS INTO PERIPHERAL VEIN, PERCUTANEOUS APPROACH: ICD-10-PCS | Performed by: INTERNAL MEDICINE

## 2025-02-06 PROCEDURE — 6370000000 HC RX 637 (ALT 250 FOR IP)

## 2025-02-06 PROCEDURE — 85014 HEMATOCRIT: CPT

## 2025-02-06 PROCEDURE — 1200000000 HC SEMI PRIVATE

## 2025-02-06 PROCEDURE — 2500000003 HC RX 250 WO HCPCS: Performed by: CLINICAL NURSE SPECIALIST

## 2025-02-06 PROCEDURE — 97530 THERAPEUTIC ACTIVITIES: CPT

## 2025-02-06 PROCEDURE — 97535 SELF CARE MNGMENT TRAINING: CPT

## 2025-02-06 PROCEDURE — 6370000000 HC RX 637 (ALT 250 FOR IP): Performed by: ORTHOPAEDIC SURGERY

## 2025-02-06 PROCEDURE — 80053 COMPREHEN METABOLIC PANEL: CPT

## 2025-02-06 PROCEDURE — 85018 HEMOGLOBIN: CPT

## 2025-02-06 PROCEDURE — 82728 ASSAY OF FERRITIN: CPT

## 2025-02-06 PROCEDURE — 6360000002 HC RX W HCPCS: Performed by: SPECIALIST

## 2025-02-06 PROCEDURE — 36415 COLL VENOUS BLD VENIPUNCTURE: CPT

## 2025-02-06 PROCEDURE — P9016 RBC LEUKOCYTES REDUCED: HCPCS

## 2025-02-06 RX ORDER — SODIUM CHLORIDE, SODIUM LACTATE, POTASSIUM CHLORIDE, CALCIUM CHLORIDE 600; 310; 30; 20 MG/100ML; MG/100ML; MG/100ML; MG/100ML
INJECTION, SOLUTION INTRAVENOUS CONTINUOUS
Status: DISCONTINUED | OUTPATIENT
Start: 2025-02-06 | End: 2025-02-07 | Stop reason: HOSPADM

## 2025-02-06 RX ORDER — INSULIN LISPRO 100 [IU]/ML
0-4 INJECTION, SOLUTION INTRAVENOUS; SUBCUTANEOUS
Status: DISCONTINUED | OUTPATIENT
Start: 2025-02-06 | End: 2025-02-07 | Stop reason: HOSPADM

## 2025-02-06 RX ORDER — VANCOMYCIN 1.5 G/300ML
1500 INJECTION, SOLUTION INTRAVENOUS EVERY 24 HOURS
Status: DISCONTINUED | OUTPATIENT
Start: 2025-02-07 | End: 2025-02-07

## 2025-02-06 RX ORDER — SODIUM CHLORIDE 9 MG/ML
INJECTION, SOLUTION INTRAVENOUS PRN
Status: DISCONTINUED | OUTPATIENT
Start: 2025-02-06 | End: 2025-02-07 | Stop reason: HOSPADM

## 2025-02-06 RX ORDER — INSULIN LISPRO 100 [IU]/ML
5 INJECTION, SOLUTION INTRAVENOUS; SUBCUTANEOUS
Status: DISCONTINUED | OUTPATIENT
Start: 2025-02-06 | End: 2025-02-07 | Stop reason: HOSPADM

## 2025-02-06 RX ADMIN — INSULIN LISPRO 5 UNITS: 100 INJECTION, SOLUTION INTRAVENOUS; SUBCUTANEOUS at 12:37

## 2025-02-06 RX ADMIN — Medication 1 TABLET: at 09:16

## 2025-02-06 RX ADMIN — SODIUM CHLORIDE, POTASSIUM CHLORIDE, SODIUM LACTATE AND CALCIUM CHLORIDE: 600; 310; 30; 20 INJECTION, SOLUTION INTRAVENOUS at 09:26

## 2025-02-06 RX ADMIN — INSULIN GLARGINE 40 UNITS: 100 INJECTION, SOLUTION SUBCUTANEOUS at 21:24

## 2025-02-06 RX ADMIN — INSULIN LISPRO 5 UNITS: 100 INJECTION, SOLUTION INTRAVENOUS; SUBCUTANEOUS at 09:30

## 2025-02-06 RX ADMIN — INSULIN LISPRO 5 UNITS: 100 INJECTION, SOLUTION INTRAVENOUS; SUBCUTANEOUS at 16:55

## 2025-02-06 RX ADMIN — INSULIN LISPRO 2 UNITS: 100 INJECTION, SOLUTION INTRAVENOUS; SUBCUTANEOUS at 12:38

## 2025-02-06 RX ADMIN — PANTOPRAZOLE SODIUM 40 MG: 40 TABLET, DELAYED RELEASE ORAL at 06:38

## 2025-02-06 RX ADMIN — LATANOPROST 1 DROP: 50 SOLUTION OPHTHALMIC at 21:08

## 2025-02-06 RX ADMIN — INSULIN LISPRO 1 UNITS: 100 INJECTION, SOLUTION INTRAVENOUS; SUBCUTANEOUS at 21:28

## 2025-02-06 RX ADMIN — FLUCONAZOLE 400 MG: 2 INJECTION, SOLUTION INTRAVENOUS at 12:35

## 2025-02-06 RX ADMIN — BUSPIRONE HYDROCHLORIDE 5 MG: 5 TABLET ORAL at 21:04

## 2025-02-06 RX ADMIN — METOPROLOL SUCCINATE 12.5 MG: 25 TABLET, EXTENDED RELEASE ORAL at 09:16

## 2025-02-06 RX ADMIN — OXYCODONE HYDROCHLORIDE 10 MG: 5 TABLET ORAL at 14:14

## 2025-02-06 RX ADMIN — Medication 2000 UNITS: at 09:16

## 2025-02-06 RX ADMIN — OXYCODONE HYDROCHLORIDE 5 MG: 5 TABLET ORAL at 09:22

## 2025-02-06 RX ADMIN — PRAMIPEXOLE DIHYDROCHLORIDE 0.25 MG: 0.12 TABLET ORAL at 21:04

## 2025-02-06 RX ADMIN — FERROUS SULFATE TAB 325 MG (65 MG ELEMENTAL FE) 325 MG: 325 (65 FE) TAB at 09:16

## 2025-02-06 RX ADMIN — OXYCODONE HYDROCHLORIDE 10 MG: 5 TABLET ORAL at 21:07

## 2025-02-06 RX ADMIN — INSULIN LISPRO 1 UNITS: 100 INJECTION, SOLUTION INTRAVENOUS; SUBCUTANEOUS at 09:32

## 2025-02-06 RX ADMIN — ANASTROZOLE 1 MG: 1 TABLET ORAL at 09:17

## 2025-02-06 RX ADMIN — PRAMIPEXOLE DIHYDROCHLORIDE 0.12 MG: 0.12 TABLET ORAL at 09:18

## 2025-02-06 RX ADMIN — SODIUM CHLORIDE, PRESERVATIVE FREE 10 ML: 5 INJECTION INTRAVENOUS at 21:19

## 2025-02-06 RX ADMIN — GABAPENTIN 300 MG: 300 CAPSULE ORAL at 21:04

## 2025-02-06 RX ADMIN — ROSUVASTATIN CALCIUM 5 MG: 10 TABLET, FILM COATED ORAL at 09:16

## 2025-02-06 ASSESSMENT — PAIN DESCRIPTION - LOCATION
LOCATION: HIP

## 2025-02-06 ASSESSMENT — PAIN DESCRIPTION - PAIN TYPE
TYPE: SURGICAL PAIN
TYPE: SURGICAL PAIN

## 2025-02-06 ASSESSMENT — PAIN SCALES - GENERAL
PAINLEVEL_OUTOF10: 9
PAINLEVEL_OUTOF10: 3
PAINLEVEL_OUTOF10: 6
PAINLEVEL_OUTOF10: 5

## 2025-02-06 ASSESSMENT — PAIN DESCRIPTION - ORIENTATION
ORIENTATION: RIGHT

## 2025-02-06 ASSESSMENT — PAIN DESCRIPTION - DESCRIPTORS
DESCRIPTORS: SORE
DESCRIPTORS: ACHING;DISCOMFORT;GNAWING;THROBBING
DESCRIPTORS: SORE

## 2025-02-06 NOTE — PROGRESS NOTES
Internal Medicine Consult Note    [facility]    Glenn Frey D.O., CHRIS Valdez D.O., CHRIS Kern D.O.     Christa Christine, MSN, APRN, NP-C  Alex Don, MSN, APRN-CNP  Carlo Lopes, MSN, APRN-CNP  Marii Pacheco, MSN APRN-CNP  Jessie Scott, MSN. APRN-NP-C     Primary Care Physician: Charlene Velez DO   Admitting Physician:  Glenn Frey DO  Admission date and time: 2/3/2025 12:40 PM    Room:  [address] [place], [place] [address]  Admitting diagnosis: Wound infection [T14.8XXA, L08.9]  Cellulitis of right lower extremity [L03.115]    Patient Name: Jenny Lilly  MRN: [id number]    Date of Service: 2/5/2025     Subjective:  Jenny is a 55 y.o. female who was seen and examined today,2/5/2025, at the bedside.  Patient patient is resting comfortably.  She states that her pain in her right hip has greatly improved.  She underwent a right hip irrigation debridement of her right hip on February 4, 2025 by Dr. Sofia.  She currently has a wound VAC intact to the right hip.  No family present during my examination.    Review of System:   Constitutional:   Denies fever or chills, weight loss or gain, fatigue or malaise.  HEENT:   Denies ear pain, sore throat, sinus or eye problems.  Cardiovascular:   Denies any chest pain, irregular heartbeats, or palpitations.   Respiratory:   Denies shortness of breath, coughing, sputum production, hemoptysis, or wheezing.  Gastrointestinal:   Denies nausea, vomiting, diarrhea, or constipation.  Denies any abdominal pain.  Genitourinary:    Denies any urgency, frequency, hematuria. Voiding  without difficulty.  Extremities:   Denies lower extremity swelling, edema or cyanosis.  Wound VAC dressing intact right hip  Neurology:    Denies any headache or focal neurological deficits, Denies generalized weakness or memory difficulty.   Psch:   Denies being anxious or depressed.  Musculoskeletal:   anemia on ferrous sulfate  Constipation  Essential hypertension on metoprolol succinate and bumex  Hyperlipidemia on rosuvastatin  Type II insulin dependent diabetes mellitus on insulin glargine, metformin, actos, trulicity  Neuropathy on gabapentin  Arthritis on celecoxib  Glaucoma on latanoprost  History left breast cancer on arimedex  Vitamin D deficiency on cholecalciferol   OMI on CPAP nightly  Class II obesity BMI 36.96 kg/m2      Plan:     Right hip irrigation debridement on February 4, 2025 by Dr. Sofia  ID has been consulted  Continue antibiotic therapy  Treat chronic comorbidity  DVT prophylaxis  Follow-up with other consultants  Glycemic control    More than 50% of my  time was spent at the bedside counseling/coordinating care with the patient and/or family with face to face contact.  This time was spent reviewing notes and laboratory data as well as instructing and counseling the patient. Time I spent with the family or surrogate(s) is included only if the patient was incapable of providing the necessary information or participating in medical decisions. I also discussed the differential diagnosis and all of the proposed management plans with the patient and individuals accompanying the patient.    The patient was seen, examined and then discussed with Dr. Frey.     LAKESHA Henderson CNP  2/5/2025  8:10 PM       I saw and evaluated the patient. I agree with the findings and the plan of care as documented in Carlo CROWDER-CNP note.    Glenn Frey DO, PRITI  8:29 PM  2/5/2025

## 2025-02-06 NOTE — PROGRESS NOTES
Pt prevena wound vac alarming and yellow light flashing. Part of the dressing near the plastic Pala part has come loose.  Informed ortho resident dr garcia, new order to reinforce with and occlusive dressing

## 2025-02-06 NOTE — PROGRESS NOTES
Occupational Therapy  OCCUPATIONAL THERAPY TREATMENT NOTE    [facility]  [address] [facility]. [facility]  [address] [place], [place]   OT BEDSIDE TREATMENT NOTE      Date:2025  Patient Name: Jenny Lilly  MRN: [id number]  : 1970  Room: [address] [place] [address]       Evaluating OT: Gina Donovan OTR/L; [id number]      Referring Provider and Specific Provider Orders/Date:      25   OT eval and treat  Start:  25,   End:  25,   ONE TIME,   Standing Count:  1 Occurrences,   R         Chance Sofia MD       Placement Recommendation: HHOT if needed         Diagnosis:   1. Cellulitis of right lower extremity    2. Wound infection    3. Postoperative wound infection of right hip    4. Post-op pain         Surgery: RIGHT HIP IRRIGATION AND DEBRIDEMENT POSSIBLE HEAD AND LINER EXCHANGE       Pertinent Medical History:       Past Medical History        Past Medical History:   Diagnosis Date    Acid reflux      Anesthesia complication       problem with blood pressure up & down    Arthritis       lower back    Back pain      Cancer (HCC)      left breast    Cardiac valve prolapse       valve ?    Diabetes mellitus (HCC)       IDDM    History of therapeutic radiation      Hyperlipidemia      Hypertension      Neuropathy due to secondary diabetes (HCC)       in marcial feet    Nontraumatic tear of right rotator cuff 2020    Prolonged emergence from general anesthesia      Psoriasis      Sleep apnea       cpap 5            Past Surgical History         Past Surgical History:   Procedure Laterality Date    ANKLE SURGERY         bilateral tarsal tunnels    BREAST BIOPSY Left 2021     LEFT BREAST NEEDLE LOCALIZED LUMPECTOMY, BLUE DYE INJECTION, LEFT AXILLARY SENTINEL LYMPHNODE EXCISION, POSSIBLE LEFT AXILLARY DISSECTION performed by Tana Granados MD at [facility]    BREAST LUMPECTOMY Left       2021     Minimal Assist from bedside chair  and commode and EOBto wheeled walker.  Transfer training with verbal cues for hand placement throughout session to improve safety. Independent    Functional Mobility Minimal Assist with wheeled walker to improve balance, 10 feet,  verbal cues for walker sequence and safety.   Min A  With ww to perform less than household distances in room to perform self care tasks Modified Johnson    Balance Sitting:     Static: good     Dynamic: fair   Standing: fair  with wheeled walker  Sitting:     Static: good     Dynamic: fair   Standing: fair  with wheeled walker Sitting:     Static: good     Dynamic: good   Standing: good  with wheeled walker   Activity Tolerance Fair  Fair  Requires frequent rest periods  good    Visual/  Perceptual Glasses: yes                           Comments:  Cleared by RN to see pt. Upon arrival patient resting in bed and agreeable to OT session. At end of session, patient seated in bedside chair with call light and phone within reach, all lines and tubes intact.  Overall patient demonstrated improved independence and safety during completion of ADL/functional transfer/mobility tasks.  Issued patient hip kit for home this date. Therapist facilitated ADL tasks, functional transfers, functional mobility, bed mobility to address safety awareness, implementation of fall prevention strategies, & functional engagement throughout ADLs. Pt would benefit from continued skilled OT to increase safety and independence with completion of ADL/IADL tasks for functional independence and quality of life.    Treatment: OT treatment provided this date includes:   ADL-  Instruction/training on safety and adapted techniques for completion of ADLs: Therapist facilitated & pt educated on activity modifications/adaptations to promote implementation of fall prevention strategies, EC/WS strategies, & safety awareness throughout ADLs.   Mobility-  Instruction/training on safety and

## 2025-02-06 NOTE — PLAN OF CARE
Problem: Chronic Conditions and Co-morbidities  Goal: Patient's chronic conditions and co-morbidity symptoms are monitored and maintained or improved  2/6/2025 1037 by Jennifer Diallo RN  Outcome: Progressing  2/6/2025 0055 by Elaine Millard RN  Outcome: Progressing     Problem: Discharge Planning  Goal: Discharge to home or other facility with appropriate resources  2/6/2025 1037 by Jennifer Diallo RN  Outcome: Progressing  2/6/2025 0055 by Elaine Millard RN  Outcome: Progressing     Problem: Safety - Adult  Goal: Free from fall injury  2/6/2025 1037 by Jennifer Diallo RN  Outcome: Progressing  2/6/2025 0055 by Elaine Millard RN  Outcome: Progressing     Problem: ABCDS Injury Assessment  Goal: Absence of physical injury  2/6/2025 1037 by Jennifer Diallo RN  Outcome: Progressing  2/6/2025 0055 by Elaine Millard RN  Outcome: Progressing     Problem: Skin/Tissue Integrity - Adult  Goal: Skin integrity remains intact  2/6/2025 1037 by Jennifer Diallo RN  Outcome: Progressing  2/6/2025 0055 by Elaine Millard RN  Outcome: Progressing  Goal: Incisions, wounds, or drain sites healing without S/S of infection  2/6/2025 1037 by Jennifer Diallo RN  Outcome: Progressing  2/6/2025 0055 by Elaine Millard RN  Outcome: Progressing  Goal: Oral mucous membranes remain intact  2/6/2025 1037 by Jennifer Diallo RN  Outcome: Progressing  2/6/2025 0055 by lEaine Millard RN  Outcome: Progressing     Problem: Musculoskeletal - Adult  Goal: Return mobility to safest level of function  2/6/2025 1037 by Jennifer Diallo RN  Outcome: Progressing  2/6/2025 0055 by Elaine Millard RN  Outcome: Progressing  Goal: Maintain proper alignment of affected body part  2/6/2025 1037 by Jennifer Daillo RN  Outcome: Progressing  2/6/2025 0055 by Elaine Millard RN  Outcome: Progressing  Goal: Return ADL status to a safe level of function  2/6/2025 1037 by eJnnifer Diallo RN  Outcome: Progressing  2/6/2025 0055 by Elaine Millard

## 2025-02-06 NOTE — PLAN OF CARE
Problem: Chronic Conditions and Co-morbidities  Goal: Patient's chronic conditions and co-morbidity symptoms are monitored and maintained or improved  2/6/2025 0055 by Elaine Millard RN  Outcome: Progressing  2/5/2025 1205 by Jana Brandon RN  Outcome: Progressing     Problem: Discharge Planning  Goal: Discharge to home or other facility with appropriate resources  2/6/2025 0055 by Elaine Millard RN  Outcome: Progressing  2/5/2025 1205 by Jana Brandon RN  Outcome: Progressing     Problem: Safety - Adult  Goal: Free from fall injury  2/6/2025 0055 by Elaine Milladr RN  Outcome: Progressing  2/5/2025 1205 by Jana Brandon RN  Outcome: Progressing     Problem: ABCDS Injury Assessment  Goal: Absence of physical injury  2/6/2025 0055 by Elaine Millard RN  Outcome: Progressing  2/5/2025 1205 by Jana Brandon RN  Outcome: Progressing     Problem: Skin/Tissue Integrity - Adult  Goal: Skin integrity remains intact  2/6/2025 0055 by Elaine Millard RN  Outcome: Progressing  2/5/2025 1205 by Jana Brandon RN  Outcome: Progressing  Goal: Incisions, wounds, or drain sites healing without S/S of infection  2/6/2025 0055 by Elaine Millard RN  Outcome: Progressing  2/5/2025 1205 by Jana Brandon RN  Outcome: Progressing  Goal: Oral mucous membranes remain intact  2/6/2025 0055 by Elaine Millard RN  Outcome: Progressing  2/5/2025 1205 by Jana Brandon RN  Outcome: Progressing     Problem: Musculoskeletal - Adult  Goal: Return mobility to safest level of function  2/6/2025 0055 by Elaine Millard RN  Outcome: Progressing  2/5/2025 1205 by Jana Brandon RN  Outcome: Progressing  Goal: Maintain proper alignment of affected body part  2/6/2025 0055 by Elaine Millard RN  Outcome: Progressing  2/5/2025 1205 by Jana Brandon RN  Outcome: Progressing  Goal: Return ADL status to a safe level of function  2/6/2025 0055 by Millard, Elaine, RN  Outcome: Progressing  2/5/2025 1203

## 2025-02-06 NOTE — PROGRESS NOTES
Physical Therapy Treatment Note/Plan of Care    Room #:  0332/0332-01  Patient Name: Jenny Lilly  YOB: 1970  MRN: [id number]    Date of Service: 2/6/2025     Tentative placement recommendation: Home with Home Health Physical Therapy  Equipment recommendation: Patient has needed equipment       Evaluating Physical Therapist: Maryan Baig, PT #[id number]      Specific Provider Orders/Date/Referring Provider :  02/05/25 0815    PT eval and treat  Start:  02/05/25 0815,   End:  02/05/25 0815,   ONE TIME,   Standing Count:  1 Occurrences,   R       Chance Sofia MD    Admitting Diagnosis:   Wound infection [T14.8XXA, L08.9]  Cellulitis of right lower extremity [L03.115]      few weeks post right hip replacement surgery, complaining of some redness around the hip and pain with some slight drainage from the lower aspect of the incision for the last day.   Surgery:   Date of Procedure: 2/4/2025     Pre-Op Diagnosis Codes:      * Possible periprosthetic hip infection     Post-Op Diagnosis: Same       Procedure(s):  RIGHT HIP IRRIGATION AND DEBRIDEMENT POSSIBLE HEAD AND LINER EXCHANGE     Surgeon(s):  Chance Sofia MD  Visit Diagnoses         Codes    Cellulitis of right lower extremity    -  Primary L03.115    Postoperative wound infection of right hip     T81.49XA    Post-op pain     G89.18            Patient Active Problem List   Diagnosis    Type II diabetes mellitus with peripheral autonomic neuropathy (HCC)    Neuropathy    Gastroesophageal reflux disease without esophagitis    Retinopathy    Anxiety    Irritable bowel syndrome    Mixed hyperlipidemia    Fatigue    Lumbar pain    RLS (restless legs syndrome)    Essential hypertension    Encounter for assessment of STD exposure    Malignant neoplasm of left female breast (HCC)    Invasive ductal carcinoma of left breast (HCC)    Immunization due    Age-related cataract of both eyes    Vitamin D deficiency    Stable proliferative diabetic  ascended and descended   Not assessed     3 steps, with rail, Supervision        ROM Within functional limits  within hip precautions   Increase range of motion 10% of affected joints    Strength BUE:  refer to OT terese  RLE:  3/5  LLE:  4/5  Increase strength in affected mm groups by 1/3 grade   Balance Sitting EOB:  good    Dynamic Standing:  fair wheeled walker  Sitting EOB: good in the chair.  Dynamic Standing: fair with wheeled walker   Sitting EOB:  good    Dynamic Standing: good wheeled walker      Patient is Alert & Oriented x person, place, time, and situation and follows directions    Sensation:  Patient  reports neuropathy bilateral feet     Edema:  yes right lower extremity   Endurance: fair       Vitals: room air   Blood Pressure at rest  Blood Pressure during session    Heart Rate at rest  Heart Rate during session    SPO2 at rest %  SPO2 during session %     Patient education  Patient educated on role of Physical Therapy, risks of immobility, safety and plan of care,  importance of mobility while in hospital , ankle pumps, quad set and glut set for edema control, blood clot prevention, hip precautions, weight bearing status , and positioning for skin integrity and comfort     Patient response to education:   Pt verbalized understanding Pt demonstrated skill Pt requires further education in this area   Yes Partial Yes      Treatment:  Patient practiced and was instructed/facilitated in the following treatment: Patient performed seated exercises,   .  Pt educated on hip precautions, sequencing, safety, importance of mobility, maintaining strength,and endurance.  Pt stood, ambulated in the hallway, and back to the chair.     Therapeutic Exercises:  ankle pumps, hip abduction/adduction, long arc quad, and seated marching,  x 20 reps.  AAROM     At end of session, patient in chair with     call light and phone within reach,  all lines and tubes intact, nursing notified.      Patient would benefit from

## 2025-02-06 NOTE — PROGRESS NOTES
Department of Orthopedic Surgery  Resident Progress Note    Patient seen and examined at bedside this morning.  Pain is well-controlled.  No new complaints at this time.  All questions pertaining to patient's surgery were answered this morning.  Patient is doing well overall.  Wound VAC is in place with no drainage within canister. Sitting up in the bedside chair this morning. She did receive blood products last night due to a low hbg.     VITALS:  BP (!) 106/53   Pulse 95   Temp 98 °F (36.7 °C) (Oral)   Resp 18   Ht 1.499 m (4' 11\")   Wt 91.2 kg (201 lb 1.6 oz)   LMP  (LMP Unknown)   SpO2 97%   BMI 40.62 kg/m²     General: alert and oriented to person, place and time, well-developed and well-nourished, in no acute distress    MUSCULOSKELETAL:   right lower extremity:  Dressing C/D/I  Compartments soft and compressible  +PF/DF/EHL  +2/4 DP & PT pulses, Brisk Cap refill, Toes warm and perfused  Distal sensation grossly intact to Peroneals, Sural, Saphenous, and tibial nrs    CBC:   Lab Results   Component Value Date/Time    WBC 7.8 02/06/2025 07:21 AM    HGB 7.9 02/06/2025 07:21 AM    HCT 25.0 02/06/2025 07:21 AM     02/06/2025 07:21 AM     PT/INR:    Lab Results   Component Value Date/Time    PROTIME 11.9 02/04/2025 07:13 AM    INR 1.1 02/04/2025 07:13 AM       Intraop Cultures: no growth to date.     ASSESSMENT  S/P irrigation and debridement with headliner exchange on 2/4    PLAN      Continue physical therapy and protocol: WBAT -right LE posterior hip precautions    Appreciate infectious disease consultation and management.   Deep venous thrombosis prophylaxis -aspirin 81 mg twice daily, early mobilization, SCDs  Continue incisional vac for 7 days  PT/OT  Pain Control: IV and PO wean narcotics as able  Monitor H&H: 7.9 this morning  D/C Plan: Appreciate physical therapy and social work recommendations.  Orthopedics will continue to follow.

## 2025-02-06 NOTE — CARE COORDINATION
CM note: Received script for IV Teflaro, had patient sign consent for [facility] home infusion pharmacy and faxed with script to them.  [facility]  Pt is on schedule for start of care with [facility] on 2/8.  Will need orders.

## 2025-02-06 NOTE — PROGRESS NOTES
no significant strikethrough or sanguinous drainage appreciated.  Otherwise, no significant pitting peripheral edema.  No ulcers. No cyanosis. No clubbing.  Neurologic:    Alert x 3.  Mild generalized weakness with no focal deficit.  Cranial nerves grossly intact. No focal weakness.  Psych:   Behavior is normal. Mood appears normal. Speech is not rapid and/or pressured.  Musculoskeletal:   The right hip area as above, otherwise no unilateral joint edema, erythema or warmth. Gait not assessed.  Integumentary:  No rashes  Skin normal color and texture.  Genitalia/Breast:  Deferred    Medication:  Scheduled Meds:   sodium chloride flush  5-40 mL IntraVENous 2 times per day    vancomycin  1,500 mg IntraVENous Q24H    fluconazole  400 mg IntraVENous Q24H    cefTRIAXone (ROCEPHIN) IV  2,000 mg IntraVENous 30 Min Pre-Op    vancomycin  1,250 mg IntraVENous 60 Min Pre-Op    tranexamic acid  1,000 mg IntraVENous Once    [Held by provider] aspirin  81 mg Oral BID    pantoprazole  40 mg Oral QAM AC    insulin lispro  0-8 Units SubCUTAneous 4x Daily AC & HS    anastrozole  1 mg Oral Daily    [Held by provider] bumetanide  1 mg Oral Daily    [Held by provider] celecoxib  200 mg Oral BID    Vitamin D  2,000 Units Oral Daily    ferrous sulfate  325 mg Oral Daily with breakfast    gabapentin  300 mg Oral Nightly    insulin glargine  40 Units SubCUTAneous Nightly    latanoprost  1 drop Both Eyes Nightly    [Held by provider] metFORMIN  500 mg Oral Daily with breakfast    metoprolol succinate  12.5 mg Oral Daily    therapeutic multivitamin-minerals  1 tablet Oral Daily    pramipexole  0.125 mg Oral Daily    pramipexole  0.25 mg Oral Nightly    rosuvastatin  5 mg Oral Daily    sertraline  100 mg Oral Daily    busPIRone  5 mg Oral Nightly     Continuous Infusions:   sodium chloride      lactated ringers      sodium chloride      dextrose         Objective Data:  CBC with Differential:    Lab Results   Component Value Date/Time    WBC

## 2025-02-06 NOTE — PROGRESS NOTES
CC: right hip infection   Face to face encounter  HPI   55-year-old female 1/13/2025 status post right total hip replacement. She presented to the hospital 2/3 with increased pain and drainage from the distal aspect of her incision as well as erythema. CRP and ESR were elevated. She also had a UTI with symptoms    S/P irrigation and debridement with headliner exchange on 2/4   Vancomycin powder was placed in the depths of the wounds as well as in the subcutaneous tissue     SUBJECTIVE:  2/6/2025 in chair had sweats no n/v/d  2/5   Patient is up in chair.  Tmax- 99.3. some pain to right hip.   Patient is tolerating medications. No reported adverse drug reactions.  No nausea, vomiting, diarrhea.    Medications:  Scheduled Meds:   insulin lispro  0-4 Units SubCUTAneous 4x Daily AC & HS    insulin lispro  5 Units SubCUTAneous TID WC    sodium chloride flush  5-40 mL IntraVENous 2 times per day    vancomycin  1,500 mg IntraVENous Q24H    fluconazole  400 mg IntraVENous Q24H    cefTRIAXone (ROCEPHIN) IV  2,000 mg IntraVENous 30 Min Pre-Op    vancomycin  1,250 mg IntraVENous 60 Min Pre-Op    tranexamic acid  1,000 mg IntraVENous Once    [Held by provider] aspirin  81 mg Oral BID    pantoprazole  40 mg Oral QAM AC    anastrozole  1 mg Oral Daily    [Held by provider] bumetanide  1 mg Oral Daily    [Held by provider] celecoxib  200 mg Oral BID    Vitamin D  2,000 Units Oral Daily    ferrous sulfate  325 mg Oral Daily with breakfast    gabapentin  300 mg Oral Nightly    insulin glargine  40 Units SubCUTAneous Nightly    latanoprost  1 drop Both Eyes Nightly    [Held by provider] metFORMIN  500 mg Oral Daily with breakfast    metoprolol succinate  12.5 mg Oral Daily    therapeutic multivitamin-minerals  1 tablet Oral Daily    pramipexole  0.125 mg Oral Daily    pramipexole  0.25 mg Oral Nightly    rosuvastatin  5 mg Oral Daily    sertraline  100 mg Oral Daily    busPIRone  5 mg Oral Nightly     Continuous Infusions:      Component Value Date    CRP 7.0 (H) 02/03/2025     Lab Results   Component Value Date    SEDRATE 74 (H) 02/03/2025    SEDRATE 37 (H) 09/14/2017    SEDRATE 41 (H) 12/15/2015     Lab Results   Component Value Date    ALT 13 02/06/2025    AST 27 02/06/2025    GGT 29 03/24/2017    ALKPHOS 115 (H) 02/06/2025    BILITOT 0.5 02/06/2025     Lab Results   Component Value Date/Time     02/06/2025 07:21 AM    K 4.0 02/06/2025 07:21 AM    K 4.3 11/12/2020 11:01 AM     02/06/2025 07:21 AM    CO2 25 02/06/2025 07:21 AM    BUN 27 02/06/2025 07:21 AM    CREATININE 1.5 02/06/2025 07:21 AM    GFRAA >60 08/24/2022 11:11 AM    LABGLOM 41 02/06/2025 07:21 AM    LABGLOM >60 12/20/2023 10:55 AM    GLUCOSE 218 02/06/2025 07:21 AM    GLUCOSE 194 03/30/2012 08:52 AM    CALCIUM 8.9 02/06/2025 07:21 AM    BILITOT 0.5 02/06/2025 07:21 AM    ALKPHOS 115 02/06/2025 07:21 AM    AST 27 02/06/2025 07:21 AM    ALT 13 02/06/2025 07:21 AM     Radiology:  2/3/2025- CT  Impression:        1. 4.1 x 3.3 x 12.3 cm triangular fluid collection in the deep subcutaneous  fat lateral to the prosthetic right hip, consistent with a postoperative  seroma or abscess.  Smaller fluid collection extending inferiorly and  laterally.  2. No sign of fracture or loosening of the components of a right total hip  prosthesis placed during the interval.     Microbiology:   2/3/2025- urine culture- E.coli and diphtheroids  2/4/2025- surgical culture- no growth to date   2/4/2025- blood cx- no growth to date     ASSESSMENT:  Right hip arthroplasty on 1/13/2025- seroma or abscess  Cx S aureus   S/p right hip irrigation and debridement with headliner exchange on 2/4/2025  Bacteruria / candiduria  E coli/diptehroids  Galileo     PLAN:    Continue Rocephin IV   Continue vancomycin - pharmacy to dose   On diflucan   Check cultures  Monitor labs  Sed rate- 73 procal- 0.05   crp- 7.0   Check for ceftaroline coverage     Please note that 30 minutes were spent on this case in

## 2025-02-06 NOTE — PROGRESS NOTES
Pharmacy Consultation Note  (Antibiotic Dosing and Monitoring)    Initial consult date: 2/5/25  Consulting physician/provider: Zoila  Drug: Vancomycin  Indication: Bone and Joint infection    Age/  Gender Height Weight IBW  Allergy Information   55 y.o./female 149.9 cm (4' 11\") 83 kg (183 lb)     Ideal body weight: 48.8 kg (107 lb 8.4 oz)  Adjusted ideal body weight: 65.8 kg (144 lb 15.3 oz)   Patient has no known allergies.      Renal Function:  Recent Labs     02/04/25  0713 02/05/25  2302 02/06/25  0721   BUN 17 26* 27*   CREATININE 1.1* 1.5* 1.5*       Intake/Output Summary (Last 24 hours) at 2/6/2025 1521  Last data filed at 2/6/2025 1235  Gross per 24 hour   Intake 1396.67 ml   Output --   Net 1396.67 ml       Vancomycin Monitoring:  Trough:  No results for input(s): \"VANCOTROUGH\" in the last 72 hours.  Random:    Recent Labs     02/06/25  1422   VANCORANDOM 25.3     Recent vancomycin administrations                     vancomycin (VANCOCIN) 1500 mg in 300 mL IVPB (mg) 1,500 mg New Bag 02/05/25 1715    vancomycin (VANCOCIN) injection (mg) 3,000 mg Given 02/04/25 1744    vancomycin (VANCOCIN) 1250 mg in 250 mL IVPB (mg) 1,250 mg New Bag 02/04/25 1359                    Assessment:  Patient is a 55 y.o. female who has been initiated on vancomycin  Estimated Creatinine Clearance: 43 mL/min (A) (based on SCr of 1.5 mg/dL (H)).  To dose vancomycin, pharmacy will be utilizing [employer] calculation software for goal AUC/BLANKA 400-600 mg/L-hr (predicted AUC/BLANKA = 558, Tr =12.3 mcg/mL)  2/6: SCr=1.5, random vancomycin level=25.3 mcg/mL(anticipated leaching from intra-articular vancomycin application)     Plan:  No vancomycin dose today.  Obtain random vancomycin levels with 2/7/25 morning labs, re-dose when level is 15-20mcg/mL  Will continue to monitor renal function   Pharmacy to follow      Morenita Yu RPh  2/6/2025, 3:24 PM  [facility]: [phone]

## 2025-02-06 NOTE — CONSENT
Informed Consent for Blood Component Transfusion Note    I have discussed with the patient the rationale for blood component transfusion; its benefits in treating or preventing fatigue, organ damage, or death; and its risk which includes mild transfusion reactions, rare risk of blood borne infection, or more serious but rare reactions. I have discussed the alternatives to transfusion, including the risk and consequences of not receiving transfusion. The patient had an opportunity to ask questions and had agreed to proceed with transfusion of blood components.    Electronically signed by LAKESHA Dietz CNP on 2/6/25 at 8:09 AM EST

## 2025-02-07 VITALS
TEMPERATURE: 97.7 F | BODY MASS INDEX: 40.54 KG/M2 | RESPIRATION RATE: 18 BRPM | OXYGEN SATURATION: 99 % | SYSTOLIC BLOOD PRESSURE: 134 MMHG | DIASTOLIC BLOOD PRESSURE: 84 MMHG | HEART RATE: 101 BPM | HEIGHT: 59 IN | WEIGHT: 201.1 LBS

## 2025-02-07 LAB
ABO/RH: NORMAL
ANION GAP SERPL CALCULATED.3IONS-SCNC: 8 MMOL/L (ref 7–16)
ANTIBODY SCREEN: NEGATIVE
ARM BAND NUMBER: NORMAL
BASOPHILS # BLD: 0.02 K/UL (ref 0–0.2)
BASOPHILS NFR BLD: 0 % (ref 0–2)
BLOOD BANK BLOOD PRODUCT EXPIRATION DATE: NORMAL
BLOOD BANK DISPENSE STATUS: NORMAL
BLOOD BANK ISBT PRODUCT BLOOD TYPE: 6200
BLOOD BANK PRODUCT CODE: NORMAL
BLOOD BANK SAMPLE EXPIRATION: NORMAL
BLOOD BANK UNIT TYPE AND RH: NORMAL
BPU ID: NORMAL
BUN SERPL-MCNC: 18 MG/DL (ref 6–20)
CALCIUM SERPL-MCNC: 9.5 MG/DL (ref 8.6–10.2)
CHLORIDE SERPL-SCNC: 106 MMOL/L (ref 98–107)
CO2 SERPL-SCNC: 26 MMOL/L (ref 22–29)
COMPONENT: NORMAL
CREAT SERPL-MCNC: 1 MG/DL (ref 0.5–1)
CROSSMATCH RESULT: NORMAL
EOSINOPHIL # BLD: 0.85 K/UL (ref 0.05–0.5)
EOSINOPHILS RELATIVE PERCENT: 10 % (ref 0–6)
ERYTHROCYTE [DISTWIDTH] IN BLOOD BY AUTOMATED COUNT: 15.5 % (ref 11.5–15)
GFR, ESTIMATED: 67 ML/MIN/1.73M2
GLUCOSE BLD-MCNC: 207 MG/DL (ref 74–99)
GLUCOSE BLD-MCNC: 259 MG/DL (ref 74–99)
GLUCOSE SERPL-MCNC: 176 MG/DL (ref 74–99)
HCT VFR BLD AUTO: 25.8 % (ref 34–48)
HCT VFR BLD AUTO: 26.6 % (ref 34–48)
HGB BLD-MCNC: 8.1 G/DL (ref 11.5–15.5)
HGB BLD-MCNC: 8.4 G/DL (ref 11.5–15.5)
IMM GRANULOCYTES # BLD AUTO: 0.06 K/UL (ref 0–0.58)
IMM GRANULOCYTES NFR BLD: 1 % (ref 0–5)
LYMPHOCYTES NFR BLD: 2.01 K/UL (ref 1.5–4)
LYMPHOCYTES RELATIVE PERCENT: 24 % (ref 20–42)
MAGNESIUM SERPL-MCNC: 1.9 MG/DL (ref 1.6–2.6)
MCH RBC QN AUTO: 30 PG (ref 26–35)
MCHC RBC AUTO-ENTMCNC: 31.6 G/DL (ref 32–34.5)
MCV RBC AUTO: 95 FL (ref 80–99.9)
MICROORGANISM SPEC CULT: ABNORMAL
MICROORGANISM SPEC CULT: NORMAL
MICROORGANISM/AGENT SPEC: ABNORMAL
MONOCYTES NFR BLD: 0.89 K/UL (ref 0.1–0.95)
MONOCYTES NFR BLD: 11 % (ref 2–12)
NEUTROPHILS NFR BLD: 54 % (ref 43–80)
NEUTS SEG NFR BLD: 4.51 K/UL (ref 1.8–7.3)
PHOSPHATE SERPL-MCNC: 2.5 MG/DL (ref 2.5–4.5)
PLATELET # BLD AUTO: 156 K/UL (ref 130–450)
PMV BLD AUTO: 10.3 FL (ref 7–12)
POTASSIUM SERPL-SCNC: 4.4 MMOL/L (ref 3.5–5)
RBC # BLD AUTO: 2.8 M/UL (ref 3.5–5.5)
SERVICE CMNT-IMP: ABNORMAL
SERVICE CMNT-IMP: NORMAL
SODIUM SERPL-SCNC: 140 MMOL/L (ref 132–146)
SPECIMEN DESCRIPTION: ABNORMAL
SPECIMEN DESCRIPTION: NORMAL
TRANSFUSION STATUS: NORMAL
UNIT DIVISION: 0
UNIT ISSUE DATE/TIME: NORMAL
WBC OTHER # BLD: 8.3 K/UL (ref 4.5–11.5)

## 2025-02-07 PROCEDURE — 85014 HEMATOCRIT: CPT

## 2025-02-07 PROCEDURE — 85018 HEMOGLOBIN: CPT

## 2025-02-07 PROCEDURE — 6360000002 HC RX W HCPCS: Performed by: CLINICAL NURSE SPECIALIST

## 2025-02-07 PROCEDURE — 2500000003 HC RX 250 WO HCPCS: Performed by: CLINICAL NURSE SPECIALIST

## 2025-02-07 PROCEDURE — 6370000000 HC RX 637 (ALT 250 FOR IP)

## 2025-02-07 PROCEDURE — 80048 BASIC METABOLIC PNL TOTAL CA: CPT

## 2025-02-07 PROCEDURE — 97530 THERAPEUTIC ACTIVITIES: CPT

## 2025-02-07 PROCEDURE — 2580000003 HC RX 258: Performed by: NURSE PRACTITIONER

## 2025-02-07 PROCEDURE — 6360000002 HC RX W HCPCS: Performed by: SPECIALIST

## 2025-02-07 PROCEDURE — 2580000003 HC RX 258: Performed by: CLINICAL NURSE SPECIALIST

## 2025-02-07 PROCEDURE — 83735 ASSAY OF MAGNESIUM: CPT

## 2025-02-07 PROCEDURE — 85025 COMPLETE CBC W/AUTO DIFF WBC: CPT

## 2025-02-07 PROCEDURE — 84100 ASSAY OF PHOSPHORUS: CPT

## 2025-02-07 PROCEDURE — 6370000000 HC RX 637 (ALT 250 FOR IP): Performed by: ORTHOPAEDIC SURGERY

## 2025-02-07 PROCEDURE — 97535 SELF CARE MNGMENT TRAINING: CPT

## 2025-02-07 PROCEDURE — 6370000000 HC RX 637 (ALT 250 FOR IP): Performed by: NURSE PRACTITIONER

## 2025-02-07 PROCEDURE — 82962 GLUCOSE BLOOD TEST: CPT

## 2025-02-07 PROCEDURE — 97605 NEG PRS WND THER DME<=50SQCM: CPT

## 2025-02-07 PROCEDURE — 97110 THERAPEUTIC EXERCISES: CPT

## 2025-02-07 RX ORDER — INSULIN GLARGINE 100 [IU]/ML
40 INJECTION, SOLUTION SUBCUTANEOUS NIGHTLY
Qty: 36 ML | Refills: 1
Start: 2025-02-07 | End: 2025-08-06

## 2025-02-07 RX ORDER — MECOBALAMIN 5000 MCG
5 TABLET,DISINTEGRATING ORAL NIGHTLY PRN
COMMUNITY
Start: 2025-02-07

## 2025-02-07 RX ORDER — BUSPIRONE HYDROCHLORIDE 5 MG/1
5 TABLET ORAL NIGHTLY
Qty: 30 TABLET | Refills: 0
Start: 2025-02-07

## 2025-02-07 RX ORDER — PANTOPRAZOLE SODIUM 40 MG/1
40 TABLET, DELAYED RELEASE ORAL
Qty: 30 TABLET | Refills: 0 | Status: SHIPPED | OUTPATIENT
Start: 2025-02-08

## 2025-02-07 RX ADMIN — SODIUM CHLORIDE, POTASSIUM CHLORIDE, SODIUM LACTATE AND CALCIUM CHLORIDE: 600; 310; 30; 20 INJECTION, SOLUTION INTRAVENOUS at 02:02

## 2025-02-07 RX ADMIN — METOPROLOL SUCCINATE 12.5 MG: 25 TABLET, EXTENDED RELEASE ORAL at 10:19

## 2025-02-07 RX ADMIN — OXYCODONE HYDROCHLORIDE 5 MG: 5 TABLET ORAL at 06:10

## 2025-02-07 RX ADMIN — SODIUM CHLORIDE, PRESERVATIVE FREE 10 ML: 5 INJECTION INTRAVENOUS at 10:22

## 2025-02-07 RX ADMIN — Medication 1 TABLET: at 10:18

## 2025-02-07 RX ADMIN — ANASTROZOLE 1 MG: 1 TABLET ORAL at 10:20

## 2025-02-07 RX ADMIN — INSULIN LISPRO 5 UNITS: 100 INJECTION, SOLUTION INTRAVENOUS; SUBCUTANEOUS at 12:19

## 2025-02-07 RX ADMIN — PANTOPRAZOLE SODIUM 40 MG: 40 TABLET, DELAYED RELEASE ORAL at 06:10

## 2025-02-07 RX ADMIN — FERROUS SULFATE TAB 325 MG (65 MG ELEMENTAL FE) 325 MG: 325 (65 FE) TAB at 10:21

## 2025-02-07 RX ADMIN — INSULIN LISPRO 1 UNITS: 100 INJECTION, SOLUTION INTRAVENOUS; SUBCUTANEOUS at 10:13

## 2025-02-07 RX ADMIN — PRAMIPEXOLE DIHYDROCHLORIDE 0.12 MG: 0.12 TABLET ORAL at 10:20

## 2025-02-07 RX ADMIN — INSULIN LISPRO 2 UNITS: 100 INJECTION, SOLUTION INTRAVENOUS; SUBCUTANEOUS at 12:18

## 2025-02-07 RX ADMIN — Medication 2000 UNITS: at 10:19

## 2025-02-07 RX ADMIN — INSULIN LISPRO 5 UNITS: 100 INJECTION, SOLUTION INTRAVENOUS; SUBCUTANEOUS at 10:13

## 2025-02-07 RX ADMIN — SERTRALINE HYDROCHLORIDE 100 MG: 50 TABLET ORAL at 10:18

## 2025-02-07 RX ADMIN — CEFTAROLINE FOSAMIL 600 MG: 600 POWDER, FOR SOLUTION INTRAVENOUS at 13:38

## 2025-02-07 RX ADMIN — ROSUVASTATIN CALCIUM 5 MG: 10 TABLET, FILM COATED ORAL at 10:18

## 2025-02-07 RX ADMIN — FLUCONAZOLE 400 MG: 2 INJECTION, SOLUTION INTRAVENOUS at 10:17

## 2025-02-07 ASSESSMENT — PAIN DESCRIPTION - ORIENTATION: ORIENTATION: RIGHT

## 2025-02-07 ASSESSMENT — PAIN SCALES - GENERAL: PAINLEVEL_OUTOF10: 5

## 2025-02-07 ASSESSMENT — PAIN DESCRIPTION - DESCRIPTORS: DESCRIPTORS: ACHING;DISCOMFORT

## 2025-02-07 ASSESSMENT — PAIN DESCRIPTION - LOCATION: LOCATION: HIP

## 2025-02-07 NOTE — PROGRESS NOTES
Physical Therapy Treatment Note/Plan of Care    Room #:  0332/0332-01  Patient Name: Jenny Lilly  YOB: 1970  MRN: [id number]    Date of Service: 2/7/2025     Tentative placement recommendation: Home with Home Health Physical Therapy  Equipment recommendation: Patient has needed equipment       Evaluating Physical Therapist: Maryan Baig, PT #[id number]      Specific Provider Orders/Date/Referring Provider :  02/05/25 0815    PT eval and treat  Start:  02/05/25 0815,   End:  02/05/25 0815,   ONE TIME,   Standing Count:  1 Occurrences,   R       Chance Sofia MD    Admitting Diagnosis:   Wound infection [T14.8XXA, L08.9]  Cellulitis of right lower extremity [L03.115]      few weeks post right hip replacement surgery, complaining of some redness around the hip and pain with some slight drainage from the lower aspect of the incision for the last day.   Surgery:   Date of Procedure: 2/4/2025     Pre-Op Diagnosis Codes:      * Possible periprosthetic hip infection     Post-Op Diagnosis: Same       Procedure(s):  RIGHT HIP IRRIGATION AND DEBRIDEMENT POSSIBLE HEAD AND LINER EXCHANGE     Surgeon(s):  Chance Sofia MD  Visit Diagnoses         Codes    Cellulitis of right lower extremity    -  Primary L03.115    Postoperative wound infection of right hip     T81.49XA    Post-op pain     G89.18            Patient Active Problem List   Diagnosis    Type II diabetes mellitus with peripheral autonomic neuropathy (HCC)    Neuropathy    Gastroesophageal reflux disease without esophagitis    Retinopathy    Anxiety    Irritable bowel syndrome    Mixed hyperlipidemia    Fatigue    Lumbar pain    RLS (restless legs syndrome)    Essential hypertension    Encounter for assessment of STD exposure    Malignant neoplasm of left female breast (HCC)    Invasive ductal carcinoma of left breast (HCC)    Immunization due    Age-related cataract of both eyes    Vitamin D deficiency    Stable proliferative diabetic

## 2025-02-07 NOTE — PROGRESS NOTES
Occupational Therapy  OCCUPATIONAL THERAPY TREATMENT NOTE    [facility]  [address]. [facility]  [address], [place]   OT BEDSIDE TREATMENT NOTE      Date:2025  Patient Name: Jenny Lilly  MRN: [id number]  : 1970  Room: [address] [place] [address]       Evaluating OT: Gina Donovan OTR/L; [id number]      Referring Provider and Specific Provider Orders/Date:      25   OT eval and treat  Start:  25,   End:  25,   ONE TIME,   Standing Count:  1 Occurrences,   R         Chance Sofia MD       Placement Recommendation: HHOT if needed         Diagnosis:   1. Cellulitis of right lower extremity    2. Wound infection    3. Postoperative wound infection of right hip    4. Post-op pain         Surgery: RIGHT HIP IRRIGATION AND DEBRIDEMENT POSSIBLE HEAD AND LINER EXCHANGE       Pertinent Medical History:       Past Medical History        Past Medical History:   Diagnosis Date    Acid reflux      Anesthesia complication       problem with blood pressure up & down    Arthritis       lower back    Back pain      Cancer (HCC)      left breast    Cardiac valve prolapse       valve ?    Diabetes mellitus (HCC)       IDDM    History of therapeutic radiation      Hyperlipidemia      Hypertension      Neuropathy due to secondary diabetes (HCC)       in marcial feet    Nontraumatic tear of right rotator cuff 2020    Prolonged emergence from general anesthesia      Psoriasis      Sleep apnea       cpap 5            Past Surgical History         Past Surgical History:   Procedure Laterality Date    ANKLE SURGERY         bilateral tarsal tunnels    BREAST BIOPSY Left 2021     LEFT BREAST NEEDLE LOCALIZED LUMPECTOMY, BLUE DYE INJECTION, LEFT AXILLARY SENTINEL LYMPHNODE EXCISION, POSSIBLE LEFT AXILLARY DISSECTION performed by Tana Granados MD at [facility]    BREAST LUMPECTOMY Left       2021

## 2025-02-07 NOTE — PROGRESS NOTES
Pharmacy Consultation Note  (Antibiotic Dosing and Monitoring)    Vancomycin has been discontinued; pharmacy will sign-off.  Please reconsult if needed.    Thank you,    Zayda Montoya, PharmD  2/7/2025 11:54 AM  [facility]: [phone]

## 2025-02-07 NOTE — FLOWSHEET NOTE
Inpatient Wound Care    Admit Date: 2/3/2025 12:40 PM    Reason for consult:  RIGHT HIP    Significant history:    Past Medical History:   Diagnosis Date    Acid reflux     Anesthesia complication     problem with blood pressure up & down    Arthritis     lower back    Back pain     Cancer (HCC) 2021    left breast    Cardiac valve prolapse     valve ?    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 marcial feet    Nontraumatic tear of right rotator cuff 11/30/2020    Prolonged emergence from general anesthesia     Psoriasis     Sleep apnea     cpap 5       Findings:     02/07/25 1057   Negative Pressure Wound Therapy Hip Right   Placement Date/Time: 02/07/25 1110   Present on Admission/Arrival: No  Location: Hip  Wound Location Orientation: Right   Dressing Status Removed (comment # of pieces)  (one)   Canister changed? Yes   Output (ml) 0 ml   Unit Type PREVENA   Mode Continuous   Target Pressure (mmHg) 125   $$ Dressing Changed & Charged $ Standard NPWT less than or equal to 50 sq cm PER TX   Dressing Type   (PURPLE)   Incision 02/04/25 Hip Right   Date First Assessed/Time First Assessed: 02/04/25 1717   Present on Original Admission: Yes  Location: Hip  Incision Location Orientation: Right   Wound Image    Dressing/Treatment Negative pressure wound therapy   Incision Assessment Dry   Odor None   Leah-incision Assessment Intact       **Informed Consent**    The patient has given verbal consent to have photos taken of right hip and inserted into their chart as part of their permanent medical record for purposes of documentation, treatment management and/or medical review.   All Images taken on 2/7/25 of patient name: Jenny MOSS Lilly were transmitted and stored on secured Epic  Site located within Media Folder Tab by a registered [employer] Mobile Application Device.       Impression:  surgical

## 2025-02-07 NOTE — PROGRESS NOTES
Department of Orthopedic Surgery  Resident Progress Note    Patient seen and examined.  Pain controlled with current medications.  No acute complaints.  Overnight, wound VAC having issues with leaking.  Per reports, no appreciable drainage and wound VAC.    VITALS:  BP (!) 115/50   Pulse 91   Temp 97.8 °F (36.6 °C) (Infrared)   Resp 17   Ht 1.499 m (4' 11\")   Wt 91.2 kg (201 lb 1.6 oz)   LMP  (LMP Unknown)   SpO2 99%   BMI 40.62 kg/m²     General: alert and oriented to person, place and time, well-developed and well-nourished, in no acute distress    MUSCULOSKELETAL:   right lower extremity:  Wound VAC dressing in place, CDI  Thigh/lower leg compartments soft and compressible  Calf soft and nontender  + Plantarflexion, dorsiflexion, great toe dorsiflexion   2+/4 DP and PT pulses.  Foot warm and well-perfused  Distal sensation grossly intact to superficial and deep peroneal, tibial, sural, saphenous nerves     CBC:   Lab Results   Component Value Date/Time    WBC 7.8 02/06/2025 07:21 AM    HGB 8.1 02/06/2025 08:30 PM    HCT 25.4 02/06/2025 08:30 PM     02/06/2025 07:21 AM     PT/INR:    Lab Results   Component Value Date/Time    PROTIME 11.9 02/04/2025 07:13 AM    INR 1.1 02/04/2025 07:13 AM         ASSESSMENT  S/P irrigation and debridement with headliner exchange on 2/4     PLAN      Continue physical therapy and protocol: WBAT -right LE posterior hip precautions   Appreciate infectious disease consultation and management.   Deep venous thrombosis prophylaxis -aspirin 81 mg twice daily, early mobilization, SCDs  Continue incisional vac for 7 days.  Wound VAC reinforced this morning, if continued issues, okay to clamp and seal.  PT/OT  Pain Control: IV and PO wean narcotics as able  Monitor H&H: Pending  D/C Plan: Appreciate physical therapy and social work recommendations.   Follow up outpatient with Dr. Sofia  Orthopedic surgery will follow from periphery for remainder of visit, please contact

## 2025-02-07 NOTE — DISCHARGE SUMMARY
Internal Medicine Progress Note     [facility]     Glenn Frey D.O., JAYLENEOCandelariaI.                         Mau Valdez D.O., CHRIS Kern D.O.     Christa Christine, MSN, APRN, NP-C  Alex Don, MSN, APRN-CNP  Carlo Lopes, MSN, APRN, NP-C  Marii Pacheco, MSN, APRN-CNP  Jessie Scott, MSN, APRN, NP-C       Internal Medicine  Discharge Summary    NAME: Jenny Lilly  :  1970  MRN:  [id number]  PCP:Charlene Velez DO  ADMITTED: 2/3/2025      DISCHARGED: 25    ADMITTING PHYSICIAN: Glenn Frey DO    CONSULTANT(S):   IP CONSULT TO INFECTIOUS DISEASES  IP CONSULT TO VASCULAR ACCESS TEAM  IP CONSULT TO PHARMACY  IP CONSULT TO SOCIAL WORK     ADMITTING DIAGNOSIS:   Wound infection [T14.8XXA, L08.9]  Cellulitis of right lower extremity [L03.115]     DISCHARGE DIAGNOSES:   Right hip wound infection with recent right hip arthroplasty status post irrigation, debridement and headliner exchange on 2025  BOB on CKD stage IIIa, multifactorial with antibiotics, potentially nephrotoxic agents, hypotension  Acute on chronic anemia, likely postoperative in nature  Constipation  Essential hypertension  Hyperlipidemia  Insulin-dependent diabetes mellitus type 2, with peripheral neuropathy  History of left breast cancer on Arimidex  Morbid obesity with BMI 40.62 kg meter squared with obstructive sleep apnea on CPAP    BRIEF HISTORY OF PRESENT ILLNESS:   Patient presents to the emergency room due to drainage from right hip incision from arthoplasty done on 25. She had staples removed on  and yesterday she noticed drainage from the wound along with redness. She also endorses swelling to right lower extremity she endorses continuous burning pain on right hip where incision is, no alleviating or aggravating factors, severity 4/10; she also endorses intermittent sharp pain in right lower extremity, no alleviating or aggravating factors, severity  irregular blood glucose. Dispense sufficient amount for indicated testing frequency plus additional to accommodate PRN testing needs.  Qty: 1 kit, Refills: 0    Comments: PT REQUESTING OneTouch Verio  Associated Diagnoses: Uncontrolled type 2 diabetes mellitus with hyperglycemia (HCC)      Insulin Syringe-Needle U-100 ([employer] INSULIN SYR 1CC/30G) 30G X 5/16\" 1 ML MISC 1 each by Does not apply route 4 times daily  Qty: 120 each, Refills: 5    Associated Diagnoses: IDDM (insulin dependent diabetes mellitus)      Blood Pressure KIT 1 kit by Does not apply route daily  Qty: 1 kit, Refills: 0    Associated Diagnoses: IDDM (insulin dependent diabetes mellitus); Vertigo; Weakness             FOLLOW UP/INSTRUCTIONS:  This patient is instructed to follow-up with her primary care physician.  Patient is instructed to follow-up with the consults listed above as directed by them.  she is instructed to resume home medications and take new medications as indicated in the list above.  If the patient has a recurrence of symptoms, she is instructed to go to the ED.    Preparing for this patient's discharge, including paperwork, orders, instructions, and meeting with patient did require > 40 minutes.    LAKESHA Dietz CNP     2/7/2025  7:34 AM

## 2025-02-07 NOTE — PROGRESS NOTES
CC: right hip infection   Face to face encounter  HPI   55-year-old female 1/13/2025 status post right total hip replacement. She presented to the hospital 2/3 with increased pain and drainage from the distal aspect of her incision as well as erythema. CRP and ESR were elevated. She also had a UTI with symptoms    S/P irrigation and debridement with headliner exchange on 2/4   Vancomycin powder was placed in the depths of the wounds as well as in the subcutaneous tissue     SUBJECTIVE:  2/7/2025- patient is in bed- updated on plans. Some pain to right hip. Has been afebrile.      2/6/2025- in chair had sweats no n/v/d  2/5 -Patient is up in chair.  Tmax- 99.3. some pain to right hip.   Patient is tolerating medications. No reported adverse drug reactions.  No nausea, vomiting, diarrhea.    Medications:  Scheduled Meds:   ceftaroline fosamil (TEFLARO) IVPB  600 mg IntraVENous Q12H    insulin lispro  0-4 Units SubCUTAneous 4x Daily AC & HS    insulin lispro  5 Units SubCUTAneous TID WC    vancomycin  1,500 mg IntraVENous Q24H    sodium chloride flush  5-40 mL IntraVENous 2 times per day    fluconazole  400 mg IntraVENous Q24H    vancomycin  1,250 mg IntraVENous 60 Min Pre-Op    tranexamic acid  1,000 mg IntraVENous Once    [Held by provider] aspirin  81 mg Oral BID    pantoprazole  40 mg Oral QAM AC    anastrozole  1 mg Oral Daily    [Held by provider] bumetanide  1 mg Oral Daily    [Held by provider] celecoxib  200 mg Oral BID    Vitamin D  2,000 Units Oral Daily    ferrous sulfate  325 mg Oral Daily with breakfast    gabapentin  300 mg Oral Nightly    insulin glargine  40 Units SubCUTAneous Nightly    latanoprost  1 drop Both Eyes Nightly    [Held by provider] metFORMIN  500 mg Oral Daily with breakfast    metoprolol succinate  12.5 mg Oral Daily    therapeutic multivitamin-minerals  1 tablet Oral Daily    pramipexole  0.125 mg Oral Daily    pramipexole  0.25 mg Oral Nightly    rosuvastatin  5 mg Oral Daily

## 2025-02-07 NOTE — PROGRESS NOTES
CLINICAL PHARMACY NOTE: MEDS TO BEDS    Total # of Prescriptions Filled: 3   The following medications were delivered to the patient:  Oxycodone 5 mg  Acetaminophen 500 mg  Pantoprazole 40 mg    Additional Documentation:

## 2025-02-07 NOTE — PROGRESS NOTES
Wound vac continues to alarm despite reinforcing dressing. Messaged Ortho resident Dr. Barnard, and instructed to turn wound vac off, and leave in place.

## 2025-02-07 NOTE — CARE COORDINATION
CM note: Arrangements have been completed for home health care to start care tomorrow.  Verified with [facility] home infusion pharmacy that they have everything they need and aware of [facility] home health care starting care tomorrow.  Wound vac is a Prevena unit and patient already had the home unit in place post operatively, see notes by ortho.  [facility] orders written.  Pt can discharge today after she has received her antibiotics.          ADDENDUM:9:50 AM  Received a change in Teflaro dose, script faxed to [facility] home infusion pharmacy.

## 2025-02-09 LAB
MICROORGANISM SPEC CULT: NORMAL
MICROORGANISM/AGENT SPEC: NORMAL
SERVICE CMNT-IMP: NORMAL
SPECIMEN DESCRIPTION: NORMAL

## 2025-02-10 DIAGNOSIS — C50.912 INVASIVE DUCTAL CARCINOMA OF LEFT BREAST (HCC): Primary | ICD-10-CM

## 2025-02-14 ENCOUNTER — OFFICE VISIT (OUTPATIENT)
Dept: ORTHOPEDIC SURGERY | Age: 55
End: 2025-02-14

## 2025-02-14 VITALS — HEIGHT: 59 IN | BODY MASS INDEX: 40.52 KG/M2 | WEIGHT: 201 LBS

## 2025-02-14 DIAGNOSIS — Z47.89 ORTHOPEDIC AFTERCARE: Primary | ICD-10-CM

## 2025-02-14 NOTE — PROGRESS NOTES
10 days Post op R Revision Total Hip Arthroplasty     Subjective: The patient continues to improve after right revision total hip arthroplasty. Activity level has improved and pain level has decreased. Ambulation has improved.  No signs or symptoms of DVT or infection.     Physical Exam: The hip incision is healing well. No drainage. There is no evidence of infection. ROM is as expected 2 weeks post op. Leg lengths appear equal. The operative extremity is well perfused. Intact function of the tibial and peroneal nerves.     Assessment:10 days s/p Right Revision Total Hip Arthroplasty     Plan:   The patient was encouraged to continue with the postoperative physical therapy program focusing on range of motion, muscle strengthening and gait mechanics.   Continue IV ABX  Hip precautions reviewed.   Weight bearing as tolerated.   Wean from assistive devices as strength, balance, and pain improves.   Discontinue DVT prophylaxis at 1 month.   Signs/symptoms of DVT/PE reviewed.   Warning signs of infection were discussed along with the need for antibiotic prophylaxis when undergoing dental work or other invasive procedures.   Pain management strategies were reviewed including ice, elevation, and rest. The importance of weaning from narcotic pain medication over time was discussed and emphasized.     Follow up:3 weeks post op for possible suture/staple removal    The patient's questions were addressed as completely as possible. The patient will contact us if function decreases, pain increases or if they have any other concerns or questions.

## 2025-02-25 ENCOUNTER — OFFICE VISIT (OUTPATIENT)
Dept: ORTHOPEDIC SURGERY | Age: 55
End: 2025-02-25

## 2025-02-25 VITALS — HEIGHT: 59 IN | WEIGHT: 190 LBS | BODY MASS INDEX: 38.3 KG/M2

## 2025-02-25 DIAGNOSIS — Z47.89 ORTHOPEDIC AFTERCARE: Primary | ICD-10-CM

## 2025-02-25 PROCEDURE — 99024 POSTOP FOLLOW-UP VISIT: CPT | Performed by: ORTHOPAEDIC SURGERY

## 2025-02-25 NOTE — PROGRESS NOTES
3 Weeks Post op Right Revision Total Hip Arthroplasty     Subjective: The patient continues to improve after right revision total hip arthroplasty. CRP WNL.  ESR stable.  Still on ABX.  No wound issues.    Physical Exam: The right hip incision is well healed. There is no evidence of infection. ROM is as expected 3 weeks post op. Leg lengths appear equal. The operative extremity is well perfused. Intact function of the tibial and peroneal nerves.     Assessment: 3 Weeks s/p Right Revision Total Hip Arthroplasty     Plan:   The patient was encouraged to continue with the postoperative physical therapy program focusing on range of motion, muscle strengthening and gait mechanics.   Hip precautions reviewed.   Weight bearing as tolerated.   Continue IV ABX for 6 weeks  Wean from assistive devices as strength, balance, and pain improves.   Discontinue DVT prophylaxis at 1 month.   Signs/symptoms of DVT/PE reviewed.   Warning signs of infection were discussed along with the need for antibiotic prophylaxis when undergoing dental work or other invasive procedures.   Pain management strategies were reviewed including ice, elevation, and rest. The importance of weaning from narcotic pain medication over time was discussed and emphasized.     Follow up: 9 weeks postoperative with right hip xrays     The patient's questions were addressed as completely as possible. The patient will contact us if function decreases, pain increases or if they have any other concerns or questions.

## 2025-03-03 RX ORDER — ROSUVASTATIN CALCIUM 5 MG/1
5 TABLET, COATED ORAL DAILY
Qty: 90 TABLET | Refills: 0 | Status: ON HOLD | OUTPATIENT
Start: 2025-03-03

## 2025-03-03 NOTE — TELEPHONE ENCOUNTER
Last seen 12/11/2024  Next appt 3/12/2025    Requested Prescriptions     Pending Prescriptions Disp Refills    rosuvastatin (CRESTOR) 5 MG tablet [Pharmacy Med Name: Rosuvastatin Calcium Oral Tablet 5 MG] 90 tablet 0     Sig: TAKE ONE TABLET BY MOUTH DAILY    Electronically signed by CAPO MUKHERJEE MA on 3/3/25 at 9:56 AM EST

## 2025-03-05 ENCOUNTER — TELEPHONE (OUTPATIENT)
Dept: BREAST CENTER | Age: 55
End: 2025-03-05

## 2025-03-05 NOTE — TELEPHONE ENCOUNTER
RN received a voicemail from patient wanting to reschedule her imaging and office visit with NP.       RN contacted patient back and rescheduled for 4/1/25 @ 11am with NP, mammogram @ 945am. Patient requested this date as she recently had hip surgery and it got infected and is on home antibiotics and goes back to infectious disease doctor 3/19/2025.      Electronically signed by Jerrica Rocha RN on 3/5/25 at 2:28 PM EST

## 2025-03-07 LAB
MICROORGANISM SPEC CULT: NORMAL
MICROORGANISM SPEC CULT: NORMAL
MICROORGANISM/AGENT SPEC: NORMAL
MICROORGANISM/AGENT SPEC: NORMAL
SERVICE CMNT-IMP: NORMAL
SERVICE CMNT-IMP: NORMAL
SPECIMEN DESCRIPTION: NORMAL
SPECIMEN DESCRIPTION: NORMAL

## 2025-03-08 ENCOUNTER — HOSPITAL ENCOUNTER (INPATIENT)
Age: 55
LOS: 5 days | Discharge: HOME | DRG: 813 | End: 2025-03-13
Attending: STUDENT IN AN ORGANIZED HEALTH CARE EDUCATION/TRAINING PROGRAM | Admitting: INTERNAL MEDICINE
Payer: MEDICAID

## 2025-03-08 ENCOUNTER — APPOINTMENT (OUTPATIENT)
Dept: CT IMAGING | Age: 55
DRG: 813 | End: 2025-03-08
Payer: MEDICAID

## 2025-03-08 ENCOUNTER — APPOINTMENT (OUTPATIENT)
Dept: GENERAL RADIOLOGY | Age: 55
DRG: 813 | End: 2025-03-08
Payer: MEDICAID

## 2025-03-08 DIAGNOSIS — G89.18 POST-OP PAIN: ICD-10-CM

## 2025-03-08 DIAGNOSIS — I10 ESSENTIAL HYPERTENSION: ICD-10-CM

## 2025-03-08 DIAGNOSIS — G62.9 NEUROPATHY: ICD-10-CM

## 2025-03-08 DIAGNOSIS — T88.8XXA FLUID COLLECTION AT SURGICAL SITE, INITIAL ENCOUNTER: Primary | ICD-10-CM

## 2025-03-08 PROBLEM — M00.9 SEPTIC JOINT (HCC): Status: ACTIVE | Noted: 2025-03-08

## 2025-03-08 LAB
ANION GAP SERPL CALCULATED.3IONS-SCNC: 10 MMOL/L (ref 7–16)
BASOPHILS # BLD: 0.02 K/UL (ref 0–0.2)
BASOPHILS NFR BLD: 0 % (ref 0–2)
BUN SERPL-MCNC: 16 MG/DL (ref 6–20)
CALCIUM SERPL-MCNC: 9 MG/DL (ref 8.6–10.2)
CHLORIDE SERPL-SCNC: 102 MMOL/L (ref 98–107)
CO2 SERPL-SCNC: 25 MMOL/L (ref 22–29)
CREAT SERPL-MCNC: 0.9 MG/DL (ref 0.5–1)
CRP SERPL HS-MCNC: 3 MG/L (ref 0–5)
EOSINOPHIL # BLD: 0.35 K/UL (ref 0.05–0.5)
EOSINOPHILS RELATIVE PERCENT: 8 % (ref 0–6)
ERYTHROCYTE [DISTWIDTH] IN BLOOD BY AUTOMATED COUNT: 15 % (ref 11.5–15)
ERYTHROCYTE [SEDIMENTATION RATE] IN BLOOD BY WESTERGREN METHOD: 70 MM/HR (ref 0–20)
GFR, ESTIMATED: 75 ML/MIN/1.73M2
GLUCOSE BLD-MCNC: 167 MG/DL (ref 74–99)
GLUCOSE SERPL-MCNC: 297 MG/DL (ref 74–99)
HCT VFR BLD AUTO: 28.1 % (ref 34–48)
HGB BLD-MCNC: 8.9 G/DL (ref 11.5–15.5)
IMM GRANULOCYTES # BLD AUTO: <0.03 K/UL (ref 0–0.58)
IMM GRANULOCYTES NFR BLD: 0 % (ref 0–5)
LYMPHOCYTES NFR BLD: 1.3 K/UL (ref 1.5–4)
LYMPHOCYTES RELATIVE PERCENT: 28 % (ref 20–42)
MCH RBC QN AUTO: 28.4 PG (ref 26–35)
MCHC RBC AUTO-ENTMCNC: 31.7 G/DL (ref 32–34.5)
MCV RBC AUTO: 89.8 FL (ref 80–99.9)
MONOCYTES NFR BLD: 0.6 K/UL (ref 0.1–0.95)
MONOCYTES NFR BLD: 13 % (ref 2–12)
NEUTROPHILS NFR BLD: 51 % (ref 43–80)
NEUTS SEG NFR BLD: 2.39 K/UL (ref 1.8–7.3)
PLATELET # BLD AUTO: 222 K/UL (ref 130–450)
PMV BLD AUTO: 10.4 FL (ref 7–12)
POTASSIUM SERPL-SCNC: 4 MMOL/L (ref 3.5–5)
RBC # BLD AUTO: 3.13 M/UL (ref 3.5–5.5)
SODIUM SERPL-SCNC: 137 MMOL/L (ref 132–146)
WBC OTHER # BLD: 4.7 K/UL (ref 4.5–11.5)

## 2025-03-08 PROCEDURE — 85652 RBC SED RATE AUTOMATED: CPT

## 2025-03-08 PROCEDURE — 99214 OFFICE O/P EST MOD 30 MIN: CPT | Performed by: ORTHOPAEDIC SURGERY

## 2025-03-08 PROCEDURE — 87070 CULTURE OTHR SPECIMN AEROBIC: CPT

## 2025-03-08 PROCEDURE — 2580000003 HC RX 258: Performed by: INTERNAL MEDICINE

## 2025-03-08 PROCEDURE — 87205 SMEAR GRAM STAIN: CPT

## 2025-03-08 PROCEDURE — 6360000002 HC RX W HCPCS: Performed by: INTERNAL MEDICINE

## 2025-03-08 PROCEDURE — 73700 CT LOWER EXTREMITY W/O DYE: CPT

## 2025-03-08 PROCEDURE — 87077 CULTURE AEROBIC IDENTIFY: CPT

## 2025-03-08 PROCEDURE — 82962 GLUCOSE BLOOD TEST: CPT

## 2025-03-08 PROCEDURE — 85025 COMPLETE CBC W/AUTO DIFF WBC: CPT

## 2025-03-08 PROCEDURE — 73502 X-RAY EXAM HIP UNI 2-3 VIEWS: CPT

## 2025-03-08 PROCEDURE — 2500000003 HC RX 250 WO HCPCS: Performed by: INTERNAL MEDICINE

## 2025-03-08 PROCEDURE — 6370000000 HC RX 637 (ALT 250 FOR IP): Performed by: INTERNAL MEDICINE

## 2025-03-08 PROCEDURE — 1200000000 HC SEMI PRIVATE

## 2025-03-08 PROCEDURE — 80048 BASIC METABOLIC PNL TOTAL CA: CPT

## 2025-03-08 PROCEDURE — 86140 C-REACTIVE PROTEIN: CPT

## 2025-03-08 PROCEDURE — 99285 EMERGENCY DEPT VISIT HI MDM: CPT

## 2025-03-08 PROCEDURE — 87075 CULTR BACTERIA EXCEPT BLOOD: CPT

## 2025-03-08 RX ORDER — MAGNESIUM SULFATE IN WATER 40 MG/ML
2000 INJECTION, SOLUTION INTRAVENOUS PRN
Status: DISCONTINUED | OUTPATIENT
Start: 2025-03-08 | End: 2025-03-13 | Stop reason: HOSPADM

## 2025-03-08 RX ORDER — ALBUTEROL SULFATE 0.83 MG/ML
2.5 SOLUTION RESPIRATORY (INHALATION) 4 TIMES DAILY PRN
Status: DISCONTINUED | OUTPATIENT
Start: 2025-03-08 | End: 2025-03-13 | Stop reason: HOSPADM

## 2025-03-08 RX ORDER — LATANOPROST 50 UG/ML
1 SOLUTION/ DROPS OPHTHALMIC NIGHTLY
Status: DISCONTINUED | OUTPATIENT
Start: 2025-03-08 | End: 2025-03-13 | Stop reason: HOSPADM

## 2025-03-08 RX ORDER — INSULIN GLARGINE 100 [IU]/ML
40 INJECTION, SOLUTION SUBCUTANEOUS NIGHTLY
Status: DISCONTINUED | OUTPATIENT
Start: 2025-03-08 | End: 2025-03-11

## 2025-03-08 RX ORDER — SUCRALFATE 1 G/1
1 TABLET ORAL 4 TIMES DAILY
Status: DISCONTINUED | OUTPATIENT
Start: 2025-03-08 | End: 2025-03-13 | Stop reason: HOSPADM

## 2025-03-08 RX ORDER — BUSPIRONE HYDROCHLORIDE 5 MG/1
5 TABLET ORAL NIGHTLY
Status: DISCONTINUED | OUTPATIENT
Start: 2025-03-08 | End: 2025-03-13 | Stop reason: HOSPADM

## 2025-03-08 RX ORDER — SODIUM CHLORIDE 9 MG/ML
INJECTION, SOLUTION INTRAVENOUS PRN
Status: DISCONTINUED | OUTPATIENT
Start: 2025-03-08 | End: 2025-03-13 | Stop reason: HOSPADM

## 2025-03-08 RX ORDER — GABAPENTIN 300 MG/1
300 CAPSULE ORAL NIGHTLY
Status: DISCONTINUED | OUTPATIENT
Start: 2025-03-08 | End: 2025-03-13 | Stop reason: HOSPADM

## 2025-03-08 RX ORDER — TIMOLOL MALEATE 5 MG/ML
1 SOLUTION/ DROPS OPHTHALMIC 2 TIMES DAILY
Status: DISCONTINUED | OUTPATIENT
Start: 2025-03-08 | End: 2025-03-13 | Stop reason: HOSPADM

## 2025-03-08 RX ORDER — PRAMIPEXOLE DIHYDROCHLORIDE 0.12 MG/1
0.12 TABLET ORAL 2 TIMES DAILY
Status: DISCONTINUED | OUTPATIENT
Start: 2025-03-08 | End: 2025-03-13 | Stop reason: HOSPADM

## 2025-03-08 RX ORDER — GLUCAGON 1 MG/ML
1 KIT INJECTION PRN
Status: DISCONTINUED | OUTPATIENT
Start: 2025-03-08 | End: 2025-03-13 | Stop reason: HOSPADM

## 2025-03-08 RX ORDER — SODIUM CHLORIDE 0.9 % (FLUSH) 0.9 %
5-40 SYRINGE (ML) INJECTION PRN
Status: DISCONTINUED | OUTPATIENT
Start: 2025-03-08 | End: 2025-03-13 | Stop reason: HOSPADM

## 2025-03-08 RX ORDER — ACETAMINOPHEN 650 MG/1
650 SUPPOSITORY RECTAL EVERY 6 HOURS PRN
Status: DISCONTINUED | OUTPATIENT
Start: 2025-03-08 | End: 2025-03-13 | Stop reason: HOSPADM

## 2025-03-08 RX ORDER — ROSUVASTATIN CALCIUM 10 MG/1
5 TABLET, COATED ORAL DAILY
Status: DISCONTINUED | OUTPATIENT
Start: 2025-03-09 | End: 2025-03-13 | Stop reason: HOSPADM

## 2025-03-08 RX ORDER — ACETAMINOPHEN 325 MG/1
650 TABLET ORAL EVERY 6 HOURS PRN
Status: DISCONTINUED | OUTPATIENT
Start: 2025-03-08 | End: 2025-03-13 | Stop reason: HOSPADM

## 2025-03-08 RX ORDER — POTASSIUM CHLORIDE 1500 MG/1
40 TABLET, EXTENDED RELEASE ORAL PRN
Status: DISCONTINUED | OUTPATIENT
Start: 2025-03-08 | End: 2025-03-13 | Stop reason: HOSPADM

## 2025-03-08 RX ORDER — PANTOPRAZOLE SODIUM 40 MG/1
40 TABLET, DELAYED RELEASE ORAL
Status: DISCONTINUED | OUTPATIENT
Start: 2025-03-09 | End: 2025-03-13 | Stop reason: HOSPADM

## 2025-03-08 RX ORDER — BRIMONIDINE TARTRATE 2 MG/ML
1 SOLUTION/ DROPS OPHTHALMIC 2 TIMES DAILY
Status: DISCONTINUED | OUTPATIENT
Start: 2025-03-08 | End: 2025-03-13 | Stop reason: HOSPADM

## 2025-03-08 RX ORDER — POTASSIUM CHLORIDE 7.45 MG/ML
10 INJECTION INTRAVENOUS PRN
Status: DISCONTINUED | OUTPATIENT
Start: 2025-03-08 | End: 2025-03-13 | Stop reason: HOSPADM

## 2025-03-08 RX ORDER — POLYETHYLENE GLYCOL 3350 17 G/17G
17 POWDER, FOR SOLUTION ORAL DAILY PRN
Status: DISCONTINUED | OUTPATIENT
Start: 2025-03-08 | End: 2025-03-13 | Stop reason: HOSPADM

## 2025-03-08 RX ORDER — BUMETANIDE 1 MG/1
1 TABLET ORAL DAILY
Status: DISCONTINUED | OUTPATIENT
Start: 2025-03-09 | End: 2025-03-13 | Stop reason: HOSPADM

## 2025-03-08 RX ORDER — MECOBALAMIN 5000 MCG
5 TABLET,DISINTEGRATING ORAL NIGHTLY PRN
Status: DISCONTINUED | OUTPATIENT
Start: 2025-03-08 | End: 2025-03-13 | Stop reason: HOSPADM

## 2025-03-08 RX ORDER — METOPROLOL SUCCINATE 25 MG/1
12.5 TABLET, EXTENDED RELEASE ORAL DAILY
Status: DISCONTINUED | OUTPATIENT
Start: 2025-03-09 | End: 2025-03-13 | Stop reason: HOSPADM

## 2025-03-08 RX ORDER — ANASTROZOLE 1 MG/1
1 TABLET ORAL DAILY
Status: DISCONTINUED | OUTPATIENT
Start: 2025-03-09 | End: 2025-03-13 | Stop reason: HOSPADM

## 2025-03-08 RX ORDER — SODIUM CHLORIDE 0.9 % (FLUSH) 0.9 %
5-40 SYRINGE (ML) INJECTION EVERY 12 HOURS SCHEDULED
Status: DISCONTINUED | OUTPATIENT
Start: 2025-03-08 | End: 2025-03-13 | Stop reason: HOSPADM

## 2025-03-08 RX ORDER — DEXTROSE MONOHYDRATE 100 MG/ML
INJECTION, SOLUTION INTRAVENOUS CONTINUOUS PRN
Status: DISCONTINUED | OUTPATIENT
Start: 2025-03-08 | End: 2025-03-13 | Stop reason: HOSPADM

## 2025-03-08 RX ORDER — ASPIRIN 81 MG/1
81 TABLET, CHEWABLE ORAL 2 TIMES DAILY
Status: DISCONTINUED | OUTPATIENT
Start: 2025-03-08 | End: 2025-03-13 | Stop reason: HOSPADM

## 2025-03-08 RX ADMIN — GABAPENTIN 300 MG: 300 CAPSULE ORAL at 23:40

## 2025-03-08 RX ADMIN — CEFTAROLINE FOSAMIL 600 MG: 600 POWDER, FOR SOLUTION INTRAVENOUS at 23:53

## 2025-03-08 RX ADMIN — SODIUM CHLORIDE, PRESERVATIVE FREE 10 ML: 5 INJECTION INTRAVENOUS at 23:30

## 2025-03-08 RX ADMIN — INSULIN GLARGINE 40 UNITS: 100 INJECTION, SOLUTION SUBCUTANEOUS at 23:27

## 2025-03-08 RX ADMIN — TIMOLOL MALEATE 1 DROP: 5 SOLUTION OPHTHALMIC at 23:26

## 2025-03-08 RX ADMIN — LATANOPROST 1 DROP: 50 SOLUTION OPHTHALMIC at 23:26

## 2025-03-08 RX ADMIN — BUSPIRONE HYDROCHLORIDE 5 MG: 5 TABLET ORAL at 23:26

## 2025-03-08 RX ADMIN — SUCRALFATE 1 G: 1 TABLET ORAL at 23:26

## 2025-03-08 RX ADMIN — PRAMIPEXOLE DIHYDROCHLORIDE 0.12 MG: 0.12 TABLET ORAL at 23:26

## 2025-03-08 ASSESSMENT — LIFESTYLE VARIABLES: HOW OFTEN DO YOU HAVE A DRINK CONTAINING ALCOHOL: NEVER

## 2025-03-08 ASSESSMENT — PAIN - FUNCTIONAL ASSESSMENT: PAIN_FUNCTIONAL_ASSESSMENT: NONE - DENIES PAIN

## 2025-03-08 NOTE — ED PROVIDER NOTES
[facility] EMERGENCY DEPARTMENT  EMERGENCY DEPARTMENT ENCOUNTER        Pt Name: Jenny Lilly  MRN: [id number]  Birthdate 1970  Date of evaluation: 3/8/2025  Provider: Melvin Decker MD  PCP: Charlene Velez DO  Note Started: 5:13 PM EST 3/8/25    CHIEF COMPLAINT       Chief Complaint   Patient presents with    Post-op Problem     R Hip surgery on 1/13 staples removed 1/28 with seepage post removal. Hip surgery again on 2/4. Staples removed on 2/24 with seepage from site starting yesterday.        HISTORY OF PRESENT ILLNESS: 1 or more Elements        Limitations to history : None    Jenny Lilly is a 55 y.o. female who presents to the emergency department for postop issue.  Patient has had a couple of episodes of periprosthetic hip infections.  No fevers but has had some chills.  Patient states she was taking a shower yesterday when she finished drying up and noticed that there is some seepage coming out of the lateral lower incision of her hip.  Is currently on PICC line antibiotics and says she has 2 weeks further.    Nursing Notes were all reviewed and agreed with or any disagreements were addressed in the HPI.      REVIEW OF EXTERNAL NOTE :       As below    REVIEW OF SYSTEMS :      Positives and Pertinent negatives as per HPI.     SURGICAL HISTORY     Past Surgical History:   Procedure Laterality Date    ANKLE SURGERY      bilateral tarsal tunnels    BREAST BIOPSY Left 02/17/2021    LEFT BREAST NEEDLE LOCALIZED LUMPECTOMY, BLUE DYE INJECTION, LEFT AXILLARY SENTINEL LYMPHNODE EXCISION, POSSIBLE LEFT AXILLARY DISSECTION performed by Tana Granados MD at [facility] [place]    BREAST LUMPECTOMY Left     2/2021    CARDIAC SURGERY      cardiac catheterization-  \" years ago - over 10 years and negative\"; to see Dr. Gonzalez 3/13/23    CARPAL TUNNEL RELEASE      left    CARPAL TUNNEL RELEASE  04/02/2012    right    CARPAL TUNNEL RELEASE Right 03/15/2023    RIGHT HAND CARPAL TUNNEL RELEASE,

## 2025-03-08 NOTE — CONSULTS
Department of Orthopedic Surgery  Consult note:    Chief complaint: Right hip drainage status post right total hip revision    HISTORY OF PRESENT ILLNESS:       Patient is a 55 y.o. female who presents with mild right hip drainage from her incision following right total hip arthroplasty revision approximately 4-1/2 weeks ago.  Patient stated she noted a slight wetness on her pants over the site of the distal aspect of her right hip incision.  States that it has been mildly draining over the past few days but that it continues to scab over and stop but then restarted again.  States that it is slightly painful of her right hip.  Denies any increasing numbness tingling paresthesias trauma none right lower extremity to right foot.  Is of note she has has mild neuropathy at baseline so this may be unreliable.  Denies any fevers chills nausea vomiting.  Denies any other orthopedic complaints at this time    Past Medical History:        Diagnosis Date    Acid reflux     Anesthesia complication     problem with blood pressure up & down    Arthritis     lower back    Back pain     Cancer (HCC) 2021    left breast    Cardiac valve prolapse     valve ?    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 marcial feet    Nontraumatic tear of right rotator cuff 11/30/2020    Prolonged emergence from general anesthesia     Psoriasis     Sleep apnea     cpap 5     Past Surgical History:        Procedure Laterality Date    ANKLE SURGERY      bilateral tarsal tunnels    BREAST BIOPSY Left 02/17/2021    LEFT BREAST NEEDLE LOCALIZED LUMPECTOMY, BLUE DYE INJECTION, LEFT AXILLARY SENTINEL LYMPHNODE EXCISION, POSSIBLE LEFT AXILLARY DISSECTION performed by Tana Granados MD at [facility] [place]    BREAST LUMPECTOMY Left     2/2021    CARDIAC SURGERY      cardiac catheterization-  \" years ago - over 10 years and negative\"; to see Dr. Gonzalez

## 2025-03-09 LAB
ALBUMIN SERPL-MCNC: 3.2 G/DL (ref 3.5–5.2)
ALP SERPL-CCNC: 157 U/L (ref 35–104)
ALT SERPL-CCNC: 24 U/L (ref 0–32)
ANION GAP SERPL CALCULATED.3IONS-SCNC: 16 MMOL/L (ref 7–16)
AST SERPL-CCNC: 29 U/L (ref 0–31)
BASOPHILS # BLD: 0.02 K/UL (ref 0–0.2)
BASOPHILS NFR BLD: 1 % (ref 0–2)
BILIRUB SERPL-MCNC: 0.2 MG/DL (ref 0–1.2)
BUN SERPL-MCNC: 14 MG/DL (ref 6–20)
CALCIUM SERPL-MCNC: 9.3 MG/DL (ref 8.6–10.2)
CHLORIDE SERPL-SCNC: 104 MMOL/L (ref 98–107)
CO2 SERPL-SCNC: 21 MMOL/L (ref 22–29)
CREAT SERPL-MCNC: 0.9 MG/DL (ref 0.5–1)
EOSINOPHIL # BLD: 0.33 K/UL (ref 0.05–0.5)
EOSINOPHILS RELATIVE PERCENT: 8 % (ref 0–6)
ERYTHROCYTE [DISTWIDTH] IN BLOOD BY AUTOMATED COUNT: 15.1 % (ref 11.5–15)
GFR, ESTIMATED: 79 ML/MIN/1.73M2
GLUCOSE BLD-MCNC: 139 MG/DL (ref 74–99)
GLUCOSE BLD-MCNC: 238 MG/DL (ref 74–99)
GLUCOSE BLD-MCNC: 275 MG/DL (ref 74–99)
GLUCOSE SERPL-MCNC: 92 MG/DL (ref 74–99)
HCT VFR BLD AUTO: 27.8 % (ref 34–48)
HGB BLD-MCNC: 8.7 G/DL (ref 11.5–15.5)
IMM GRANULOCYTES # BLD AUTO: <0.03 K/UL (ref 0–0.58)
IMM GRANULOCYTES NFR BLD: 0 % (ref 0–5)
LYMPHOCYTES NFR BLD: 1.42 K/UL (ref 1.5–4)
LYMPHOCYTES RELATIVE PERCENT: 34 % (ref 20–42)
MAGNESIUM SERPL-MCNC: 1.9 MG/DL (ref 1.6–2.6)
MCH RBC QN AUTO: 28.7 PG (ref 26–35)
MCHC RBC AUTO-ENTMCNC: 31.3 G/DL (ref 32–34.5)
MCV RBC AUTO: 91.7 FL (ref 80–99.9)
MONOCYTES NFR BLD: 0.64 K/UL (ref 0.1–0.95)
MONOCYTES NFR BLD: 15 % (ref 2–12)
NEUTROPHILS NFR BLD: 43 % (ref 43–80)
NEUTS SEG NFR BLD: 1.82 K/UL (ref 1.8–7.3)
PHOSPHATE SERPL-MCNC: 3.2 MG/DL (ref 2.5–4.5)
PLATELET # BLD AUTO: 203 K/UL (ref 130–450)
PMV BLD AUTO: 10.6 FL (ref 7–12)
POTASSIUM SERPL-SCNC: 4.2 MMOL/L (ref 3.5–5)
PROCALCITONIN SERPL-MCNC: 0.05 NG/ML (ref 0–0.08)
PROT SERPL-MCNC: 6.3 G/DL (ref 6.4–8.3)
RBC # BLD AUTO: 3.03 M/UL (ref 3.5–5.5)
SODIUM SERPL-SCNC: 141 MMOL/L (ref 132–146)
T4 FREE SERPL-MCNC: 1 NG/DL (ref 0.9–1.7)
TSH SERPL DL<=0.05 MIU/L-ACNC: 4.83 UIU/ML (ref 0.27–4.2)
WBC OTHER # BLD: 4.2 K/UL (ref 4.5–11.5)

## 2025-03-09 PROCEDURE — 82962 GLUCOSE BLOOD TEST: CPT

## 2025-03-09 PROCEDURE — 6370000000 HC RX 637 (ALT 250 FOR IP): Performed by: INTERNAL MEDICINE

## 2025-03-09 PROCEDURE — 84443 ASSAY THYROID STIM HORMONE: CPT

## 2025-03-09 PROCEDURE — 87040 BLOOD CULTURE FOR BACTERIA: CPT

## 2025-03-09 PROCEDURE — 84145 PROCALCITONIN (PCT): CPT

## 2025-03-09 PROCEDURE — 84439 ASSAY OF FREE THYROXINE: CPT

## 2025-03-09 PROCEDURE — 84100 ASSAY OF PHOSPHORUS: CPT

## 2025-03-09 PROCEDURE — 1200000000 HC SEMI PRIVATE

## 2025-03-09 PROCEDURE — 83735 ASSAY OF MAGNESIUM: CPT

## 2025-03-09 PROCEDURE — 85025 COMPLETE CBC W/AUTO DIFF WBC: CPT

## 2025-03-09 PROCEDURE — 6360000002 HC RX W HCPCS: Performed by: INTERNAL MEDICINE

## 2025-03-09 PROCEDURE — 80053 COMPREHEN METABOLIC PANEL: CPT

## 2025-03-09 PROCEDURE — 36415 COLL VENOUS BLD VENIPUNCTURE: CPT

## 2025-03-09 PROCEDURE — 2580000003 HC RX 258: Performed by: INTERNAL MEDICINE

## 2025-03-09 PROCEDURE — 2500000003 HC RX 250 WO HCPCS: Performed by: INTERNAL MEDICINE

## 2025-03-09 PROCEDURE — 6370000000 HC RX 637 (ALT 250 FOR IP): Performed by: SPECIALIST

## 2025-03-09 RX ORDER — INSULIN LISPRO 100 [IU]/ML
0-8 INJECTION, SOLUTION INTRAVENOUS; SUBCUTANEOUS
Status: DISCONTINUED | OUTPATIENT
Start: 2025-03-09 | End: 2025-03-13 | Stop reason: HOSPADM

## 2025-03-09 RX ORDER — HYDROCODONE BITARTRATE AND ACETAMINOPHEN 5; 325 MG/1; MG/1
1 TABLET ORAL EVERY 6 HOURS PRN
Status: DISCONTINUED | OUTPATIENT
Start: 2025-03-09 | End: 2025-03-13 | Stop reason: HOSPADM

## 2025-03-09 RX ORDER — ENOXAPARIN SODIUM 100 MG/ML
40 INJECTION SUBCUTANEOUS DAILY
Status: DISCONTINUED | OUTPATIENT
Start: 2025-03-09 | End: 2025-03-13 | Stop reason: HOSPADM

## 2025-03-09 RX ORDER — LACTOBACILLUS RHAMNOSUS GG 10B CELL
1 CAPSULE ORAL EVERY 12 HOURS
Status: DISCONTINUED | OUTPATIENT
Start: 2025-03-09 | End: 2025-03-13 | Stop reason: HOSPADM

## 2025-03-09 RX ADMIN — PANTOPRAZOLE SODIUM 40 MG: 40 TABLET, DELAYED RELEASE ORAL at 06:07

## 2025-03-09 RX ADMIN — METOPROLOL SUCCINATE 12.5 MG: 25 TABLET, EXTENDED RELEASE ORAL at 09:04

## 2025-03-09 RX ADMIN — BUSPIRONE HYDROCHLORIDE 5 MG: 5 TABLET ORAL at 21:51

## 2025-03-09 RX ADMIN — TIMOLOL MALEATE 1 DROP: 5 SOLUTION OPHTHALMIC at 08:58

## 2025-03-09 RX ADMIN — Medication 1 CAPSULE: at 21:51

## 2025-03-09 RX ADMIN — CEFTAROLINE FOSAMIL 600 MG: 600 POWDER, FOR SOLUTION INTRAVENOUS at 11:50

## 2025-03-09 RX ADMIN — ROSUVASTATIN CALCIUM 5 MG: 10 TABLET, FILM COATED ORAL at 09:03

## 2025-03-09 RX ADMIN — PRAMIPEXOLE DIHYDROCHLORIDE 0.12 MG: 0.12 TABLET ORAL at 21:51

## 2025-03-09 RX ADMIN — BRIMONIDINE TARTRATE 1 DROP: 2 SOLUTION OPHTHALMIC at 08:58

## 2025-03-09 RX ADMIN — PRAMIPEXOLE DIHYDROCHLORIDE 0.12 MG: 0.12 TABLET ORAL at 11:51

## 2025-03-09 RX ADMIN — ANASTROZOLE 1 MG: 1 TABLET ORAL at 09:05

## 2025-03-09 RX ADMIN — SODIUM CHLORIDE, PRESERVATIVE FREE 10 ML: 5 INJECTION INTRAVENOUS at 21:54

## 2025-03-09 RX ADMIN — SUCRALFATE 1 G: 1 TABLET ORAL at 16:19

## 2025-03-09 RX ADMIN — GABAPENTIN 300 MG: 300 CAPSULE ORAL at 21:51

## 2025-03-09 RX ADMIN — BRIMONIDINE TARTRATE 1 DROP: 2 SOLUTION OPHTHALMIC at 00:33

## 2025-03-09 RX ADMIN — INSULIN GLARGINE 40 UNITS: 100 INJECTION, SOLUTION SUBCUTANEOUS at 21:57

## 2025-03-09 RX ADMIN — SUCRALFATE 1 G: 1 TABLET ORAL at 09:03

## 2025-03-09 RX ADMIN — SODIUM CHLORIDE, PRESERVATIVE FREE 10 ML: 5 INJECTION INTRAVENOUS at 09:06

## 2025-03-09 RX ADMIN — TIMOLOL MALEATE 1 DROP: 5 SOLUTION OPHTHALMIC at 21:51

## 2025-03-09 RX ADMIN — LATANOPROST 1 DROP: 50 SOLUTION OPHTHALMIC at 21:54

## 2025-03-09 RX ADMIN — BRIMONIDINE TARTRATE 1 DROP: 2 SOLUTION OPHTHALMIC at 21:51

## 2025-03-09 RX ADMIN — CEFTAROLINE FOSAMIL 600 MG: 600 POWDER, FOR SOLUTION INTRAVENOUS at 23:21

## 2025-03-09 RX ADMIN — INSULIN LISPRO 2 UNITS: 100 INJECTION, SOLUTION INTRAVENOUS; SUBCUTANEOUS at 11:26

## 2025-03-09 RX ADMIN — SUCRALFATE 1 G: 1 TABLET ORAL at 11:45

## 2025-03-09 RX ADMIN — SUCRALFATE 1 G: 1 TABLET ORAL at 21:51

## 2025-03-09 RX ADMIN — ENOXAPARIN SODIUM 40 MG: 100 INJECTION SUBCUTANEOUS at 08:59

## 2025-03-09 NOTE — H&P
Department of Internal Medicine  History and Physical    PCP: Charlene Velez DO  Admitting Physician: Dr. Frey/José Miguel  Consultants:   Date of Service: 3/8/93620    CHIEF COMPLAINT:  right hip drainage    HISTORY OF PRESENT ILLNESS:    Patient is 55-year-old female presented to the ED due to drainage from hip.  Patient recently discharged from the hospital earlier in February after being treated for cellulitis/septic joint.  She had wound VAC placed. This was removed recently after her discharge.  Additionally she had her staples removed on the 25th of february.  She had been doing fine while receiving Teflaro twice a day as an outpatient.  However she noticed drainage yesterday.  She also noticed increased tightness on the lateral right hip. She does admit to chills.   She describes numbness but denies any significant increase in pain.     PAST MEDICAL Hx:  Past Medical History:   Diagnosis Date    Acid reflux     Anesthesia complication     problem with blood pressure up & down    Arthritis     lower back    Back pain     Cancer (HCC) 2021    left breast    Cardiac valve prolapse     valve ?    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 marcial feet    Nontraumatic tear of right rotator cuff 11/30/2020    Prolonged emergence from general anesthesia     Psoriasis     Sleep apnea     cpap 5       PAST SURGICAL Hx:   Past Surgical History:   Procedure Laterality Date    ANKLE SURGERY      bilateral tarsal tunnels    BREAST BIOPSY Left 02/17/2021    LEFT BREAST NEEDLE LOCALIZED LUMPECTOMY, BLUE DYE INJECTION, LEFT AXILLARY SENTINEL LYMPHNODE EXCISION, POSSIBLE LEFT AXILLARY DISSECTION [place]ados [place] [facility] [place]    BREAST LUMPECTOMY Left     2/2021    CARDIAC SURGERY      cardiac catheterization-  \" years ago - over 10 years and negative\"; to see Dr. Gonzalez 3/13/23    CARPAL TUNNEL RELEASE

## 2025-03-09 NOTE — CONSULTS
CONSULTATION NOTE :ID     Patient - Jenny Lilly,  Age - 55 y.o.    - 1970      Room Number - 0324/0324-02   MRN -  [id number]   [facility] # - [id number]  Date of Admission -  3/8/2025  5:09 PM  Patient's PCP: Charlene Velez DO     Requesting Physician: Glenn Frey DO    HISTORY OF PRESENT ILLNESS   This is a 55 y.o. female who was admitted on 3/8/2025   to the hospital with a chief complaints of   Chief Complaint   Patient presents with    Post-op Problem     R Hip surgery on  staples removed  with seepage post removal. Hip surgery again on . Staples removed on  with seepage from site starting yesterday.      ID was consulted on 25 for antibiotic management   Knonw to ID 55-year-old female 2025 status post right total hip replacement. She presented to the hospital 2/3 with increased pain and drainage from the distal aspect of her incision as well as erythema. CRP and ESR were elevated. She also had a UTI with symptoms  S/P irrigation and debridement with head and liner exchange on    Vancomycin powder was placed in the depths of the wounds as well as in the subcutaneous tissue   Came in with hip drainage  Afebrile vs stable   Wbc 4.7 ->4.2 cr0.9  esr70   Blood cx neg  Wound cx thigh    tissue cx MSSA   2/3 urine cx/diptheroids  2025 urine cx mixed gpo  MRSA screen neg     CT HIP RIGHT WO CONTRAST  Result Date: 3/8/2025  1.  There is a fluid-filled tract which extends from the lateral right hip region to the skin surface.  Fluid collection also present in this area which demonstrates surrounding fat stranding.  Skin thickening to the lateral right proximal thigh. (Soft tissue abscess must be considered given appearance.  IV contrast was not administered to assess for rim enhancement of the fluid collection.) 2.  Right hip prosthesis.  There does not appear to be a right hip joint effusion at this time. 3.  Mild left hip osteoarthritis. 4.

## 2025-03-10 ENCOUNTER — APPOINTMENT (OUTPATIENT)
Dept: INTERVENTIONAL RADIOLOGY/VASCULAR | Age: 55
DRG: 813 | End: 2025-03-10
Payer: MEDICAID

## 2025-03-10 LAB
ALBUMIN SERPL-MCNC: 3.4 G/DL (ref 3.5–5.2)
ALP SERPL-CCNC: 157 U/L (ref 35–104)
ALT SERPL-CCNC: 25 U/L (ref 0–32)
ANION GAP SERPL CALCULATED.3IONS-SCNC: 11 MMOL/L (ref 7–16)
AST SERPL-CCNC: 27 U/L (ref 0–31)
BASOPHILS # BLD: 0.02 K/UL (ref 0–0.2)
BASOPHILS NFR BLD: 1 % (ref 0–2)
BILIRUB SERPL-MCNC: 0.2 MG/DL (ref 0–1.2)
BUN SERPL-MCNC: 13 MG/DL (ref 6–20)
CALCIUM SERPL-MCNC: 9.3 MG/DL (ref 8.6–10.2)
CHLORIDE SERPL-SCNC: 106 MMOL/L (ref 98–107)
CO2 SERPL-SCNC: 23 MMOL/L (ref 22–29)
CREAT SERPL-MCNC: 0.8 MG/DL (ref 0.5–1)
EOSINOPHIL # BLD: 0.29 K/UL (ref 0.05–0.5)
EOSINOPHILS RELATIVE PERCENT: 7 % (ref 0–6)
ERYTHROCYTE [DISTWIDTH] IN BLOOD BY AUTOMATED COUNT: 15.2 % (ref 11.5–15)
GFR, ESTIMATED: 81 ML/MIN/1.73M2
GLUCOSE BLD-MCNC: 114 MG/DL (ref 74–99)
GLUCOSE BLD-MCNC: 121 MG/DL (ref 74–99)
GLUCOSE BLD-MCNC: 175 MG/DL (ref 74–99)
GLUCOSE BLD-MCNC: 57 MG/DL (ref 74–99)
GLUCOSE BLD-MCNC: 85 MG/DL (ref 74–99)
GLUCOSE SERPL-MCNC: 78 MG/DL (ref 74–99)
HCT VFR BLD AUTO: 29.6 % (ref 34–48)
HGB BLD-MCNC: 9 G/DL (ref 11.5–15.5)
IMM GRANULOCYTES # BLD AUTO: <0.03 K/UL (ref 0–0.58)
IMM GRANULOCYTES NFR BLD: 0 % (ref 0–5)
INR PPP: 1
LYMPHOCYTES NFR BLD: 1.36 K/UL (ref 1.5–4)
LYMPHOCYTES RELATIVE PERCENT: 32 % (ref 20–42)
MAGNESIUM SERPL-MCNC: 2.1 MG/DL (ref 1.6–2.6)
MCH RBC QN AUTO: 28.5 PG (ref 26–35)
MCHC RBC AUTO-ENTMCNC: 30.4 G/DL (ref 32–34.5)
MCV RBC AUTO: 93.7 FL (ref 80–99.9)
MICROORGANISM SPEC CULT: ABNORMAL
MICROORGANISM/AGENT SPEC: ABNORMAL
MONOCYTES NFR BLD: 0.53 K/UL (ref 0.1–0.95)
MONOCYTES NFR BLD: 13 % (ref 2–12)
NEUTROPHILS NFR BLD: 48 % (ref 43–80)
NEUTS SEG NFR BLD: 2.02 K/UL (ref 1.8–7.3)
PARTIAL THROMBOPLASTIN TIME: 21.9 SEC (ref 24.5–35.1)
PHOSPHATE SERPL-MCNC: 3.5 MG/DL (ref 2.5–4.5)
PLATELET # BLD AUTO: 193 K/UL (ref 130–450)
PMV BLD AUTO: 11 FL (ref 7–12)
POTASSIUM SERPL-SCNC: 4 MMOL/L (ref 3.5–5)
PROT SERPL-MCNC: 6.4 G/DL (ref 6.4–8.3)
PROTHROMBIN TIME: 10.9 SEC (ref 9.3–12.4)
RBC # BLD AUTO: 3.16 M/UL (ref 3.5–5.5)
SODIUM SERPL-SCNC: 140 MMOL/L (ref 132–146)
SPECIMEN DESCRIPTION: ABNORMAL
WBC OTHER # BLD: 4.2 K/UL (ref 4.5–11.5)

## 2025-03-10 PROCEDURE — 6360000002 HC RX W HCPCS: Performed by: INTERNAL MEDICINE

## 2025-03-10 PROCEDURE — 0J9L30Z DRAINAGE OF RIGHT UPPER LEG SUBCUTANEOUS TISSUE AND FASCIA WITH DRAINAGE DEVICE, PERCUTANEOUS APPROACH: ICD-10-PCS | Performed by: RADIOLOGY

## 2025-03-10 PROCEDURE — 36415 COLL VENOUS BLD VENIPUNCTURE: CPT

## 2025-03-10 PROCEDURE — 6370000000 HC RX 637 (ALT 250 FOR IP): Performed by: SPECIALIST

## 2025-03-10 PROCEDURE — 6360000002 HC RX W HCPCS: Performed by: RADIOLOGY

## 2025-03-10 PROCEDURE — 2500000003 HC RX 250 WO HCPCS: Performed by: INTERNAL MEDICINE

## 2025-03-10 PROCEDURE — 2580000003 HC RX 258: Performed by: INTERNAL MEDICINE

## 2025-03-10 PROCEDURE — 10030 IMG GID FLU COLL DRG SFT TIS: CPT

## 2025-03-10 PROCEDURE — 6360000002 HC RX W HCPCS: Performed by: SPECIALIST

## 2025-03-10 PROCEDURE — 84100 ASSAY OF PHOSPHORUS: CPT

## 2025-03-10 PROCEDURE — 1200000000 HC SEMI PRIVATE

## 2025-03-10 PROCEDURE — C1769 GUIDE WIRE: HCPCS

## 2025-03-10 PROCEDURE — 6370000000 HC RX 637 (ALT 250 FOR IP): Performed by: INTERNAL MEDICINE

## 2025-03-10 PROCEDURE — 87205 SMEAR GRAM STAIN: CPT

## 2025-03-10 PROCEDURE — 83735 ASSAY OF MAGNESIUM: CPT

## 2025-03-10 PROCEDURE — 2580000003 HC RX 258: Performed by: SPECIALIST

## 2025-03-10 PROCEDURE — 85025 COMPLETE CBC W/AUTO DIFF WBC: CPT

## 2025-03-10 PROCEDURE — 85730 THROMBOPLASTIN TIME PARTIAL: CPT

## 2025-03-10 PROCEDURE — 82962 GLUCOSE BLOOD TEST: CPT

## 2025-03-10 PROCEDURE — 85610 PROTHROMBIN TIME: CPT

## 2025-03-10 PROCEDURE — 87070 CULTURE OTHR SPECIMN AEROBIC: CPT

## 2025-03-10 PROCEDURE — 80053 COMPREHEN METABOLIC PANEL: CPT

## 2025-03-10 RX ORDER — FENTANYL CITRATE 50 UG/ML
INJECTION, SOLUTION INTRAMUSCULAR; INTRAVENOUS PRN
Status: COMPLETED | OUTPATIENT
Start: 2025-03-10 | End: 2025-03-10

## 2025-03-10 RX ORDER — DULAGLUTIDE 1.5 MG/.5ML
INJECTION, SOLUTION SUBCUTANEOUS
Qty: 2 ML | Refills: 0 | Status: SHIPPED | OUTPATIENT
Start: 2025-03-10

## 2025-03-10 RX ORDER — INSULIN GLARGINE 100 [IU]/ML
INJECTION, SOLUTION SUBCUTANEOUS
Qty: 45 ML | Refills: 0 | Status: SHIPPED
Start: 2025-03-10 | End: 2025-03-12

## 2025-03-10 RX ORDER — GABAPENTIN 300 MG/1
300 CAPSULE ORAL NIGHTLY
Qty: 30 CAPSULE | Refills: 0 | Status: SHIPPED | OUTPATIENT
Start: 2025-03-10 | End: 2025-04-09

## 2025-03-10 RX ORDER — BUMETANIDE 1 MG/1
1 TABLET ORAL DAILY
Qty: 90 TABLET | Refills: 0 | Status: SHIPPED | OUTPATIENT
Start: 2025-03-10

## 2025-03-10 RX ORDER — METOPROLOL SUCCINATE 25 MG/1
12.5 TABLET, EXTENDED RELEASE ORAL DAILY
Qty: 45 TABLET | Refills: 0 | Status: SHIPPED | OUTPATIENT
Start: 2025-03-10

## 2025-03-10 RX ORDER — MIDAZOLAM HYDROCHLORIDE 1 MG/ML
INJECTION, SOLUTION INTRAMUSCULAR; INTRAVENOUS PRN
Status: COMPLETED | OUTPATIENT
Start: 2025-03-10 | End: 2025-03-10

## 2025-03-10 RX ADMIN — TIMOLOL MALEATE 1 DROP: 5 SOLUTION OPHTHALMIC at 10:01

## 2025-03-10 RX ADMIN — CEFTAROLINE FOSAMIL 600 MG: 600 POWDER, FOR SOLUTION INTRAVENOUS at 12:23

## 2025-03-10 RX ADMIN — ACETAMINOPHEN 650 MG: 325 TABLET ORAL at 20:32

## 2025-03-10 RX ADMIN — LATANOPROST 1 DROP: 50 SOLUTION OPHTHALMIC at 20:17

## 2025-03-10 RX ADMIN — Medication 1 CAPSULE: at 20:34

## 2025-03-10 RX ADMIN — BRIMONIDINE TARTRATE 1 DROP: 2 SOLUTION OPHTHALMIC at 10:01

## 2025-03-10 RX ADMIN — SODIUM CHLORIDE, PRESERVATIVE FREE 10 ML: 5 INJECTION INTRAVENOUS at 20:50

## 2025-03-10 RX ADMIN — PANTOPRAZOLE SODIUM 40 MG: 40 TABLET, DELAYED RELEASE ORAL at 06:09

## 2025-03-10 RX ADMIN — GABAPENTIN 300 MG: 300 CAPSULE ORAL at 20:35

## 2025-03-10 RX ADMIN — ANASTROZOLE 1 MG: 1 TABLET ORAL at 09:59

## 2025-03-10 RX ADMIN — SUCRALFATE 1 G: 1 TABLET ORAL at 20:34

## 2025-03-10 RX ADMIN — ROSUVASTATIN CALCIUM 5 MG: 10 TABLET, FILM COATED ORAL at 10:00

## 2025-03-10 RX ADMIN — SUCRALFATE 1 G: 1 TABLET ORAL at 10:00

## 2025-03-10 RX ADMIN — TIMOLOL MALEATE 1 DROP: 5 SOLUTION OPHTHALMIC at 20:17

## 2025-03-10 RX ADMIN — Medication 1 CAPSULE: at 09:59

## 2025-03-10 RX ADMIN — ALTEPLASE 1 MG: 2.2 INJECTION, POWDER, LYOPHILIZED, FOR SOLUTION INTRAVENOUS at 09:58

## 2025-03-10 RX ADMIN — BUSPIRONE HYDROCHLORIDE 5 MG: 5 TABLET ORAL at 20:34

## 2025-03-10 RX ADMIN — INSULIN GLARGINE 40 UNITS: 100 INJECTION, SOLUTION SUBCUTANEOUS at 20:33

## 2025-03-10 RX ADMIN — PRAMIPEXOLE DIHYDROCHLORIDE 0.12 MG: 0.12 TABLET ORAL at 20:34

## 2025-03-10 RX ADMIN — MIDAZOLAM 1 MG: 1 INJECTION INTRAMUSCULAR; INTRAVENOUS at 13:55

## 2025-03-10 RX ADMIN — SUCRALFATE 1 G: 1 TABLET ORAL at 17:44

## 2025-03-10 RX ADMIN — SODIUM CHLORIDE, PRESERVATIVE FREE 10 ML: 5 INJECTION INTRAVENOUS at 10:09

## 2025-03-10 RX ADMIN — BRIMONIDINE TARTRATE 1 DROP: 2 SOLUTION OPHTHALMIC at 20:17

## 2025-03-10 RX ADMIN — FENTANYL CITRATE 25 MCG: 50 INJECTION, SOLUTION INTRAMUSCULAR; INTRAVENOUS at 13:54

## 2025-03-10 RX ADMIN — METOPROLOL SUCCINATE 12.5 MG: 25 TABLET, EXTENDED RELEASE ORAL at 10:00

## 2025-03-10 RX ADMIN — CEFTAROLINE FOSAMIL 600 MG: 600 POWDER, FOR SOLUTION INTRAVENOUS at 22:33

## 2025-03-10 RX ADMIN — PRAMIPEXOLE DIHYDROCHLORIDE 0.12 MG: 0.12 TABLET ORAL at 10:00

## 2025-03-10 RX ADMIN — VANCOMYCIN HYDROCHLORIDE 1000 MG: 1 INJECTION, POWDER, LYOPHILIZED, FOR SOLUTION INTRAVENOUS at 16:08

## 2025-03-10 ASSESSMENT — PAIN - FUNCTIONAL ASSESSMENT
PAIN_FUNCTIONAL_ASSESSMENT: NONE - DENIES PAIN

## 2025-03-10 ASSESSMENT — PAIN DESCRIPTION - DESCRIPTORS: DESCRIPTORS: ACHING

## 2025-03-10 ASSESSMENT — PAIN SCALES - GENERAL
PAINLEVEL_OUTOF10: 6
PAINLEVEL_OUTOF10: 2

## 2025-03-10 ASSESSMENT — PAIN DESCRIPTION - LOCATION: LOCATION: BACK

## 2025-03-10 ASSESSMENT — PAIN DESCRIPTION - ORIENTATION: ORIENTATION: LEFT;POSTERIOR

## 2025-03-10 NOTE — OR NURSING
Patient tolerated procedure.  DSD applied to righ hip.  CDI. VSS. Respirations easy, even, unlabored. Report given to Rosa RUDD.  Patient transported to 0322.

## 2025-03-10 NOTE — TELEPHONE ENCOUNTER
Last seen 12/11/2024  Next appt 3/12/2025    Requested Prescriptions     Pending Prescriptions Disp Refills    TRULICITY 1.5 MG/0.5ML SC injection [Pharmacy Med Name: Trulicity Subcutaneous Solution Auto-injector 1.5 MG/0.5ML] 2 mL 0     Sig: INJECT 0.5 MLS INTO THE SKIN EVERY 7 DAYS.    metoprolol succinate (TOPROL XL) 25 MG extended release tablet [Pharmacy Med Name: Metoprolol Succinate ER Oral Tablet Extended Release 24 Hour 25 MG] 45 tablet 0     Sig: TAKE ONE-HALF TABLET BY MOUTH DAILY    LANTUS SOLOSTAR 100 UNIT/ML injection pen [Pharmacy Med Name: Lantus SoloStar Subcutaneous Solution Pen-injector 100 UNIT/ML] 45 mL 0     Sig: INJECT 50 UNITS SUBCUTANEOUSLY NIGHTLY.    gabapentin (NEURONTIN) 300 MG capsule [Pharmacy Med Name: Gabapentin Oral Capsule 300 MG] 30 capsule 0     Sig: Take 1 capsule by mouth at bedtime.    bumetanide (BUMEX) 1 MG tablet [Pharmacy Med Name: Bumetanide Oral Tablet 1 MG] 90 tablet 0     Sig: TAKE ONE TABLET BY MOUTH DAILY    Electronically signed by CAPO MUKHERJEE MA on 3/10/25 at 7:54 AM EDT

## 2025-03-10 NOTE — PLAN OF CARE
Problem: Chronic Conditions and Co-morbidities  Goal: Patient's chronic conditions and co-morbidity symptoms are monitored and maintained or improved  3/10/2025 1950 by Clive Carranza RN  Outcome: Not Progressing  3/10/2025 1931 by Clive aCrranza RN  Outcome: Progressing     Problem: Discharge Planning  Goal: Discharge to home or other facility with appropriate resources  3/10/2025 1950 by Clive Carranza RN  Outcome: Not Progressing  3/10/2025 1931 by Clive Carranza RN  Outcome: Progressing     Problem: Safety - Adult  Goal: Free from fall injury  3/10/2025 1950 by Clive Carranza RN  Outcome: Not Progressing  3/10/2025 1931 by Clive Carranza RN  Outcome: Progressing     Problem: Chronic Conditions and Co-morbidities  Goal: Patient's chronic conditions and co-morbidity symptoms are monitored and maintained or improved  3/10/2025 1950 by Clive Carranza RN  Outcome: Not Progressing  3/10/2025 1931 by Clive Carranza RN  Outcome: Progressing     Problem: Discharge Planning  Goal: Discharge to home or other facility with appropriate resources  3/10/2025 1950 by Clive Carranza RN  Outcome: Not Progressing  3/10/2025 1931 by Clive Carranza RN  Outcome: Progressing     Problem: Safety - Adult  Goal: Free from fall injury  3/10/2025 1950 by Clive Carranza RN  Outcome: Not Progressing  3/10/2025 1931 by Clive Carranza RN  Outcome: Progressing

## 2025-03-11 LAB
ALBUMIN SERPL-MCNC: 3.2 G/DL (ref 3.5–5.2)
ALP SERPL-CCNC: 145 U/L (ref 35–104)
ALT SERPL-CCNC: 23 U/L (ref 0–32)
ANION GAP SERPL CALCULATED.3IONS-SCNC: 7 MMOL/L (ref 7–16)
AST SERPL-CCNC: 26 U/L (ref 0–31)
BASOPHILS # BLD: 0.01 K/UL (ref 0–0.2)
BASOPHILS NFR BLD: 0 % (ref 0–2)
BILIRUB SERPL-MCNC: 0.2 MG/DL (ref 0–1.2)
BUN SERPL-MCNC: 14 MG/DL (ref 6–20)
CALCIUM SERPL-MCNC: 8.8 MG/DL (ref 8.6–10.2)
CHLORIDE SERPL-SCNC: 112 MMOL/L (ref 98–107)
CO2 SERPL-SCNC: 25 MMOL/L (ref 22–29)
CREAT SERPL-MCNC: 0.9 MG/DL (ref 0.5–1)
EOSINOPHIL # BLD: 0.31 K/UL (ref 0.05–0.5)
EOSINOPHILS RELATIVE PERCENT: 8 % (ref 0–6)
ERYTHROCYTE [DISTWIDTH] IN BLOOD BY AUTOMATED COUNT: 14.8 % (ref 11.5–15)
GFR, ESTIMATED: 80 ML/MIN/1.73M2
GLUCOSE BLD-MCNC: 108 MG/DL (ref 74–99)
GLUCOSE BLD-MCNC: 139 MG/DL (ref 74–99)
GLUCOSE BLD-MCNC: 149 MG/DL (ref 74–99)
GLUCOSE BLD-MCNC: 205 MG/DL (ref 74–99)
GLUCOSE BLD-MCNC: 74 MG/DL (ref 74–99)
GLUCOSE SERPL-MCNC: 62 MG/DL (ref 74–99)
HCT VFR BLD AUTO: 27.6 % (ref 34–48)
HGB BLD-MCNC: 8.6 G/DL (ref 11.5–15.5)
IMM GRANULOCYTES # BLD AUTO: <0.03 K/UL (ref 0–0.58)
IMM GRANULOCYTES NFR BLD: 0 % (ref 0–5)
LYMPHOCYTES NFR BLD: 1.15 K/UL (ref 1.5–4)
LYMPHOCYTES RELATIVE PERCENT: 29 % (ref 20–42)
MAGNESIUM SERPL-MCNC: 2 MG/DL (ref 1.6–2.6)
MCH RBC QN AUTO: 29 PG (ref 26–35)
MCHC RBC AUTO-ENTMCNC: 31.2 G/DL (ref 32–34.5)
MCV RBC AUTO: 92.9 FL (ref 80–99.9)
MONOCYTES NFR BLD: 0.59 K/UL (ref 0.1–0.95)
MONOCYTES NFR BLD: 15 % (ref 2–12)
NEUTROPHILS NFR BLD: 48 % (ref 43–80)
NEUTS SEG NFR BLD: 1.91 K/UL (ref 1.8–7.3)
PHOSPHATE SERPL-MCNC: 3.8 MG/DL (ref 2.5–4.5)
PLATELET # BLD AUTO: 208 K/UL (ref 130–450)
PMV BLD AUTO: 10.2 FL (ref 7–12)
POTASSIUM SERPL-SCNC: 3.8 MMOL/L (ref 3.5–5)
PROT SERPL-MCNC: 5.8 G/DL (ref 6.4–8.3)
RBC # BLD AUTO: 2.97 M/UL (ref 3.5–5.5)
SODIUM SERPL-SCNC: 144 MMOL/L (ref 132–146)
WBC OTHER # BLD: 4 K/UL (ref 4.5–11.5)

## 2025-03-11 PROCEDURE — 6360000002 HC RX W HCPCS: Performed by: INTERNAL MEDICINE

## 2025-03-11 PROCEDURE — 6360000002 HC RX W HCPCS: Performed by: NURSE PRACTITIONER

## 2025-03-11 PROCEDURE — 6370000000 HC RX 637 (ALT 250 FOR IP): Performed by: INTERNAL MEDICINE

## 2025-03-11 PROCEDURE — 2580000003 HC RX 258: Performed by: INTERNAL MEDICINE

## 2025-03-11 PROCEDURE — 2500000003 HC RX 250 WO HCPCS: Performed by: INTERNAL MEDICINE

## 2025-03-11 PROCEDURE — 80053 COMPREHEN METABOLIC PANEL: CPT

## 2025-03-11 PROCEDURE — 6360000002 HC RX W HCPCS: Performed by: SPECIALIST

## 2025-03-11 PROCEDURE — 85025 COMPLETE CBC W/AUTO DIFF WBC: CPT

## 2025-03-11 PROCEDURE — 1200000000 HC SEMI PRIVATE

## 2025-03-11 PROCEDURE — 82962 GLUCOSE BLOOD TEST: CPT

## 2025-03-11 PROCEDURE — 2580000003 HC RX 258: Performed by: SPECIALIST

## 2025-03-11 PROCEDURE — 83735 ASSAY OF MAGNESIUM: CPT

## 2025-03-11 PROCEDURE — P9047 ALBUMIN (HUMAN), 25%, 50ML: HCPCS | Performed by: NURSE PRACTITIONER

## 2025-03-11 PROCEDURE — 84100 ASSAY OF PHOSPHORUS: CPT

## 2025-03-11 PROCEDURE — 6370000000 HC RX 637 (ALT 250 FOR IP): Performed by: SPECIALIST

## 2025-03-11 RX ORDER — ALBUMIN (HUMAN) 12.5 G/50ML
25 SOLUTION INTRAVENOUS ONCE
Status: COMPLETED | OUTPATIENT
Start: 2025-03-11 | End: 2025-03-11

## 2025-03-11 RX ORDER — INSULIN GLARGINE 100 [IU]/ML
35 INJECTION, SOLUTION SUBCUTANEOUS NIGHTLY
Status: DISCONTINUED | OUTPATIENT
Start: 2025-03-11 | End: 2025-03-13 | Stop reason: HOSPADM

## 2025-03-11 RX ADMIN — ACETAMINOPHEN 650 MG: 325 TABLET ORAL at 03:28

## 2025-03-11 RX ADMIN — CEFTAROLINE FOSAMIL 600 MG: 600 POWDER, FOR SOLUTION INTRAVENOUS at 10:41

## 2025-03-11 RX ADMIN — BRIMONIDINE TARTRATE 1 DROP: 2 SOLUTION OPHTHALMIC at 09:01

## 2025-03-11 RX ADMIN — PANTOPRAZOLE SODIUM 40 MG: 40 TABLET, DELAYED RELEASE ORAL at 06:14

## 2025-03-11 RX ADMIN — BUSPIRONE HYDROCHLORIDE 5 MG: 5 TABLET ORAL at 21:07

## 2025-03-11 RX ADMIN — VANCOMYCIN HYDROCHLORIDE 1000 MG: 1 INJECTION, POWDER, LYOPHILIZED, FOR SOLUTION INTRAVENOUS at 16:38

## 2025-03-11 RX ADMIN — ENOXAPARIN SODIUM 40 MG: 100 INJECTION SUBCUTANEOUS at 09:04

## 2025-03-11 RX ADMIN — PRAMIPEXOLE DIHYDROCHLORIDE 0.12 MG: 0.12 TABLET ORAL at 08:58

## 2025-03-11 RX ADMIN — SUCRALFATE 1 G: 1 TABLET ORAL at 21:07

## 2025-03-11 RX ADMIN — CEFTAROLINE FOSAMIL 600 MG: 600 POWDER, FOR SOLUTION INTRAVENOUS at 21:18

## 2025-03-11 RX ADMIN — TIMOLOL MALEATE 1 DROP: 5 SOLUTION OPHTHALMIC at 09:01

## 2025-03-11 RX ADMIN — SUCRALFATE 1 G: 1 TABLET ORAL at 08:58

## 2025-03-11 RX ADMIN — SUCRALFATE 1 G: 1 TABLET ORAL at 13:06

## 2025-03-11 RX ADMIN — METOPROLOL SUCCINATE 12.5 MG: 25 TABLET, EXTENDED RELEASE ORAL at 08:58

## 2025-03-11 RX ADMIN — LATANOPROST 1 DROP: 50 SOLUTION OPHTHALMIC at 21:07

## 2025-03-11 RX ADMIN — ROSUVASTATIN CALCIUM 5 MG: 10 TABLET, FILM COATED ORAL at 09:00

## 2025-03-11 RX ADMIN — ANASTROZOLE 1 MG: 1 TABLET ORAL at 09:01

## 2025-03-11 RX ADMIN — SODIUM CHLORIDE, PRESERVATIVE FREE 10 ML: 5 INJECTION INTRAVENOUS at 09:12

## 2025-03-11 RX ADMIN — INSULIN LISPRO 2 UNITS: 100 INJECTION, SOLUTION INTRAVENOUS; SUBCUTANEOUS at 10:41

## 2025-03-11 RX ADMIN — TIMOLOL MALEATE 1 DROP: 5 SOLUTION OPHTHALMIC at 21:07

## 2025-03-11 RX ADMIN — PRAMIPEXOLE DIHYDROCHLORIDE 0.12 MG: 0.12 TABLET ORAL at 21:08

## 2025-03-11 RX ADMIN — BRIMONIDINE TARTRATE 1 DROP: 2 SOLUTION OPHTHALMIC at 21:07

## 2025-03-11 RX ADMIN — VANCOMYCIN HYDROCHLORIDE 1000 MG: 1 INJECTION, POWDER, LYOPHILIZED, FOR SOLUTION INTRAVENOUS at 03:28

## 2025-03-11 RX ADMIN — SUCRALFATE 1 G: 1 TABLET ORAL at 16:31

## 2025-03-11 RX ADMIN — GABAPENTIN 300 MG: 300 CAPSULE ORAL at 21:07

## 2025-03-11 RX ADMIN — Medication 1 CAPSULE: at 21:07

## 2025-03-11 RX ADMIN — SODIUM CHLORIDE, PRESERVATIVE FREE 10 ML: 5 INJECTION INTRAVENOUS at 21:08

## 2025-03-11 RX ADMIN — Medication 1 CAPSULE: at 09:01

## 2025-03-11 RX ADMIN — ALBUMIN (HUMAN) 25 G: 0.25 INJECTION, SOLUTION INTRAVENOUS at 09:10

## 2025-03-11 ASSESSMENT — PAIN DESCRIPTION - LOCATION: LOCATION: BACK

## 2025-03-11 ASSESSMENT — PAIN SCALES - GENERAL
PAINLEVEL_OUTOF10: 5
PAINLEVEL_OUTOF10: 3

## 2025-03-11 ASSESSMENT — PAIN DESCRIPTION - DESCRIPTORS: DESCRIPTORS: ACHING

## 2025-03-11 ASSESSMENT — PAIN DESCRIPTION - ORIENTATION: ORIENTATION: LEFT;UPPER

## 2025-03-11 NOTE — PLAN OF CARE
Problem: Chronic Conditions and Co-morbidities  Goal: Patient's chronic conditions and co-morbidity symptoms are monitored and maintained or improved  3/10/2025 1950 by Clive Carranza RN  Outcome: Not Progressing  3/10/2025 1931 by Clive Carranza RN  Outcome: Progressing     Problem: Discharge Planning  Goal: Discharge to home or other facility with appropriate resources  3/10/2025 1950 by Clive Carranza RN  Outcome: Not Progressing  3/10/2025 1931 by Clive Carranza RN  Outcome: Progressing     Problem: Safety - Adult  Goal: Free from fall injury  3/10/2025 1950 by Clive Carranza RN  Outcome: Not Progressing  3/10/2025 1931 by Clive Carranza RN  Outcome: Progressing     Problem: Chronic Conditions and Co-morbidities  Goal: Patient's chronic conditions and co-morbidity symptoms are monitored and maintained or improved  3/10/2025 1950 by Clive Carranza RN  Outcome: Not Progressing  3/10/2025 1931 by Clive Carranza RN  Outcome: Progressing     Problem: Discharge Planning  Goal: Discharge to home or other facility with appropriate resources  3/10/2025 1950 by Clive Carranza RN  Outcome: Not Progressing  3/10/2025 1931 by Clive Carranza RN  Outcome: Progressing     Problem: Safety - Adult  Goal: Free from fall injury  3/10/2025 1950 by Clive Carranza RN  Outcome: Not Progressing  3/10/2025 1931 by Clive Carranza RN  Outcome: Progressing

## 2025-03-12 LAB
ALBUMIN SERPL-MCNC: 3.5 G/DL (ref 3.5–5.2)
ALP SERPL-CCNC: 153 U/L (ref 35–104)
ALT SERPL-CCNC: 30 U/L (ref 0–32)
ANION GAP SERPL CALCULATED.3IONS-SCNC: 10 MMOL/L (ref 7–16)
AST SERPL-CCNC: 41 U/L (ref 0–31)
BASOPHILS # BLD: 0.01 K/UL (ref 0–0.2)
BASOPHILS NFR BLD: 0 % (ref 0–2)
BILIRUB SERPL-MCNC: 0.2 MG/DL (ref 0–1.2)
BUN SERPL-MCNC: 12 MG/DL (ref 6–20)
CALCIUM SERPL-MCNC: 9.2 MG/DL (ref 8.6–10.2)
CHLORIDE SERPL-SCNC: 107 MMOL/L (ref 98–107)
CO2 SERPL-SCNC: 25 MMOL/L (ref 22–29)
CREAT SERPL-MCNC: 0.9 MG/DL (ref 0.5–1)
DATE LAST DOSE: NORMAL
EOSINOPHIL # BLD: 0.31 K/UL (ref 0.05–0.5)
EOSINOPHILS RELATIVE PERCENT: 8 % (ref 0–6)
ERYTHROCYTE [DISTWIDTH] IN BLOOD BY AUTOMATED COUNT: 14.6 % (ref 11.5–15)
GFR, ESTIMATED: 75 ML/MIN/1.73M2
GLUCOSE BLD-MCNC: 153 MG/DL (ref 74–99)
GLUCOSE BLD-MCNC: 166 MG/DL (ref 74–99)
GLUCOSE BLD-MCNC: 240 MG/DL (ref 74–99)
GLUCOSE BLD-MCNC: 81 MG/DL (ref 74–99)
GLUCOSE SERPL-MCNC: 93 MG/DL (ref 74–99)
HCT VFR BLD AUTO: 26.7 % (ref 34–48)
HGB BLD-MCNC: 8.3 G/DL (ref 11.5–15.5)
IMM GRANULOCYTES # BLD AUTO: <0.03 K/UL (ref 0–0.58)
IMM GRANULOCYTES NFR BLD: 0 % (ref 0–5)
LYMPHOCYTES NFR BLD: 1.31 K/UL (ref 1.5–4)
LYMPHOCYTES RELATIVE PERCENT: 35 % (ref 20–42)
MAGNESIUM SERPL-MCNC: 2 MG/DL (ref 1.6–2.6)
MCH RBC QN AUTO: 29.2 PG (ref 26–35)
MCHC RBC AUTO-ENTMCNC: 31.1 G/DL (ref 32–34.5)
MCV RBC AUTO: 94 FL (ref 80–99.9)
MONOCYTES NFR BLD: 0.56 K/UL (ref 0.1–0.95)
MONOCYTES NFR BLD: 15 % (ref 2–12)
NEUTROPHILS NFR BLD: 41 % (ref 43–80)
NEUTS SEG NFR BLD: 1.5 K/UL (ref 1.8–7.3)
PHOSPHATE SERPL-MCNC: 3.6 MG/DL (ref 2.5–4.5)
PLATELET # BLD AUTO: 200 K/UL (ref 130–450)
PMV BLD AUTO: 9.8 FL (ref 7–12)
POTASSIUM SERPL-SCNC: 4 MMOL/L (ref 3.5–5)
PROT SERPL-MCNC: 6.2 G/DL (ref 6.4–8.3)
RBC # BLD AUTO: 2.84 M/UL (ref 3.5–5.5)
SODIUM SERPL-SCNC: 142 MMOL/L (ref 132–146)
TME LAST DOSE: NORMAL H
VANCOMYCIN DOSE: NORMAL MG
VANCOMYCIN TROUGH SERPL-MCNC: 13.8 UG/ML (ref 5–16)
WBC OTHER # BLD: 3.7 K/UL (ref 4.5–11.5)

## 2025-03-12 PROCEDURE — 80202 ASSAY OF VANCOMYCIN: CPT

## 2025-03-12 PROCEDURE — 85025 COMPLETE CBC W/AUTO DIFF WBC: CPT

## 2025-03-12 PROCEDURE — 6360000002 HC RX W HCPCS: Performed by: SPECIALIST

## 2025-03-12 PROCEDURE — 82962 GLUCOSE BLOOD TEST: CPT

## 2025-03-12 PROCEDURE — 2580000003 HC RX 258: Performed by: SPECIALIST

## 2025-03-12 PROCEDURE — 6370000000 HC RX 637 (ALT 250 FOR IP): Performed by: INTERNAL MEDICINE

## 2025-03-12 PROCEDURE — 6370000000 HC RX 637 (ALT 250 FOR IP): Performed by: SPECIALIST

## 2025-03-12 PROCEDURE — 2500000003 HC RX 250 WO HCPCS: Performed by: INTERNAL MEDICINE

## 2025-03-12 PROCEDURE — 6360000002 HC RX W HCPCS: Performed by: INTERNAL MEDICINE

## 2025-03-12 PROCEDURE — 2580000003 HC RX 258: Performed by: INTERNAL MEDICINE

## 2025-03-12 PROCEDURE — 1200000000 HC SEMI PRIVATE

## 2025-03-12 PROCEDURE — 84100 ASSAY OF PHOSPHORUS: CPT

## 2025-03-12 PROCEDURE — 80053 COMPREHEN METABOLIC PANEL: CPT

## 2025-03-12 PROCEDURE — 83735 ASSAY OF MAGNESIUM: CPT

## 2025-03-12 RX ORDER — SUCRALFATE 1 G/1
1 TABLET ORAL 4 TIMES DAILY
Qty: 120 TABLET | Refills: 0 | Status: SHIPPED | OUTPATIENT
Start: 2025-03-12

## 2025-03-12 RX ORDER — ACETAMINOPHEN 325 MG/1
350 TABLET ORAL EVERY 6 HOURS PRN
COMMUNITY
Start: 2025-03-12 | End: 2025-04-11

## 2025-03-12 RX ORDER — INSULIN GLARGINE 100 [IU]/ML
35 INJECTION, SOLUTION SUBCUTANEOUS NIGHTLY
Qty: 45 ML | Refills: 0
Start: 2025-03-12

## 2025-03-12 RX ORDER — HYDROCODONE BITARTRATE AND ACETAMINOPHEN 5; 325 MG/1; MG/1
1 TABLET ORAL EVERY 6 HOURS PRN
Qty: 20 TABLET | Refills: 0 | Status: SHIPPED | OUTPATIENT
Start: 2025-03-12 | End: 2025-03-17

## 2025-03-12 RX ORDER — LACTOBACILLUS RHAMNOSUS GG 10B CELL
1 CAPSULE ORAL EVERY 12 HOURS
Qty: 60 CAPSULE | Refills: 0 | Status: SHIPPED | OUTPATIENT
Start: 2025-03-12

## 2025-03-12 RX ADMIN — INSULIN LISPRO 2 UNITS: 100 INJECTION, SOLUTION INTRAVENOUS; SUBCUTANEOUS at 16:13

## 2025-03-12 RX ADMIN — TIMOLOL MALEATE 1 DROP: 5 SOLUTION OPHTHALMIC at 09:14

## 2025-03-12 RX ADMIN — Medication 1 CAPSULE: at 09:11

## 2025-03-12 RX ADMIN — Medication 1 CAPSULE: at 21:31

## 2025-03-12 RX ADMIN — SUCRALFATE 1 G: 1 TABLET ORAL at 17:12

## 2025-03-12 RX ADMIN — GABAPENTIN 300 MG: 300 CAPSULE ORAL at 21:30

## 2025-03-12 RX ADMIN — ANASTROZOLE 1 MG: 1 TABLET ORAL at 09:11

## 2025-03-12 RX ADMIN — ROSUVASTATIN CALCIUM 5 MG: 10 TABLET, FILM COATED ORAL at 09:07

## 2025-03-12 RX ADMIN — ENOXAPARIN SODIUM 40 MG: 100 INJECTION SUBCUTANEOUS at 09:15

## 2025-03-12 RX ADMIN — LATANOPROST 1 DROP: 50 SOLUTION OPHTHALMIC at 21:29

## 2025-03-12 RX ADMIN — VANCOMYCIN HYDROCHLORIDE 1000 MG: 1 INJECTION, POWDER, LYOPHILIZED, FOR SOLUTION INTRAVENOUS at 14:45

## 2025-03-12 RX ADMIN — PRAMIPEXOLE DIHYDROCHLORIDE 0.12 MG: 0.12 TABLET ORAL at 09:08

## 2025-03-12 RX ADMIN — SUCRALFATE 1 G: 1 TABLET ORAL at 13:22

## 2025-03-12 RX ADMIN — VANCOMYCIN HYDROCHLORIDE 1000 MG: 1 INJECTION, POWDER, LYOPHILIZED, FOR SOLUTION INTRAVENOUS at 04:16

## 2025-03-12 RX ADMIN — SODIUM CHLORIDE, PRESERVATIVE FREE 10 ML: 5 INJECTION INTRAVENOUS at 09:13

## 2025-03-12 RX ADMIN — BRIMONIDINE TARTRATE 1 DROP: 2 SOLUTION OPHTHALMIC at 21:29

## 2025-03-12 RX ADMIN — SUCRALFATE 1 G: 1 TABLET ORAL at 09:11

## 2025-03-12 RX ADMIN — SUCRALFATE 1 G: 1 TABLET ORAL at 21:30

## 2025-03-12 RX ADMIN — PRAMIPEXOLE DIHYDROCHLORIDE 0.12 MG: 0.12 TABLET ORAL at 21:28

## 2025-03-12 RX ADMIN — CEFTAROLINE FOSAMIL 600 MG: 600 POWDER, FOR SOLUTION INTRAVENOUS at 10:12

## 2025-03-12 RX ADMIN — METOPROLOL SUCCINATE 12.5 MG: 25 TABLET, EXTENDED RELEASE ORAL at 09:08

## 2025-03-12 RX ADMIN — BRIMONIDINE TARTRATE 1 DROP: 2 SOLUTION OPHTHALMIC at 09:14

## 2025-03-12 RX ADMIN — PANTOPRAZOLE SODIUM 40 MG: 40 TABLET, DELAYED RELEASE ORAL at 05:23

## 2025-03-12 RX ADMIN — DAPTOMYCIN 400 MG: 500 INJECTION, POWDER, LYOPHILIZED, FOR SOLUTION INTRAVENOUS at 17:13

## 2025-03-12 RX ADMIN — TIMOLOL MALEATE 1 DROP: 5 SOLUTION OPHTHALMIC at 21:35

## 2025-03-12 RX ADMIN — BUSPIRONE HYDROCHLORIDE 5 MG: 5 TABLET ORAL at 21:30

## 2025-03-12 RX ADMIN — SODIUM CHLORIDE, PRESERVATIVE FREE 10 ML: 5 INJECTION INTRAVENOUS at 21:26

## 2025-03-12 NOTE — DISCHARGE SUMMARY
Internal Medicine Progress Note     [facility]     Glenn Frey D.O., JAYLENEOCandelariaI.                         Mau Valdez D.O., JAMIE Huizar, MSN, APRN, NP-C  Alex Don, MSN, APRN-CNP  Carlo Lopes, MSN, APRN, NP-C  Marii Pacheco, MSN, APRN-CNP  Jessie Scott, MSN, APRN, NP-C       Internal Medicine  Discharge Summary    NAME: Jenny Lilly  :  1970  MRN:  [id number]  PCP:Charlene Velez DO  ADMITTED: 3/8/2025      DISCHARGED: 3/12/25    ADMITTING PHYSICIAN: Glenn Frey DO    CONSULTANT(S):   IP CONSULT TO ORTHOPEDIC SURGERY  IP CONSULT TO INFECTIOUS DISEASES  IP CONSULT TO PHARMACY     ADMITTING DIAGNOSIS:   Septic joint (HCC) [M00.9]  Fluid collection at surgical site, initial encounter [T88.8XXA]     DISCHARGE DIAGNOSES:   Postoperative right hip seroma in the setting of complicated right hip arthroplasty status post irrigation debridement and head/liner exchange on 2025 status post IR guided right hip drain placement March 10, 2025, fluid cultures positive for Staphylococcus epidermidis  Chronic kidney disease stage IIIa  Anemia of chronic disease  Essential hypertension  Hyperlipidemia  Insulin-dependent diabetes mellitus type 2, with peripheral neuropathy  History of left breast cancer on Arimidex  Psoriasis  Morbid obesity with BMI 40.62 kg meter squared with obstructive sleep apnea on CPAP    BRIEF HISTORY OF PRESENT ILLNESS:   Patient is 55-year-old female presented to the ED due to drainage from hip.  Patient recently discharged from the hospital earlier in February after being treated for cellulitis/septic joint.  She had wound VAC placed. This was removed recently after her discharge.  Additionally she had her staples removed on the .  She had been doing fine while receiving Teflaro twice a day as an outpatient.  However she noticed drainage yesterday.  She also noticed increased

## 2025-03-13 VITALS
HEART RATE: 76 BPM | TEMPERATURE: 97.9 F | WEIGHT: 190.1 LBS | DIASTOLIC BLOOD PRESSURE: 56 MMHG | HEIGHT: 59 IN | BODY MASS INDEX: 38.32 KG/M2 | SYSTOLIC BLOOD PRESSURE: 99 MMHG | OXYGEN SATURATION: 96 % | RESPIRATION RATE: 16 BRPM

## 2025-03-13 LAB
ALBUMIN SERPL-MCNC: 3.4 G/DL (ref 3.5–5.2)
ALP SERPL-CCNC: 176 U/L (ref 35–104)
ALT SERPL-CCNC: 39 U/L (ref 0–32)
ANION GAP SERPL CALCULATED.3IONS-SCNC: 11 MMOL/L (ref 7–16)
AST SERPL-CCNC: 45 U/L (ref 0–31)
BASOPHILS # BLD: 0.02 K/UL (ref 0–0.2)
BASOPHILS NFR BLD: 1 % (ref 0–2)
BILIRUB SERPL-MCNC: 0.2 MG/DL (ref 0–1.2)
BUN SERPL-MCNC: 16 MG/DL (ref 6–20)
CALCIUM SERPL-MCNC: 9.3 MG/DL (ref 8.6–10.2)
CHLORIDE SERPL-SCNC: 111 MMOL/L (ref 98–107)
CK SERPL-CCNC: 68 U/L (ref 20–180)
CO2 SERPL-SCNC: 23 MMOL/L (ref 22–29)
CREAT SERPL-MCNC: 1.1 MG/DL (ref 0.5–1)
EOSINOPHIL # BLD: 0.29 K/UL (ref 0.05–0.5)
EOSINOPHILS RELATIVE PERCENT: 7 % (ref 0–6)
ERYTHROCYTE [DISTWIDTH] IN BLOOD BY AUTOMATED COUNT: 14.8 % (ref 11.5–15)
GFR, ESTIMATED: 60 ML/MIN/1.73M2
GLUCOSE BLD-MCNC: 102 MG/DL (ref 74–99)
GLUCOSE BLD-MCNC: 163 MG/DL (ref 74–99)
GLUCOSE SERPL-MCNC: 86 MG/DL (ref 74–99)
HCT VFR BLD AUTO: 26.8 % (ref 34–48)
HGB BLD-MCNC: 8.2 G/DL (ref 11.5–15.5)
IMM GRANULOCYTES # BLD AUTO: <0.03 K/UL (ref 0–0.58)
IMM GRANULOCYTES NFR BLD: 0 % (ref 0–5)
LYMPHOCYTES NFR BLD: 1.56 K/UL (ref 1.5–4)
LYMPHOCYTES RELATIVE PERCENT: 35 % (ref 20–42)
MAGNESIUM SERPL-MCNC: 2.1 MG/DL (ref 1.6–2.6)
MCH RBC QN AUTO: 28.2 PG (ref 26–35)
MCHC RBC AUTO-ENTMCNC: 30.6 G/DL (ref 32–34.5)
MCV RBC AUTO: 92.1 FL (ref 80–99.9)
MONOCYTES NFR BLD: 0.75 K/UL (ref 0.1–0.95)
MONOCYTES NFR BLD: 17 % (ref 2–12)
NEUTROPHILS NFR BLD: 40 % (ref 43–80)
NEUTS SEG NFR BLD: 1.78 K/UL (ref 1.8–7.3)
PHOSPHATE SERPL-MCNC: 3.9 MG/DL (ref 2.5–4.5)
PLATELET # BLD AUTO: 209 K/UL (ref 130–450)
PMV BLD AUTO: 10.7 FL (ref 7–12)
POTASSIUM SERPL-SCNC: 4 MMOL/L (ref 3.5–5)
PROT SERPL-MCNC: 6.2 G/DL (ref 6.4–8.3)
RBC # BLD AUTO: 2.91 M/UL (ref 3.5–5.5)
SODIUM SERPL-SCNC: 145 MMOL/L (ref 132–146)
WBC OTHER # BLD: 4.4 K/UL (ref 4.5–11.5)

## 2025-03-13 PROCEDURE — 6360000002 HC RX W HCPCS: Performed by: INTERNAL MEDICINE

## 2025-03-13 PROCEDURE — 85025 COMPLETE CBC W/AUTO DIFF WBC: CPT

## 2025-03-13 PROCEDURE — 83735 ASSAY OF MAGNESIUM: CPT

## 2025-03-13 PROCEDURE — 82550 ASSAY OF CK (CPK): CPT

## 2025-03-13 PROCEDURE — 84100 ASSAY OF PHOSPHORUS: CPT

## 2025-03-13 PROCEDURE — 6370000000 HC RX 637 (ALT 250 FOR IP): Performed by: INTERNAL MEDICINE

## 2025-03-13 PROCEDURE — 6370000000 HC RX 637 (ALT 250 FOR IP): Performed by: SPECIALIST

## 2025-03-13 PROCEDURE — 2580000003 HC RX 258: Performed by: SPECIALIST

## 2025-03-13 PROCEDURE — 80053 COMPREHEN METABOLIC PANEL: CPT

## 2025-03-13 PROCEDURE — 82962 GLUCOSE BLOOD TEST: CPT

## 2025-03-13 PROCEDURE — 36415 COLL VENOUS BLD VENIPUNCTURE: CPT

## 2025-03-13 PROCEDURE — 6360000002 HC RX W HCPCS: Performed by: SPECIALIST

## 2025-03-13 PROCEDURE — 2500000003 HC RX 250 WO HCPCS: Performed by: INTERNAL MEDICINE

## 2025-03-13 RX ADMIN — BRIMONIDINE TARTRATE 1 DROP: 2 SOLUTION OPHTHALMIC at 10:27

## 2025-03-13 RX ADMIN — Medication 1 CAPSULE: at 10:26

## 2025-03-13 RX ADMIN — SUCRALFATE 1 G: 1 TABLET ORAL at 10:26

## 2025-03-13 RX ADMIN — TIMOLOL MALEATE 1 DROP: 5 SOLUTION OPHTHALMIC at 10:27

## 2025-03-13 RX ADMIN — PRAMIPEXOLE DIHYDROCHLORIDE 0.12 MG: 0.12 TABLET ORAL at 10:25

## 2025-03-13 RX ADMIN — METOPROLOL SUCCINATE 12.5 MG: 25 TABLET, EXTENDED RELEASE ORAL at 10:25

## 2025-03-13 RX ADMIN — ANASTROZOLE 1 MG: 1 TABLET ORAL at 10:25

## 2025-03-13 RX ADMIN — SODIUM CHLORIDE, PRESERVATIVE FREE 10 ML: 5 INJECTION INTRAVENOUS at 10:26

## 2025-03-13 RX ADMIN — ENOXAPARIN SODIUM 40 MG: 100 INJECTION SUBCUTANEOUS at 10:26

## 2025-03-13 RX ADMIN — SUCRALFATE 1 G: 1 TABLET ORAL at 13:42

## 2025-03-13 RX ADMIN — DAPTOMYCIN 400 MG: 500 INJECTION, POWDER, LYOPHILIZED, FOR SOLUTION INTRAVENOUS at 13:43

## 2025-03-13 RX ADMIN — PANTOPRAZOLE SODIUM 40 MG: 40 TABLET, DELAYED RELEASE ORAL at 06:16

## 2025-03-13 NOTE — DISCHARGE INSTRUCTIONS
Your information:  Name: Jenny Lilly  : 1970    Your instructions:    Discharge home with PICC line and drain.  Home care will call you before their visit tomorrow.  Follow up with primary care provider and specialists as directed.    Drain information:  Call your doctor now or seek immediate medical care if:    You have signs of worsening infection, such as:  Increased pain, swelling, warmth, or redness.  Red streaks leading from the infected skin.  Pus draining from the wound.  A fever.   Watch closely for changes in your health, and be sure to contact your doctor if:    You do not get better as expected.     PICC line information:  Be careful wearing jewelry, such as necklaces, that can catch on the catheter.  If the catheter breaks, follow the instructions your doctor gave you. If you have no instructions, clamp or tie off the catheter. Then see a doctor as soon as possible.  To help prevent infection, take a shower instead of a bath. Do not go swimming with the catheter.  Try to keep the area dry. When you shower, cover the area with waterproof material, such as plastic wrap.  Never touch the open end of the catheter if the cap is off.  Never use scissors, knives, pins, or other sharp objects near the catheter or other tubing.  If your catheter has a clamp, keep it clamped when you are not using it.  Fasten or tape the catheter to your body to prevent pulling or dangling.  Avoid clothing that rubs or pulls on your catheter.  Avoid bending or crimping your catheter.  Always wash your hands before you touch your catheter.  Wear loose clothing over the catheter for the first 10 to 14 days. When getting dressed, be careful not to pull on the catheter.    Signs and symptoms to look out for:  Call your doctor now or seek immediate medical care if:    You have worse symptoms of infection, such as:  Increased pain, swelling, warmth, or redness.  Red streaks leading from the area.  Pus draining from the

## 2025-03-13 NOTE — CARE COORDINATION
3-13-Cm note: [facility] has pt's IV Dapto RX, they are planning on sending it out to pt's home for start tomorrow, [facility] has her set up to resume care tomorrow. Pt is calling for a ride home. Electronically signed by Paulina Corrales RN on 3/13/2025 at 2:50 PM    
CM note: IR drain placed 2 days ago, pt will need teaching on care prior to discharge.  Pt active with [facility] infusion pharmacy for IV Teflaro BID.  IF nothing changes will just need a resumption of care order.  Discharge order written by attending, awaiting final antibiotics by ID.  
CM note: attempted to meet with patient for transition of care planning but patient was off the unit for her procedure.  Will meet with patient tomorrow.  [facility] of [facility].  Pt is being seen by ortho and ID, on IV Teflaro/Vanc.  
No  Plans to Return to Present Housing: Yes    Potential Assistance needed at discharge: Home Care            Potential DME:    Patient expects to discharge to: Apartment  Plan for transportation at discharge: Family    Financial    Payor: [employer] / Plan: [employer] [place] / Product Type: *No Product type* /     Does insurance require precert for SNF: Yes    Potential assistance Purchasing Medications:    Meds-to-Beds request: Yes      [id number] - [place], [place] - [address] - P [phone] - F [phone]  [address] [place] [address]  Phone: [phone] Fax: [phone]      Notes:    Factors facilitating achievement of predicted outcomes: Motivated, Cooperative, Pleasant, and Has needed Durable Medical Equipment at home    Barriers to discharge: Medication managment    Additional Case Management Notes: Met with patient for transition of care planning.  Pt had a drain placed in IR yesterday to her right hip.  Following for final antibiotics, if pt can discharge on oral she will have no needs for discharge.  IF she does need IV she has done them before and has hx of [facility].  Will make a tentative discharge.    The Plan for Transition of Care is related to the following treatment goals of Septic joint (HCC) [M00.9]  Fluid collection at surgical site, initial encounter [T88.8XXA]    IF APPLICABLE: The Patient and/or patient representative Jenny and her family were provided with a choice of provider and agrees with the discharge plan. Freedom of choice list with basic dialogue that supports the patient's individualized plan of care/goals and shares the quality data associated with the providers was provided to: Patient    The Patient and/or Patient Representative Agree with the Discharge Plan? No    Mona Pierson RN  Case Management Department  Ph: [phone]

## 2025-03-13 NOTE — PROGRESS NOTES
Date:  03/11/25   Hospital Day: 4  PT Name:  Jenny Lilly  MRN:   [id number]  Primary Care Physician: Charlene Velez DO  Requesting physician:   Glenn Frey DO  Reason for follow up: antibiotics/infection  Chief Complaint:   Chief Complaint   Patient presents with    Post-op Problem     R Hip surgery on 1/13 staples removed 1/28 with seepage post removal. Hip surgery again on 2/4. Staples removed on 2/24 with seepage from site starting yesterday.        Assessment  Jenny Lilly is a 55 y.o. year old female who presented on 3/8/2025 and is being treated for Septic joint (HCC)   Chief Complaint   Patient presents with    Post-op Problem     R Hip surgery on 1/13 staples removed 1/28 with seepage post removal. Hip surgery again on 2/4. Staples removed on 2/24 with seepage from site starting yesterday.      Pt was admitted with   1. Fluid collection at surgical site, initial encounter  Leukopenia follow prob from cefatroline   Right hip pji cx MSSA  Right hip fluid collection serosang   It to drain check cx  pending   Prelim wound cx CONS   Cont current atbx   Added vanco   Esr70 /crp3  Procal 0.05    Plan    Antimicrobials:   vancomycin (VANCOCIN) 1,000 mg in sodium chloride 0.9 % 250 mL IVPB ([id number]), Q12  ceftaroline fosamil (TEFLARO) 600 mg in sodium chloride 0.9 % 50 mL IVPB, Q12H    Expected end date: TBD     Pertinent Micro:   Wound cx leg CONS       Lines: Picc    Wounds:  none   Disposition:  TBD  Watch for cdiff colitis/clabsi-line infection  Monitor labs  Continue current therapy.  Please see orders for further management and care.    Subjective/HPI:  Jenny was seen and examined at bedside today for atbx infection     Overnight:  DOS  03/11/25  On side of bed nad   3/10   Feels well for aspiration today   There are no adverse drug reactions.  All tests were reviewed.   No  family present during my examination.    ROS:   Pt denies  fevers/chills  Pt denies nausea/vomiting/diarrhea  Pt 
    Date:  03/13/25   Hospital Day: 6  PT Name:  Jenny Lilly  MRN:   [id number]  Primary Care Physician: Charlene Velez DO  Requesting physician:   Glenn Frey DO  Reason for follow up: antibiotics/infection  Chief Complaint:   Chief Complaint   Patient presents with    Post-op Problem     R Hip surgery on 1/13 staples removed 1/28 with seepage post removal. Hip surgery again on 2/4. Staples removed on 2/24 with seepage from site starting yesterday.        Assessment  Jenny Lilly is a 55 y.o. year old female who presented on 3/8/2025 and is being treated for Septic joint (HCC)   Chief Complaint   Patient presents with    Post-op Problem     R Hip surgery on 1/13 staples removed 1/28 with seepage post removal. Hip surgery again on 2/4. Staples removed on 2/24 with seepage from site starting yesterday.      Pt was admitted with   1. Fluid collection at surgical site, initial encounter    2. Post-op pain  - HYDROcodone-acetaminophen (NORCO) 5-325 MG per tablet; Take 1 tablet by mouth every 6 hours as needed for Pain for up to 5 days. Use caution as may cause drowsiness or sedation. Do not operate machinery, take while working, drive or drink alcohol while taking. Max Daily Amount: 4 tablets  Dispense: 20 tablet; Refill: 0    3. Essential hypertension  Leukopenia follow prob from cefatroline WILL STOP  ON VANCO   D/C WITH DAPTO   MED REC   Right hip pji cx MSSA  Right hip fluid collection serosang   IR to drain check cx  pending   Prelim wound cx CONS     Esr70 /crp3  Procal 0.05    Plan    Antimicrobials:   DAPTOmycin (CUBICIN) 400 mg in sodium chloride (PF) 0.9 % 8 mL IV syringe, Q24H     Expected end date: 2 WEEKS     Pertinent Micro:   Wound cx leg CONS       Lines: Picc    Wounds:  none   Disposition:  CAN D/C with daptomycin   Med rec  F/u  2weeks   Watch for cdiff colitis/clabsi-line infection  Monitor labs  Continue current therapy.  Please see orders for further management and 
4 Eyes Skin Assessment     NAME:  Jenny Lilly  YOB: 1970  MEDICAL RECORD NUMBER:  [id number]    The patient is being assessed for  Admission    I agree that at least one RN has performed a thorough Head to Toe Skin Assessment on the patient. ALL assessment sites listed below have been assessed.      Areas assessed by both nurses:    Head, Face, Ears, Shoulders, Back, Chest, Arms, Elbows, Hands, Sacrum. Buttock, Coccyx, Ischium, and Legs. Feet and Heels        Does the Patient have a Wound? No noted wound(s)       Benigno Prevention initiated by RN: No  Wound Care Orders initiated by RN: No    Pressure Injury (Stage 3,4, Unstageable, DTI, NWPT, and Complex wounds) if present, place Wound referral order by RN under : No    New Ostomies, if present place, Ostomy referral order under : No     Nurse 1 eSignature: Electronically signed by Maya Taylor RN on 3/9/25 at 6:55 AM EDT    **SHARE this note so that the co-signing nurse can place an eSignature**    Nurse 2 eSignature: {Esignature:[id number]}    
CLINICAL PHARMACY NOTE: MEDS TO BEDS    Total # of Prescriptions Filled: 2   The following medications were delivered to the patient:  Norco 5-325 mg  Sucralfate 1 gm    Additional Documentation:    Shahlalle was not covered by insurance.  
Date: 3/11/2025    Time: 10:39 PM    Patient Placed On BIPAP/CPAP/ Non-Invasive Ventilation?  No    If no must comment    Comments: Patient refuses to wear BIPAP at this time        Smita Perez RCP  
Department of Orthopedic Surgery  Resident Progress Note    Patient seen and examined.  Did have right hip aspiration per interventional radiology with drain placement.  Pain well controlled. No new complaints.  Denies chest pain, shortness of breath, dizziness/lightheadedness.  Denies fevers or chills.    VITALS:  BP (!) 103/50   Pulse 78   Temp 97.5 °F (36.4 °C) (Infrared)   Resp 16   Ht 1.499 m (4' 11\")   Wt 86.2 kg (190 lb 1.6 oz)   LMP  (LMP Unknown)   SpO2 98%   BMI 38.40 kg/m²     General: alert and oriented to person, place and time, well-developed and well-nourished, in no acute distress    MUSCULOSKELETAL:   right lower extremity:  Approximately 50 cc of red serosanguineous in drain placed by IR  Compartments soft and compressible  +PF/DF/EHL  +2/4 DP & PT pulses, Brisk Cap refill, Toes warm and perfused  Distal sensation grossly intact to Peroneals, Sural, Saphenous, and tibial nrs    CBC:   Lab Results   Component Value Date/Time    WBC 4.2 03/10/2025 11:49 AM    HGB 9.0 03/10/2025 11:49 AM    HCT 29.6 03/10/2025 11:49 AM     03/10/2025 11:49 AM     PT/INR:    Lab Results   Component Value Date/Time    PROTIME 10.9 03/10/2025 11:49 AM    INR 1.0 03/10/2025 11:49 AM       Awaiting IR aspiration culture results, currently pending.    ASSESSMENT  Right hip seroma    PLAN      Continue physical therapy and protocol: WBAT - RLE  Continue antibiotic coverage per medical team/ID  Deep venous thrombosis prophylaxis -per admitting team, early mobilization  PT/OT  Pain Control  Monitor H&H.  8.6 this morning  Follow cultures from IR aspirate  D/C Plan:  Home Health      
Department of Orthopedic Surgery  Resident Progress Note    Patient seen and examined.  Has continued to have red serosanguineous drainage from drain placed by IR.  Pain well controlled, has been up and ambulating.  No new complaints.  Denies chest pain, shortness of breath, dizziness/lightheadedness.  Denies fevers or chills.    VITALS:  BP (!) 126/45   Pulse 75   Temp 98.1 °F (36.7 °C) (Oral)   Resp 16   Ht 1.499 m (4' 11\")   Wt 86.2 kg (190 lb 1.6 oz)   LMP  (LMP Unknown)   SpO2 98%   BMI 38.40 kg/m²     General: alert and oriented to person, place and time, well-developed and well-nourished, in no acute distress    MUSCULOSKELETAL:   right lower extremity:  Small amounts of red serosanguineous drainage from drain placed by IR, patient does state that this was emptied recently  Compartments soft and compressible  +PF/DF/EHL  +2/4 DP & PT pulses, Brisk Cap refill, Toes warm and perfused  Distal sensation grossly intact to Peroneals, Sural, Saphenous, and tibial nrs    CBC:   Lab Results   Component Value Date/Time    WBC 3.7 03/12/2025 03:00 AM    HGB 8.3 03/12/2025 03:00 AM    HCT 26.7 03/12/2025 03:00 AM     03/12/2025 03:00 AM     PT/INR:    Lab Results   Component Value Date/Time    PROTIME 10.9 03/10/2025 11:49 AM    INR 1.0 03/10/2025 11:49 AM       Current culture results from IR aspiration demonstrating no growth, no organisms seen    ASSESSMENT  Right hip seroma    PLAN      Continue physical therapy and protocol: WBAT - RLE  Continue antibiotic coverage per medical team/ID  Deep venous thrombosis prophylaxis -per admitting team, early mobilization  PT/OT  Pain Control  Monitor H&H.  8.3 this morning  Orthopedics will follow the patient peripherally for the remainder of their inpatient stay. Will continue to follow IR aspiration culture results.  Please call with questions or concerns.      
Department of Orthopedic Surgery  Resident Progress Note    Patient seen and examined. Pain controlled. No new complaints.  Denies chest pain, shortness of breath, dizziness/lightheadedness. + Flatulence, + BM.    Patient feeling well overall today.  She is not had any drainage of the right hip.  She is scheduled for IR aspiration and possible drain placement today.    VITALS:  /60   Pulse 79   Temp 97.9 °F (36.6 °C) (Oral)   Resp 17   Ht 1.499 m (4' 11\")   Wt 86.2 kg (190 lb 1.6 oz)   LMP  (LMP Unknown)   SpO2 96%   BMI 38.40 kg/m²     General: alert and oriented to person, place and time, well-developed and well-nourished, in no acute distress    MUSCULOSKELETAL:   right lower extremity:  Incision without marginal erythema.  She does have some fullness appreciated distally.  Minimal fluctuance.  No drainage.    Compartments soft and compressible  +PF/DF/EHL  +2/4 DP & PT pulses, Brisk Cap refill, Toes warm and perfused  Distal sensation grossly intact to Peroneals, Sural, Saphenous, and tibial nrs    CBC:   Lab Results   Component Value Date/Time    WBC 4.2 03/09/2025 07:08 AM    HGB 8.7 03/09/2025 07:08 AM    HCT 27.8 03/09/2025 07:08 AM     03/09/2025 07:08 AM     PT/INR:    Lab Results   Component Value Date/Time    PROTIME 11.9 02/04/2025 07:13 AM    INR 1.1 02/04/2025 07:13 AM       Awaiting IR aspiration for culture results.    ASSESSMENT  Right hip seroma    PLAN      Continue physical therapy and protocol: WBAT - RLE  24 hour abx coverage  Deep venous thrombosis prophylaxis -per admitting team, early mobilization  PT/OT  Pain Control  Monitor H&H  Follow cultures from IR aspirate  D/C Plan:  Home Health      
Internal Medicine Progress Note     [facility]     Glenn Frey D.O., CHRIS Valdez D.O., CHRIS Kern D.O.     Christa Christine, MSN, APRN, NP-C  Alex Don, MSN, APRN-CNP  Carlo Lopes, MSN, APRN, NP-C  Marii Pacheco, MSN, APRN-CNP  Jessie Scott, MSN, APRN, NP-C     Primary Care Physician: Charlene Velez DO   Admitting Physician:  Glenn Frey DO  Admission date and time: 3/8/2025  5:09 PM    Room:  [address] [place] [address]  Admitting diagnosis: Septic joint (HCC) [M00.9]  Fluid collection at surgical site, initial encounter [T88.8XXA]    Patient Name: Jenny Lilly  MRN: [id number]    Date of Service: 3/13/2025     Subjective:  Jenny is a 55 y.o. female who was seen and examined today,3/13/2025, at the bedside.  Patient was going to be discharged yesterday but adjustment arrangement has been made for outpatient IV antibiotics.  This has been set up and the plan for discharge today.  Patient has no new complaints      Review of System:   Constitutional:   Some degree of malaise and fatigue.  Does not report any fevers or chills.  HEENT:   Denies ear pain, sore throat, sinus or eye problems.  Cardiovascular:   Denies any chest pain, irregular heartbeats, or palpitations.   Respiratory:   Denies shortness of breath, coughing, sputum production, hemoptysis, or wheezing.  Gastrointestinal:   Denies nausea, vomiting, diarrhea, or constipation.  Denies any abdominal pain.  Genitourinary:    Denies any urgency, frequency, hematuria. Voiding  without difficulty.  Extremities:   Less swelling and redness associated with the incision site.  Admits to more well-controlled discomfort.  Neurology:    Denies any headache or focal neurological deficits, admits to chronic weakness and deconditioning.  Psch:   Denies being anxious or depressed.  Musculoskeletal:    Right lower extremity as above, otherwise denies myalgias, joint complaints or back 
Internal Medicine Progress Note     [facility]     Glenn Frey D.O., CHRIS Valdez D.O., CHRIS Kern D.O.     Christa Christine, MSN, APRN, NP-C  Alex Don, MSN, APRN-CNP  Carlo Lopes, MSN, APRN, NP-C  Marii Pacheco, MSN, APRN-CNP  Jessie Scott, MSN, APRN, NP-C     Primary Care Physician: Charlene Velez DO   Admitting Physician:  Glenn Frey DO  Admission date and time: 3/8/2025  5:09 PM    Room:  [address] [place] [address]  Admitting diagnosis: Septic joint (HCC) [M00.9]  Fluid collection at surgical site, initial encounter [T88.8XXA]    Patient Name: Jenny Lilly  MRN: [id number]    Date of Service: 3/11/2025     Subjective:  Jenny is a 55 y.o. female who was seen and examined today,3/11/2025, at the bedside. Jenny continues to do well post procedurally.  Symptoms remain well-controlled.  We have reviewed the results of the recent workup and plan of care moving forward.  She is tolerating current treatment without difficulty.  No new symptoms or concerns.  There are no family members present on my examination.      Review of System:   Constitutional:   Some degree of malaise and fatigue.  Does not report any fevers or chills.  HEENT:   Denies ear pain, sore throat, sinus or eye problems.  Cardiovascular:   Denies any chest pain, irregular heartbeats, or palpitations.   Respiratory:   Denies shortness of breath, coughing, sputum production, hemoptysis, or wheezing.  Gastrointestinal:   Denies nausea, vomiting, diarrhea, or constipation.  Denies any abdominal pain.  Genitourinary:    Denies any urgency, frequency, hematuria. Voiding  without difficulty.  Extremities:   Less swelling and redness associated with the incision site.  Admits to more well-controlled discomfort.  Neurology:    Denies any headache or focal neurological deficits, admits to chronic weakness and deconditioning.  Psch:   Denies being anxious or 
Patient came down to special procedures for ct guided aspiration of right hip.  Patient was educated about the amount of radiation used with today's procedure.  Patient has been NPO since midnight.    Patient taken to Cat Scan, positioned on table supin with O2 and monitoring equipment attached.     Procedure was explained and questions answered.  Emotional support given.    Patient scanned and images reviewed by Dr Ibarra    Patient prepped and draped per protocol.     1353 sedation medication given HR 79, RR 11 107/62 100 % ETCO2 40 on 2 L NC    1350 start procedure VS HR 79 RR 11 , 107/62, !00 $% ETOC2 41 2 L NC    8 Kiswahili drainage tube placed to right hip with Cat Scan guidance by Dr Ibarra  .  Drain 22 cm at the skin.     10 ml, total amount of red colored fluid drained    specimen obtained and sent to lab for culture&sensitivity and gram stain.     Patient re-scanned. Right hip Puncture site cleansed, flowswitch connected to tube,  securement device and Tegaderm applied to secure tube and tube connected to accordian bag.     Patient tolerated well.     1403 Procedure completed /42 HR 75 , RR 12, 100% ETOC@ 40 on 2 L NC    1418- 15 minutes post procedure VS HR 75, RR 16, 133/63, RA 98% ,no complaints of pain, CDI  1420  patient moved from CT to cath lab.    1433 - 30 minutes post procedure VS HR 76, RR 17, 141/61, RA 95% ,  no complaints of pain, CDI    See sedation documentation for vital signs    Total amount of sedation medication given during procedure: 1 mg of IV Versed and 25 mcg of IV Fentanyl    Nurse to nurse called spoke with Rosa RUDD, nurse notified of above information, nurse assumed post sedation vital signs    Transport in for patient to go back to floor  
Pharmacy Consultation Note  (Antibiotic Dosing and Monitoring)    Initial consult date: 3/10/2025  Consulting physician/provider: Dr Gupta  Drug: Vancomycin  Indication: SSTI    Age/  Gender Height Weight IBW  Allergy Information   55 y.o./female 149.9 cm (4' 11\") 86.2 kg (190 lb 1.6 oz)     Ideal body weight: 48.8 kg (107 lb 8.4 oz)  Adjusted ideal body weight: 63.8 kg (140 lb 8.9 oz)   Patient has no known allergies.      Renal Function:  Recent Labs     03/08/25  1734 03/09/25  0611 03/10/25  1149   BUN 16 14 13   CREATININE 0.9 0.9 0.8     No intake or output data in the 24 hours ending 03/10/25 1413    Vancomycin Monitoring:  Trough:  No results for input(s): \"VANCOTROUGH\" in the last 72 hours.  Random:  No results for input(s): \"VANCORANDOM\" in the last 72 hours.      Vancomycin Administration Times:  Recent vancomycin administrations        No vancomycin IV orders with administrations found.               Assessment:  Patient is a 55 y.o. female who has been initiated on vancomycin  Estimated Creatinine Clearance: 78 mL/min (based on SCr of 0.8 mg/dL).  To dose vancomycin, pharmacy will target an AUC of 400-600    Plan:  Vancomycin 1000 mg IV every 12 hours  Check levels when appropriate  Will continue to monitor renal function   Pharmacy to follow      Justus Riley, KellyD, BCEMP 3/10/2025 2:13 PM   [phone]    [facility]: [phone]  
Pharmacy Consultation Note  (Antibiotic Dosing and Monitoring)    Initial consult date: 3/10/2025  Consulting physician/provider: Dr Gupta  Drug: Vancomycin  Indication: SSTI    Age/  Gender Height Weight IBW  Allergy Information   55 y.o./female 149.9 cm (4' 11\") 86.2 kg (190 lb 1.6 oz)     Ideal body weight: 48.8 kg (107 lb 8.4 oz)  Adjusted ideal body weight: 63.8 kg (140 lb 8.9 oz)   Patient has no known allergies.      Renal Function:  Recent Labs     03/09/25  0611 03/10/25  1149 03/11/25  0500   BUN 14 13 14   CREATININE 0.9 0.8 0.9       Intake/Output Summary (Last 24 hours) at 3/11/2025 1457  Last data filed at 3/11/2025 1251  Gross per 24 hour   Intake 600 ml   Output 151 ml   Net 449 ml       Vancomycin Monitoring:  Trough:  No results for input(s): \"VANCOTROUGH\" in the last 72 hours.  Random:  No results for input(s): \"VANCORANDOM\" in the last 72 hours.      Vancomycin Administration Times:  Recent vancomycin administrations                     vancomycin (VANCOCIN) 1,000 mg in sodium chloride 0.9 % 250 mL IVPB ([id number]) (mg) 1,000 mg New Bag 03/11/25 0328     1,000 mg New Bag 03/10/25 1608               Assessment:  Patient is a 55 y.o. female who has been initiated on vancomycin  Estimated Creatinine Clearance: 69 mL/min (based on SCr of 0.9 mg/dL).  To dose vancomycin, pharmacy will target an AUC of 400-600    Plan:  Vancomycin 1000 mg IV every 12 hours  Trough tomorrow @ 0200; Hold dose if > 20 mcg/mL  Will continue to monitor renal function   Pharmacy to follow      Justus Riley PharmD, BCEMP 3/11/2025 2:57 PM   [phone]    [facility]: [phone]  
Pharmacy Consultation Note  (Antibiotic Dosing and Monitoring)    Initial consult date: 3/10/2025  Consulting physician/provider: Dr Gupta  Drug: Vancomycin  Indication: SSTI    Age/  Gender Height Weight IBW  Allergy Information   55 y.o./female 149.9 cm (4' 11\") 86.2 kg (190 lb 1.6 oz)     Ideal body weight: 48.8 kg (107 lb 8.4 oz)  Adjusted ideal body weight: 63.8 kg (140 lb 8.9 oz)   Patient has no known allergies.      Renal Function:  Recent Labs     03/10/25  1149 03/11/25  0500 03/12/25  0300   BUN 13 14 12   CREATININE 0.8 0.9 0.9       Intake/Output Summary (Last 24 hours) at 3/12/2025 1151  Last data filed at 3/12/2025 0657  Gross per 24 hour   Intake 240 ml   Output 80 ml   Net 160 ml       Vancomycin Monitoring:  Trough:    Recent Labs     03/12/25  0300   VANCOTROUGH 13.8     Random:  No results for input(s): \"VANCORANDOM\" in the last 72 hours.      Recent vancomycin administrations                     vancomycin (VANCOCIN) 1,000 mg in sodium chloride 0.9 % 250 mL IVPB ([id number]) (mg) 1,000 mg New Bag 03/12/25 0416     1,000 mg New Bag 03/11/25 1638     1,000 mg New Bag  0328     1,000 mg New Bag 03/10/25 1608                      Assessment:  Patient is a 55 y.o. female who has been initiated on vancomycin  Estimated Creatinine Clearance: 69 mL/min (based on SCr of 0.9 mg/dL).  To dose vancomycin, pharmacy will target an AUC of 400-600    Plan:  Continue vancomycin 1000 mg IV every 12 hours  Will check vancomycin levels when appropriate  Will continue to monitor renal function   Pharmacy to follow      Marii Carney, PharmD, 3/12/575215:51 AM      [facility]: [phone]  
Pharmacy Consultation Note  (Antibiotic Dosing and Monitoring)    Vancomycin has been discontinued; pharmacy will sign-off.  Please reconsult if needed.    Thank you,    Kasia Coronado, PharmD.  3/12/2025 4:18 PM    [facility]: [phone]    
Spiritual Health History and Assessment/Progress Note  [facility]    (P) Initial Encounter,  ,  ,      Name: Jenny Lilly MRN: [id number]    Age: 55 y.o.     Sex: female   Language: English   Zoroastrianism: Evangelical   Septic joint (HCC)     Date: 3/11/2025                           Spiritual Assessment began in [facility] 3 MED SURG        Referral/Consult From: (P) Rounding   Encounter Overview/Reason: (P) Initial Encounter  Service Provided For: (P) Patient    Nereyda, Belief, Meaning:   Patient is connected with a nereyda tradition or spiritual practice  Family/Friends No family/friends present      Importance and Influence:  Patient has spiritual/personal beliefs that influence decisions regarding their health  Family/Friends No family/friends present    Community:  Patient feels well-supported. Support system includes: Children and Extended family  Family/Friends No family/friends present    Assessment and Plan of Care:     Patient Interventions include: Facilitated expression of thoughts and feelings and Affirmed coping skills/support systems  Family/Friends Interventions include: No family/friends present    Patient Plan of Care: Spiritual Care available upon further referral  Family/Friends Plan of Care: No family/friends present    Electronically signed by Chaplain Lucila on 3/11/2025 at 1:47 PM   
ml 02/07/25 1556   Unit Type PREVENA 02/07/25 1057   Mode Continuous 02/07/25 1057   Target Pressure (mmHg) 125 02/07/25 1057   $$ Dressing Changed & Charged $ Standard NPWT less than or equal to 50 sq cm PER TX 02/07/25 1057   Number of days: 29       Assessment:  Postoperative right hip seroma in the setting of complicated right hip arthroplasty status post irrigation debridement and head/liner exchange on February 4, 2025  Chronic kidney disease stage III AA  Anemia of chronic disease  Essential hypertension  Hyperlipidemia  Insulin-dependent diabetes mellitus type 2, with peripheral neuropathy  History of left breast cancer on Arimidex  Psoriasis  Morbid obesity with BMI 40.62 kg meter squared with obstructive sleep apnea on CPAP    Plan:   Plans are for IR guided drainage of the seroma today.  Fluid cultures will be sent.  She is maintained on antibiotics as per the infectious disease team and midline is in place.  Chronic comorbidities are otherwise stable.  Her pain is better controlled and chronic comorbidities are stable.    More than 50% of my time was spent at the bedside counseling/coordinating care with the patient and/or family with face to face contact.  This time was spent reviewing notes and laboratory data as well as instructing and counseling the patient. Time I spent with the family or surrogate(s) is included only if the patient was incapable of providing the necessary information or participating in medical decisions. I also discussed the differential diagnosis and all of the proposed management plans with the patient and individuals accompanying the patient. I am readily available for any further decision-making and intervention.       Mau Valdez DO, JAYLENEO.I.  3/10/2025  7:14 AM   
Collected: 03/08/25 1737    Order Status: Completed Specimen: Leg Updated: 03/11/25 0843     Specimen Description .LEG .THIGH     Direct Exam Swab collections are low-yield and rarely indicated. Generally, the specimen volume when collected by swab is small, reducing the probability of isolating organisms: many organisms adhere to the fibers of the swab, which reduces the opportunity or recovering organisms. Interpret results with caution.         MANY EPITHELIAL CELLS      NO Polymorphonuclear leukocytes      NO ORGANISMS SEEN     Culture STAPHYLOCOCCUS EPIDERMIDIS Identification by MALDI-TOF RARE GROWTH No further workup    Culture, Anaerobic [[id number]] Collected: 03/08/25 1737    Order Status: Completed Specimen: Leg Updated: 03/11/25 1158     Specimen Description .LEG RIGHT     Culture No anaerobic organisms isolated at 2 days.          Recent Labs     03/10/25  1149 03/11/25  0500 03/12/25  0300   INR 1.0  --   --    PROTIME 10.9  --   --    AST 27 26 41*   ALT 25 23 30     Lab Results   Component Value Date/Time    CHOL 139 02/04/2025 07:13 AM    TRIG 96 02/04/2025 07:13 AM    HDL 48 02/04/2025 07:13 AM     Lab Results   Component Value Date/Time    VITD25 51.7 02/04/2025 07:13 AM     Recent Labs     03/10/25  1149 03/11/25  0500 03/12/25  0300   WBC 4.2* 4.0* 3.7*   HGB 9.0* 8.6* 8.3*   HCT 29.6* 27.6* 26.7*    208 200   MCV 93.7 92.9 94.0   MCH 28.5 29.0 29.2   MCHC 30.4* 31.2* 31.1*   RDW 15.2* 14.8 14.6   LYMPHOPCT 32 29 35   MONOPCT 13* 15* 15*   EOSPCT 7* 8* 8*   BASOPCT 1 0 0   MONOSABS 0.53 0.59 0.56   LYMPHSABS 1.36* 1.15* 1.31*   EOSABS 0.29 0.31 0.31   BASOSABS 0.02 0.01 0.01     Recent Labs     03/10/25  1149 03/11/25  0500 03/12/25  0300    144 142   K 4.0 3.8 4.0    112* 107   CO2 23 25 25   BUN 13 14 12   CREATININE 0.8 0.9 0.9   LABGLOM 81 80 75   GLUCOSE 78 62* 93   CALCIUM 9.3 8.8 9.2   BILITOT 0.2 0.2 0.2   ALKPHOS 157* 145* 153*   AST 27 26 41*   ALT 25 23 30     U/A:  
patient. I am readily available for any further decision-making and intervention.       Mau Valdez DO, DEWAYNE.C.O.I.  3/9/2025  7:00 AM   
fluid-filled tract which extends from the lateral right hip region to the skin surface.  Fluid collection also present in this area which demonstrates surrounding fat stranding.  Skin thickening to the lateral right proximal thigh. (Soft tissue abscess must be considered given appearance.  IV contrast was not administered to assess for rim enhancement of the fluid collection.) 2.  Right hip prosthesis.  There does not appear to be a right hip joint effusion at this time. 3.  Mild left hip osteoarthritis. 4.  Mild to moderate atherosclerotic plaque burden.     CT FEMUR RIGHT WO CONTRAST  Result Date: 3/8/2025  1.  There is a fluid-filled tract which extends from the lateral right hip region to the skin surface.  Fluid collection also present in this area which demonstrates surrounding fat stranding.  Skin thickening to the lateral right proximal thigh. (Soft tissue abscess must be considered given appearance.  IV contrast was not administered to assess for rim enhancement of the fluid collection.) 2.  Right hip prosthesis.  There does not appear to be a right hip joint effusion at this time. 3.  Mild left hip osteoarthritis. 4.  Mild to moderate atherosclerotic plaque burden.     XR HIP RIGHT (2-3 VIEWS)  Result Date: 3/8/2025  1. No signs of fracture or dislocation. 2. No signs of loosening of the right hip arthroplasty.         Imaging and labs were reviewed per medical records and any ID pertinent labs were addressed with the patient.     The patient was educated about the diagnosis, prognosis, indications, risks and benefits of treatment.      Pt had the opportunity to ask questions.  All questions were answered.    Thank you for involving me in the care of Jenny Lilly. Please do not hesitate to call for any questions or concerns.    Electronically signed by Kary Gupta MD on 3/10/2025 at 1:04 PM    Phone [phone]  Fax [phone]

## 2025-03-15 LAB
MICROORGANISM SPEC CULT: NO GROWTH
MICROORGANISM/AGENT SPEC: NORMAL
SERVICE CMNT-IMP: NORMAL
SPECIMEN DESCRIPTION: NORMAL

## 2025-03-19 ENCOUNTER — TELEMEDICINE (OUTPATIENT)
Dept: FAMILY MEDICINE CLINIC | Age: 55
End: 2025-03-19
Payer: MEDICAID

## 2025-03-19 DIAGNOSIS — Z96.649 INFECTION OF PROSTHETIC HIP JOINT, SEQUELA: ICD-10-CM

## 2025-03-19 DIAGNOSIS — K21.9 GASTROESOPHAGEAL REFLUX DISEASE WITHOUT ESOPHAGITIS: ICD-10-CM

## 2025-03-19 DIAGNOSIS — Z09 HOSPITAL DISCHARGE FOLLOW-UP: Primary | ICD-10-CM

## 2025-03-19 DIAGNOSIS — T84.59XS INFECTION OF PROSTHETIC HIP JOINT, SEQUELA: ICD-10-CM

## 2025-03-19 DIAGNOSIS — I24.9 ACS (ACUTE CORONARY SYNDROME) (HCC): ICD-10-CM

## 2025-03-19 DIAGNOSIS — Z17.0 MALIGNANT NEOPLASM OF LEFT BREAST IN FEMALE, ESTROGEN RECEPTOR POSITIVE, UNSPECIFIED SITE OF BREAST: ICD-10-CM

## 2025-03-19 DIAGNOSIS — C50.912 MALIGNANT NEOPLASM OF LEFT BREAST IN FEMALE, ESTROGEN RECEPTOR POSITIVE, UNSPECIFIED SITE OF BREAST: ICD-10-CM

## 2025-03-19 DIAGNOSIS — M00.051 STAPHYLOCOCCAL ARTHRITIS OF RIGHT HIP: ICD-10-CM

## 2025-03-19 DIAGNOSIS — C50.912 INVASIVE DUCTAL CARCINOMA OF LEFT BREAST: ICD-10-CM

## 2025-03-19 DIAGNOSIS — E11.3553 STABLE PROLIFERATIVE DIABETIC RETINOPATHY OF BOTH EYES ASSOCIATED WITH TYPE 2 DIABETES MELLITUS: ICD-10-CM

## 2025-03-19 DIAGNOSIS — I10 ESSENTIAL HYPERTENSION: ICD-10-CM

## 2025-03-19 DIAGNOSIS — E11.43 TYPE II DIABETES MELLITUS WITH PERIPHERAL AUTONOMIC NEUROPATHY (HCC): ICD-10-CM

## 2025-03-19 PROCEDURE — 1111F DSCHRG MED/CURRENT MED MERGE: CPT | Performed by: FAMILY MEDICINE

## 2025-03-19 PROCEDURE — 99214 OFFICE O/P EST MOD 30 MIN: CPT | Performed by: FAMILY MEDICINE

## 2025-03-19 RX ORDER — PANTOPRAZOLE SODIUM 40 MG/1
40 TABLET, DELAYED RELEASE ORAL
Qty: 90 TABLET | Refills: 1 | Status: SHIPPED | OUTPATIENT
Start: 2025-03-19

## 2025-03-19 ASSESSMENT — PATIENT HEALTH QUESTIONNAIRE - PHQ9
SUM OF ALL RESPONSES TO PHQ QUESTIONS 1-9: 0
SUM OF ALL RESPONSES TO PHQ QUESTIONS 1-9: 0
1. LITTLE INTEREST OR PLEASURE IN DOING THINGS: NOT AT ALL
SUM OF ALL RESPONSES TO PHQ QUESTIONS 1-9: 0
2. FEELING DOWN, DEPRESSED OR HOPELESS: NOT AT ALL
SUM OF ALL RESPONSES TO PHQ QUESTIONS 1-9: 0

## 2025-03-24 DIAGNOSIS — E11.65 UNCONTROLLED TYPE 2 DIABETES MELLITUS WITH HYPERGLYCEMIA (HCC): ICD-10-CM

## 2025-03-24 DIAGNOSIS — K21.9 GASTROESOPHAGEAL REFLUX DISEASE WITHOUT ESOPHAGITIS: ICD-10-CM

## 2025-03-24 DIAGNOSIS — E11.43 TYPE II DIABETES MELLITUS WITH PERIPHERAL AUTONOMIC NEUROPATHY (HCC): ICD-10-CM

## 2025-03-24 RX ORDER — SERTRALINE HYDROCHLORIDE 100 MG/1
TABLET, FILM COATED ORAL
Qty: 90 TABLET | Refills: 0 | Status: SHIPPED | OUTPATIENT
Start: 2025-03-24

## 2025-03-24 RX ORDER — OMEPRAZOLE 40 MG/1
40 CAPSULE, DELAYED RELEASE ORAL DAILY
Qty: 90 CAPSULE | Refills: 0 | OUTPATIENT
Start: 2025-03-24

## 2025-03-24 RX ORDER — BUSPIRONE HYDROCHLORIDE 5 MG/1
5 TABLET ORAL 2 TIMES DAILY
Qty: 180 TABLET | Refills: 0 | Status: SHIPPED | OUTPATIENT
Start: 2025-03-24

## 2025-03-24 RX ORDER — METFORMIN HYDROCHLORIDE 500 MG/1
500 TABLET, EXTENDED RELEASE ORAL
Qty: 90 TABLET | Refills: 0 | Status: SHIPPED | OUTPATIENT
Start: 2025-03-24

## 2025-03-24 NOTE — TELEPHONE ENCOUNTER
Last Appointment:  3/19/2025  Future Appointments   Date Time Provider Department Center   3/26/2025 10:45 AM Kary Gupta MD [facility]   4/1/2025  9:45 AM YZ ELISE ETHAN RM 2 YZ ELISE [facility] Rad/Car   4/1/2025 11:00 AM Malena Lo, APRN - CNP [facility]   4/8/2025 11:00 AM Chance Sofia MD [facility]

## 2025-03-24 NOTE — PROGRESS NOTES
Post-Discharge Transitional Care  Follow Up      Jenny Lilly   YOB: 1970    Date of Office Visit:  3/19/2025  Date of Hospital Admission: 3/8/25  Date of Hospital Discharge: 3/13/25  Risk of hospital readmission (high >=14%. Medium >=10%) :Readmission Risk Score: 24.3      Care management risk score Rising risk (score 2-5) and Complex Care (Scores >=6): No Risk Score On File     Non face to face  following discharge, date last encounter closed (first attempt may have been earlier): *No documented post hospital discharge outreach found in the last 14 days    Call initiated 2 business days of discharge: *No response recorded in the last 14 days    ASSESSMENT/PLAN:   Infection of prosthetic hip joint, sequela  ACS (acute coronary syndrome) (HCC)  Essential hypertension  Gastroesophageal reflux disease without esophagitis  -     pantoprazole (PROTONIX) 40 MG tablet; Take 1 tablet by mouth every morning (before breakfast), Disp-90 tablet, R-1Normal  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    Medical Decision Making: moderate complexity  Return in about 3 months (around 6/19/2025).    On this date 3/19/2025 I have spent 40 minutes reviewing previous notes, test results and face to face with the patient discussing the diagnosis and importance of compliance with the treatment plan as well as documenting on the day of the visit.       Subjective:   HPI:  Follow up of Hospital problems/diagnosis(es): septic joint, post-op right hip seroma positive for staph epidermidis     Inpatient course: Discharge summary reviewed- see

## 2025-03-30 DIAGNOSIS — K21.9 GASTROESOPHAGEAL REFLUX DISEASE WITHOUT ESOPHAGITIS: ICD-10-CM

## 2025-03-31 RX ORDER — OMEPRAZOLE 40 MG/1
40 CAPSULE, DELAYED RELEASE ORAL DAILY
Qty: 90 CAPSULE | Refills: 0 | Status: SHIPPED | OUTPATIENT
Start: 2025-03-31

## 2025-03-31 NOTE — TELEPHONE ENCOUNTER
Last seen 3/19/2025  Next appt Visit date not found    Requested Prescriptions     Pending Prescriptions Disp Refills    omeprazole (PRILOSEC) 40 MG delayed release capsule [Pharmacy Med Name: Omeprazole Oral Capsule Delayed Release 40 MG] 90 capsule 0     Sig: TAKE ONE CAPSULE BY MOUTH DAILY    Electronically signed by CAPO MUKHERJEE MA on 3/31/25 at 8:10 AM EDT

## 2025-04-01 ENCOUNTER — HOSPITAL ENCOUNTER (OUTPATIENT)
Dept: GENERAL RADIOLOGY | Age: 55
Discharge: HOME | End: 2025-04-03
Attending: INTERNAL MEDICINE
Payer: MEDICAID

## 2025-04-01 ENCOUNTER — OFFICE VISIT (OUTPATIENT)
Dept: BREAST CENTER | Age: 55
End: 2025-04-01
Payer: MEDICAID

## 2025-04-01 VITALS
RESPIRATION RATE: 20 BRPM | DIASTOLIC BLOOD PRESSURE: 70 MMHG | HEART RATE: 79 BPM | OXYGEN SATURATION: 100 % | HEIGHT: 59 IN | TEMPERATURE: 97.9 F | WEIGHT: 191 LBS | BODY MASS INDEX: 38.51 KG/M2 | SYSTOLIC BLOOD PRESSURE: 130 MMHG

## 2025-04-01 VITALS — WEIGHT: 190 LBS | HEIGHT: 59 IN | BODY MASS INDEX: 38.3 KG/M2

## 2025-04-01 DIAGNOSIS — Z85.3 PERSONAL HISTORY OF BREAST CANCER: Primary | ICD-10-CM

## 2025-04-01 DIAGNOSIS — Z12.39 BREAST CANCER SCREENING, HIGH RISK PATIENT: ICD-10-CM

## 2025-04-01 PROCEDURE — 77063 BREAST TOMOSYNTHESIS BI: CPT

## 2025-04-01 PROCEDURE — 3075F SYST BP GE 130 - 139MM HG: CPT | Performed by: NURSE PRACTITIONER

## 2025-04-01 PROCEDURE — 99213 OFFICE O/P EST LOW 20 MIN: CPT | Performed by: NURSE PRACTITIONER

## 2025-04-01 PROCEDURE — G8417 CALC BMI ABV UP PARAM F/U: HCPCS | Performed by: NURSE PRACTITIONER

## 2025-04-01 PROCEDURE — 1036F TOBACCO NON-USER: CPT | Performed by: NURSE PRACTITIONER

## 2025-04-01 PROCEDURE — 1111F DSCHRG MED/CURRENT MED MERGE: CPT | Performed by: NURSE PRACTITIONER

## 2025-04-01 PROCEDURE — G8427 DOCREV CUR MEDS BY ELIG CLIN: HCPCS | Performed by: NURSE PRACTITIONER

## 2025-04-01 PROCEDURE — 3078F DIAST BP <80 MM HG: CPT | Performed by: NURSE PRACTITIONER

## 2025-04-01 PROCEDURE — 3017F COLORECTAL CA SCREEN DOC REV: CPT | Performed by: NURSE PRACTITIONER

## 2025-04-01 ASSESSMENT — ENCOUNTER SYMPTOMS
STRIDOR: 0
BLOOD IN STOOL: 0
RECTAL PAIN: 0

## 2025-04-03 PROCEDURE — 82550 ASSAY OF CK (CPK): CPT

## 2025-04-04 LAB — CK SERPL-CCNC: 185 U/L (ref 20–180)

## 2025-04-07 DIAGNOSIS — Z96.641 S/P TOTAL RIGHT HIP ARTHROPLASTY: Primary | ICD-10-CM

## 2025-04-08 ENCOUNTER — OFFICE VISIT (OUTPATIENT)
Dept: ORTHOPEDIC SURGERY | Age: 55
End: 2025-04-08

## 2025-04-08 VITALS — BODY MASS INDEX: 38.51 KG/M2 | WEIGHT: 191 LBS | HEIGHT: 59 IN | TEMPERATURE: 98.6 F

## 2025-04-08 DIAGNOSIS — Z47.89 ORTHOPEDIC AFTERCARE: Primary | ICD-10-CM

## 2025-04-08 PROCEDURE — 99024 POSTOP FOLLOW-UP VISIT: CPT | Performed by: ORTHOPAEDIC SURGERY

## 2025-04-08 NOTE — PROGRESS NOTES
9 Weeks Post op Right Revision Total Hip Arthroplasty     Subjective: The patient continues to improve after right revision total hip arthroplasty. CRP WNL.  ESR stable.  Still on ABX.  Had drain placement 3/10/25 for seroma.  Doing well.  Essentially no output from drain for last week.    Physical Exam: The right hip incision is well healed. There is no evidence of infection. ROM is as expected 3 weeks post op. Leg lengths appear equal. The operative extremity is well perfused. Intact function of the tibial and peroneal nerves.     Assessment: 9 Weeks s/p Right Revision Total Hip Arthroplasty     Plan:   The patient was encouraged to continue with the postoperative physical therapy program focusing on range of motion, muscle strengthening and gait mechanics.   Hip precautions reviewed.   Weight bearing as tolerated.   Continue IV ABX for 6 weeks  Wean from assistive devices as strength, balance, and pain improves.   Discontinue DVT prophylaxis at 1 month.   Signs/symptoms of DVT/PE reviewed.   Warning signs of infection were discussed along with the need for antibiotic prophylaxis when undergoing dental work or other invasive procedures.   Pain management strategies were reviewed including ice, elevation, and rest. The importance of weaning from narcotic pain medication over time was discussed and emphasized.     Follow up: 1 month for recheck    The patient's questions were addressed as completely as possible. The patient will contact us if function decreases, pain increases or if they have any other concerns or questions.

## 2025-04-12 DIAGNOSIS — G62.9 NEUROPATHY: ICD-10-CM

## 2025-04-14 RX ORDER — GABAPENTIN 300 MG/1
300 CAPSULE ORAL NIGHTLY
Qty: 30 CAPSULE | Refills: 0 | Status: SHIPPED | OUTPATIENT
Start: 2025-04-14 | End: 2025-05-14

## 2025-04-14 RX ORDER — DULAGLUTIDE 1.5 MG/.5ML
INJECTION, SOLUTION SUBCUTANEOUS
Qty: 2 ML | Refills: 0 | Status: SHIPPED | OUTPATIENT
Start: 2025-04-14

## 2025-04-16 DIAGNOSIS — T84.50XD PROSTHETIC JOINT INFECTION, SUBSEQUENT ENCOUNTER: Primary | ICD-10-CM

## 2025-04-16 RX ORDER — DOXYCYCLINE 100 MG/1
100 CAPSULE ORAL 2 TIMES DAILY
Qty: 60 CAPSULE | Refills: 0 | Status: SHIPPED | OUTPATIENT
Start: 2025-04-16 | End: 2025-05-16

## 2025-04-17 LAB
ALBUMIN: 4.1 G/DL (ref 3.5–5.2)
ALP BLD-CCNC: 159 U/L (ref 35–104)
ALT SERPL-CCNC: 33 U/L (ref 0–35)
ANION GAP SERPL CALCULATED.3IONS-SCNC: 11 MMOL/L (ref 7–16)
AST SERPL-CCNC: 42 U/L (ref 0–35)
BASOPHILS ABSOLUTE: 0.02 K/UL (ref 0–0.2)
BASOPHILS RELATIVE PERCENT: 0 % (ref 0–2)
BILIRUB SERPL-MCNC: 0.2 MG/DL (ref 0–1.2)
BUN BLDV-MCNC: 18 MG/DL (ref 6–20)
CALCIUM SERPL-MCNC: 9.8 MG/DL (ref 8.6–10)
CHLORIDE BLD-SCNC: 99 MMOL/L (ref 98–107)
CO2: 29 MMOL/L (ref 22–29)
CREAT SERPL-MCNC: 1.1 MG/DL (ref 0.5–1)
EOSINOPHILS ABSOLUTE: 0.12 K/UL (ref 0.05–0.5)
EOSINOPHILS RELATIVE PERCENT: 2 % (ref 0–6)
GFR, ESTIMATED: 60 ML/MIN/1.73M2
GLUCOSE BLD-MCNC: 168 MG/DL (ref 74–99)
HCT VFR BLD CALC: 33.3 % (ref 34–48)
HEMOGLOBIN: 10.3 G/DL (ref 11.5–15.5)
IMMATURE GRANULOCYTES %: 0 % (ref 0–5)
IMMATURE GRANULOCYTES ABSOLUTE: 0.03 K/UL (ref 0–0.58)
LYMPHOCYTES ABSOLUTE: 2.07 K/UL (ref 1.5–4)
LYMPHOCYTES RELATIVE PERCENT: 29 % (ref 20–42)
MCH RBC QN AUTO: 27.8 PG (ref 26–35)
MCHC RBC AUTO-ENTMCNC: 30.9 G/DL (ref 32–34.5)
MCV RBC AUTO: 90 FL (ref 80–99.9)
MONOCYTES ABSOLUTE: 0.59 K/UL (ref 0.1–0.95)
MONOCYTES RELATIVE PERCENT: 8 % (ref 2–12)
NEUTROPHILS ABSOLUTE: 4.35 K/UL (ref 1.8–7.3)
NEUTROPHILS RELATIVE PERCENT: 61 % (ref 43–80)
PDW BLD-RTO: 15.2 % (ref 11.5–15)
PLATELET # BLD: 270 K/UL (ref 130–450)
PMV BLD AUTO: 11.2 FL (ref 7–12)
POTASSIUM SERPL-SCNC: 3.8 MMOL/L (ref 3.4–4.5)
RBC # BLD: 3.7 M/UL (ref 3.5–5.5)
SODIUM BLD-SCNC: 139 MMOL/L (ref 136–145)
TOTAL CK: 187 U/L (ref 0–170)
TOTAL PROTEIN: 7.4 G/DL (ref 6.4–8.3)
WBC # BLD: 7.2 K/UL (ref 4.5–11.5)

## 2025-04-21 ENCOUNTER — CLINICAL DOCUMENTATION (OUTPATIENT)
Dept: INFECTIOUS DISEASES | Age: 55
End: 2025-04-21

## 2025-04-21 DIAGNOSIS — T84.50XS INFECTION OF PROSTHETIC JOINT, SEQUELA: Primary | ICD-10-CM

## 2025-04-21 RX ORDER — CEPHALEXIN 500 MG/1
500 CAPSULE ORAL 4 TIMES DAILY
Qty: 120 CAPSULE | Refills: 0 | Status: SHIPPED | OUTPATIENT
Start: 2025-04-21 | End: 2025-05-21

## 2025-04-21 NOTE — PROGRESS NOTES
Called to reschedule appt with pt   Pt is doing well   Pt hs been ambulating well no f/c/n/v/d  Pt can stop atbx after her last dose on Thursday   Can pull picc    Try suppress with cephalexin   F/u with labs 3-4 weeks    Kary Gupta MD  7:29 PM

## 2025-04-22 LAB
ALBUMIN: 3.7 G/DL (ref 3.5–5.2)
ALP BLD-CCNC: 157 U/L (ref 35–104)
ALT SERPL-CCNC: 34 U/L (ref 0–35)
ANION GAP SERPL CALCULATED.3IONS-SCNC: 13 MMOL/L (ref 7–16)
AST SERPL-CCNC: 37 U/L (ref 0–35)
BASOPHILS ABSOLUTE: 0.03 K/UL (ref 0–0.2)
BASOPHILS RELATIVE PERCENT: 0 % (ref 0–2)
BILIRUB SERPL-MCNC: <0.2 MG/DL (ref 0–1.2)
BUN BLDV-MCNC: 18 MG/DL (ref 6–20)
C-REACTIVE PROTEIN: 4.7 MG/L (ref 0–5)
CALCIUM SERPL-MCNC: 10 MG/DL (ref 8.6–10)
CHLORIDE BLD-SCNC: 101 MMOL/L (ref 98–107)
CO2: 27 MMOL/L (ref 22–29)
CREAT SERPL-MCNC: 1 MG/DL (ref 0.5–1)
EOSINOPHILS ABSOLUTE: 0.16 K/UL (ref 0.05–0.5)
EOSINOPHILS RELATIVE PERCENT: 2 % (ref 0–6)
GFR, ESTIMATED: 67 ML/MIN/1.73M2
GLUCOSE BLD-MCNC: 140 MG/DL (ref 74–99)
HCT VFR BLD CALC: 32 % (ref 34–48)
HEMOGLOBIN: 9.7 G/DL (ref 11.5–15.5)
IMMATURE GRANULOCYTES %: 1 % (ref 0–5)
IMMATURE GRANULOCYTES ABSOLUTE: 0.04 K/UL (ref 0–0.58)
LYMPHOCYTES ABSOLUTE: 2.15 K/UL (ref 1.5–4)
LYMPHOCYTES RELATIVE PERCENT: 29 % (ref 20–42)
MCH RBC QN AUTO: 27.4 PG (ref 26–35)
MCHC RBC AUTO-ENTMCNC: 30.3 G/DL (ref 32–34.5)
MCV RBC AUTO: 90.4 FL (ref 80–99.9)
MONOCYTES ABSOLUTE: 0.64 K/UL (ref 0.1–0.95)
MONOCYTES RELATIVE PERCENT: 9 % (ref 2–12)
NEUTROPHILS ABSOLUTE: 4.53 K/UL (ref 1.8–7.3)
NEUTROPHILS RELATIVE PERCENT: 60 % (ref 43–80)
PDW BLD-RTO: 15.2 % (ref 11.5–15)
PLATELET # BLD: 288 K/UL (ref 130–450)
PMV BLD AUTO: 10.9 FL (ref 7–12)
POTASSIUM SERPL-SCNC: 4.2 MMOL/L (ref 3.5–5.1)
RBC # BLD: 3.54 M/UL (ref 3.5–5.5)
SED RATE, AUTOMATED: 68 MM/HR (ref 0–20)
SODIUM BLD-SCNC: 141 MMOL/L (ref 136–145)
TOTAL CK: 156 U/L (ref 0–170)
TOTAL PROTEIN: 7.3 G/DL (ref 6.4–8.3)
WBC # BLD: 7.6 K/UL (ref 4.5–11.5)

## 2025-05-08 ENCOUNTER — OFFICE VISIT (OUTPATIENT)
Dept: ORTHOPEDIC SURGERY | Age: 55
End: 2025-05-08
Payer: MEDICAID

## 2025-05-08 VITALS — BODY MASS INDEX: 38.51 KG/M2 | HEIGHT: 59 IN | WEIGHT: 191 LBS

## 2025-05-08 DIAGNOSIS — Z96.641 S/P REVISION OF RIGHT TOTAL HIP: Primary | ICD-10-CM

## 2025-05-08 DIAGNOSIS — Z96.641 S/P TOTAL RIGHT HIP ARTHROPLASTY: Primary | ICD-10-CM

## 2025-05-08 PROCEDURE — G8417 CALC BMI ABV UP PARAM F/U: HCPCS | Performed by: ORTHOPAEDIC SURGERY

## 2025-05-08 PROCEDURE — 3017F COLORECTAL CA SCREEN DOC REV: CPT | Performed by: ORTHOPAEDIC SURGERY

## 2025-05-08 PROCEDURE — 99213 OFFICE O/P EST LOW 20 MIN: CPT | Performed by: ORTHOPAEDIC SURGERY

## 2025-05-08 PROCEDURE — 1036F TOBACCO NON-USER: CPT | Performed by: ORTHOPAEDIC SURGERY

## 2025-05-08 PROCEDURE — G8427 DOCREV CUR MEDS BY ELIG CLIN: HCPCS | Performed by: ORTHOPAEDIC SURGERY

## 2025-05-08 NOTE — PROGRESS NOTES
3 months Post op Right Revision Total Hip Arthroplasty     Subjective: The patient continues to improve after right revision total hip arthroplasty. CRP WNL.  ESR stable.  Still on PO ABX but has been off IV ABX.  No incisional issues.  No use of assistive devices.    Physical Exam: The right hip incision is well healed. There is no evidence of infection. ROM is as expected. Leg lengths appear equal. The operative extremity is well perfused. Intact function of the tibial and peroneal nerves.     Assessment: 3 months s/p Right Revision Total Hip Arthroplasty     Plan:   The patient was encouraged to continue with the postoperative physical therapy program focusing on range of motion, muscle strengthening and gait mechanics.   Hip precautions reviewed.   Weight bearing as tolerated.   Wean from assistive devices as strength, balance, and pain improves.   Discontinue DVT prophylaxis at 1 month.   Signs/symptoms of DVT/PE reviewed.   Warning signs of infection were discussed along with the need for antibiotic prophylaxis when undergoing dental work or other invasive procedures.   Pain management strategies were reviewed including ice, elevation, and rest. The importance of weaning from narcotic pain medication over time was discussed and emphasized.     Follow up: 2 months    The patient's questions were addressed as completely as possible. The patient will contact us if function decreases, pain increases or if they have any other concerns or questions.

## 2025-05-14 ENCOUNTER — TELEPHONE (OUTPATIENT)
Dept: PHYSICAL THERAPY | Age: 55
End: 2025-05-14

## 2025-05-14 PROBLEM — Z96.641: Status: ACTIVE | Noted: 2025-05-14

## 2025-05-14 NOTE — TELEPHONE ENCOUNTER
Date: 2025       Patient Name: Jenny Lilly  : 1970      MRN: [id number]    No show/no call for PT evaluation scheduled at 0930 today.    William Tuttle, PT

## 2025-05-15 ENCOUNTER — TELEPHONE (OUTPATIENT)
Dept: FAMILY MEDICINE CLINIC | Age: 55
End: 2025-05-15

## 2025-05-15 NOTE — TELEPHONE ENCOUNTER
Spoke with patient to verify a request for a \"at home\"  B/P cuff received via fax from a Medical Care Manager. Pt verified that she did want a B/P cuff to monitor her low B/P's. Informed Pt we would get it taken care of. While on the phone patient requested a refill of her Atenolol. Informed pt that she doesn't have Atenolol on her medication list. We have that's its been discontinued since 2/13/2023. Pt claims Dr. Ohara has had her on it for years. Looked through her medication list from hospital discharges and it was not on any of them. Pt said she must've been confused because she's been in and out of hospitals. She stated she will be seeing Dr. Ohara soon and will discuss it with him.    Electronically signed by Shae Workman LPN on 5/15/25 at 10:35 AM EDT

## 2025-05-20 ENCOUNTER — HOSPITAL ENCOUNTER (OUTPATIENT)
Age: 55
Discharge: HOME | End: 2025-05-20
Payer: MEDICAID

## 2025-05-20 DIAGNOSIS — T84.50XS INFECTION OF PROSTHETIC JOINT, SEQUELA: ICD-10-CM

## 2025-05-20 LAB
ALBUMIN SERPL-MCNC: 3.6 G/DL (ref 3.5–5.2)
ALP SERPL-CCNC: 161 U/L (ref 35–104)
ALT SERPL-CCNC: 23 U/L (ref 0–32)
ANION GAP SERPL CALCULATED.3IONS-SCNC: 10 MMOL/L (ref 7–16)
AST SERPL-CCNC: 25 U/L (ref 0–31)
BASOPHILS # BLD: 0.01 K/UL (ref 0–0.2)
BASOPHILS NFR BLD: 0 % (ref 0–2)
BILIRUB SERPL-MCNC: 0.2 MG/DL (ref 0–1.2)
BUN SERPL-MCNC: 15 MG/DL (ref 6–20)
CALCIUM SERPL-MCNC: 10.2 MG/DL (ref 8.6–10.2)
CHLORIDE SERPL-SCNC: 101 MMOL/L (ref 98–107)
CO2 SERPL-SCNC: 27 MMOL/L (ref 22–29)
CREAT SERPL-MCNC: 0.9 MG/DL (ref 0.5–1)
EOSINOPHIL # BLD: 0.13 K/UL (ref 0.05–0.5)
EOSINOPHILS RELATIVE PERCENT: 2 % (ref 0–6)
ERYTHROCYTE [DISTWIDTH] IN BLOOD BY AUTOMATED COUNT: 16.2 % (ref 11.5–15)
ERYTHROCYTE [SEDIMENTATION RATE] IN BLOOD BY WESTERGREN METHOD: 56 MM/HR (ref 0–20)
GFR, ESTIMATED: 71 ML/MIN/1.73M2
GLUCOSE SERPL-MCNC: 173 MG/DL (ref 74–99)
HCT VFR BLD AUTO: 36.3 % (ref 34–48)
HGB BLD-MCNC: 11.2 G/DL (ref 11.5–15.5)
IMM GRANULOCYTES # BLD AUTO: <0.03 K/UL (ref 0–0.58)
IMM GRANULOCYTES NFR BLD: 0 % (ref 0–5)
LYMPHOCYTES NFR BLD: 1.93 K/UL (ref 1.5–4)
LYMPHOCYTES RELATIVE PERCENT: 29 % (ref 20–42)
MCH RBC QN AUTO: 27 PG (ref 26–35)
MCHC RBC AUTO-ENTMCNC: 30.9 G/DL (ref 32–34.5)
MCV RBC AUTO: 87.5 FL (ref 80–99.9)
MONOCYTES NFR BLD: 0.58 K/UL (ref 0.1–0.95)
MONOCYTES NFR BLD: 9 % (ref 2–12)
NEUTROPHILS NFR BLD: 60 % (ref 43–80)
NEUTS SEG NFR BLD: 3.93 K/UL (ref 1.8–7.3)
PLATELET # BLD AUTO: 160 K/UL (ref 130–450)
PMV BLD AUTO: 11.4 FL (ref 7–12)
POTASSIUM SERPL-SCNC: 5 MMOL/L (ref 3.5–5)
PROT SERPL-MCNC: 7.5 G/DL (ref 6.4–8.3)
RBC # BLD AUTO: 4.15 M/UL (ref 3.5–5.5)
SODIUM SERPL-SCNC: 138 MMOL/L (ref 132–146)
WBC OTHER # BLD: 6.6 K/UL (ref 4.5–11.5)

## 2025-05-20 PROCEDURE — 85652 RBC SED RATE AUTOMATED: CPT

## 2025-05-20 PROCEDURE — 80053 COMPREHEN METABOLIC PANEL: CPT

## 2025-05-20 PROCEDURE — 85025 COMPLETE CBC W/AUTO DIFF WBC: CPT

## 2025-05-20 PROCEDURE — 36415 COLL VENOUS BLD VENIPUNCTURE: CPT

## 2025-05-28 ENCOUNTER — OFFICE VISIT (OUTPATIENT)
Dept: FAMILY MEDICINE CLINIC | Age: 55
End: 2025-05-28
Payer: MEDICAID

## 2025-05-28 VITALS
SYSTOLIC BLOOD PRESSURE: 120 MMHG | OXYGEN SATURATION: 95 % | HEART RATE: 88 BPM | TEMPERATURE: 97.3 F | DIASTOLIC BLOOD PRESSURE: 78 MMHG | BODY MASS INDEX: 39.51 KG/M2 | WEIGHT: 196 LBS | RESPIRATION RATE: 18 BRPM | HEIGHT: 59 IN

## 2025-05-28 DIAGNOSIS — Z17.0 MALIGNANT NEOPLASM OF LEFT BREAST IN FEMALE, ESTROGEN RECEPTOR POSITIVE, UNSPECIFIED SITE OF BREAST (HCC): ICD-10-CM

## 2025-05-28 DIAGNOSIS — M00.051 STAPHYLOCOCCAL ARTHRITIS OF RIGHT HIP (HCC): ICD-10-CM

## 2025-05-28 DIAGNOSIS — E11.43 TYPE II DIABETES MELLITUS WITH PERIPHERAL AUTONOMIC NEUROPATHY (HCC): ICD-10-CM

## 2025-05-28 DIAGNOSIS — C50.912 INVASIVE DUCTAL CARCINOMA OF LEFT BREAST (HCC): ICD-10-CM

## 2025-05-28 DIAGNOSIS — E11.65 UNCONTROLLED TYPE 2 DIABETES MELLITUS WITH HYPERGLYCEMIA (HCC): Primary | ICD-10-CM

## 2025-05-28 DIAGNOSIS — I24.9 ACS (ACUTE CORONARY SYNDROME) (HCC): ICD-10-CM

## 2025-05-28 DIAGNOSIS — G62.9 NEUROPATHY: ICD-10-CM

## 2025-05-28 DIAGNOSIS — C50.912 MALIGNANT NEOPLASM OF LEFT BREAST IN FEMALE, ESTROGEN RECEPTOR POSITIVE, UNSPECIFIED SITE OF BREAST (HCC): ICD-10-CM

## 2025-05-28 DIAGNOSIS — I10 ESSENTIAL HYPERTENSION: ICD-10-CM

## 2025-05-28 DIAGNOSIS — E11.3553 STABLE PROLIFERATIVE DIABETIC RETINOPATHY OF BOTH EYES ASSOCIATED WITH TYPE 2 DIABETES MELLITUS (HCC): ICD-10-CM

## 2025-05-28 LAB — HBA1C MFR BLD: 8.6 %

## 2025-05-28 PROCEDURE — 2022F DILAT RTA XM EVC RTNOPTHY: CPT | Performed by: FAMILY MEDICINE

## 2025-05-28 PROCEDURE — G8427 DOCREV CUR MEDS BY ELIG CLIN: HCPCS | Performed by: FAMILY MEDICINE

## 2025-05-28 PROCEDURE — 1036F TOBACCO NON-USER: CPT | Performed by: FAMILY MEDICINE

## 2025-05-28 PROCEDURE — 99214 OFFICE O/P EST MOD 30 MIN: CPT | Performed by: FAMILY MEDICINE

## 2025-05-28 PROCEDURE — 3078F DIAST BP <80 MM HG: CPT | Performed by: FAMILY MEDICINE

## 2025-05-28 PROCEDURE — 3074F SYST BP LT 130 MM HG: CPT | Performed by: FAMILY MEDICINE

## 2025-05-28 PROCEDURE — 3017F COLORECTAL CA SCREEN DOC REV: CPT | Performed by: FAMILY MEDICINE

## 2025-05-28 PROCEDURE — G8417 CALC BMI ABV UP PARAM F/U: HCPCS | Performed by: FAMILY MEDICINE

## 2025-05-28 PROCEDURE — 83036 HEMOGLOBIN GLYCOSYLATED A1C: CPT | Performed by: FAMILY MEDICINE

## 2025-05-28 PROCEDURE — 3052F HG A1C>EQUAL 8.0%<EQUAL 9.0%: CPT | Performed by: FAMILY MEDICINE

## 2025-05-28 RX ORDER — DULAGLUTIDE 1.5 MG/.5ML
1.5 INJECTION, SOLUTION SUBCUTANEOUS WEEKLY
Qty: 2 ML | Refills: 1 | Status: SHIPPED | OUTPATIENT
Start: 2025-05-28

## 2025-05-28 RX ORDER — GABAPENTIN 300 MG/1
300 CAPSULE ORAL NIGHTLY
Qty: 30 CAPSULE | Refills: 2 | Status: SHIPPED | OUTPATIENT
Start: 2025-05-28 | End: 2025-08-26

## 2025-05-28 RX ORDER — PROCHLORPERAZINE 25 MG/1
1 SUPPOSITORY RECTAL
Qty: 1 EACH | Refills: 1 | Status: SHIPPED | OUTPATIENT
Start: 2025-05-28

## 2025-05-28 RX ORDER — ROSUVASTATIN CALCIUM 5 MG/1
5 TABLET, COATED ORAL DAILY
Qty: 90 TABLET | Refills: 0 | OUTPATIENT
Start: 2025-05-28

## 2025-05-28 RX ORDER — PROCHLORPERAZINE 25 MG/1
1 SUPPOSITORY RECTAL
Qty: 9 EACH | Refills: 1 | Status: SHIPPED | OUTPATIENT
Start: 2025-05-28

## 2025-05-28 RX ORDER — METOPROLOL SUCCINATE 25 MG/1
12.5 TABLET, EXTENDED RELEASE ORAL DAILY
Qty: 45 TABLET | Refills: 1 | Status: SHIPPED | OUTPATIENT
Start: 2025-05-28

## 2025-05-28 RX ORDER — INSULIN GLARGINE 100 [IU]/ML
35 INJECTION, SOLUTION SUBCUTANEOUS NIGHTLY
Qty: 45 ML | Refills: 1 | Status: SHIPPED | OUTPATIENT
Start: 2025-05-28

## 2025-05-28 RX ORDER — PIOGLITAZONE 45 MG/1
45 TABLET ORAL DAILY
Qty: 90 TABLET | Refills: 1 | Status: SHIPPED | OUTPATIENT
Start: 2025-05-28

## 2025-05-29 LAB
6-MONOACETYLMORPHINE, URINE: NEGATIVE
ABNORMAL SPECIMEN VALIDITY TEST: ABNORMAL
ALCOHOL URINE: NOT DETECTED MG/DL
AMPHETAMINE SCREEN URINE: NEGATIVE
BARBITURATE SCREEN URINE: NEGATIVE
BENZODIAZEPINE SCREEN, URINE: NEGATIVE
BUPRENORPHINE URINE: NEGATIVE
CANNABINOID SCREEN URINE: NEGATIVE
COCAINE METABOLITE, URINE: NEGATIVE
FENTANYL URINE: NEGATIVE
INTEGRITY CHECK, CREATININE, URINE: 21.1 MG/DL (ref 22–250)
INTEGRITY CHECK, OXIDANT, URINE: 35 MG/L
INTEGRITY CHECK, PH, URINE: 6.2 (ref 4.5–8)
INTEGRITY CHECK, SPECIFIC GRAVITY, URINE: 1.01 (ref 1–1.03)
METHADONE SCREEN, URINE: NEGATIVE
OPIATES, URINE: NEGATIVE
OXYCODONE SCREEN URINE: NEGATIVE
PCP,URINE: NEGATIVE
TEST INFORMATION: ABNORMAL
TRAMADOL, URINE: NEGATIVE

## 2025-05-30 ASSESSMENT — ENCOUNTER SYMPTOMS
BLOOD IN STOOL: 0
SHORTNESS OF BREATH: 0
COLOR CHANGE: 0
SORE THROAT: 0
EYE DISCHARGE: 0
DIARRHEA: 0
ABDOMINAL PAIN: 0
GASTROINTESTINAL NEGATIVE: 1
SINUS PRESSURE: 0
VOMITING: 0
EYE PAIN: 0
VOICE CHANGE: 0
ALLERGIC/IMMUNOLOGIC NEGATIVE: 1
WHEEZING: 0
EYE REDNESS: 0
CHOKING: 0
FACIAL SWELLING: 0
TROUBLE SWALLOWING: 0
ABDOMINAL DISTENTION: 0
CHEST TIGHTNESS: 0
ANAL BLEEDING: 0
RECTAL PAIN: 0
EYE ITCHING: 0
BACK PAIN: 0
NAUSEA: 0
COUGH: 0
RHINORRHEA: 0
CONSTIPATION: 0
STRIDOR: 0
PHOTOPHOBIA: 0
SINUS PAIN: 0

## 2025-05-30 NOTE — PROGRESS NOTES
RIGHT WRIST FIRST DORSAL COMPARTMENT RELEASE performed by Ivan Martin MD at [facility] OR       Past Family Hx:  Reviewed with patient      Problem Relation Age of Onset    Heart Disease Mother     Diabetes Mother     Heart Disease Father     Diabetes Father     Cancer Father         prostate, colon, skin cance behind ear    Breast Cancer Maternal Aunt 55        breast    Cancer Maternal Uncle 58        colon    Cancer Paternal Aunt 62        breast    Cancer Other 58        maternal great aunt - breast       Social Hx:  Reviewed with patient  Social History     Tobacco Use    Smoking status: Never    Smokeless tobacco: Never   Substance Use Topics    Alcohol use: Yes     Alcohol/week: 1.0 standard drink of alcohol     Types: 1 Glasses of wine per week     Comment: 1 drink per week       OBJECTIVE  /78 (BP Site: Left Upper Arm, Patient Position: Sitting)   Pulse 88   Temp 97.3 °F (36.3 °C) (Temporal)   Resp 18   Ht 1.499 m (4' 11.02\")   Wt 88.9 kg (196 lb)   LMP  (LMP Unknown)   SpO2 95%   BMI 39.57 kg/m²     Problem List:  Jenny does not have any pertinent problems on file.    PHYS EX:  Physical Exam  Vitals and nursing note reviewed.   Constitutional:       General: She is not in acute distress.     Appearance: Normal appearance. She is well-developed. She is obese. She is not ill-appearing, toxic-appearing or diaphoretic.      Comments: Patient has morbid obesity. Patient instructed on low calorie, healthy ADA diet.      HENT:      Head: Normocephalic and atraumatic.      Nose: No congestion or rhinorrhea.      Mouth/Throat:      Pharynx: No oropharyngeal exudate or posterior oropharyngeal erythema.   Eyes:      General: No scleral icterus.        Right eye: No discharge.         Left eye: No discharge.      Conjunctiva/sclera: Conjunctivae normal.      Pupils: Pupils are equal, round, and reactive to light.   Neck:      Thyroid: No thyromegaly.      Vascular: No carotid bruit or JVD.

## 2025-06-14 DIAGNOSIS — E11.65 UNCONTROLLED TYPE 2 DIABETES MELLITUS WITH HYPERGLYCEMIA (HCC): ICD-10-CM

## 2025-06-14 DIAGNOSIS — K21.9 GASTROESOPHAGEAL REFLUX DISEASE WITHOUT ESOPHAGITIS: ICD-10-CM

## 2025-06-14 DIAGNOSIS — E11.43 TYPE II DIABETES MELLITUS WITH PERIPHERAL AUTONOMIC NEUROPATHY (HCC): ICD-10-CM

## 2025-06-16 RX ORDER — SERTRALINE HYDROCHLORIDE 100 MG/1
TABLET, FILM COATED ORAL
Qty: 90 TABLET | Refills: 1 | Status: SHIPPED | OUTPATIENT
Start: 2025-06-16

## 2025-06-16 RX ORDER — OMEPRAZOLE 40 MG/1
40 CAPSULE, DELAYED RELEASE ORAL DAILY
Qty: 90 CAPSULE | Refills: 1 | Status: SHIPPED | OUTPATIENT
Start: 2025-06-16

## 2025-06-16 RX ORDER — METFORMIN HYDROCHLORIDE 500 MG/1
500 TABLET, EXTENDED RELEASE ORAL
Qty: 90 TABLET | Refills: 1 | Status: SHIPPED | OUTPATIENT
Start: 2025-06-16

## 2025-06-16 RX ORDER — ROSUVASTATIN CALCIUM 5 MG/1
5 TABLET, COATED ORAL DAILY
Qty: 90 TABLET | Refills: 1 | Status: SHIPPED | OUTPATIENT
Start: 2025-06-16

## 2025-06-16 RX ORDER — BUSPIRONE HYDROCHLORIDE 5 MG/1
5 TABLET ORAL 2 TIMES DAILY
Qty: 180 TABLET | Refills: 1 | Status: SHIPPED | OUTPATIENT
Start: 2025-06-16

## 2025-06-16 NOTE — TELEPHONE ENCOUNTER
Last Appointment:  5/28/2025  Future Appointments   Date Time Provider Department Center   7/10/2025 11:00 AM Chance Sofia MD [facility]   7/24/2025 10:50 AM [facility] LABS ROOM MEDICAL ONCOLOGY [facility] MED ONC [place]   7/24/2025 11:45 AM [facility]inaldi [facility]   9/3/2025 10:30 AM Charlene Velez, DO [facility] ECC DEP   4/7/2026 10:00 AM SEYZ ELISE ETHAN RM 1 SEYZ ABDU [facility] Rad/Car   4/7/2026 11:00 AM Malena Lo, APRN - CNP [facility]

## 2025-06-23 ENCOUNTER — TELEPHONE (OUTPATIENT)
Dept: FAMILY MEDICINE CLINIC | Age: 55
End: 2025-06-23

## 2025-06-23 DIAGNOSIS — N30.00 ACUTE CYSTITIS WITHOUT HEMATURIA: Primary | ICD-10-CM

## 2025-06-23 DIAGNOSIS — E11.65 UNCONTROLLED TYPE 2 DIABETES MELLITUS WITH HYPERGLYCEMIA (HCC): Primary | ICD-10-CM

## 2025-06-23 RX ORDER — AVOBENZONE, HOMOSALATE, OCTISALATE, OCTOCRYLENE 30; 40; 45; 26 MG/ML; MG/ML; MG/ML; MG/ML
1 CREAM TOPICAL 4 TIMES DAILY
Qty: 100 EACH | Refills: 1 | Status: SHIPPED | OUTPATIENT
Start: 2025-06-23 | End: 2026-07-28

## 2025-06-23 RX ORDER — NITROFURANTOIN 25; 75 MG/1; MG/1
100 CAPSULE ORAL 2 TIMES DAILY
Qty: 20 CAPSULE | Refills: 0 | Status: SHIPPED | OUTPATIENT
Start: 2025-06-23 | End: 2025-07-03

## 2025-06-23 RX ORDER — GLUCOSAMINE HCL/CHONDROITIN SU 500-400 MG
CAPSULE ORAL
Qty: 400 STRIP | Refills: 1 | Status: SHIPPED | OUTPATIENT
Start: 2025-06-23

## 2025-06-23 NOTE — TELEPHONE ENCOUNTER
Last Appointment:  5/28/2025  Future Appointments   Date Time Provider Department Center   7/10/2025 11:00 AM Chance Sofia MD [facility]   7/24/2025 10:50 AM [facility] LABS ROOM MEDICAL ONCOLOGY [email or web address] MED ONC [place]   7/24/2025 11:45 AM [facility]   9/3/2025 10:30 AM Charlene Velez, DO [facility] ECC DEP   4/7/2026 10:00 AM SEYZ ELISE ETHAN RM 1 SEYZ ABDU [facility] Rad/Car   4/7/2026 11:00 AM Malena Lo, APRN - CNP [facility]

## 2025-06-23 NOTE — TELEPHONE ENCOUNTER
Pt called and states she is having uti symptoms, admits to pain while urinating and urinary frequency, pt would like something called in, pt states she is unable to get a ride at this time to an appointment. Please advise.     Electronically signed by CAPO MUKHERJEE MA on 6/23/25 at 9:21 AM EDT

## 2025-07-10 ENCOUNTER — OFFICE VISIT (OUTPATIENT)
Dept: ORTHOPEDIC SURGERY | Age: 55
End: 2025-07-10
Payer: MEDICAID

## 2025-07-10 DIAGNOSIS — Z96.641 S/P TOTAL RIGHT HIP ARTHROPLASTY: Primary | ICD-10-CM

## 2025-07-10 DIAGNOSIS — E11.65 UNCONTROLLED TYPE 2 DIABETES MELLITUS WITH HYPERGLYCEMIA (HCC): ICD-10-CM

## 2025-07-10 PROCEDURE — G8427 DOCREV CUR MEDS BY ELIG CLIN: HCPCS | Performed by: ORTHOPAEDIC SURGERY

## 2025-07-10 PROCEDURE — G8417 CALC BMI ABV UP PARAM F/U: HCPCS | Performed by: ORTHOPAEDIC SURGERY

## 2025-07-10 PROCEDURE — 3017F COLORECTAL CA SCREEN DOC REV: CPT | Performed by: ORTHOPAEDIC SURGERY

## 2025-07-10 PROCEDURE — 1036F TOBACCO NON-USER: CPT | Performed by: ORTHOPAEDIC SURGERY

## 2025-07-10 PROCEDURE — 99213 OFFICE O/P EST LOW 20 MIN: CPT | Performed by: ORTHOPAEDIC SURGERY

## 2025-07-10 ASSESSMENT — ENCOUNTER SYMPTOMS
EYE DISCHARGE: 0
SHORTNESS OF BREATH: 0
ABDOMINAL DISTENTION: 0
ALLERGIC/IMMUNOLOGIC NEGATIVE: 1

## 2025-07-10 NOTE — TELEPHONE ENCOUNTER
Last Appointment:  5/28/2025  Future Appointments   Date Time Provider Department Center   7/10/2025 11:00 AM Chance Sofia MD [facility]   7/21/2025 10:50 AM [facility] LABS ROOM MEDICAL ONCOLOGY [facility] MED ONC [place]   7/21/2025 11:45 AM [facility]inaldi [facility]   9/3/2025 10:30 AM Charlene Velez, DO [facility] ECC DEP   4/7/2026 10:00 AM SEYZ ABDKARLA ETHAN RM 1 SEYZ ABDU [facility] Rad/Car   4/7/2026 11:00 AM Malena Lo, APRN - CNP [facility]

## 2025-07-10 NOTE — PROGRESS NOTES
Jenny Lilly (:  1970) is a 55 y.o. female,Established patient, here for evaluation of the following chief complaint(s):  Hip Pain (Patient presents today for follow up on right CARTER. Patient states her hip is doing well. Patient states the hip will become painful if she is on it too long and will relax and the pain subsides. )      Assessment & Plan   ASSESSMENT/PLAN:  1. S/P total right hip arthroplasty    Continue activity as tolerated  Monitor for signs of infection    Dental prophylaxis discussed    Return in about 6 months (around 1/10/2026).         Subjective   SUBJECTIVE/OBJECTIVE:  Hip Pain         55-year-old 5 months status post right total hip replacement.  This was complicated by a initial postoperative infection.  She was on IV antibiotics but has been off of that for several months.  She is doing well.  She notes no issues with her incision and no signs of infection.  She states she feels well and gets better by the month    Past Medical History:   Diagnosis Date    Acid reflux     Anesthesia complication     problem with blood pressure up & down    Arthritis     lower back    Back pain     Breast cancer (HCC)     left breast/radiation    Cancer (HCC)     left breast    Cardiac valve prolapse     valve ?    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

## 2025-07-11 RX ORDER — DULAGLUTIDE 1.5 MG/.5ML
INJECTION, SOLUTION SUBCUTANEOUS
Qty: 2 ML | Refills: 2 | Status: SHIPPED | OUTPATIENT
Start: 2025-07-11

## 2025-07-21 ENCOUNTER — HOSPITAL ENCOUNTER (OUTPATIENT)
Dept: INFUSION THERAPY | Age: 55
Discharge: HOME | End: 2025-07-21
Payer: MEDICAID

## 2025-07-21 ENCOUNTER — OFFICE VISIT (OUTPATIENT)
Age: 55
End: 2025-07-21
Payer: MEDICAID

## 2025-07-21 ENCOUNTER — TELEPHONE (OUTPATIENT)
Dept: CASE MANAGEMENT | Age: 55
End: 2025-07-21

## 2025-07-21 VITALS
OXYGEN SATURATION: 100 % | HEIGHT: 59 IN | DIASTOLIC BLOOD PRESSURE: 75 MMHG | TEMPERATURE: 97.4 F | HEART RATE: 72 BPM | BODY MASS INDEX: 40.16 KG/M2 | SYSTOLIC BLOOD PRESSURE: 131 MMHG | WEIGHT: 199.2 LBS

## 2025-07-21 DIAGNOSIS — Z79.811 USE OF AROMATASE INHIBITORS: ICD-10-CM

## 2025-07-21 DIAGNOSIS — C50.912 MALIGNANT NEOPLASM OF LEFT FEMALE BREAST, UNSPECIFIED ESTROGEN RECEPTOR STATUS, UNSPECIFIED SITE OF BREAST (HCC): Primary | ICD-10-CM

## 2025-07-21 DIAGNOSIS — D64.9 ANEMIA, UNSPECIFIED TYPE: ICD-10-CM

## 2025-07-21 DIAGNOSIS — Z78.0 POSTMENOPAUSAL: ICD-10-CM

## 2025-07-21 DIAGNOSIS — C50.912 INVASIVE DUCTAL CARCINOMA OF LEFT BREAST (HCC): ICD-10-CM

## 2025-07-21 LAB
ALBUMIN SERPL-MCNC: 3.7 G/DL (ref 3.5–5.2)
ALP SERPL-CCNC: 113 U/L (ref 35–104)
ALT SERPL-CCNC: 23 U/L (ref 0–35)
ANION GAP SERPL CALCULATED.3IONS-SCNC: 14 MMOL/L (ref 7–16)
AST SERPL-CCNC: 31 U/L (ref 0–35)
BASOPHILS # BLD: 0.02 K/UL (ref 0–0.2)
BASOPHILS NFR BLD: 0 % (ref 0–2)
BILIRUB SERPL-MCNC: 0.3 MG/DL (ref 0–1.2)
BUN SERPL-MCNC: 13 MG/DL (ref 6–20)
CALCIUM SERPL-MCNC: 9.7 MG/DL (ref 8.6–10)
CHLORIDE SERPL-SCNC: 103 MMOL/L (ref 98–107)
CO2 SERPL-SCNC: 25 MMOL/L (ref 22–29)
CREAT SERPL-MCNC: 0.9 MG/DL (ref 0.5–1)
EOSINOPHIL # BLD: 0.13 K/UL (ref 0.05–0.5)
EOSINOPHILS RELATIVE PERCENT: 2 % (ref 0–6)
ERYTHROCYTE [DISTWIDTH] IN BLOOD BY AUTOMATED COUNT: 17.6 % (ref 11.5–15)
FERRITIN SERPL-MCNC: 31 NG/ML
FOLATE SERPL-MCNC: >40 NG/ML (ref 4.6–34.8)
GFR, ESTIMATED: 73 ML/MIN/1.73M2
GLUCOSE SERPL-MCNC: 80 MG/DL (ref 74–99)
HCT VFR BLD AUTO: 32.9 % (ref 34–48)
HGB BLD-MCNC: 10.6 G/DL (ref 11.5–15.5)
IMM GRANULOCYTES # BLD AUTO: <0.03 K/UL (ref 0–0.58)
IMM GRANULOCYTES NFR BLD: 0 % (ref 0–5)
IRON SATN MFR SERPL: 12 % (ref 15–50)
IRON SERPL-MCNC: 46 UG/DL (ref 37–145)
LYMPHOCYTES NFR BLD: 1.93 K/UL (ref 1.5–4)
LYMPHOCYTES RELATIVE PERCENT: 33 % (ref 20–42)
MCH RBC QN AUTO: 28.6 PG (ref 26–35)
MCHC RBC AUTO-ENTMCNC: 32.2 G/DL (ref 32–34.5)
MCV RBC AUTO: 88.7 FL (ref 80–99.9)
MONOCYTES NFR BLD: 0.54 K/UL (ref 0.1–0.95)
MONOCYTES NFR BLD: 9 % (ref 2–12)
NEUTROPHILS NFR BLD: 55 % (ref 43–80)
NEUTS SEG NFR BLD: 3.25 K/UL (ref 1.8–7.3)
PLATELET # BLD AUTO: 259 K/UL (ref 130–450)
PMV BLD AUTO: 10.5 FL (ref 7–12)
POTASSIUM SERPL-SCNC: 4.5 MMOL/L (ref 3.5–5.1)
PROT SERPL-MCNC: 7.2 G/DL (ref 6.4–8.3)
RBC # BLD AUTO: 3.71 M/UL (ref 3.5–5.5)
SODIUM SERPL-SCNC: 141 MMOL/L (ref 136–145)
TIBC SERPL-MCNC: 371 UG/DL (ref 250–450)
VIT B12 SERPL-MCNC: 739 PG/ML (ref 232–1245)
WBC OTHER # BLD: 5.9 K/UL (ref 4.5–11.5)

## 2025-07-21 PROCEDURE — 3017F COLORECTAL CA SCREEN DOC REV: CPT | Performed by: INTERNAL MEDICINE

## 2025-07-21 PROCEDURE — 36415 COLL VENOUS BLD VENIPUNCTURE: CPT

## 2025-07-21 PROCEDURE — 1036F TOBACCO NON-USER: CPT | Performed by: INTERNAL MEDICINE

## 2025-07-21 PROCEDURE — 99213 OFFICE O/P EST LOW 20 MIN: CPT | Performed by: INTERNAL MEDICINE

## 2025-07-21 PROCEDURE — G8417 CALC BMI ABV UP PARAM F/U: HCPCS | Performed by: INTERNAL MEDICINE

## 2025-07-21 PROCEDURE — G8427 DOCREV CUR MEDS BY ELIG CLIN: HCPCS | Performed by: INTERNAL MEDICINE

## 2025-07-21 PROCEDURE — 82746 ASSAY OF FOLIC ACID SERUM: CPT

## 2025-07-21 PROCEDURE — 83540 ASSAY OF IRON: CPT

## 2025-07-21 PROCEDURE — 99214 OFFICE O/P EST MOD 30 MIN: CPT | Performed by: INTERNAL MEDICINE

## 2025-07-21 PROCEDURE — 3075F SYST BP GE 130 - 139MM HG: CPT | Performed by: INTERNAL MEDICINE

## 2025-07-21 PROCEDURE — 85025 COMPLETE CBC W/AUTO DIFF WBC: CPT

## 2025-07-21 PROCEDURE — 82728 ASSAY OF FERRITIN: CPT

## 2025-07-21 PROCEDURE — 3078F DIAST BP <80 MM HG: CPT | Performed by: INTERNAL MEDICINE

## 2025-07-21 PROCEDURE — 83550 IRON BINDING TEST: CPT

## 2025-07-21 PROCEDURE — 82607 VITAMIN B-12: CPT

## 2025-07-21 PROCEDURE — 80053 COMPREHEN METABOLIC PANEL: CPT

## 2025-07-21 RX ORDER — ANASTROZOLE 1 MG/1
1 TABLET ORAL DAILY
Qty: 30 TABLET | Refills: 10 | Status: SHIPPED | OUTPATIENT
Start: 2025-07-21

## 2025-07-21 NOTE — TELEPHONE ENCOUNTER
Completed patient's Breast Cancer Index test requisition on the [employer] portal per Dr. Varun Bellamy's order.  Patient's insurance card, face sheet, recent office note, and pathology report uploaded to the [employer] portal with test order.  Copy of order placed in patient's Epic chart.  VALERIA BennettN, BSW, RN, OCN  Oncology Nurse Navigator

## 2025-07-21 NOTE — PROGRESS NOTES
Department of [facility] Med Oncology  Attending Clinic Note    Reason for Visit: Follow-up on a patient with Left Breast Cancer     PCP:  Charlene Velez DO    History of Present Illness:  55 year old female with Left Breast Cancer.   Lesion located in the 7 o'clock position found on mammogram.    Breast cancer risk factors include age, gender, family history of cancer    Bilateral Screening Mammogram 11/12/2020 with 1.5 cm irregular focal asymmetry seen on the CC view, not well seen on MLO view. Stable mammogram of right breast    Left Diagnostic Mammogram 12/15/2021 with spiculated mass measuring 1.5 cm in the lower inner quadrant of the left breast. ON dedicated targeted ultrasound examination of left breast there is a solid lesion with posterior shadowing at 7 o'clock position. Highly suggestive of malignancy    Ultrasound guided core biopsy on 12/23/2020  Left breast, core needle biopsy: Invasive, moderately differentiatedductal carcinoma (grade 2)  Comment:    Intradepartmental consultation is obtained.  Breast Cancer Marker Studies:  Estrogen Receptors (ER): 90%  Progesterone Receptors (MD): 90%  HER-2/salvador: Indeterminate/2+    FISH analysis HER/2: Negative  Average HER-2 signals/nucleus: 3.4   Average MARGARITO 17 signals/nucleus: 2.9   HER-2/MARGARITO 17 signal ratio: 1.2     2/17/2021 Left localized lumpectomy with left axillary sentinal lymph node excision   A.  Breast, left, lumpectomy: Invasive ductal carcinoma   Ductal carcinoma in situ   Invasive carcinoma present less than 1 mm from yellow/medial margin   Margins negative for ductal carcinoma in situ     B.  New medial anterior margin, excision: Negative for carcinoma   C.  [place] lymph node, excision: One (1) lymph node, negative for metastatic carcinoma     CANCER CASE SUMMARY   Procedure: Excision (less than total mastectomy)   Specimen laterality: Left   Tumor size: 1.3 cm in greatest dimension   Histologic type: Invasive carcinoma of no special type

## 2025-07-31 LAB — SURGICAL PATHOLOGY REPORT: NORMAL

## 2025-08-01 ENCOUNTER — TELEPHONE (OUTPATIENT)
Dept: CASE MANAGEMENT | Age: 55
End: 2025-08-01

## 2025-08-01 NOTE — TELEPHONE ENCOUNTER
Contacted patient and reviewed her Breast Cancer Index test results per Dr. Varun Bellamy's request.  Reviewed that her results show no benefit to continue the Arimidex medication longer than 5 years.  Questions answered to her apparent satisfaction.  Reviewed her 10/20/2025 follow up appointment with Dr. Varun Bellamy.  Denies any additional needs from the NN.  Appreciative of call.  VALERIA BennettN, BSW, RN, OCN  Oncology Nurse Navigator

## 2025-08-14 ENCOUNTER — APPOINTMENT (OUTPATIENT)
Dept: GENERAL RADIOLOGY | Age: 55
DRG: 198 | End: 2025-08-14
Payer: MEDICAID

## 2025-08-14 ENCOUNTER — HOSPITAL ENCOUNTER (INPATIENT)
Age: 55
LOS: 2 days | Discharge: HOME | DRG: 198 | End: 2025-08-16
Attending: EMERGENCY MEDICINE | Admitting: INTERNAL MEDICINE
Payer: MEDICAID

## 2025-08-14 DIAGNOSIS — R60.9 EDEMA, UNSPECIFIED TYPE: ICD-10-CM

## 2025-08-14 DIAGNOSIS — R07.9 CHEST PAIN, UNSPECIFIED TYPE: Primary | ICD-10-CM

## 2025-08-14 LAB
ALBUMIN SERPL-MCNC: 3.7 G/DL (ref 3.5–5.2)
ALP SERPL-CCNC: 112 U/L (ref 35–104)
ALT SERPL-CCNC: 18 U/L (ref 0–35)
ANION GAP SERPL CALCULATED.3IONS-SCNC: 12 MMOL/L (ref 7–16)
AST SERPL-CCNC: 22 U/L (ref 0–35)
BASOPHILS # BLD: 0.01 K/UL (ref 0–0.2)
BASOPHILS NFR BLD: 0 % (ref 0–2)
BILIRUB SERPL-MCNC: <0.2 MG/DL (ref 0–1.2)
BNP SERPL-MCNC: 395 PG/ML (ref 0–125)
BUN SERPL-MCNC: 15 MG/DL (ref 6–20)
CALCIUM SERPL-MCNC: 9.4 MG/DL (ref 8.6–10)
CHLORIDE SERPL-SCNC: 104 MMOL/L (ref 98–107)
CO2 SERPL-SCNC: 27 MMOL/L (ref 22–29)
CREAT SERPL-MCNC: 1 MG/DL (ref 0.5–1)
EOSINOPHIL # BLD: 0.14 K/UL (ref 0.05–0.5)
EOSINOPHILS RELATIVE PERCENT: 2 % (ref 0–6)
ERYTHROCYTE [DISTWIDTH] IN BLOOD BY AUTOMATED COUNT: 16.4 % (ref 11.5–15)
GFR, ESTIMATED: 68 ML/MIN/1.73M2
GLUCOSE SERPL-MCNC: 181 MG/DL (ref 74–99)
HCT VFR BLD AUTO: 32.8 % (ref 34–48)
HGB BLD-MCNC: 10.4 G/DL (ref 11.5–15.5)
IMM GRANULOCYTES # BLD AUTO: <0.03 K/UL (ref 0–0.58)
IMM GRANULOCYTES NFR BLD: 0 % (ref 0–5)
LYMPHOCYTES NFR BLD: 2.38 K/UL (ref 1.5–4)
LYMPHOCYTES RELATIVE PERCENT: 40 % (ref 20–42)
MCH RBC QN AUTO: 28.5 PG (ref 26–35)
MCHC RBC AUTO-ENTMCNC: 31.7 G/DL (ref 32–34.5)
MCV RBC AUTO: 89.9 FL (ref 80–99.9)
MONOCYTES NFR BLD: 0.57 K/UL (ref 0.1–0.95)
MONOCYTES NFR BLD: 10 % (ref 2–12)
NEUTROPHILS NFR BLD: 47 % (ref 43–80)
NEUTS SEG NFR BLD: 2.81 K/UL (ref 1.8–7.3)
PLATELET # BLD AUTO: 235 K/UL (ref 130–450)
PMV BLD AUTO: 10.7 FL (ref 7–12)
POTASSIUM SERPL-SCNC: 3.3 MMOL/L (ref 3.5–5.1)
PROT SERPL-MCNC: 6.9 G/DL (ref 6.4–8.3)
RBC # BLD AUTO: 3.65 M/UL (ref 3.5–5.5)
SODIUM SERPL-SCNC: 143 MMOL/L (ref 136–145)
TROPONIN I SERPL HS-MCNC: 18 NG/L (ref 0–14)
TROPONIN I SERPL HS-MCNC: 19 NG/L (ref 0–14)
WBC OTHER # BLD: 5.9 K/UL (ref 4.5–11.5)

## 2025-08-14 PROCEDURE — 99285 EMERGENCY DEPT VISIT HI MDM: CPT

## 2025-08-14 PROCEDURE — 71045 X-RAY EXAM CHEST 1 VIEW: CPT

## 2025-08-14 PROCEDURE — 83880 ASSAY OF NATRIURETIC PEPTIDE: CPT

## 2025-08-14 PROCEDURE — 85025 COMPLETE CBC W/AUTO DIFF WBC: CPT

## 2025-08-14 PROCEDURE — 6370000000 HC RX 637 (ALT 250 FOR IP): Performed by: EMERGENCY MEDICINE

## 2025-08-14 PROCEDURE — G0378 HOSPITAL OBSERVATION PER HR: HCPCS

## 2025-08-14 PROCEDURE — 93005 ELECTROCARDIOGRAM TRACING: CPT

## 2025-08-14 PROCEDURE — 2060000000 HC ICU INTERMEDIATE R&B

## 2025-08-14 PROCEDURE — 6370000000 HC RX 637 (ALT 250 FOR IP): Performed by: INTERNAL MEDICINE

## 2025-08-14 PROCEDURE — 84484 ASSAY OF TROPONIN QUANT: CPT

## 2025-08-14 PROCEDURE — 80053 COMPREHEN METABOLIC PANEL: CPT

## 2025-08-14 RX ORDER — ANASTROZOLE 1 MG/1
1 TABLET ORAL DAILY
Status: DISCONTINUED | OUTPATIENT
Start: 2025-08-15 | End: 2025-08-16 | Stop reason: HOSPADM

## 2025-08-14 RX ORDER — METOPROLOL SUCCINATE 25 MG/1
12.5 TABLET, EXTENDED RELEASE ORAL DAILY
Status: DISCONTINUED | OUTPATIENT
Start: 2025-08-15 | End: 2025-08-16 | Stop reason: HOSPADM

## 2025-08-14 RX ORDER — GABAPENTIN 300 MG/1
300 CAPSULE ORAL NIGHTLY
Status: DISCONTINUED | OUTPATIENT
Start: 2025-08-14 | End: 2025-08-16 | Stop reason: HOSPADM

## 2025-08-14 RX ORDER — BUMETANIDE 1 MG/1
1 TABLET ORAL DAILY
Status: DISCONTINUED | OUTPATIENT
Start: 2025-08-14 | End: 2025-08-16 | Stop reason: HOSPADM

## 2025-08-14 RX ORDER — PANTOPRAZOLE SODIUM 40 MG/1
40 TABLET, DELAYED RELEASE ORAL
Status: DISCONTINUED | OUTPATIENT
Start: 2025-08-15 | End: 2025-08-16 | Stop reason: HOSPADM

## 2025-08-14 RX ORDER — SODIUM CHLORIDE 9 MG/ML
INJECTION, SOLUTION INTRAVENOUS PRN
Status: DISCONTINUED | OUTPATIENT
Start: 2025-08-14 | End: 2025-08-16 | Stop reason: HOSPADM

## 2025-08-14 RX ORDER — DEXTROSE MONOHYDRATE 100 MG/ML
INJECTION, SOLUTION INTRAVENOUS CONTINUOUS PRN
Status: DISCONTINUED | OUTPATIENT
Start: 2025-08-14 | End: 2025-08-16 | Stop reason: HOSPADM

## 2025-08-14 RX ORDER — ASPIRIN 325 MG
325 TABLET ORAL ONCE
Status: COMPLETED | OUTPATIENT
Start: 2025-08-14 | End: 2025-08-14

## 2025-08-14 RX ORDER — ACETAMINOPHEN 325 MG/1
650 TABLET ORAL EVERY 6 HOURS PRN
Status: DISCONTINUED | OUTPATIENT
Start: 2025-08-14 | End: 2025-08-16 | Stop reason: HOSPADM

## 2025-08-14 RX ORDER — ACETAMINOPHEN 650 MG/1
650 SUPPOSITORY RECTAL EVERY 6 HOURS PRN
Status: DISCONTINUED | OUTPATIENT
Start: 2025-08-14 | End: 2025-08-16 | Stop reason: HOSPADM

## 2025-08-14 RX ORDER — MAGNESIUM SULFATE IN WATER 40 MG/ML
2000 INJECTION, SOLUTION INTRAVENOUS PRN
Status: DISCONTINUED | OUTPATIENT
Start: 2025-08-14 | End: 2025-08-16 | Stop reason: HOSPADM

## 2025-08-14 RX ORDER — GLUCAGON 1 MG/ML
1 KIT INJECTION PRN
Status: DISCONTINUED | OUTPATIENT
Start: 2025-08-14 | End: 2025-08-16 | Stop reason: HOSPADM

## 2025-08-14 RX ORDER — SUCRALFATE 1 G/1
1 TABLET ORAL 4 TIMES DAILY
Status: DISCONTINUED | OUTPATIENT
Start: 2025-08-14 | End: 2025-08-16 | Stop reason: HOSPADM

## 2025-08-14 RX ORDER — SODIUM CHLORIDE 0.9 % (FLUSH) 0.9 %
5-40 SYRINGE (ML) INJECTION PRN
Status: DISCONTINUED | OUTPATIENT
Start: 2025-08-14 | End: 2025-08-16 | Stop reason: HOSPADM

## 2025-08-14 RX ORDER — POLYETHYLENE GLYCOL 3350 17 G/17G
17 POWDER, FOR SOLUTION ORAL DAILY PRN
Status: DISCONTINUED | OUTPATIENT
Start: 2025-08-14 | End: 2025-08-16 | Stop reason: HOSPADM

## 2025-08-14 RX ORDER — BUSPIRONE HYDROCHLORIDE 5 MG/1
5 TABLET ORAL 2 TIMES DAILY
Status: DISCONTINUED | OUTPATIENT
Start: 2025-08-14 | End: 2025-08-16 | Stop reason: HOSPADM

## 2025-08-14 RX ORDER — SODIUM CHLORIDE 0.9 % (FLUSH) 0.9 %
5-40 SYRINGE (ML) INJECTION EVERY 12 HOURS SCHEDULED
Status: DISCONTINUED | OUTPATIENT
Start: 2025-08-14 | End: 2025-08-16 | Stop reason: HOSPADM

## 2025-08-14 RX ORDER — INSULIN GLARGINE 100 [IU]/ML
35 INJECTION, SOLUTION SUBCUTANEOUS NIGHTLY
Status: DISCONTINUED | OUTPATIENT
Start: 2025-08-14 | End: 2025-08-16 | Stop reason: HOSPADM

## 2025-08-14 RX ADMIN — BUSPIRONE HYDROCHLORIDE 5 MG: 5 TABLET ORAL at 21:06

## 2025-08-14 RX ADMIN — ASPIRIN 325 MG: 325 TABLET ORAL at 18:14

## 2025-08-14 RX ADMIN — GABAPENTIN 300 MG: 300 CAPSULE ORAL at 21:06

## 2025-08-14 RX ADMIN — SUCRALFATE 1 G: 1 TABLET ORAL at 21:06

## 2025-08-14 ASSESSMENT — LIFESTYLE VARIABLES
HOW OFTEN DO YOU HAVE A DRINK CONTAINING ALCOHOL: MONTHLY OR LESS
HOW MANY STANDARD DRINKS CONTAINING ALCOHOL DO YOU HAVE ON A TYPICAL DAY: 1 OR 2

## 2025-08-14 ASSESSMENT — PAIN SCALES - GENERAL: PAINLEVEL_OUTOF10: 5

## 2025-08-14 ASSESSMENT — PAIN DESCRIPTION - LOCATION: LOCATION: CHEST

## 2025-08-14 ASSESSMENT — PAIN - FUNCTIONAL ASSESSMENT: PAIN_FUNCTIONAL_ASSESSMENT: 0-10

## 2025-08-15 ENCOUNTER — APPOINTMENT (OUTPATIENT)
Dept: CT IMAGING | Age: 55
DRG: 198 | End: 2025-08-15
Payer: MEDICAID

## 2025-08-15 ENCOUNTER — APPOINTMENT (OUTPATIENT)
Dept: ULTRASOUND IMAGING | Age: 55
DRG: 198 | End: 2025-08-15
Payer: MEDICAID

## 2025-08-15 ENCOUNTER — APPOINTMENT (OUTPATIENT)
Age: 55
DRG: 198 | End: 2025-08-15
Payer: MEDICAID

## 2025-08-15 LAB
ALBUMIN SERPL-MCNC: 3.8 G/DL (ref 3.5–5.2)
ALP SERPL-CCNC: 119 U/L (ref 35–104)
ALT SERPL-CCNC: 17 U/L (ref 0–35)
ANION GAP SERPL CALCULATED.3IONS-SCNC: 11 MMOL/L (ref 7–16)
AST SERPL-CCNC: 24 U/L (ref 0–35)
BASOPHILS # BLD: 0.02 K/UL (ref 0–0.2)
BASOPHILS NFR BLD: 0 % (ref 0–2)
BILIRUB SERPL-MCNC: 0.2 MG/DL (ref 0–1.2)
BUN SERPL-MCNC: 15 MG/DL (ref 6–20)
CALCIUM SERPL-MCNC: 9.8 MG/DL (ref 8.6–10)
CHLORIDE SERPL-SCNC: 106 MMOL/L (ref 98–107)
CHOLEST SERPL-MCNC: 152 MG/DL
CO2 SERPL-SCNC: 28 MMOL/L (ref 22–29)
CREAT SERPL-MCNC: 0.9 MG/DL (ref 0.5–1)
ECHO AO ASC DIAM: 2.3 CM
ECHO AO ASCENDING AORTA INDEX: 1.25 CM/M2
ECHO AV AREA PEAK VELOCITY: 2.5 CM2
ECHO AV AREA VTI: 2.5 CM2
ECHO AV AREA/BSA PEAK VELOCITY: 1.4 CM2/M2
ECHO AV AREA/BSA VTI: 1.4 CM2/M2
ECHO AV MEAN GRADIENT: 5 MMHG
ECHO AV MEAN VELOCITY: 1.1 M/S
ECHO AV PEAK GRADIENT: 10 MMHG
ECHO AV PEAK VELOCITY: 1.6 M/S
ECHO AV VELOCITY RATIO: 0.81
ECHO AV VTI: 34.7 CM
ECHO BSA: 1.93 M2
ECHO EST RA PRESSURE: 3 MMHG
ECHO IVC PROX: 1.5 CM
ECHO LA AREA 2C: 10.2 CM2
ECHO LA AREA 4C: 13.4 CM2
ECHO LA DIAMETER INDEX: 1.58 CM/M2
ECHO LA DIAMETER: 2.9 CM
ECHO LA VOL A-L A2C: 22 ML (ref 22–52)
ECHO LA VOL A-L A4C: 34 ML (ref 22–52)
ECHO LA VOL BP: 27 ML (ref 22–52)
ECHO LA VOL MOD A2C: 21 ML (ref 22–52)
ECHO LA VOL MOD A4C: 32 ML (ref 22–52)
ECHO LA VOL/BSA BIPLANE: 15 ML/M2 (ref 16–34)
ECHO LA VOLUME AREA LENGTH: 28 ML
ECHO LA VOLUME INDEX A-L A2C: 12 ML/M2 (ref 16–34)
ECHO LA VOLUME INDEX A-L A4C: 18 ML/M2 (ref 16–34)
ECHO LA VOLUME INDEX AREA LENGTH: 15 ML/M2 (ref 16–34)
ECHO LA VOLUME INDEX MOD A2C: 11 ML/M2 (ref 16–34)
ECHO LA VOLUME INDEX MOD A4C: 17 ML/M2 (ref 16–34)
ECHO LV EDV A2C: 74 ML
ECHO LV EDV A4C: 71 ML
ECHO LV EDV BP: 75 ML (ref 56–104)
ECHO LV EDV INDEX A4C: 39 ML/M2
ECHO LV EDV INDEX BP: 41 ML/M2
ECHO LV EDV NDEX A2C: 40 ML/M2
ECHO LV EF PHYSICIAN: 82 %
ECHO LV EJECTION FRACTION A2C: 72 %
ECHO LV EJECTION FRACTION A4C: 61 %
ECHO LV EJECTION FRACTION BIPLANE: 67 % (ref 55–100)
ECHO LV ESV A2C: 20 ML
ECHO LV ESV A4C: 27 ML
ECHO LV ESV BP: 25 ML (ref 19–49)
ECHO LV ESV INDEX A2C: 11 ML/M2
ECHO LV ESV INDEX A4C: 15 ML/M2
ECHO LV ESV INDEX BP: 14 ML/M2
ECHO LV FRACTIONAL SHORTENING: 51 % (ref 28–44)
ECHO LV INTERNAL DIMENSION DIASTOLE INDEX: 2.23 CM/M2
ECHO LV INTERNAL DIMENSION DIASTOLIC: 4.1 CM (ref 3.9–5.3)
ECHO LV INTERNAL DIMENSION SYSTOLIC INDEX: 1.09 CM/M2
ECHO LV INTERNAL DIMENSION SYSTOLIC: 2 CM
ECHO LV ISOVOLUMETRIC RELAXATION TIME (IVRT): 121.8 MS
ECHO LV IVSD: 1 CM (ref 0.6–0.9)
ECHO LV MASS 2D: 132.1 G (ref 67–162)
ECHO LV MASS INDEX 2D: 71.8 G/M2 (ref 43–95)
ECHO LV POSTERIOR WALL DIASTOLIC: 1 CM (ref 0.6–0.9)
ECHO LV RELATIVE WALL THICKNESS RATIO: 0.49
ECHO LVOT AREA: 3.1 CM2
ECHO LVOT AV VTI INDEX: 0.81
ECHO LVOT DIAM: 2 CM
ECHO LVOT MEAN GRADIENT: 4 MMHG
ECHO LVOT PEAK GRADIENT: 6 MMHG
ECHO LVOT PEAK VELOCITY: 1.3 M/S
ECHO LVOT STROKE VOLUME INDEX: 48.1 ML/M2
ECHO LVOT SV: 88.5 ML
ECHO LVOT VTI: 28.2 CM
ECHO MV "A" WAVE DURATION: 125.6 MSEC
ECHO MV A VELOCITY: 0.85 M/S
ECHO MV AREA PHT: 4.1 CM2
ECHO MV AREA VTI: 3.4 CM2
ECHO MV E DECELERATION TIME (DT): 187.3 MS
ECHO MV E VELOCITY: 1.01 M/S
ECHO MV E/A RATIO: 1.19
ECHO MV LVOT VTI INDEX: 0.92
ECHO MV MAX VELOCITY: 1 M/S
ECHO MV MEAN GRADIENT: 2 MMHG
ECHO MV MEAN VELOCITY: 0.7 M/S
ECHO MV PEAK GRADIENT: 4 MMHG
ECHO MV PRESSURE HALF TIME (PHT): 54.2 MS
ECHO MV VTI: 25.9 CM
ECHO PULMONARY ARTERY SYSTOLIC PRESSURE (PASP): 20 MMHG
ECHO PV MAX VELOCITY: 0.9 M/S
ECHO PV MEAN GRADIENT: 2 MMHG
ECHO PV MEAN VELOCITY: 0.7 M/S
ECHO PV PEAK GRADIENT: 3 MMHG
ECHO PV VTI: 19 CM
ECHO PVEIN A DURATION: 110.4 MS
ECHO PVEIN A VELOCITY: 0.3 M/S
ECHO PVEIN PEAK D VELOCITY: 0.6 M/S
ECHO PVEIN PEAK S VELOCITY: 0.5 M/S
ECHO PVEIN S/D RATIO: 0.8
ECHO RIGHT VENTRICULAR SYSTOLIC PRESSURE (RVSP): 17 MMHG
ECHO RV INTERNAL DIMENSION: 2.2 CM
ECHO TV REGURGITANT MAX VELOCITY: 1.87 M/S
ECHO TV REGURGITANT PEAK GRADIENT: 14 MMHG
EKG ATRIAL RATE: 86 BPM
EKG P AXIS: 55 DEGREES
EKG P-R INTERVAL: 138 MS
EKG Q-T INTERVAL: 388 MS
EKG QRS DURATION: 90 MS
EKG QTC CALCULATION (BAZETT): 464 MS
EKG R AXIS: 20 DEGREES
EKG T AXIS: 28 DEGREES
EKG VENTRICULAR RATE: 86 BPM
EOSINOPHIL # BLD: 0.15 K/UL (ref 0.05–0.5)
EOSINOPHILS RELATIVE PERCENT: 3 % (ref 0–6)
ERYTHROCYTE [DISTWIDTH] IN BLOOD BY AUTOMATED COUNT: 16.7 % (ref 11.5–15)
GFR, ESTIMATED: 76 ML/MIN/1.73M2
GLUCOSE BLD-MCNC: 193 MG/DL (ref 74–99)
GLUCOSE BLD-MCNC: 278 MG/DL (ref 74–99)
GLUCOSE BLD-MCNC: 82 MG/DL (ref 74–99)
GLUCOSE BLD-MCNC: 89 MG/DL (ref 74–99)
GLUCOSE SERPL-MCNC: 93 MG/DL (ref 74–99)
HCT VFR BLD AUTO: 36.8 % (ref 34–48)
HDLC SERPL-MCNC: 49 MG/DL
HGB BLD-MCNC: 11.5 G/DL (ref 11.5–15.5)
IMM GRANULOCYTES # BLD AUTO: <0.03 K/UL (ref 0–0.58)
IMM GRANULOCYTES NFR BLD: 0 % (ref 0–5)
LDLC SERPL CALC-MCNC: 83 MG/DL
LYMPHOCYTES NFR BLD: 2.34 K/UL (ref 1.5–4)
LYMPHOCYTES RELATIVE PERCENT: 42 % (ref 20–42)
MAGNESIUM SERPL-MCNC: 1.9 MG/DL (ref 1.6–2.6)
MCH RBC QN AUTO: 28.8 PG (ref 26–35)
MCHC RBC AUTO-ENTMCNC: 31.3 G/DL (ref 32–34.5)
MCV RBC AUTO: 92.2 FL (ref 80–99.9)
MONOCYTES NFR BLD: 0.56 K/UL (ref 0.1–0.95)
MONOCYTES NFR BLD: 10 % (ref 2–12)
NEUTROPHILS NFR BLD: 45 % (ref 43–80)
NEUTS SEG NFR BLD: 2.56 K/UL (ref 1.8–7.3)
PHOSPHATE SERPL-MCNC: 3.6 MG/DL (ref 2.5–4.5)
PLATELET # BLD AUTO: 240 K/UL (ref 130–450)
PMV BLD AUTO: 10.8 FL (ref 7–12)
POTASSIUM SERPL-SCNC: 4.1 MMOL/L (ref 3.5–5.1)
PROT SERPL-MCNC: 7.5 G/DL (ref 6.4–8.3)
RBC # BLD AUTO: 3.99 M/UL (ref 3.5–5.5)
SODIUM SERPL-SCNC: 145 MMOL/L (ref 136–145)
T4 FREE SERPL-MCNC: 1.1 NG/DL (ref 0.9–1.7)
TRIGL SERPL-MCNC: 99 MG/DL
TSH SERPL DL<=0.05 MIU/L-ACNC: 3.54 UIU/ML (ref 0.27–4.2)
VLDLC SERPL CALC-MCNC: 20 MG/DL
WBC OTHER # BLD: 5.6 K/UL (ref 4.5–11.5)

## 2025-08-15 PROCEDURE — 85025 COMPLETE CBC W/AUTO DIFF WBC: CPT

## 2025-08-15 PROCEDURE — 6370000000 HC RX 637 (ALT 250 FOR IP): Performed by: INTERNAL MEDICINE

## 2025-08-15 PROCEDURE — APPSS60 APP SPLIT SHARED TIME 46-60 MINUTES: Performed by: PHYSICIAN ASSISTANT

## 2025-08-15 PROCEDURE — 74177 CT ABD & PELVIS W/CONTRAST: CPT

## 2025-08-15 PROCEDURE — 93970 EXTREMITY STUDY: CPT

## 2025-08-15 PROCEDURE — 84100 ASSAY OF PHOSPHORUS: CPT

## 2025-08-15 PROCEDURE — 83735 ASSAY OF MAGNESIUM: CPT

## 2025-08-15 PROCEDURE — 84439 ASSAY OF FREE THYROXINE: CPT

## 2025-08-15 PROCEDURE — 82962 GLUCOSE BLOOD TEST: CPT

## 2025-08-15 PROCEDURE — G0378 HOSPITAL OBSERVATION PER HR: HCPCS

## 2025-08-15 PROCEDURE — 36415 COLL VENOUS BLD VENIPUNCTURE: CPT

## 2025-08-15 PROCEDURE — 80053 COMPREHEN METABOLIC PANEL: CPT

## 2025-08-15 PROCEDURE — 99222 1ST HOSP IP/OBS MODERATE 55: CPT | Performed by: INTERNAL MEDICINE

## 2025-08-15 PROCEDURE — 80061 LIPID PANEL: CPT

## 2025-08-15 PROCEDURE — 2060000000 HC ICU INTERMEDIATE R&B

## 2025-08-15 PROCEDURE — 71260 CT THORAX DX C+: CPT

## 2025-08-15 PROCEDURE — 2500000003 HC RX 250 WO HCPCS: Performed by: INTERNAL MEDICINE

## 2025-08-15 PROCEDURE — 6360000004 HC RX CONTRAST MEDICATION

## 2025-08-15 PROCEDURE — 93306 TTE W/DOPPLER COMPLETE: CPT | Performed by: INTERNAL MEDICINE

## 2025-08-15 PROCEDURE — 84443 ASSAY THYROID STIM HORMONE: CPT

## 2025-08-15 PROCEDURE — 6360000004 HC RX CONTRAST MEDICATION: Performed by: RADIOLOGY

## 2025-08-15 PROCEDURE — C8929 TTE W OR WO FOL WCON,DOPPLER: HCPCS

## 2025-08-15 RX ORDER — IOPAMIDOL 755 MG/ML
70 INJECTION, SOLUTION INTRAVASCULAR
Status: COMPLETED | OUTPATIENT
Start: 2025-08-15 | End: 2025-08-15

## 2025-08-15 RX ADMIN — SUCRALFATE 1 G: 1 TABLET ORAL at 21:31

## 2025-08-15 RX ADMIN — SODIUM CHLORIDE, PRESERVATIVE FREE 10 ML: 5 INJECTION INTRAVENOUS at 08:31

## 2025-08-15 RX ADMIN — SUCRALFATE 1 G: 1 TABLET ORAL at 08:38

## 2025-08-15 RX ADMIN — SODIUM CHLORIDE, PRESERVATIVE FREE 10 ML: 5 INJECTION INTRAVENOUS at 21:34

## 2025-08-15 RX ADMIN — BUSPIRONE HYDROCHLORIDE 5 MG: 5 TABLET ORAL at 21:31

## 2025-08-15 RX ADMIN — SUCRALFATE 1 G: 1 TABLET ORAL at 17:01

## 2025-08-15 RX ADMIN — IOPAMIDOL 70 ML: 755 INJECTION, SOLUTION INTRAVENOUS at 15:20

## 2025-08-15 RX ADMIN — GABAPENTIN 300 MG: 300 CAPSULE ORAL at 21:32

## 2025-08-15 RX ADMIN — BUMETANIDE 1 MG: 1 TABLET ORAL at 08:37

## 2025-08-15 RX ADMIN — ANASTROZOLE 1 MG: 1 TABLET ORAL at 08:38

## 2025-08-15 RX ADMIN — PANTOPRAZOLE SODIUM 40 MG: 40 TABLET, DELAYED RELEASE ORAL at 05:51

## 2025-08-15 RX ADMIN — SUCRALFATE 1 G: 1 TABLET ORAL at 12:49

## 2025-08-15 RX ADMIN — SULFUR HEXAFLUORIDE 2 ML: 60.7; .19; .19 INJECTION, POWDER, LYOPHILIZED, FOR SUSPENSION INTRAVENOUS; INTRAVESICAL at 14:25

## 2025-08-15 RX ADMIN — METOPROLOL SUCCINATE 12.5 MG: 25 TABLET, EXTENDED RELEASE ORAL at 08:37

## 2025-08-15 RX ADMIN — INSULIN GLARGINE 35 UNITS: 100 INJECTION, SOLUTION SUBCUTANEOUS at 22:22

## 2025-08-15 RX ADMIN — BUSPIRONE HYDROCHLORIDE 5 MG: 5 TABLET ORAL at 08:37

## 2025-08-15 ASSESSMENT — PAIN SCALES - GENERAL
PAINLEVEL_OUTOF10: 0

## 2025-08-16 VITALS
TEMPERATURE: 97.9 F | RESPIRATION RATE: 18 BRPM | HEIGHT: 59 IN | BODY MASS INDEX: 39.92 KG/M2 | WEIGHT: 198 LBS | DIASTOLIC BLOOD PRESSURE: 71 MMHG | HEART RATE: 75 BPM | OXYGEN SATURATION: 99 % | SYSTOLIC BLOOD PRESSURE: 111 MMHG

## 2025-08-16 DIAGNOSIS — I10 ESSENTIAL HYPERTENSION: ICD-10-CM

## 2025-08-16 DIAGNOSIS — E11.65 UNCONTROLLED TYPE 2 DIABETES MELLITUS WITH HYPERGLYCEMIA (HCC): ICD-10-CM

## 2025-08-16 LAB
ALBUMIN SERPL-MCNC: 3.8 G/DL (ref 3.5–5.2)
ALP SERPL-CCNC: 118 U/L (ref 35–104)
ALT SERPL-CCNC: 18 U/L (ref 0–35)
ANION GAP SERPL CALCULATED.3IONS-SCNC: 12 MMOL/L (ref 7–16)
AST SERPL-CCNC: 24 U/L (ref 0–35)
BASOPHILS # BLD: 0.02 K/UL (ref 0–0.2)
BASOPHILS NFR BLD: 0 % (ref 0–2)
BILIRUB SERPL-MCNC: 0.3 MG/DL (ref 0–1.2)
BILIRUB UR QL STRIP: NEGATIVE
BUN SERPL-MCNC: 13 MG/DL (ref 6–20)
CALCIUM SERPL-MCNC: 9.7 MG/DL (ref 8.6–10)
CHLORIDE SERPL-SCNC: 102 MMOL/L (ref 98–107)
CLARITY UR: CLEAR
CO2 SERPL-SCNC: 26 MMOL/L (ref 22–29)
COLOR UR: YELLOW
CREAT SERPL-MCNC: 0.9 MG/DL (ref 0.5–1)
EOSINOPHIL # BLD: 0.19 K/UL (ref 0.05–0.5)
EOSINOPHILS RELATIVE PERCENT: 3 % (ref 0–6)
ERYTHROCYTE [DISTWIDTH] IN BLOOD BY AUTOMATED COUNT: 16.6 % (ref 11.5–15)
GFR, ESTIMATED: 75 ML/MIN/1.73M2
GLUCOSE SERPL-MCNC: 95 MG/DL (ref 74–99)
GLUCOSE UR STRIP-MCNC: NEGATIVE MG/DL
HCT VFR BLD AUTO: 38.2 % (ref 34–48)
HGB BLD-MCNC: 11.9 G/DL (ref 11.5–15.5)
HGB UR QL STRIP.AUTO: NEGATIVE
IMM GRANULOCYTES # BLD AUTO: <0.03 K/UL (ref 0–0.58)
IMM GRANULOCYTES NFR BLD: 0 % (ref 0–5)
KETONES UR STRIP-MCNC: NEGATIVE MG/DL
LEUKOCYTE ESTERASE UR QL STRIP: NEGATIVE
LYMPHOCYTES NFR BLD: 1.97 K/UL (ref 1.5–4)
LYMPHOCYTES RELATIVE PERCENT: 33 % (ref 20–42)
MCH RBC QN AUTO: 28.5 PG (ref 26–35)
MCHC RBC AUTO-ENTMCNC: 31.2 G/DL (ref 32–34.5)
MCV RBC AUTO: 91.6 FL (ref 80–99.9)
MONOCYTES NFR BLD: 0.57 K/UL (ref 0.1–0.95)
MONOCYTES NFR BLD: 9 % (ref 2–12)
NEUTROPHILS NFR BLD: 54 % (ref 43–80)
NEUTS SEG NFR BLD: 3.29 K/UL (ref 1.8–7.3)
NITRITE UR QL STRIP: NEGATIVE
PH UR STRIP: 5.5 [PH] (ref 5–8)
PLATELET # BLD AUTO: 239 K/UL (ref 130–450)
PMV BLD AUTO: 10.8 FL (ref 7–12)
POTASSIUM SERPL-SCNC: 4 MMOL/L (ref 3.5–5.1)
PROT SERPL-MCNC: 7.3 G/DL (ref 6.4–8.3)
PROT UR STRIP-MCNC: NEGATIVE MG/DL
RBC # BLD AUTO: 4.17 M/UL (ref 3.5–5.5)
RBC #/AREA URNS HPF: ABNORMAL /HPF
SODIUM SERPL-SCNC: 140 MMOL/L (ref 136–145)
SP GR UR STRIP: <1.005 (ref 1–1.03)
UROBILINOGEN UR STRIP-ACNC: 0.2 EU/DL (ref 0–1)
WBC #/AREA URNS HPF: ABNORMAL /HPF
WBC OTHER # BLD: 6.1 K/UL (ref 4.5–11.5)

## 2025-08-16 PROCEDURE — 80053 COMPREHEN METABOLIC PANEL: CPT

## 2025-08-16 PROCEDURE — 2500000003 HC RX 250 WO HCPCS: Performed by: INTERNAL MEDICINE

## 2025-08-16 PROCEDURE — 99231 SBSQ HOSP IP/OBS SF/LOW 25: CPT | Performed by: INTERNAL MEDICINE

## 2025-08-16 PROCEDURE — 85025 COMPLETE CBC W/AUTO DIFF WBC: CPT

## 2025-08-16 PROCEDURE — G0378 HOSPITAL OBSERVATION PER HR: HCPCS

## 2025-08-16 PROCEDURE — 36415 COLL VENOUS BLD VENIPUNCTURE: CPT

## 2025-08-16 PROCEDURE — 6370000000 HC RX 637 (ALT 250 FOR IP): Performed by: INTERNAL MEDICINE

## 2025-08-16 PROCEDURE — 81001 URINALYSIS AUTO W/SCOPE: CPT

## 2025-08-16 RX ORDER — METOLAZONE 5 MG/1
5 TABLET ORAL
Qty: 8 TABLET | Refills: 1 | Status: SHIPPED | OUTPATIENT
Start: 2025-08-18

## 2025-08-16 RX ADMIN — METOPROLOL SUCCINATE 12.5 MG: 25 TABLET, EXTENDED RELEASE ORAL at 08:44

## 2025-08-16 RX ADMIN — BUSPIRONE HYDROCHLORIDE 5 MG: 5 TABLET ORAL at 08:45

## 2025-08-16 RX ADMIN — SODIUM CHLORIDE, PRESERVATIVE FREE 10 ML: 5 INJECTION INTRAVENOUS at 10:00

## 2025-08-16 RX ADMIN — SUCRALFATE 1 G: 1 TABLET ORAL at 12:57

## 2025-08-16 RX ADMIN — BUMETANIDE 1 MG: 1 TABLET ORAL at 08:44

## 2025-08-16 RX ADMIN — ANASTROZOLE 1 MG: 1 TABLET ORAL at 08:45

## 2025-08-16 RX ADMIN — SUCRALFATE 1 G: 1 TABLET ORAL at 08:44

## 2025-08-16 RX ADMIN — PANTOPRAZOLE SODIUM 40 MG: 40 TABLET, DELAYED RELEASE ORAL at 05:58

## 2025-08-16 ASSESSMENT — PAIN SCALES - GENERAL: PAINLEVEL_OUTOF10: 0

## 2025-08-18 RX ORDER — GLUCOSAM/CHON-MSM1/C/MANG/BOSW 500-416.6
TABLET ORAL
Qty: 100 EACH | Refills: 2 | Status: SHIPPED | OUTPATIENT
Start: 2025-08-18

## 2025-08-18 RX ORDER — BUMETANIDE 1 MG/1
1 TABLET ORAL DAILY
Qty: 90 TABLET | Refills: 0 | Status: SHIPPED | OUTPATIENT
Start: 2025-08-18

## 2025-08-19 LAB
EKG ATRIAL RATE: 86 BPM
EKG P AXIS: 55 DEGREES
EKG P-R INTERVAL: 138 MS
EKG Q-T INTERVAL: 388 MS
EKG QRS DURATION: 90 MS
EKG QTC CALCULATION (BAZETT): 464 MS
EKG R AXIS: 20 DEGREES
EKG T AXIS: 28 DEGREES
EKG VENTRICULAR RATE: 86 BPM

## 2025-09-02 DIAGNOSIS — G62.9 NEUROPATHY: ICD-10-CM

## 2025-09-03 ENCOUNTER — TELEPHONE (OUTPATIENT)
Dept: FAMILY MEDICINE CLINIC | Age: 55
End: 2025-09-03

## 2025-09-03 RX ORDER — GABAPENTIN 300 MG/1
300 CAPSULE ORAL NIGHTLY
Qty: 30 CAPSULE | Refills: 0 | Status: SHIPPED | OUTPATIENT
Start: 2025-09-03 | End: 2025-09-03 | Stop reason: SDUPTHER

## (undated) DEVICE — CUFF TOURNIQUET 18 SNG BLADDER DUAL PORT

## (undated) DEVICE — STOCKINETTE: Brand: CONVERTORS

## (undated) DEVICE — [AGGRESSIVE 6-FLUTE BARREL BUR, ARTHROSCOPIC SHAVER BLADE,  DO NOT RESTERILIZE,  DO NOT USE IF PACKAGE IS DAMAGED,  KEEP DRY,  KEEP AWAY FROM SUNLIGHT]: Brand: FORMULA

## (undated) DEVICE — ELECTRODE PT RET AD L9FT HI MOIST COND ADH HYDRGEL CORDED

## (undated) DEVICE — TUBING, SUCTION, 1/4" X 10', STRAIGHT: Brand: MEDLINE

## (undated) DEVICE — PUMP SUC IRR TBNG L10FT W/ HNDPC ASSEMB STRYKEFLOW 2

## (undated) DEVICE — Device

## (undated) DEVICE — BASIC DOUBLE BASIN 2-LF: Brand: MEDLINE INDUSTRIES, INC.

## (undated) DEVICE — NEEDLE HYPO 25GA L1.5IN BLU POLYPR HUB S STL REG BVL STR

## (undated) DEVICE — GAUZE,SPONGE,4"X4",16PLY,XRAY,STRL,LF: Brand: MEDLINE

## (undated) DEVICE — PMI PTFE COATED LAPAROSCOPIC WIRE L-HOOK 44 CM: Brand: PMI

## (undated) DEVICE — GLOVE ORTHO 8   MSG9480

## (undated) DEVICE — MARKER RAD KNEE TIB CKPT STEREOTAXIC IMAG LESION LOC

## (undated) DEVICE — GOWN SURG XL SMS FAB NONREINFORCED RAGLAN SLV HK LOOP CLSR

## (undated) DEVICE — MARKER,SKIN,WI/RULER AND LABELS: Brand: MEDLINE

## (undated) DEVICE — NEEDLE HYPO 18GA L1.5IN PNK POLYPR HUB S STL THN WALL FILL

## (undated) DEVICE — 20 ML SYRINGE REGULAR TIP: Brand: MONOJECT

## (undated) DEVICE — PEN: MARKING STD 100/CS: Brand: MEDICAL ACTION INDUSTRIES

## (undated) DEVICE — STOCKINETTE COMPR W4XL54IN 2 PLY COT

## (undated) DEVICE — TOWEL,OR,DSP,ST,BLUE,STD,6/PK,12PK/CS: Brand: MEDLINE

## (undated) DEVICE — SUTURE ETHLN SZ 4-0 L18IN NONABSORBABLE BLK L19MM PS-2 3/8 1667H

## (undated) DEVICE — KENDALL 450 SERIES MONITORING FOAM ELECTRODE - RECTANGULAR SHAPE ( 3/PK): Brand: KENDALL

## (undated) DEVICE — SHEET,DRAPE,40X58,STERILE: Brand: MEDLINE

## (undated) DEVICE — PACK,UNIVERSAL,NO GOWNS: Brand: MEDLINE

## (undated) DEVICE — PATIENT RETURN ELECTRODE, SINGLE-USE, CONTACT QUALITY MONITORING, ADULT, WITH 9FT CORD, FOR PATIENTS WEIGING OVER 33LBS. (15KG): Brand: MEGADYNE

## (undated) DEVICE — SKIN AFFIX SURG ADHESIVE 72/CS 0.55ML: Brand: MEDLINE

## (undated) DEVICE — ELECTRODE NDL TOT L2.13IN EXPOSED L3CM ACT L3MM TIP

## (undated) DEVICE — BASIC PACK: Brand: CONVERTORS

## (undated) DEVICE — CAMERA STRYKER 1488 HD GEN

## (undated) DEVICE — DRESSING PETRO W3XL3IN OIL EMUL N ADH GZ KNIT IMPREG CELOS

## (undated) DEVICE — GLOVE ORTHO 7   MSG9470

## (undated) DEVICE — BANDAGE COMPR W4INXL5YD WHT BGE POLY COT M E WRP WV HK AND

## (undated) DEVICE — DOUBLE BASIN SET: Brand: MEDLINE INDUSTRIES, INC.

## (undated) DEVICE — SET ENDO INSTR LAPAROSCOPIC INCISIONAL

## (undated) DEVICE — CATHETER IV 20GA L1.16IN OD1.080MM ID0.800MM 60ML/MIN PNK

## (undated) DEVICE — GARMENT,MEDLINE,DVT,INT,CALF,MED, GEN2: Brand: MEDLINE

## (undated) DEVICE — BLADE ES ELASTOMERIC COAT INSUL DURABLE BEND UPTO 90DEG

## (undated) DEVICE — PAD N ADH W3XL4IN POLY COT SFT PERF FLM EASILY CUT ABSRB

## (undated) DEVICE — STANDARD HYPODERMIC NEEDLE,POLYPROPYLENE HUB: Brand: MONOJECT

## (undated) DEVICE — GLOVE SURG SZ 7 L12IN FNGR THK79MIL GRN LTX FREE

## (undated) DEVICE — PROBE GAM TRUNODE DISP EACH

## (undated) DEVICE — BLADE OPHTH GRN ROUNDED TIP 1 SIDE SHRP GRINDLESS MINI-BLDE

## (undated) DEVICE — BANDAGE,GAUZE,BULKEE II,4.5"X4.1YD,STRL: Brand: MEDLINE

## (undated) DEVICE — GLOVE ORANGE PI 8   MSG9080

## (undated) DEVICE — SOLUTION IV 1000 ML 0.9 NACL INJ USP EXCEL PLAS CONTAINER

## (undated) DEVICE — BOOT POS LEG DEMAYO

## (undated) DEVICE — PAD,ABDOMINAL,8"X10",ST,LF: Brand: MEDLINE

## (undated) DEVICE — PIN BNE FIX TEMP L140MM DIA4MM MAKO

## (undated) DEVICE — SET ENDO INSTR RED YEL LAPAROSCOPIC

## (undated) DEVICE — BANDAGE,SELF ADHRNT,COFLEX,4"X5YD,STRL: Brand: COLABEL

## (undated) DEVICE — 4-PORT MANIFOLD: Brand: NEPTUNE 2

## (undated) DEVICE — RECIPROCATING BLADE DOUBLE SIDE (74.0 X 0.77MM)

## (undated) DEVICE — 450 ML BOTTLE OF 0.05% CHLORHEXIDINE GLUCONATE IN 99.95% STERILE WATER FOR IRRIGATION, USP AND APPLICATOR.: Brand: IRRISEPT ANTIMICROBIAL WOUND LAVAGE

## (undated) DEVICE — SPONGE GZ 4IN 4IN 4 PLY N WVN AVANT

## (undated) DEVICE — 2108 SERIES SAGITTAL BLADE (24.8 X 0.88 X 90.5MM)

## (undated) DEVICE — SOLUTION SCRB 32OZ 7.5% POVIDONE IOD BTL GENTLE EFFECTIVE

## (undated) DEVICE — SOLUTION IV IRRIG POUR BRL 0.9% SODIUM CHL 2F7124

## (undated) DEVICE — 6 X 9  1.75MIL 4-WALL LABGUARD: Brand: MINIGRIP COMMERCIAL LLC

## (undated) DEVICE — STRIP,CLOSURE,WOUND,MEDI-STRIP,1/2X4: Brand: MEDLINE

## (undated) DEVICE — SOLUTION IV IRRIG LACTATED RINGERS 3000ML 2B7487

## (undated) DEVICE — SUTURE STRATAFIX SYMMETRIC SZ 1 L18IN ABSRB VLT CT1 L36CM SXPP1A404

## (undated) DEVICE — MARKER RAD 3.5MM HEX CKPT STEREOTAXIC IMAG LESION LOC FOR

## (undated) DEVICE — GLOVE ORANGE PI 7 1/2   MSG9075

## (undated) DEVICE — BLOCK BITE 60FR CAREGUARD

## (undated) DEVICE — TOWEL OR BLUEE 16X26IN ST 8 PACK ORB08 16X26ORTWL

## (undated) DEVICE — NEEDLE SPNL L3.5IN PNK HUB S STL REG WALL FIT STYL W/ QNCKE

## (undated) DEVICE — CHLORAPREP 26ML ORANGE

## (undated) DEVICE — INTENDED FOR TISSUE SEPARATION, AND OTHER PROCEDURES THAT REQUIRE A SHARP SURGICAL BLADE TO PUNCTURE OR CUT.: Brand: BARD-PARKER ® STAINLESS STEEL BLADES

## (undated) DEVICE — DRAPE THER FLUID WARMING 66X44 IN FLAT SLUSH DBL DISC ORS

## (undated) DEVICE — GAUZE,SPONGE,4"X4",16PLY,STRL,LF,10/TRAY: Brand: MEDLINE

## (undated) DEVICE — WIPES SKIN CLOTH READYPREP 9 X 10.5 IN 2% CHLORHEX GLUCONATE CHG PREOP

## (undated) DEVICE — SYRINGE MED 10ML LUERLOCK TIP W/O SFTY DISP

## (undated) DEVICE — PENCIL ES L3M BTTN SWCH HOLSTER W/ BLDE ELECTRD EDGE

## (undated) DEVICE — BANDAGE COMPR M W6INXL10YD WHT BGE VELC E MTRX HK AND LOOP

## (undated) DEVICE — BANDAGE COMPR W2INXL5YD WHT BGE POLY COT M E WRP WV HK AND

## (undated) DEVICE — BLADE OPHTH STRL LTX FREE DISP

## (undated) DEVICE — TUBING PMP L16FT MAIN DISP FOR AR-6400 AR-6475

## (undated) DEVICE — BLADE SURG SAW STD S STL OSC W/ SERR EDGE DISP

## (undated) DEVICE — APPLICATOR MEDICATED 26 CC SOLUTION HI LT ORNG CHLORAPREP

## (undated) DEVICE — RECIPROCATING BLADE, DOUBLE SIDED, OFFSET  (70.0 X 0.8 X 12.5MM)

## (undated) DEVICE — SUPER TURBOVAC 90 INTEGRATED CABLE WAND ICW: Brand: COBLATION

## (undated) DEVICE — BLADE,STAINLESS-STEEL,15,STRL,DISPOSABLE: Brand: MEDLINE

## (undated) DEVICE — ORTHOMAX TOTAL HIP PACK: Brand: MEDLINE INDUSTRIES, INC.

## (undated) DEVICE — BLADE SAW AGGRESSIVE 5.5 MM CUT

## (undated) DEVICE — HYPODERMIC SAFETY NEEDLE: Brand: MAGELLAN

## (undated) DEVICE — MEDIA CONTRAST ISOVUE GLASS VIALS 250 50ML

## (undated) DEVICE — GLOVE ORANGE PI 8 1/2   MSG9085

## (undated) DEVICE — CONTROL SYRINGE LUER-LOCK TIP: Brand: MONOJECT

## (undated) DEVICE — HANDPIECE SET WITH BONE CLEANING TIP: Brand: INTERPULSE

## (undated) DEVICE — SYRINGE MED 50ML LUERLOCK TIP

## (undated) DEVICE — CONTAINER SPEC COLL 960ML POLYPR TRIANG GRAD INTAKE/OUTPUT

## (undated) DEVICE — KIT TRK KNEE PROC VIZADISC

## (undated) DEVICE — FORCEPS BX L240CM JAW DIA2.8MM L CAP W/ NDL MIC MESH TOOTH

## (undated) DEVICE — 1810 FOAM BLOCK NEEDLE COUNTER: Brand: DEVON

## (undated) DEVICE — DRESSING BORDERED ADH GZ UNIV GEN USE 5IN 4IN AND 2 1/2IN

## (undated) DEVICE — KIT DRP FOR RIO ROBOTIC ARM ASST SYS

## (undated) DEVICE — GOWN,SIRUS,FABRNF,L,20/CS: Brand: MEDLINE

## (undated) DEVICE — APPLIER CLP M L L11.4IN DIA10MM ENDOSCP ROT MULT FOR LIG

## (undated) DEVICE — SYRINGE MED 10ML TRNSLUC BRL PLUNG BLK MRK POLYPR CTRL

## (undated) DEVICE — PACK SURG LAP CHOLE CUSTOM

## (undated) DEVICE — GLOVE ORANGE PI 7   MSG9070

## (undated) DEVICE — PLUMEPORT LAPAROSCOPIC SMOKE FILTRATION DEVICE: Brand: PLUMEPORT ACTIV

## (undated) DEVICE — PMI PTFE COATED LAPAROSCOPIC WIRE L-HOOK 33 CM: Brand: PMI

## (undated) DEVICE — BANDAGE COMPR FOAM 5 YDX6 IN TAN STRL COFLX

## (undated) DEVICE — HANDLE CVR PATENTED RETENTION DISC STRL LIGHT SHLD

## (undated) DEVICE — DRESSING GZ W1XL8IN COT XRFRM N ADH OVERWRAP CURAD

## (undated) DEVICE — 3 ML SYRINGE LUER-LOCK TIP: Brand: MONOJECT

## (undated) DEVICE — GUIDE WIRE, BALL-TIPPED, STERILE

## (undated) DEVICE — MEDI-VAC YANKAUER SUCTION HANDLE W/BULBOUS TIP: Brand: CARDINAL HEALTH

## (undated) DEVICE — LAPAROSCOPIC SCISSORS: Brand: EPIX LAPAROSCOPIC SCISSORS

## (undated) DEVICE — SURGICAL PROCEDURE PACK BASIC

## (undated) DEVICE — GOWN,SIRUS,POLYRNF,BRTHSLV,XLN/XL,20/CS: Brand: MEDLINE

## (undated) DEVICE — Z DISCONTINUED NO SUB IDED BAG SPEC RETRV M C240ML MOUTH 7.3MM L17CM SHFT 10MM NYL EZEE

## (undated) DEVICE — Z DISCONTINUED USE 2275686 GLOVE SURG SZ 8 L12IN FNGR THK13MIL WHT ISOLEX POLYISOPRENE

## (undated) DEVICE — KIT BEDSIDE REVITAL OX 500ML

## (undated) DEVICE — LUBRICANT SURG JELLY ST BACTER TUBE 4.25OZ

## (undated) DEVICE — CORD RETRCT SIL - ORDER MULTIPLES OF 10 EACH

## (undated) DEVICE — MEDI-VAC NON-CONDUCTIVE SUCTION TUBING: Brand: CARDINAL HEALTH

## (undated) DEVICE — COOK ENDOSCOPIC CHOLANGIOGRAPHY SET: Brand: COOK

## (undated) DEVICE — GOWN,SIRUS,NONRNF,SETINSLV,XL,20/CS: Brand: MEDLINE

## (undated) DEVICE — GOWN ISOLATN REG YEL M WT MULTIPLY SIDETIE LEV 2

## (undated) DEVICE — PAD,ABDOMINAL,5"X9",ST,LF,25/BX: Brand: MEDLINE INDUSTRIES, INC.

## (undated) DEVICE — KIT TRK HIP PROC VIZADISC

## (undated) DEVICE — Z INACTIVE USE 2660664 SOLUTION IRRIG 3000ML 0.9% SOD CHL USP UROMATIC PLAS CONT

## (undated) DEVICE — NEPTUNE E-SEP SMOKE EVACUATION PENCIL, COATED, 70MM BLADE, PUSH BUTTON SWITCH: Brand: NEPTUNE E-SEP

## (undated) DEVICE — SET SURG INSTR DISSECT

## (undated) DEVICE — SLEEVE TRAC SPANDEX LAT W/ 4IN COBAN SUPERFICIAL RAD NRV PD

## (undated) DEVICE — TROCAR ENDOSCP L100MM DIA12MM BLDELSS OBT RADLUC STBL SL

## (undated) DEVICE — DRESSING HYDROFIBER AQUACEL AG ADVANTAGE 3.5X12 IN

## (undated) DEVICE — TROCAR ENDOSCP L100MM DIA5MM BLDELSS STBL SL OBT RADLUC

## (undated) DEVICE — NDL CNTR 40CT FM MAG: Brand: MEDLINE INDUSTRIES, INC.

## (undated) DEVICE — APPLIER LIG CLP M L11IN TI STR RNG HNDL FOR 20 CLP DISP

## (undated) DEVICE — TIBURON EXTREMITY SHEET: Brand: CONVERTORS

## (undated) DEVICE — BANDAGE COMPR W4XL108IN WHT LAYERED NO CLSR SYN RUB ESMARCH

## (undated) DEVICE — TUBING, SUCTION, 3/16" X 12', STRAIGHT: Brand: MEDLINE

## (undated) DEVICE — Z DISCONTINUED NO SUB IDED DRAIN PENROSE L12IN 0.25IN USED TO PROMOTE DRNAGE IN OPN

## (undated) DEVICE — KIT INT FIX FEM TIB CKPT MAKOPLASTY

## (undated) DEVICE — PLASMABLADE PS210-030S 3.0S LOCK: Brand: PLASMABLADE™

## (undated) DEVICE — MASK,FACE,MAXFLUIDPROTECT,SHIELD/ERLPS: Brand: MEDLINE

## (undated) DEVICE — GLOVE SURG L12IN SZ 65FNGR THK94MIL TRNSLUC YEL LTX

## (undated) DEVICE — SOLUTION WND IRRIGATION 450 ML 0.5 PVP-I 0.9 NACL

## (undated) DEVICE — DRAPE,SHOULDER,ORTHOMAX,W/POUCH,5/CS: Brand: MEDLINE

## (undated) DEVICE — SET INST MINOR PROCEDURE

## (undated) DEVICE — KIT SURG W7XL11IN 2 PKT UNTREATED NA

## (undated) DEVICE — 12 ML SYRINGE,LUER-LOCK TIP: Brand: MONOJECT

## (undated) DEVICE — SUTURE N ABSRB 5-0 1.7 MM W/NDL TAPE WHT BLU AR7511

## (undated) DEVICE — Device: Brand: DEFENDO VALVE AND CONNECTOR KIT

## (undated) DEVICE — COVER,LIGHT HANDLE,FLX,1/PK: Brand: MEDLINE INDUSTRIES, INC.

## (undated) DEVICE — NEEDLE INSUF L120MM DIA2MM DISP FOR PNEUMOPERI ENDOPATH

## (undated) DEVICE — DRAPE 64X41IN RADIOLOGY C ARM EQUIP STER

## (undated) DEVICE — DRAPE,ORTHOMAX ,HIP,W/POUCHES: Brand: MEDLINE

## (undated) DEVICE — BLADE SAW AGRSV + CUT 4MM

## (undated) DEVICE — GOWN,SIRUS,FABRNF,XL,20/CS: Brand: MEDLINE

## (undated) DEVICE — STANDARD HYPODERMIC NEEDLE,ALUMINUM HUB: Brand: MONOJECT

## (undated) DEVICE — SOLUTION IRRIG 500ML 0.9% SOD CHLO USP POUR PLAS BTL

## (undated) DEVICE — SOLUTION IRRIG 3000ML 0.9% SOD CHL USP UROMATIC PLAS CONT

## (undated) DEVICE — SUTURE ABSORBABLE MONOFILAMENT 4-0 PS2 27 IN UD MONOCRYL + SXMP1B119

## (undated) DEVICE — [HIGH FLOW INSUFFLATOR,  DO NOT USE IF PACKAGE IS DAMAGED,  KEEP DRY,  KEEP AWAY FROM SUNLIGHT,  PROTECT FROM HEAT AND RADIOACTIVE SOURCES.]: Brand: PNEUMOSURE